# Patient Record
Sex: FEMALE | Race: WHITE | NOT HISPANIC OR LATINO | Employment: OTHER | URBAN - METROPOLITAN AREA
[De-identification: names, ages, dates, MRNs, and addresses within clinical notes are randomized per-mention and may not be internally consistent; named-entity substitution may affect disease eponyms.]

---

## 2017-01-06 ENCOUNTER — OFFICE VISIT (OUTPATIENT)
Dept: PSYCHOLOGY | Facility: CLINIC | Age: 47
End: 2017-01-06
Payer: COMMERCIAL

## 2017-01-06 DIAGNOSIS — F41.1 GENERALIZED ANXIETY DISORDER: ICD-10-CM

## 2017-01-06 DIAGNOSIS — F33.0 MAJOR DEPRESSIVE DISORDER, RECURRENT EPISODE, MILD (H): ICD-10-CM

## 2017-01-06 DIAGNOSIS — F43.10 PTSD (POST-TRAUMATIC STRESS DISORDER): Primary | ICD-10-CM

## 2017-01-06 PROCEDURE — 90791 PSYCH DIAGNOSTIC EVALUATION: CPT | Performed by: MARRIAGE & FAMILY THERAPIST

## 2017-01-06 ASSESSMENT — ANXIETY QUESTIONNAIRES
GAD7 TOTAL SCORE: 15
1. FEELING NERVOUS, ANXIOUS, OR ON EDGE: SEVERAL DAYS
IF YOU CHECKED OFF ANY PROBLEMS ON THIS QUESTIONNAIRE, HOW DIFFICULT HAVE THESE PROBLEMS MADE IT FOR YOU TO DO YOUR WORK, TAKE CARE OF THINGS AT HOME, OR GET ALONG WITH OTHER PEOPLE: EXTREMELY DIFFICULT
6. BECOMING EASILY ANNOYED OR IRRITABLE: SEVERAL DAYS
5. BEING SO RESTLESS THAT IT IS HARD TO SIT STILL: SEVERAL DAYS
2. NOT BEING ABLE TO STOP OR CONTROL WORRYING: NEARLY EVERY DAY
3. WORRYING TOO MUCH ABOUT DIFFERENT THINGS: NEARLY EVERY DAY
7. FEELING AFRAID AS IF SOMETHING AWFUL MIGHT HAPPEN: NEARLY EVERY DAY

## 2017-01-06 ASSESSMENT — PATIENT HEALTH QUESTIONNAIRE - PHQ9: 5. POOR APPETITE OR OVEREATING: NEARLY EVERY DAY
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
## 2017-01-07 ASSESSMENT — PATIENT HEALTH QUESTIONNAIRE - PHQ9: SUM OF ALL RESPONSES TO PHQ QUESTIONS 1-9: 16

## 2017-01-07 ASSESSMENT — ANXIETY QUESTIONNAIRES: GAD7 TOTAL SCORE: 15

## 2017-01-09 DIAGNOSIS — J45.20 INTERMITTENT ASTHMA, UNCOMPLICATED: Primary | ICD-10-CM

## 2017-01-10 DIAGNOSIS — F41.1 GENERALIZED ANXIETY DISORDER: Primary | ICD-10-CM

## 2017-01-11 RX ORDER — ALPRAZOLAM 0.5 MG
TABLET ORAL
Qty: 90 TABLET | Refills: 0 | Status: SHIPPED | OUTPATIENT
Start: 2017-01-11 | End: 2017-04-11

## 2017-01-11 RX ORDER — ALBUTEROL SULFATE 90 UG/1
AEROSOL, METERED RESPIRATORY (INHALATION)
Qty: 18 G | Refills: 1 | Status: SHIPPED | OUTPATIENT
Start: 2017-01-11 | End: 2017-03-06

## 2017-01-11 NOTE — TELEPHONE ENCOUNTER
Routing refill request to provider for review/approval because:  Patient needs to be seen because:  Asthma Action plan is due. RN unable to approve.     Dejuan Sung, RN, BSN

## 2017-01-11 NOTE — TELEPHONE ENCOUNTER
ventolin       Last Written Prescription Date: 10/26/16  Last Fill Quantity: 18, # refills: 2    Last Office Visit with [facility] or [facility] prescribing provider:  11/29/16   Future Office Visit:    Next 5 appointments (look out 90 days)     Jan 12, 2017  2:00 PM   Return Visit with Ezra Varela [address] [address]   [phone]            Jan 19, 2017  1:00 PM   Return Visit with Ezra Varela [facility] ([address] [address]   [phone]            Jan 26, 2017  2:00 PM   Return Visit with Ezra Varela [address] [address]   [phone]                   Date of Last Asthma Action Plan Letter:   Asthma Action Plan Q1 Year    Topic Date Due     Asthma Action Plan - yearly  11/23/2016      Asthma Control Test:   ACT Total Scores 11/29/2016   ACT TOTAL SCORE -   ASTHMA ER VISITS -   ASTHMA HOSPITALIZATIONS -   ACT TOTAL SCORE (Goal Greater than or Equal to 20) 20   In the past 12 months, how many times did you visit the emergency room for your asthma without being admitted to the hospital? 0   In the past 12 months, how many times were you hospitalized overnight because of your asthma? 0       Date of Last Spirometry Test:   No results found for this or any previous visit.

## 2017-01-11 NOTE — TELEPHONE ENCOUNTER
Xanax       Last Written Prescription Date: 10/11/2016  Last Fill Quantity: 90,  # refills: 0   Last Office Visit with [facility] or [facility] prescribing provider: 11/29/2016                                         Next 5 appointments (look out 90 days)     Jan 12, 2017  2:00 PM   Return Visit with Ezra Varela [address] [address] [phone]            Jan 19, 2017  1:00 PM   Return Visit with ROBERTA Lara   [facility] ([address] [address] [phone]            Jan 26, 2017  2:00 PM   Return Visit with Ezra Varela [facility] ([address] [address] [phone]

## 2017-01-15 ENCOUNTER — HOSPITAL ENCOUNTER (EMERGENCY)
Facility: CLINIC | Age: 47
Discharge: HOME | End: 2017-01-15
Attending: EMERGENCY MEDICINE | Admitting: EMERGENCY MEDICINE
Payer: COMMERCIAL

## 2017-01-15 VITALS
DIASTOLIC BLOOD PRESSURE: 78 MMHG | RESPIRATION RATE: 14 BRPM | BODY MASS INDEX: 21.53 KG/M2 | HEART RATE: 66 BPM | WEIGHT: 134 LBS | TEMPERATURE: 97.6 F | HEIGHT: 66 IN | SYSTOLIC BLOOD PRESSURE: 109 MMHG | OXYGEN SATURATION: 100 %

## 2017-01-15 DIAGNOSIS — J02.0 ACUTE STREPTOCOCCAL PHARYNGITIS: ICD-10-CM

## 2017-01-15 LAB
FLUAV+FLUBV AG SPEC QL: NEGATIVE
FLUAV+FLUBV AG SPEC QL: NORMAL
SPECIMEN SOURCE: NORMAL

## 2017-01-15 PROCEDURE — 99284 EMERGENCY DEPT VISIT MOD MDM: CPT | Performed by: EMERGENCY MEDICINE

## 2017-01-15 PROCEDURE — 87804 INFLUENZA ASSAY W/OPTIC: CPT | Performed by: EMERGENCY MEDICINE

## 2017-01-15 PROCEDURE — 99283 EMERGENCY DEPT VISIT LOW MDM: CPT

## 2017-01-15 RX ORDER — CEFUROXIME AXETIL 500 MG/1
500 TABLET ORAL 2 TIMES DAILY
Qty: 20 TABLET | Refills: 0 | Status: SHIPPED | OUTPATIENT
Start: 2017-01-15 | End: 2017-02-03

## 2017-01-15 ASSESSMENT — ENCOUNTER SYMPTOMS
HEADACHES: 1
FEVER: 1
NAUSEA: 1
CHEST TIGHTNESS: 1
COUGH: 1
DIAPHORESIS: 1
VOMITING: 0
DIARRHEA: 0
WEAKNESS: 1

## 2017-01-15 NOTE — ED PROVIDER NOTES
"  History     Chief Complaint   Patient presents with     Generalized Weakness     The history is provided by the patient.     Sherie Otero is a 46 year old female with a history of Allergic Rhinitis and Asthma, who presents to the ED complaining of generalized weakness. She reports that her symptoms began 5 days ago with weakness, an occipital headache, subjective fever, diaphoresis, a mild cough, mild nausea, and one episode of chest tightness. She \"feels miserable\". Patient states that her daughter has been ill for about 5 days, and her son has the stomach flu. She denies any emesis, diarrhea, or other associated symptoms. Patient did not get an Influenza Vaccination this year. Patient does use Tobacco, smoking about 1.5 PPD.     Patient Active Problem List   Diagnosis     CARDIOVASCULAR SCREENING; LDL GOAL LESS THAN 160     Chronic fatigue syndrome     Fibromyalgia     Generalized anxiety disorder     Intermittent asthma     Tobacco abuse     Migraine headache     SHANTANU (obstructive sleep apnea)     Disturbance in sleep behavior     Cervicalgia     Stenosis, cervical spine     Spondylitis, cervical (H)     NSAID induced gastritis     Major depressive disorder, recurrent episode, mild (H)     Other chronic pain     Intermittent asthma, uncomplicated     Rheumatoid arthritis involving multiple sites with positive rheumatoid factor (H)     Elevated white blood cell count     Panic attacks     Grief reaction     Osteoarthritis of cervical spine, unspecified spinal osteoarthritis complication status     Degenerative joint disease of cervical spine     Past Medical History   Diagnosis Date     Tension headache      Other specified gastritis without mention of hemorrhage      Irritable bowel syndrome      Generalized anxiety disorder      Other malaise and fatigue      fibromyalgia     Lump or mass in breast 1996     Left breast lump 1996-- aspiration benign.     Pain in joint, lower leg      patello-femoral syndrome "     Intrinsic asthma, unspecified      Allergic rhinitis due to other allergen      Rheumatoid arthritis(714.0)      Hearing loss      Stenosis, cervical spine      C5-C7 (MRI)     Spondylitis, cervical (H)      C5-C7 (MRI)     Spindle cell carcinoma (H) 2013     Imo Update utility     Degenerative joint disease of cervical spine 2016     multi level worst at C5-6       Past Surgical History   Procedure Laterality Date     C appendectomy       Ruptured at age 7 years     C  delivery only       , Low Cervical     Dilation and curettage, hysteroscopy, ablate endometrium novasure, combined  2011     Procedure:COMBINED DILATION AND CURETTAGE, HYSTEROSCOPY, ABLATE ENDOMETRIUM NOVASURE; hysteroscopy, dilation and curettage, novasure; Surgeon:JEREMY ANNA; Location:[facility] OR     Hc hysteros w permanent fallopain implant       Essure done in office Davian amaya guidewire       Excise lesion trunk  2013     Procedure: EXCISE LESION TRUNK;  interlaminar epidural Steroid Injection Cervical -thoracic Levels (C7-T1)    Re-Excision of chest wall mass;  Surgeon: Jeremy Bolaños MD;  Location: [facility] OR     Inject facet joint  2014     Procedure: INJECT FACET JOINT;  diagnostic medial branch facet nerve block cervical levels 5-6, 6-7;  Surgeon: Josué Munson MD;  Location: [facility]     Inject block medial branch cervical/thoracic/lumbar  2014     Procedure: INJECT BLOCK MEDIAL BRANCH CERVICAL / THORACIC / LUMBAR;  Surgeon: Josué Munson MD;  Location: [facility]       Family History   Problem Relation Age of Onset     Arthritis Mother      Rheumatoid     Respiratory Mother      COPD     Hypertension Sister      1/2 sister     Neurologic Disorder Sister      1/2 sisiter  Very mild form of CP     HEART DISEASE Maternal Aunt      Bypass at age 50's     Cardiovascular Maternal Grandmother        Social History   Substance Use Topics     Smoking status: Current Every Day  Smoker -- 0.50 packs/day for 29 years     Types: Cigarettes     Smokeless tobacco: Never Used      Comment: 9/19/11 - 1pk/day     Alcohol Use: No        Immunization History   Administered Date(s) Administered     TDAP (BOOSTRIX AGES 10-64) 03/19/2014          Allergies   Allergen Reactions     Codeine Nausea and Vomiting     Droperidol      Uncontrollable shaking       Penicillins        Current Outpatient Prescriptions   Medication Sig Dispense Refill     cefUROXime (CEFTIN) 500 MG tablet Take 1 tablet (500 mg) by mouth 2 times daily 20 tablet 0     VENTOLIN  (90 BASE) MCG/ACT Inhaler INHALE 2 PUFFS INTO THE LUNGS EVERY 6 HOURS 18 g 1     ALPRAZolam (XANAX) 0.5 MG tablet TAKE ONE TABLET BY MOUTH THREE TIMES DAILY 90 tablet 0     clonazePAM (KLONOPIN) 0.5 MG tablet TAKE 1/2 TO 1 TABLET TWO TIMES DAILY AS NEEDED FOR ANXIETY 60 tablet 0     HYDROcodone-acetaminophen (NORCO) 7.5-325 MG per tablet TAKE 2 TABLETS BY MOUTH EVERY 6 HOURS AS NEEDED FOR PAIN -MAX 8 TABS/ tablet 0     cephALEXin (KEFLEX) 500 MG capsule TAKE 1 CAPSULE (500 MG) BY MOUTH 3 TIMES DAILY FOR 14 DAYS 42 capsule 0     busPIRone (BUSPAR) 10 MG tablet Take 5 mg (1/2 tab) in the morning and 10 mg at night 135 tablet 1     DULoxetine (CYMBALTA) 60 MG capsule TAKE ONE CAPSULE BY MOUTH EVERY EVENING - KADY 90 capsule 3     verapamil (CALAN) 40 MG tablet TAKE ONE TABLET BY MOUTH TWICE DAILY 60 tablet 8     omeprazole (PRILOSEC) 20 MG capsule TAKE 1 CAPSULE (20 MG) BY MOUTH DAILY 30 capsule 8     ranitidine (ZANTAC) 300 MG tablet TAKE 1 TABLET (300 MG) BY MOUTH AT BEDTIME 30 tablet 8     Prenatal Vit-Fe Fumarate-FA (PNV PRENATAL PLUS MULTIVITAMIN) 27-1 MG TABS TAKE ONE TABLET BY MOUTH DAILY 30 tablet 11     cetirizine (ZYRTEC) 10 MG tablet TAKE  ONE TABLET BY MOUTH EVERY DAY 30 tablet 10     fluticasone (FLONASE) 50 MCG/ACT nasal spray Spray 1-2 sprays into both nostrils daily 16 g 1       I have reviewed the Medications, Allergies, Past  "Medical and Surgical History, and Social History in the Epic system.    Review of Systems   Constitutional: Positive for fever and diaphoresis.   Respiratory: Positive for cough and chest tightness.    Gastrointestinal: Positive for nausea. Negative for vomiting and diarrhea.   Neurological: Positive for weakness and headaches (occipital).   All other systems reviewed and are negative.      Physical Exam   BP: 109/78 mmHg  Pulse: 78  Temp: 97.6  F (36.4  C)  Resp: 16  Height: 167.6 cm (5' 6\")  Weight: 60.782 kg (134 lb)  SpO2: 100 %    Physical Exam   Constitutional: She is oriented to person, place, and time. No distress.   HENT:   Nose: Mucosal edema and rhinorrhea present.   Mouth/Throat: Oropharynx is clear and moist and mucous membranes are normal.   Neck: Normal range of motion. Neck supple.   Cardiovascular: Normal rate, regular rhythm, normal heart sounds and intact distal pulses.    No murmur heard.  Pulmonary/Chest: Effort normal. She has no decreased breath sounds. She has wheezes (Scant expiratory wheezes bilaterally, at base). She has no rhonchi. She has no rales.   Abdominal: Soft. Bowel sounds are normal. There is no tenderness. There is no rebound.   Lymphadenopathy:     She has no cervical adenopathy.   Neurological: She is alert and oriented to person, place, and time.   Skin: Skin is warm. No rash noted. She is not diaphoretic.   Psychiatric: She has a normal mood and affect. Her behavior is normal.   Nursing note and vitals reviewed.      ED Course   Procedures                 Results for orders placed or performed during the hospital encounter of 01/15/17 (from the past 24 hour(s))   Influenza A/B antigen   Result Value Ref Range    Influenza A/B Agn Specimen Nasopharyngeal     Influenza A Negative NEG    Influenza B  NEG     Negative   Test results must be correlated with clinical data. If necessary, results   should be confirmed by a molecular assay or viral culture.         Medications - No " data to display    Assessments & Plan (with Medical Decision Making)  Sherie is a 46 year old female who presents to the ED complaining of generalized weakness, chest tightness, cough, nausea, diaphoresis, a subjective fever, and occipital headache for 5 days. Patient is afebrile with SpO2 of 100% and normal vitals upon presentation to the ED. She exhibits mucosal edema, rhinorrhea, and bilateral wheezes at the base. Influenza Swab, which resulted negative.  In addition, her daughter is positive for strep and had sustained timeline and symptoms that the patient has reported Sherie on Ceftin 500 mg twice daily for 7 days for presumed strep pharyngitis.  The patient's headache is most likely due to decreased by mouth intake and therefore I encouraged her to push fluids, take ibuprofen for the pain, and rest.  I reviewed with the patient indications return to the ED for reevaluation.  All questions with a sure answered and she was suitable for discharge.       I have reviewed the nursing notes.    I have reviewed the findings, diagnosis, plan and need for follow up with the patient.    New Prescriptions    CEFUROXIME (CEFTIN) 500 MG TABLET    Take 1 tablet (500 mg) by mouth 2 times daily       Final diagnoses:   Acute streptococcal pharyngitis     This document serves as a record of services personally performed by Obinna Mims MD. It was created on their behalf by Yamel Ji, a trained medical scribe. The creation of this record is based on the provider's personal observations and the statements of the patient. This document has been checked and approved by the attending provider.    Note: Chart documentation done in part with Dragon Voice Recognition software. Although reviewed after completion, some word and grammatical errors may remain.    1/15/2017   [facility] EMERGENCY DEPARTMENT      Obinna Mims MD  01/15/17 1649

## 2017-01-15 NOTE — ED AVS SNAPSHOT
[facility] Emergency Department    [address] [place] [address]    Phone:  [phone]    Fax:  [phone]                                       Sherie Otero   MRN: [id number]    Department:  [facility] Emergency Department   Date of Visit:  1/15/2017           After Visit Summary Signature Page     I have received my discharge instructions, and my questions have been answered. I have discussed any challenges I see with this plan with the nurse or doctor.    ..........................................................................................................................................  Patient/Patient Representative Signature      ..........................................................................................................................................  Patient Representative Print Name and Relationship to Patient    ..................................................               ................................................  Date                                            Time    ..........................................................................................................................................  Reviewed by Signature/Title    ...................................................              ..............................................  Date                                                            Time

## 2017-01-15 NOTE — ED AVS SNAPSHOT
[facility] Emergency Department    [address]     [place] [address]    Phone:  [phone]    Fax:  [phone]                                       Sherie Otero   MRN: [id number]    Department:  [facility] Emergency Department   Date of Visit:  1/15/2017           Patient Information     Date Of Birth          1970        Your diagnoses for this visit were:     Acute streptococcal pharyngitis        You were seen by Obinna Mims MD.      Follow-up Information     Follow up with Selina Reveles PA-C.    Specialty:  Family Practice    Why:  As needed    Contact information:    [facility]  [address]   [place] [address] [phone]          Discharge Instructions         Pharyngitis: Strep (Presumed)    You have pharyngitis (sore throat). The cause is thought to be the streptococcus, or strep, bacterium. Strep throat infection can cause throat pain that is worse when swallowing, aching all over, headache, and fever. The infection may be spread by coughing, kissing, or touching others after touching your mouth or nose. Antibiotic medications are given to treat the infection.  Home care    Rest at home. Drink plenty of fluids to avoid dehydration.    No work or school for the first 2 days of taking the antibiotics. After this time, you will not be contagious. You can then return to work or school if you are feeling better.     The antibiotic medication must be taken for the full 10 days, even if you feel better. This is very important to ensure the infection is treated. It is also important to prevent drug-resistant organisms from developing. If you were given an antibiotic shot, no more antibiotics are needed.    You may use acetaminophen or ibuprofen to control pain or fever, unless another medicine was prescribed for this. If you have chronic liver or kidney disease or ever had a stomach ulcer or GI bleeding, talk with your doctor before  using these medicines.    Throat lozenges or a throat-numbing sprays can help reduce throat pain. Gargling with warm salt water can also help. Dissolve 1/2 teaspoon of salt in 1 8 ounce glass of warm water.     Avoid salty or spicy foods, which can irritate the throat.  Follow-up care  Follow up with your healthcare provider or our staff if you are not improving over the next week.  When to seek medical advice  Call your healthcare provider right away if any of these occur:    Fever as directed by your doctor.     New or worsening ear pain, sinus pain, or headache    Painful lumps in the back of neck    Stiff neck    Lymph nodes are getting larger    Inability to swallow liquids, excessive drooling, or inability to open mouth wide due to throat pain    Signs of dehydration (very dark urine or no urine, sunken eyes, dizziness)    Trouble breathing or noisy breathing    Muffled voice    New rash    4582-5052 [employer]. [address] [place] [address]. All rights reserved. This information is not intended as a substitute for professional medical care. Always follow your healthcare professional's instructions.          Future Appointments        Provider Department Dept Phone Center    1/19/2017 1:00 PM ROBERTA Lennon [facility] [phone] [facility]    1/19/2017 2:10 PM Brady Lorenzo PA-C [facility] [phone] [facility]    1/26/2017 2:00 PM ROBERTA Lennon [facility] [phone] [facility]      24 Hour Appointment Hotline       To make an appointment at any [facility], call [phone] ([phone]). If you don't have a family doctor or clinic, we will help you find one. [facility] are conveniently located to serve the needs of you and your family.             Review of your medicines      START taking        Dose / Directions Last dose taken    cefUROXime 500 MG tablet   Commonly known  as:  CEFTIN   Dose:  500 mg   Quantity:  20 tablet        Take 1 tablet (500 mg) by mouth 2 times daily   Refills:  0          Our records show that you are taking the medicines listed below. If these are incorrect, please call your family doctor or clinic.        Dose / Directions Last dose taken    ALPRAZolam 0.5 MG tablet   Commonly known as:  XANAX   Quantity:  90 tablet        TAKE ONE TABLET BY MOUTH THREE TIMES DAILY   Refills:  0        busPIRone 10 MG tablet   Commonly known as:  BUSPAR   Quantity:  135 tablet        Take 5 mg (1/2 tab) in the morning and 10 mg at night   Refills:  1        cephALEXin 500 MG capsule   Commonly known as:  KEFLEX   Quantity:  42 capsule        TAKE 1 CAPSULE (500 MG) BY MOUTH 3 TIMES DAILY FOR 14 DAYS   Refills:  0        cetirizine 10 MG tablet   Commonly known as:  zyrTEC   Quantity:  30 tablet        TAKE  ONE TABLET BY MOUTH EVERY DAY   Refills:  10        clonazePAM 0.5 MG tablet   Commonly known as:  klonoPIN   Quantity:  60 tablet        TAKE 1/2 TO 1 TABLET TWO TIMES DAILY AS NEEDED FOR ANXIETY   Refills:  0        DULoxetine 60 MG EC capsule   Commonly known as:  CYMBALTA   Quantity:  90 capsule        TAKE ONE CAPSULE BY MOUTH EVERY EVENING - KADY   Refills:  3        fluticasone 50 MCG/ACT spray   Commonly known as:  FLONASE   Dose:  1-2 spray   Quantity:  16 g        Spray 1-2 sprays into both nostrils daily   Refills:  1        HYDROcodone-acetaminophen 7.5-325 MG per tablet   Commonly known as:  NORCO   Quantity:  240 tablet        TAKE 2 TABLETS BY MOUTH EVERY 6 HOURS AS NEEDED FOR PAIN -MAX 8 TABS/DAY   Refills:  0        omeprazole 20 MG CR capsule   Commonly known as:  priLOSEC   Quantity:  30 capsule        TAKE 1 CAPSULE (20 MG) BY MOUTH DAILY   Refills:  8        PNV PRENATAL PLUS MULTIVITAMIN 27-1 MG Tabs   Quantity:  30 tablet        TAKE ONE TABLET BY MOUTH DAILY   Refills:  11        ranitidine 300 MG tablet   Commonly known as:  ZANTAC   Quantity:  30  "tablet        TAKE 1 TABLET (300 MG) BY MOUTH AT BEDTIME   Refills:  8        VENTOLIN  (90 BASE) MCG/ACT Inhaler   Quantity:  18 g   Generic drug:  albuterol        INHALE 2 PUFFS INTO THE LUNGS EVERY 6 HOURS   Refills:  1        verapamil 40 MG tablet   Commonly known as:  CALAN   Quantity:  60 tablet        TAKE ONE TABLET BY MOUTH TWICE DAILY   Refills:  8                Prescriptions were sent or printed at these locations (1 Prescription)                   [facility]   [address] [address]    Telephone:  [phone]   Fax:  [phone]   Hours:                  These medications are not ready yet because we are checking if your insurance will help you pay for them. Call your pharmacy to confirm that your medication is ready for pickup. It may take up to 24 hours for them to receive the prescription. If the prescription is not ready within 3 business days, please contact your clinic or your provider (1 of 1)         cefUROXime (CEFTIN) 500 MG tablet                Procedures and tests performed during your visit     Influenza A/B antigen      Orders Needing Specimen Collection     None      Pending Results     No orders found from 1/14/2017 to 1/16/2017.            Pending Culture Results     No orders found from 1/14/2017 to 1/16/2017.            Thank you for choosing [facility]       Thank you for choosing [facility] for your care. Our goal is always to provide you with excellent care. Hearing back from our patients is one way we can continue to improve our services. Please take a few minutes to complete the written survey that you may receive in the mail after you visit with us. Thank you!        [facility] Information     [employer] lets you send messages to your doctor, view your test results, renew your prescriptions, schedule appointments and more. To sign up, go to [email or web address] . Click on \"Log in\" on the left side of the screen, which will " "take you to the Welcome page. Then click on \"Sign up Now\" on the right side of the page.     You will be asked to enter the access code listed below, as well as some personal information. Please follow the directions to create your username and password.     Your access code is: [email or web address]  Expires: 2017  3:00 PM     Your access code will  in 90 days. If you need help or a new code, please call your [facility] clinic or [phone].        Care EveryWhere ID     This is your Care EveryWhere ID. This could be used by other organizations to access your [facility] medical records  [id number]        After Visit Summary       This is your record. Keep this with you and show to your community pharmacist(s) and doctor(s) at your next visit.                  "

## 2017-01-16 DIAGNOSIS — M47.812 OSTEOARTHRITIS OF CERVICAL SPINE, UNSPECIFIED SPINAL OSTEOARTHRITIS COMPLICATION STATUS: Primary | ICD-10-CM

## 2017-01-16 NOTE — DISCHARGE INSTRUCTIONS
Pharyngitis: Strep (Presumed)    You have pharyngitis (sore throat). The cause is thought to be the streptococcus, or strep, bacterium. Strep throat infection can cause throat pain that is worse when swallowing, aching all over, headache, and fever. The infection may be spread by coughing, kissing, or touching others after touching your mouth or nose. Antibiotic medications are given to treat the infection.  Home care    Rest at home. Drink plenty of fluids to avoid dehydration.    No work or school for the first 2 days of taking the antibiotics. After this time, you will not be contagious. You can then return to work or school if you are feeling better.     The antibiotic medication must be taken for the full 10 days, even if you feel better. This is very important to ensure the infection is treated. It is also important to prevent drug-resistant organisms from developing. If you were given an antibiotic shot, no more antibiotics are needed.    You may use acetaminophen or ibuprofen to control pain or fever, unless another medicine was prescribed for this. If you have chronic liver or kidney disease or ever had a stomach ulcer or GI bleeding, talk with your doctor before using these medicines.    Throat lozenges or a throat-numbing sprays can help reduce throat pain. Gargling with warm salt water can also help. Dissolve 1/2 teaspoon of salt in 1 8 ounce glass of warm water.     Avoid salty or spicy foods, which can irritate the throat.  Follow-up care  Follow up with your healthcare provider or our staff if you are not improving over the next week.  When to seek medical advice  Call your healthcare provider right away if any of these occur:    Fever as directed by your doctor.     New or worsening ear pain, sinus pain, or headache    Painful lumps in the back of neck    Stiff neck    Lymph nodes are getting larger    Inability to swallow liquids, excessive drooling, or inability to open mouth wide due to throat  pain    Signs of dehydration (very dark urine or no urine, sunken eyes, dizziness)    Trouble breathing or noisy breathing    Muffled voice    New rash    [id number] [employer]. [address] [place] [address], [place] [address]. All rights reserved. This information is not intended as a substitute for professional medical care. Always follow your healthcare professional's instructions.

## 2017-01-16 NOTE — TELEPHONE ENCOUNTER
[place]      Last Written Prescription Date: 12/9/16  Last Fill Quantity: 240,  # refills: 0   Last Office Visit with [facility] or [facility] prescribing provider: 11/29/16                                         Next 5 appointments (look out 90 days)     Jan 19, 2017  1:00 PM   Return Visit with ROBERTA Lara   [facility] ([address] [address] [phone]            Jan 26, 2017  2:00 PM   Return Visit with ROBERTA Lara   [facility] ([facility])    [address] [place] [id number] [phone]                  Paula Og CMA ([facility])

## 2017-01-17 RX ORDER — HYDROCODONE BITARTRATE AND ACETAMINOPHEN 7.5; 325 MG/1; MG/1
TABLET ORAL
Qty: 240 TABLET | Refills: 0 | Status: SHIPPED | OUTPATIENT
Start: 2017-01-17 | End: 2017-02-21

## 2017-01-18 ENCOUNTER — TELEPHONE (OUTPATIENT)
Dept: FAMILY MEDICINE | Facility: CLINIC | Age: 47
End: 2017-01-18

## 2017-01-18 NOTE — TELEPHONE ENCOUNTER
Patient notified that per Selina Reveles PA-C a z-soco does not treat strep. Patient offered an appt tomorrow with Selina Reveles PA-C. Patient declined and asked to be kept on a cancellation list because she can not make that appt time. Patient had no further questions at this time.  Paula Og CMA ([facility])

## 2017-01-18 NOTE — TELEPHONE ENCOUNTER
Vilma is calling back wondering if she could be worked in at 945 tomorrow as discussed previously? Please call vilma back

## 2017-01-18 NOTE — TELEPHONE ENCOUNTER
Yes this is fine. Appt scheduled. Please inform patient.  Paula Og CMA ([facility])

## 2017-01-18 NOTE — TELEPHONE ENCOUNTER
Reason for Call:  Medication or medication refill:    Do you use a [facility]?  Name of the pharmacy and phone number for the current request:  [facility] - [phone]    Name of the medication requested: patient was in the ED Sunday and they said that she had strep and was given cefUROXime (CEFTIN) 500 MG tablet. Patient doesn't feel like this is helping as fast as a z pack would. Requesting Selina to send a z pack to her pharmacy     Other request:     Can we leave a detailed message on this number? YES    Phone number patient can be reached at: Home number on file [phone] (home)    Best Time: any    Call taken on 1/18/2017 at 11:09 AM by Lizett Ryder

## 2017-01-19 ENCOUNTER — HOSPITAL ENCOUNTER (OUTPATIENT)
Dept: CT IMAGING | Facility: CLINIC | Age: 47
End: 2017-01-19
Attending: PHYSICIAN ASSISTANT
Payer: COMMERCIAL

## 2017-01-19 ENCOUNTER — HOSPITAL ENCOUNTER (OUTPATIENT)
Dept: GENERAL RADIOLOGY | Facility: CLINIC | Age: 47
Discharge: HOME | End: 2017-01-19
Attending: PHYSICIAN ASSISTANT | Admitting: PHYSICIAN ASSISTANT
Payer: COMMERCIAL

## 2017-01-19 ENCOUNTER — OFFICE VISIT (OUTPATIENT)
Dept: FAMILY MEDICINE | Facility: CLINIC | Age: 47
End: 2017-01-19
Payer: COMMERCIAL

## 2017-01-19 VITALS
WEIGHT: 131 LBS | BODY MASS INDEX: 21.15 KG/M2 | RESPIRATION RATE: 16 BRPM | TEMPERATURE: 98.2 F | DIASTOLIC BLOOD PRESSURE: 70 MMHG | SYSTOLIC BLOOD PRESSURE: 110 MMHG | HEART RATE: 76 BPM

## 2017-01-19 DIAGNOSIS — R07.9 RIGHT-SIDED CHEST PAIN: ICD-10-CM

## 2017-01-19 DIAGNOSIS — R59.0 HILAR LYMPHADENOPATHY: ICD-10-CM

## 2017-01-19 DIAGNOSIS — R79.89 ELEVATED D-DIMER: ICD-10-CM

## 2017-01-19 DIAGNOSIS — R93.89 ABNORMAL CT OF THE CHEST: ICD-10-CM

## 2017-01-19 DIAGNOSIS — R50.9 FEVER, UNSPECIFIED: ICD-10-CM

## 2017-01-19 DIAGNOSIS — R50.9 FEVER, UNSPECIFIED: Primary | ICD-10-CM

## 2017-01-19 DIAGNOSIS — R07.0 THROAT PAIN: ICD-10-CM

## 2017-01-19 DIAGNOSIS — R91.8 PULMONARY NODULES: ICD-10-CM

## 2017-01-19 LAB
BASOPHILS # BLD AUTO: 0.1 10E9/L (ref 0–0.2)
BASOPHILS NFR BLD AUTO: 0.6 %
CRP SERPL-MCNC: 39.5 MG/L (ref 0–8)
D DIMER PPP FEU-MCNC: 1.7 UG/ML FEU (ref 0–0.5)
DIFFERENTIAL METHOD BLD: NORMAL
EOSINOPHIL # BLD AUTO: 0.1 10E9/L (ref 0–0.7)
EOSINOPHIL NFR BLD AUTO: 1.3 %
ERYTHROCYTE [DISTWIDTH] IN BLOOD BY AUTOMATED COUNT: 12.4 % (ref 10–15)
HCT VFR BLD AUTO: 42.6 % (ref 35–47)
HETEROPH AB SER QL: NEGATIVE
HGB BLD-MCNC: 14.4 G/DL (ref 11.7–15.7)
IMM GRANULOCYTES # BLD: 0 10E9/L (ref 0–0.4)
IMM GRANULOCYTES NFR BLD: 0.2 %
LYMPHOCYTES # BLD AUTO: 3.3 10E9/L (ref 0.8–5.3)
LYMPHOCYTES NFR BLD AUTO: 31.9 %
MCH RBC QN AUTO: 32.8 PG (ref 26.5–33)
MCHC RBC AUTO-ENTMCNC: 33.8 G/DL (ref 31.5–36.5)
MCV RBC AUTO: 97 FL (ref 78–100)
MONOCYTES # BLD AUTO: 1.2 10E9/L (ref 0–1.3)
MONOCYTES NFR BLD AUTO: 11.7 %
NEUTROPHILS # BLD AUTO: 5.6 10E9/L (ref 1.6–8.3)
NEUTROPHILS NFR BLD AUTO: 54.3 %
PLATELET # BLD AUTO: 242 10E9/L (ref 150–450)
PLATELET # BLD EST: NORMAL 10*3/UL
RBC # BLD AUTO: 4.39 10E12/L (ref 3.8–5.2)
RBC MORPH BLD: NORMAL
WBC # BLD AUTO: 10.3 10E9/L (ref 4–11)

## 2017-01-19 PROCEDURE — 36415 COLL VENOUS BLD VENIPUNCTURE: CPT | Performed by: PHYSICIAN ASSISTANT

## 2017-01-19 PROCEDURE — 71020 XR CHEST 2 VW: CPT | Mod: TC

## 2017-01-19 PROCEDURE — 86308 HETEROPHILE ANTIBODY SCREEN: CPT | Performed by: PHYSICIAN ASSISTANT

## 2017-01-19 PROCEDURE — 25500064 ZZH RX 255 OP 636: Performed by: PHYSICIAN ASSISTANT

## 2017-01-19 PROCEDURE — 71260 CT THORAX DX C+: CPT

## 2017-01-19 PROCEDURE — 86140 C-REACTIVE PROTEIN: CPT | Performed by: PHYSICIAN ASSISTANT

## 2017-01-19 PROCEDURE — 99215 OFFICE O/P EST HI 40 MIN: CPT | Performed by: PHYSICIAN ASSISTANT

## 2017-01-19 PROCEDURE — 25000125 ZZHC RX 250: Performed by: PHYSICIAN ASSISTANT

## 2017-01-19 PROCEDURE — 85025 COMPLETE CBC W/AUTO DIFF WBC: CPT | Performed by: PHYSICIAN ASSISTANT

## 2017-01-19 PROCEDURE — 85379 FIBRIN DEGRADATION QUANT: CPT | Performed by: PHYSICIAN ASSISTANT

## 2017-01-19 RX ORDER — IOPAMIDOL 755 MG/ML
100 INJECTION, SOLUTION INTRAVASCULAR ONCE
Status: COMPLETED | OUTPATIENT
Start: 2017-01-19 | End: 2017-01-19

## 2017-01-19 RX ADMIN — IOPAMIDOL 65 ML: 755 INJECTION, SOLUTION INTRAVENOUS at 12:36

## 2017-01-19 RX ADMIN — SODIUM CHLORIDE 61 ML: 9 INJECTION, SOLUTION INTRAVENOUS at 12:29

## 2017-01-19 ASSESSMENT — PAIN SCALES - GENERAL: PAINLEVEL: NO PAIN (0)

## 2017-01-19 NOTE — PROGRESS NOTES
SUBJECTIVE:                                                    Sherie Otero is a 46 year old female with long history of multiple medical problems including documented rheumatoid arthritis kind, fibromyalgia, chronic fatigue, longtime smoker who presents to clinic today for the following health issues:      ED/UC Followup:    Facility:  [facility]  Date of visit: 1/15/17  Reason for visit: Acute Streptococcal Pharyngitis - presumed - no test. Daughter with + strep. Flu swab negative.  Current Status: Patient was treated with Ceftin and has had no improvement - continues to have a waxing and waning of symptoms. Biggest concerns today are off-and-on hot and cold flashes and she has had intermittent particularly with lying down right upper chest discomfort that radiates to her back. She has smoked for the majority of her adult life.      ED COURSE:  Assessments & Plan (with Medical Decision Making)  Sherie is a 46 year old female who presents to the ED complaining of generalized weakness, chest tightness, cough, nausea, diaphoresis, a subjective fever, and occipital headache for 5 days. Patient is afebrile with SpO2 of 100% and normal vitals upon presentation to the ED. She exhibits mucosal edema, rhinorrhea, and bilateral wheezes at the base. Influenza Swab, which resulted negative.  In addition, her daughter is positive for strep and had sustained timeline and symptoms that the patient has reported Sherie on Ceftin 500 mg twice daily for 7 days for presumed strep pharyngitis.  The patient's headache is most likely due to decreased by mouth intake and therefore I encouraged her to push fluids, take ibuprofen for the pain, and rest.     Continues with hot/cold, myalgias at night, pain R upper anterior chest at night time.      Problem list and histories reviewed & adjusted, as indicated.  Additional history: as documented    Problem list, Medication list, Allergies, and Medical/Social/Surgical histories reviewed in [facility]  and updated as appropriate.    ROS:  Constitutional, HEENT, cardiovascular, pulmonary, GI, , musculoskeletal, neuro, skin, endocrine and psych systems are negative, except as otherwise noted. States today other than the upper respiratory and lower respiratory concerns, she is stable with all of her other chronic medical conditions.    OBJECTIVE:                                                    /70 mmHg  Pulse 76  Temp(Src) 98.2  F (36.8  C) (Tympanic)  Resp 16  Wt 131 lb (59.421 kg)  Body mass index is 21.15 kg/(m^2).   GENERAL: alert, no distress and appears older than stated age  EYES: Eyes grossly normal to inspection, PERRL and conjunctivae and sclerae normal  HENT: ear canals and TM's normal, nose and mouth without ulcers or lesions  NECK: no adenopathy, no asymmetry, masses, or scars and thyroid normal to palpation  RESP: lungs clear to auscultation - no rales, rhonchi or wheezes  CV: regular rate and rhythm, normal S1 S2, no S3 or S4, no murmur, click or rub, no peripheral edema and peripheral pulses strong  LUNGS: Lungs sound clear however she is guarded with taking deep breaths as she has pain in her right upper lung with deep breaths. No shortness of breath or dyspnea.  ABDOMEN: soft, nontender, no hepatosplenomegaly, no masses and bowel sounds normal  MS: no gross musculoskeletal defects noted, no edema  SKIN: no suspicious lesions or rashes  PSYCH: affect flat and this is typical of her normal demeanor.    Diagnostic Test Results:  Results for orders placed or performed in visit on 01/19/17 (from the past 24 hour(s))   D dimer, quantitative   Result Value Ref Range    D Dimer 1.7 (H) 0.0 - 0.50 ug/ml FEU   CBC with platelets and differential   Result Value Ref Range    WBC 10.3 4.0 - 11.0 10e9/L    RBC Count 4.39 3.8 - 5.2 10e12/L    Hemoglobin 14.4 11.7 - 15.7 g/dL    Hematocrit 42.6 35.0 - 47.0 %    MCV 97 78 - 100 fl    MCH 32.8 26.5 - 33.0 pg    MCHC 33.8 31.5 - 36.5 g/dL    RDW 12.4  10.0 - 15.0 %    Platelet Count 242 150 - 450 10e9/L    Diff Method Automated Method     % Neutrophils 54.3 %    % Lymphocytes 31.9 %    % Monocytes 11.7 %    % Eosinophils 1.3 %    % Basophils 0.6 %    % Immature Granulocytes 0.2 %    Absolute Neutrophil 5.6 1.6 - 8.3 10e9/L    Absolute Lymphocytes 3.3 0.8 - 5.3 10e9/L    Absolute Monocytes 1.2 0.0 - 1.3 10e9/L    Absolute Eosinophils 0.1 0.0 - 0.7 10e9/L    Absolute Basophils 0.1 0.0 - 0.2 10e9/L    Abs Immature Granulocytes 0.0 0 - 0.4 10e9/L    RBC Morphology Normal     Platelet Estimate Normal    CRP, inflammation   Result Value Ref Range    CRP Inflammation 39.5 (H) 0.0 - 8.0 mg/L   Mononucleosis screen   Result Value Ref Range    Mononucleosis Screen Negative NEG     CXR:  IMPRESSION:  1. Right lateral basilar atelectasis, infiltrate, and/or pleural  fluid.  2. Prominent right hilum. Adenopathy is difficult to entirely exclude.  Further evaluation with CT is recommended.    CHEST CT (INITIALLY ORDERED DUE TO ELEVATED D DIMER IN PRESENCE OF R CHEST PAIN  IMPRESSION:  1. Multiple bilateral pulmonary nodules measuring up to 1.2 cm in  maximum dimension, mediastinal and bilateral hilar lymphadenopathy,  and right pleural fluid collection could be secondary to rheumatoid  arthritis with rheumatoid nodules and mediastinal lymphadenopathy.  Constellation of findings could represent rheumatoid arthritis with  rheumatoid nodules, right pleural fluid, and mediastinal  lymphadenopathy, although lymphadenopathy is considered somewhat  atypical with rheumatoid arthritis. Differential diagnosis also  includes metastases. I recommend CT/PET for further evaluation.  Alternatively, close interval follow-up CT chest in 3 months, to  ensure stability or resolution, can be performed.     ASSESSMENT:                                                       Fever, unspecified  Throat pain  Right-sided chest pain  Elevated d-dimer  Abnormal CT of the chest  Pulmonary nodules  Hilar  lymphadenopathy      PLAN:                                                        ICD-10-CM    1. Fever, unspecified R50.9 CBC with platelets and differential     CRP, inflammation     Mononucleosis screen     XR Chest 2 Views   2. Throat pain R07.0 CBC with platelets and differential     CRP, inflammation     Mononucleosis screen   3. Right-sided chest pain R07.9 D dimer, quantitative     CBC with platelets and differential     CRP, inflammation     XR Chest 2 Views     CANCELED: CT Chest w Contrast   4. Elevated d-dimer R79.1 CANCELED: CT Chest w Contrast   5. Abnormal CT of the chest R93.8 Creatinine     CT Abdomen Pelvis w Contrast     PULMONARY MEDICINE REFERRAL     PET Oncology (Eyes to Thighs)   6. Pulmonary nodules R91.8 CT Abdomen Pelvis w Contrast     PULMONARY MEDICINE REFERRAL     PET Oncology (Eyes to Thighs)   7. Hilar lymphadenopathy R59.0 PULMONARY MEDICINE REFERRAL     PET Oncology (Eyes to Thighs)           I will have her finish the Ceftin. CT scan confirms that there is not an infiltrate as the chest x-ray question. I reviewed her lab studies as well as CT with her today-there are larger concerns that she may have a primary tumor that may be creating the hilar lymphadenopathy noted. She also has multiple pulmonary nodules all of about the same size and less than 3 cm. Radiologist called me with the findings of the CT and originally felt that a CT of the abdomen/pelvis would be the next step to look for a primary tumor. He then called me back after doing some research and stated that a CT PET scan would be a better option-went through the paperwork to get this ordered-sounds like she is going to have to get an approval from her insurance company. We'll also get a creatinine prior to any more studies. Patient is understandably nervous and worried and is very tearful after this news. Her mother recently  of lung cancer. She has been a longtime smoker and now has even more concerns. Pulmonology  consult was arranged as well it sounds like we can get her in early February. She would like to stay local if possible given constraints with transportation.    If she has any new symptoms that are concerning should contact me, otherwise I will contact her with results as soon as I see them. In the meantime continue with supportive measures surrounding what appears to be a viral respiratory infection.    Selina Reveles PA-C  [facility]    GREATER THAN 50% OF TIME SPENT IN COUNSELING & CARE COORDINATION; review of labs, CXR, CT, establishing plan - TOTAL FACE TO FACE TIME  45 MINUTES.    Orders Placed This Encounter     XR Chest 2 Views     CT Abdomen Pelvis w Contrast     PET Oncology (Eyes to Thighs)     D dimer, quantitative     CBC with platelets and differential     CRP, inflammation     Mononucleosis screen     Creatinine     PULMONARY MEDICINE REFERRAL     Multiple Vitamins-Minerals (MULTIVITAL PO)       Chart documentation done in part with Dragon Voice recognition Software. Although reviewed after completion, some word and grammatical error may remain.  AVS given to patient upon discharge today.  Electronically signed by Selina Reveles PA-C  January 19, 2017  4:30 PM

## 2017-01-19 NOTE — NURSING NOTE
"Chief Complaint   Patient presents with     ER F/U     Strep       Initial /70 mmHg  Pulse 76  Temp(Src) 98.2  F (36.8  C) (Tympanic)  Resp 16  Wt 131 lb (59.421 kg) Estimated body mass index is 21.15 kg/(m^2) as calculated from the following:    Height as of 1/15/17: 5' 6\" (1.676 m).    Weight as of this encounter: 131 lb (59.421 kg).  BP completed using cuff size: regular  Paula Og CMA (AAMA)   "

## 2017-01-19 NOTE — MR AVS SNAPSHOT
After Visit Summary   1/19/2017    Sherie Otero    MRN: [id number]           Patient Information     Date Of Birth          1970        Visit Information        Provider Department      1/19/2017 9:45 AM Selina Reveles PA-C [facility]        Today's Diagnoses     Fever, unspecified    -  1     Throat pain         Right-sided chest pain         Elevated d-dimer         Abnormal CT of the chest         Pulmonary nodules         Hilar lymphadenopathy            Follow-ups after your visit        Additional Services     PULMONARY MEDICINE REFERRAL       Your provider [place] you [place] [facility] [facility] [phone]   [email or web address]    Please be aware that coverage of these services is subject to the terms and limitations of your health insurance plan.  Call member services at your health plan with any benefit or coverage questions.      Please bring the following with you to your appointment:    (1) Any X-Rays, CTs or MRIs which have been performed.  Contact the facility where they were done to arrange for  prior to your scheduled appointment.    (2) List of current medications   (3) This referral request   (4) Any documents/labs given to you for this referral                  Your next 10 appointments already scheduled     Jan 26, 2017  2:00 PM   Return Visit with ROBERTA Lara   [facility]    [address] [place] [id number] [phone]            Jan 27, 2017  2:00 PM   PE NPET ONCOLOGY (EYES TO THIGHS) with PHPET1   [facility] PET CT Scan ([facility])    [address] [place] [id number] [phone]           Tell your doctor:   If there is any chance you may be pregnant or if you are breastfeeding.   If you have problems lying in small spaces (claustrophobia). If you do, your doctor may give  you medicine to help you relax. If you have diabetes:   Have your exam early in the morning. Your blood glucose will go up as the day goes by.   Your glucose level must be 180 or less at the start of the exam. Please take any medicines you need to ensure this blood glucose level. 24 hours before your scan: Don t do any heavy exercise. (No jogging, aerobics or other workouts.) Exercise will make your pictures less accurate. 6 hours before your scan:   Stop all food and liquids (except water).   Do not chew gum or suck on mints.   If you need to take medicine with food, you may take it with a few crackers.  Please call your Imaging Department at your exam site with any questions.            Feb 03, 2017 10:00 AM   New Visit with Abdiel Man MD   [facility] ([address] [id number] [phone]            Feb 03, 2017 11:20 AM   New Visit with Brady Lorenzo PA-C   [facility] ([address] [id number] [phone]              Who to contact     If you have questions or need follow up information about today's clinic visit or your schedule please contact [facility] directly at [phone].  Normal or non-critical lab and imaging results will be communicated to you by Inmoohart, letter or phone within 4 business days after the clinic has received the results. If you do not hear from us within 7 days, please contact the clinic through MyChart or phone. If you have a critical or abnormal lab result, we will notify you by phone as soon as possible.  Submit refill requests through XtremeData or call your pharmacy and they will forward the refill request to us. Please allow 3 business days for your refill to be completed.          Additional Information About Your Visit        XtremeData Information     XtremeData lets you send messages to your doctor, view your test  "results, renew your prescriptions, schedule appointments and more. To sign up, go to [email or web address] . Click on \"Log in\" on the left side of the screen, which will take you to the Welcome page. Then click on \"Sign up Now\" on the right side of the page.     You will be asked to enter the access code listed below, as well as some personal information. Please follow the directions to create your username and password.     Your access code is: [email or web address]  Expires: 2017  3:00 PM     Your access code will  in 90 days. If you need help or a new code, please call your [place] clinic or [phone].        Care EveryWhere ID     This is your Care EveryWhere ID. This could be used by other organizations to access your [place] medical records  [id number]        Your Vitals Were     Pulse Temperature Respirations             76 98.2  F (36.8  C) (Tympanic) 16          Blood Pressure from Last 3 Encounters:   17 110/70   01/15/17 109/78   16 110/60    Weight from Last 3 Encounters:   17 131 lb (59.421 kg)   01/15/17 134 lb (60.782 kg)   16 134 lb (60.782 kg)              We Performed the Following     CBC with platelets and differential     CRP, inflammation     D dimer, quantitative     Mononucleosis screen     PULMONARY MEDICINE REFERRAL        Primary Care Provider Office Phone # Fax #    Selina Reveles PA-C [phone]       [address] [place] [address]        Thank you!     Thank you for choosing [facility]  for your care. Our goal is always to provide you with excellent care. Hearing back from our patients is one way we can continue to improve our services. Please take a few minutes to complete the written survey that you may receive in the mail after your visit with us. Thank you!             Your Updated Medication List - Protect others around you: Learn how to safely use, store and throw away your " medicines at www.disposemymeds.org.          This list is accurate as of: 1/19/17 11:59 PM.  Always use your most recent med list.                   Brand Name Dispense Instructions for use    ALPRAZolam 0.5 MG tablet    XANAX    90 tablet    TAKE ONE TABLET BY MOUTH THREE TIMES DAILY       busPIRone 10 MG tablet    BUSPAR    135 tablet    Take 5 mg (1/2 tab) in the morning and 10 mg at night       cefUROXime 500 MG tablet    CEFTIN    20 tablet    Take 1 tablet (500 mg) by mouth 2 times daily       cetirizine 10 MG tablet    zyrTEC    30 tablet    TAKE  ONE TABLET BY MOUTH EVERY DAY       clonazePAM 0.5 MG tablet    klonoPIN    60 tablet    TAKE 1/2 TO 1 TABLET TWO TIMES DAILY AS NEEDED FOR ANXIETY       DULoxetine 60 MG EC capsule    CYMBALTA    90 capsule    TAKE ONE CAPSULE BY MOUTH EVERY EVENING - KADY       fluticasone 50 MCG/ACT spray    FLONASE    16 g    Spray 1-2 sprays into both nostrils daily       HYDROcodone-acetaminophen 7.5-325 MG per tablet    NORCO    240 tablet    TAKE 2 TABLETS BY MOUTH EVERY 6 HOURS AS NEEDED FOR PAIN--MAX OF 8 TABLETS PER DAY       MULTIVITAL PO          VENTOLIN  (90 BASE) MCG/ACT Inhaler   Generic drug:  albuterol     18 g    INHALE 2 PUFFS INTO THE LUNGS EVERY 6 HOURS       verapamil 40 MG tablet    CALAN    60 tablet    TAKE ONE TABLET BY MOUTH TWICE DAILY

## 2017-01-20 ENCOUNTER — TELEPHONE (OUTPATIENT)
Dept: FAMILY MEDICINE | Facility: CLINIC | Age: 47
End: 2017-01-20

## 2017-01-20 ASSESSMENT — PATIENT HEALTH QUESTIONNAIRE - PHQ9: SUM OF ALL RESPONSES TO PHQ QUESTIONS 1-9: 14

## 2017-01-20 NOTE — TELEPHONE ENCOUNTER
Sherie is calling stating the insurance is still waiting from  about a prior authorization they need in order to pay for the norco. And in order for pt to take up to 8 a day. That's what [facility] stated to pt. If you can't do anything, is it to  prescription and take 6 a day? Pt stated she is out of them right now. Please call and advise.

## 2017-01-23 NOTE — TELEPHONE ENCOUNTER
She has been on 8/day longterm along with many other modalities for pain.  If they won't approve based on that, we will need to change script to 6/day & have her see a pain clinic.  Electronically signed by Selina Reveles PA-C  1/23/2017

## 2017-01-23 NOTE — TELEPHONE ENCOUNTER
I can do the PA Selina but can you tell the reason why she should be on 8 a day.  That is what they will be asking me.

## 2017-01-24 ENCOUNTER — TELEPHONE (OUTPATIENT)
Dept: FAMILY MEDICINE | Facility: CLINIC | Age: 47
End: 2017-01-24

## 2017-01-24 NOTE — TELEPHONE ENCOUNTER
Since Dr. Perez is the one that suggested the PET scan I gave Krista his number to see what he has to say.

## 2017-01-24 NOTE — TELEPHONE ENCOUNTER
Reason for Call: Request for an order or referral:    Order or referral being requested: PET SCAN    Date needed: as soon as possible    Has the patient been seen by the PCP for this problem? YES    Additional comments: Krista calling has questions on the PET SCAN/neck CT part/please call      Phone number Patient can be reached at:  Other phone number:  [phone]    Best Time:  any    Can we leave a detailed message on this number?  Not Applicable    Call taken on 1/24/2017 at 9:42 AM by Evangelina Higginbotham

## 2017-01-25 DIAGNOSIS — F41.1 GENERALIZED ANXIETY DISORDER: Primary | ICD-10-CM

## 2017-01-25 RX ORDER — CLONAZEPAM 0.5 MG/1
TABLET ORAL
Qty: 60 TABLET | Refills: 0 | Status: SHIPPED | OUTPATIENT
Start: 2017-01-25 | End: 2017-01-26

## 2017-01-25 NOTE — TELEPHONE ENCOUNTER
Clonazepam       Last Written Prescription Date:  12/28/2016  Last Fill Quantity: 60,   # refills: 0  Last Office Visit with [facility] prescribing provider: 1/19/2017  Future Office visit:    Next 5 appointments (look out 90 days)     Jan 26, 2017  2:00 PM   Return Visit with ROBERTA Lara   [facility] ([facility])    [address] [place] [id number] [phone]                   Routing refill request to provider for review/approval because:  Drug not on the [facility] refill protocol or controlled substance

## 2017-01-26 RX ORDER — CLONAZEPAM 0.5 MG/1
TABLET ORAL
Qty: 60 TABLET | Refills: 0 | Status: SHIPPED | OUTPATIENT
Start: 2017-01-26 | End: 2017-02-28

## 2017-01-26 NOTE — TELEPHONE ENCOUNTER
[facility] pharmacy needs directions for Clonazepam refill. Please call. [phone]

## 2017-01-27 ENCOUNTER — HOSPITAL ENCOUNTER (OUTPATIENT)
Dept: PET IMAGING | Facility: CLINIC | Age: 47
Discharge: HOME | End: 2017-01-27
Attending: PHYSICIAN ASSISTANT | Admitting: PHYSICIAN ASSISTANT
Payer: COMMERCIAL

## 2017-01-27 DIAGNOSIS — R59.0 HILAR LYMPHADENOPATHY: ICD-10-CM

## 2017-01-27 DIAGNOSIS — R93.89 ABNORMAL CT OF THE CHEST: ICD-10-CM

## 2017-01-27 DIAGNOSIS — R91.8 PULMONARY NODULES: ICD-10-CM

## 2017-01-27 PROCEDURE — 25500064 ZZH RX 255 OP 636: Performed by: PHYSICIAN ASSISTANT

## 2017-01-27 PROCEDURE — 34300033 ZZH RX 343: Performed by: PHYSICIAN ASSISTANT

## 2017-01-27 PROCEDURE — 78815 PET IMAGE W/CT SKULL-THIGH: CPT | Mod: PI

## 2017-01-27 PROCEDURE — A9552 F18 FDG: HCPCS | Performed by: PHYSICIAN ASSISTANT

## 2017-01-27 RX ORDER — IOPAMIDOL 755 MG/ML
20-135 INJECTION, SOLUTION INTRAVASCULAR ONCE
Status: COMPLETED | OUTPATIENT
Start: 2017-01-27 | End: 2017-01-27

## 2017-01-27 RX ADMIN — IOPAMIDOL 100 ML: 755 INJECTION, SOLUTION INTRAVENOUS at 14:53

## 2017-01-27 RX ADMIN — FLUDEOXYGLUCOSE F-18 13.33 MCI.: 500 INJECTION, SOLUTION INTRAVENOUS at 14:54

## 2017-01-30 ENCOUNTER — TELEPHONE (OUTPATIENT)
Dept: FAMILY MEDICINE | Facility: CLINIC | Age: 47
End: 2017-01-30

## 2017-01-30 NOTE — TELEPHONE ENCOUNTER
Called with results of PET scan - important to keep appt on 2/3 with Dr. Sanon.    Electronically signed by Selina Reveles PA-C  1/30/2017

## 2017-01-30 NOTE — TELEPHONE ENCOUNTER
Pt called checking on the status of the message below.     Thank you,   Yasmin Hardin   for [facility]

## 2017-01-30 NOTE — TELEPHONE ENCOUNTER
Reason for Call:  Request for results:    Name of test or procedure: pet/ct scan    Date of test of procedure: 01/27/17    Location of the test or procedure: [facility] to leave the result message on voice mail or with a family member? YES    Phone number Patient can be reached at:  Cell number on file:    Telephone Information:   Mobile [phone]       Additional comments: Sherie would like the results of her scan    Call taken on 1/30/2017 at 11:32 AM by Marisa Delong

## 2017-02-03 ENCOUNTER — OFFICE VISIT (OUTPATIENT)
Dept: NEUROSURGERY | Facility: CLINIC | Age: 47
End: 2017-02-03
Payer: COMMERCIAL

## 2017-02-03 ENCOUNTER — OFFICE VISIT (OUTPATIENT)
Dept: PULMONOLOGY | Facility: CLINIC | Age: 47
End: 2017-02-03
Payer: COMMERCIAL

## 2017-02-03 VITALS
WEIGHT: 128 LBS | OXYGEN SATURATION: 97 % | DIASTOLIC BLOOD PRESSURE: 78 MMHG | TEMPERATURE: 96.8 F | HEIGHT: 66 IN | RESPIRATION RATE: 20 BRPM | HEART RATE: 107 BPM | SYSTOLIC BLOOD PRESSURE: 120 MMHG | BODY MASS INDEX: 20.57 KG/M2

## 2017-02-03 VITALS — WEIGHT: 128 LBS | BODY MASS INDEX: 20.57 KG/M2 | HEIGHT: 66 IN | TEMPERATURE: 96.8 F

## 2017-02-03 DIAGNOSIS — R93.89 ABNORMAL CT OF THE CHEST: Primary | ICD-10-CM

## 2017-02-03 DIAGNOSIS — M48.02 SPINAL STENOSIS IN CERVICAL REGION: Primary | ICD-10-CM

## 2017-02-03 LAB
CRP SERPL-MCNC: 12.2 MG/L (ref 0–8)
FEF 25/75: NORMAL
FEV-1: NORMAL
FEV1/FVC: NORMAL
FVC: NORMAL

## 2017-02-03 PROCEDURE — 86635 COCCIDIOIDES ANTIBODY: CPT | Mod: 90 | Performed by: INTERNAL MEDICINE

## 2017-02-03 PROCEDURE — 99204 OFFICE O/P NEW MOD 45 MIN: CPT | Performed by: PHYSICIAN ASSISTANT

## 2017-02-03 PROCEDURE — 86255 FLUORESCENT ANTIBODY SCREEN: CPT | Mod: 90 | Performed by: INTERNAL MEDICINE

## 2017-02-03 PROCEDURE — 86606 ASPERGILLUS ANTIBODY: CPT | Mod: 90 | Performed by: INTERNAL MEDICINE

## 2017-02-03 PROCEDURE — 99244 OFF/OP CNSLTJ NEW/EST MOD 40: CPT | Performed by: INTERNAL MEDICINE

## 2017-02-03 PROCEDURE — 86698 HISTOPLASMA ANTIBODY: CPT | Mod: 90 | Performed by: INTERNAL MEDICINE

## 2017-02-03 PROCEDURE — 36415 COLL VENOUS BLD VENIPUNCTURE: CPT | Performed by: INTERNAL MEDICINE

## 2017-02-03 PROCEDURE — 86612 BLASTOMYCES ANTIBODY: CPT | Mod: 90 | Performed by: INTERNAL MEDICINE

## 2017-02-03 PROCEDURE — 86140 C-REACTIVE PROTEIN: CPT | Performed by: INTERNAL MEDICINE

## 2017-02-03 PROCEDURE — 99000 SPECIMEN HANDLING OFFICE-LAB: CPT | Performed by: INTERNAL MEDICINE

## 2017-02-03 PROCEDURE — 94010 BREATHING CAPACITY TEST: CPT | Performed by: INTERNAL MEDICINE

## 2017-02-03 ASSESSMENT — PAIN SCALES - GENERAL: PAINLEVEL: EXTREME PAIN (8)

## 2017-02-03 NOTE — PROGRESS NOTES
PULMONARY CONSULTATION      PHYSICIAN REQUESTING CONSULTATION:  Selina Reveles P.A.-C.      REASON FOR CONSULTATION:  Abnormal CT scan.      HISTORY OF PRESENT ILLNESS:  Ms. Sherie Otero is a 46-year-old female who has fibromyalgia syndrome and carries a diagnosis of rheumatoid arthritis, but is not on any rheumatoid medication; who is in her usual state of fair health (she is on SSI disability for fibromyalgia). In 01/10 she had acute onset of body aches, chilly feeling, no trey rigors was not able to measure her temperature, headaches, sweats was lying in bed most of the time but had no respiratory symptoms, no sore throat.  She was seen in the emergency room along with her daughter who did have a sore throat and was found to be strep pharyngitis positive, so they treated both her and the daughter empirically with Ceftin.  The patient did not improve.  Several days later she had chest pain when lying down, which restricted deep breathing and she was seen in the office on 01/19 and sent for a spiral CT scan which did not show a pulmonary embolism, but showed multiple pulmonary nodules and lymphadenopathy.  No additional antibiotics or other therapies were given.  Because of the abnormal CT scan she underwent a PET CT scan 8 days after that which I will review below.      In the interim, the patient feels she is finally improving, she made dinner for the first time in 3 weeks.  The chills and diffuse myalgias have resolved; she has much better energy.  Again, no sore throat, cough, hemoptysis, joint swelling, rash, nausea, vomiting or weight loss has occurred.  She ascribes her fibromyalgia, intermittent pain, sometimes in her ankles, her feet, wrists or elbows that moved from 1 place to another.  Usually these resolve without specific treatment about once or twice per year she takes a short course of prednisone, but has never been on any long-term medications besides Cymbalta and BuSpar and occasional Xanax.   She was prescribed a Ventolin inhaler many years ago but almost never uses it.  She herself has not felt any lymphadenopathy.  Her 18-year-old daughter had brought home a potbelly pig from a farm several months ago, it stays in the house most of the time, does not appear to be ill.  But because of the concern of infectious disease the pig was removed from the house earlier this week.  The patient herself has had no recent travel.  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  No exposure.  No toxic inhalants.  She does have summer and fall allergies causing cough but this is not severe.  Occasional sinus congestion.  She has also had increasing neck pain and back pain and had recent MRIs of the spine and will be seeing the spine surgeon later today.      She smoked 1 pack per day since age 14.  Does not think she has a smoker's cough does not believe cigarettes are making her ill anyway, but has been trying to cut down with this recent illness.  Her  did die recently.  Her mother also had lung cancer but is doing okay after radiation therapy and some kind of lung resection.      LABORATORY DATA AND DIAGNOSTIC STUDIES:  The patient's CRP is mildly elevated.  CBC is normal.  D-dimer was 1.7.  CT scan images were reviewed with the patient; the initial chest x-ray on 01/19 shows mild streaky opacity in the right lower lobe, possible right hilar lymphadenopathy.  This was confirmed with a CT scan showing lymphadenopathy also subcarinal paratracheal there are 4-5 and 8 mm to 10 mm opacities not completely tissue density scattered throughout the right lung, a smaller one in the left lung.  The airways were open.  There is no emphysema or fibrosis.  Followup PET scan on the 27th showed the nodules about the same; possibly one is slightly cavitating and the lymphadenopathy persists and almost all the lymph nodes, even the nonenlarged ones are hypermetabolic, in fact more  hypermetabolic than the pulmonary nodules.  There is no abnormality outside the chest.      ASSESSMENT AND PLAN:   A 46-year-old smoker with acute illness which she is resolving with multiple abnormalities on the CT scan and PET scan.  I did tell the patient this presentation is not typical of lung cancer.     1.  There are multiple pulmonary nodules, all about the same size which would be unusual for lung cancer.   2.  The lymph nodes are more hypermetabolic than the parenchymal nodules.   3.  Most of the nodules are on the right side, but there is bilateral hypermetabolism in both areas of the mediastinum.  This is unusual for lung cancer, it would also be unusual for metastatic cancer as most patients already have a previous diagnosis and, in fact, a total body PET scan would normally  a primary elsewhere in the body such as colon.  The patient had a recent mammogram which was negative and again her symptoms have resolved.        This brings up the possibility that this was an infectious etiology, possible fungal; less likely is a noninfectious inflammatory state.  I do not think her rheumatoid arthritis has been active enough to cause this kind of acute illness; sarcoidosis seems unlikely; organizing pneumonia seems unlikely.  A internet search did not reveal that potbelly pigs transmit illnesses to humans easily. Because of the uncertainty, I offered 2 options to the patient.  One is bronchoscopy at the [facility] with endobronchial ultrasound to biopsy the hilar lymph nodes, or because malignancy is low on the list and she otherwise feels well to repeat the CT scan in 2 months and then proceed to invasive procedures if there is no resolution or worsening of the abnormalities.  She has chosen the latter.        I will obtain blood tests for fungal antibodies, ANCA, repeat the CRP and patient is to contact me earlier if she has any alarming symptoms or palpable lymphadenopathy or recurrence of  her prior syndrome.         AYAN HODGES MD, MPH             D: 2017 11:15   T: 2017 13:02   MT: GERMAINE#[id number]      Name:     ZAKIYA RUSH   MRN:      [id number]        Account:      [id number]   :      1970           Visit Date:   2017      Document: [id number]       cc: Selina Reveles PA-C

## 2017-02-03 NOTE — PROGRESS NOTES
"Sherie Otero is a 46 year old female who presents for:  Chief Complaint   Patient presents with     Neurologic Problem     Neck pain, changes from 2012 cervical MRI to 2016        Initial Vitals:  Temp(Src) 96.8  F (36  C) (Skin)  Ht 1.676 m (5' 6\")  Wt 58.06 kg (128 lb)  BMI 20.67 kg/m2 Estimated body mass index is 20.67 kg/(m^2) as calculated from the following:    Height as of this encounter: 1.676 m (5' 6\").    Weight as of this encounter: 58.06 kg (128 lb).. Body surface area is 1.64 meters squared. BP completed using cuff size: NA (Not Taken)  Data Unavailable    Do you feel safe in your environment?  Yes  Do you need any refills today? No    Nursing Comments:       5 min nursing intake time  Nicholas Delaney    Discharge plan:    nursing discharge time   signature    "

## 2017-02-03 NOTE — PROGRESS NOTES
Past Medical History   Diagnosis Date     Tension headache      Other specified gastritis without mention of hemorrhage      Irritable bowel syndrome      Generalized anxiety disorder      Other malaise and fatigue      fibromyalgia     Lump or mass in breast 1996     Left breast lump 1996-- aspiration benign.     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 MUST LAST 30 DAYS. 60 tablet 0     Multiple Vitamins-Minerals (MULTIVITAL PO)        HYDROcodone-acetaminophen (NORCO) 7.5-325 MG per tablet TAKE 2 TABLETS BY MOUTH EVERY 6 HOURS AS NEEDED FOR PAIN--MAX OF 8 TABLETS PER  tablet 0     VENTOLIN  (90 BASE) MCG/ACT Inhaler INHALE 2 PUFFS INTO THE LUNGS EVERY 6 HOURS 18 g 1     ALPRAZolam (XANAX) 0.5 MG tablet TAKE ONE TABLET BY MOUTH THREE TIMES DAILY 90 tablet 0     busPIRone (BUSPAR) 10 MG tablet Take 5 mg (1/2 tab) in the morning and 10 mg at night 135 tablet 1     DULoxetine (CYMBALTA) 60 MG capsule TAKE ONE CAPSULE BY MOUTH EVERY EVENING - KADY 90 capsule 3     verapamil (CALAN) 40 MG tablet TAKE ONE TABLET BY MOUTH TWICE DAILY 60 tablet 8     fluticasone (FLONASE) 50 MCG/ACT nasal spray Spray 1-2 sprays into both nostrils daily 16 g 1     cetirizine (ZYRTEC) 10 MG tablet TAKE  ONE TABLET BY MOUTH EVERY DAY 30 tablet 10     Family History   Problem Relation Age of Onset     Arthritis Mother      Rheumatoid     Respiratory Mother      COPD      "Hypertension Sister      1/2 sister     Neurologic Disorder Sister      1/2 sisiter  Very mild form of CP     HEART DISEASE Maternal Aunt      Bypass at age 50's     Cardiovascular Maternal Grandmother      Social History     Social History     Marital Status:      Spouse Name: N/A     Number of Children: N/A     Years of Education: N/A     Occupational History           bookkeeping     Social History Main Topics     Smoking status: Current Every Day Smoker -- 0.50 packs/day for 29 years     Types: Cigarettes     Smokeless tobacco: Never Used      Comment: 9/19/11 - 1pk/day     Alcohol Use: No     Drug Use: No     Sexual Activity:     Partners: Male     Birth Control/ Protection: Surgical      Comment: Essure     Other Topics Concern     Caffeine Concern No     up to 6pm at night up to a pot of coffee a day      Sleep Concern Yes     Exercise No     Social History Narrative     Constitutional: improving, fatigue,and  Chills w/o weight loss  Eyes: NEGATIVE for vision changes or irritation and redness.  ENT/Mouth: NEGATIVE for hoarseness and sore throat  CV: NEGATIVE for chest pain, palpitations or chest pressure, lower extremity edema and syncope or near-syncope  Respiratory:  NEGATIVE for significant cough or SOB, hemoptysis, excessive sleepiness  GI: NEGATIVE for nausea, abdominal pain, heartburn, or change in bowel habits  Musculoskeletal: migratory arthralgias w/o joint swelling  Integumentary/Skin: NEGATIVE for rash  Neurological:  NEGATIVE for numbness or tingling and focal weakness  Hemotologic/Lymphatic: NEGATIVE for bleeding disorder and swollen nodes  Allergic/Immunologic: No rhinitis or hives  RESPIRATORY EXAM:  /78 mmHg  Pulse 107  Temp(Src) 96.8  F (36  C) (Temporal)  Resp 20  Ht 5' 6\" (1.676 m)  Wt 128 lb (58.06 kg)  BMI 20.67 kg/m2  SpO2 97%  Patient is well-nourished and well-appearing   Moves easily to exam table without dyspnea, voice quality normal  Nares have no obstruction or " d/c and normal mucosa.  No geoffrey-pharyngeal lesions.    No CEDRICK  No neck masses or thyromegaly or jugular venous distention   Symmetrical chest, normal configuration, without accessory muscle use or tenderness on palpation. Clear breath sounds. No stridor.   Normal S1 and S2 heart sounds without murmur rub or gallop. No limb edema.  No abdominal distension  No neck or supraclavicular adenopathy.   No muscle atrophy. 5/5 lower limb strength bilaterally.  No tremor.  No digit clubbing.  No skin rash.   Affect is normal; cognition is normal.     Remainder of visit dictated. Transcription immediately below.  Abdiel Man MD, MPH  Associate Professor of Medicine

## 2017-02-03 NOTE — NURSING NOTE
"Chief Complaint   Patient presents with     Consult       Initial /78 mmHg  Pulse 107  Temp(Src) 96.8  F (36  C) (Temporal)  Resp 20  Ht 1.676 m (5' 6\")  Wt 58.06 kg (128 lb)  BMI 20.67 kg/m2  SpO2 97% Estimated body mass index is 20.67 kg/(m^2) as calculated from the following:    Height as of this encounter: 1.676 m (5' 6\").    Weight as of this encounter: 58.06 kg (128 lb).  BP completed using cuff size: PK Zavala  "

## 2017-02-03 NOTE — MR AVS SNAPSHOT
"              After Visit Summary   2/3/2017    Sherie Otero    MRN: [id number]           Patient Information     Date Of Birth          1970        Visit Information        Provider Department      2/3/2017 11:20 AM Brady Lorenzo PA-C [facility]         Follow-ups after your visit        Your next 10 appointments already scheduled     Apr 14, 2017  1:30 PM   Return Visit with Abdiel Man MD   [facility] ([facility])    [address] [place] [id number]   [phone]              Future tests that were ordered for you today     Open Future Orders        Priority Expected Expires Ordered    CT Chest w Contrast Routine 4/3/2017 2/3/2018 2/3/2017            Who to contact     If you have questions or need follow up information about today's clinic visit or your schedule please contact [facility] directly at [phone].  Normal or non-critical lab and imaging results will be communicated to you by MyChart, letter or phone within 4 business days after the clinic has received the results. If you do not hear from us within 7 days, please contact the clinic through MyChart or phone. If you have a critical or abnormal lab result, we will notify you by phone as soon as possible.  Submit refill requests through [employer] or call your pharmacy and they will forward the refill request to us. Please allow 3 business days for your refill to be completed.          Additional Information About Your Visit        [employer] Information     [employer] lets you send messages to your doctor, view your test results, renew your prescriptions, schedule appointments and more. To sign up, go to [email or web address] . Click on \"Log in\" on the left side of the screen, which will take you to the Welcome page. Then click on \"Sign up Now\" on the right side of the page.     You will be asked to enter the access code listed below, as well as some personal " "information. Please follow the directions to create your username and password.     Your access code is: [email or web address]  Expires: 2017  3:00 PM     Your access code will  in 90 days. If you need help or a new code, please call your [facility] clinic or [phone].        Care EveryWhere ID     This is your Care EveryWhere ID. This could be used by other organizations to access your [facility] medical records  [id number]        Your Vitals Were     Temperature Height BMI (Body Mass Index)             96.8  F (36  C) (Skin) 1.676 m (5' 6\") 20.67 kg/m2          Blood Pressure from Last 3 Encounters:   17 120/78   17 110/70   01/15/17 109/78    Weight from Last 3 Encounters:   17 58.06 kg (128 lb)   17 58.06 kg (128 lb)   17 59.421 kg (131 lb)              Today, you had the following     No orders found for display       Primary Care Provider Office Phone # Fax #    Selina Reveles PA-C [phone]       [facility] [address] [place] [address]        Thank you!     Thank you for choosing [facility]  for your care. Our goal is always to provide you with excellent care. Hearing back from our patients is one way we can continue to improve our services. Please take a few minutes to complete the written survey that you may receive in the mail after your visit with us. Thank you!             Your Updated Medication List - Protect others around you: Learn how to safely use, store and throw away your medicines at www.disposemymeds.org.          This list is accurate as of: 2/3/17 11:59 AM.  Always use your most recent med list.                   Brand Name Dispense Instructions for use    ALPRAZolam 0.5 MG tablet    XANAX    90 tablet    TAKE ONE TABLET BY MOUTH THREE TIMES DAILY       busPIRone 10 MG tablet    BUSPAR    135 tablet    Take 5 mg (1/2 tab) in the morning and 10 mg at night       cetirizine 10 MG tablet    zyrTEC    30 " tablet    TAKE  ONE TABLET BY MOUTH EVERY DAY       clonazePAM 0.5 MG tablet    klonoPIN    60 tablet    TAKE 1/2 TO 1 TABLET TWO TIMES DAILY AS NEEDED FOR ANXIETY. MUST LAST 30 DAYS.       DULoxetine 60 MG EC capsule    CYMBALTA    90 capsule    TAKE ONE CAPSULE BY MOUTH EVERY EVENING - KADY       fluticasone 50 MCG/ACT spray    FLONASE    16 g    Spray 1-2 sprays into both nostrils daily       HYDROcodone-acetaminophen 7.5-325 MG per tablet    NORCO    240 tablet    TAKE 2 TABLETS BY MOUTH EVERY 6 HOURS AS NEEDED FOR PAIN--MAX OF 8 TABLETS PER DAY       MULTIVITAL PO          VENTOLIN  (90 BASE) MCG/ACT Inhaler   Generic drug:  albuterol     18 g    INHALE 2 PUFFS INTO THE LUNGS EVERY 6 HOURS       verapamil 40 MG tablet    CALAN    60 tablet    TAKE ONE TABLET BY MOUTH TWICE DAILY

## 2017-02-03 NOTE — PROGRESS NOTES
Dr. Chemo Evangelista  [facility]  Neurosurgery Clinic Visit      CC: Neck pain    Primary care Provider: Selina Reveles      Reason For Visit:   I was asked to consult on the patient for neck pain.      HPI: Sherie Otero is a 46 year old female who presents for evaluation of her chief complaint of neck pain. She has had symptoms of mild, intermittent neck pain over several years. She also notes an occasional numbness in her hands and arms, which seems to happen during the night when she sleeps awkwardly. She is not having any concerns with fine motor control in her upper extremities, but does note that she occasionally has some balance and coordination issues. She denies any bowel or bladder changes, or any focal weaknesses in her extremity. She has not received any recent treatment.    Past Medical History   Diagnosis Date     Tension headache      Other specified gastritis without mention of hemorrhage      Irritable bowel syndrome      Generalized anxiety disorder      Other malaise and fatigue      fibromyalgia     Lump or mass in breast      Left breast lump -- aspiration benign.     Pain in joint, lower leg      patello-femoral syndrome     Intrinsic asthma, unspecified      Allergic rhinitis due to other allergen      Rheumatoid arthritis(714.0)      Hearing loss      Stenosis, cervical spine      C5-C7 (MRI)     Spondylitis, cervical (H)      C5-C7 (MRI)     Spindle cell carcinoma (H) 2013     Imo Update utility     Degenerative joint disease of cervical spine 2016     multi level worst at C5-6       Past Medical History reviewed with patient during visit.    Past Surgical History   Procedure Laterality Date     C appendectomy       Ruptured at age 7 years     C  delivery only       , Low Cervical     Dilation and curettage, hysteroscopy, ablate endometrium novasure, combined  2011     Procedure:COMBINED DILATION AND CURETTAGE, HYSTEROSCOPY, ABLATE  ENDOMETRIUM NOVASURE; hysteroscopy, dilation and curettage, novasure; Surgeon:JEREMY ANNA; Location:[facility] OR     Hc hysteros w permanent fallopain implant  2012     Essure done in office Davian amaya guidewire       Excise lesion trunk  9/12/2013     Procedure: EXCISE LESION TRUNK;  interlaminar epidural Steroid Injection Cervical -thoracic Levels (C7-T1)    Re-Excision of chest wall mass;  Surgeon: Jeremy Bolaños MD;  Location: [facility] OR     Inject facet joint  2/13/2014     Procedure: INJECT FACET JOINT;  diagnostic medial branch facet nerve block cervical levels 5-6, 6-7;  Surgeon: Josué Munson MD;  Location: [facility] OR     Inject block medial branch cervical/thoracic/lumbar  6/12/2014     Procedure: INJECT BLOCK MEDIAL BRANCH CERVICAL / THORACIC / LUMBAR;  Surgeon: Josué Munson MD;  Location: [facility] OR     Past Surgical History reviewed with patient during visit.    Current Outpatient Prescriptions   Medication     clonazePAM (KLONOPIN) 0.5 MG tablet     Multiple Vitamins-Minerals (MULTIVITAL PO)     HYDROcodone-acetaminophen (NORCO) 7.5-325 MG per tablet     VENTOLIN  (90 BASE) MCG/ACT Inhaler     ALPRAZolam (XANAX) 0.5 MG tablet     busPIRone (BUSPAR) 10 MG tablet     DULoxetine (CYMBALTA) 60 MG capsule     fluticasone (FLONASE) 50 MCG/ACT nasal spray     verapamil (CALAN) 40 MG tablet     cetirizine (ZYRTEC) 10 MG tablet     No current facility-administered medications for this visit.       Allergies   Allergen Reactions     Codeine Nausea and Vomiting     Droperidol      Uncontrollable shaking       Penicillins        Social History     Social History     Marital Status:      Spouse Name: N/A     Number of Children: N/A     Years of Education: N/A     Occupational History           bookkeeping     Social History Main Topics     Smoking status: Current Every Day Smoker -- 0.50 packs/day for 29 years     Types: Cigarettes     Smokeless tobacco: Never Used      Comment: 9/19/11 -  "1pk/day     Alcohol Use: No     Drug Use: No     Sexual Activity:     Partners: Male     Birth Control/ Protection: Surgical      Comment: Essure     Other Topics Concern     Caffeine Concern No     up to 6pm at night up to a pot of coffee a day      Sleep Concern Yes     Exercise No     Social History Narrative       Family History   Problem Relation Age of Onset     Arthritis Mother      Rheumatoid     Respiratory Mother      COPD     Hypertension Sister      1/2 sister     Neurologic Disorder Sister      1/2 sisiter  Very mild form of CP     HEART DISEASE Maternal Aunt      Bypass at age 50's     Cardiovascular Maternal Grandmother           ROS: 10 point ROS neg other than the symptoms noted above in the HPI.    Vital Signs: Temp(Src) 96.8  F (36  C) (Skin)  Ht 1.676 m (5' 6\")  Wt 58.06 kg (128 lb)  BMI 20.67 kg/m2    Examination:  Constitutional:  Alert, well nourished, NAD.  HEENT: Normocephalic, atraumatic.   Pulmonary:  Without shortness of breath, normal effort.   Lymph: no lymphadenopathy to low back or LE.   Integumentary: Skin is free of rashes or lesions.   Cardiovascular:  No pitting edema of BLE.      Neurological:  Awake  Alert  Oriented x 3  Speech clear  Cranial nerves II - XII grossly intact  PERRL  EOMI  Face symmetric  Tongue midline  Motor exam   Shoulder Abduction:  Right:  5/5   Left:  5/5  Biceps:                      Right:  5/5   Left:  5/5  Triceps:                     Right:  5/5   Left:  5/5  Wrist Extensors:       Right:  5/5   Left:  5/5  Wrist Flexors:           Right:  5/5   Left:  5/5  Intrinsics:                   Right:  5/5   Left:  5/5  Hip Flexor:                Right: 5/5  Left:  5/5  Hip Adductor:             Right:  5/5  Left:  5/5  Hip Abductor:             Right:  5/5  Left:  5/5  Gastroc Soleus:        Right:  5/5  Left:  5/5  Tib/Ant:                      Right:  5/5  Left:  5/5  EHL:                          Right:  5/5  Left:  5/5       Sensation normal to " bilateral upper and lower extremities.    Reflexes are 2+ in the patellar and Achilles. There is no clonus. Downgoing Babinski.    Reflexes are 2+ in the brachial radialis and triceps. Negative Kourtney sign bilaterally.    Finger to Nose smooth  Pronator drift negative  Musculoskeletal:  Gait: Able to stand from a seated position. Normal non-antalgic, non-myelopathic gait.  Able to heel/toe walk without loss of balance  Cervical examination reveals good range of motion.  No tenderness to palpation of the cervical spine or paraspinous muscles bilaterally.    Lumbar examination reveals no tenderness of the spine or paraspinous muscles.  Hip height is symmetrical. Negative SI joint, sciatic notch or greater trochanteric tenderness to palpation bilaterally.  Straight leg raise is negative bilaterally.      Imaging:   MRI of the cervical spine was reviewed in the office today. It shows degenerative disc disease throughout the cervical spine, with loss of lordosis. There is moderate disc bulging at C4 5 and C5 6, producing moderate to severe central stenosis. There is mild cord deformity. There are no cord signal abnormalities.    Assessment/Plan:     Cervicalgia  Central cervical stenosis at C4 5 and C5 6    Sherie Otero is a 46 year old female. I did have a discussion with the patient regarding her cervical spine. She understands the findings on her MRI. At this point, I do not think she is symptomatic from this, but does get some occasional numbness and tingling in her hands, which seems to be worse at night. She does not have any neurological deficits on examination. I did discuss with her symptoms that could indicate this stenosis in her cervical spine is worsening. She will schedule a follow-up appointment with Dr. Evangelista to discuss her options. She voiced agreement and understanding.           Brady Lorenzo PA-C  [facility]  [address]  [address]    Tel [phone]  Pager [phone]

## 2017-02-05 LAB — ANCA IGG TITR SER IF: NORMAL {TITER}

## 2017-02-06 LAB
ASPERGILLUS AB TITR SER CF: NORMAL {TITER}
B DERMAT AB TITR SER CF: NORMAL {TITER}
COCCIDIOIDES AB TITR SER CF: NORMAL {TITER}
H CAPSUL MYC AB TITR SER CF: NORMAL {TITER}
H CAPSUL YST AB TITR SER CF: NORMAL {TITER}

## 2017-02-21 ENCOUNTER — TELEPHONE (OUTPATIENT)
Dept: PULMONOLOGY | Facility: CLINIC | Age: 47
End: 2017-02-21

## 2017-02-21 DIAGNOSIS — J31.0 CHRONIC RHINITIS: ICD-10-CM

## 2017-02-21 DIAGNOSIS — M47.812 OSTEOARTHRITIS OF CERVICAL SPINE, UNSPECIFIED SPINAL OSTEOARTHRITIS COMPLICATION STATUS: ICD-10-CM

## 2017-02-21 RX ORDER — HYDROCODONE BITARTRATE AND ACETAMINOPHEN 7.5; 325 MG/1; MG/1
TABLET ORAL
Qty: 240 TABLET | Refills: 0 | Status: SHIPPED | OUTPATIENT
Start: 2017-02-21 | End: 2017-03-22

## 2017-02-21 NOTE — TELEPHONE ENCOUNTER
Reason for Call:  Other call back    Detailed comments: Dr. Man patient - would like lab resutls from 02/03/2017    Phone Number Patient can be reached at: Cell number on file:    Telephone Information:   Mobile [phone]       Best Time: as soon as possible     Can we leave a detailed message on this number? YES    Call taken on 2/21/2017 at 3:43 PM by Barbara Anguiano

## 2017-02-21 NOTE — TELEPHONE ENCOUNTER
Claraco       Last Written Prescription Date: 1/17/2017  Last Fill Quantity: 240,  # refills: 0   Last Office Visit with [facility] or [facility] prescribing provider: 1/19/2017                                         Next 5 appointments (look out 90 days)     Apr 14, 2017  1:30 PM CDT   Return Visit with bAdiel Man MD   [facility]    [address] [place] [id number] [phone]

## 2017-02-21 NOTE — TELEPHONE ENCOUNTER
Script faxed to [facility] [phone] and put in black box up front.  Addie García MA

## 2017-02-22 RX ORDER — CETIRIZINE HYDROCHLORIDE 10 MG/1
TABLET ORAL
Qty: 30 TABLET | Refills: 9 | Status: SHIPPED | OUTPATIENT
Start: 2017-02-22 | End: 2018-03-14

## 2017-02-22 NOTE — TELEPHONE ENCOUNTER
Provider is not in until 3/3/2017. Email was sent to him today asking to check his in-basket and inform patient of lab results.  Marjan Larios, CMA

## 2017-02-22 NOTE — TELEPHONE ENCOUNTER
cetirizine (ZYRTEC) 10 MG tablet      Last Written Prescription Date: 10/7/15  Last Fill Quantity: 30,  # refills: 10   Last Office Visit with [facility], [facility] or [facility] prescribing provider: 1/19/17                                         Next 5 appointments (look out 90 days)     Apr 14, 2017  1:30 PM CDT   Return Visit with Abdiel Man MD   [facility] ([facility])    [address] [place] [id number] [phone]

## 2017-02-23 NOTE — TELEPHONE ENCOUNTER
Patient called about negative fungal and ANCA tests. Patient continues to fell well.  Will get repeat CT in April.

## 2017-02-28 DIAGNOSIS — F41.1 GENERALIZED ANXIETY DISORDER: ICD-10-CM

## 2017-03-01 RX ORDER — CLONAZEPAM 0.5 MG/1
TABLET ORAL
Qty: 60 TABLET | Refills: 0 | Status: SHIPPED | OUTPATIENT
Start: 2017-03-01 | End: 2017-03-29

## 2017-03-01 NOTE — TELEPHONE ENCOUNTER
Script faxed to [facility] [phone] pharmacy.  Addie García MA

## 2017-03-01 NOTE — TELEPHONE ENCOUNTER
Clonazepam       Last Written Prescription Date: 1/26/2017  Last Fill Quantity: 60,  # refills: 0   Last Office Visit with [facility] or [facility] prescribing provider: 1/19/2017                                         Next 5 appointments (look out 90 days)     Apr 14, 2017  1:30 PM CDT   Return Visit with Abdiel Man MD   [facility]    [address] [place] [id number] [phone]

## 2017-03-10 DIAGNOSIS — J31.0 CHRONIC RHINITIS: ICD-10-CM

## 2017-03-11 NOTE — TELEPHONE ENCOUNTER
flonase      Last Written Prescription Date: 2/7/17  Last Fill Quantity: 16g,  # refills: 0   Last Office Visit with [facility], [facility] or [facility] prescribing provider: 1/19/17                                         Next 5 appointments (look out 90 days)     Apr 14, 2017  1:30 PM CDT   Return Visit with Abdiel Man MD   [facility] ([facility])    [address] [place] [address] [phone]

## 2017-03-13 RX ORDER — FLUTICASONE PROPIONATE 50 MCG
SPRAY, SUSPENSION (ML) NASAL
Qty: 16 G | Refills: 6 | Status: SHIPPED | OUTPATIENT
Start: 2017-03-13 | End: 2018-01-08

## 2017-03-16 DIAGNOSIS — G43.819 OTHER MIGRAINE WITHOUT STATUS MIGRAINOSUS, INTRACTABLE: ICD-10-CM

## 2017-03-16 NOTE — TELEPHONE ENCOUNTER
Verapamil      Last Written Prescription Date: 5/4/16  Last Fill Quantity: 60, # refills: 8  Last Office Visit with [facility], [facility] or [facility] prescribing provider: 1/19/17  Next 5 appointments (look out 90 days)     Apr 14, 2017  1:30 PM CDT   Return Visit with Abdiel Man MD   [facility])    [address] [place] [phone] [phone]                   Potassium   Date Value Ref Range Status   01/12/2016 4.0 3.4 - 5.3 mmol/L Final     Creatinine   Date Value Ref Range Status   01/12/2016 0.78 0.52 - 1.04 mg/dL Final     BP Readings from Last 3 Encounters:   02/03/17 120/78   01/19/17 110/70   01/15/17 109/78

## 2017-03-20 NOTE — TELEPHONE ENCOUNTER
Routing refill request to provider for review/approval because:  Drug not on the [facility] refill protocol for this diagnosis  Addie Kuhn, JULIO      Future Office Visit:    Next 5 appointments (look out 90 days)     Apr 14, 2017  1:30 PM CDT   Return Visit with Abdiel Man MD   [facility] ([facility])    [address] [place] [id number]   [phone]                  BP Readings from Last 3 Encounters:   02/03/17 120/78   01/19/17 110/70   01/15/17 109/78     Lab Results   Component Value Date    ALT 24 02/05/2016     Lab Results   Component Value Date    CHOL 204 03/06/2009     Lab Results   Component Value Date    HDL 91 03/06/2009     Lab Results   Component Value Date    LDL 63 03/06/2009     Lab Results   Component Value Date    TRIG 247 03/06/2009     No results found for: CHOLJOSIANEO

## 2017-03-21 PROBLEM — G43.819 OTHER MIGRAINE WITHOUT STATUS MIGRAINOSUS, INTRACTABLE: Status: ACTIVE | Noted: 2017-03-21

## 2017-03-21 RX ORDER — VERAPAMIL HYDROCHLORIDE 40 MG/1
TABLET ORAL
Qty: 60 TABLET | Refills: 0 | Status: SHIPPED | OUTPATIENT
Start: 2017-03-21 | End: 2017-04-15

## 2017-03-22 DIAGNOSIS — M47.812 OSTEOARTHRITIS OF CERVICAL SPINE, UNSPECIFIED SPINAL OSTEOARTHRITIS COMPLICATION STATUS: ICD-10-CM

## 2017-03-22 RX ORDER — HYDROCODONE BITARTRATE AND ACETAMINOPHEN 7.5; 325 MG/1; MG/1
TABLET ORAL
Qty: 240 TABLET | Refills: 0 | Status: SHIPPED | OUTPATIENT
Start: 2017-03-22 | End: 2017-04-20

## 2017-03-22 NOTE — TELEPHONE ENCOUNTER
Script faxed to [facility] [phone] and put in black box up front.  Addie García MA

## 2017-03-22 NOTE — TELEPHONE ENCOUNTER
Claraco       Last Written Prescription Date: 2/21/2017  Last Fill Quantity: 240,  # refills: 0   Last Office Visit with [facility] or [facility] prescribing provider: 1/19/2017                                         Next 5 appointments (look out 90 days)     Apr 14, 2017  1:30 PM CDT   Return Visit with Abdiel Man MD   [facility] ([facility])    [address] [place] [phone] [phone]

## 2017-03-29 DIAGNOSIS — F41.1 GENERALIZED ANXIETY DISORDER: ICD-10-CM

## 2017-03-29 NOTE — TELEPHONE ENCOUNTER
clonazePAM (KLONOPIN) 0.5 MG tablet      Last Written Prescription Date:  3/1/17  Last Fill Quantity: 60,   # refills: 0  Last Office Visit with [facility] or [facility] provider: 1/19/17  Future Office visit:    Next 5 appointments (look out 90 days)     Apr 14, 2017  1:30 PM CDT   Return Visit with Abdiel Man MD   [facility] ([facility])    [address] [place] [id number] [phone]                   Routing refill request to provider for review/approval because:  Drug not on the [facility] or [facility] refill protocol or controlled substance

## 2017-03-30 RX ORDER — CLONAZEPAM 0.5 MG/1
TABLET ORAL
Qty: 60 TABLET | Refills: 0 | Status: SHIPPED | OUTPATIENT
Start: 2017-03-30 | End: 2017-04-28

## 2017-04-03 ENCOUNTER — HOSPITAL ENCOUNTER (OUTPATIENT)
Dept: CT IMAGING | Facility: CLINIC | Age: 47
Discharge: HOME | End: 2017-04-03
Attending: INTERNAL MEDICINE | Admitting: INTERNAL MEDICINE
Payer: COMMERCIAL

## 2017-04-03 DIAGNOSIS — R93.89 ABNORMAL CT OF THE CHEST: ICD-10-CM

## 2017-04-03 PROCEDURE — 71260 CT THORAX DX C+: CPT

## 2017-04-03 PROCEDURE — 25500064 ZZH RX 255 OP 636: Performed by: RADIOLOGY

## 2017-04-03 PROCEDURE — 25000125 ZZHC RX 250: Performed by: RADIOLOGY

## 2017-04-03 RX ORDER — IOPAMIDOL 755 MG/ML
100 INJECTION, SOLUTION INTRAVASCULAR ONCE
Status: COMPLETED | OUTPATIENT
Start: 2017-04-03 | End: 2017-04-03

## 2017-04-03 RX ADMIN — SODIUM CHLORIDE 75 ML: 9 INJECTION, SOLUTION INTRAVENOUS at 12:12

## 2017-04-03 RX ADMIN — IOPAMIDOL 75 ML: 755 INJECTION, SOLUTION INTRAVENOUS at 12:12

## 2017-04-05 DIAGNOSIS — R91.8 PULMONARY NODULES: Primary | ICD-10-CM

## 2017-04-05 NOTE — PROGRESS NOTES
Called patient but no answer but left message that CT nodules and CEDRICK were much improved suggesting that the abnormalities were infectious, less likely inflammatory and that no further f/u is needed as long as she feels well.

## 2017-04-11 DIAGNOSIS — F41.1 GENERALIZED ANXIETY DISORDER: ICD-10-CM

## 2017-04-11 NOTE — TELEPHONE ENCOUNTER
ALPRAZolam       Last Written Prescription Date:  1/11/17  Last Fill Quantity: 90,   # refills: 0  Last Office Visit with [facility], [facility] or [facility] prescribing provider: 1/19/17  Future Office visit:    Next 5 appointments (look out 90 days)     Apr 14, 2017  1:30 PM CDT   Return Visit with Abdiel Man MD   [facility]    [address] [place] [phone] [phone]                   Routing refill request to provider for review/approval because:  Drug not on the [facility], [facility] or [facility] refill protocol or controlled substance  Ammy Friedman MA 4/11/2017

## 2017-04-13 ENCOUNTER — TELEPHONE (OUTPATIENT)
Dept: PULMONOLOGY | Facility: CLINIC | Age: 47
End: 2017-04-13

## 2017-04-13 RX ORDER — ALPRAZOLAM 0.5 MG
TABLET ORAL
Qty: 90 TABLET | Refills: 0 | Status: SHIPPED | OUTPATIENT
Start: 2017-04-13 | End: 2017-07-07

## 2017-04-13 NOTE — TELEPHONE ENCOUNTER
Script faxed to [facility] [phone] pharmacy.  Addie García MA

## 2017-04-13 NOTE — TELEPHONE ENCOUNTER
Reason for Call:  Other appointment    Detailed comments: When confirming Sherie's appointment for Friday, she asked if the appointment was necessary, stating Dr Man called and gave her results and states all is well.  She is planning to keep the appointment unless she hears from us otherwise.    Phone Number Patient can be reached at: Home number on file [phone] (home)    Best Time: any    Can we leave a detailed message on this number? YES    Call taken on 4/13/2017 at 9:54 AM by Dennise Smith

## 2017-04-15 DIAGNOSIS — G43.819 OTHER MIGRAINE WITHOUT STATUS MIGRAINOSUS, INTRACTABLE: ICD-10-CM

## 2017-04-17 RX ORDER — VERAPAMIL HYDROCHLORIDE 40 MG/1
40 TABLET ORAL 2 TIMES DAILY
Qty: 60 TABLET | Refills: 0 | Status: SHIPPED | OUTPATIENT
Start: 2017-04-17 | End: 2017-05-16

## 2017-04-17 NOTE — TELEPHONE ENCOUNTER
ALT:  ALT 24  0 - 50 U/L Final 02/05/2016  4:20 PM [facility]     Routing refill request to provider for review/approval because:  Labs not current:  ALT  T'd up #30    Will forward to schedulers to schedule patient for physical and labs.  Addie Kuhn RN

## 2017-04-17 NOTE — TELEPHONE ENCOUNTER
Verapamil       Last Written Prescription Date: 3/21/2017  Last Fill Quantity: 60, # refills: 0  Last Office Visit with [facility] or [facility] prescribing provider: 1/19/2017       Potassium   Date Value Ref Range Status   01/12/2016 4.0 3.4 - 5.3 mmol/L Final     Creatinine   Date Value Ref Range Status   01/12/2016 0.78 0.52 - 1.04 mg/dL Final     BP Readings from Last 3 Encounters:   02/03/17 120/78   01/19/17 110/70   01/15/17 109/78

## 2017-04-20 DIAGNOSIS — M47.812 OSTEOARTHRITIS OF CERVICAL SPINE, UNSPECIFIED SPINAL OSTEOARTHRITIS COMPLICATION STATUS: ICD-10-CM

## 2017-04-20 RX ORDER — HYDROCODONE BITARTRATE AND ACETAMINOPHEN 7.5; 325 MG/1; MG/1
TABLET ORAL
Qty: 240 TABLET | Refills: 0 | Status: SHIPPED | OUTPATIENT
Start: 2017-04-20 | End: 2017-05-16

## 2017-04-20 NOTE — TELEPHONE ENCOUNTER
Claraco       Last Written Prescription Date: 3/22/2017  Last Fill Quantity: 240,  # refills: 0   Last Office Visit with [facility], [facility] or  [facility] prescribing provider: 1/19/2017                                         Next 5 appointments (look out 90 days)     May 16, 2017 10:00 AM CDT   PHYSICAL with Selina Reveles PA-C   [facility]    [address] [place] [phone]

## 2017-04-28 DIAGNOSIS — F41.1 GENERALIZED ANXIETY DISORDER: ICD-10-CM

## 2017-04-28 NOTE — TELEPHONE ENCOUNTER
clonazePAM (KLONOPIN) 0.5 MG tablet      Last Written Prescription Date:  3/30/17  Last Fill Quantity: 60,   # refills: 0  Last Office Visit with [facility] or [facility] prescribing provider: 1/19/17  Future Office visit:    Next 5 appointments (look out 90 days)     May 16, 2017 10:00 AM CDT   PHYSICAL with Selina Reveles PA-C   [facility]    [address] [place] [phone]                   Routing refill request to provider for review/approval because:  Drug not on the [facility] or [facility] refill protocol or controlled substance

## 2017-05-01 RX ORDER — CLONAZEPAM 0.5 MG/1
TABLET ORAL
Qty: 60 TABLET | Refills: 0 | Status: SHIPPED | OUTPATIENT
Start: 2017-05-01 | End: 2017-05-31

## 2017-05-03 DIAGNOSIS — J45.20 INTERMITTENT ASTHMA, UNCOMPLICATED: ICD-10-CM

## 2017-05-03 NOTE — TELEPHONE ENCOUNTER
VENTOLIN  (90 BASE) MCG/ACT     Last Written Prescription Date: 03/07/2017  Last Fill Quantity: 18g, # refills: 1    Last Office Visit with [facility], [facility] or [facility] prescribing provider:  01/19/2017   Future Office Visit:    Next 5 appointments (look out 90 days)     May 16, 2017 10:00 AM CDT   PHYSICAL with Selina Reveles PA-C   [facility]    [address] [place] [phone]                   Date of Last Asthma Action Plan Letter:   Asthma Action Plan Q1 Year    Topic Date Due     Asthma Action Plan - yearly  11/23/2016      Asthma Control Test:   ACT Total Scores 11/29/2016   ACT TOTAL SCORE -   ASTHMA ER VISITS -   ASTHMA HOSPITALIZATIONS -   ACT TOTAL SCORE (Goal Greater than or Equal to 20) 20   In the past 12 months, how many times did you visit the emergency room for your asthma without being admitted to the hospital? 0   In the past 12 months, how many times were you hospitalized overnight because of your asthma? 0       Date of Last Spirometry Test:   No results found for this or any previous visit.

## 2017-05-05 RX ORDER — ALBUTEROL SULFATE 90 UG/1
2 AEROSOL, METERED RESPIRATORY (INHALATION) EVERY 6 HOURS PRN
Qty: 18 G | Refills: 0 | Status: SHIPPED | OUTPATIENT
Start: 2017-05-05 | End: 2017-06-05

## 2017-05-16 ENCOUNTER — OFFICE VISIT (OUTPATIENT)
Dept: FAMILY MEDICINE | Facility: CLINIC | Age: 47
End: 2017-05-16
Payer: COMMERCIAL

## 2017-05-16 VITALS
HEIGHT: 66 IN | WEIGHT: 132 LBS | HEART RATE: 88 BPM | BODY MASS INDEX: 21.21 KG/M2 | SYSTOLIC BLOOD PRESSURE: 120 MMHG | RESPIRATION RATE: 16 BRPM | TEMPERATURE: 98.4 F | DIASTOLIC BLOOD PRESSURE: 70 MMHG

## 2017-05-16 DIAGNOSIS — L70.0 ACNE VULGARIS: ICD-10-CM

## 2017-05-16 DIAGNOSIS — M79.7 FIBROMYALGIA: ICD-10-CM

## 2017-05-16 DIAGNOSIS — G43.819 OTHER MIGRAINE WITHOUT STATUS MIGRAINOSUS, INTRACTABLE: ICD-10-CM

## 2017-05-16 DIAGNOSIS — G47.9 SLEEP DISTURBANCE: Primary | ICD-10-CM

## 2017-05-16 DIAGNOSIS — F33.0 MAJOR DEPRESSIVE DISORDER, RECURRENT EPISODE, MILD (H): ICD-10-CM

## 2017-05-16 DIAGNOSIS — M47.812 OSTEOARTHRITIS OF CERVICAL SPINE, UNSPECIFIED SPINAL OSTEOARTHRITIS COMPLICATION STATUS: ICD-10-CM

## 2017-05-16 PROCEDURE — 99214 OFFICE O/P EST MOD 30 MIN: CPT | Performed by: PHYSICIAN ASSISTANT

## 2017-05-16 RX ORDER — VERAPAMIL HYDROCHLORIDE 40 MG/1
40 TABLET ORAL 2 TIMES DAILY
Qty: 180 TABLET | Refills: 3 | Status: SHIPPED | OUTPATIENT
Start: 2017-05-16 | End: 2018-01-30

## 2017-05-16 RX ORDER — DULOXETIN HYDROCHLORIDE 60 MG/1
CAPSULE, DELAYED RELEASE ORAL
Qty: 90 CAPSULE | Refills: 3 | Status: SHIPPED | OUTPATIENT
Start: 2017-05-16 | End: 2018-01-30

## 2017-05-16 RX ORDER — HYDROCODONE BITARTRATE AND ACETAMINOPHEN 7.5; 325 MG/1; MG/1
TABLET ORAL
Qty: 240 TABLET | Refills: 0 | Status: SHIPPED | OUTPATIENT
Start: 2017-05-20 | End: 2017-06-19

## 2017-05-16 RX ORDER — CLINDAMYCIN PHOSPHATE 11.9 MG/ML
SOLUTION TOPICAL 2 TIMES DAILY
Qty: 60 ML | Refills: 11 | Status: SHIPPED | OUTPATIENT
Start: 2017-05-16 | End: 2018-01-30

## 2017-05-16 RX ORDER — HYDROXYZINE HYDROCHLORIDE 25 MG/1
50-100 TABLET, FILM COATED ORAL
Qty: 90 TABLET | Refills: 1 | Status: SHIPPED | OUTPATIENT
Start: 2017-05-16 | End: 2017-07-15

## 2017-05-16 ASSESSMENT — PAIN SCALES - GENERAL: PAINLEVEL: NO PAIN (0)

## 2017-05-16 NOTE — PROGRESS NOTES
SUBJECTIVE:                                                    Sherie Otero is a 46 year old female who presents to clinic today for the following health issues:      Depression Followup  Had started seeing Obed Nichole in January - was ill and didn't make it back in. Would like to restart. Had been sick for a month.   Cymbalta, Buspar and prn Clonazepam/Xanax used. Has horrible sleep patterns - is chronically fatigued. Has had a sleep study which was negative. Has been on REstoril without good relief. Has diagnosis of fibromyalgia - Cymbalta used for this as well.    Status since last visit: Worsened - lost her good friend - sudden death     See PHQ-9 for current symptoms.  Other associated symptoms: None    Complicating factors:   Significant life event:  No   Current substance abuse:  None  Anxiety or Panic symptoms:  No    PHQ-9  English PHQ-9   Any Language          Asthma Follow-Up    Was ACT completed today?    Yes    ACT Total Scores 5/16/2017   ACT TOTAL SCORE -   ASTHMA ER VISITS -   ASTHMA HOSPITALIZATIONS -   ACT TOTAL SCORE (Goal Greater than or Equal to 20) 17   In the past 12 months, how many times did you visit the emergency room for your asthma without being admitted to the hospital? 0   In the past 12 months, how many times were you hospitalized overnight because of your asthma? 0       Recent asthma triggers that patient is dealing with: None        Amount of exercise or physical activity: None    Problems taking medications regularly: No    Medication side effects: none    Diet: regular (no restrictions)      Migraine Follow-Up  Is on Verapamil twice daily for Migraine prevention    Headaches symptoms:  Stable     Frequency: rare     Duration of headaches: 1-2 hrs    Able to do normal daily activities/work with migraines: Yes    Rescue/Relief medication:none                  Preventative medication: verapamil    Neurologic complications: No new stroke-like symptoms, loss of vision or speech,  numbness or weakness    In the past 4 weeks, how often have you gone to Urgent Care or the emergency room because of your headaches?  0     CYSTIC LESIONS FACE  REQUESTING treatment for chronic cystic acne lesions face. Has been on Minocin consistently in the past. Has been told she should see derm. Wondering about a topical agent.     RENEWAL NARCOTIC AGREEMENT  IS ON Vicodin max of 8/day for ongoing chronic neck pain with know osteoarthritis.     Problem list and histories reviewed & adjusted, as indicated.  Additional history: as documented    Past Medical History:   Diagnosis Date     Allergic rhinitis due to other allergen      Degenerative joint disease of cervical spine 2016    multi level worst at C5-6     Generalized anxiety disorder      Hearing loss      Intrinsic asthma, unspecified      Irritable bowel syndrome      Lump or mass in breast     Left breast lump -- aspiration benign.     Other malaise and fatigue     fibromyalgia     Other specified gastritis without mention of hemorrhage      Pain in joint, lower leg     patello-femoral syndrome     Rheumatoid arthritis(714.0)      Spindle cell carcinoma (H) 2013    Imo Update utility     Spondylitis, cervical (H)     C5-C7 (MRI)     Stenosis, cervical spine     C5-C7 (MRI)     Tension headache      Past Surgical History:   Procedure Laterality Date     C APPENDECTOMY      Ruptured at age 7 years     C  DELIVERY ONLY      , Low Cervical     DILATION AND CURETTAGE, HYSTEROSCOPY, ABLATE ENDOMETRIUM NOVASURE, COMBINED  2011    Procedure:COMBINED DILATION AND CURETTAGE, HYSTEROSCOPY, ABLATE ENDOMETRIUM NOVASURE; hysteroscopy, dilation and curettage, novasure; Surgeon:JEREMY ANNA; Location:[facility] OR     EXCISE LESION TRUNK  2013    Procedure: EXCISE LESION TRUNK;  interlaminar epidural Steroid Injection Cervical -thoracic Levels (C7-T1)    Re-Excision of chest wall mass;  Surgeon: Jeremy Bolaños MD;   Location: [facility] OR     HC HYSTEROS W PERMANENT FALLOPAIN IMPLANT  2012    Essure done in office [facility]     INJECT BLOCK MEDIAL BRANCH CERVICAL/THORACIC/LUMBAR  6/12/2014    Procedure: INJECT BLOCK MEDIAL BRANCH CERVICAL / THORACIC / LUMBAR;  Surgeon: Josué Munson MD;  Location: [facility] OR     INJECT FACET JOINT  2/13/2014    Procedure: INJECT FACET JOINT;  diagnostic medial branch facet nerve block cervical levels 5-6, 6-7;  Surgeon: Josué Munson MD;  Location:  OR     [facility]       Social History   Substance Use Topics     Smoking status: Current Every Day Smoker     Packs/day: 0.50     Years: 29.00     Types: Cigarettes     Smokeless tobacco: Never Used      Comment: 9/19/11 - 1pk/day     Alcohol use No     Family History   Problem Relation Age of Onset     Arthritis Mother      Rheumatoid     Respiratory Mother      COPD     Hypertension Sister      1/2 sister     Neurologic Disorder Sister      1/2 sisiter  Very mild form of CP     HEART DISEASE Maternal Aunt      Bypass at age 50's     Cardiovascular Maternal Grandmother         Allergies   Allergen Reactions     Codeine Nausea and Vomiting     Droperidol      Uncontrollable shaking       Penicillins      Current Outpatient Prescriptions   Medication Sig Dispense Refill     hydrOXYzine (ATARAX) 25 MG tablet Take 2-4 tablets ( mg) by mouth nightly as needed for anxiety (sleep) May use 25mg (1 tablet) to start with intially. 90 tablet 1     clindamycin (CLEOCIN T) 1 % solution Apply topically 2 times daily 60 mL 11     [START ON 5/20/2017] HYDROcodone-acetaminophen (NORCO) 7.5-325 MG per tablet TAKE 2 TABLETS BY MOUTH EVERY 6 HOURS AS NEEDED FOR PAIN--MAX OF 8 TABLETS PER  tablet 0     verapamil (CALAN) 40 MG tablet Take 1 tablet (40 mg) by mouth 2 times daily 180 tablet 3     DULoxetine (CYMBALTA) 60 MG EC capsule TAKE ONE CAPSULE BY MOUTH EVERY EVENING - KADY 90 capsule 3     busPIRone (BUSPAR) 10 MG tablet TAKE 5 MG (1/2 TAB) IN THE  "MORNING AND 10 MG AT NIGHT 135 tablet 1     clonazePAM (KLONOPIN) 0.5 MG tablet TAKE ONE-HALF TO ONE TABLET BY MOUTH TWICE MOON AS NEEDED FOR ANXIETY-MUST LAST 30 DAYS 60 tablet 0     fluticasone (FLONASE) 50 MCG/ACT spray SPRAY 1-2 SPRAYS INTO BOTH NOSTRILS DAILY 16 g 6     cetirizine (ZYRTEC) 10 MG tablet TAKE  ONE TABLET BY MOUTH EVERY DAY 30 tablet 9     Multiple Vitamins-Minerals (MULTIVITAL PO)        albuterol (VENTOLIN HFA) 108 (90 BASE) MCG/ACT Inhaler Inhale 2 puffs into the lungs every 6 hours as needed for shortness of breath / dyspnea or wheezing Appointment needed for additional refills. 18 g 0     ALPRAZolam (XANAX) 0.5 MG tablet TAKE ONE TABLET BY MOUTH THREE TIMES DAILY 90 tablet 0           Reviewed and updated as needed this visit by clinical staff  Tobacco  Meds       Reviewed and updated as needed this visit by Provider         ROS:  Constitutional, HEENT, cardiovascular, pulmonary, GI, , musculoskeletal, neuro, skin, endocrine and psych systems are negative, except as otherwise noted.    OBJECTIVE:                                                    /70  Pulse 88  Temp 98.4  F (36.9  C) (Tympanic)  Resp 16  Ht 5' 6\" (1.676 m)  Wt 132 lb (59.9 kg)  BMI 21.31 kg/m2  Body mass index is 21.31 kg/(m^2).   GENERAL: alert, no distress, fatigued and appears older than stated age  No further PE done today.    Diagnostic Test Results:  none      ASSESSMENT:                                                       Sleep disturbance  Acne vulgaris  Osteoarthritis of cervical spine, unspecified spinal osteoarthritis complication status  Other migraine without status migrainosus, intractable  Fibromyalgia  Major depressive disorder, recurrent episode, mild (H)      PLAN:                                                        ICD-10-CM    1. Sleep disturbance G47.9 hydrOXYzine (ATARAX) 25 MG tablet   2. Acne vulgaris L70.0 clindamycin (CLEOCIN T) 1 % solution   3. Osteoarthritis of cervical spine, " "unspecified spinal osteoarthritis complication status M47.812 HYDROcodone-acetaminophen (NORCO) 7.5-325 MG per tablet   4. Other migraine without status migrainosus, intractable G43.819 verapamil (CALAN) 40 MG tablet   5. Fibromyalgia M79.7 DULoxetine (CYMBALTA) 60 MG EC capsule   6. Major depressive disorder, recurrent episode, mild (H) F33.0 DULoxetine (CYMBALTA) 60 MG EC capsule           Continue current medications. Trial of Atarax for sleep - reviewed side effects with her. Urged to get back in with Obed Varela for counseling. Narcotic agreement reviewed and she signed this again. Given poor sleep and daytime somnolence and had a full workup and has seen sleep clinic for study, may want to meet with a sleep specialist. Likely her ongoing narcotic use is not helping her chronic fatigue and with underlying fibromyalgia this is common.   It was stressed that her role in treating fibromyalgia is important.   It was explained that there are certain factors that may be associated with a better prognosis: an increased sense of control over pain, a belief that one is not disabled, a belief that pain is not a sign of damage, exercising more, and pacing activities.  \"Catastrophizing\" about the pain of fibromyalgia is associated with increased awareness and worsening of the pain.      Pharmaceutical treatment of fibromyalgia works best when combined with nonpharmacologic measures.  Tricyclic medications, such as amitriptyline, and several selective serotonin and norepinephrine reuptake inhibitors, such as duloxetine, are effective at treating both fibromyalgia and depression.  Cyclobenzaprine, another tricyclic medication, may be effective for fibromyalgia but not depression.  Anticonvulsants, such as gabapentin and pregabalin, may be beneficial.  In general, these medications may be started at low doses and built up slowly.     For sleep problems, some medications that treat pain also improve sleep such as " cyclobenzaprine, amitriptyline, gabapentin, or pregabalin.  Medications such as zolpidem or benzodiazepines are not recommended for patients with fibromyalgia.      NSAIDs, glucocorticoids, and narcotics are not used to treatment fibromyalgia.      Trigger point or tender point injections may help some patients with fibromyalgia but quality trials for this therapy are lacking.      The patient was counseled regarding the importance of exercise, and caution that a temporary increase in myalgias my occur upon initiating an exercise program.  Low impact aerobic activities, such as fast walking, biking, swimming, and water aerobics, are very important to include in the treatment plan.  Other activities that are not primarily directed at developing aerobic fitness include mind-body interventions such as Steven Chi and yoga.        Selina Reveles PA-C  [facility]    GREATER THAN 50% OF TIME SPENT IN COUNSELING & CARE COORDINATION - TOTAL FACE TO FACE TIME  35 MINUTES.    Orders Placed This Encounter     hydrOXYzine (ATARAX) 25 MG tablet     clindamycin (CLEOCIN T) 1 % solution     HYDROcodone-acetaminophen (NORCO) 7.5-325 MG per tablet     verapamil (CALAN) 40 MG tablet     DULoxetine (CYMBALTA) 60 MG EC capsule       Chart documentation done in part with Dragon Voice recognition Software. Although reviewed after completion, some word and grammatical error may remain.  AVS given to patient upon discharge today.  Electronically signed by Selina Reveles PA-C  May 19, 2017  8:31 PM

## 2017-05-16 NOTE — NURSING NOTE
"Chief Complaint   Patient presents with     Asthma     med check     Depression       Initial /70  Pulse 88  Temp 98.4  F (36.9  C) (Tympanic)  Resp 16  Ht 5' 6\" (1.676 m)  Wt 132 lb (59.9 kg)  BMI 21.31 kg/m2 Estimated body mass index is 21.31 kg/(m^2) as calculated from the following:    Height as of this encounter: 5' 6\" (1.676 m).    Weight as of this encounter: 132 lb (59.9 kg).  Medication Reconciliation: complete   Paula Og CMA (AAMA)   "

## 2017-05-16 NOTE — MR AVS SNAPSHOT
After Visit Summary   5/16/2017    Sherie Otero    MRN: [id number]           Patient Information     Date Of Birth          1970        Visit Information        Provider Department      5/16/2017 10:00 AM Selina Reveles PA-C [facility]        Today's Diagnoses     Sleep disturbance    -  1    Acne vulgaris        Osteoarthritis of cervical spine, unspecified spinal osteoarthritis complication status        Other migraine without status migrainosus, intractable        Fibromyalgia        Major depressive disorder, recurrent episode, mild (H)          Care Instructions      Preventive Health Recommendations  Female Ages 40 to 49    Yearly exam:     See your health care provider every year in order to  1. Review health changes.   2. Discuss preventive care.    3. Review your medicines if your doctor prescribed any.      Get a Pap test every three years (unless you have an abnormal result and your provider advises testing more often).      If you get Pap tests with HPV test, you only need to test every 5 years, unless you have an abnormal result. You do not need a Pap test if your uterus was removed (hysterectomy) and you have not had cancer.      You should be tested each year for STDs (sexually transmitted diseases), if you're at risk.       Ask your doctor if you should have a mammogram.      Have a colonoscopy (test for colon cancer) if someone in your family has had colon cancer or polyps before age 50.       Have a cholesterol test every 5 years.       Have a diabetes test (fasting glucose) after age 45. If you are at risk for diabetes, you should have this test every 3 years.    Shots: Get a flu shot each year. Get a tetanus shot every 10 years.     Nutrition:     Eat at least 5 servings of fruits and vegetables each day.    Eat whole-grain bread, whole-wheat pasta and brown rice instead of white grains and rice.    Talk to your provider about Calcium and Vitamin D.  "    Lifestyle    Exercise at least 150 minutes a week (an average of 30 minutes a day, 5 days a week). This will help you control your weight and prevent disease.    Limit alcohol to one drink per day.    No smoking.     Wear sunscreen to prevent skin cancer.    See your dentist every six months for an exam and cleaning.        Follow-ups after your visit        Who to contact     If you have questions or need follow up information about today's clinic visit or your schedule please contact [facility] directly at [phone].  Normal or non-critical lab and imaging results will be communicated to you by XTRMhart, letter or phone within 4 business days after the clinic has received the results. If you do not hear from us within 7 days, please contact the clinic through StartX or phone. If you have a critical or abnormal lab result, we will notify you by phone as soon as possible.  Submit refill requests through StartX or call your pharmacy and they will forward the refill request to us. Please allow 3 business days for your refill to be completed.          Additional Information About Your Visit        XTRMharMarketbright Information     StartX gives you secure access to your electronic health record. If you see a primary care provider, you can also send messages to your care team and make appointments. If you have questions, please call your primary care clinic.  If you do not have a primary care provider, please call [phone] and they will assist you.        Care EveryWhere ID     This is your Care EveryWhere ID. This could be used by other organizations to access your [place] medical records  [id number]        Your Vitals Were     Pulse Temperature Respirations Height BMI (Body Mass Index)       88 98.4  F (36.9  C) (Tympanic) 16 5' 6\" (1.676 m) 21.31 kg/m2        Blood Pressure from Last 3 Encounters:   05/16/17 120/70   02/03/17 120/78   01/19/17 110/70    Weight from Last 3 Encounters:   05/16/17 " 132 lb (59.9 kg)   02/03/17 128 lb (58.1 kg)   02/03/17 128 lb (58.1 kg)              Today, you had the following     No orders found for display         Today's Medication Changes          These changes are accurate as of: 5/16/17 11:59 PM.  If you have any questions, ask your nurse or doctor.               Start taking these medicines.        Dose/Directions    clindamycin 1 % solution   Commonly known as:  CLEOCIN T   Used for:  Acne vulgaris   Started by:  Selina Reveles PA-C        Apply topically 2 times daily   Quantity:  60 mL   Refills:  11       hydrOXYzine 25 MG tablet   Commonly known as:  ATARAX   Used for:  Sleep disturbance   Started by:  Selina Reveles PA-C        Dose:   mg   Take 2-4 tablets ( mg) by mouth nightly as needed for anxiety (sleep) May use 25mg (1 tablet) to start with intially.   Quantity:  90 tablet   Refills:  1         These medicines have changed or have updated prescriptions.        Dose/Directions    verapamil 40 MG tablet   Commonly known as:  CALAN   This may have changed:  additional instructions   Used for:  Other migraine without status migrainosus, intractable   Changed by:  Selina Reveles PA-C        Dose:  40 mg   Take 1 tablet (40 mg) by mouth 2 times daily   Quantity:  180 tablet   Refills:  3            Where to get your medicines      These medications were sent to [id number] - [place], [place]  [address]   [address] [place] [address]     Phone:  [phone]     clindamycin 1 % solution    DULoxetine 60 MG EC capsule    hydrOXYzine 25 MG tablet    verapamil 40 MG tablet         Some of these will need a paper prescription and others can be bought over the counter.  Ask your nurse if you have questions.     Bring a paper prescription for each of these medications     HYDROcodone-acetaminophen 7.5-325 MG per tablet                Primary Care Provider Office Phone # Fax #    Selina Reveles PA-C [phone]  [phone]       [address] [place] [address]        Thank you!     Thank you for choosing [facility]  for your care. Our goal is always to provide you with excellent care. Hearing back from our patients is one way we can continue to improve our services. Please take a few minutes to complete the written survey that you may receive in the mail after your visit with us. Thank you!             Your Updated Medication List - Protect others around you: Learn how to safely use, store and throw away your medicines at www.disposemymeds.org.          This list is accurate as of: 5/16/17 11:59 PM.  Always use your most recent med list.                   Brand Name Dispense Instructions for use    albuterol 108 (90 BASE) MCG/ACT Inhaler    VENTOLIN HFA    18 g    Inhale 2 puffs into the lungs every 6 hours as needed for shortness of breath / dyspnea or wheezing Appointment needed for additional refills.       ALPRAZolam 0.5 MG tablet    XANAX    90 tablet    TAKE ONE TABLET BY MOUTH THREE TIMES DAILY       busPIRone 10 MG tablet    BUSPAR    135 tablet    TAKE 5 MG (1/2 TAB) IN THE MORNING AND 10 MG AT NIGHT       cetirizine 10 MG tablet    zyrTEC    30 tablet    TAKE  ONE TABLET BY MOUTH EVERY DAY       clindamycin 1 % solution    CLEOCIN T    60 mL    Apply topically 2 times daily       clonazePAM 0.5 MG tablet    klonoPIN    60 tablet    TAKE ONE-HALF TO ONE TABLET BY MOUTH TWICE MOON AS NEEDED FOR ANXIETY-MUST LAST 30 DAYS       DULoxetine 60 MG EC capsule    CYMBALTA    90 capsule    TAKE ONE CAPSULE BY MOUTH EVERY EVENING - KADY       fluticasone 50 MCG/ACT spray    FLONASE    16 g    SPRAY 1-2 SPRAYS INTO BOTH NOSTRILS DAILY       HYDROcodone-acetaminophen 7.5-325 MG per tablet   Start taking on:  5/20/2017    NORCO    240 tablet    TAKE 2 TABLETS BY MOUTH EVERY 6 HOURS AS NEEDED FOR PAIN--MAX OF 8 TABLETS PER DAY       hydrOXYzine 25 MG tablet    ATARAX    90 tablet     Take 2-4 tablets ( mg) by mouth nightly as needed for anxiety (sleep) May use 25mg (1 tablet) to start with intially.       MULTIVITAL PO          verapamil 40 MG tablet    CALAN    180 tablet    Take 1 tablet (40 mg) by mouth 2 times daily

## 2017-05-16 NOTE — TELEPHONE ENCOUNTER
Disp Refills Start End KADY      verapamil (CALAN) 40 MG tablet 180 tablet 3 5/16/2017  No     Sig: Take 1 tablet (40 mg) by mouth 2 times daily     Patient just had filled.  Gutierrez Felton MA

## 2017-05-17 ASSESSMENT — ASTHMA QUESTIONNAIRES: ACT_TOTALSCORE: 17

## 2017-05-17 ASSESSMENT — PATIENT HEALTH QUESTIONNAIRE - PHQ9: SUM OF ALL RESPONSES TO PHQ QUESTIONS 1-9: 18

## 2017-05-18 RX ORDER — VERAPAMIL HYDROCHLORIDE 40 MG/1
TABLET ORAL
Qty: 60 TABLET | Refills: 0 | OUTPATIENT
Start: 2017-05-18

## 2017-05-18 NOTE — TELEPHONE ENCOUNTER
Verapamil  Filled for 90 days w/3 refills on 5/16/17, too soon.  Refill refused.    Bolivar Laurent RN, BSN

## 2017-05-31 DIAGNOSIS — F41.1 GENERALIZED ANXIETY DISORDER: ICD-10-CM

## 2017-05-31 RX ORDER — CLONAZEPAM 0.5 MG/1
TABLET ORAL
Qty: 60 TABLET | Refills: 0 | Status: SHIPPED | OUTPATIENT
Start: 2017-05-31 | End: 2017-06-27

## 2017-05-31 NOTE — TELEPHONE ENCOUNTER
clonazePAM (KLONOPIN) 0.5 MG tablet      Last Written Prescription Date:  5/1/17  Last Fill Quantity: 60,   # refills: 0  Last Office Visit with [facility], [facility] or [facility] prescribing provider: 5/16/17  Future Office visit:       Routing refill request to provider for review/approval because:  Drug not on the [facility], [facility] or [facility] refill protocol or controlled substance

## 2017-06-05 DIAGNOSIS — J45.20 INTERMITTENT ASTHMA, UNCOMPLICATED: ICD-10-CM

## 2017-06-05 NOTE — TELEPHONE ENCOUNTER
Ventolin        Last Written Prescription Date: 5/5/2017  Last Fill Quantity: 18g, # refills: 0    Last Office Visit with [facility], [facility] or [facility] prescribing provider:  5/16/2017   Future Office Visit:       Date of Last Asthma Action Plan Letter:   Asthma Action Plan Q1 Year    Topic Date Due     Asthma Action Plan - yearly  11/23/2016      Asthma Control Test:   ACT Total Scores 5/16/2017   ACT TOTAL SCORE -   ASTHMA ER VISITS -   ASTHMA HOSPITALIZATIONS -   ACT TOTAL SCORE (Goal Greater than or Equal to 20) 17   In the past 12 months, how many times did you visit the emergency room for your asthma without being admitted to the hospital? 0   In the past 12 months, how many times were you hospitalized overnight because of your asthma? 0       Date of Last Spirometry Test:   No results found for this or any previous visit.

## 2017-06-08 RX ORDER — ALBUTEROL SULFATE 90 UG/1
AEROSOL, METERED RESPIRATORY (INHALATION)
Qty: 18 G | Refills: 3 | Status: SHIPPED | OUTPATIENT
Start: 2017-06-08 | End: 2018-03-14

## 2017-06-08 NOTE — TELEPHONE ENCOUNTER
Routing refill request to provider for review/approval because:  Labs out of range:  ACT      Addie Kuhn RN

## 2017-06-19 ENCOUNTER — TELEPHONE (OUTPATIENT)
Dept: FAMILY MEDICINE | Facility: CLINIC | Age: 47
End: 2017-06-19

## 2017-06-19 ENCOUNTER — MYC REFILL (OUTPATIENT)
Dept: FAMILY MEDICINE | Facility: CLINIC | Age: 47
End: 2017-06-19

## 2017-06-19 DIAGNOSIS — M47.812 OSTEOARTHRITIS OF CERVICAL SPINE, UNSPECIFIED SPINAL OSTEOARTHRITIS COMPLICATION STATUS: ICD-10-CM

## 2017-06-19 RX ORDER — HYDROCODONE BITARTRATE AND ACETAMINOPHEN 7.5; 325 MG/1; MG/1
TABLET ORAL
Qty: 240 TABLET | Refills: 0 | Status: SHIPPED | OUTPATIENT
Start: 2017-06-19 | End: 2017-07-16

## 2017-06-19 NOTE — TELEPHONE ENCOUNTER
Message from Dragon Portshart:  Original authorizing provider: AGUSTÍN Owens would like a refill of the following medications:  HYDROcodone-acetaminophen (NORCO) 7.5-325 MG per tablet [Selina Reveles PA-C]    Preferred pharmacy: [id number] [address]     Comment:  I called [facility] Friday for them to fax you a refill request, Rosalva said today that you did not receive it.        Last Written Prescription Date: 05/20/17  Last Fill Quantity: 240,  # refills: 0   Last Office Visit with [facility], [facility] or [facility] prescribing provider: 05/16/17

## 2017-06-19 NOTE — TELEPHONE ENCOUNTER
Patient called today.    Patient is requesting medication hydrocodone.    Patient is out of this medication.    Please contact patient.    Thank you.    Central Scheduling  Andreea ZHANG

## 2017-06-27 DIAGNOSIS — F41.1 GENERALIZED ANXIETY DISORDER: ICD-10-CM

## 2017-06-27 RX ORDER — CLONAZEPAM 0.5 MG/1
TABLET ORAL
Qty: 60 TABLET | Refills: 0 | Status: SHIPPED | OUTPATIENT
Start: 2017-06-27 | End: 2017-07-26

## 2017-06-27 NOTE — TELEPHONE ENCOUNTER
Klonopin      Last Written Prescription Date: 5/31/2017  Last Fill Quantity: 60,  # refills: 0   Last Office Visit with [facility], [facility] or [facility] prescribing provider: 5/16/2017

## 2017-07-07 ENCOUNTER — MYC REFILL (OUTPATIENT)
Dept: FAMILY MEDICINE | Facility: CLINIC | Age: 47
End: 2017-07-07

## 2017-07-07 DIAGNOSIS — F41.1 GENERALIZED ANXIETY DISORDER: ICD-10-CM

## 2017-07-07 NOTE — TELEPHONE ENCOUNTER
Message from Bright Patternhart:  Original authorizing provider: AGUSTÍN Owens would like a refill of the following medications:  ALPRAZolam (XANAX) 0.5 MG tablet [Selina Reveles PA-C]    Preferred pharmacy: [id number] [address]     Comment:

## 2017-07-07 NOTE — TELEPHONE ENCOUNTER
ALPRAZOLAM      Last Written Prescription Date:  4/13/17  Last Fill Quantity: 90,   # refills: 0  Last Office Visit with [facility] prescribing provider: 5/16/17  Future Office visit:       Routing refill request to provider for review/approval because:  Drug not on the [facility] refill protocol or controlled substance

## 2017-07-10 RX ORDER — ALPRAZOLAM 0.5 MG
0.5 TABLET ORAL 3 TIMES DAILY
Qty: 90 TABLET | Refills: 0 | Status: SHIPPED | OUTPATIENT
Start: 2017-07-10 | End: 2017-10-05

## 2017-07-15 DIAGNOSIS — G47.9 SLEEP DISTURBANCE: ICD-10-CM

## 2017-07-16 ENCOUNTER — MYC REFILL (OUTPATIENT)
Dept: FAMILY MEDICINE | Facility: CLINIC | Age: 47
End: 2017-07-16

## 2017-07-16 DIAGNOSIS — M47.812 OSTEOARTHRITIS OF CERVICAL SPINE, UNSPECIFIED SPINAL OSTEOARTHRITIS COMPLICATION STATUS: ICD-10-CM

## 2017-07-17 RX ORDER — HYDROCODONE BITARTRATE AND ACETAMINOPHEN 7.5; 325 MG/1; MG/1
TABLET ORAL
Qty: 240 TABLET | Refills: 0 | Status: SHIPPED | OUTPATIENT
Start: 2017-07-17 | End: 2017-08-14

## 2017-07-17 NOTE — TELEPHONE ENCOUNTER
Atarax       Last Written Prescription Date: 5/16/2017  Last Fill Quantity: 90,  # refills: 1   Last Office Visit with [facility], [facility] or [facility] prescribing provider: 5/16/2017

## 2017-07-17 NOTE — TELEPHONE ENCOUNTER
Message from [employer]:  Original authorizing provider: AGUSTÍN Owens would like a refill of the following medications:  HYDROcodone-acetaminophen (NORCO) 7.5-325 MG per tablet [Selina Reveles PA-C]    Preferred pharmacy: [facility] #[id number] [address]     Comment:        Last Written Prescription Date: 06/19/17  Last Fill Quantity: 240,  # refills: 0   Last Office Visit with [facility], [facility] or [facility] prescribing provider: 05/16/17

## 2017-07-18 RX ORDER — HYDROXYZINE HYDROCHLORIDE 25 MG/1
TABLET, FILM COATED ORAL
Qty: 90 TABLET | Refills: 0 | Status: SHIPPED | OUTPATIENT
Start: 2017-07-18 | End: 2017-08-15

## 2017-07-18 NOTE — TELEPHONE ENCOUNTER
Atarax  Routing refill request to provider for review/approval because:  Drug not on the [facility] refill protocol for associated diagnosis    Bolivar Laurent RN, BSN

## 2017-07-25 ENCOUNTER — MYC REFILL (OUTPATIENT)
Dept: FAMILY MEDICINE | Facility: CLINIC | Age: 47
End: 2017-07-25

## 2017-07-25 DIAGNOSIS — F41.1 GENERALIZED ANXIETY DISORDER: ICD-10-CM

## 2017-07-25 NOTE — TELEPHONE ENCOUNTER
Message from MyChart:  Original authorizing provider: MD Sherie Ortega would like a refill of the following medications:  clonazePAM (KLONOPIN) 0.5 MG tablet [Mauro Paredes MD]    Preferred pharmacy: [facility] #[id number] [address]     Comment:        Last Written Prescription Date: 06/27/17  Last Fill Quantity: 60,  # refills: 0   Last Office Visit with G, P or [facility] prescribing provider: 05/16/17

## 2017-07-26 RX ORDER — CLONAZEPAM 0.5 MG/1
TABLET ORAL
Qty: 60 TABLET | Refills: 0 | Status: CANCELLED | OUTPATIENT
Start: 2017-07-26

## 2017-07-26 RX ORDER — CLONAZEPAM 0.5 MG/1
TABLET ORAL
Qty: 60 TABLET | Refills: 0 | Status: SHIPPED | OUTPATIENT
Start: 2017-07-26 | End: 2017-08-23

## 2017-07-26 NOTE — TELEPHONE ENCOUNTER
Rx faxed to [facility] and placed in team C rx faxed bin. Informed patient via Keduot.     Hodan Huynh CMA  7/26/2017

## 2017-08-14 ENCOUNTER — MYC REFILL (OUTPATIENT)
Dept: FAMILY MEDICINE | Facility: CLINIC | Age: 47
End: 2017-08-14

## 2017-08-14 DIAGNOSIS — M47.812 OSTEOARTHRITIS OF CERVICAL SPINE, UNSPECIFIED SPINAL OSTEOARTHRITIS COMPLICATION STATUS: ICD-10-CM

## 2017-08-14 RX ORDER — HYDROCODONE BITARTRATE AND ACETAMINOPHEN 7.5; 325 MG/1; MG/1
TABLET ORAL
Qty: 240 TABLET | Refills: 0 | Status: SHIPPED | OUTPATIENT
Start: 2017-08-14 | End: 2017-09-07

## 2017-08-14 NOTE — TELEPHONE ENCOUNTER
Message from [employer]:  Original authorizing provider: AGUSTÍN Owens would like a refill of the following medications:  HYDROcodone-acetaminophen (NORCO) 7.5-325 MG per tablet [Selina Reveles PA-C]    Preferred pharmacy: [facility] #[id number] [address]     Comment:        Last Written Prescription Date: 07/17/17  Last Fill Quantity: 240,  # refills: 0   Last Office Visit with [facility], [facility] or [place] prescribing provider: 05/16/17

## 2017-08-15 DIAGNOSIS — G47.9 SLEEP DISTURBANCE: ICD-10-CM

## 2017-08-15 NOTE — TELEPHONE ENCOUNTER
hydrOXYzine (ATARAX) 25 MG tablet      Last Written Prescription Date: 7/18/17  Last Fill Quantity: 90,  # refills: 0   Last Office Visit with [facility], [facility] or [facility] prescribing provider: 5/16/17

## 2017-08-16 NOTE — TELEPHONE ENCOUNTER
Routing refill request to provider for review/approval because:  Drug not on the [facility] refill protocol for this diagnosis.  Addie Kuhn RN

## 2017-08-17 RX ORDER — HYDROXYZINE HYDROCHLORIDE 25 MG/1
TABLET, FILM COATED ORAL
Qty: 90 TABLET | Refills: 0 | Status: SHIPPED | OUTPATIENT
Start: 2017-08-17 | End: 2018-01-12

## 2017-08-23 ENCOUNTER — MYC REFILL (OUTPATIENT)
Dept: FAMILY MEDICINE | Facility: CLINIC | Age: 47
End: 2017-08-23

## 2017-08-23 ENCOUNTER — TELEPHONE (OUTPATIENT)
Dept: FAMILY MEDICINE | Facility: CLINIC | Age: 47
End: 2017-08-23

## 2017-08-23 DIAGNOSIS — F41.1 GENERALIZED ANXIETY DISORDER: ICD-10-CM

## 2017-08-23 RX ORDER — CLONAZEPAM 0.5 MG/1
TABLET ORAL
Qty: 60 TABLET | Refills: 0 | Status: SHIPPED | OUTPATIENT
Start: 2017-08-23 | End: 2017-09-19

## 2017-08-23 NOTE — TELEPHONE ENCOUNTER
Reason for Call:  Same Day Appointment, Requested Provider:  Selina Reveles PA-C    PCP: Selina Reveles    Reason for visit: back pain     Duration of symptoms: since 8/12    Have you been treated for this in the past? No    Additional comments: Patient called and states that she think she may have pulled a muscle in her back and she said it is not getting better. She said it happened 8/12 and that she has tried doing stretches, taking ibuprofen and rotating the ibuprofen with her pain medicine and it is not working. She said she had even tried putting a pain patch on it and that it didn't help at all. She said it doesn't hurt to touch her back but it hurts when she moves, walks, or stands. She is wondering if Selina Reveles would be able to work her into her schedule sooner than her next available. Please advise.     Can we leave a detailed message on this number? YES    Phone number patient can be reached at: Home number on file [phone] (home)    Best Time: any     Call taken on 8/23/2017 at 3:28 PM by Nicholas Curran

## 2017-08-23 NOTE — TELEPHONE ENCOUNTER
Called pt and informed her that we first chance to see her would be next Wed at either 9 or 3 otherwise she will have to see someone else. She will try and wait for her appt next week otherwise she will call back and try and see someone emelyn.  Addie García MA

## 2017-08-23 NOTE — TELEPHONE ENCOUNTER
Message from [employer]:  Original authorizing provider: AGUSTÍN Owens would like a refill of the following medications:  clonazePAM (KLONOPIN) 0.5 MG tablet [Selina Reveles PA-C]    Preferred pharmacy: [facility] #[id number] [address]     Comment:        Last Written Prescription Date: 07/26/17  Last Fill Quantity: 60,  # refills: 0   Last Office Visit with G, P or [facility] prescribing provider: 05/16/17

## 2017-08-30 ENCOUNTER — OFFICE VISIT (OUTPATIENT)
Dept: FAMILY MEDICINE | Facility: CLINIC | Age: 47
End: 2017-08-30
Payer: MEDICAID

## 2017-08-30 VITALS
HEART RATE: 100 BPM | SYSTOLIC BLOOD PRESSURE: 116 MMHG | WEIGHT: 131 LBS | OXYGEN SATURATION: 98 % | BODY MASS INDEX: 21.14 KG/M2 | TEMPERATURE: 97.8 F | DIASTOLIC BLOOD PRESSURE: 72 MMHG | RESPIRATION RATE: 18 BRPM

## 2017-08-30 DIAGNOSIS — F33.0 MAJOR DEPRESSIVE DISORDER, RECURRENT EPISODE, MILD (H): ICD-10-CM

## 2017-08-30 DIAGNOSIS — M05.79 RHEUMATOID ARTHRITIS INVOLVING MULTIPLE SITES WITH POSITIVE RHEUMATOID FACTOR (H): ICD-10-CM

## 2017-08-30 DIAGNOSIS — M62.830 BACK MUSCLE SPASM: ICD-10-CM

## 2017-08-30 DIAGNOSIS — J45.20 INTERMITTENT ASTHMA, UNCOMPLICATED: ICD-10-CM

## 2017-08-30 DIAGNOSIS — S39.012A LOW BACK STRAIN, INITIAL ENCOUNTER: Primary | ICD-10-CM

## 2017-08-30 PROCEDURE — 99214 OFFICE O/P EST MOD 30 MIN: CPT | Performed by: PHYSICIAN ASSISTANT

## 2017-08-30 RX ORDER — TIZANIDINE 2 MG/1
2 TABLET ORAL 3 TIMES DAILY PRN
Qty: 60 TABLET | Refills: 0 | Status: SHIPPED | OUTPATIENT
Start: 2017-08-30 | End: 2017-09-18 | Stop reason: ALTCHOICE

## 2017-08-30 RX ORDER — PREDNISONE 20 MG/1
TABLET ORAL
Qty: 15 TABLET | Refills: 0 | Status: SHIPPED | OUTPATIENT
Start: 2017-08-30 | End: 2018-01-30

## 2017-08-30 ASSESSMENT — PAIN SCALES - GENERAL: PAINLEVEL: MODERATE PAIN (5)

## 2017-08-30 NOTE — MR AVS SNAPSHOT
After Visit Summary   8/30/2017    Sherie Otero    MRN: [id number]           Patient Information     Date Of Birth          1970        Visit Information        Provider Department      8/30/2017 3:00 PM Selina Reveles PA-C [facility]        Today's Diagnoses     Low back strain, initial encounter    -  1    Intermittent asthma, uncomplicated        Major depressive disorder, recurrent episode, mild (H)        Back muscle spasm        Rheumatoid arthritis involving multiple sites with positive rheumatoid factor (H)           Follow-ups after your visit        Who to contact     If you have questions or need follow up information about today's clinic visit or your schedule please contact [facility] directly at [phone].  Normal or non-critical lab and imaging results will be communicated to you by Vibrynthart, letter or phone within 4 business days after the clinic has received the results. If you do not hear from us within 7 days, please contact the clinic through Vibrynthart or phone. If you have a critical or abnormal lab result, we will notify you by phone as soon as possible.  Submit refill requests through [employer]Keeper or call your pharmacy and they will forward the refill request to us. Please allow 3 business days for your refill to be completed.          Additional Information About Your Visit        MyChart Information     AdKeeper gives you secure access to your electronic health record. If you see a primary care provider, you can also send messages to your care team and make appointments. If you have questions, please call your primary care clinic.  If you do not have a primary care provider, please call [phone] and they will assist you.        Care EveryWhere ID     This is your Care EveryWhere ID. This could be used by other organizations to access your [facility] medical records  [id number]        Your Vitals Were     Pulse Temperature Respirations  Pulse Oximetry BMI (Body Mass Index)       100 97.8  F (36.6  C) (Tympanic) 18 98% 21.14 kg/m2        Blood Pressure from Last 3 Encounters:   08/30/17 116/72   05/16/17 120/70   02/03/17 120/78    Weight from Last 3 Encounters:   08/30/17 131 lb (59.4 kg)   05/16/17 132 lb (59.9 kg)   02/03/17 128 lb (58.1 kg)              We Performed the Following     Asthma Action Plan (AAP)     DEPRESSION ACTION PLAN (DAP)          Today's Medication Changes          These changes are accurate as of: 8/30/17 11:59 PM.  If you have any questions, ask your nurse or doctor.               Start taking these medicines.        Dose/Directions    predniSONE 20 MG tablet   Commonly known as:  DELTASONE   Used for:  Low back strain, initial encounter   Started by:  Selina Reveles PA-C        Take 40mg daily x 5 days then 20 mg daily x 5 days.   Quantity:  15 tablet   Refills:  0       tiZANidine 2 MG tablet   Commonly known as:  ZANAFLEX   Used for:  Back muscle spasm   Started by:  Selina Reveles PA-C        Dose:  2 mg   Take 1 tablet (2 mg) by mouth 3 times daily as needed for muscle spasms   Quantity:  60 tablet   Refills:  0            Where to get your medicines      These medications were sent to [facility] [address]   [address] [place] [address] [facility] [address]     Phone:  [phone]     predniSONE 20 MG tablet    tiZANidine 2 MG tablet                Primary Care Provider Office Phone # Fax #    Selina Reveles PA-C [phone]       [address] [place] [address]   [facility] [place] [address]        Equal Access to Services     GERMAIEN [facility] SystemJOAQUIN : Fabi Amaya, wanohelia luhosea, qaybta kaalneema edwards. So [facility] [phone].    ATENCIÓN: Si habla español, tiene a lange disposición servicios gratuitos de asistencia lingüística. iMla al [phone].    We comply with applicable federal civil rights laws and [place] laws. We do not  discriminate on the basis of race, color, national origin, age, disability sex, sexual orientation or gender identity.            Thank you!     Thank you for choosing [facility]  for your care. Our goal is always to provide you with excellent care. Hearing back from our patients is one way we can continue to improve our services. Please take a few minutes to complete the written survey that you may receive in the mail after your visit with us. Thank you!             Your Updated Medication List - Protect others around you: Learn how to safely use, store and throw away your medicines at www.disposemymeds.org.          This list is accurate as of: 8/30/17 11:59 PM.  Always use your most recent med list.                   Brand Name Dispense Instructions for use Diagnosis    ALPRAZolam 0.5 MG tablet    XANAX    90 tablet    Take 1 tablet (0.5 mg) by mouth 3 times daily    Generalized anxiety disorder       busPIRone 10 MG tablet    BUSPAR    135 tablet    TAKE 5 MG (1/2 TAB) IN THE MORNING AND 10 MG AT NIGHT    Generalized anxiety disorder       cetirizine 10 MG tablet    zyrTEC    30 tablet    TAKE  ONE TABLET BY MOUTH EVERY DAY    Chronic rhinitis       clindamycin 1 % solution    CLEOCIN T    60 mL    Apply topically 2 times daily    Acne vulgaris       clonazePAM 0.5 MG tablet    klonoPIN    60 tablet    TAKE ONE-HALF TO ONE TABLET BY MOUTH TWICE A DAY AS NEEDED FOR ANXIETY - MUST LAST 30 DAYS    Generalized anxiety disorder       DULoxetine 60 MG EC capsule    CYMBALTA    90 capsule    TAKE ONE CAPSULE BY MOUTH EVERY EVENING - KADY    Fibromyalgia, Major depressive disorder, recurrent episode, mild (H)       fluticasone 50 MCG/ACT spray    FLONASE    16 g    SPRAY 1-2 SPRAYS INTO BOTH NOSTRILS DAILY    Chronic rhinitis       hydrOXYzine 25 MG tablet    ATARAX    90 tablet    TAKE 2-4 TABLETS ( MG) BY MOUTH NIGHTLY AS NEEDED FOR ANXIETY (SLEEP) MAY USE 25MG(1 TABLET) TO START WITH    Sleep  disturbance       MULTIVITAL PO           predniSONE 20 MG tablet    DELTASONE    15 tablet    Take 40mg daily x 5 days then 20 mg daily x 5 days.    Low back strain, initial encounter       tiZANidine 2 MG tablet    ZANAFLEX    60 tablet    Take 1 tablet (2 mg) by mouth 3 times daily as needed for muscle spasms    Back muscle spasm       VENTOLIN  (90 BASE) MCG/ACT Inhaler   Generic drug:  albuterol     18 g    INHALE 2 PUFFS INTO THE LUNGS EVERY 6 HOURS AS NEEDED FOR SHORTNESS OF BREATH    Intermittent asthma, uncomplicated       verapamil 40 MG tablet    CALAN    180 tablet    Take 1 tablet (40 mg) by mouth 2 times daily    Other migraine without status migrainosus, intractable

## 2017-08-30 NOTE — NURSING NOTE
"Chief Complaint   Patient presents with     Back Pain       Initial /72  Pulse 100  Temp 97.8  F (36.6  C) (Tympanic)  Resp 18  Wt 131 lb (59.4 kg)  SpO2 98%  BMI 21.14 kg/m2 Estimated body mass index is 21.14 kg/(m^2) as calculated from the following:    Height as of 5/16/17: 5' 6\" (1.676 m).    Weight as of this encounter: 131 lb (59.4 kg).  BP completed using cuff size: krunal García MA      "

## 2017-08-30 NOTE — PROGRESS NOTES
"  SUBJECTIVE:   Sherie Otero is a 47 year old female with underlying RA & fibromyalgia who presents to clinic today for the following health issues:      Back Pain - was vacuuming and suddenly felt severe non radiating low back pain.  Was a \"shooting\" pain when it started & now states that in the time it took her to get an office appt it has improved by about 50%.  Has been using her current prescription of Vicodin - she has a narcotic agreement - uses this for her RA/fibromyalgia dx.        Duration: 8/12        Specific cause: turning/bending    Description:   Location of pain: low back bilateral  Character of pain: sharp, stabbing and intermittent  Pain radiation:none  New numbness or weakness in legs, not attributed to pain:  no     Intensity: Currently 5/10    History:   Pain interferes with job: Not applicable  History of back problems: no prior back problems  Any previous MRI or X-rays: None  Sees a specialist for back pain:  No  Therapies tried without relief: opioids    Alleviating factors:   Improved by: none      Precipitating factors:  Worsened by: Lifting and Bending    Functional and Psychosocial Screen (Omar STarT Back):      Not performed today          Accompanying Signs & Symptoms:  Risk of Fracture:  None  Risk of Cauda Equina:  None  Risk of Infection:  None  Risk of Cancer:  None  Risk of Ankylosing Spondylitis:  Onset at age <35, male, AND morning back stiffness. no                       Problem list and histories reviewed & adjusted, as indicated.  Additional history: as documented    Patient Active Problem List   Diagnosis     CARDIOVASCULAR SCREENING; LDL GOAL LESS THAN 160     Chronic fatigue syndrome     Fibromyalgia     Generalized anxiety disorder     Intermittent asthma     Tobacco abuse     SHANTANU (obstructive sleep apnea)     Disturbance in sleep behavior     Cervicalgia     Stenosis, cervical spine     Spondylitis, cervical (H)     NSAID induced gastritis     Major depressive " disorder, recurrent episode, mild (H)     Other chronic pain     Intermittent asthma, uncomplicated     Rheumatoid arthritis involving multiple sites with positive rheumatoid factor (H)     Elevated white blood cell count     Panic attacks     Grief reaction     Osteoarthritis of cervical spine, unspecified spinal osteoarthritis complication status     Degenerative joint disease of cervical spine     Pulmonary nodules     Abnormal CT of the chest     Hilar lymphadenopathy     Other migraine without status migrainosus, intractable     Past Surgical History:   Procedure Laterality Date     C APPENDECTOMY      Ruptured at age 7 years     C  DELIVERY ONLY      , Low Cervical     DILATION AND CURETTAGE, HYSTEROSCOPY, ABLATE ENDOMETRIUM NOVASURE, COMBINED  2011    Procedure:COMBINED DILATION AND CURETTAGE, HYSTEROSCOPY, ABLATE ENDOMETRIUM NOVASURE; hysteroscopy, dilation and curettage, novasure; Surgeon:JEREMY ANNA; Location:[facility] OR     EXCISE LESION TRUNK  2013    Procedure: EXCISE LESION TRUNK;  interlaminar epidural Steroid Injection Cervical -thoracic Levels (C7-T1)    Re-Excision of chest wall mass;  Surgeon: Jeremy Bolaños MD;  Location: [facility] OR     HC HYSTEROS W PERMANENT FALLOPAIN IMPLANT      Essure done in office [facility]     INJECT BLOCK MEDIAL BRANCH CERVICAL/THORACIC/LUMBAR  2014    Procedure: INJECT BLOCK MEDIAL BRANCH CERVICAL / THORACIC / LUMBAR;  Surgeon: Josué Munson MD;  Location: [facility] OR     INJECT FACET JOINT  2014    Procedure: INJECT FACET JOINT;  diagnostic medial branch facet nerve block cervical levels 5-6, 6-7;  Surgeon: Josué Munson MD;  Location:  OR     [facility]         Social History   Substance Use Topics     Smoking status: Current Every Day Smoker     Packs/day: 0.50     Years: 29.00     Types: Cigarettes     Smokeless tobacco: Never Used      Comment: 11 - 1pk/day     Alcohol use No     Family History   Problem  Relation Age of Onset     Arthritis Mother      Rheumatoid     Respiratory Mother      COPD     Hypertension Sister      1/2 sister     Neurologic Disorder Sister      1/2 sisiter  Very mild form of CP     Cardiovascular Maternal Grandmother      HEART DISEASE Maternal Aunt      Bypass at age 50's             Reviewed and updated as needed this visit by clinical staffAlltriston  Meds       Reviewed and updated as needed this visit by Provider         ROS:  Constitutional, HEENT, cardiovascular, pulmonary, gi and gu systems are negative, except as otherwise noted.      OBJECTIVE:   /72  Pulse 100  Temp 97.8  F (36.6  C) (Tympanic)  Resp 18  Wt 131 lb (59.4 kg)  SpO2 98%  BMI 21.14 kg/m2  Body mass index is 21.14 kg/(m^2).   GENERAL: alert, no distress and appears older than stated age  Comprehensive back pain exam:  Tenderness of lumbar musculature, Pain limits the following motions: twisting motion, Lower extremity strength functional and equal on both sides, Lower extremity reflexes within normal limits bilaterally, Lower extremity sensation normal and equal on both sides and Straight leg raise negative bilaterally    Diagnostic Test Results:  none     ASSESSMENT:      Intermittent asthma, uncomplicated  Major depressive disorder, recurrent episode, mild (H)  Low back strain, initial encounter  Back muscle spasm  Rheumatoid arthritis involving multiple sites with positive rheumatoid factor (H)      PLAN:       ICD-10-CM    1. Low back strain, initial encounter S39.012A predniSONE (DELTASONE) 20 MG tablet   2. Intermittent asthma, uncomplicated J45.20 Asthma Action Plan (AAP)   3. Major depressive disorder, recurrent episode, mild (H) F33.0 DEPRESSION ACTION PLAN (DAP)   4. Back muscle spasm M62.830 tiZANidine (ZANAFLEX) 2 MG tablet   5. Rheumatoid arthritis involving multiple sites with positive rheumatoid factor (H) M05.79          MEDICATIONS:        - Start taking Medrol Dose pack and Tizanadine.        - Continue other medications without change - urged to decrease use of Vicodin. She will likely need new script early as she was using more of this for the pain.  FUTURE APPOINTMENTS:       - Follow-up visit as needed for back - would consider PT referral if persistent issue with her back.     Selina Reveles PA-C  [facility]    GREATER THAN 50% OF TIME SPENT IN COUNSELING & CARE COORDINATION - TOTAL FACE TO FACE TIME  30 MINUTES.    Orders Placed This Encounter     Asthma Action Plan (AAP)     DEPRESSION ACTION PLAN (DAP)     predniSONE (DELTASONE) 20 MG tablet     tiZANidine (ZANAFLEX) 2 MG tablet       AVS given to patient upon discharge today.  Electronically signed by Selina Reveles PA-C  September 7, 2017  2:47 PM

## 2017-08-31 ASSESSMENT — ASTHMA QUESTIONNAIRES: ACT_TOTALSCORE: 22

## 2017-09-07 ENCOUNTER — MYC REFILL (OUTPATIENT)
Dept: FAMILY MEDICINE | Facility: CLINIC | Age: 47
End: 2017-09-07

## 2017-09-07 DIAGNOSIS — M47.812 OSTEOARTHRITIS OF CERVICAL SPINE, UNSPECIFIED SPINAL OSTEOARTHRITIS COMPLICATION STATUS: ICD-10-CM

## 2017-09-07 RX ORDER — HYDROCODONE BITARTRATE AND ACETAMINOPHEN 7.5; 325 MG/1; MG/1
TABLET ORAL
Qty: 240 TABLET | Refills: 0 | Status: SHIPPED | OUTPATIENT
Start: 2017-09-07 | End: 2017-10-05

## 2017-09-07 NOTE — TELEPHONE ENCOUNTER
Message from [employer]:  Original authorizing provider: AGUSTÍN Owens would like a refill of the following medications:  HYDROcodone-acetaminophen (NORCO) 7.5-325 MG per tablet [Selina Reveles PA-C]    Preferred pharmacy: [facility] #[id number] [address]     Comment:  Hi Dr Reveles you wanted me to mention when I submitted an early refill request for my pain meds that I had taken extra meds when I hurt my low back on Aug12th thru the 30th when I could get in to see you.

## 2017-09-18 ENCOUNTER — MYC MEDICAL ADVICE (OUTPATIENT)
Dept: FAMILY MEDICINE | Facility: CLINIC | Age: 47
End: 2017-09-18

## 2017-09-18 DIAGNOSIS — M62.830 BACK MUSCLE SPASM: Primary | ICD-10-CM

## 2017-09-18 RX ORDER — METAXALONE 400 MG/1
1-2 TABLET ORAL AT BEDTIME
Qty: 120 TABLET | Refills: 0 | Status: SHIPPED | OUTPATIENT
Start: 2017-09-18 | End: 2017-11-28

## 2017-09-19 ENCOUNTER — MYC REFILL (OUTPATIENT)
Dept: FAMILY MEDICINE | Facility: CLINIC | Age: 47
End: 2017-09-19

## 2017-09-19 DIAGNOSIS — F41.1 GENERALIZED ANXIETY DISORDER: ICD-10-CM

## 2017-09-19 RX ORDER — CLONAZEPAM 0.5 MG/1
TABLET ORAL
Qty: 60 TABLET | Refills: 0 | Status: SHIPPED | OUTPATIENT
Start: 2017-09-19 | End: 2017-10-16

## 2017-09-19 NOTE — TELEPHONE ENCOUNTER
Message from [employer]:  Original authorizing provider: AGUSTÍN Owens would like a refill of the following medications:  clonazePAM (KLONOPIN) 0.5 MG tablet [Selina Reveles PA-C]    Preferred pharmacy: [facility] #[id number] [address]     Comment:        Last Written Prescription Date: 08/23/17  Last Fill Quantity: 60,  # refills: 0   Last Office Visit with G, P or [facility] prescribing provider: 08/30/17

## 2017-09-20 ENCOUNTER — MYC REFILL (OUTPATIENT)
Dept: FAMILY MEDICINE | Facility: CLINIC | Age: 47
End: 2017-09-20

## 2017-09-20 DIAGNOSIS — M79.7 FIBROMYALGIA: ICD-10-CM

## 2017-09-20 DIAGNOSIS — F33.0 MAJOR DEPRESSIVE DISORDER, RECURRENT EPISODE, MILD (H): ICD-10-CM

## 2017-09-20 RX ORDER — DULOXETIN HYDROCHLORIDE 60 MG/1
CAPSULE, DELAYED RELEASE ORAL
Qty: 90 CAPSULE | Refills: 3 | Status: CANCELLED | OUTPATIENT
Start: 2017-09-20

## 2017-09-21 NOTE — TELEPHONE ENCOUNTER
We have not received any fax from [facility] about this needed a PA.  I am showing that we previously have done PA's for this in Oct.    Dennise COLON

## 2017-09-21 NOTE — TELEPHONE ENCOUNTER
Message from [employer]:  Original authorizing provider: AGUSTÍN Owens would like a refill of the following medications:  DULoxetine (CYMBALTA) 60 MG EC capsule [Selina Reveles PA-C]    Preferred pharmacy: [facility] #[id number] [address]     Comment:  Hi  my insurance is denying my brand name Cymbalta, Shop said they faxed you the form 3 times. I only have 2 left and not sure what to do.      DULoxetine (CYMBALTA) 60 MG EC capsule  Last Written Prescription Date: 05/16/2017  Last Fill Quantity: 90, # refills: 3  Last Office Visit with G, P or [facility] prescribing provider: 08/30/2017        BP Readings from Last 3 Encounters:   08/30/17 116/72   05/16/17 120/70   02/03/17 120/78     Pulse: (for Fetzima)  Creatinine   Date Value Ref Range Status   01/12/2016 0.78 0.52 - 1.04 mg/dL Final   ]    Last PHQ-9 score on record=   PHQ-9 SCORE 5/16/2017   Total Score -   Total Score 18

## 2017-10-05 ENCOUNTER — MYC REFILL (OUTPATIENT)
Dept: FAMILY MEDICINE | Facility: CLINIC | Age: 47
End: 2017-10-05

## 2017-10-05 DIAGNOSIS — M47.812 OSTEOARTHRITIS OF CERVICAL SPINE, UNSPECIFIED SPINAL OSTEOARTHRITIS COMPLICATION STATUS: ICD-10-CM

## 2017-10-05 DIAGNOSIS — F41.1 GENERALIZED ANXIETY DISORDER: ICD-10-CM

## 2017-10-05 RX ORDER — HYDROCODONE BITARTRATE AND ACETAMINOPHEN 7.5; 325 MG/1; MG/1
TABLET ORAL
Qty: 240 TABLET | Refills: 0 | Status: CANCELLED | OUTPATIENT
Start: 2017-10-05

## 2017-10-05 NOTE — TELEPHONE ENCOUNTER
Message from MyChart:  Original authorizing provider: AGUSTÍN Owens would like a refill of the following medications:  ALPRAZolam (XANAX) 0.5 MG tablet [Selina Reveles PA-C]  HYDROcodone-acetaminophen (NORCO) 7.5-325 MG per tablet [[place], APRN CNP]    Preferred pharmacy: [facility] #[id number] [address]     Comment:

## 2017-10-05 NOTE — TELEPHONE ENCOUNTER
Message from [facility] SystemsThe [facility] Connecticutt:  Original authorizing provider: CYN Carney CNP would like a refill of the following medications:  HYDROcodone-acetaminophen (NORCO) 7.5-325 MG per tablet [CYN Carney CNP]    Preferred pharmacy: [facility] #[id number] [address]     Comment:      Medication renewals requested in this message routed to other providers:  ALPRAZolam (XANAX) 0.5 MG tablet [ALAINA OwensC]

## 2017-10-09 ENCOUNTER — MYC MEDICAL ADVICE (OUTPATIENT)
Dept: FAMILY MEDICINE | Facility: CLINIC | Age: 47
End: 2017-10-09

## 2017-10-10 RX ORDER — HYDROCODONE BITARTRATE AND ACETAMINOPHEN 7.5; 325 MG/1; MG/1
TABLET ORAL
Qty: 240 TABLET | Refills: 0 | Status: SHIPPED | OUTPATIENT
Start: 2017-10-10 | End: 2017-11-06

## 2017-10-10 RX ORDER — ALPRAZOLAM 0.5 MG
0.5 TABLET ORAL 3 TIMES DAILY
Qty: 90 TABLET | Refills: 0 | Status: SHIPPED | OUTPATIENT
Start: 2017-10-10 | End: 2017-11-13

## 2017-10-16 ENCOUNTER — MYC REFILL (OUTPATIENT)
Dept: FAMILY MEDICINE | Facility: CLINIC | Age: 47
End: 2017-10-16

## 2017-10-16 DIAGNOSIS — F41.1 GENERALIZED ANXIETY DISORDER: ICD-10-CM

## 2017-10-16 NOTE — TELEPHONE ENCOUNTER
Message from [employer]hart:  Original authorizing provider: AGUSTÍN Owens would like a refill of the following medications:  clonazePAM (KLONOPIN) 0.5 MG tablet [Selina Reveles PA-C]    Preferred pharmacy: [facility] #[id number] [address]     Comment:

## 2017-10-17 RX ORDER — CLONAZEPAM 0.5 MG/1
TABLET ORAL
Qty: 60 TABLET | Refills: 0 | Status: SHIPPED | OUTPATIENT
Start: 2017-10-17 | End: 2017-11-13

## 2017-10-18 ENCOUNTER — MYC REFILL (OUTPATIENT)
Dept: FAMILY MEDICINE | Facility: CLINIC | Age: 47
End: 2017-10-18

## 2017-10-18 DIAGNOSIS — F41.1 GENERALIZED ANXIETY DISORDER: ICD-10-CM

## 2017-10-19 NOTE — TELEPHONE ENCOUNTER
Message from [employer]:  Original authorizing provider: AGUSTÍN Owens would like a refill of the following medications:  busPIRone (BUSPAR) 10 MG tablet [Selina Reveles PA-C]    Preferred pharmacy: [facility] #[id number] [address]     Comment:

## 2017-10-24 RX ORDER — BUSPIRONE HYDROCHLORIDE 10 MG/1
TABLET ORAL
Qty: 45 TABLET | Refills: 0 | Status: SHIPPED | OUTPATIENT
Start: 2017-10-24 | End: 2017-12-26

## 2017-10-24 NOTE — TELEPHONE ENCOUNTER
Buspar  Routing refill request to provider for review/approval because:  Last PHQ-9 done 5/16/17, was 18    Bolivar Laurent RN, BSN

## 2017-11-06 ENCOUNTER — MYC REFILL (OUTPATIENT)
Dept: FAMILY MEDICINE | Facility: CLINIC | Age: 47
End: 2017-11-06

## 2017-11-06 DIAGNOSIS — M47.812 OSTEOARTHRITIS OF CERVICAL SPINE, UNSPECIFIED SPINAL OSTEOARTHRITIS COMPLICATION STATUS: ICD-10-CM

## 2017-11-06 RX ORDER — HYDROCODONE BITARTRATE AND ACETAMINOPHEN 7.5; 325 MG/1; MG/1
TABLET ORAL
Qty: 240 TABLET | Refills: 0 | Status: SHIPPED | OUTPATIENT
Start: 2017-11-06 | End: 2017-12-04

## 2017-11-06 NOTE — TELEPHONE ENCOUNTER
Message from [employer]:  Original authorizing provider: AGUSTÍN Owens would like a refill of the following medications:  HYDROcodone-acetaminophen (NORCO) 7.5-325 MG per tablet [Selina Reveles PA-C]    Preferred pharmacy: [facility]    Comment:

## 2017-11-13 ENCOUNTER — MYC REFILL (OUTPATIENT)
Dept: FAMILY MEDICINE | Facility: CLINIC | Age: 47
End: 2017-11-13

## 2017-11-13 DIAGNOSIS — F41.1 GENERALIZED ANXIETY DISORDER: ICD-10-CM

## 2017-11-13 RX ORDER — CLONAZEPAM 0.5 MG/1
TABLET ORAL
Qty: 60 TABLET | Refills: 0 | Status: SHIPPED | OUTPATIENT
Start: 2017-11-13 | End: 2017-12-11

## 2017-11-13 RX ORDER — ALPRAZOLAM 0.5 MG
0.5 TABLET ORAL 3 TIMES DAILY
Qty: 90 TABLET | Refills: 0 | Status: SHIPPED | OUTPATIENT
Start: 2017-11-13 | End: 2017-11-14

## 2017-11-13 NOTE — TELEPHONE ENCOUNTER
Message from [employer]art:  Original authorizing provider: AGUSTÍN Owens would like a refill of the following medications:  ALPRAZolam (XANAX) 0.5 MG tablet [Selina Reveles PA-C]  clonazePAM (KLONOPIN) 0.5 MG tablet [Selina Reveles PA-C]    Preferred pharmacy: [facility]    Comment:

## 2017-11-13 NOTE — TELEPHONE ENCOUNTER
Xanax      Last Written Prescription Date:  10/10/2017  Last Fill Quantity: 90,   # refills: 0  Future Office visit:       Routing refill request to provider for review/approval because:  Drug not on the [facility], [facility] or [facility] refill protocol or controlled substance      Klonopin      Last Written Prescription Date:  10/17/2017  Last Fill Quantity: 60,   # refills: 0  Future Office visit:       Routing refill request to provider for review/approval because:  Drug not on the [facility], [facility] or [facility] refill protocol or controlled substance

## 2017-11-14 ENCOUNTER — TELEPHONE (OUTPATIENT)
Dept: FAMILY MEDICINE | Facility: CLINIC | Age: 47
End: 2017-11-14

## 2017-11-14 NOTE — TELEPHONE ENCOUNTER
Please call patient - for safety reasons will only fill Clonazepam monthly - pharmacy called with concerns of two benzodiazapines being prescribed.  If persistent anxiety issues may need to revisit baseline meds.    Electronically signed by Selina Reveles PA-C  11/14/2017

## 2017-11-28 DIAGNOSIS — M62.830 BACK MUSCLE SPASM: ICD-10-CM

## 2017-11-28 RX ORDER — METAXALONE 400 MG/1
TABLET ORAL
Qty: 60 TABLET | Refills: 0 | Status: SHIPPED | OUTPATIENT
Start: 2017-11-28 | End: 2017-12-26

## 2017-11-28 NOTE — TELEPHONE ENCOUNTER
Metaxalone 400 MG TABS   Last Written Prescription Date:  9-18-17  Last Fill Quantity: 120,   # refills: 0  Last Office Visit: 8-30-17  Future Office visit:       Routing refill request to provider for review/approval because:  Drug not on the [facility], [facility] or [facility] refill protocol or controlled substance

## 2017-12-04 ENCOUNTER — MYC REFILL (OUTPATIENT)
Dept: FAMILY MEDICINE | Facility: CLINIC | Age: 47
End: 2017-12-04

## 2017-12-04 DIAGNOSIS — M47.812 OSTEOARTHRITIS OF CERVICAL SPINE, UNSPECIFIED SPINAL OSTEOARTHRITIS COMPLICATION STATUS: ICD-10-CM

## 2017-12-04 RX ORDER — HYDROCODONE BITARTRATE AND ACETAMINOPHEN 7.5; 325 MG/1; MG/1
TABLET ORAL
Qty: 240 TABLET | Refills: 0 | Status: SHIPPED | OUTPATIENT
Start: 2017-12-04 | End: 2018-01-01

## 2017-12-04 NOTE — TELEPHONE ENCOUNTER
Script faxed to [facility] [phone] and put in black box up front.  Addie García MA

## 2017-12-04 NOTE — TELEPHONE ENCOUNTER
DANACO      Last Written Prescription Date:  11/6/17  Last Fill Quantity: 240,   # refills: 0  Last Office Visit: 8/30/17  Future Office visit:       Routing refill request to provider for review/approval because:  Drug not on the [facility], [facility] or [facility] refill protocol or controlled substance

## 2017-12-04 NOTE — TELEPHONE ENCOUNTER
Message from [employer]:  Original authorizing provider: AGUSTÍN Owens would like a refill of the following medications:  HYDROcodone-acetaminophen (NORCO) 7.5-325 MG per tablet [Selina Reveles PA-C]    Preferred pharmacy: [facility] #[id number] [address]     Comment:

## 2017-12-06 ENCOUNTER — TELEPHONE (OUTPATIENT)
Dept: FAMILY MEDICINE | Facility: CLINIC | Age: 47
End: 2017-12-06

## 2017-12-06 NOTE — TELEPHONE ENCOUNTER
Patient is due for a PHQ-9.  Index start date:10/16/2017  Index end date:12/16/2017    Please call patient. Pt is active on [employer]. I have sent PHQ-9 via [employer] and will postpone encounter. Talisha Becerra CMA ([facility])

## 2017-12-11 ENCOUNTER — MYC REFILL (OUTPATIENT)
Dept: FAMILY MEDICINE | Facility: CLINIC | Age: 47
End: 2017-12-11

## 2017-12-11 DIAGNOSIS — F41.1 GENERALIZED ANXIETY DISORDER: ICD-10-CM

## 2017-12-11 NOTE — TELEPHONE ENCOUNTER
Pt returned PHQ-9. Talisha Becerra CMA ([facility])  PHQ-9 SCORE 12/11/2017   Total Score -   Total Score MyChart 4 (Minimal depression)   Total Score 4

## 2017-12-11 NOTE — TELEPHONE ENCOUNTER
I have attempted to call the pt to update a PHQ-9. I left message for pt to call back. I will call back another time. Talisha Becerra CMA ([facility])

## 2017-12-11 NOTE — TELEPHONE ENCOUNTER
CLONAZEPAM      Last Written Prescription Date:  11/13/17  Last Fill Quantity: 60,   # refills: 0  Last Office Visit: 8/30/17  Future Office visit:       Routing refill request to provider for review/approval because:  Drug not on the [facility], P or [facility] refill protocol or controlled substance

## 2017-12-11 NOTE — TELEPHONE ENCOUNTER
Message from [employer]:  Original authorizing provider: AGUSTÍN Owens would like a refill of the following medications:  clonazePAM (KLONOPIN) 0.5 MG tablet [Selina Reveles PA-C]    Preferred pharmacy: [facility] #[id number] [address]     Comment:

## 2017-12-13 RX ORDER — CLONAZEPAM 0.5 MG/1
TABLET ORAL
Qty: 60 TABLET | Refills: 0 | Status: SHIPPED | OUTPATIENT
Start: 2017-12-13 | End: 2018-01-12

## 2017-12-26 DIAGNOSIS — M62.830 BACK MUSCLE SPASM: ICD-10-CM

## 2017-12-26 DIAGNOSIS — F41.1 GENERALIZED ANXIETY DISORDER: ICD-10-CM

## 2017-12-27 RX ORDER — METAXALONE 400 MG/1
TABLET ORAL
Qty: 60 TABLET | Refills: 1 | Status: SHIPPED | OUTPATIENT
Start: 2017-12-27 | End: 2018-01-30 | Stop reason: ALTCHOICE

## 2017-12-27 RX ORDER — BUSPIRONE HYDROCHLORIDE 10 MG/1
TABLET ORAL
Qty: 45 TABLET | Refills: 1 | Status: SHIPPED | OUTPATIENT
Start: 2017-12-27 | End: 2018-01-30

## 2017-12-27 NOTE — TELEPHONE ENCOUNTER
Requested Prescriptions   Pending Prescriptions Disp Refills     busPIRone (BUSPAR) 10 MG tablet [Pharmacy Med Name: BUSPIRONE 10 MG     TAB IZA] 45 tablet 0     Sig: TAKE 5 MG (1/2 TAB) IN THE MORNING AND 10 MG AT NIGHT    Atypical Antidepressants Protocol Passed    12/26/2017  5:42 PM       Passed - Patient has PHQ-9 score less than 5 in past 6 months.    The PHQ-9 criteria is meant to fail. It requires a PHQ-9 score review         Passed - Recent or future visit with authorizing provider's specialty    Patient had office visit in the last year or has a visit in the next 30 days with authorizing provider.  See chart review.              Passed - Patient is age 18 or older       Passed - No active pregnancy on record       Passed - No positive pregnancy test in past 12 mos       Passed - Recent (6 mo) or future visit with authorizing provider's specialty    Patient had office visit in the last 6 months or has a visit in the next 30 days with authorizing provider.  See chart review.          Metaxalone      Last Written Prescription Date:  11/28/2017  Last Fill Quantity: 60,   # refills: 0  Last Office Visit: 8/30/2017  Future Office visit:    Next 5 appointments (look out 90 days)     Jan 09, 2018  2:30 PM CST   [facility]    [address] [place] [id number] [phone]                   Routing refill request to provider for review/approval because:  Drug not on the [facility], [facility] or  Health refill protocol or controlled substance

## 2018-01-01 ENCOUNTER — MYC REFILL (OUTPATIENT)
Dept: FAMILY MEDICINE | Facility: CLINIC | Age: 48
End: 2018-01-01

## 2018-01-01 DIAGNOSIS — M47.812 OSTEOARTHRITIS OF CERVICAL SPINE, UNSPECIFIED SPINAL OSTEOARTHRITIS COMPLICATION STATUS: ICD-10-CM

## 2018-01-02 RX ORDER — HYDROCODONE BITARTRATE AND ACETAMINOPHEN 7.5; 325 MG/1; MG/1
TABLET ORAL
Qty: 240 TABLET | Refills: 0 | Status: SHIPPED | OUTPATIENT
Start: 2018-01-02 | End: 2018-01-30

## 2018-01-02 NOTE — TELEPHONE ENCOUNTER
Script faxed to [facility] [phone] and put in black box up front.  Addie García MA

## 2018-01-02 NOTE — TELEPHONE ENCOUNTER
DANACO      Last Written Prescription Date:  12/4/17  Last Fill Quantity: 240,   # refills: 0  Last Office Visit: 8/30/17  Future Office visit:    Next 5 appointments (look out 90 days)     Jan 30, 2018  1:00 PM CST   MyChart Long with Selina Reveles PA-C   [facility] ([facility])    [address] [place] [id number]   [phone]                   Routing refill request to provider for review/approval because:  Drug not on the [facility], [facility] or  Health refill protocol or controlled substance

## 2018-01-02 NOTE — TELEPHONE ENCOUNTER
Message from [employer]:  Original authorizing provider: AGUSTÍN Owens would like a refill of the following medications:  HYDROcodone-acetaminophen (NORCO) 7.5-325 MG per tablet [Selina Reveles PA-C]    Preferred pharmacy: [facility] #[id number] [address]     Comment:

## 2018-01-08 ENCOUNTER — MYC REFILL (OUTPATIENT)
Dept: FAMILY MEDICINE | Facility: CLINIC | Age: 48
End: 2018-01-08

## 2018-01-08 DIAGNOSIS — J31.0 CHRONIC RHINITIS: ICD-10-CM

## 2018-01-09 RX ORDER — FLUTICASONE PROPIONATE 50 MCG
SPRAY, SUSPENSION (ML) NASAL
Qty: 16 G | Refills: 2 | Status: SHIPPED | OUTPATIENT
Start: 2018-01-09 | End: 2018-03-14

## 2018-01-09 NOTE — TELEPHONE ENCOUNTER
"Last Written Prescription Date:  3/13/2017  Last Fill Quantity: 16 g,  # refills: 6   Last Office Visit with [facility], P or [facility] prescribing provider:  8/30/2017   Future Office Visit:    Next 5 appointments (look out 90 days)     Jan 30, 2018  1:00 PM CST   MyChart Long with Selina Reveles PA-C   [facility] ([facility])    [address] [place] [id number] [phone]                 Requested Prescriptions   Pending Prescriptions Disp Refills     fluticasone (FLONASE) 50 MCG/ACT spray 16 g 6     Sig: SPRAY 1-2 SPRAYS INTO BOTH NOSTRILS DAILY    Inhaled Steroids Protocol Passed    1/9/2018  8:40 AM       Passed - Patient is age 12 or older       Passed - Asthma control test 20 or greater in last 6 months    Please review ACT score.          Passed - Recent (6 mo) or future visit with authorizing provider's specialty    Patient had office visit in the last 6 months or has a visit in the next 30 days with authorizing provider.  See \"Patient Info\" tab in inbasket, or \"Choose Columns\" in Meds & Orders section of the refill encounter.             "

## 2018-01-09 NOTE — TELEPHONE ENCOUNTER
Message from [employer]:  Original authorizing provider: AGUSTÍN Owens would like a refill of the following medications:  fluticasone (FLONASE) 50 MCG/ACT spray [Selina Reveles PA-C]    Preferred pharmacy: [facility] #[id number] [address]     Comment:

## 2018-01-09 NOTE — TELEPHONE ENCOUNTER
FLONASE Prescription approved per [facility] Refill Protocol......................JULIO Heller

## 2018-01-11 ENCOUNTER — TELEPHONE (OUTPATIENT)
Dept: FAMILY MEDICINE | Facility: CLINIC | Age: 48
End: 2018-01-11

## 2018-01-11 NOTE — TELEPHONE ENCOUNTER
Prior Authorization Retail Medication Request  Medication/Dose: Cymbalta 60mg  Diagnosis and ICD code: Major depressive disorder (F33.0), Fibromyalgia (M79.7)  New/Renewal/Insurance Change PA: Renewal  Previously Tried and Failed Therapies:     Insurance ID (if provided): [id number]  Insurance Phone (if provided): [phone]    Any additional info from fax request: KADY    If you received a fax notification from an outside Pharmacy:  Pharmacy Name:[facility]  Pharmacy #:[phone]  Pharmacy Fax:[phone]

## 2018-01-12 ENCOUNTER — MYC REFILL (OUTPATIENT)
Dept: FAMILY MEDICINE | Facility: CLINIC | Age: 48
End: 2018-01-12

## 2018-01-12 DIAGNOSIS — F41.1 GENERALIZED ANXIETY DISORDER: ICD-10-CM

## 2018-01-12 DIAGNOSIS — G47.9 SLEEP DISTURBANCE: ICD-10-CM

## 2018-01-12 NOTE — TELEPHONE ENCOUNTER
CLONAZEPAM      Last Written Prescription Date:  12/17  Last Fill Quantity: 60,   # refills: 0  Last Office Visit: 8/30/17  Future Office visit:    Next 5 appointments (look out 90 days)     Jan 30, 2018  1:00 PM CST   MyChart Long with Selina Reveles PA-C   [facility]    [address] [place] [id number]   [phone]                   Routing refill request to provider for review/approval because:  Drug not on the [facility], [facility] or [facility] refill protocol or controlled substance    HYDROXYZINE  Last Written Prescription Date:  8/17/17  Last Fill Quantity: 90,  # refills: 0   Last Office Visit with [facility], [facility] or [facility] prescribing provider:  8/30/17   Future Office Visit:    Next 5 appointments (look out 90 days)     Jan 30, 2018  1:00 PM CST   MyChart Long with eSlina Reveles PA-C   [facility]    [address] [place] [id number] [phone]

## 2018-01-12 NOTE — TELEPHONE ENCOUNTER
Message from MyChart:  Original authorizing provider: AGUSTÍN Owens would like a refill of the following medications:  hydrOXYzine (ATARAX) 25 MG tablet [Selina Reveles PA-C]  clonazePAM (KLONOPIN) 0.5 MG tablet [Selina Reveles PA-C]    Preferred pharmacy: [facility] #[id number] [address]     Comment:

## 2018-01-15 RX ORDER — CLONAZEPAM 0.5 MG/1
TABLET ORAL
Qty: 60 TABLET | Refills: 0 | Status: SHIPPED | OUTPATIENT
Start: 2018-01-15 | End: 2018-01-30

## 2018-01-15 RX ORDER — HYDROXYZINE HYDROCHLORIDE 25 MG/1
TABLET, FILM COATED ORAL
Qty: 90 TABLET | Refills: 0 | Status: SHIPPED | OUTPATIENT
Start: 2018-01-15 | End: 2018-03-14

## 2018-01-15 NOTE — TELEPHONE ENCOUNTER
Script faxed to [facility] [phone] pharmacy.  Addie García MA

## 2018-01-16 NOTE — TELEPHONE ENCOUNTER
Central Prior Authorization Team   Phone: [phone]    PA Initiation    Medication: Cymbalta 60mg  Insurance Company: [employer] - Phone [phone] Fax [phone]  Pharmacy Filling the Rx: [facility] #[id number] [address]   Filling Pharmacy Phone: [phone]  Filling Pharmacy Fax: [phone]  Start Date: 1/16/2018

## 2018-01-30 ENCOUNTER — OFFICE VISIT (OUTPATIENT)
Dept: FAMILY MEDICINE | Facility: CLINIC | Age: 48
End: 2018-01-30
Payer: COMMERCIAL

## 2018-01-30 VITALS
TEMPERATURE: 98.3 F | RESPIRATION RATE: 18 BRPM | DIASTOLIC BLOOD PRESSURE: 70 MMHG | BODY MASS INDEX: 20.25 KG/M2 | HEIGHT: 66 IN | SYSTOLIC BLOOD PRESSURE: 118 MMHG | OXYGEN SATURATION: 97 % | WEIGHT: 126 LBS | HEART RATE: 100 BPM

## 2018-01-30 DIAGNOSIS — F41.1 GENERALIZED ANXIETY DISORDER: ICD-10-CM

## 2018-01-30 DIAGNOSIS — G43.819 OTHER MIGRAINE WITHOUT STATUS MIGRAINOSUS, INTRACTABLE: ICD-10-CM

## 2018-01-30 DIAGNOSIS — M79.7 FIBROMYALGIA: ICD-10-CM

## 2018-01-30 DIAGNOSIS — F33.0 MAJOR DEPRESSIVE DISORDER, RECURRENT EPISODE, MILD (H): ICD-10-CM

## 2018-01-30 DIAGNOSIS — M47.812 OSTEOARTHRITIS OF CERVICAL SPINE, UNSPECIFIED SPINAL OSTEOARTHRITIS COMPLICATION STATUS: ICD-10-CM

## 2018-01-30 PROCEDURE — 99215 OFFICE O/P EST HI 40 MIN: CPT | Performed by: PHYSICIAN ASSISTANT

## 2018-01-30 RX ORDER — BUSPIRONE HYDROCHLORIDE 10 MG/1
TABLET ORAL
Qty: 135 TABLET | Refills: 3 | Status: SHIPPED | OUTPATIENT
Start: 2018-01-30 | End: 2018-03-14

## 2018-01-30 RX ORDER — ALPRAZOLAM 0.5 MG
TABLET ORAL
Qty: 30 TABLET | Refills: 0 | Status: SHIPPED | OUTPATIENT
Start: 2018-01-30 | End: 2018-02-26

## 2018-01-30 RX ORDER — VERAPAMIL HYDROCHLORIDE 40 MG/1
40 TABLET ORAL 2 TIMES DAILY
Qty: 180 TABLET | Refills: 3 | Status: SHIPPED | OUTPATIENT
Start: 2018-01-30 | End: 2018-03-14

## 2018-01-30 RX ORDER — PREGABALIN 50 MG/1
50 CAPSULE ORAL 2 TIMES DAILY
Qty: 60 CAPSULE | Refills: 1 | Status: SHIPPED | OUTPATIENT
Start: 2018-01-30 | End: 2018-02-20

## 2018-01-30 RX ORDER — HYDROCODONE BITARTRATE AND ACETAMINOPHEN 7.5; 325 MG/1; MG/1
TABLET ORAL
Qty: 240 TABLET | Refills: 0 | Status: SHIPPED | OUTPATIENT
Start: 2018-01-30 | End: 2018-02-26

## 2018-01-30 RX ORDER — CYCLOBENZAPRINE HCL 5 MG
5 TABLET ORAL 3 TIMES DAILY PRN
Qty: 90 TABLET | Refills: 1 | Status: SHIPPED | OUTPATIENT
Start: 2018-01-30 | End: 2018-03-15

## 2018-01-30 RX ORDER — DULOXETIN HYDROCHLORIDE 60 MG/1
CAPSULE, DELAYED RELEASE ORAL
Qty: 90 CAPSULE | Refills: 3 | Status: SHIPPED | OUTPATIENT
Start: 2018-01-30 | End: 2018-06-12

## 2018-01-30 RX ORDER — CLONAZEPAM 0.5 MG/1
TABLET ORAL
Qty: 60 TABLET | Refills: 0 | Status: SHIPPED | OUTPATIENT
Start: 2018-02-12 | End: 2018-03-12

## 2018-01-30 ASSESSMENT — ANXIETY QUESTIONNAIRES
1. FEELING NERVOUS, ANXIOUS, OR ON EDGE: NEARLY EVERY DAY
3. WORRYING TOO MUCH ABOUT DIFFERENT THINGS: MORE THAN HALF THE DAYS
5. BEING SO RESTLESS THAT IT IS HARD TO SIT STILL: SEVERAL DAYS
7. FEELING AFRAID AS IF SOMETHING AWFUL MIGHT HAPPEN: MORE THAN HALF THE DAYS
GAD7 TOTAL SCORE: 17
2. NOT BEING ABLE TO STOP OR CONTROL WORRYING: NEARLY EVERY DAY
IF YOU CHECKED OFF ANY PROBLEMS ON THIS QUESTIONNAIRE, HOW DIFFICULT HAVE THESE PROBLEMS MADE IT FOR YOU TO DO YOUR WORK, TAKE CARE OF THINGS AT HOME, OR GET ALONG WITH OTHER PEOPLE: VERY DIFFICULT
6. BECOMING EASILY ANNOYED OR IRRITABLE: NEARLY EVERY DAY

## 2018-01-30 ASSESSMENT — PAIN SCALES - GENERAL: PAINLEVEL: NO PAIN (0)

## 2018-01-30 ASSESSMENT — PATIENT HEALTH QUESTIONNAIRE - PHQ9: 5. POOR APPETITE OR OVEREATING: NEARLY EVERY DAY

## 2018-01-30 NOTE — NURSING NOTE
"Chief Complaint   Patient presents with     Recheck Medication       Initial /70  Pulse 100  Temp 98.3  F (36.8  C) (Tympanic)  Resp 18  Ht 5' 5.5\" (1.664 m)  Wt 126 lb (57.2 kg)  SpO2 97%  BMI 20.65 kg/m2 Estimated body mass index is 20.65 kg/(m^2) as calculated from the following:    Height as of this encounter: 5' 5.5\" (1.664 m).    Weight as of this encounter: 126 lb (57.2 kg).  BP completed using cuff size: krunal García MA      "

## 2018-01-30 NOTE — MR AVS SNAPSHOT
After Visit Summary   1/30/2018    Sherie Otero    MRN: [id number]           Patient Information     Date Of Birth          1970        Visit Information        Provider Department      1/30/2018 1:00 PM Selina Reveles PA-C [facility]        Today's Diagnoses     Generalized anxiety disorder        Other migraine without status migrainosus, intractable        Fibromyalgia        Major depressive disorder, recurrent episode, mild (H)        Osteoarthritis of cervical spine, unspecified spinal osteoarthritis complication status           Follow-ups after your visit        Who to contact     If you have questions or need follow up information about today's clinic visit or your schedule please contact [facility] directly at [phone].  Normal or non-critical lab and imaging results will be communicated to you by MyChart, letter or phone within 4 business days after the clinic has received the results. If you do not hear from us within 7 days, please contact the clinic through [employer] or phone. If you have a critical or abnormal lab result, we will notify you by phone as soon as possible.  Submit refill requests through [employer] or call your pharmacy and they will forward the refill request to us. Please allow 3 business days for your refill to be completed.          Additional Information About Your Visit        MyChart Information     [employer] gives you secure access to your electronic health record. If you see a primary care provider, you can also send messages to your care team and make appointments. If you have questions, please call your primary care clinic.  If you do not have a primary care provider, please call [phone] and they will assist you.        Care EveryWhere ID     This is your Care EveryWhere ID. This could be used by other organizations to access your [place] medical records  [id number]        Your Vitals Were     Pulse Temperature  "Respirations Height Pulse Oximetry BMI (Body Mass Index)    100 98.3  F (36.8  C) (Tympanic) 18 5' 5.5\" (1.664 m) 97% 20.65 kg/m2       Blood Pressure from Last 3 Encounters:   01/30/18 118/70   08/30/17 116/72   05/16/17 120/70    Weight from Last 3 Encounters:   01/30/18 126 lb (57.2 kg)   08/30/17 131 lb (59.4 kg)   05/16/17 132 lb (59.9 kg)              Today, you had the following     No orders found for display         Today's Medication Changes          These changes are accurate as of 1/30/18 11:59 PM.  If you have any questions, ask your nurse or doctor.               Start taking these medicines.        Dose/Directions    ALPRAZolam 0.5 MG tablet   Commonly known as:  XANAX   Used for:  Generalized anxiety disorder   Started by:  Selina Reveles PA-C        One tablet mid day.   Quantity:  30 tablet   Refills:  0       cyclobenzaprine 5 MG tablet   Commonly known as:  FLEXERIL   Used for:  Fibromyalgia   Started by:  Selina Reveles PA-C        Dose:  5 mg   Take 1 tablet (5 mg) by mouth 3 times daily as needed for muscle spasms   Quantity:  90 tablet   Refills:  1       pregabalin 50 MG capsule   Commonly known as:  LYRICA   Used for:  Fibromyalgia   Started by:  Selina Reveles PA-C        Dose:  50 mg   Take 1 capsule (50 mg) by mouth 2 times daily   Quantity:  60 capsule   Refills:  1         These medicines have changed or have updated prescriptions.        Dose/Directions    busPIRone 10 MG tablet   Commonly known as:  BUSPAR   This may have changed:  See the new instructions.   Used for:  Generalized anxiety disorder   Changed by:  Selina Reveles PA-C        TAKE 5 MG (1/2 TAB) IN THE MORNING AND 10 MG AT NIGHT   Quantity:  135 tablet   Refills:  3         Stop taking these medicines if you haven't already. Please contact your care team if you have questions.     Metaxalone 400 MG Tabs   Stopped by:  Selina Reveles PA-C                Where to get your medicines      These " medications were sent to [facility] #[id number] - [place], [place] - [address]  [address] [facility] , [facility] [address]     Phone:  [phone]     busPIRone 10 MG tablet    cyclobenzaprine 5 MG tablet    DULoxetine 60 MG EC capsule    verapamil 40 MG tablet         Some of these will need a paper prescription and others can be bought over the counter.  Ask your nurse if you have questions.     Bring a paper prescription for each of these medications     ALPRAZolam 0.5 MG tablet    clonazePAM 0.5 MG tablet    HYDROcodone-acetaminophen 7.5-325 MG per tablet    pregabalin 50 MG capsule                Primary Care Provider Office Phone # Fax #    Selina Reveles PA-C [phone] [address]   [place] [address]        Equal Access to Services     ARUN WESTBROOK : Hadii aad ku hadasho Soomaali, waaxda luqadaha, qaybta kaalmada adeegyada, neema dan . So [facility] [phone].    ATENCIÓN: Si habla español, tiene a lange disposición servicios gratuitos de asistencia lingüística. [facility] [phone].    We comply with applicable federal civil rights laws and [place] laws. We do not discriminate on the basis of race, color, national origin, age, disability, sex, sexual orientation, or gender identity.            Thank you!     Thank you for choosing [facility]  for your care. Our goal is always to provide you with excellent care. Hearing back from our patients is one way we can continue to improve our services. Please take a few minutes to complete the written survey that you may receive in the mail after your visit with us. Thank you!             Your Updated Medication List - Protect others around you: Learn how to safely use, store and throw away your medicines at www.disposemymeds.org.          This list is accurate as of 1/30/18 11:59 PM.  Always use your most recent med list.                   Brand Name Dispense Instructions for use Diagnosis     ALPRAZolam 0.5 MG tablet    XANAX    30 tablet    One tablet mid day.    Generalized anxiety disorder       busPIRone 10 MG tablet    BUSPAR    135 tablet    TAKE 5 MG (1/2 TAB) IN THE MORNING AND 10 MG AT NIGHT    Generalized anxiety disorder       cetirizine 10 MG tablet    zyrTEC    30 tablet    TAKE  ONE TABLET BY MOUTH EVERY DAY    Chronic rhinitis       clonazePAM 0.5 MG tablet   Start taking on:  2/12/2018    klonoPIN    60 tablet    TAKE ONE-HALF TO ONE TABLET BY MOUTH TWICE A DAY AS NEEDED FOR ANXIETY - MUST LAST 30 DAYS    Generalized anxiety disorder       cyclobenzaprine 5 MG tablet    FLEXERIL    90 tablet    Take 1 tablet (5 mg) by mouth 3 times daily as needed for muscle spasms    Fibromyalgia       DULoxetine 60 MG EC capsule    CYMBALTA    90 capsule    TAKE ONE CAPSULE BY MOUTH EVERY EVENING - KADY    Fibromyalgia, Major depressive disorder, recurrent episode, mild (H)       fluticasone 50 MCG/ACT spray    FLONASE    16 g    SPRAY 1-2 SPRAYS INTO BOTH NOSTRILS DAILY    Chronic rhinitis       HYDROcodone-acetaminophen 7.5-325 MG per tablet    NORCO    240 tablet    TAKE 2 TABLETS BY MOUTH EVERY 6 HOURS AS NEEDED FOR PAIN--MAX OF 8 TABLETS PER DAY    Osteoarthritis of cervical spine, unspecified spinal osteoarthritis complication status       hydrOXYzine 25 MG tablet    ATARAX    90 tablet    TAKE 2-4 TABLETS ( MG) BY MOUTH NIGHTLY AS NEEDED FOR ANXIETY (SLEEP) MAY USE 25MG(1 TABLET) TO START WITH    Sleep disturbance       MULTIVITAL PO           pregabalin 50 MG capsule    LYRICA    60 capsule    Take 1 capsule (50 mg) by mouth 2 times daily    Fibromyalgia       VENTOLIN  (90 BASE) MCG/ACT Inhaler   Generic drug:  albuterol     18 g    INHALE 2 PUFFS INTO THE LUNGS EVERY 6 HOURS AS NEEDED FOR SHORTNESS OF BREATH    Intermittent asthma, uncomplicated       verapamil 40 MG tablet    CALAN    180 tablet    Take 1 tablet (40 mg) by mouth 2 times daily    Other migraine without status  migrainosus, intractable

## 2018-01-30 NOTE — LETTER
[facility]    01/30/18    Patient: Sherie Otero  YOB: 1970  Medical Record Number: [id number]                                                                  Controlled Substance Agreement  I understand that my care provider has prescribed controlled substances (narcotics, tranquilizers, and/or stimulants) to help manage my condition(s).  I am taking this medicine to help me function or work.  I know that this is strong medicine.  It could have serious side effects and even cause a dependency on the drug.  If I stop these medicines suddenly, I could have severe withdrawal symptoms.    The risks, benefits, and side effects of these medication(s) were explained to me.  I agree that:  1. I will take part in other treatments as advised by my provider.  This may be psychiatry or counseling, physical therapy, behavioral therapy, group treatment, or a referral to a pain clinic.  I will reduce or stop my medicine when my provider tells me to do so.   2. I will take my medicines as prescribed.  I will not change the dose or schedule unless my provider tells me to.  There will be no refills if I  run out early.   I may be contacted at any time without warning and asked to complete a drug test or pill count.   3. I will keep all my appointments at the clinic.  If I miss appointments or fail to follow instructions, my provider may stop my medicine.  4. I will not ask other providers to prescribe controlled substances. And I will not accept controlled substances from other people. If I need another prescribed controlled substance for a new reason, I will notify my provider within one business day.  5. If I enroll in the [place] Medical Marijuana program, I will tell my provider.  I will also sign an agreement to share my medical records with my provider.  6. I will use one pharmacy to fill all of my controlled substance prescriptions.  If my prescription is mailed to my  pharmacy, it may take 5 to 7 days for my medicine to be ready.  7. I understand that my provider, clinic care team, and pharmacy can track controlled substance prescriptions from other providers through a central database (prescription monitoring program).  8. I will bring in my list of medications (or my medicine bottles) each time I come to the clinic.  REV- 04/2016                                                                                                                                            Page 1 of 2      [facility]    01/30/18    Patient: Sherie Otero  YOB: 1970  Medical Record Number: [id number]    9. Refills of controlled substances will be made only during office hours.  It is up to me to make sure that I do not run out of my medicines on weekends or holidays.    10. I am responsible for my prescriptions.  If the medicine is lost or stolen, it will not be replaced.   I also agree not to share these medicines with anyone.  11. I agree to not use ANY illegal or recreational drugs.  This includes marijuana, cocaine, bath salts or other drugs.  I agree not to use alcohol unless my provider says I may.  I agree to give urine samples whenever asked.  If I fail to give a urine sample, the provider may stop my medicine.     12. I will tell my nurse or provider right away if I become pregnant or have a new medical problem treated outside of [facility].  13. I understand that this medicine can affect my thinking and judgment.  It may be unsafe for me to drive, use machinery and do dangerous tasks.  I will not do any of these things until I know how the medicine affects me.  If my dose changes, I will wait to see how it affects me.  I will contact my provider if I have concerns about medicine side effects.  I understand that if I do not follow any of the conditions above, my prescriptions or treatment may be stopped.    I agree that my provider, clinic  care team, and pharmacy may work with any city, state or federal law enforcement agency that investigates the misuse, sale, or other diversion of my controlled medicine. I will allow my provider to discuss my care with or share a copy of this agreement with any other treating provider, pharmacy or emergency room where I receive care.  I agree to give up (waive) any right of privacy or confidentiality with respect to these authorizations.   I have read this agreement and have asked questions about anything I did not understand.   ___________________________________    ___________________________  Patient Signature                                                           Date and Time  ___________________________________     ____________________________  Witness                                                                            Date and Time  ___________________________________  Selina Reveles PA-C  REV-  04/2016                                                                                                                                                                 Page 2 of 2  Opioid Pain Medicines (also known as Narcotics)  What You Need to Know      What are opioids?   Opioids are pain medicines that must be prescribed by a doctor. Examples are:     morphine (MS Contin, Catrachita)    oxycodone (Oxycontin)    oxycodone and acetaminophen (Percocet)    hydrocodone and acetaminophen (Vicodin, Norco)     fentanyl patch (Duragesic)     hydromorphone (Dilaudid)     methadone     What do opioids do well?   Opioids are best for short-term pain after a surgery or injury. They also work well for cancer pain. Unlike other pain medicines, they do not cause liver or kidney failure or ulcers. They may help some people with long-lasting (chronic) pain.     What do opioids NOT do well?   Opioids never get rid of pain entirely, and they do not work well for most patients with chronic pain. Opioids do not reduce  swelling, one of the causes of pain. They also don t work well for nerve pain.     Side effects  Talk to your doctor before you start or decide to keep taking one of these medicines. Side effects include:    Lowers your breathing rate enough that it could cause death    Death due to taking more than the prescribed dose    Serious lifelong opioid use      Dependence is not the same as addiction. Addiction is when people keep using a substance that harms their body, their mind or their relations with others. If you have a history of drug or alcohol abuse, taking opioids can cause a relapse.  Over time, opioids don t work as well. Most people will need higher and higher doses. The higher the dose, the more serious the side effects. We don t know the long-term effects of opioids.   People who have used opioids for a long time have a lower quality of life, worse depression, higher levels of pain and more visits to doctors.  Overdose from prescription drugs is the second leading cause of death in the [place] The risk of overdose rises when opioids are taken with other drugs such as:    Medicines used for anxiety and panic attacks (such as lorazepam, alprazolam, clonazepam    Other sedatives    Alcohol    Illegal drugs such as heroin  Never share your opioids with others. Be sure to store opioids in a secure place, locked if possible.Young children can easily swallow them and overdose.     Are there other ways to manage pain?  Ways to help reduce pain:    Exercise every day.    Treat health problems that may be causing pain.    Treat mental health problems like depression and anxiety.     Worse depression symptoms; Less pleasure in things you usually enjoy    Feeling tired or sluggish    Slower thoughts or cloudy thinking    Being more sensitive to pain over time; Pain is harder to control.    Trouble sleeping or restless sleep    Changes in hormone levels (for example, less testosterone).     Changes in sex drive or ability  to have sex    Long lasting nausea and constipation    Trouble breathing while asleep; This is worse with lung problems like COPD or sleep apnea.    Unsafe driving    Getting sick more often    Itching    Feeling dizzy    Dry mouth    Sweating    Trouble emptying the bladder (peeing). This is worse if you have an enlarged prostate or get urinary tract infections (UTIs).    What else should I know about opioids?  When someone takes opioids for too long or too often, they become dependent. This means that if you stop or reduce the medicine too quickly, you will have withdrawal symptoms.          Practice good sleep habits.  Try to go to bed and get up at the same time every day.    Stop smoking.  Tobacco use can make pain worse.    Do things that you enjoy.    Find a way to work through pain without drugs.  Try deep breathing, meditation, visual imagery and aromatherapy.    Ask your doctor to help you create a plan to manage your pain.

## 2018-01-30 NOTE — PROGRESS NOTES
SUBJECTIVE:   Sherie Otero is a very complicated 47 year old female who presents to clinic today for the following health issues:      Depression and Anxiety Follow-Up  States that she has had a lot of stress recently.  Also continues to struggle with chronic fatigue.  Previous patient of Dr. Stapleton's, came to me she has maintained fairly well through the years that I have not seen her on current meds.  On numerous medications.  I have suggested she think about seen ENT for further evaluation of sleep due to her chronic fatigue.  She has been dragging her feet on doing that.  Does have a diagnosis of fibromyalgia and is in chronic pain.  Has a narcotic agreement with me.  States her anxiety has significantly worsened since I requested that she stop using Xanax as needed through the day as a new pharmacy she changed to was not happy with filling both clonazepam twice a day and as needed low-dose Xanax.  She also uses BuSpar 5 mg morning and 10 mg evening for anxiety.  Has tried to go up and dose on that medication and it seems to cause her a lot more sedation.  She is requesting to go back on the alprazolam.  She was using 0.5 mg mid day and not using more than that once a day.  Unfortunately to wait a prescription previously had been written may have been a bit confusing and looked like she was using it 3 times a day.  The one prescription was lasting her 90 days which would make sense with how she was taking.  She also uses verapamil for migraine prevention twice daily-has been on this long-term.  Seems to work well for her.Using hydrocodone-acetaminophen for pain with a max of 8 tablets per day.  Has been on this long-term.  Duloxetine 60 mg daily for depression and for pain which seems to help.  She is requesting a muscle relaxer again as at nighttime particularly her trapezius regions really bother her.     Status since last visit: Worsened     Other associated symptoms:None    Complicating factors:      Significant life event: No     Current substance abuse: None    PHQ-9 1/19/2017 5/16/2017 12/11/2017   Total Score 14 18 4   Q9: Suicide Ideation Not at all Not at all Not at all     CELIA-7 SCORE 1/6/2017   Total Score 15       PHQ-9  English  PHQ-9   Any Language  CELIA-7  Suicide Assessment Five-step Evaluation and Treatment (SAFE-T)  Migraine Follow-Up    Headaches symptoms:  Stable     Frequency: 2 times week      Duration of headaches: 6-8 hours     Able to do normal daily activities/work with migraines: No -     Rescue/Relief medication:              Effectiveness: moderate relief    Preventative medication: Verapamil    Neurologic complications: No new stroke-like symptoms, loss of vision or speech, numbness or weakness    In the past 4 weeks, how often have you gone to Urgent Care or the emergency room because of your headaches?  0      Amount of exercise or physical activity: None    Problems taking medications regularly: No    Medication side effects: none    Diet: regular (no restrictions)            Problem list and histories reviewed & adjusted, as indicated.  Additional history: as documented    Patient Active Problem List   Diagnosis     CARDIOVASCULAR SCREENING; LDL GOAL LESS THAN 160     Chronic fatigue syndrome     Fibromyalgia     Generalized anxiety disorder     Intermittent asthma     Tobacco abuse     SHANTANU (obstructive sleep apnea)     Disturbance in sleep behavior     Cervicalgia     Stenosis, cervical spine     Spondylitis, cervical (H)     NSAID induced gastritis     Major depressive disorder, recurrent episode, mild (H)     Other chronic pain     Intermittent asthma, uncomplicated     Rheumatoid arthritis involving multiple sites with positive rheumatoid factor (H)     Elevated white blood cell count     Panic attacks     Grief reaction     Osteoarthritis of cervical spine, unspecified spinal osteoarthritis complication status     Degenerative joint disease of cervical spine     Pulmonary  nodules     Abnormal CT of the chest     Hilar lymphadenopathy     Other migraine without status migrainosus, intractable     Past Surgical History:   Procedure Laterality Date     C APPENDECTOMY      Ruptured at age 7 years     C  DELIVERY ONLY      , Low Cervical     DILATION AND CURETTAGE, HYSTEROSCOPY, ABLATE ENDOMETRIUM NOVASURE, COMBINED  2011    Procedure:COMBINED DILATION AND CURETTAGE, HYSTEROSCOPY, ABLATE ENDOMETRIUM NOVASURE; hysteroscopy, dilation and curettage, novasure; Surgeon:JEREMY ANNA; Location:[facility] OR     EXCISE LESION TRUNK  2013    Procedure: EXCISE LESION TRUNK;  interlaminar epidural Steroid Injection Cervical -thoracic Levels (C7-T1)    Re-Excision of chest wall mass;  Surgeon: Jeremy Bolaños MD;  Location: [facility] OR     HC HYSTEROS W PERMANENT FALLOPAIN IMPLANT      Essure done in office Marcelo     INJECT BLOCK MEDIAL BRANCH CERVICAL/THORACIC/LUMBAR  2014    Procedure: INJECT BLOCK MEDIAL BRANCH CERVICAL / THORACIC / LUMBAR;  Surgeon: Josué Munson MD;  Location: [facility] OR     INJECT FACET JOINT  2014    Procedure: INJECT FACET JOINT;  diagnostic medial branch facet nerve block cervical levels 5-6, 6-7;  Surgeon: Josué Munson MD;  Location: [facility] OR     WISDOM [facility]         Social History   Substance Use Topics     Smoking status: Current Every Day Smoker     Packs/day: 0.50     Years: 29.00     Types: Cigarettes     Smokeless tobacco: Never Used      Comment: 11 - 1pk/day     Alcohol use No     Family History   Problem Relation Age of Onset     Arthritis Mother      Rheumatoid     Respiratory Mother      COPD     Hypertension Sister      1/2 sister     Neurologic Disorder Sister      1/2 sisiter  Very mild form of CP     Cardiovascular Maternal Grandmother      HEART DISEASE Maternal Aunt      Bypass at age 50's           Reviewed and updated as needed this visit by clinical staff       Reviewed and updated as needed this  "visit by Provider         ROS:  Constitutional, HEENT, cardiovascular, pulmonary, GI, , musculoskeletal, neuro, skin, endocrine and psych systems are negative, except as otherwise noted.    OBJECTIVE:     /70  Pulse 100  Temp 98.3  F (36.8  C) (Tympanic)  Resp 18  Ht 5' 5.5\" (1.664 m)  Wt 126 lb (57.2 kg)  SpO2 97%  BMI 20.65 kg/m2  Body mass index is 20.65 kg/(m^2).   GENERAL: alert, no distress, fatigued and appears older than stated age  NECK: no adenopathy, no asymmetry, masses, or scars and thyroid normal to palpation  RESP: lungs clear to auscultation - no rales, rhonchi or wheezes  CV: regular rate and rhythm, normal S1 S2, no S3 or S4, no murmur, click or rub, no peripheral edema and peripheral pulses strong  ABDOMEN: soft, nontender, no hepatosplenomegaly, no masses and bowel sounds normal  SKIN: no suspicious lesions or rashes    Diagnostic Test Results:  none     ASSESSMENT:      Generalized anxiety disorder  Other migraine without status migrainosus, intractable  Fibromyalgia  Major depressive disorder, recurrent episode, mild (H)  Osteoarthritis of cervical spine, unspecified spinal osteoarthritis complication status      PLAN:       ICD-10-CM    1. Generalized anxiety disorder F41.1 busPIRone (BUSPAR) 10 MG tablet     ALPRAZolam (XANAX) 0.5 MG tablet     clonazePAM (KLONOPIN) 0.5 MG tablet   2. Other migraine without status migrainosus, intractable G43.819 verapamil (CALAN) 40 MG tablet   3. Fibromyalgia M79.7 DULoxetine (CYMBALTA) 60 MG EC capsule     cyclobenzaprine (FLEXERIL) 5 MG tablet     pregabalin (LYRICA) 50 MG capsule   4. Major depressive disorder, recurrent episode, mild (H) F33.0 DULoxetine (CYMBALTA) 60 MG EC capsule   5. Osteoarthritis of cervical spine, unspecified spinal osteoarthritis complication status M47.812 HYDROcodone-acetaminophen (NORCO) 7.5-325 MG per tablet       Tobacco Cessation:   reports that she has been smoking Cigarettes.  She has a 14.50 pack-year " smoking history. She has never used smokeless tobacco.  Tobacco Cessation Action Plan: Information offered: Patient not interested at this time      MEDICATIONS:        - Trial of Lyrica 50 mg twice daily-will obviously be able to titrate this up hopefully in the next 3-4 weeks if she is doing well with this.  Hoping this will help with some of her fibromyalgia.  Pain.  Also added Flexeril to her medication list for spasm at nighttime.    I am willing to refill Xanax if she is willing to use this only once a day midday.  She will trial doing clonazepam half a tablet morning and evening and use the alprazolam midday.  Prescription is written for #30 to make it clear to the pharmacy that she is only using this once midday.  See orders.  I reviewed with her that I am very nervous about her using multiple medications within the same family-she is aware of safety and how they should be used together.       - Continue other medications without change  FUTURE APPOINTMENTS:       - Follow-up visit in 3 months and office.  Otherwise can contact me via E visit or my chart to let me know how the Lyrica is working and if she needs a dose upward.    Selina Reveles PA-C  [facility]    GREATER THAN 50% OF TIME SPENT IN COUNSELING & CARE COORDINATION - TOTAL FACE TO FACE TIME  45 MINUTES.    Orders Placed This Encounter     busPIRone (BUSPAR) 10 MG tablet     verapamil (CALAN) 40 MG tablet     DULoxetine (CYMBALTA) 60 MG EC capsule     ALPRAZolam (XANAX) 0.5 MG tablet     cyclobenzaprine (FLEXERIL) 5 MG tablet     pregabalin (LYRICA) 50 MG capsule     HYDROcodone-acetaminophen (NORCO) 7.5-325 MG per tablet     clonazePAM (KLONOPIN) 0.5 MG tablet       AVS given to patient upon discharge today.  Electronically signed by Selina Reveles PA-C  February 3, 2018  4:28 PM

## 2018-01-31 ASSESSMENT — PATIENT HEALTH QUESTIONNAIRE - PHQ9: SUM OF ALL RESPONSES TO PHQ QUESTIONS 1-9: 11

## 2018-01-31 ASSESSMENT — ANXIETY QUESTIONNAIRES: GAD7 TOTAL SCORE: 17

## 2018-02-20 ENCOUNTER — MYC MEDICAL ADVICE (OUTPATIENT)
Dept: FAMILY MEDICINE | Facility: CLINIC | Age: 48
End: 2018-02-20

## 2018-02-20 DIAGNOSIS — M79.7 FIBROMYALGIA: ICD-10-CM

## 2018-02-20 RX ORDER — PREGABALIN 50 MG/1
50 CAPSULE ORAL 2 TIMES DAILY
Qty: 60 CAPSULE | Refills: 1 | Status: SHIPPED | OUTPATIENT
Start: 2018-02-20 | End: 2018-04-18

## 2018-02-20 NOTE — TELEPHONE ENCOUNTER
See note below, new prescription for Lyrica placed to approve. See information in regards to flexeril filled. Yazmin Mcdaniels LPN      LYRICA      Last Written Prescription Date:  1/30/18, did not pick original up prescription  Last Fill Quantity: 60,   # refills: 1  Last Office Visit: 1/30/18  Future Office visit:       Routing refill request to provider for review/approval because:  Drug not on the [facility] or [facility] refill protocol or controlled substance

## 2018-02-20 NOTE — TELEPHONE ENCOUNTER
Savella and Cymbalta are both in the same family of medication.  He would have to take you off of the Cymbalta in order to start that medication, and I would want you to check with your insurance company to see if it is even covered as there is a good likelihood that it is not at this time.  Lyrica works very differently for the pain associated with fibromyalgia.  It works well with Cymbalta which you have been stable on.  If you are wanting to change potentially to the Savella, I would need you or your pharmacy to check with your insurance to see if it is even possible with coverage.    Electronically signed by Selina Reveles PA-C  2/20/2018

## 2018-02-23 ENCOUNTER — TELEPHONE (OUTPATIENT)
Dept: FAMILY MEDICINE | Facility: CLINIC | Age: 48
End: 2018-02-23

## 2018-02-23 NOTE — TELEPHONE ENCOUNTER
Prior Authorization Retail Medication Request  Medication/Dose: Lyrica 50 mg capsule  Diagnosis and ICD code: Fibromyalgia (M79.7)  New/Renewal/Insurance Change PA: New  Previously Tried and Failed Therapies:     Insurance ID (if provided): [id number]  Insurance Phone (if provided): [phone]    Any additional info from fax request: [facility] meds  SHAIKH: Shanna LAFLEUR DOB 1970    If you received a fax notification from an outside Pharmacy:  Pharmacy Name:[facility], [place]  Pharmacy #:[phone]  Pharmacy Fax:[phone]

## 2018-02-26 ENCOUNTER — MYC MEDICAL ADVICE (OUTPATIENT)
Dept: FAMILY MEDICINE | Facility: CLINIC | Age: 48
End: 2018-02-26

## 2018-02-26 ENCOUNTER — MYC REFILL (OUTPATIENT)
Dept: FAMILY MEDICINE | Facility: CLINIC | Age: 48
End: 2018-02-26

## 2018-02-26 DIAGNOSIS — M47.812 OSTEOARTHRITIS OF CERVICAL SPINE, UNSPECIFIED SPINAL OSTEOARTHRITIS COMPLICATION STATUS: ICD-10-CM

## 2018-02-26 DIAGNOSIS — F41.1 GENERALIZED ANXIETY DISORDER: ICD-10-CM

## 2018-02-26 NOTE — TELEPHONE ENCOUNTER
ALPRAZOLAM      Last Written Prescription Date:  1/30/18  Last Fill Quantity: 30,   # refills: 0  Last Office Visit: 1/30/18  Future Office visit:       Routing refill request to provider for review/approval because:  Drug not on the [facility], [facility] or [facility] refill protocol or controlled substance      NORCO      Last Written Prescription Date:  1/30/18  Last Fill Quantity: 240,   # refills: 0  Last Office Visit: 1/30/18  Future Office visit:       Routing refill request to provider for review/approval because:  Drug not on the [facility], [facility] or [facility] refill protocol or controlled substance

## 2018-02-27 NOTE — TELEPHONE ENCOUNTER
Fax received of approval of medication, [facility] pharmacy notified. Yazmin Mcdaniels LPN

## 2018-02-28 RX ORDER — ALPRAZOLAM 0.5 MG
TABLET ORAL
Qty: 30 TABLET | Refills: 0 | Status: SHIPPED | OUTPATIENT
Start: 2018-02-28 | End: 2018-03-26

## 2018-02-28 RX ORDER — HYDROCODONE BITARTRATE AND ACETAMINOPHEN 7.5; 325 MG/1; MG/1
TABLET ORAL
Qty: 240 TABLET | Refills: 0 | Status: SHIPPED | OUTPATIENT
Start: 2018-02-28 | End: 2018-03-26

## 2018-03-12 ENCOUNTER — MYC REFILL (OUTPATIENT)
Dept: FAMILY MEDICINE | Facility: CLINIC | Age: 48
End: 2018-03-12

## 2018-03-12 DIAGNOSIS — F41.1 GENERALIZED ANXIETY DISORDER: ICD-10-CM

## 2018-03-12 RX ORDER — CLONAZEPAM 0.5 MG/1
TABLET ORAL
Qty: 60 TABLET | Refills: 0 | Status: SHIPPED | OUTPATIENT
Start: 2018-03-12 | End: 2018-04-09

## 2018-03-12 NOTE — TELEPHONE ENCOUNTER
Message from [employer]:  Original authorizing provider: AGUSTÍN Owens would like a refill of the following medications:  clonazePAM (KLONOPIN) 0.5 MG tablet [Selina Reveles PA-C]    Preferred pharmacy: [facility]    Comment:

## 2018-03-12 NOTE — TELEPHONE ENCOUNTER
KLONOPIN      Last Written Prescription Date:  2/12/18  Last Fill Quantity: 60,   # refills: 0  Last Office Visit: 1/30/18  Future Office visit:       Routing refill request to provider for review/approval because:  Drug not on the [facility], [facility] or [facility] refill protocol or controlled substance

## 2018-03-14 ENCOUNTER — MYC MEDICAL ADVICE (OUTPATIENT)
Dept: FAMILY MEDICINE | Facility: CLINIC | Age: 48
End: 2018-03-14

## 2018-03-15 ENCOUNTER — MYC REFILL (OUTPATIENT)
Dept: FAMILY MEDICINE | Facility: CLINIC | Age: 48
End: 2018-03-15

## 2018-03-15 DIAGNOSIS — M79.7 FIBROMYALGIA: ICD-10-CM

## 2018-03-15 RX ORDER — CYCLOBENZAPRINE HCL 5 MG
5 TABLET ORAL 3 TIMES DAILY PRN
Qty: 90 TABLET | Refills: 1 | Status: SHIPPED | OUTPATIENT
Start: 2018-03-15 | End: 2018-05-11

## 2018-03-15 NOTE — TELEPHONE ENCOUNTER
CYCLOBENZAPRINE      Last Written Prescription Date:  1/30/18  Last Fill Quantity: 90,   # refills: 1  Last Office Visit: 1/30/18  Future Office visit:       Routing refill request to provider for review/approval because:  Drug not on the [facility], [facility] or [facility] refill protocol or controlled substance

## 2018-03-15 NOTE — TELEPHONE ENCOUNTER
Message from [employer]:  Original authorizing provider: AGUSTÍN Owens would like a refill of the following medications:  cyclobenzaprine (FLEXERIL) 5 MG tablet [Selina Reveles PA-C]    Preferred pharmacy: [facility]    Comment:

## 2018-03-26 ENCOUNTER — MYC REFILL (OUTPATIENT)
Dept: FAMILY MEDICINE | Facility: CLINIC | Age: 48
End: 2018-03-26

## 2018-03-26 DIAGNOSIS — M47.812 OSTEOARTHRITIS OF CERVICAL SPINE, UNSPECIFIED SPINAL OSTEOARTHRITIS COMPLICATION STATUS: ICD-10-CM

## 2018-03-26 DIAGNOSIS — F41.1 GENERALIZED ANXIETY DISORDER: ICD-10-CM

## 2018-03-26 RX ORDER — HYDROCODONE BITARTRATE AND ACETAMINOPHEN 7.5; 325 MG/1; MG/1
TABLET ORAL
Qty: 240 TABLET | Refills: 0 | Status: SHIPPED | OUTPATIENT
Start: 2018-03-26 | End: 2018-04-24

## 2018-03-26 RX ORDER — ALPRAZOLAM 0.5 MG
TABLET ORAL
Qty: 30 TABLET | Refills: 0 | Status: SHIPPED | OUTPATIENT
Start: 2018-03-26 | End: 2018-04-24

## 2018-03-26 NOTE — TELEPHONE ENCOUNTER
NORCO      Last Written Prescription Date:  2/28/18  Last Fill Quantity: 240,   # refills: 0  Last Office Visit: 1/30/18  Future Office visit:    Next 5 appointments (look out 90 days)     Apr 02, 2018 10:00 AM CDT   Office Visit with Selina Reveles PA-C   [facility] ([address] [id number]   [phone]                   Routing refill request to provider for review/approval because:  Drug not on the [facility], [facility] or [facility] refill protocol or controlled substance      ALPRAZOLAM      Last Written Prescription Date:  2/28/18  Last Fill Quantity: 30,   # refills: 0  Last Office Visit: 1/30/18  Future Office visit:    Next 5 appointments (look out 90 days)     Apr 02, 2018 10:00 AM CDT   Office Visit with Selina Reveles PA-C   [facility]    [address] [place] [id number]   [phone]                   Routing refill request to provider for review/approval because:  Drug not on the [facility], [facility] or [facility] refill protocol or controlled substance

## 2018-03-26 NOTE — TELEPHONE ENCOUNTER
Message from [employer]:  Original authorizing provider: AGUSTÍN Owens would like a refill of the following medications:  ALPRAZolam (XANAX) 0.5 MG tablet [Selina Reveles PA-C]  HYDROcodone-acetaminophen (NORCO) 7.5-325 MG per tablet [Selina Reveles PA-C]    Preferred pharmacy: [address]     Comment:

## 2018-04-09 ENCOUNTER — MYC REFILL (OUTPATIENT)
Dept: FAMILY MEDICINE | Facility: CLINIC | Age: 48
End: 2018-04-09

## 2018-04-09 DIAGNOSIS — F41.1 GENERALIZED ANXIETY DISORDER: ICD-10-CM

## 2018-04-09 NOTE — TELEPHONE ENCOUNTER
Message from [employer]:  Original authorizing provider: AGUSTÍN Owens would like a refill of the following medications:  clonazePAM (KLONOPIN) 0.5 MG tablet [Selina Reveles PA-C]    Preferred pharmacy: [address]     Comment:

## 2018-04-09 NOTE — TELEPHONE ENCOUNTER
CLONAZEPAM      Last Written Prescription Date:  3/12/18  Last Fill Quantity: 60,   # refills: 0  Last Office Visit: 1/30/18  Future Office visit:    Next 5 appointments (look out 90 days)     Apr 18, 2018  1:15 PM CDT   Office Visit with Selina Reveles PA-C   [facility] ([facility])    [address] [place] [id number]   [phone]                   Routing refill request to provider for review/approval because:  Drug not on the [facility], [facility] or  Health refill protocol or controlled substance

## 2018-04-10 RX ORDER — CLONAZEPAM 0.5 MG/1
TABLET ORAL
Qty: 60 TABLET | Refills: 0 | Status: SHIPPED | OUTPATIENT
Start: 2018-04-10 | End: 2018-05-11

## 2018-04-13 DIAGNOSIS — G47.9 SLEEP DISTURBANCE: ICD-10-CM

## 2018-04-13 RX ORDER — HYDROXYZINE HYDROCHLORIDE 25 MG/1
TABLET, FILM COATED ORAL
Qty: 90 TABLET | Refills: 0 | Status: SHIPPED | OUTPATIENT
Start: 2018-04-13 | End: 2018-05-21

## 2018-04-13 NOTE — TELEPHONE ENCOUNTER
"Requested Prescriptions   Pending Prescriptions Disp Refills     hydrOXYzine (ATARAX) 25 MG tablet [Pharmacy Med Name: hydrOXYzine HCL 25MG TAB] 90 tablet 0     Sig: TAKE 2-4 TABLETS BY MOUTH NIGHTLY AS NEEDED FOR ANXIETY(SLEEP) -MAY TAKE 1 TABLET(25MG) TO START WITH    Antihistamines Protocol Passed    4/13/2018 11:46 AM       Passed - Recent (12 mo) or future (30 days) visit within the authorizing provider's specialty    Patient had office visit in the last 12 months or has a visit in the next 30 days with authorizing provider or within the authorizing provider's specialty.  See \"Patient Info\" tab in inbasket, or \"Choose Columns\" in Meds & Orders section of the refill encounter.           Passed - Patient is age 3 or older    Apply age and/or weight-based dosing for peds patients age 3 and older.    Forward request to provider for patients under the age of 3.            Last Written Prescription Date:  3/14/18  Last Fill Quantity: 90,  # refills: 0   Last Office Visit with [facility], [facility] or [facility] prescribing provider:  1/30/18   Future Office Visit:    Next 5 appointments (look out 90 days)     Apr 18, 2018  1:15 PM CDT   [facility]    [address] [place] [id number] [phone]                   "

## 2018-04-18 ENCOUNTER — OFFICE VISIT (OUTPATIENT)
Dept: FAMILY MEDICINE | Facility: CLINIC | Age: 48
End: 2018-04-18
Payer: COMMERCIAL

## 2018-04-18 VITALS
SYSTOLIC BLOOD PRESSURE: 110 MMHG | WEIGHT: 131 LBS | HEART RATE: 82 BPM | RESPIRATION RATE: 18 BRPM | DIASTOLIC BLOOD PRESSURE: 72 MMHG | OXYGEN SATURATION: 97 % | BODY MASS INDEX: 21.47 KG/M2 | TEMPERATURE: 98.2 F

## 2018-04-18 DIAGNOSIS — M79.7 FIBROMYALGIA: ICD-10-CM

## 2018-04-18 DIAGNOSIS — M05.79 RHEUMATOID ARTHRITIS INVOLVING MULTIPLE SITES WITH POSITIVE RHEUMATOID FACTOR (H): Primary | ICD-10-CM

## 2018-04-18 DIAGNOSIS — J31.0 CHRONIC RHINITIS, UNSPECIFIED TYPE: ICD-10-CM

## 2018-04-18 PROCEDURE — 99215 OFFICE O/P EST HI 40 MIN: CPT | Performed by: PHYSICIAN ASSISTANT

## 2018-04-18 RX ORDER — FLUTICASONE PROPIONATE 50 MCG
SPRAY, SUSPENSION (ML) NASAL
Qty: 16 G | Refills: 11 | Status: SHIPPED | OUTPATIENT
Start: 2018-04-18 | End: 2018-08-02

## 2018-04-18 ASSESSMENT — PAIN SCALES - GENERAL: PAINLEVEL: NO PAIN (0)

## 2018-04-18 NOTE — NURSING NOTE
"Chief Complaint   Patient presents with     Recheck Medication       Initial /72  Pulse 82  Temp 98.2  F (36.8  C) (Temporal)  Resp 18  Wt 131 lb (59.4 kg)  SpO2 97%  BMI 21.47 kg/m2 Estimated body mass index is 21.47 kg/(m^2) as calculated from the following:    Height as of 1/30/18: 5' 5.5\" (1.664 m).    Weight as of this encounter: 131 lb (59.4 kg).  BP completed using cuff size: krunal García MA      "

## 2018-04-18 NOTE — PROGRESS NOTES
SUBJECTIVE:   Sherie Otero is an extremely complicated 47 year old female who presents to clinic today for the following health issues:      Recheck Meds for fibromyalgia and ongoing chronic pain.  She was a longtime patient of Dr. Gregory Stapleton and when her clinic closed here in [place], started seeing me in 2013.  Unfortunately many of her records did not transfer quickly and did not get placed into her chart accurately when I started seeing her.  In 2016, the patient continued about joint pain and I told her we needed to do a rheumatological workup.  That is when she told me that she had already had a positive rheumatoid factor any diagnosis.  Had been seeing Dr. Penaloza in rheumatology years prior to that diagnosis.      In reviewing epic thoroughly, I am seen 3 visits in 2004 and 1 visit in 2008 with that physician.  In the 2008 visit it was noted that she was diagnosed with fibromyalgia in 2004.  She had related a history of fatigue associated with nonrestorative sleep, diffuse arthralgias and myalgias and several areas of trigger point tenderness without evidence of synovitis on exam.  She also had history of IBS, chronic headaches, and occasional nocturnal lower leg cramping discomfort.  Rheumatoid factor in 2004 returned at 640 with normal being less than 40.  She was started on Plaquenil 200 mg, 2 tablets daily in an attempt to determine whether any of her musculoskeletal symptoms may be inflammatory in etiology.  Joint pain continued and waxed and waned despite the use of naproxen and Vicodin.  2008 diagnosis was that of fatigue associated with fibromyalgia-at the time was told she had a weakly positive rheumatoid factor and did not of synovitis on physical examination.  Because of abdominal discomfort and weight loss, the Plaquenil was discontinued after about 4 weeks of therapy.  She was started on sulfasalazine and began with a low dose and a taper up, but at the 2008 visit, the patient did not  recollect ever starting the sulfasalazine therapy. In 2008,  She was told by the rheumatologist to increase her Cymbalta to 120 mg daily.  Neurontin or Lyrica should be considered next options if the Cymbalta increase does not help her-she was told she did not need to return to rheumatology clinic.        I last saw this patient January 2018.  At that visit, she was having persistent issues with fibromyalgia pain and excessive fatigue.  Opted to have her start Lyrica on a low-dose basis with hopes that she could titrate up on this.  Also added Flexeril for muscle spasm at nighttime.  Also reviewed benzodiazepine use carefully as her prescriptions were not real clear and how she was using this.  She is taking clonazepam twice daily and a low dose once daily alprazolam midday.    Taking me via my chart in March stating she had to wean herself off of the Lyrica as it was causing increased pain.  Muscle relaxers also did not help her.  Used a prescription of oral prednisone to help her get through that weekend.    Current medications include Cymbalta 60 mg daily, hydroxyzine mainly for nighttime, hydrocodone-acetaminophen using large doses 7.5-325 8 tablets per day, as needed Flexeril.  Also on several different medications for anxiety.      She needs refills of Flonase today for chronic rhinorrhea.        Problem list and histories reviewed & adjusted, as indicated.  Additional history: as documented    Patient Active Problem List   Diagnosis     CARDIOVASCULAR SCREENING; LDL GOAL LESS THAN 160     Chronic fatigue syndrome     Fibromyalgia     Generalized anxiety disorder     Intermittent asthma     Tobacco abuse     SHANTANU (obstructive sleep apnea)     Disturbance in sleep behavior     Cervicalgia     Stenosis, cervical spine     Spondylitis, cervical (H)     NSAID induced gastritis     Major depressive disorder, recurrent episode, mild (H)     Other chronic pain     Intermittent asthma, uncomplicated     Rheumatoid  arthritis involving multiple sites with positive rheumatoid factor (H)     Elevated white blood cell count     Panic attacks     Grief reaction     Osteoarthritis of cervical spine, unspecified spinal osteoarthritis complication status     Degenerative joint disease of cervical spine     Pulmonary nodules     Abnormal CT of the chest     Hilar lymphadenopathy     Other migraine without status migrainosus, intractable     Past Surgical History:   Procedure Laterality Date     C APPENDECTOMY      Ruptured at age 7 years     C  DELIVERY ONLY      , Low Cervical     DILATION AND CURETTAGE, HYSTEROSCOPY, ABLATE ENDOMETRIUM NOVASURE, COMBINED  2011    Procedure:COMBINED DILATION AND CURETTAGE, HYSTEROSCOPY, ABLATE ENDOMETRIUM NOVASURE; hysteroscopy, dilation and curettage, novasure; Surgeon:JEREMY ANNA; Location:[facility] OR     EXCISE LESION TRUNK  2013    Procedure: EXCISE LESION TRUNK;  interlaminar epidural Steroid Injection Cervical -thoracic Levels (C7-T1)    Re-Excision of chest wall mass;  Surgeon: Jeremy Bolaños MD;  Location: [facility] OR     HC HYSTEROS W PERMANENT FALLOPAIN IMPLANT      Essure done in office [place]     INJECT BLOCK MEDIAL BRANCH CERVICAL/THORACIC/LUMBAR  2014    Procedure: INJECT BLOCK MEDIAL BRANCH CERVICAL / THORACIC / LUMBAR;  Surgeon: Josué Munson MD;  Location: [facility] OR     INJECT FACET JOINT  2014    Procedure: INJECT FACET JOINT;  diagnostic medial branch facet nerve block cervical levels 5-6, 6-7;  Surgeon: Josué Munson MD;  Location:  OR     [facility]         Social History   Substance Use Topics     Smoking status: Current Every Day Smoker     Packs/day: 0.50     Years: 29.00     Types: Cigarettes     Smokeless tobacco: Never Used      Comment: 11 - 1pk/day     Alcohol use No     Family History   Problem Relation Age of Onset     Arthritis Mother      Rheumatoid     Respiratory Mother      COPD     Hypertension  Sister      1/2 sister     Neurologic Disorder Sister      1/2 sisiter  Very mild form of CP     Cardiovascular Maternal Grandmother      HEART DISEASE Maternal Aunt      Bypass at age 50's           Reviewed and updated as needed this visit by clinical staff       Reviewed and updated as needed this visit by Provider         ROS:  Constitutional, HEENT, cardiovascular, pulmonary, GI, , musculoskeletal, neuro, skin, endocrine and psych systems are negative, except as otherwise noted.    OBJECTIVE:     /72  Pulse 82  Temp 98.2  F (36.8  C) (Temporal)  Resp 18  Wt 131 lb (59.4 kg)  SpO2 97%  BMI 21.47 kg/m2  Body mass index is 21.47 kg/(m^2).   GENERAL: alert, no distress and appears older than stated age  Full exam was not done today although it is noted that she does not have any obvious signs of synovitis.    Diagnostic Test Results:  none     ASSESSMENT:      Chronic rhinitis, unspecified type  Rheumatoid arthritis involving multiple sites with positive rheumatoid factor (H)  Fibromyalgia      PLAN:       ICD-10-CM    1. Rheumatoid arthritis involving multiple sites with positive rheumatoid factor (H) M05.79 RHEUMATOLOGY REFERRAL   2. Chronic rhinitis, unspecified type J31.0 fluticasone (FLONASE) 50 MCG/ACT spray   3. Fibromyalgia M79.7 RHEUMATOLOGY REFERRAL           MEDICATIONS:  Continue current medications without change  Filled Flonase today, at this point will not change any of her medications given specialty consultation suggestion today.  CONSULTATION/REFERRAL to rheumatology-appointment set up with Dr. Ash Donald in rheumatology.    I reviewed with her my concerns regarding polypharmacy.  She is on a numerous amount of medications at high doses many of which are controlled substances.  We have attempted other medications that have been suggested by rheumatology in 2008 and these have not agreed with her and she has had side effects namely gabapentin and Lyrica most recently.  She requests  "something more for pain today, but I am reluctant and refused to do anything more at this time.  I suggested to her that she may need to consider a pain clinic to get adequate care.    She is excited to consider rheumatology consult, I discussed with her that more formalized testing may need to be done, but feel it is in her best interest to get another opinion on this.    Selina Reveles PA-C  [facility]    GREATER THAN 50% OF TIME SPENT IN COUNSELING & CARE COORDINATION; namely review of past medical history including rheumatology notes and review of previous treatment plan and current plan \" TOTAL FACE TO FACE TIME  45 MINUTES.    Orders Placed This Encounter     RHEUMATOLOGY REFERRAL     fluticasone (FLONASE) 50 MCG/ACT spray       AVS given to patient upon discharge today.  Electronically signed by Selina Reveles PA-C  April 18, 2018  5:16 PM      "

## 2018-04-19 ASSESSMENT — ASTHMA QUESTIONNAIRES: ACT_TOTALSCORE: 23

## 2018-04-24 ENCOUNTER — MYC REFILL (OUTPATIENT)
Dept: FAMILY MEDICINE | Facility: CLINIC | Age: 48
End: 2018-04-24

## 2018-04-24 DIAGNOSIS — F41.1 GENERALIZED ANXIETY DISORDER: ICD-10-CM

## 2018-04-24 DIAGNOSIS — M47.812 OSTEOARTHRITIS OF CERVICAL SPINE, UNSPECIFIED SPINAL OSTEOARTHRITIS COMPLICATION STATUS: ICD-10-CM

## 2018-04-24 RX ORDER — HYDROCODONE BITARTRATE AND ACETAMINOPHEN 7.5; 325 MG/1; MG/1
TABLET ORAL
Qty: 240 TABLET | Refills: 0 | Status: SHIPPED | OUTPATIENT
Start: 2018-04-24 | End: 2018-05-21

## 2018-04-24 RX ORDER — ALPRAZOLAM 0.5 MG
TABLET ORAL
Qty: 30 TABLET | Refills: 0 | Status: SHIPPED | OUTPATIENT
Start: 2018-04-24 | End: 2018-05-21

## 2018-04-24 NOTE — TELEPHONE ENCOUNTER
NORCO      Last Written Prescription Date:  3/26/18  Last Fill Quantity: 240,   # refills: 0  Last Office Visit: 4/18/18  Future Office visit:       Routing refill request to provider for review/approval because:  Drug not on the [facility], [facility] or [facility] refill protocol or controlled substance    ALPRAZOLAM      Last Written Prescription Date:  3/26/18  Last Fill Quantity: 30,   # refills: 0  Last Office Visit: 4/18/18  Future Office visit:       Routing refill request to provider for review/approval because:  Drug not on the [facility], [facility] or [facility] refill protocol or controlled substance

## 2018-04-24 NOTE — TELEPHONE ENCOUNTER
Message from [employer]:  Original authorizing provider: AGUSTÍN Owens would like a refill of the following medications:  ALPRAZolam (XANAX) 0.5 MG tablet [Selina Reveles PA-C]  HYDROcodone-acetaminophen (NORCO) 7.5-325 MG per tablet [Selina Reveles PA-C]    Preferred pharmacy: [address]     Comment:

## 2018-05-11 ENCOUNTER — MYC REFILL (OUTPATIENT)
Dept: FAMILY MEDICINE | Facility: CLINIC | Age: 48
End: 2018-05-11

## 2018-05-11 DIAGNOSIS — F41.1 GENERALIZED ANXIETY DISORDER: ICD-10-CM

## 2018-05-11 DIAGNOSIS — M79.7 FIBROMYALGIA: ICD-10-CM

## 2018-05-11 DIAGNOSIS — J31.0 CHRONIC RHINITIS, UNSPECIFIED TYPE: ICD-10-CM

## 2018-05-11 NOTE — TELEPHONE ENCOUNTER
Message from MyChart:  Original authorizing provider: AGUSTÍN Owens would like a refill of the following medications:  cetirizine (ZYRTEC) 10 MG tablet [Selina Reveles PA-C]  cyclobenzaprine (FLEXERIL) 5 MG tablet [Selina Reveles PA-C]  clonazePAM (KLONOPIN) 0.5 MG tablet [Selina Reveles PA-C]    Preferred pharmacy: [address]     Comment:

## 2018-05-11 NOTE — TELEPHONE ENCOUNTER
"Clonazepam 0.5 MG       Last Written Prescription Date:  4/10/18  Last Fill Quantity: 60,   # refills: 0  Last Office Visit: 4/18/18  Future Office visit:       Routing refill request to provider for review/approval because:  Drug not on the [facility], [facility] or [facility] refill protocol or controlled substance  Cyclobenzaprine 5 MG       Last Written Prescription Date:  3/15/18  Last Fill Quantity: 90,   # refills: 1  Last Office Visit: 4/18/18  Future Office visit:       Routing refill request to provider for review/approval because:  Drug not on the [facility] or [facility] refill protocol or controlled substance  Requested Prescriptions   Pending Prescriptions Disp Refills     cetirizine (ZYRTEC) 10 MG tablet 30 tablet 0     Sig: TAKE  ONE TABLET BY MOUTH EVERY DAY.  Appointment needed for additional refills.    Antihistamines Protocol Passed    5/11/2018  2:30 PM       Passed - Patient is 3-64 years of age    Apply weight-based dosing for peds patients age 3 - 12 years of age.    Forward request to provider for patients under the age of 3 or over the age of 64.         Passed - Recent (12 mo) or future (30 days) visit within the authorizing provider's specialty    Patient had office visit in the last 12 months or has a visit in the next 30 days with authorizing provider or within the authorizing provider's specialty.  See \"Patient Info\" tab in inbasket, or \"Choose Columns\" in Meds & Orders section of the refill encounter.            cyclobenzaprine (FLEXERIL) 5 MG tablet 90 tablet 1     Sig: Take 1 tablet (5 mg) by mouth 3 times daily as needed for muscle spasms    There is no refill protocol information for this order        clonazePAM (KLONOPIN) 0.5 MG tablet 60 tablet 0     Sig: TAKE ONE-HALF TO ONE TABLET BY MOUTH TWICE A DAY AS NEEDED FOR ANXIETY - MUST LAST 30 DAYS    There is no refill protocol information for this order          Last Written Prescription Date:  3/14/18  Last Fill Quantity: 30,  # refills: 0   Last " Office Visit with [facility], [facility] or [facility] prescribing provider:  4/18/18   Future Office Visit:

## 2018-05-14 PROBLEM — J31.0 CHRONIC RHINITIS, UNSPECIFIED TYPE: Status: ACTIVE | Noted: 2018-05-14

## 2018-05-14 RX ORDER — CETIRIZINE HYDROCHLORIDE 10 MG/1
TABLET ORAL
Qty: 90 TABLET | Refills: 1 | Status: SHIPPED | OUTPATIENT
Start: 2018-05-14 | End: 2018-08-02

## 2018-05-14 RX ORDER — CYCLOBENZAPRINE HCL 5 MG
5 TABLET ORAL 3 TIMES DAILY PRN
Qty: 90 TABLET | Refills: 1 | Status: SHIPPED | OUTPATIENT
Start: 2018-05-14 | End: 2018-07-17

## 2018-05-14 RX ORDER — CLONAZEPAM 0.5 MG/1
TABLET ORAL
Qty: 60 TABLET | Refills: 0 | Status: SHIPPED | OUTPATIENT
Start: 2018-05-14 | End: 2018-06-12

## 2018-05-21 ENCOUNTER — MYC REFILL (OUTPATIENT)
Dept: FAMILY MEDICINE | Facility: CLINIC | Age: 48
End: 2018-05-21

## 2018-05-21 ENCOUNTER — TELEPHONE (OUTPATIENT)
Dept: FAMILY MEDICINE | Facility: CLINIC | Age: 48
End: 2018-05-21

## 2018-05-21 DIAGNOSIS — F41.1 GENERALIZED ANXIETY DISORDER: ICD-10-CM

## 2018-05-21 DIAGNOSIS — M47.812 OSTEOARTHRITIS OF CERVICAL SPINE, UNSPECIFIED SPINAL OSTEOARTHRITIS COMPLICATION STATUS: ICD-10-CM

## 2018-05-21 DIAGNOSIS — G47.9 SLEEP DISTURBANCE: ICD-10-CM

## 2018-05-21 RX ORDER — ALPRAZOLAM 0.5 MG
TABLET ORAL
Qty: 30 TABLET | Refills: 0 | Status: SHIPPED | OUTPATIENT
Start: 2018-05-21 | End: 2018-06-19

## 2018-05-21 RX ORDER — HYDROXYZINE HYDROCHLORIDE 25 MG/1
TABLET, FILM COATED ORAL
Qty: 90 TABLET | Refills: 0 | Status: SHIPPED | OUTPATIENT
Start: 2018-05-21 | End: 2018-07-01

## 2018-05-21 RX ORDER — HYDROCODONE BITARTRATE AND ACETAMINOPHEN 7.5; 325 MG/1; MG/1
TABLET ORAL
Qty: 240 TABLET | Refills: 0 | Status: SHIPPED | OUTPATIENT
Start: 2018-05-21 | End: 2018-06-19

## 2018-05-21 NOTE — TELEPHONE ENCOUNTER
----- Message from Sherie Otero sent at 5/21/2018 11:41 AM CDT -----  Regarding: Appointment Request ()  Contact: [phone]  Appointment Request From: Sherie Otero    With Provider: Selina Reveles PA-C [-Primary Care Physician-]    Preferred Date Range: From 5/21/2018 To 6/30/2018    Preferred Times: Any    Reason: To address the following health maintenance concerns.  Pap Q3 Yr    Comments:  Hi  is it possible to have my yearly exam done before November ? I've been having pain in my lower abdomen, we've discussed this in the past.

## 2018-05-21 NOTE — TELEPHONE ENCOUNTER
Please approve in the absence of patients provider. Yazmin Mcdaniels LPN    ALPRAZOLAM      Last Written Prescription Date:  4/24/18  Last Fill Quantity: 30,   # refills: 0  Last Office Visit: 4/18/18  Future Office visit:    Next 5 appointments (look out 90 days)     May 25, 2018 11:30 AM CDT   Screening Mammogram with PHMA1   [facility] ([facility])    [address] [place] [phone]   [phone]                   Routing refill request to provider for review/approval because:  Drug not on the [facility], [facility] or [facility] refill protocol or controlled substance   NORCO      Last Written Prescription Date:  4/24/18  Last Fill Quantity: 240,   # refills: 0  Last Office Visit: 4/18/18  Future Office visit:    Next 5 appointments (look out 90 days)     May 25, 2018 11:30 AM CDT   Screening Mammogram with PHMA1   [facility] ([facility])    [address] [place] [address]   [phone]                   Routing refill request to provider for review/approval because:  Drug not on the [facility], [facility] or [facility] refill protocol or controlled substance    ATARAX      Last Written Prescription Date:  4/13/18  Last Fill Quantity: 90,   # refills: 0  Last Office Visit: 4/18/18  Future Office visit:    Next 5 appointments (look out 90 days)     May 25, 2018 11:30 AM CDT   Screening Mammogram with PHMA1   [facility] ([facility])    [address] [place] [phone]   [phone]                   Routing refill request to provider for review/approval because:  Drug not on the [facility], [facility] or [facility] refill protocol or controlled substance

## 2018-05-21 NOTE — TELEPHONE ENCOUNTER
Message from [employer]:  Original authorizing provider: AGUSTÍN Owens would like a refill of the following medications:  hydrOXYzine (ATARAX) 25 MG tablet [Selina Reveles PA-C]  ALPRAZolam (XANAX) 0.5 MG tablet [Selina Reveles PA-C]  HYDROcodone-acetaminophen (NORCO) 7.5-325 MG per tablet [Selina Reveles PA-C]    Preferred pharmacy: [address]     Comment:

## 2018-05-21 NOTE — TELEPHONE ENCOUNTER
Please triage abdominal pain to see if symptoms worsening and needs to be seen sooner rather than later. Otherwise pt can schedule physical for first available with Selina, but addressing abdominal pain and physical would need to be separate appointments per Addie BALLARD MA. Amber Jeffries, CMA

## 2018-05-21 NOTE — TELEPHONE ENCOUNTER
Patient states this is the same pain she has been having the past 8 years. She states she is fine to wait to see PCP at her next available opening in June.  She states she either had an US ordered for this in the past, or she completed one, she can't remember.  She is informed there was one ordered in 1/2017, but not completed.  She is instructed she will need to meet with PCP first to discuss if she still wants her to complete this or has a different plan in mind.    Patient understands and agrees to this plan.  Closing this encounter.  Addie Kuhn, SAMAARN, RN

## 2018-05-25 ENCOUNTER — HOSPITAL ENCOUNTER (OUTPATIENT)
Dept: MAMMOGRAPHY | Facility: CLINIC | Age: 48
Discharge: HOME | End: 2018-05-25
Attending: PHYSICIAN ASSISTANT | Admitting: PHYSICIAN ASSISTANT
Payer: COMMERCIAL

## 2018-05-25 DIAGNOSIS — Z12.31 VISIT FOR SCREENING MAMMOGRAM: ICD-10-CM

## 2018-05-25 PROCEDURE — 77067 SCR MAMMO BI INCL CAD: CPT

## 2018-06-12 ENCOUNTER — MYC REFILL (OUTPATIENT)
Dept: FAMILY MEDICINE | Facility: CLINIC | Age: 48
End: 2018-06-12

## 2018-06-12 DIAGNOSIS — M79.7 FIBROMYALGIA: ICD-10-CM

## 2018-06-12 DIAGNOSIS — F33.0 MAJOR DEPRESSIVE DISORDER, RECURRENT EPISODE, MILD (H): ICD-10-CM

## 2018-06-12 DIAGNOSIS — F41.1 GENERALIZED ANXIETY DISORDER: ICD-10-CM

## 2018-06-12 RX ORDER — DULOXETIN HYDROCHLORIDE 60 MG/1
CAPSULE, DELAYED RELEASE ORAL
Qty: 90 CAPSULE | Refills: 3 | Status: CANCELLED | OUTPATIENT
Start: 2018-06-12

## 2018-06-13 RX ORDER — DULOXETINE HCL 60 MG
CAPSULE,DELAYED RELEASE (ENTERIC COATED) ORAL
Qty: 30 CAPSULE | Refills: 0 | Status: SHIPPED | OUTPATIENT
Start: 2018-06-13 | End: 2018-07-16

## 2018-06-13 RX ORDER — CLONAZEPAM 0.5 MG/1
TABLET ORAL
Qty: 60 TABLET | Refills: 0 | Status: SHIPPED | OUTPATIENT
Start: 2018-06-13 | End: 2018-07-16

## 2018-06-13 NOTE — TELEPHONE ENCOUNTER
CLONAZEPAM      Last Written Prescription Date:  5/14/18  Last Fill Quantity: 60,   # refills: 0  Last Office Visit: 4/18/18  Future Office visit:    Next 5 appointments (look out 90 days)     Jun 20, 2018 12:45 PM CDT   Office Visit with Selina Reveles PA-C   [facility] ([address] [address]   [phone]            Jun 20, 2018  1:15 PM CDT   PHYSICAL with Selina Reveles PA-C   [facility] ([facility])    [address] [place] [address]   [phone]                   Routing refill request to provider for review/approval because:  Drug not on the [facility], [facility] or [facility] refill protocol or controlled substance

## 2018-06-13 NOTE — TELEPHONE ENCOUNTER
"Requested Prescriptions   Pending Prescriptions Disp Refills     CYMBALTA 60 MG EC capsule [Pharmacy Med Name: CYMBALTA 60MG CPEP] 30 capsule 4     Sig: TAKE ONE CAPSULE BY MOUTH EVERY EVENING    Serotonin-Norepinephrine Reuptake Inhibitors  Failed    6/12/2018 10:47 PM       Failed - PHQ-9 score of less than 5 in past 6 months    Please review last PHQ-9 score.          Passed - Blood pressure under 140/90 in past 12 months    BP Readings from Last 3 Encounters:   04/18/18 110/72   01/30/18 118/70   08/30/17 116/72                Passed - Patient is age 18 or older       Passed - No active pregnancy on record       Passed - No positive pregnancy test in past 12 months       Passed - Recent (6 mo) or future (30 days) visit within the authorizing provider's specialty    Patient had office visit in the last 6 months or has a visit in the next 30 days with authorizing provider or within the authorizing provider's specialty.  See \"Patient Info\" tab in inbasket, or \"Choose Columns\" in Meds & Orders section of the refill encounter.            Last Written Prescription Date:  1/30/18  Last Fill Quantity: 90,  # refills: 3   Last office visit: 4/18/2018 with prescribing provider:     Future Office Visit:   Next 5 appointments (look out 90 days)     Jun 20, 2018 12:45 PM CDT   Office Visit with Selina Reveles PA-C   [facility] ([address] [phone]            Jun 20, 2018  1:15 PM CDT   PHYSICAL with Selina Reveles PA-C   [facility] ([address] [phone] [phone]                   "

## 2018-06-13 NOTE — TELEPHONE ENCOUNTER
Message from [employer]:  Original authorizing provider: AGUSTÍN Owens would like a refill of the following medications:  DULoxetine (CYMBALTA) 60 MG EC capsule [Selina Reveles PA-C]  clonazePAM (KLONOPIN) 0.5 MG tablet [Selina Reveles PA-C]    Preferred pharmacy: [address]     Comment:

## 2018-06-19 ENCOUNTER — MYC REFILL (OUTPATIENT)
Dept: FAMILY MEDICINE | Facility: CLINIC | Age: 48
End: 2018-06-19

## 2018-06-19 DIAGNOSIS — F41.1 GENERALIZED ANXIETY DISORDER: ICD-10-CM

## 2018-06-19 DIAGNOSIS — J45.20 INTERMITTENT ASTHMA, UNCOMPLICATED: ICD-10-CM

## 2018-06-19 DIAGNOSIS — M47.812 OSTEOARTHRITIS OF CERVICAL SPINE, UNSPECIFIED SPINAL OSTEOARTHRITIS COMPLICATION STATUS: ICD-10-CM

## 2018-06-19 RX ORDER — HYDROCODONE BITARTRATE AND ACETAMINOPHEN 7.5; 325 MG/1; MG/1
TABLET ORAL
Qty: 240 TABLET | Refills: 0 | Status: SHIPPED | OUTPATIENT
Start: 2018-06-19 | End: 2018-07-19

## 2018-06-19 RX ORDER — ALPRAZOLAM 0.5 MG
TABLET ORAL
Qty: 30 TABLET | Refills: 0 | Status: SHIPPED | OUTPATIENT
Start: 2018-06-19 | End: 2018-07-17

## 2018-06-19 NOTE — TELEPHONE ENCOUNTER
Script brought to [facility] pharmacy.  Addie García MA

## 2018-06-19 NOTE — TELEPHONE ENCOUNTER
Message from [employer] [facility]:  Original authorizing provider: MD Sherie Ortega would like a refill of the following medications:  ALPRAZolam (XANAX) 0.5 MG tablet [Mauro Paredes MD]  HYDROcodone-acetaminophen (NORCO) 7.5-325 MG per tablet [Mauro Paredes MD]    Preferred pharmacy: [address]     Comment:      Medication renewals requested in this message routed to other providers:  albuterol (VENTOLIN HFA) 108 (90 BASE) MCG/ACT Inhaler [Selina Reveles PA-C]

## 2018-06-19 NOTE — TELEPHONE ENCOUNTER
"Requested Prescriptions   Pending Prescriptions Disp Refills     albuterol (VENTOLIN HFA) 108 (90 Base) MCG/ACT Inhaler 18 g 3     Sig: Inhale 2 puffs into the lungs every 6 hours as needed    Asthma Maintenance Inhalers - Anticholinergics Passed    6/19/2018  3:07 PM       Passed - Patient is age 12 years or older       Passed - Asthma control assessment score within normal limits in last 6 months    Please review ACT score.          Passed - Recent (6 mo) or future (30 days) visit within the authorizing provider's specialty    Patient had office visit in the last 6 months or has a visit in the next 30 days with authorizing provider or within the authorizing provider's specialty.  See \"Patient Info\" tab in inbasket, or \"Choose Columns\" in Meds & Orders section of the refill encounter.              Last Written Prescription Date:  3/14/18  Last Fill Quantity: 18 g,  # refills: 0   Last Office Visit with [facility], [facility] or [facility] prescribing provider:  4/18/18   Future Office Visit:    Next 5 appointments (look out 90 days)     Jul 25, 2018 11:15 AM CDT   PHYSICAL with Selina Reevles [facility]-C   [facility]    [address] [facility] [address] [phone]                 "

## 2018-06-19 NOTE — TELEPHONE ENCOUNTER
NORCO      Last Written Prescription Date:  5/21/18  Last Fill Quantity: 240,   # refills: 0  Last Office Visit: 4/18/18  Future Office visit:    Next 5 appointments (look out 90 days)     Jul 25, 2018 11:15 AM CDT   PHYSICAL with Selina Reveles PA-C   [address] [address]   [phone]                   Routing refill request to provider for review/approval because:  Drug not on the [facility], [facility] or [facility] refill protocol or controlled substance    ALPRAZOLAM      Last Written Prescription Date:  5/21/18  Last Fill Quantity: 30,   # refills: 0  Last Office Visit: 4/18/18  Future Office visit:    Next 5 appointments (look out 90 days)     Jul 25, 2018 11:15 AM CDT   PHYSICAL with Selina Reveles PA-C   [facility] ([facility]    [address] [place] [address]   [phone]                   Routing refill request to provider for review/approval because:  Drug not on the [facility], [facility] or [facility] refill protocol or controlled substance

## 2018-06-19 NOTE — TELEPHONE ENCOUNTER
Please make sure for upcoming physical, this patient is given a full hour.  If we need to adjust her appointment, please call her to do so.  I would like to spend additional time discussing pain management and medications with her and it will take longer than the typical additional 15 minutes I usually get.

## 2018-06-19 NOTE — TELEPHONE ENCOUNTER
[facility]  Original authorizing provider: AGUSTÍN Owens would like a refill of the following medications:  albuterol (VENTOLIN HFA) 108 (90 BASE) MCG/ACT Inhaler [Selina Reveles PA-C]    Preferred pharmacy: [address]     Comment:      Medication renewals requested in this message routed to other providers:  ALPRAZolam (XANAX) 0.5 MG tablet [Mauro Paredes MD]  HYDROcodone-acetaminophen (NORCO) 7.5-325 MG per tablet [Mauro Paredes MD]

## 2018-06-21 RX ORDER — ALBUTEROL SULFATE 90 UG/1
2 AEROSOL, METERED RESPIRATORY (INHALATION) EVERY 6 HOURS PRN
Qty: 18 G | Refills: 4 | Status: SHIPPED | OUTPATIENT
Start: 2018-06-21 | End: 2019-05-16

## 2018-06-21 NOTE — TELEPHONE ENCOUNTER
ACT Total Scores 5/16/2017 8/30/2017 4/18/2018   ACT TOTAL SCORE - - -   ASTHMA ER VISITS - - -   ASTHMA HOSPITALIZATIONS - - -   ACT TOTAL SCORE (Goal Greater than or Equal to 20) 17 22 23   In the past 12 months, how many times did you visit the emergency room for your asthma without being admitted to the hospital? 0 0 0   In the past 12 months, how many times were you hospitalized overnight because of your asthma? 0 0 0     VENTOLIN Prescription approved per [facility] Refill Protocol...........................JULIO Heller

## 2018-06-22 ENCOUNTER — TELEPHONE (OUTPATIENT)
Dept: FAMILY MEDICINE | Facility: CLINIC | Age: 48
End: 2018-06-22

## 2018-06-22 NOTE — TELEPHONE ENCOUNTER
"Patient is due for a PHQ-9.  Index start date:5/30/2018  Index end date:9/30/2018    Please call patient. Pt has appt scheduled for 8/01/2018. I have put \"Give PHQ-9\" in the appt note and will postpone encounter. I have also sent a [employer] message. Talisha Becerra CMA ([facility])      "

## 2018-07-01 DIAGNOSIS — G47.9 SLEEP DISTURBANCE: ICD-10-CM

## 2018-07-02 NOTE — TELEPHONE ENCOUNTER
"Requested Prescriptions   Pending Prescriptions Disp Refills     hydrOXYzine (ATARAX) 25 MG tablet [Pharmacy Med Name: hydrOXYzine HCL 25MG TAB] 90 tablet 0     Sig: TAKE 2-4 TABLETS BY MOUTH NIGHTLY AS NEEDED FOR ANXIETY  (SLEEP) MAY TAKE ONE TABLET TO START WITH    Antihistamines Protocol Passed    7/1/2018 11:54 AM       Passed - Recent (12 mo) or future (30 days) visit within the authorizing provider's specialty    Patient had office visit in the last 12 months or has a visit in the next 30 days with authorizing provider or within the authorizing provider's specialty.  See \"Patient Info\" tab in inbasket, or \"Choose Columns\" in Meds & Orders section of the refill encounter.           Passed - Patient is age 3 or older    Apply age and/or weight-based dosing for peds patients age 3 and older.    Forward request to provider for patients under the age of 3.          Last Written Prescription Date:  5/21/18  Last Fill Quantity: 90,  # refills: 0   Last office visit: 4/18/2018 with prescribing provider:     Future Office Visit:   Next 5 appointments (look out 90 days)     Aug 01, 2018  1:30 PM CDT   PHYSICAL with Selina Reveles PA-C   [facility] ([facility])    [address] [facility] [address] [phone]                   "

## 2018-07-03 RX ORDER — HYDROXYZINE HYDROCHLORIDE 25 MG/1
TABLET, FILM COATED ORAL
Qty: 90 TABLET | Refills: 0 | Status: SHIPPED | OUTPATIENT
Start: 2018-07-03 | End: 2018-09-26

## 2018-07-12 DIAGNOSIS — F41.1 GENERALIZED ANXIETY DISORDER: ICD-10-CM

## 2018-07-12 RX ORDER — CLONAZEPAM 0.5 MG/1
TABLET ORAL
Qty: 60 TABLET | Refills: 0 | OUTPATIENT
Start: 2018-07-12

## 2018-07-12 NOTE — TELEPHONE ENCOUNTER
Requested Prescriptions   Pending Prescriptions Disp Refills     clonazePAM (KLONOPIN) 0.5 MG tablet 60 tablet 0     Sig: TAKE ONE-HALF TO ONE TABLET BY MOUTH TWICE A DAY AS NEEDED FOR ANXIETY - MUST LAST 30 DAYS    There is no refill protocol information for this order              Last Written Prescription Date:  6/13/18  Last Fill Quantity: 60,   # refills: 0  Last Office Visit: 4/18/18  Future Office visit:    Next 5 appointments (look out 90 days)     Aug 01, 2018  1:30 PM CDT   PHYSICAL with Selina Reveles PA-C   [facility] ([facility]    [address] [place] [id number] [phone]                   Routing refill request to provider for review/approval because:  Drug not on the [facility], [facility] or [facility] refill protocol or controlled substance

## 2018-07-16 ENCOUNTER — MYC REFILL (OUTPATIENT)
Dept: FAMILY MEDICINE | Facility: CLINIC | Age: 48
End: 2018-07-16

## 2018-07-16 DIAGNOSIS — F41.1 GENERALIZED ANXIETY DISORDER: ICD-10-CM

## 2018-07-16 DIAGNOSIS — F33.0 MAJOR DEPRESSIVE DISORDER, RECURRENT EPISODE, MILD (H): ICD-10-CM

## 2018-07-16 DIAGNOSIS — M79.7 FIBROMYALGIA: ICD-10-CM

## 2018-07-16 NOTE — TELEPHONE ENCOUNTER
CLONAZEPAM      Last Written Prescription Date:  6/13/18  Last Fill Quantity: 60,   # refills: 0  Last Office Visit: 4/18/18  Future Office visit:    Next 5 appointments (look out 90 days)     Aug 01, 2018  1:30 PM CDT   PHYSICAL with Selina Reveles PA-C   [facility]    [address] [place] [address]   [phone]                   Routing refill request to provider for review/approval because:  Drug not on the [facility], [facility] or  Health refill protocol or controlled substance

## 2018-07-16 NOTE — TELEPHONE ENCOUNTER
Message from [employer]:  Original authorizing provider: AGUSTÍN Owens would like a refill of the following medications:  clonazePAM (KLONOPIN) 0.5 MG tablet [Selina Reveles PA-C]  CYMBALTA 60 MG EC capsule [Selina Reveles PA-C]    [facility] [place] [address] [facility]     Comment:

## 2018-07-17 DIAGNOSIS — M79.7 FIBROMYALGIA: ICD-10-CM

## 2018-07-17 DIAGNOSIS — F41.1 GENERALIZED ANXIETY DISORDER: ICD-10-CM

## 2018-07-17 RX ORDER — CYCLOBENZAPRINE HCL 5 MG
TABLET ORAL
Qty: 90 TABLET | Refills: 1 | Status: SHIPPED | OUTPATIENT
Start: 2018-07-17 | End: 2018-09-10

## 2018-07-17 NOTE — TELEPHONE ENCOUNTER
Cyclobenzaprine 5 MG       Last Written Prescription Date:  5/14/18  Last Fill Quantity: 90,   # refills: 1  Last Office Visit: 4/18/18  Future Office visit:    Next 5 appointments (look out 90 days)     Aug 01, 2018  1:30 PM CDT   PHYSICAL with Selina Reveles PA-C   [facility] ([facility])    [address] [place] [address]   [phone]                   Routing refill request to provider for review/approval because:  Drug not on the [facility], [facility] or [facility] refill protocol or controlled substance

## 2018-07-17 NOTE — TELEPHONE ENCOUNTER
"Requested Prescriptions   Pending Prescriptions Disp Refills     CYMBALTA 60 MG EC capsule [Pharmacy Med Name: CYMBALTA 60MG CPEP] 30 capsule 0     Sig: TAKE ONE CAPSULE BY MOUTH EVERY EVENING  (WILL GIVE ADDITIONAL REFILLS AFTER UPCOMING APPOINTMENT )    Serotonin-Norepinephrine Reuptake Inhibitors  Failed    7/16/2018 11:50 AM       Failed - PHQ-9 score of less than 5 in past 6 months    Please review last PHQ-9 score.          Passed - Blood pressure under 140/90 in past 12 months    BP Readings from Last 3 Encounters:   04/18/18 110/72   01/30/18 118/70   08/30/17 116/72                Passed - Patient is age 18 or older       Passed - No active pregnancy on record       Passed - No positive pregnancy test in past 12 months       Passed - Recent (6 mo) or future (30 days) visit within the authorizing provider's specialty    Patient had office visit in the last 6 months or has a visit in the next 30 days with authorizing provider or within the authorizing provider's specialty.  See \"Patient Info\" tab in inbasket, or \"Choose Columns\" in Meds & Orders section of the refill encounter.              Last Written Prescription Date:  6-13-18  Last Fill Quantity: 30,  # refills: 0   Last Office Visit with [facility] prescribing provider:  1/30/18   Future Office Visit:    Next 5 appointments (look out 90 days)     Aug 01, 2018  1:30 PM CDT   PHYSICAL with Selina Reveles PA-C   [facility]    [address] [place] [address] [phone]                   "

## 2018-07-18 RX ORDER — ALPRAZOLAM 0.5 MG
TABLET ORAL
Qty: 30 TABLET | Refills: 0 | Status: SHIPPED | OUTPATIENT
Start: 2018-07-18 | End: 2018-08-13

## 2018-07-18 NOTE — TELEPHONE ENCOUNTER
Xanax      Last Written Prescription Date:  6/19/2018  Last Fill Quantity: 30,   # refills: 0  Last Office Visit: 4/18/2018  Future Office visit:    Next 5 appointments (look out 90 days)     Aug 01, 2018  1:30 PM CDT   PHYSICAL with Selina Reveles PA-C   [facility]    [address] [place] [address]   [phone]                   Routing refill request to provider for review/approval because:  Drug not on the [facility], [facility] or  Health refill protocol or controlled substance

## 2018-07-18 NOTE — TELEPHONE ENCOUNTER
PHQ-9 SCORE 5/16/2017 12/11/2017 1/30/2018   Total Score - - -   Total Score MyChart - 4 (Minimal depression) -   Total Score 18 4 11     Routing refill request to provider for review/approval because:  Labs out of range:  PHQ-9  T'd up 1 month for provider review.  Addie Kuhn, SAMARAN, RN

## 2018-07-19 ENCOUNTER — MYC MEDICAL ADVICE (OUTPATIENT)
Dept: FAMILY MEDICINE | Facility: CLINIC | Age: 48
End: 2018-07-19

## 2018-07-19 DIAGNOSIS — M47.812 OSTEOARTHRITIS OF CERVICAL SPINE, UNSPECIFIED SPINAL OSTEOARTHRITIS COMPLICATION STATUS: ICD-10-CM

## 2018-07-19 RX ORDER — CLONAZEPAM 0.5 MG/1
TABLET ORAL
Qty: 60 TABLET | Refills: 0 | Status: SHIPPED | OUTPATIENT
Start: 2018-07-19 | End: 2018-08-13

## 2018-07-19 RX ORDER — DULOXETINE HCL 60 MG
60 CAPSULE,DELAYED RELEASE (ENTERIC COATED) ORAL DAILY
Qty: 30 CAPSULE | Refills: 0 | Status: SHIPPED | OUTPATIENT
Start: 2018-07-19 | End: 2018-07-31

## 2018-07-19 RX ORDER — DULOXETINE HCL 60 MG
CAPSULE,DELAYED RELEASE (ENTERIC COATED) ORAL
Qty: 30 CAPSULE | Refills: 0 | Status: SHIPPED | OUTPATIENT
Start: 2018-07-19 | End: 2018-08-02

## 2018-07-19 NOTE — TELEPHONE ENCOUNTER
Pt returned PHQ-9. She has a follow-up appt scheduled for 8/01/2018.     PHQ-9 SCORE 7/19/2018   Total Score -   Total Score MyChart 12 (Moderate depression)   Total Score 12     Talisha Becerra CMA ([facility])

## 2018-07-20 RX ORDER — HYDROCODONE BITARTRATE AND ACETAMINOPHEN 7.5; 325 MG/1; MG/1
TABLET ORAL
Qty: 240 TABLET | Refills: 0 | Status: SHIPPED | OUTPATIENT
Start: 2018-07-20 | End: 2018-08-16

## 2018-07-20 NOTE — TELEPHONE ENCOUNTER
Norco 7.5-325 MG       Last Written Prescription Date:  6-19-18  Last Fill Quantity: 240,   # refills: 0  Last Office Visit: 4/18/18  Future Office visit:    Next 5 appointments (look out 90 days)     Aug 01, 2018  1:30 PM CDT   PHYSICAL with Selina Reveles PA-C   [facility] ([facility])    [address] [place] [id number] [phone]                   Routing refill request to provider for review/approval because:  Drug not on the [facility], [facility] or [facility] refill protocol or controlled substance

## 2018-07-20 NOTE — TELEPHONE ENCOUNTER
Pt is mycharting about request that was sent yesterday. Can this be filled in providers absence as pt is out of medication. Copy and pasted mychart msg. Amber Jeffries, [facility]           Francisco Marks I sent a refill request for my pain medication and other medications, everything but my pain medication has been filled. I filled them last on 06-20-18 I'm asking because its Friday and [place] and don't want to run out. I have a flare starting from the weather.

## 2018-07-31 ENCOUNTER — RADIANT APPOINTMENT (OUTPATIENT)
Dept: GENERAL RADIOLOGY | Facility: CLINIC | Age: 48
End: 2018-07-31
Attending: INTERNAL MEDICINE
Payer: COMMERCIAL

## 2018-07-31 ENCOUNTER — OFFICE VISIT (OUTPATIENT)
Dept: RHEUMATOLOGY | Facility: CLINIC | Age: 48
End: 2018-07-31
Payer: COMMERCIAL

## 2018-07-31 VITALS
SYSTOLIC BLOOD PRESSURE: 111 MMHG | HEIGHT: 66 IN | WEIGHT: 127.8 LBS | OXYGEN SATURATION: 96 % | BODY MASS INDEX: 20.54 KG/M2 | TEMPERATURE: 98.1 F | RESPIRATION RATE: 16 BRPM | DIASTOLIC BLOOD PRESSURE: 52 MMHG | HEART RATE: 108 BPM

## 2018-07-31 DIAGNOSIS — Z86.19 FREQUENT INFECTIONS: ICD-10-CM

## 2018-07-31 DIAGNOSIS — Z72.0 TOBACCO USE: ICD-10-CM

## 2018-07-31 DIAGNOSIS — M43.6 NECK STIFFNESS: ICD-10-CM

## 2018-07-31 DIAGNOSIS — M05.79 RHEUMATOID ARTHRITIS INVOLVING MULTIPLE SITES WITH POSITIVE RHEUMATOID FACTOR (H): ICD-10-CM

## 2018-07-31 DIAGNOSIS — M05.79 RHEUMATOID ARTHRITIS INVOLVING MULTIPLE SITES WITH POSITIVE RHEUMATOID FACTOR (H): Primary | ICD-10-CM

## 2018-07-31 DIAGNOSIS — G89.4 CHRONIC PAIN SYNDROME: ICD-10-CM

## 2018-07-31 LAB
ALBUMIN SERPL-MCNC: 3.7 G/DL (ref 3.4–5)
ALP SERPL-CCNC: 122 U/L (ref 40–150)
ALT SERPL W P-5'-P-CCNC: 18 U/L (ref 0–50)
AST SERPL W P-5'-P-CCNC: 15 U/L (ref 0–45)
BASOPHILS # BLD AUTO: 0 10E9/L (ref 0–0.2)
BASOPHILS NFR BLD AUTO: 0.1 %
BILIRUB DIRECT SERPL-MCNC: <0.1 MG/DL (ref 0–0.2)
BILIRUB SERPL-MCNC: 0.2 MG/DL (ref 0.2–1.3)
CALCIUM SERPL-MCNC: 9.1 MG/DL (ref 8.5–10.1)
CREAT SERPL-MCNC: 0.9 MG/DL (ref 0.52–1.04)
CRP SERPL-MCNC: 5.7 MG/L (ref 0–8)
DIFFERENTIAL METHOD BLD: ABNORMAL
EOSINOPHIL # BLD AUTO: 0 10E9/L (ref 0–0.7)
EOSINOPHIL NFR BLD AUTO: 0.1 %
ERYTHROCYTE [DISTWIDTH] IN BLOOD BY AUTOMATED COUNT: 13.1 % (ref 10–15)
ERYTHROCYTE [SEDIMENTATION RATE] IN BLOOD BY WESTERGREN METHOD: 14 MM/H (ref 0–20)
GFR SERPL CREATININE-BSD FRML MDRD: 66 ML/MIN/1.7M2
HCT VFR BLD AUTO: 40.4 % (ref 35–47)
HGB BLD-MCNC: 13.4 G/DL (ref 11.7–15.7)
LYMPHOCYTES # BLD AUTO: 1 10E9/L (ref 0.8–5.3)
LYMPHOCYTES NFR BLD AUTO: 6.8 %
MCH RBC QN AUTO: 33 PG (ref 26.5–33)
MCHC RBC AUTO-ENTMCNC: 33.2 G/DL (ref 31.5–36.5)
MCV RBC AUTO: 100 FL (ref 78–100)
MONOCYTES # BLD AUTO: 0.2 10E9/L (ref 0–1.3)
MONOCYTES NFR BLD AUTO: 1.1 %
NEUTROPHILS # BLD AUTO: 13.6 10E9/L (ref 1.6–8.3)
NEUTROPHILS NFR BLD AUTO: 91.9 %
PLATELET # BLD AUTO: 223 10E9/L (ref 150–450)
PROT SERPL-MCNC: 7.5 G/DL (ref 6.8–8.8)
RBC # BLD AUTO: 4.06 10E12/L (ref 3.8–5.2)
WBC # BLD AUTO: 14.8 10E9/L (ref 4–11)

## 2018-07-31 PROCEDURE — 87340 HEPATITIS B SURFACE AG IA: CPT | Performed by: INTERNAL MEDICINE

## 2018-07-31 PROCEDURE — 85652 RBC SED RATE AUTOMATED: CPT | Performed by: INTERNAL MEDICINE

## 2018-07-31 PROCEDURE — 82784 ASSAY IGA/IGD/IGG/IGM EACH: CPT | Performed by: INTERNAL MEDICINE

## 2018-07-31 PROCEDURE — 86200 CCP ANTIBODY: CPT | Performed by: INTERNAL MEDICINE

## 2018-07-31 PROCEDURE — 85025 COMPLETE CBC W/AUTO DIFF WBC: CPT | Performed by: INTERNAL MEDICINE

## 2018-07-31 PROCEDURE — 73130 X-RAY EXAM OF HAND: CPT | Mod: 59

## 2018-07-31 PROCEDURE — 86140 C-REACTIVE PROTEIN: CPT | Performed by: INTERNAL MEDICINE

## 2018-07-31 PROCEDURE — 82310 ASSAY OF CALCIUM: CPT | Performed by: INTERNAL MEDICINE

## 2018-07-31 PROCEDURE — 36415 COLL VENOUS BLD VENIPUNCTURE: CPT | Performed by: INTERNAL MEDICINE

## 2018-07-31 PROCEDURE — 80076 HEPATIC FUNCTION PANEL: CPT | Performed by: INTERNAL MEDICINE

## 2018-07-31 PROCEDURE — 86431 RHEUMATOID FACTOR QUANT: CPT | Performed by: INTERNAL MEDICINE

## 2018-07-31 PROCEDURE — 99215 OFFICE O/P EST HI 40 MIN: CPT | Performed by: INTERNAL MEDICINE

## 2018-07-31 PROCEDURE — 82565 ASSAY OF CREATININE: CPT | Performed by: INTERNAL MEDICINE

## 2018-07-31 PROCEDURE — 86704 HEP B CORE ANTIBODY TOTAL: CPT | Performed by: INTERNAL MEDICINE

## 2018-07-31 PROCEDURE — 82306 VITAMIN D 25 HYDROXY: CPT | Performed by: INTERNAL MEDICINE

## 2018-07-31 PROCEDURE — 73630 X-RAY EXAM OF FOOT: CPT | Mod: 59

## 2018-07-31 PROCEDURE — 72050 X-RAY EXAM NECK SPINE 4/5VWS: CPT | Mod: FY

## 2018-07-31 PROCEDURE — 87389 HIV-1 AG W/HIV-1&-2 AB AG IA: CPT | Performed by: INTERNAL MEDICINE

## 2018-07-31 RX ORDER — PREDNISONE 10 MG/1
TABLET ORAL
Qty: 40 TABLET | Refills: 1 | Status: SHIPPED | OUTPATIENT
Start: 2018-07-31 | End: 2018-12-11

## 2018-07-31 RX ORDER — FOLIC ACID 1 MG/1
1 TABLET ORAL DAILY
Qty: 100 TABLET | Refills: 3 | Status: SHIPPED | OUTPATIENT
Start: 2018-07-31 | End: 2019-08-13

## 2018-07-31 RX ORDER — METHOTREXATE 2.5 MG/1
TABLET ORAL
Qty: 24 TABLET | Refills: 3 | Status: SHIPPED | OUTPATIENT
Start: 2018-07-31 | End: 2018-11-16

## 2018-07-31 NOTE — PATIENT INSTRUCTIONS
Rheumatology    Dr. Ash Donald         [place] [facility]   (Monday)  [address]  [place] [address]       [facility]   (Tuesday)  [address]  [place] [address]    [facility]   (Wed., Thurs., and Friday)  [address] [place] [address]    Phone number: [phone]  Thank you for choosing [facility].  Tameka Castro CMA

## 2018-07-31 NOTE — MR AVS SNAPSHOT
After Visit Summary   7/31/2018    Sherie Otero    MRN: [id number]           Patient Information     Date Of Birth          1970        Visit Information        Provider Department      7/31/2018 2:40 PM Ash Donald MD [facility]        Today's Diagnoses     Rheumatoid arthritis involving multiple sites with positive rheumatoid factor (H)    -  1    Neck stiffness        Frequent infections          Care Instructions    Rheumatology    Dr. Ash Donald         [place] [facility]   (Monday)  [address]  [place] [address]       [facility]   (Tuesday)  [address]  [place] [address]    [facility]   (Wed., Thurs., and Friday)  [address] [facility] [place] [address]    Phone number: [phone]  Thank you for choosing [facility].  Tameka Castro CMA            Follow-ups after your visit        Your next 10 appointments already scheduled     Aug 01, 2018  1:30 PM CDT   PHYSICAL with Selina Reveles PA-C   [address] [id number] [phone]            Aug 30, 2018  1:00 PM CDT   LAB with [facility] [address] [phone] [phone]           Please do not eat 10-12 hours before your appointment if you are coming in fasting for labs on lipids, cholesterol, or glucose (sugar). This does not apply to pregnant women. Water, hot tea and black coffee (with nothing added) are okay. Do not drink other fluids, diet soda or chew gum.            Sep 27, 2018  1:00 PM CDT   LAB with [facility] LAB PMC   [facility] ([facility])    [address] [place] [id number] [phone]           Please do not eat 10-12 hours before your appointment if you are coming in fasting for labs on lipids, cholesterol, or glucose (sugar). This does not apply to  pregnant women. Water, hot tea and black coffee (with nothing added) are okay. Do not drink other fluids, diet soda or chew gum.            Oct 30, 2018  1:00 PM CDT   LAB with NL LAB PMC   [facility] ([facility])    [address] [place] [address]   [phone]           Please do not eat 10-12 hours before your appointment if you are coming in fasting for labs on lipids, cholesterol, or glucose (sugar). This does not apply to pregnant women. Water, hot tea and black coffee (with nothing added) are okay. Do not drink other fluids, diet soda or chew gum.            Dec 11, 2018  2:40 PM CST   Return Visit with Ash Donald MD   [facility]    [address] [place] [phone]   [phone]              Future tests that were ordered for you today     Open Standing Orders        Priority Remaining Interval Expires Ordered    CBC with platelets differential Routine 2/2 Every 4 Weeks 1/27/2019 7/31/2018    Creatinine Routine 2/2 Every 4 Weeks 1/27/2019 7/31/2018    Hepatic panel Routine 2/2 Every 4 Weeks 1/27/2019 7/31/2018          Open Future Orders        Priority Expected Expires Ordered    Hepatic panel Routine 10/25/2018 11/28/2018 7/31/2018    CRP inflammation Routine 10/25/2018 11/28/2018 7/31/2018    Erythrocyte sedimentation rate auto Routine 10/25/2018 11/28/2018 7/31/2018    Creatinine Routine 10/25/2018 11/28/2018 7/31/2018    CBC with platelets differential Routine 10/25/2018 11/28/2018 7/31/2018    XR Hand Bilateral G/E 3 Views Routine 7/31/2018 7/31/2019 7/31/2018    XR Foot Bilateral G/E 3 Views Routine 7/31/2018 7/31/2019 7/31/2018    XR Cervical Spine G/E 4 Views Routine 7/31/2018 8/1/2019 7/31/2018            Who to contact     If you have questions or need follow up information about today's clinic visit or your schedule please contact [facility] directly at  "[phone].  Normal or non-critical lab and imaging results will be communicated to you by [employer], letter or phone within 4 business days after the clinic has received the results. If you do not hear from us within 7 days, please contact the clinic through [employer] or phone. If you have a critical or abnormal lab result, we will notify you by phone as soon as possible.  Submit refill requests through [employer] or call your pharmacy and they will forward the refill request to us. Please allow 3 business days for your refill to be completed.          Additional Information About Your Visit        [employer] Information     [employer] gives you secure access to your electronic health record. If you see a primary care provider, you can also send messages to your care team and make appointments. If you have questions, please call your primary care clinic.  If you do not have a primary care provider, please call [phone] and they will assist you.        Care EveryWhere ID     This is your Care EveryWhere ID. This could be used by other organizations to access your [facility] medical records  [id number]        Your Vitals Were     Pulse Temperature Respirations Height Pulse Oximetry BMI (Body Mass Index)    108 98.1  F (36.7  C) (Oral) 16 1.664 m (5' 5.5\") 96% 20.94 kg/m2       Blood Pressure from Last 3 Encounters:   07/31/18 111/52   04/18/18 110/72   01/30/18 118/70    Weight from Last 3 Encounters:   07/31/18 58 kg (127 lb 12.8 oz)   04/18/18 59.4 kg (131 lb)   01/30/18 57.2 kg (126 lb)              We Performed the Following     Calcium     CBC with platelets differential     Creatinine     CRP inflammation     Cyclic Citrullinated Peptide Antibody IgG     Erythrocyte sedimentation rate auto     Hepatic panel     Hepatitis B core antibody     Hepatitis B surface antigen     HIV Antigen Antibody Combo     Immunoglobulins A G and M     Rheumatoid factor     Vitamin D Deficiency          Today's Medication Changes        "   These changes are accurate as of 7/31/18  3:33 PM.  If you have any questions, ask your nurse or doctor.               Start taking these medicines.        Dose/Directions    folic acid 1 MG tablet   Commonly known as:  FOLVITE   Used for:  Rheumatoid arthritis involving multiple sites with positive rheumatoid factor (H)   Started by:  Ash Donald MD        Dose:  1 mg   Take 1 tablet (1 mg) by mouth daily   Quantity:  100 tablet   Refills:  3       methotrexate sodium 2.5 MG Tabs   Used for:  Rheumatoid arthritis involving multiple sites with positive rheumatoid factor (H)   Started by:  Ash Donald MD        10mg (4 tabs) once weekly x1 week, then increase by 2.5mg (1 tab) each week until taking the goal weekly dose of 15mg (6 tabs) once weekly   Quantity:  24 tablet   Refills:  3       predniSONE 10 MG tablet   Commonly known as:  DELTASONE   Used for:  Rheumatoid arthritis involving multiple sites with positive rheumatoid factor (H)   Started by:  Ash Donald MD        PRN arthritis flare: prednisone 10-20mg daily x5days, then stop.   Quantity:  40 tablet   Refills:  1            Where to get your medicines      These medications were sent to [facility] [place] - [address] [place] [address] [facility] [address]     Phone:  [phone]     folic acid 1 MG tablet    methotrexate sodium 2.5 MG Tabs    predniSONE 10 MG tablet                Primary Care Provider Office Phone # Fax #    Selina Reveles PA-C [phone]       [address]   [facility] [address]        Equal Access to Services     [facility] AH: Hadii glendy ku hadasho Soomaali, waaxda luqadaha, qaybta kaalmada adeegyada, neema harvey. So [facility] [phone].    ATENCIÓN: Si habla español, tiene a lange disposición servicios gratuitos de asistencia lingüística. Llame al [phone].    We comply with applicable federal civil rights laws and [place] laws. We do not  discriminate on the basis of race, color, national origin, age, disability, sex, sexual orientation, or gender identity.            Thank you!     Thank you for choosing [facility]  for your care. Our goal is always to provide you with excellent care. Hearing back from our patients is one way we can continue to improve our services. Please take a few minutes to complete the written survey that you may receive in the mail after your visit with us. Thank you!             Your Updated Medication List - Protect others around you: Learn how to safely use, store and throw away your medicines at www.disposemymeds.org.          This list is accurate as of 7/31/18  3:33 PM.  Always use your most recent med list.                   Brand Name Dispense Instructions for use Diagnosis    albuterol 108 (90 Base) MCG/ACT Inhaler    VENTOLIN HFA    18 g    Inhale 2 puffs into the lungs every 6 hours as needed    Intermittent asthma, uncomplicated       ALPRAZolam 0.5 MG tablet    XANAX    30 tablet    One tablet mid day.    Generalized anxiety disorder       busPIRone 10 MG tablet    BUSPAR    135 tablet    TAKE 5 MG (1/2 TAB) IN THE MORNING AND 10 MG AT NIGHT    Generalized anxiety disorder       cetirizine 10 MG tablet    zyrTEC    90 tablet    TAKE  ONE TABLET BY MOUTH EVERY DAY.  Appointment needed for additional refills.    Chronic rhinitis, unspecified type       clonazePAM 0.5 MG tablet    klonoPIN    60 tablet    TAKE ONE-HALF TO ONE TABLET BY MOUTH TWICE A DAY AS NEEDED FOR ANXIETY - MUST LAST 30 DAYS    Generalized anxiety disorder       cyclobenzaprine 5 MG tablet    FLEXERIL    90 tablet    TAKE ONE TABLET BY MOUTH THREE TIMES A DAY AS NEEDED FOR MUSCLE SPASMS    Fibromyalgia       CYMBALTA 60 MG EC capsule   Generic drug:  DULoxetine     30 capsule    TAKE ONE CAPSULE BY MOUTH EVERY EVENING  (WILL GIVE ADDITIONAL REFILLS AFTER UPCOMING APPOINTMENT )    Fibromyalgia, Major depressive disorder,  recurrent episode, mild (H)       fluticasone 50 MCG/ACT spray    FLONASE    16 g    SPRAY 2 SPRAYS INTO BOTH NOSTRILS DAILY.    Chronic rhinitis, unspecified type       folic acid 1 MG tablet    FOLVITE    100 tablet    Take 1 tablet (1 mg) by mouth daily    Rheumatoid arthritis involving multiple sites with positive rheumatoid factor (H)       HYDROcodone-acetaminophen 7.5-325 MG per tablet    NORCO    240 tablet    TAKE 2 TABLETS BY MOUTH EVERY 6 HOURS AS NEEDED FOR PAIN--MAX OF 8 TABLETS PER DAY    Osteoarthritis of cervical spine, unspecified spinal osteoarthritis complication status       hydrOXYzine 25 MG tablet    ATARAX    90 tablet    TAKE 2-4 TABLETS BY MOUTH NIGHTLY AS NEEDED FOR ANXIETY  (SLEEP) MAY TAKE ONE TABLET TO START WITH    Sleep disturbance       methotrexate sodium 2.5 MG Tabs     24 tablet    10mg (4 tabs) once weekly x1 week, then increase by 2.5mg (1 tab) each week until taking the goal weekly dose of 15mg (6 tabs) once weekly    Rheumatoid arthritis involving multiple sites with positive rheumatoid factor (H)       MULTIVITAL PO           predniSONE 10 MG tablet    DELTASONE    40 tablet    PRN arthritis flare: prednisone 10-20mg daily x5days, then stop.    Rheumatoid arthritis involving multiple sites with positive rheumatoid factor (H)       verapamil 40 MG tablet    CALAN    180 tablet    Take 1 tablet (40 mg) by mouth 2 times daily    Other migraine without status migrainosus, intractable

## 2018-07-31 NOTE — PROGRESS NOTES
Rheumatology Clinic Visit      Sherie Otero MRN# [id number]   YOB: 1970 Age: 47 year old      Date of visit: 7/31/18   Referring provider: Selina Reveles  PCP: Selina Reveles    Chief Complaint   Patient presents with:  Consult      Assessment and Plan     1.  Seropositive rheumatoid arthritis (RF 21, CCP 32): Previously followed by Jeff Penaloza and Mem[facility].  Previously on HCQ (GI upset) and possible SSZ (patient does not recall using SSZ).  Inflammatory arthritis symptoms started in 1999 with sudden onset polyarticular pain/stiffness/swelling that resolved with prednisone.  Since then she has had polyarticular pain involving her MCPs, wrists, knees, and feet; and morning stiffness for at least a couple hours but worse with arthritis flares; each arthritis flare consists of worsening peripheral joint pain and stiffness and presence of neck stiffness; each time this resolves with prednisone 10-20mg daily that she typically uses for 5 days or less; flares have occurred 4 times in the past 3 months.  No clear synovitis on exam.  Lack of synovitis may be due to prednisone use, but previous rheumatology evaluations did not see synovitis either. Chest imaging in the past suggests inflammatory nodules but malignancy cannot be completely excluded and the patient verbalized understanding of this.  Therefore, in summary she has a history and labs consistent with rheumatoid arthritis but lacks physical exam findings today while on prednisone 10mg daily.  We reviewed this in detail and the rationale for treating as rheumatoid arthritis.  Will start methotrexate as noted below. Okay to continue prednisone as she has been doing for over 10 years. Note that MTX is being used in PO form despite patient's reported easy GI upset because she also says that her son is going to be going to rehab soon for IV drug use and does not want to have needles in the house if it can be avoided, but if there is a need to use MTX  SQ then she says she can keep the needles in her safe.   - Start Methotrexate 10mg once weekly for 1 week, then increase by 2.5mg each week until taking 15mg once weekly then continue 15mg once weekly thereafter.  - Start folic acid 1mg daily  - PRN RA flare: prednisone 10-20mg daily x5days, then stop  - X-rays today: bilateral hands/feet  - Labs today: CBC, Creatinine, Hepatic panel, ESR, CRP, hepatitis B, HIV, RF, CCP  - Labs monthly ×2 months: CBC, creatinine, hepatic panel  - Labs in 3 months: CBC, Creatinine, Hepatic Panel, ESR, CRP    # Methotrexate Risks and Benefits: The risks and benefits of methotrexate were discussed in detail and the patient verbalized understanding.  The risks discussed include, but are not limited to, the risk for hypersensitivity, anaphylaxis, anaphylactoid reactions, infections, bone marrow suppression, renal toxicity, hepatotoxicity, pulmonary toxicity, malignancy, impaired fertility, GI upset, alopecia, and oral and nasal sores.  Folic acid supplementation is recommended during methotrexate therapy to help prevent some of the side effects. Pregnancy prevention and planning was discussed; it is recommended that women of childbearing potential use reliable contraception during therapy.  The risks of taking both methotrexate and alcohol were reviewed; complete alcohol avoidance was discussed.  Routine laboratory monitoring is required during methotrexate therapy. Taking MTX once weekly, all within a 24 hour period was stressed and the patient verbalized this instruction back to me.  I encouraged reviewing the package insert and asking any questions about the medication.    # Prednisone Risks and Benefits: The risks and benefits of prednisone were discussed in detail and the patient verbalized understanding.  The risks discussed include, but are not limited to, weight gain, fluid retention, impaired wound healing, hyperglycemia, adrenal suppression, GI upset, peptic ulcer,  hepatotoxicity, aseptic necrosis of the femoral and humeral heads, osteoporosis, myopathy, tendon rupture (particularly Achilles tendon), ocular changes including an increased intraocular pressure.  I encouraged reviewing the package insert and asking any questions about the medication.      # Leflunomide (Arava) Risks and Benefits: The risks and benefits of leflunomide were discussed in detail and the patient verbalized understanding.  The risks discussed include, but are not limited to, the risk for hypersensitivity, anaphylaxis, anaphylactoid reactions, hepatotoxicity, infections, interstitial lung disease, alopecia, rash, nausea, and diarrhea.  The impact on malignancies is not fully defined.   Alcohol should be avoided while taking leflunomide.  Pregnancy prevention and planning was discussed; it is recommended that women of childbearing potential use reliable contraception before, during, and for a period of 2 years after treatment with leflunomide; if pregnancy is planned within 2 years of discontinuing medication then women should undergo drug elimination procedures (i.e. cholestyramine). Routine laboratory monitoring is required during leflunomide therapy. I encouraged reviewing the package insert and asking any questions about the medication.      2. Neck stiffness: present during arthritis flares. No radiation of the pain.  Evaluate for instability.  - X-ray: C-spine, including flexion and extension views    3. Frequent infections: Reported per patient, but generally only occurs in the winter and she refuses vaccinations because she says she knows of someone who had an adverse reaction to the flu shot.  She would benefit from vaccination and we discussed this today. Given the association between rheumatologic diseases and immunodeficiency will also check immunoglobulins today.  - Lab: quantitative immunoglobulins (A, G, M)    4. Cigarette Smoking: Encouraged complete cessation and discussed the health  benefits.      5. Chronic Pain Syndrome / Fibromyalgia: Agree that she likely has fibromyalgia and she says that she can tell the difference between fibromyalgia pain and arthritis pain.  Question how much of her pain is related to arthritis.  Fibromyalgia management per PCP.      Ms. Otero verbalized agreement with and understanding of the rational for the diagnosis and treatment plan.  All questions were answered to best of my ability and the patient's satisfaction. Ms. Otero was advised to contact the clinic with any questions that may arise after the clinic visit.      More than 50% of the face-to-face time was spent counseling the patient.  Total face-to-face time was at least 40 minutes.  This was her first clinic visit with me.     Thank you for involving me in the care of the patient    Return to clinic: 3 - 4 months      HPI   Sherie Otero is a 47 year old female with a past medical history significant for migraines, chronic rhinitis, anxiety, depression, asthma, NSAID induced gastritis, SHANTANU, fibromyalgia, and rheumatoid arthritis who is seen in consultation at the request of Selina Reveles for evaluation of rheumatoid arthritis and fibromyalgia..    4/18/2018 clinic note by Selina Reveles documents that the patient has been seen by rheumatology in the past.  Diagnosed with fibromyalgia in 2004.  Fatigue associated with nonrestorative sleep, diffuse arthralgias and myalgias, and several areas of trigger point tenderness without evidence of synovitis on exam.  Irritable bowel syndrome.  Chronic headaches.  Rheumatoid factor 2004 was 640 with normal being less than 40.  She was started on Plaquenil 200 mg twice daily.  Plaquenil stopped because of abdominal discomfort and weight loss after 4 weeks of therapy.  She was then started on sulfasalazine but unclear if she used it.  In 2018 Cymbalta was increased.    2/1/2016 rheumatology clinic note by Dr. Mason Valentine documents that the patient is  "coming and discuss possible diagnosis of rheumatoid arthritis in the setting of known chronic pain and fibromyalgia.  Previously seen by Dr. Penaloza in 2004 and was diagnosed with probable fibromyalgia.Tremaine have been then seen in the emergency department where apparently she could not move and had multiple swollen joints and was placed on prednisone that led to improvement.  Plaquenil started that caused nausea and GI symptoms so was discontinued.  Sulfasalazine was then used because the same.  Elevated rheumatoid factor.  No physical exam findings to suggest rheumatoid arthritis.  Treated for years of chronic pain with courses of prednisone that lead to rather immediate improvement.  Diagnosed with fibromyalgia.  \"Certainly I do not see any signs of progressive deforming arthritis.\"    4/30/2017 chest CT with contrast showed \"multiple bilateral pulmonary nodules.  All are slightly decreased in size when compared to 1/19/2017 suggesting an inflammatory etiology.\"    1/27/2017 PET CT showed multiple hypermetabolic pulmonary nodules.  Neoplasm could not be excluded.  \"Hypermetabolic lung nodules and lymphadenopathy could also possibly be seen in the setting of rheumatic disease.\"    Today, Ms. Otero reports that she has steroid responsive joint pain.  Sudden onset joint pain and swelling that started 19 years ago, and at that time it ultimately resolved after a prednisone taper.  Typically has flares with weather changes and sometimes if \"I overdo myself\"; in the last 3 months she has had 4 flares. Flares started about 18 years ago.  Feet and hands affected.  Knees also affected.  She points to her MCPs and wrists as joints being affected.  Neck stiffness with flares. Morning stiffness every days; lasts all day when having flares; couple hours when not having an arthritis flare.  Feels there is a difference between fibromyalgia and arthritis.  At this time, when she has more pain she uses narcotics and that if " the pain does not get better she uses prednisone, and reportedly prednisone always works. Prednisone used: 10-20mg daily x5days. Currently on prednisone 10mg daily.     Denies fevers, chills, nausea, vomiting, constipation, diarrhea. No abdominal pain. No chest pain/pressure, palpitations, or shortness of breath. No LE swelling. No neck pain. No oral or nasal sores.  No rash. No sicca symptoms. No photosensitivity or photophobia. No eye pain or redness. No history of inflammatory eye disease.  No history of DVT or pulmonary embolism; 1 first trimester miscarriage.  No history of serositis.  No history of Raynaud's Phenomenon.  No seizure history. No known renal disorder.      Frequent infections in the winter only. Doesn't get vaccinations because she knows a person who had a reaction from the flu shot.     Mother: RA    Tobacco: 1 ppd   EtOH: none  Drugs: none  Occupation: cleans houses    Ms. Otero's cousin was present with her today.     ROS   GEN: No fevers, chills, night sweats, or weight change  SKIN: No itching, rashes, sores  HEENT: No epistaxis. No oral or nasal ulcers.  CV: No chest pain, pressure, palpitations, or dyspnea on exertion.  PULM: No SOB, wheeze, cough.  GI: No nausea, vomiting, constipation, diarrhea. No blood in stool. No abdominal pain.  : No blood in urine.  MSK: See HPI.  NEURO: No numbness or tingling  ENDO: No heat/cold intolerance.  EXT: No LE swelling  PSYCH: Negative    Active Problem List     Patient Active Problem List   Diagnosis     CARDIOVASCULAR SCREENING; LDL GOAL LESS THAN 160     Chronic fatigue syndrome     Fibromyalgia     Generalized anxiety disorder     Intermittent asthma     Tobacco abuse     SHANTANU (obstructive sleep apnea)     Disturbance in sleep behavior     Cervicalgia     Stenosis, cervical spine     Spondylitis, cervical (H)     NSAID induced gastritis     Major depressive disorder, recurrent episode, mild (H)     Other chronic pain     Intermittent asthma,  uncomplicated     Rheumatoid arthritis involving multiple sites with positive rheumatoid factor (H)     Elevated white blood cell count     Panic attacks     Grief reaction     Osteoarthritis of cervical spine, unspecified spinal osteoarthritis complication status     Degenerative joint disease of cervical spine     Pulmonary nodules     Abnormal CT of the chest     Hilar lymphadenopathy     Other migraine without status migrainosus, intractable     Chronic rhinitis, unspecified type     Past Medical History     Past Medical History:   Diagnosis Date     Allergic rhinitis due to other allergen      Degenerative joint disease of cervical spine 2016    multi level worst at C5-6     Generalized anxiety disorder      Hearing loss      Intrinsic asthma, unspecified      Irritable bowel syndrome      Lump or mass in breast     Left breast lump -- aspiration benign.     Other malaise and fatigue     fibromyalgia     Other specified gastritis without mention of hemorrhage      Pain in joint, lower leg     patello-femoral syndrome     Rheumatoid arthritis(714.0)      Spindle cell carcinoma (H) 2013    Im[employer] Update utility     Spondylitis, cervical (H)     C5-C7 (MRI)     Stenosis, cervical spine     C5-C7 (MRI)     Tension headache      Past Surgical History     Past Surgical History:   Procedure Laterality Date     C APPENDECTOMY      Ruptured at age 7 years     C  DELIVERY ONLY      , Low Cervical     DILATION AND CURETTAGE, HYSTEROSCOPY, ABLATE ENDOMETRIUM NOVASURE, COMBINED  2011    Procedure:COMBINED DILATION AND CURETTAGE, HYSTEROSCOPY, ABLATE ENDOMETRIUM NOVASURE; hysteroscopy, dilation and curettage, novasure; Surgeon:JEREMY ANNA; Location:[facility] OR     EXCISE LESION TRUNK  2013    Procedure: EXCISE LESION TRUNK;  interlaminar epidural Steroid Injection Cervical -thoracic Levels (C7-T1)    Re-Excision of chest wall mass;  Surgeon: Jeremy Bolaños MD;  Location:   OR     HC HYSTEROS W PERMANENT FALLOPAIN IMPLANT  2012    Essure done in office [facility]     INJECT BLOCK MEDIAL BRANCH CERVICAL/THORACIC/LUMBAR  6/12/2014    Procedure: INJECT BLOCK MEDIAL BRANCH CERVICAL / THORACIC / LUMBAR;  Surgeon: Josué Munson MD;  Location: [facility] OR     INJECT FACET JOINT  2/13/2014    Procedure: INJECT FACET JOINT;  diagnostic medial branch facet nerve block cervical levels 5-6, 6-7;  Surgeon: Josué Munson MD;  Location: [facility] OR     WISDOM ST GUIDEWIRE       Allergy     Allergies   Allergen Reactions     Codeine Nausea and Vomiting     Droperidol      Uncontrollable shaking       Penicillins      Current Medication List     Current Outpatient Prescriptions   Medication Sig     albuterol (VENTOLIN HFA) 108 (90 Base) MCG/ACT Inhaler Inhale 2 puffs into the lungs every 6 hours as needed     ALPRAZolam (XANAX) 0.5 MG tablet One tablet mid day.     busPIRone (BUSPAR) 10 MG tablet TAKE 5 MG (1/2 TAB) IN THE MORNING AND 10 MG AT NIGHT     cetirizine (ZYRTEC) 10 MG tablet TAKE  ONE TABLET BY MOUTH EVERY DAY.  Appointment needed for additional refills.     clonazePAM (KLONOPIN) 0.5 MG tablet TAKE ONE-HALF TO ONE TABLET BY MOUTH TWICE A DAY AS NEEDED FOR ANXIETY - MUST LAST 30 DAYS     cyclobenzaprine (FLEXERIL) 5 MG tablet TAKE ONE TABLET BY MOUTH THREE TIMES A DAY AS NEEDED FOR MUSCLE SPASMS     CYMBALTA 60 MG EC capsule TAKE ONE CAPSULE BY MOUTH EVERY EVENING  (WILL GIVE ADDITIONAL REFILLS AFTER UPCOMING APPOINTMENT )     CYMBALTA 60 MG EC capsule Take 1 capsule (60 mg) by mouth daily     fluticasone (FLONASE) 50 MCG/ACT spray SPRAY 2 SPRAYS INTO BOTH NOSTRILS DAILY.     HYDROcodone-acetaminophen (NORCO) 7.5-325 MG per tablet TAKE 2 TABLETS BY MOUTH EVERY 6 HOURS AS NEEDED FOR PAIN--MAX OF 8 TABLETS PER DAY     hydrOXYzine (ATARAX) 25 MG tablet TAKE 2-4 TABLETS BY MOUTH NIGHTLY AS NEEDED FOR ANXIETY  (SLEEP) MAY TAKE ONE TABLET TO START WITH     Multiple Vitamins-Minerals (MULTIVITAL PO)       "verapamil (CALAN) 40 MG tablet Take 1 tablet (40 mg) by mouth 2 times daily     No current facility-administered medications for this visit.          Social History   See HPI    Family History     Family History   Problem Relation Age of Onset     Arthritis Mother      Rheumatoid     Respiratory Mother      COPD     Hypertension Sister      1/2 sister     Neurologic Disorder Sister      1/2 sisiter  Very mild form of CP     Cardiovascular Maternal Grandmother      HEART DISEASE Maternal Aunt      Bypass at age 50's     Mother: Rheumatoid arthritis  Cousin: Rheumatoid arthritis    Physical Exam     Temp Readings from Last 3 Encounters:   04/18/18 98.2  F (36.8  C) (Temporal)   01/30/18 98.3  F (36.8  C) (Tympanic)   08/30/17 97.8  F (36.6  C) (Tympanic)     BP Readings from Last 5 Encounters:   04/18/18 110/72   01/30/18 118/70   08/30/17 116/72   05/16/17 120/70   02/03/17 120/78     Pulse Readings from Last 1 Encounters:   04/18/18 82     Resp Readings from Last 1 Encounters:   04/18/18 18     Estimated body mass index is 21.47 kg/(m^2) as calculated from the following:    Height as of 1/30/18: 1.664 m (5' 5.5\").    Weight as of 4/18/18: 59.4 kg (131 lb).    GEN: NAD.  Smells of cigarette smoke  HEENT: MMM. No oral lesions. Anicteric, noninjected sclera  CV: S1, S2. RRR. No m/r/g.  PULM: CTA bilaterally. No w/c.  ABD: +BS.   MSK: Mild tenderness palpation bilateral second and third MCPs that were without synovial swelling.  PIPs without swelling or tenderness to palpation.  DIPs without swelling or tenderness to palpation.  Wrists mildly tender to palpation but without swelling.  Elbows and shoulders without swelling or tenderness palpation; normal range of motion.  Hips nontender to direct palpation.  Knees, ankles, and MTPs without swelling or tenderness palpation.  Neck with normal range of motion.       NEURO: UE and LE strengths 5/5 and equal bilaterally.   SKIN: No rash  EXT: No LE edema  PSYCH: Alert. " Appropriate.    Labs / Imaging (select studies)     RF/CCP  Recent Labs   Lab Test  02/05/16   1620   CCPIGG  32*   RHF  21*     SUPA  Recent Labs   Lab Test  02/05/16   1620   PAM  <1.0  Interpretation:  Negative       ANCA  Recent Labs   Lab Test  02/03/17   1052   ANCA  <1:20  Reference range: <1:20  (Note)  The ANCA IFA is <1:20; therefore, no further testing will  be performed.  INTERPRETIVE INFORMATION: Anti-Neutrophil Cyto Ab, IgG  Neutrophil Cytoplasmic Antibodies (C-ANCA = granular  cytoplasmic staining, P-ANCA = perinuclear staining) are  found in the serum of over 90 percent of patients with  certain necrotizing systemic vasculitides, and usually in  less than 5 percent of patients with collagen vascular  disease or arthritis.  Performed by [facility],  [address] [place] [address] [id number] [phone]  [email or web address] Vinay Armas MD, Lab. Director       CBC  Recent Labs   Lab Test  01/19/17   1038  02/05/16 1620  01/12/16   1221   WBC  10.3  10.7  15.5*  15.4*   RBC  4.39  4.09  4.28  4.25   HGB  14.4  13.9  14.4  14.3   HCT  42.6  41.0  42.5  42.1   MCV  97  100  99  99   RDW  12.4  12.6  12.5  12.6   PLT  242  207  214  213   MCH  32.8  34.0*  33.6*  33.6*   MCHC  33.8  33.9  33.9  34.0   NEUTROPHIL  54.3  49.7  77.7   LYMPH  31.9  42.2  16.7   MONOCYTE  11.7  6.8  4.7   EOSINOPHIL  1.3  0.8  0.5   BASOPHIL  0.6  0.3  0.1   ANEU  5.6  5.3  12.1*   ALYM  3.3  4.5  2.6   MAURIZIO  1.2  0.7  0.7   AEOS  0.1  0.1  0.1   ABAS  0.1  0.0  0.0     CMP  Recent Labs   Lab Test  02/05/16   1620  01/12/16   1221  09/14/15   1530 09/25/12 07/30/12   NA   --   142  140   --    --    POTASSIUM   --   4.0  3.9  3.9  4.1   CHLORIDE   --   106  107   --    --    CO2   --   33*  28   --    --    ANIONGAP   --   3  5   --    --    GLC   --   83  95  82  88   BUN   --   12  7   --    --    CR   --   0.78  0.76  0.92  0.92   GFRESTIMATED   --   80  82  76  76   GFRESTBLACK   --   >90   GFR Calc     >90   GFR Calc    88  88   ANDRESSA   --   8.5  8.4*   --    --    BILITOTAL   --   0.3  0.2   --    --    ALBUMIN   --   3.9  3.3*   --    --    PROTTOTAL   --   7.4  6.7*   --    --    ALKPHOS   --   129  124   --    --    AST   --   14  14  20  15   ALT  24  24  20  19  14     Calcium/VitaminD  Recent Labs   Lab Test  01/12/16   1221  09/14/15   1530   ANDRESSA  8.5  8.4*   VITDT  34   --      ESR/CRP  Recent Labs   Lab Test  02/03/17   1052  01/19/17   1038  02/05/16   1620  01/12/16   1221   SED   --    --    --   7   CRP  12.2*  39.5*  <2.9  <2.9     TSH/T4  Recent Labs   Lab Test  01/12/16   1221 03/14/13 07/30/12   TSH  0.61  1.09  1.16     Hepatitis C  Recent Labs   Lab Test  02/05/16   1620   HCVAB  Nonreactive   Assay performance characteristics have not been established for newborns,   infants, and children       Lyme ab screening  Recent Labs   Lab Test  11/29/16   1515   LYMEGM  0.12     Immunization History     Immunization History   Administered Date(s) Administered     TDAP Vaccine (Boostrix) 03/19/2014          Chart documentation done in part with Dragon Voice recognition Software. Although reviewed after completion, some word and grammatical error may remain.    Ash Donald MD

## 2018-08-01 ENCOUNTER — OFFICE VISIT (OUTPATIENT)
Dept: FAMILY MEDICINE | Facility: CLINIC | Age: 48
End: 2018-08-01
Payer: COMMERCIAL

## 2018-08-01 VITALS
WEIGHT: 129 LBS | RESPIRATION RATE: 18 BRPM | HEART RATE: 80 BPM | DIASTOLIC BLOOD PRESSURE: 72 MMHG | BODY MASS INDEX: 21.49 KG/M2 | SYSTOLIC BLOOD PRESSURE: 120 MMHG | TEMPERATURE: 97.5 F | HEIGHT: 65 IN | OXYGEN SATURATION: 97 %

## 2018-08-01 DIAGNOSIS — F41.1 GENERALIZED ANXIETY DISORDER: ICD-10-CM

## 2018-08-01 DIAGNOSIS — R10.2 PELVIC PAIN IN FEMALE: ICD-10-CM

## 2018-08-01 DIAGNOSIS — M79.7 FIBROMYALGIA: ICD-10-CM

## 2018-08-01 DIAGNOSIS — G43.819 OTHER MIGRAINE WITHOUT STATUS MIGRAINOSUS, INTRACTABLE: ICD-10-CM

## 2018-08-01 DIAGNOSIS — J31.0 CHRONIC RHINITIS, UNSPECIFIED TYPE: ICD-10-CM

## 2018-08-01 DIAGNOSIS — F33.0 MAJOR DEPRESSIVE DISORDER, RECURRENT EPISODE, MILD (H): ICD-10-CM

## 2018-08-01 DIAGNOSIS — Z72.0 TOBACCO ABUSE: ICD-10-CM

## 2018-08-01 DIAGNOSIS — Z00.01 ENCOUNTER FOR ROUTINE ADULT MEDICAL EXAM WITH ABNORMAL FINDINGS: Primary | ICD-10-CM

## 2018-08-01 LAB
ALBUMIN UR-MCNC: NEGATIVE MG/DL
APPEARANCE UR: ABNORMAL
BILIRUB UR QL STRIP: NEGATIVE
CCP AB SER IA-ACNC: 29 U/ML
COLOR UR AUTO: YELLOW
DEPRECATED CALCIDIOL+CALCIFEROL SERPL-MC: 39 UG/L (ref 20–75)
GLUCOSE UR STRIP-MCNC: NEGATIVE MG/DL
HBV CORE AB SERPL QL IA: NONREACTIVE
HBV SURFACE AG SERPL QL IA: NONREACTIVE
HGB UR QL STRIP: NEGATIVE
HIV 1+2 AB+HIV1 P24 AG SERPL QL IA: NONREACTIVE
IGA SERPL-MCNC: 251 MG/DL (ref 70–380)
IGG SERPL-MCNC: 1240 MG/DL (ref 695–1620)
IGM SERPL-MCNC: 147 MG/DL (ref 60–265)
KETONES UR STRIP-MCNC: 5 MG/DL
LEUKOCYTE ESTERASE UR QL STRIP: NEGATIVE
MUCOUS THREADS #/AREA URNS LPF: PRESENT /LPF
NITRATE UR QL: NEGATIVE
PH UR STRIP: 5 PH (ref 5–7)
RBC #/AREA URNS AUTO: 3 /HPF (ref 0–2)
RHEUMATOID FACT SER NEPH-ACNC: 21 IU/ML (ref 0–20)
SOURCE: ABNORMAL
SP GR UR STRIP: 1.03 (ref 1–1.03)
SPECIMEN SOURCE: NORMAL
SQUAMOUS #/AREA URNS AUTO: <1 /HPF (ref 0–1)
UROBILINOGEN UR STRIP-MCNC: 2 MG/DL (ref 0–2)
WBC #/AREA URNS AUTO: 1 /HPF (ref 0–5)
WET PREP SPEC: NORMAL

## 2018-08-01 PROCEDURE — 87624 HPV HI-RISK TYP POOLED RSLT: CPT | Performed by: PHYSICIAN ASSISTANT

## 2018-08-01 PROCEDURE — 99214 OFFICE O/P EST MOD 30 MIN: CPT | Mod: 25 | Performed by: PHYSICIAN ASSISTANT

## 2018-08-01 PROCEDURE — 87491 CHLMYD TRACH DNA AMP PROBE: CPT | Performed by: PHYSICIAN ASSISTANT

## 2018-08-01 PROCEDURE — 87591 N.GONORRHOEAE DNA AMP PROB: CPT | Performed by: PHYSICIAN ASSISTANT

## 2018-08-01 PROCEDURE — 87210 SMEAR WET MOUNT SALINE/INK: CPT | Performed by: PHYSICIAN ASSISTANT

## 2018-08-01 PROCEDURE — G0145 SCR C/V CYTO,THINLAYER,RESCR: HCPCS | Performed by: PHYSICIAN ASSISTANT

## 2018-08-01 PROCEDURE — 81001 URINALYSIS AUTO W/SCOPE: CPT | Performed by: PHYSICIAN ASSISTANT

## 2018-08-01 PROCEDURE — 99396 PREV VISIT EST AGE 40-64: CPT | Performed by: PHYSICIAN ASSISTANT

## 2018-08-01 ASSESSMENT — PAIN SCALES - GENERAL: PAINLEVEL: NO PAIN (0)

## 2018-08-01 NOTE — MR AVS SNAPSHOT
After Visit Summary   8/1/2018    Sherie Otero    MRN: [id number]           Patient Information     Date Of Birth          1970        Visit Information        Provider Department      8/1/2018 1:30 PM Selina Reveles PA-C [facility]        Today's Diagnoses     Encounter for routine adult medical exam with abnormal findings    -  1    Pelvic pain in female        Tobacco abuse        Other migraine without status migrainosus, intractable        Chronic rhinitis, unspecified type        Fibromyalgia        Major depressive disorder, recurrent episode, mild (H)        Generalized anxiety disorder          Care Instructions      Preventive Health Recommendations  Female Ages 40 to 49    Yearly exam:     See your health care provider every year in order to  1. Review health changes.   2. Discuss preventive care.    3. Review your medicines if your doctor prescribed any.      Get a Pap test every three years (unless you have an abnormal result and your provider advises testing more often).      If you get Pap tests with HPV test, you only need to test every 5 years, unless you have an abnormal result. You do not need a Pap test if your uterus was removed (hysterectomy) and you have not had cancer.      You should be tested each year for STDs (sexually transmitted diseases), if you're at risk.     Ask your doctor if you should have a mammogram.      Have a colonoscopy (test for colon cancer) if someone in your family has had colon cancer or polyps before age 50.       Have a cholesterol test every 5 years.       Have a diabetes test (fasting glucose) after age 45. If you are at risk for diabetes, you should have this test every 3 years.    Shots: Get a flu shot each year. Get a tetanus shot every 10 years.     Nutrition:     Eat at least 5 servings of fruits and vegetables each day.    Eat whole-grain bread, whole-wheat pasta and brown rice instead of white grains and  rice.    Get adequate Calcium and Vitamin D.      Lifestyle    Exercise at least 150 minutes a week (an average of 30 minutes a day, 5 days a week). This will help you control your weight and prevent disease.    Limit alcohol to one drink per day.    No smoking.     Wear sunscreen to prevent skin cancer.    See your dentist every six months for an exam and cleaning.          Follow-ups after your visit        Additional Services     MED THERAPY MANAGE REFERRAL       Your provider has referred you to: **[facility] Scheduling (numerous locations) [phone]   [email or web address]  [facility]: [facility] [phone]   [email or web address]    Reason for Referral: Tobacco Cessation    The [facility] department will contact you to schedule an appointment.  You may also schedule the appointment by calling [phone].  For [facility] patients, please call [phone] to confirm/schedule your appointment on the next business day.    This service is designed to help you get the most from your medications.  A specially trained Pharmacist will work closely with you and your providers to solve any questions, concerns, issues or problems related to your medications.    Please bring all of your prescription and non-prescription medications (such as vitamins, over-the-counter medications, and herbals) or a detailed medication list to your appointment.    If you have a glucose meter or other home monitoring information, please also bring this to your appointment (i.e. blood glucose log, blood pressure log, pain log, etc.).                  Your next 10 appointments already scheduled     Aug 30, 2018  1:00 PM CDT   LAB with NL LAB [facility]   [facility] ([facility])    [address] [address] [phone]            Please do not eat 10-12 hours before your appointment if you are coming in fasting for labs on lipids, cholesterol, or glucose (sugar). This does not apply to pregnant women. Water, hot tea and black coffee (with nothing added) are okay. Do not drink other fluids, diet soda or chew gum.            Sep 27, 2018  1:00 PM CDT   LAB with NL LAB [facility] ([address] [address]   [phone]           Please do not eat 10-12 hours before your appointment if you are coming in fasting for labs on lipids, cholesterol, or glucose (sugar). This does not apply to pregnant women. Water, hot tea and black coffee (with nothing added) are okay. Do not drink other fluids, diet soda or chew gum.            Oct 30, 2018  1:00 PM CDT   LAB with NL LAB PMC   [facility] ([address] [address]   [phone]           Please do not eat 10-12 hours before your appointment if you are coming in fasting for labs on lipids, cholesterol, or glucose (sugar). This does not apply to pregnant women. Water, hot tea and black coffee (with nothing added) are okay. Do not drink other fluids, diet soda or chew gum.            Dec 11, 2018  2:40 PM CST   Return Visit with Ash Donald MD   [facility] ([facility]    [address] [place] [address]   [phone]              Future tests that were ordered for you today     Open Future Orders        Priority Expected Expires Ordered    CRP inflammation Routine  9/2/2018 8/2/2018    CBC with platelets and differential Routine  9/2/2018 8/2/2018    ESR: Erythrocyte sedimentation rate Routine  9/2/2018 8/2/2018    US Pelvic Complete w Transvaginal Routine  8/1/2019 8/1/2018    Follicle stimulating hormone Routine  10/1/2018 8/1/2018    Lipid Profile (Chol, Trig, HDL, LDL calc) Routine   "10/1/2018 8/1/2018            Who to contact     If you have questions or need follow up information about today's clinic visit or your schedule please contact [facility] directly at [phone].  Normal or non-critical lab and imaging results will be communicated to you by MyChart, letter or phone within 4 business days after the clinic has received the results. If you do not hear from us within 7 days, please contact the clinic through [employer] or phone. If you have a critical or abnormal lab result, we will notify you by phone as soon as possible.  Submit refill requests through [employer] or call your pharmacy and they will forward the refill request to us. Please allow 3 business days for your refill to be completed.          Additional Information About Your Visit        [employer] Information     [employer] gives you secure access to your electronic health record. If you see a primary care provider, you can also send messages to your care team and make appointments. If you have questions, please call your primary care clinic.  If you do not have a primary care provider, please call [phone] and they will assist you.        Care EveryWhere ID     This is your Care EveryWhere ID. This could be used by other organizations to access your [place] medical records  [id number]        Your Vitals Were     Pulse Temperature Respirations Height Pulse Oximetry BMI (Body Mass Index)    80 97.5  F (36.4  C) (Temporal) 18 5' 5.3\" (1.659 m) 97% 21.27 kg/m2       Blood Pressure from Last 3 Encounters:   08/01/18 120/72   07/31/18 111/52   04/18/18 110/72    Weight from Last 3 Encounters:   08/01/18 129 lb (58.5 kg)   07/31/18 127 lb 12.8 oz (58 kg)   04/18/18 131 lb (59.4 kg)              We Performed the Following     Chlamydia trachomatis PCR     HPV High Risk Types DNA Cervical     MED THERAPY MANAGE REFERRAL     Neisseria gonorrhoeae PCR     Pap imaged thin layer screen with HPV - recommended age 30 - 65 " years (select HPV order below)     UA with Microscopic reflex to Culture (Pamela Still; [facility])     Wet prep          Today's Medication Changes          These changes are accurate as of 8/1/18 11:59 PM.  If you have any questions, ask your nurse or doctor.               These medicines have changed or have updated prescriptions.        Dose/Directions    cetirizine 10 MG tablet   Commonly known as:  zyrTEC   This may have changed:  additional instructions   Used for:  Chronic rhinitis, unspecified type   Changed by:  Selina Reveles PA-C        TAKE  ONE TABLET BY MOUTH EVERY DAY.   Quantity:  90 tablet   Refills:  3       CYMBALTA 60 MG EC capsule   This may have changed:  See the new instructions.   Used for:  Fibromyalgia, Major depressive disorder, recurrent episode, mild (H)   Generic drug:  DULoxetine   Changed by:  Selina Reveles PA-C        TAKE ONE CAPSULE BY MOUTH EVERY EVENING - KADY   Quantity:  90 capsule   Refills:  1            Where to get your medicines      These medications were sent to [facility] [address]   [address] [facility] [address]     Phone:  [phone]     busPIRone 10 MG tablet    cetirizine 10 MG tablet    CYMBALTA 60 MG EC capsule    fluticasone 50 MCG/ACT spray    verapamil 40 MG tablet                Primary Care Provider Office Phone # Fax #    Selina Reveels PA-C [phone]       [address] [facility]   [facility] [address]        Equal Access to Services     ARUN WESTBROOK AH: Hadii glendy garcia hadasho Sosanjanaali, waaxda luqadaha, qaybta kaalmada adeegyada, neema bradshaw hayalma dan . So [facility] [phone].    ATENCIÓN: Si habla español, tiene a lange disposición servicios gratuitos de asistencia lingüística. Mila al [phone].    We comply with applicable federal civil rights laws and [place] laws. We do not discriminate on the basis of race, color, national origin, age, disability, sex, sexual  orientation, or gender identity.            Thank you!     Thank you for choosing [facility]  for your care. Our goal is always to provide you with excellent care. Hearing back from our patients is one way we can continue to improve our services. Please take a few minutes to complete the written survey that you may receive in the mail after your visit with us. Thank you!             Your Updated Medication List - Protect others around you: Learn how to safely use, store and throw away your medicines at www.disposemymeds.org.          This list is accurate as of 8/1/18 11:59 PM.  Always use your most recent med list.                   Brand Name Dispense Instructions for use Diagnosis    albuterol 108 (90 Base) MCG/ACT Inhaler    VENTOLIN HFA    18 g    Inhale 2 puffs into the lungs every 6 hours as needed    Intermittent asthma, uncomplicated       ALPRAZolam 0.5 MG tablet    XANAX    30 tablet    One tablet mid day.    Generalized anxiety disorder       busPIRone 10 MG tablet    BUSPAR    135 tablet    TAKE 5 MG (1/2 TAB) IN THE MORNING AND 10 MG AT NIGHT    Generalized anxiety disorder       cetirizine 10 MG tablet    zyrTEC    90 tablet    TAKE  ONE TABLET BY MOUTH EVERY DAY.    Chronic rhinitis, unspecified type       clonazePAM 0.5 MG tablet    klonoPIN    60 tablet    TAKE ONE-HALF TO ONE TABLET BY MOUTH TWICE A DAY AS NEEDED FOR ANXIETY - MUST LAST 30 DAYS    Generalized anxiety disorder       cyclobenzaprine 5 MG tablet    FLEXERIL    90 tablet    TAKE ONE TABLET BY MOUTH THREE TIMES A DAY AS NEEDED FOR MUSCLE SPASMS    Fibromyalgia       CYMBALTA 60 MG EC capsule   Generic drug:  DULoxetine     90 capsule    TAKE ONE CAPSULE BY MOUTH EVERY EVENING - KADY    Fibromyalgia, Major depressive disorder, recurrent episode, mild (H)       fluticasone 50 MCG/ACT spray    FLONASE    16 g    SPRAY 2 SPRAYS INTO BOTH NOSTRILS DAILY.    Chronic rhinitis, unspecified type       folic acid 1 MG tablet     FOLVITE    100 tablet    Take 1 tablet (1 mg) by mouth daily    Rheumatoid arthritis involving multiple sites with positive rheumatoid factor (H)       HYDROcodone-acetaminophen 7.5-325 MG per tablet    NORCO    240 tablet    TAKE 2 TABLETS BY MOUTH EVERY 6 HOURS AS NEEDED FOR PAIN--MAX OF 8 TABLETS PER DAY    Osteoarthritis of cervical spine, unspecified spinal osteoarthritis complication status       hydrOXYzine 25 MG tablet    ATARAX    90 tablet    TAKE 2-4 TABLETS BY MOUTH NIGHTLY AS NEEDED FOR ANXIETY  (SLEEP) MAY TAKE ONE TABLET TO START WITH    Sleep disturbance       methotrexate sodium 2.5 MG Tabs     24 tablet    10mg (4 tabs) once weekly x1 week, then increase by 2.5mg (1 tab) each week until taking the goal weekly dose of 15mg (6 tabs) once weekly    Rheumatoid arthritis involving multiple sites with positive rheumatoid factor (H)       MULTIVITAL PO           predniSONE 10 MG tablet    DELTASONE    40 tablet    PRN arthritis flare: prednisone 10-20mg daily x5days, then stop.    Rheumatoid arthritis involving multiple sites with positive rheumatoid factor (H)       verapamil 40 MG tablet    CALAN    180 tablet    Take 1 tablet (40 mg) by mouth 2 times daily    Other migraine without status migrainosus, intractable

## 2018-08-01 NOTE — PROGRESS NOTES
SUBJECTIVE:   CC: Sherie Otero is an 47 year old woman who presents for preventive health visit.  She comes in today with ongoing concerns related to bilateral lower pelvic discomfort.  She is nervous about this as she has history of yeast her placement in 2010.  Feels the pain started potentially around that time.  She is also requesting STD screening as she has a new sexual partner.  She has no vaginal symptoms that are concerning.  No longer has menstrual cycles.  Last pelvic ultrasound was done in 2011-that was reviewed in detail.    She does have a narcotic agreement with me which was last signed 1/30/2018.     Today she states she would like to consider stopping smoking.  Is wondering what options are available.    She had an establish care visit with rheumatologist Dr. Ash Donald yesterday for rheumatoid arthritis.  He will be starting new medication management including methotrexate.  He will be monitoring labs very carefully.  Follow-up with him will be in December.    Physical   Annual:     Getting at least 3 servings of Calcium per day:  NO    Bi-annual eye exam:  Yes    Dental care twice a year:  NO    Sleep apnea or symptoms of sleep apnea:  Daytime drowsiness    Diet:  Regular (no restrictions)    Taking medications regularly:  Yes    Medication side effects:  Lightheadedness    Additional concerns today:  YES        Depression and Anxiety Follow-Up  Currently using Cymbalta 60 mg daily which is also being used to manage fibromyalgia pain.  She is also on BuSpar 5 mg in the morning and 10 mg at night for anxiety.  Uses 1 0.5 mg alprazolam mid day for anxiety/panic.  Been on much of this regimen since seen Dr. Stapleton years ago.    Status since last visit: Improved     Other associated symptoms:None    Complicating factors:     Significant life event: Yes-       Current substance abuse: None    PHQ-9 12/11/2017 1/30/2018 7/19/2018   Total Score 4 11 12   Q9: Suicide Ideation Not at all Not at all  Not at all     CELIA-7 SCORE 1/6/2017 1/30/2018 7/19/2018   Total Score - - 16 (severe anxiety)   Total Score 15 17 16       PHQ-9  English  PHQ-9   Any Language  CELIA-7  Suicide Assessment Five-step Evaluation and Treatment (SAFE-T)    Today's PHQ-2 Score:   PHQ-2 ( 1999 [employer]) 8/1/2018   Q1: Little interest or pleasure in doing things 0   Q2: Feeling down, depressed or hopeless 0   PHQ-2 Score 0   Q1: Little interest or pleasure in doing things Not at all   Q2: Feeling down, depressed or hopeless Not at all   PHQ-2 Score 0       Abuse: Current or Past(Physical, Sexual or Emotional)- No  Do you feel safe in your environment - Yes    Social History   Substance Use Topics     Smoking status: Current Every Day Smoker     Packs/day: 0.50     Years: 29.00     Types: Cigarettes     Smokeless tobacco: Never Used      Comment: 9/19/11 - 1pk/day     Alcohol use No     Alcohol Use 8/1/2018   If you drink alcohol do you typically have greater than 3 drinks per day OR greater than 7 drinks per week? No   No flowsheet data found.    Reviewed orders with patient.  Reviewed health maintenance and updated orders accordingly - Yes  Patient Active Problem List   Diagnosis     CARDIOVASCULAR SCREENING; LDL GOAL LESS THAN 160     Chronic fatigue syndrome     Fibromyalgia     Generalized anxiety disorder     Intermittent asthma     Tobacco abuse     SHANTANU (obstructive sleep apnea)     Disturbance in sleep behavior     Cervicalgia     Stenosis, cervical spine     Spondylitis, cervical (H)     NSAID induced gastritis     Major depressive disorder, recurrent episode, mild (H)     Other chronic pain     Intermittent asthma, uncomplicated     Rheumatoid arthritis involving multiple sites with positive rheumatoid factor (H)     Elevated white blood cell count     Panic attacks     Grief reaction     Osteoarthritis of cervical spine, unspecified spinal osteoarthritis complication status     Degenerative joint disease of cervical spine      Pulmonary nodules     Abnormal CT of the chest     Hilar lymphadenopathy     Other migraine without status migrainosus, intractable     Chronic rhinitis, unspecified type     Pelvic pain in female     Past Surgical History:   Procedure Laterality Date     C APPENDECTOMY      Ruptured at age 7 years     C  DELIVERY ONLY      , Low Cervical     DILATION AND CURETTAGE, HYSTEROSCOPY, ABLATE ENDOMETRIUM NOVASURE, COMBINED  2011    Procedure:COMBINED DILATION AND CURETTAGE, HYSTEROSCOPY, ABLATE ENDOMETRIUM NOVASURE; hysteroscopy, dilation and curettage, novasure; Surgeon:JEREMY ANNA; Location:[facility]     EXCISE LESION TRUNK  2013    Procedure: EXCISE LESION TRUNK;  interlaminar epidural Steroid Injection Cervical -thoracic Levels (C7-T1)    Re-Excision of chest wall mass;  Surgeon: Jeremy Bolaños MD;  Location: [facility] OR     HC HYSTEROS W PERMANENT FALLOPAIN IMPLANT      Essure done in office Marcel[facility]     INJECT BLOCK MEDIAL BRANCH CERVICAL/THORACIC/LUMBAR  2014    Procedure: INJECT BLOCK MEDIAL BRANCH CERVICAL / THORACIC / LUMBAR;  Surgeon: Josué Munson MD;  Location: [facility]     INJECT FACET JOINT  2014    Procedure: INJECT FACET JOINT;  diagnostic medial branch facet nerve block cervical levels 5-6, 6-7;  Surgeon: Josué Munson MD;  Location: [facility] OR     WISDOM [facility]         Social History   Substance Use Topics     Smoking status: Current Every Day Smoker     Packs/day: 0.50     Years: 29.00     Types: Cigarettes     Smokeless tobacco: Never Used      Comment: 11 - 1pk/day     Alcohol use No     Family History   Problem Relation Age of Onset     Arthritis Mother      Rheumatoid     Respiratory Mother      COPD     Hypertension Sister      1/2 sister     Neurologic Disorder Sister      1/2 sisiter  Very mild form of CP     Cardiovascular Maternal Grandmother      HEART DISEASE Maternal Aunt      Bypass at age 50's           Patient under age 50, mutual  "decision reflected in health maintenance.      Pertinent mammograms are reviewed under the imaging tab.  History of abnormal Pap smear: NO - age 30-65 PAP every 5 years with negative HPV co-testing recommended  PAP / HPV 11/23/2015 8/23/2011   PAP NIL NIL     Reviewed and updated as needed this visit by clinical staff  Allergies  Meds         Reviewed and updated as needed this visit by Provider            Review of Systems  CONSTITUTIONAL: NEGATIVE for fever, chills, change in weight  CONSTITUTIONAL:POSITIVE  for chronic fatigue  INTEGUMENTARY/SKIN: NEGATIVE for worrisome rashes, moles or lesions  EYES: NEGATIVE for vision changes or irritation  ENT: NEGATIVE for ear, mouth and throat problems  RESP: NEGATIVE for significant cough or SOB  BREAST: NEGATIVE for masses, tenderness or discharge  CV: NEGATIVE for chest pain, palpitations or peripheral edema  GI: NEGATIVE for nausea, abdominal pain, heartburn, or change in bowel habits  : NEGATIVE for unusual urinary or vaginal symptoms. No vaginal bleeding.  MUSCULOSKELETAL:POSITIVE  for chronic fibromyalgia pain; chronic joint pain related to rheumatoid arthritis  NEURO: NEGATIVE for weakness, dizziness or paresthesias  ENDOCRINE: NEGATIVE for temperature intolerance, skin/hair changes  HEME/ALLERGY/IMMUNE: NEGATIVE for bleeding problems  PSYCHIATRIC: NEGATIVE for changes in mood or affect      OBJECTIVE:   /72  Pulse 80  Temp 97.5  F (36.4  C) (Temporal)  Resp 18  Ht 5' 5.3\" (1.659 m)  Wt 129 lb (58.5 kg)  SpO2 97%  BMI 21.27 kg/m2  Physical Exam  GENERAL APPEARANCE: alert, no distress, fatigued and appears older than stated age  EYES: Eyes grossly normal to inspection, PERRL and conjunctivae and sclerae normal  HENT: ear canals and TM's normal, nose and mouth without ulcers or lesions, oropharynx clear and oral mucous membranes moist  NECK: no adenopathy, no asymmetry, masses, or scars and thyroid normal to palpation  RESP: lungs clear to " auscultation - no rales, rhonchi or wheezes  BREAST: normal without masses, tenderness or nipple discharge and no palpable axillary masses or adenopathy  CV: regular rate and rhythm, normal S1 S2, no S3 or S4, no murmur, click or rub, no peripheral edema and peripheral pulses strong  ABDOMEN: soft, nontender, no hepatosplenomegaly, no masses and bowel sounds normal   (female): normal female external genitalia, normal urethral meatus, vaginal mucosal atrophy noted, normal cervix, adnexae, and uterus without masses or abnormal discharge  MS: no musculoskeletal defects are noted and gait is age appropriate without ataxia  SKIN: no suspicious lesions or rashes  NEURO: Normal strength and tone, sensory exam grossly normal, mentation intact and speech normal  PSYCH: mentation appears normal and affect normal/bright    Diagnostic Test Results:  STD screening will be done today per her request.  UA will be done.  Tomorrow she will come in for fasting labs including lipid panel, FSH, CRP, ESR, CBC differential.    Pelvic ultrasound ordered    ASSESSMENT/PLAN:       ICD-10-CM    1. Encounter for routine adult medical exam with abnormal findings Z00.01 Wet prep     Neisseria gonorrhoeae PCR     Chlamydia trachomatis PCR     Pap imaged thin layer screen with HPV - recommended age 30 - 65 years (select HPV order below)     HPV High Risk Types DNA Cervical     Follicle stimulating hormone     Lipid Profile (Chol, Trig, HDL, LDL calc)   2. Pelvic pain in female R10.2 UA with Microscopic reflex to Culture (Pamela Still; [facility])     Follicle stimulating hormone     US Pelvic Complete w Transvaginal     CRP inflammation     CBC with platelets and differential     ESR: Erythrocyte sedimentation rate   3. Tobacco abuse Z72.0 MED THERAPY MANAGE REFERRAL   4. Other migraine without status migrainosus, intractable G43.819 verapamil (CALAN) 40 MG tablet   5. Chronic rhinitis, unspecified type J31.0 fluticasone (FLONASE) 50  "MCG/ACT spray     cetirizine (ZYRTEC) 10 MG tablet   6. Fibromyalgia M79.7 CYMBALTA 60 MG EC capsule   7. Major depressive disorder, recurrent episode, mild (H) F33.0 CYMBALTA 60 MG EC capsule   8. Generalized anxiety disorder F41.1 busPIRone (BUSPAR) 10 MG tablet       COUNSELING:  Reviewed preventive health counseling, as reflected in patient instructions       Regular exercise       Healthy diet/nutrition       Vision screening       Immunizations    Patient will consider Shingrix vaccine when she turns 48 end of August- took sheet home to review and check insurance reimbursement. Can return for float visit to initiate Shingrix series.                 Osteoporosis Prevention/Bone Health    BP Readings from Last 1 Encounters:   07/31/18 111/52     Estimated body mass index is 20.94 kg/(m^2) as calculated from the following:    Height as of 7/31/18: 5' 5.5\" (1.664 m).    Weight as of 7/31/18: 127 lb 12.8 oz (58 kg).           reports that she has been smoking Cigarettes.  She has a 14.50 pack-year smoking history. She has never used smokeless tobacco.  Tobacco Cessation Action Plan: Medication Therapy Management  (Referral to MTM) this was coordinated today.    She has had ongoing bilateral lower pelvic pain since 2010 when E sure was placed.  She has not had a pelvic ultrasound since 2011.  Will repeat pelvic ultrasound.  Today her pelvic exam is really unremarkable.  Pap smear was done as was STD screening per her request.  We will also be getting some serum lab studies.  If all returns normal or negative, I did discuss with Dr. De Santiago next steps.  Could consider MRI of the pelvis, or just get a gynecological consult to discuss pain and options given Essure placement.    Counseling Resources:  ATP IV Guidelines  Pooled Cohorts Equation Calculator  Breast Cancer Risk Calculator  FRAX Risk Assessment  ICSI Preventive Guidelines  Dietary Guidelines for Americans, 2010  USDA's MyPlate  ASA Prophylaxis  Lung CA " Screening    Selina Reveles PA-C  [facility]    Orders Placed This Encounter     US Pelvic Complete w Transvaginal     UA with Microscopic reflex to Culture (aPmela Still; [facility])     Pap imaged thin layer screen with HPV - recommended age 30 - 65 years (select HPV order below)     HPV High Risk Types DNA Cervical     Follicle stimulating hormone     Lipid Profile (Chol, Trig, HDL, LDL calc)     MED THERAPY MANAGE REFERRAL       AVS given to patient upon discharge today.  Electronically signed by Selina Reveles PA-C  August 2, 2018  10:30 AM

## 2018-08-01 NOTE — NURSING NOTE
"Chief Complaint   Patient presents with     Physical     Abdominal Pain       Initial /72  Pulse 80  Temp 97.5  F (36.4  C) (Temporal)  Resp 18  Ht 5' 5.3\" (1.659 m)  Wt 129 lb (58.5 kg)  SpO2 97%  BMI 21.27 kg/m2 Estimated body mass index is 21.27 kg/(m^2) as calculated from the following:    Height as of this encounter: 5' 5.3\" (1.659 m).    Weight as of this encounter: 129 lb (58.5 kg).  BP completed using cuff size: krunal García MA      "

## 2018-08-02 LAB
C TRACH DNA SPEC QL NAA+PROBE: NEGATIVE
N GONORRHOEA DNA SPEC QL NAA+PROBE: NEGATIVE
SPECIMEN SOURCE: NORMAL
SPECIMEN SOURCE: NORMAL

## 2018-08-02 RX ORDER — BUSPIRONE HYDROCHLORIDE 10 MG/1
TABLET ORAL
Qty: 135 TABLET | Refills: 3 | Status: SHIPPED | OUTPATIENT
Start: 2018-08-02 | End: 2018-12-13

## 2018-08-02 RX ORDER — CETIRIZINE HYDROCHLORIDE 10 MG/1
TABLET ORAL
Qty: 90 TABLET | Refills: 3 | Status: SHIPPED | OUTPATIENT
Start: 2018-08-02 | End: 2018-12-13

## 2018-08-02 RX ORDER — DULOXETINE HCL 60 MG
CAPSULE,DELAYED RELEASE (ENTERIC COATED) ORAL
Qty: 90 CAPSULE | Refills: 1 | Status: SHIPPED | OUTPATIENT
Start: 2018-08-02 | End: 2018-11-02

## 2018-08-02 RX ORDER — VERAPAMIL HYDROCHLORIDE 40 MG/1
40 TABLET ORAL 2 TIMES DAILY
Qty: 180 TABLET | Refills: 3 | Status: SHIPPED | OUTPATIENT
Start: 2018-08-02 | End: 2019-07-25

## 2018-08-02 RX ORDER — FLUTICASONE PROPIONATE 50 MCG
SPRAY, SUSPENSION (ML) NASAL
Qty: 16 G | Refills: 11 | Status: SHIPPED | OUTPATIENT
Start: 2018-08-02 | End: 2019-07-12

## 2018-08-02 ASSESSMENT — PATIENT HEALTH QUESTIONNAIRE - PHQ9: SUM OF ALL RESPONSES TO PHQ QUESTIONS 1-9: 9

## 2018-08-03 LAB
COPATH REPORT: NORMAL
PAP: NORMAL

## 2018-08-07 LAB
FINAL DIAGNOSIS: NORMAL
HPV HR 12 DNA CVX QL NAA+PROBE: NEGATIVE
HPV16 DNA SPEC QL NAA+PROBE: NEGATIVE
HPV18 DNA SPEC QL NAA+PROBE: NEGATIVE
SPECIMEN DESCRIPTION: NORMAL
SPECIMEN SOURCE CVX/VAG CYTO: NORMAL

## 2018-08-08 ENCOUNTER — TELEPHONE (OUTPATIENT)
Dept: RHEUMATOLOGY | Facility: CLINIC | Age: 48
End: 2018-08-08

## 2018-08-08 NOTE — TELEPHONE ENCOUNTER
Reason for Call:  Other     Detailed comments: Started methotraxate started on 8/6 should I move out labs visits. Please call back and advise.    Phone Number Patient can be reached at: Cell number on file:    Telephone Information:   Mobile [phone]       Best Time: any    Can we leave a detailed message on this number? YES    Call taken on 8/8/2018 at 2:55 PM by Batool Gutierrez

## 2018-08-13 DIAGNOSIS — F41.1 GENERALIZED ANXIETY DISORDER: ICD-10-CM

## 2018-08-13 RX ORDER — ALPRAZOLAM 0.5 MG
TABLET ORAL
Qty: 30 TABLET | Refills: 0 | Status: SHIPPED | OUTPATIENT
Start: 2018-08-16 | End: 2018-08-13

## 2018-08-13 RX ORDER — ALPRAZOLAM 0.5 MG
TABLET ORAL
Qty: 30 TABLET | Refills: 0 | Status: SHIPPED | OUTPATIENT
Start: 2018-09-13 | End: 2018-08-13

## 2018-08-13 RX ORDER — CLONAZEPAM 0.5 MG/1
TABLET ORAL
Qty: 60 TABLET | Refills: 0 | Status: SHIPPED | OUTPATIENT
Start: 2018-08-16 | End: 2018-08-13

## 2018-08-13 RX ORDER — ALPRAZOLAM 0.5 MG
TABLET ORAL
Qty: 30 TABLET | Refills: 0 | Status: SHIPPED | OUTPATIENT
Start: 2018-10-11 | End: 2018-09-14

## 2018-08-13 RX ORDER — CLONAZEPAM 0.5 MG/1
TABLET ORAL
Qty: 60 TABLET | Refills: 0 | Status: SHIPPED | OUTPATIENT
Start: 2018-09-13 | End: 2018-08-13

## 2018-08-13 RX ORDER — CLONAZEPAM 0.5 MG/1
TABLET ORAL
Qty: 60 TABLET | Refills: 0 | Status: SHIPPED | OUTPATIENT
Start: 2018-10-11 | End: 2018-09-14

## 2018-08-13 NOTE — TELEPHONE ENCOUNTER
Xanax       Last Written Prescription Date:  7/18/2018  Last Fill Quantity: 30,   # refills: 0  Last Office Visit: 8/1/2018  Future Office visit:       Routing refill request to provider for review/approval because:  Drug not on the [facility], P or [facility] refill protocol or controlled substance

## 2018-08-13 NOTE — TELEPHONE ENCOUNTER
Clonazepam       Last Written Prescription Date:  7/19/2018  Last Fill Quantity: 60,   # refills: 0  Last Office Visit: 8/1/2018  Future Office visit:       Routing refill request to provider for review/approval because:  Drug not on the [facility], [facility] or [facility] refill protocol or controlled substance

## 2018-08-14 ENCOUNTER — HOSPITAL ENCOUNTER (OUTPATIENT)
Dept: ULTRASOUND IMAGING | Facility: CLINIC | Age: 48
Discharge: HOME | End: 2018-08-14
Attending: PHYSICIAN ASSISTANT | Admitting: PHYSICIAN ASSISTANT
Payer: COMMERCIAL

## 2018-08-14 DIAGNOSIS — R10.2 PELVIC PAIN IN FEMALE: ICD-10-CM

## 2018-08-14 PROCEDURE — 76830 TRANSVAGINAL US NON-OB: CPT

## 2018-08-14 NOTE — PROGRESS NOTES
"Innobits message sent:  \"Ms. Otero,    Hand and foot x-rays did not show erosive changes.  Cervical spine x-ray showed degenerative changes at multiple levels.    Labs showed positive antibodies that are consistent with rheumatoid arthritis.    These findings do not change the treatment plan discussed during your clinic visit.    Sincerely,  Ash Donald MD  8/14/2018 5:28 PM\""

## 2018-08-16 ENCOUNTER — MYC REFILL (OUTPATIENT)
Dept: FAMILY MEDICINE | Facility: CLINIC | Age: 48
End: 2018-08-16

## 2018-08-16 ENCOUNTER — MYC MEDICAL ADVICE (OUTPATIENT)
Dept: FAMILY MEDICINE | Facility: CLINIC | Age: 48
End: 2018-08-16

## 2018-08-16 DIAGNOSIS — R10.2 PELVIC PAIN IN FEMALE: Primary | ICD-10-CM

## 2018-08-16 DIAGNOSIS — M47.812 OSTEOARTHRITIS OF CERVICAL SPINE, UNSPECIFIED SPINAL OSTEOARTHRITIS COMPLICATION STATUS: ICD-10-CM

## 2018-08-16 RX ORDER — HYDROCODONE BITARTRATE AND ACETAMINOPHEN 7.5; 325 MG/1; MG/1
TABLET ORAL
Qty: 240 TABLET | Refills: 0 | Status: SHIPPED | OUTPATIENT
Start: 2018-08-16 | End: 2018-09-14

## 2018-08-16 NOTE — TELEPHONE ENCOUNTER
Norco       Last Written Prescription Date:  7/20/2018  Last Fill Quantity: 240,   # refills: 0  Last Office Visit: 8/1/2018  Future Office visit:       Routing refill request to provider for review/approval because:  Drug not on the [facility], [facility] or [facility] refill protocol or controlled substance

## 2018-08-16 NOTE — TELEPHONE ENCOUNTER
Message from MyChart:  Original authorizing provider: MD Sherie Ortega would like a refill of the following medications:  HYDROcodone-acetaminophen (NORCO) 7.5-325 MG per tablet [Mauro Paredes MD]    Preferred pharmacy: [address]     Comment:  Hi Selina I also need to refill 2 prescriptions that don't show on my list. Alprazolam and Clonazepam.

## 2018-08-21 NOTE — TELEPHONE ENCOUNTER
Spoke with patient and changed her lab appointments so they will be starting 4 weeks after she started her methotrexate. (started on 8/6/18)  Patient is also wondering if methotrexate can cause her cough to get worse, she has had a cough but lately it is getting worse but not sure if it is due to the medication or not. She stated she does have seasonal allergies and maybe it is due to that. Informed patient I will send a message to Dr. Donald to see what he thinks and we would call her back.  Tameka Castro [facility]  8/21/2018 10:58 AM

## 2018-08-22 NOTE — TELEPHONE ENCOUNTER
Called, spoke with the patient, informed her of Dr. Donald's advice below. She stated that she has a prescription for Zyrtec and will try taking that. Patient expressed understanding to me, and will call back if cough does not return to a baseline after trying the antihistamine for a few days. Closing encounter.    Malia Pereira CMA on 8/22/2018 at 10:51 AM

## 2018-08-22 NOTE — TELEPHONE ENCOUNTER
Rheumatology team: (1) thank you for addressing the lab question.  (2) regarding the cough, please advise Ms. Otero to use an antihistamine such as Claritin or Zyrtec; and if the cough does not improve then will need to further evaluate because methotrexate can be associated with a cough.  She needs to notify us if the cough does not return to a baseline after the allergy treatment with antihistamines is used for a few days.      Ash Donald MD  8/22/2018 5:50 AM

## 2018-09-04 DIAGNOSIS — M05.79 RHEUMATOID ARTHRITIS INVOLVING MULTIPLE SITES WITH POSITIVE RHEUMATOID FACTOR (H): ICD-10-CM

## 2018-09-04 LAB
ALBUMIN SERPL-MCNC: 3.4 G/DL (ref 3.4–5)
ALP SERPL-CCNC: 117 U/L (ref 40–150)
ALT SERPL W P-5'-P-CCNC: 15 U/L (ref 0–50)
AST SERPL W P-5'-P-CCNC: 14 U/L (ref 0–45)
BASOPHILS # BLD AUTO: 0.1 10E9/L (ref 0–0.2)
BASOPHILS NFR BLD AUTO: 0.3 %
BILIRUB DIRECT SERPL-MCNC: <0.1 MG/DL (ref 0–0.2)
BILIRUB SERPL-MCNC: 0.2 MG/DL (ref 0.2–1.3)
CREAT SERPL-MCNC: 0.94 MG/DL (ref 0.52–1.04)
DIFFERENTIAL METHOD BLD: ABNORMAL
EOSINOPHIL NFR BLD AUTO: 0.4 %
ERYTHROCYTE [DISTWIDTH] IN BLOOD BY AUTOMATED COUNT: 13.3 % (ref 10–15)
GFR SERPL CREATININE-BSD FRML MDRD: 63 ML/MIN/1.7M2
HCT VFR BLD AUTO: 40.4 % (ref 35–47)
HGB BLD-MCNC: 13.3 G/DL (ref 11.7–15.7)
IMM GRANULOCYTES # BLD: 0.1 10E9/L (ref 0–0.4)
IMM GRANULOCYTES NFR BLD: 0.7 %
LYMPHOCYTES # BLD AUTO: 1.5 10E9/L (ref 0.8–5.3)
LYMPHOCYTES NFR BLD AUTO: 6.9 %
MCH RBC QN AUTO: 33.1 PG (ref 26.5–33)
MCHC RBC AUTO-ENTMCNC: 32.9 G/DL (ref 31.5–36.5)
MCV RBC AUTO: 101 FL (ref 78–100)
MONOCYTES # BLD AUTO: 0.7 10E9/L (ref 0–1.3)
MONOCYTES NFR BLD AUTO: 3.4 %
NEUTROPHILS # BLD AUTO: 19 10E9/L (ref 1.6–8.3)
NEUTROPHILS NFR BLD AUTO: 88.3 %
NRBC # BLD AUTO: 0 10*3/UL
NRBC BLD AUTO-RTO: 0 /100
PLATELET # BLD AUTO: 268 10E9/L (ref 150–450)
PROT SERPL-MCNC: 6.9 G/DL (ref 6.8–8.8)
RBC # BLD AUTO: 4.02 10E12/L (ref 3.8–5.2)
WBC # BLD AUTO: 21.5 10E9/L (ref 4–11)

## 2018-09-04 PROCEDURE — 80076 HEPATIC FUNCTION PANEL: CPT | Performed by: INTERNAL MEDICINE

## 2018-09-04 PROCEDURE — 82565 ASSAY OF CREATININE: CPT | Performed by: INTERNAL MEDICINE

## 2018-09-04 PROCEDURE — 85025 COMPLETE CBC W/AUTO DIFF WBC: CPT | Performed by: INTERNAL MEDICINE

## 2018-09-04 PROCEDURE — 36415 COLL VENOUS BLD VENIPUNCTURE: CPT | Performed by: INTERNAL MEDICINE

## 2018-09-10 DIAGNOSIS — M79.7 FIBROMYALGIA: ICD-10-CM

## 2018-09-10 RX ORDER — CYCLOBENZAPRINE HCL 5 MG
TABLET ORAL
Qty: 90 TABLET | Refills: 3 | Status: SHIPPED | OUTPATIENT
Start: 2018-09-10 | End: 2018-11-02 | Stop reason: DRUGHIGH

## 2018-09-10 NOTE — TELEPHONE ENCOUNTER
Flexeril       Last Written Prescription Date:  7/17/2018  Last Fill Quantity: 90,   # refills: 1  Last Office Visit: 8/1/2018  Future Office visit:       Routing refill request to provider for review/approval because:  Drug not on the [facility], [facility] or [facility] refill protocol or controlled substance

## 2018-09-14 DIAGNOSIS — F41.1 GENERALIZED ANXIETY DISORDER: ICD-10-CM

## 2018-09-14 DIAGNOSIS — M47.812 OSTEOARTHRITIS OF CERVICAL SPINE, UNSPECIFIED SPINAL OSTEOARTHRITIS COMPLICATION STATUS: ICD-10-CM

## 2018-09-14 NOTE — TELEPHONE ENCOUNTER
Requested Prescriptions   Pending Prescriptions Disp Refills     ALPRAZolam (XANAX) 0.5 MG tablet 30 tablet 0     Sig: One tablet mid day. MUST LAST 30 DAYS.    There is no refill protocol information for this order   Last Written Prescription Date:  10/11/2018  Last Fill Quantity: 30,  # refills: 0   Last office visit: 8/1/2018 with prescribing provider:  08/01/2018-Lunning   Future Office Visit:   Next 5 appointments (look out 90 days)     Dec 11, 2018  2:40 PM CST   [facility]    [address] [place] [address]   [phone]                      clonazePAM (KLONOPIN) 0.5 MG tablet 60 tablet 0     Sig: TAKE ONE-HALF TO ONE TABLET BY MOUTH TWICE A DAY AS NEEDED FOR ANXIETY - MUST LAST 30 DAYS    There is no refill protocol information for this order   Last Written Prescription Date:  10/11/2018  Last Fill Quantity: 60,  # refills: 0   Last office visit: 8/1/2018 with prescribing provider:  08/01/2018-Lunning   Future Office Visit:   Next 5 appointments (look out 90 days)     Dec 11, 2018  2:40 PM CST   Return Visit with Ash Donald MD   [facility] ([address] [address]   [phone]                      HYDROcodone-acetaminophen (NORCO) 7.5-325 MG per tablet 240 tablet 0     Sig: TAKE 2 TABLETS BY MOUTH EVERY 6 HOURS AS NEEDED FOR PAIN--MAX OF 8 TABLETS PER DAY. NARCOTIC AGREEMENT DONE 1/30/2018    There is no refill protocol information for this order      Last Written Prescription Date:  08/16/2018  Last Fill Quantity: 240,  # refills: 0   Last office visit: 8/1/2018 with prescribing provider:  08/01/2018-Lunning   Future Office Visit:   Next 5 appointments (look out 90 days)     Dec 11, 2018  2:40 PM CST   [facility]     [address] [facility] [address]   [phone]

## 2018-09-17 RX ORDER — CLONAZEPAM 0.5 MG/1
TABLET ORAL
Qty: 60 TABLET | Refills: 0 | Status: SHIPPED | OUTPATIENT
Start: 2018-10-11 | End: 2018-11-02

## 2018-09-17 RX ORDER — ALPRAZOLAM 0.5 MG
TABLET ORAL
Qty: 30 TABLET | Refills: 0 | Status: SHIPPED | OUTPATIENT
Start: 2018-10-11 | End: 2018-11-02

## 2018-09-17 RX ORDER — HYDROCODONE BITARTRATE AND ACETAMINOPHEN 7.5; 325 MG/1; MG/1
TABLET ORAL
Qty: 240 TABLET | Refills: 0 | Status: SHIPPED | OUTPATIENT
Start: 2018-09-17 | End: 2018-10-14

## 2018-09-17 NOTE — PROGRESS NOTES
"MyChart message sent:  \"Ms. Otero,    Your labs did not show evidence for methotrexate toxicity.  However, the white blood cell count is elevated and this could represent infection or other cause of inflammation.  Prednisone will also increase the white blood cell count but not typically this high.  If you are experiencing fevers, chills, or other symptoms of infection then please discuss with your primary care provider. Otherwise this lab will be repeated with your next toxicity monitoring labs.    Sincerely,  Ash Donald MD  9/16/2018 10:19 PM\""

## 2018-09-18 DIAGNOSIS — F41.1 GENERALIZED ANXIETY DISORDER: ICD-10-CM

## 2018-09-18 RX ORDER — CLONAZEPAM 0.5 MG/1
TABLET ORAL
Qty: 60 TABLET | Refills: 0 | Status: CANCELLED | OUTPATIENT
Start: 2018-10-11

## 2018-09-18 RX ORDER — ALPRAZOLAM 0.5 MG
TABLET ORAL
Qty: 30 TABLET | Refills: 0 | Status: CANCELLED | OUTPATIENT
Start: 2018-10-11

## 2018-09-18 NOTE — TELEPHONE ENCOUNTER
Alprazolam  0.5 MG      Last Written Prescription Date:  10/11/18  Last Fill Quantity: 30,   # refills: 0  Last Office Visit: 8/1/18  Future Office visit:    Next 5 appointments (look out 90 days)     Dec 11, 2018  2:40 PM CST   [facility] Visit with Ash Donald MD   [facility]    [address] [facility] [address]   [phone]                   Routing refill request to provider for review/approval because:  Drug not on the [facility], [facility] or [facility] refill protocol or controlled substance  Clonazepam 0.5 MG       Last Written Prescription Date:  10/11/18  Last Fill Quantity: 60,   # refills: 0  Last Office Visit: 8/1/18  Future Office visit:    Next 5 appointments (look out 90 days)     Dec 11, 2018  2:40 PM CST   Return Visit with Ash Donald MD   [facility]    [address] [facility] [address]   [phone]                   Routing refill request to provider for review/approval because:  Drug not on the [facility], [facility] or [facility] refill protocol or controlled substance  Patient had prescriptions profiled at pharmacy, please disregard encounter.

## 2018-09-20 ENCOUNTER — TELEPHONE (OUTPATIENT)
Dept: FAMILY MEDICINE | Facility: CLINIC | Age: 48
End: 2018-09-20

## 2018-09-20 ENCOUNTER — HOSPITAL ENCOUNTER (OUTPATIENT)
Dept: GENERAL RADIOLOGY | Facility: CLINIC | Age: 48
Discharge: HOME | End: 2018-09-20
Attending: PHYSICIAN ASSISTANT | Admitting: PHYSICIAN ASSISTANT
Payer: MEDICARE

## 2018-09-20 ENCOUNTER — TELEPHONE (OUTPATIENT)
Dept: PSYCHOLOGY | Facility: CLINIC | Age: 48
End: 2018-09-20

## 2018-09-20 ENCOUNTER — OFFICE VISIT (OUTPATIENT)
Dept: FAMILY MEDICINE | Facility: CLINIC | Age: 48
End: 2018-09-20
Payer: MEDICARE

## 2018-09-20 VITALS
OXYGEN SATURATION: 98 % | BODY MASS INDEX: 21.6 KG/M2 | WEIGHT: 131 LBS | RESPIRATION RATE: 18 BRPM | DIASTOLIC BLOOD PRESSURE: 70 MMHG | TEMPERATURE: 96.8 F | SYSTOLIC BLOOD PRESSURE: 116 MMHG | HEART RATE: 90 BPM

## 2018-09-20 DIAGNOSIS — R05.9 COUGH: Primary | ICD-10-CM

## 2018-09-20 DIAGNOSIS — J20.9 ACUTE BRONCHITIS WITH SYMPTOMS > 10 DAYS: ICD-10-CM

## 2018-09-20 DIAGNOSIS — R05.9 COUGH: ICD-10-CM

## 2018-09-20 DIAGNOSIS — J01.90 ACUTE SINUSITIS WITH SYMPTOMS > 10 DAYS: ICD-10-CM

## 2018-09-20 PROCEDURE — 71046 X-RAY EXAM CHEST 2 VIEWS: CPT | Mod: TC

## 2018-09-20 PROCEDURE — 99214 OFFICE O/P EST MOD 30 MIN: CPT | Performed by: PHYSICIAN ASSISTANT

## 2018-09-20 RX ORDER — BENZONATATE 200 MG/1
200 CAPSULE ORAL 3 TIMES DAILY PRN
Qty: 21 CAPSULE | Refills: 0 | Status: SHIPPED | OUTPATIENT
Start: 2018-09-20 | End: 2018-10-01

## 2018-09-20 RX ORDER — GUAIFENESIN 600 MG/1
1200 TABLET, EXTENDED RELEASE ORAL 2 TIMES DAILY
Qty: 28 TABLET | Refills: 0 | Status: SHIPPED | OUTPATIENT
Start: 2018-09-20 | End: 2018-12-13

## 2018-09-20 RX ORDER — DOXYCYCLINE 100 MG/1
100 CAPSULE ORAL 2 TIMES DAILY
Qty: 20 CAPSULE | Refills: 0 | Status: SHIPPED | OUTPATIENT
Start: 2018-09-20 | End: 2018-10-01

## 2018-09-20 ASSESSMENT — PAIN SCALES - GENERAL: PAINLEVEL: NO PAIN (0)

## 2018-09-20 NOTE — TELEPHONE ENCOUNTER
Reason for Call:  Same Day Appointment, Requested Provider:  Selina Reveles PA-C    PCP: Selina Reveles    Reason for visit: possible bronchitis, chills,  Feels like she is getting worse.     Duration of symptoms: x 7 days    Have you been treated for this in the past? No    Additional comments: asking if Selina Snowdenricardo can work her in today.  Please advise.     Can we leave a detailed message on this number? YES    Phone number patient can be reached at: Cell number on file:    Telephone Information:   Mobile [phone]       Best Time: as soon as possible    Call taken on 9/20/2018 at 9:08 AM by Carol Anand

## 2018-09-20 NOTE — MR AVS SNAPSHOT
After Visit Summary   9/20/2018    Sherie Otero    MRN: [id number]           Patient Information     Date Of Birth          1970        Visit Information        Provider Department      9/20/2018 2:00 PM Selina Reveles PA-C [facility]        Today's Diagnoses     Cough    -  1    Acute bronchitis with symptoms > 10 days        Acute sinusitis with symptoms > 10 days           Follow-ups after your visit        Your next 10 appointments already scheduled     Oct 02, 2018  1:00 PM CDT   LAB with NL LAB [facility] ([address] [address]   [phone]           Please do not eat 10-12 hours before your appointment if you are coming in fasting for labs on lipids, cholesterol, or glucose (sugar). This does not apply to pregnant women. Water, hot tea and black coffee (with nothing added) are okay. Do not drink other fluids, diet soda or chew gum.            Oct 30, 2018  1:00 PM CDT   LAB with NL LAB [facility] ([address] [address]   [phone]           Please do not eat 10-12 hours before your appointment if you are coming in fasting for labs on lipids, cholesterol, or glucose (sugar). This does not apply to pregnant women. Water, hot tea and black coffee (with nothing added) are okay. Do not drink other fluids, diet soda or chew gum.            Dec 11, 2018  2:40 PM CST   Return Visit with Ash Donald MD   [facility] ([facility]    [address] [place] [address] [phone]              Who to contact     If you have questions or need follow up information about today's clinic visit or your schedule please contact [facility] directly at [phone].  Normal or non-critical lab and imaging results will be communicated to you by Candie  letter or phone within 4 business days after the clinic has received the results. If you do not hear from us within 7 days, please contact the clinic through [employer] or phone. If you have a critical or abnormal lab result, we will notify you by phone as soon as possible.  Submit refill requests through [employer] or call your pharmacy and they will forward the refill request to us. Please allow 3 business days for your refill to be completed.          Additional Information About Your Visit        [employer] Information     [employer] gives you secure access to your electronic health record. If you see a primary care provider, you can also send messages to your care team and make appointments. If you have questions, please call your primary care clinic.  If you do not have a primary care provider, please call [phone] and they will assist you.        Care EveryWhere ID     This is your Care EveryWhere ID. This could be used by other organizations to access your [facility] medical records  [id number]        Your Vitals Were     Pulse Temperature Respirations Pulse Oximetry BMI (Body Mass Index)       90 96.8  F (36  C) (Temporal) 18 98% 21.6 kg/m2        Blood Pressure from Last 3 Encounters:   09/20/18 116/70   08/01/18 120/72   07/31/18 111/52    Weight from Last 3 Encounters:   09/20/18 131 lb (59.4 kg)   08/01/18 129 lb (58.5 kg)   07/31/18 127 lb 12.8 oz (58 kg)                 Today's Medication Changes          These changes are accurate as of 9/20/18 11:59 PM.  If you have any questions, ask your nurse or doctor.               Start taking these medicines.        Dose/Directions    benzonatate 200 MG capsule   Commonly known as:  TESSALON   Used for:  Acute bronchitis with symptoms > 10 days   Started by:  Selina Reveles PA-C        Dose:  200 mg   Take 1 capsule (200 mg) by mouth 3 times daily as needed for cough   Quantity:  21 capsule   Refills:  0       doxycycline 100 MG capsule   Commonly known as:   VIBRAMYCIN   Used for:  Cough, Acute bronchitis with symptoms > 10 days, Acute sinusitis with symptoms > 10 days   Started by:  Selina Reveles PA-C        Dose:  100 mg   Take 1 capsule (100 mg) by mouth 2 times daily   Quantity:  20 capsule   Refills:  0       guaiFENesin 600 MG 12 hr tablet   Commonly known as:  MUCINEX   Used for:  Cough, Acute bronchitis with symptoms > 10 days, Acute sinusitis with symptoms > 10 days   Started by:  Selina Reveles PA-C        Dose:  1200 mg   Take 2 tablets (1,200 mg) by mouth 2 times daily   Quantity:  28 tablet   Refills:  0            Where to get your medicines      These medications were sent to [facility], [place] - [address] [facility] [address]     Phone:  [phone]     benzonatate 200 MG capsule    doxycycline 100 MG capsule    guaiFENesin 600 MG 12 hr tablet                Primary Care Provider Office Phone # Fax #    Selina Reveles PA-C [phone]       [address] [facility] [address]   [facility] [address]        Equal Access to Services     [facility]: Hadii glendy ku hadasho Soomaali, waaxda luqadaha, qaybta kaalmada adeegyaace, waxay leeann dan . [facility] [phone].    ATENCIÓN: Si habla español, tiene a lange disposición servicios gratuitos de asistencia lingüística. Llame al [phone].    We comply with applicable federal civil rights laws and [place] laws. We do not discriminate on the basis of race, color, national origin, age, disability, sex, sexual orientation, or gender identity.            Thank you!     Thank you for choosing [facility]  for your care. Our goal is always to provide you with excellent care. Hearing back from our patients is one way we can continue to improve our services. Please take a few minutes to complete the written survey that you may receive in the mail after your visit with us. Thank you!             Your Updated Medication  List - Protect others around you: Learn how to safely use, store and throw away your medicines at www.disposemymeds.org.          This list is accurate as of 9/20/18 11:59 PM.  Always use your most recent med list.                   Brand Name Dispense Instructions for use Diagnosis    albuterol 108 (90 Base) MCG/ACT inhaler    VENTOLIN HFA    18 g    Inhale 2 puffs into the lungs every 6 hours as needed    Intermittent asthma, uncomplicated       ALPRAZolam 0.5 MG tablet   Start taking on:  10/11/2018    XANAX    30 tablet    One tablet mid day. MUST LAST 30 DAYS.    Generalized anxiety disorder       benzonatate 200 MG capsule    TESSALON    21 capsule    Take 1 capsule (200 mg) by mouth 3 times daily as needed for cough    Acute bronchitis with symptoms > 10 days       busPIRone 10 MG tablet    BUSPAR    135 tablet    TAKE 5 MG (1/2 TAB) IN THE MORNING AND 10 MG AT NIGHT    Generalized anxiety disorder       cetirizine 10 MG tablet    zyrTEC    90 tablet    TAKE  ONE TABLET BY MOUTH EVERY DAY.    Chronic rhinitis, unspecified type       clonazePAM 0.5 MG tablet   Start taking on:  10/11/2018    klonoPIN    60 tablet    TAKE ONE-HALF TO ONE TABLET BY MOUTH TWICE A DAY AS NEEDED FOR ANXIETY - MUST LAST 30 DAYS    Generalized anxiety disorder       cyclobenzaprine 5 MG tablet    FLEXERIL    90 tablet    TAKE ONE TABLET BY MOUTH THREE TIMES A DAY AS NEEDED FOR MUSCLE SPASMS    Fibromyalgia       CYMBALTA 60 MG EC capsule   Generic drug:  DULoxetine     90 capsule    TAKE ONE CAPSULE BY MOUTH EVERY EVENING - KADY    Fibromyalgia, Major depressive disorder, recurrent episode, mild (H)       doxycycline 100 MG capsule    VIBRAMYCIN    20 capsule    Take 1 capsule (100 mg) by mouth 2 times daily    Cough, Acute bronchitis with symptoms > 10 days, Acute sinusitis with symptoms > 10 days       fluticasone 50 MCG/ACT spray    FLONASE    16 g    SPRAY 2 SPRAYS INTO BOTH NOSTRILS DAILY.    Chronic rhinitis, unspecified type        folic acid 1 MG tablet    FOLVITE    100 tablet    Take 1 tablet (1 mg) by mouth daily    Rheumatoid arthritis involving multiple sites with positive rheumatoid factor (H)       guaiFENesin 600 MG 12 hr tablet    MUCINEX    28 tablet    Take 2 tablets (1,200 mg) by mouth 2 times daily    Cough, Acute bronchitis with symptoms > 10 days, Acute sinusitis with symptoms > 10 days       HYDROcodone-acetaminophen 7.5-325 MG per tablet    NORCO    240 tablet    TAKE 2 TABLETS BY MOUTH EVERY 6 HOURS AS NEEDED FOR PAIN--MAX OF 8 TABLETS PER DAY. NARCOTIC AGREEMENT DONE 1/30/2018    Osteoarthritis of cervical spine, unspecified spinal osteoarthritis complication status       hydrOXYzine 25 MG tablet    ATARAX    90 tablet    TAKE 2-4 TABLETS BY MOUTH NIGHTLY AS NEEDED FOR ANXIETY  (SLEEP) MAY TAKE ONE TABLET TO START WITH    Sleep disturbance       methotrexate sodium 2.5 MG Tabs     24 tablet    10mg (4 tabs) once weekly x1 week, then increase by 2.5mg (1 tab) each week until taking the goal weekly dose of 15mg (6 tabs) once weekly    Rheumatoid arthritis involving multiple sites with positive rheumatoid factor (H)       MULTIVITAL PO           predniSONE 10 MG tablet    DELTASONE    40 tablet    PRN arthritis flare: prednisone 10-20mg daily x5days, then stop.    Rheumatoid arthritis involving multiple sites with positive rheumatoid factor (H)       verapamil 40 MG tablet    CALAN    180 tablet    Take 1 tablet (40 mg) by mouth 2 times daily    Other migraine without status migrainosus, intractable

## 2018-09-20 NOTE — TELEPHONE ENCOUNTER
Prior Authorization Approval    Authorization Effective Date: 9/20/2018  Authorization Expiration Date: 12/31/2018  Medication: Methotrexate needs PA  Approved Dose/Quantity:    Reference #:     Insurance Company: [employer] [id number] Fax [phone]  Expected CoPay:       CoPay Card Available:      Foundation Assistance Needed:    Which Pharmacy is filling the prescription (Not needed for infusion/clinic administered): [facility] [address]   Pharmacy Notified: Yes  Patient Notified: Yes

## 2018-09-20 NOTE — NURSING NOTE
"Chief Complaint   Patient presents with     URI       Initial /70  Pulse 90  Temp 96.8  F (36  C) (Temporal)  Resp 18  Wt 131 lb (59.4 kg)  SpO2 98%  BMI 21.6 kg/m2 Estimated body mass index is 21.6 kg/(m^2) as calculated from the following:    Height as of 8/1/18: 5' 5.3\" (1.659 m).    Weight as of this encounter: 131 lb (59.4 kg).  BP completed using cuff size: krunal García MA      "

## 2018-09-20 NOTE — TELEPHONE ENCOUNTER
Methotrexate needs PA, insurance states to call [phone] to determine if methotrexate should be billed to Part D or B.     Prior Authorization Retail Medication Request    Medication/Dose: Methotrexate needs PA  ICD code (if different than what is on RX):    Previously Tried and Failed:    Rationale:      Insurance Name:  [employer] Part D  Insurance ID:  [id number]      Thanks  Deanna Fierro [facility]   [phone]

## 2018-09-20 NOTE — PROGRESS NOTES
SUBJECTIVE:   Sherie Otero is a 48 year old female who has smoked for the majority of her adult life who presents to clinic today for the following health issues:      Acute Illness   Acute illness concerns: cough   Onset: 9/10    Fever: no    Chills/Sweats: YES    Headache (location?): YES    Sinus Pressure:yes    Conjunctivitis:  no    Ear Pain: YES: both    Rhinorrhea: YES    Congestion: YES    Sore Throat: no     Cough: YES productive - thick mucous    Wheeze: no    Decreased Appetite: YES    Nausea: yes    Vomiting: no    Diarrhea:  no    Dysuria/Freq.: no    Fatigue/Achiness: YES    Sick/Strep Exposure: no     Therapies Tried and outcome: Prednisone per Dr. Donald to use with RA flares, but has had costochondral pain in the right anterior chest since she saw him.  States it has helped with wheezing that she was having, but respiratory symptoms continue.    CT scan lungs from 1/2017  IMPRESSION:  1. Multiple bilateral pulmonary nodules measuring up to 1.2 cm in  maximum dimension, mediastinal and bilateral hilar lymphadenopathy,  and right pleural fluid collection could be secondary to rheumatoid  arthritis with rheumatoid nodules and mediastinal lymphadenopathy.  Constellation of findings could represent rheumatoid arthritis with  rheumatoid nodules, right pleural fluid, and mediastinal  lymphadenopathy, although lymphadenopathy is considered somewhat  atypical with rheumatoid arthritis. Differential diagnosis also  includes metastases. I recommend CT/PET for further evaluation.  Alternatively, close interval follow-up CT chest in 3 months, to  ensure stability or resolution, can be performed.    PET SCAN  IMPRESSION: There are multiple hypermetabolic pulmonary nodules  bilaterally, with hypermetabolic lymphadenopathy noted in the  mediastinum and bilateral lung germain. Findings remain indeterminate,  and neoplasm cannot be excluded, although the constellation of  findings raises the possibility of an  atypical infection, such as  fungal. Please clinically correlate. Hypermetabolic lung nodules and  lymphadenopathy could also possibly be seen in the setting of  rheumatic disease.     CT scan repeat 2017  IMPRESSION:  1. Multiple bilateral pulmonary nodules. All are slightly decreased in  size when compared to 2017 suggesting an inflammatory etiology.  2. Mediastinal and bilateral hilar adenopathy. This is nonspecific but  stable or slightly decreased in size compared to 2017.         Problem list and histories reviewed & adjusted, as indicated.  Additional history: as documented    Patient Active Problem List   Diagnosis     CARDIOVASCULAR SCREENING; LDL GOAL LESS THAN 160     Chronic fatigue syndrome     Fibromyalgia     Generalized anxiety disorder     Tobacco abuse     SHANTANU (obstructive sleep apnea)     Disturbance in sleep behavior     Cervicalgia     Stenosis, cervical spine     Spondylitis, cervical (H)     NSAID induced gastritis     Major depressive disorder, recurrent episode, mild (H)     Other chronic pain     Intermittent asthma, uncomplicated     Rheumatoid arthritis involving multiple sites with positive rheumatoid factor (H)     Elevated white blood cell count     Panic attacks     Osteoarthritis of cervical spine, unspecified spinal osteoarthritis complication status     Degenerative joint disease of cervical spine     Pulmonary nodules     Abnormal CT of the chest     Hilar lymphadenopathy     Other migraine without status migrainosus, intractable     Chronic rhinitis, unspecified type     Pelvic pain in female     Cervical cancer screening     Past Surgical History:   Procedure Laterality Date     C APPENDECTOMY      Ruptured at age 7 years     C  DELIVERY ONLY      , Low Cervical     DILATION AND CURETTAGE, HYSTEROSCOPY, ABLATE ENDOMETRIUM NOVASURE, COMBINED  2011    Procedure:COMBINED DILATION AND CURETTAGE, HYSTEROSCOPY, ABLATE ENDOMETRIUM NOVASURE;  hysteroscopy, dilation and curettage, novasure; Surgeon:JEREMY ANNA; Location:[facility] OR     EXCISE LESION TRUNK  9/12/2013    Procedure: EXCISE LESION TRUNK;  interlaminar epidural Steroid Injection Cervical -thoracic Levels (C7-T1)    Re-Excision of chest wall mass;  Surgeon: Jeremy Bolaños MD;  Location: [facility] OR     HC HYSTEROS W PERMANENT FALLOPAIN IMPLANT  2012    Essure done in office [facility]     INJECT BLOCK MEDIAL BRANCH CERVICAL/THORACIC/LUMBAR  6/12/2014    Procedure: INJECT BLOCK MEDIAL BRANCH CERVICAL / THORACIC / LUMBAR;  Surgeon: Josué Munson MD;  Location: [facility] OR     INJECT FACET JOINT  2/13/2014    Procedure: INJECT FACET JOINT;  diagnostic medial branch facet nerve block cervical levels 5-6, 6-7;  Surgeon: Josué Munson MD;  Location: [facility] OR     WISDOM [facility]         Social History   Substance Use Topics     Smoking status: Current Every Day Smoker     Packs/day: 0.50     Years: 29.00     Types: Cigarettes     Smokeless tobacco: Never Used      Comment: 9/19/11 - 1pk/day     Alcohol use No     Family History   Problem Relation Age of Onset     Arthritis Mother      Rheumatoid     Respiratory Mother      COPD     Hypertension Sister      1/2 sister     Neurologic Disorder Sister      1/2 sisiter  Very mild form of CP     Cardiovascular Maternal Grandmother      HEART DISEASE Maternal Aunt      Bypass at age 50's           Reviewed and updated as needed this visit by clinical staff       Reviewed and updated as needed this visit by Provider         ROS:  Constitutional, HEENT, cardiovascular, pulmonary, GI, , musculoskeletal, neuro, skin, endocrine and psych systems are negative, except as otherwise noted.    OBJECTIVE:     /70  Pulse 90  Temp 96.8  F (36  C) (Temporal)  Resp 18  Wt 131 lb (59.4 kg)  SpO2 98%  BMI 21.6 kg/m2  Body mass index is 21.6 kg/(m^2).   GENERAL: alert, no distress and appears older than stated age  HENT: normal cephalic/atraumatic, both ears:  "clear effusion, nasal mucosa edematous , rhinorrhea clear, oropharynx clear, oral mucous membranes moist and sinuses: maxillary tenderness on both sides  NECK: no adenopathy, no asymmetry, masses, or scars and thyroid normal to palpation  RESP: Scattered rhonchi anterior aspects and upper aspects posterior as well.  No rales are noted.  No respiratory distress.  Bronchitic sounding cough throughout the appointment.  CV: regular rate and rhythm, normal S1 S2, no S3 or S4, no murmur, click or rub, no peripheral edema and peripheral pulses strong  ABDOMEN: soft, nontender, no hepatosplenomegaly, no masses and bowel sounds normal  MS: no gross musculoskeletal defects noted, no edema  SKIN: no suspicious lesions or rashes    Diagnostic Test Results:  CXR - IMPRESSION: Interval clearing of previously seen right base density.  Current exam shows some minimal prominent bronchovascular markings but  no discrete infiltrates or effusions. Heart size and pulmonary  vasculature are normal    ASSESSMENT:      Cough  Acute bronchitis with symptoms > 10 days  Acute sinusitis with symptoms > 10 days      PLAN:       ICD-10-CM    1. Cough R05 XR Chest 2 Views     doxycycline (VIBRAMYCIN) 100 MG capsule     guaiFENesin (MUCINEX) 600 MG 12 hr tablet   2. Acute bronchitis with symptoms > 10 days J20.9 doxycycline (VIBRAMYCIN) 100 MG capsule     benzonatate (TESSALON) 200 MG capsule     guaiFENesin (MUCINEX) 600 MG 12 hr tablet   3. Acute sinusitis with symptoms > 10 days J01.90 doxycycline (VIBRAMYCIN) 100 MG capsule     guaiFENesin (MUCINEX) 600 MG 12 hr tablet           MEDICATIONS:        - Start taking Doxycycline per orders.  She also requested \"liquid gold\" cough medication which apparently used to be prescribed by another provider.  Sounds like it may contain a narcotic.  She was told that I do not typically prescribe that type of medication and many of those formulations are obsolete.  I was willing to give her Tessalon Perles " for cough at this time especially given the long list of medication she currently takes.  She is also using Zyrtec daily which she will continue.  Also uses Flonase on a daily basis which is encouraged.  She does have an albuterol inhaler which she will use for wheezing.  Dr. Donald is apparently watching her prednisone use, encouraged not to use this for bronchial symptoms but should be using it strictly for RA flares.       - Continue other medications without change  FUTURE APPOINTMENTS:       - Follow-up visit in 2 weeks - sooner if worsening.    Selian Reveles PA-C  [facility]      Orders Placed This Encounter     XR Chest 2 Views     doxycycline (VIBRAMYCIN) 100 MG capsule     benzonatate (TESSALON) 200 MG capsule     guaiFENesin (MUCINEX) 600 MG 12 hr tablet       AVS given to patient upon discharge today.  Electronically signed by Selina Reveles PA-C  September 24, 2018  12:13 PM

## 2018-09-20 NOTE — TELEPHONE ENCOUNTER
Central Prior Authorization Team   Phone: [phone]    PA Initiation    Medication: Methotrexate needs PA  Insurance Company: [employer] - Phone [phone] Fax [phone]  Pharmacy Filling the Rx: [address]   Filling Pharmacy Phone: [phone]  Filling Pharmacy Fax:    Start Date: 9/20/2018

## 2018-09-26 ENCOUNTER — TELEPHONE (OUTPATIENT)
Dept: FAMILY MEDICINE | Facility: CLINIC | Age: 48
End: 2018-09-26

## 2018-09-26 DIAGNOSIS — G47.9 SLEEP DISTURBANCE: ICD-10-CM

## 2018-09-26 NOTE — TELEPHONE ENCOUNTER
"Requested Prescriptions   Pending Prescriptions Disp Refills     hydrOXYzine (ATARAX) 25 MG tablet  Last Written Prescription Date:  7/3/18  Last Fill Quantity: 90,  # refills: 0   Last office visit: 9/20/2018 with prescribing provider:  9/20/18   Future Office Visit:   Next 5 appointments (look out 90 days)     Dec 11, 2018  2:40 PM CST   [facility] Visit with Ash Donald MD   [facility]    [address] [place] [address]   [phone]                  90 tablet 0    Antihistamines Protocol Passed    9/26/2018  2:52 PM       Passed - Recent (12 mo) or future (30 days) visit within the authorizing provider's specialty    Patient had office visit in the last 12 months or has a visit in the next 30 days with authorizing provider or within the authorizing provider's specialty.  See \"Patient Info\" tab in inbasket, or \"Choose Columns\" in Meds & Orders section of the refill encounter.           Passed - Patient is age 3 or older    Apply age and/or weight-based dosing for peds patients age 3 and older.    Forward request to provider for patients under the age of 3.            "

## 2018-09-26 NOTE — TELEPHONE ENCOUNTER
Per Selina Reveles we are unable to see her ,please help her make an appt with someone.  Addie García MA

## 2018-09-26 NOTE — TELEPHONE ENCOUNTER
Reason for Call:  Same Day Appointment, Requested Provider:  Selina Reveles PA-C    PCP: Selina Reveles    Reason for visit: rib pain from a fall on 9/22    Duration of symptoms:      Have you been treated for this in the past? No    Additional comments: Was recently seen for a URI and now has rib pain from a fall. She feels she is not improving and now having difficulty coughing because of the pain. She is wondering if you could work her in 9/27 or 9/28.     Can we leave a detailed message on this number? YES    Phone number patient can be reached at: Home number on file [phone] (home)    Best Time:      Call taken on 9/26/2018 at 10:31 AM by Marjan Castro

## 2018-09-26 NOTE — TELEPHONE ENCOUNTER
Called Sherie and message was relayed.  Sherie denies making an appt with another provider at this time but will call back if she feels she needs to.

## 2018-09-28 RX ORDER — HYDROXYZINE HYDROCHLORIDE 25 MG/1
TABLET, FILM COATED ORAL
Qty: 90 TABLET | Refills: 2 | Status: SHIPPED | OUTPATIENT
Start: 2018-09-28 | End: 2018-11-02

## 2018-10-01 ENCOUNTER — OFFICE VISIT (OUTPATIENT)
Dept: FAMILY MEDICINE | Facility: OTHER | Age: 48
End: 2018-10-01
Payer: MEDICARE

## 2018-10-01 ENCOUNTER — RADIANT APPOINTMENT (OUTPATIENT)
Dept: GENERAL RADIOLOGY | Facility: OTHER | Age: 48
End: 2018-10-01
Attending: NURSE PRACTITIONER
Payer: MEDICARE

## 2018-10-01 VITALS
HEART RATE: 95 BPM | SYSTOLIC BLOOD PRESSURE: 126 MMHG | DIASTOLIC BLOOD PRESSURE: 60 MMHG | OXYGEN SATURATION: 98 % | TEMPERATURE: 98.2 F | BODY MASS INDEX: 21.76 KG/M2 | WEIGHT: 132 LBS | RESPIRATION RATE: 16 BRPM

## 2018-10-01 DIAGNOSIS — J45.20 MILD INTERMITTENT ASTHMA WITHOUT COMPLICATION: ICD-10-CM

## 2018-10-01 DIAGNOSIS — R07.81 RIB PAIN ON RIGHT SIDE: ICD-10-CM
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
## 2018-10-01 DIAGNOSIS — J06.9 UPPER RESPIRATORY TRACT INFECTION, UNSPECIFIED TYPE: ICD-10-CM

## 2018-10-01 PROCEDURE — 71046 X-RAY EXAM CHEST 2 VIEWS: CPT | Mod: FY

## 2018-10-01 PROCEDURE — 99214 OFFICE O/P EST MOD 30 MIN: CPT | Performed by: NURSE PRACTITIONER

## 2018-10-01 RX ORDER — CODEINE PHOSPHATE AND GUAIFENESIN 10; 100 MG/5ML; MG/5ML
1 SOLUTION ORAL EVERY 6 HOURS PRN
Qty: 120 ML | Refills: 0 | Status: SHIPPED | OUTPATIENT
Start: 2018-10-01 | End: 2018-12-13

## 2018-10-01 ASSESSMENT — ANXIETY QUESTIONNAIRES
2. NOT BEING ABLE TO STOP OR CONTROL WORRYING: MORE THAN HALF THE DAYS
1. FEELING NERVOUS, ANXIOUS, OR ON EDGE: NEARLY EVERY DAY
3. WORRYING TOO MUCH ABOUT DIFFERENT THINGS: MORE THAN HALF THE DAYS
5. BEING SO RESTLESS THAT IT IS HARD TO SIT STILL: MORE THAN HALF THE DAYS
7. FEELING AFRAID AS IF SOMETHING AWFUL MIGHT HAPPEN: NOT AT ALL
GAD7 TOTAL SCORE: 12
6. BECOMING EASILY ANNOYED OR IRRITABLE: NOT AT ALL
IF YOU CHECKED OFF ANY PROBLEMS ON THIS QUESTIONNAIRE, HOW DIFFICULT HAVE THESE PROBLEMS MADE IT FOR YOU TO DO YOUR WORK, TAKE CARE OF THINGS AT HOME, OR GET ALONG WITH OTHER PEOPLE: VERY DIFFICULT

## 2018-10-01 ASSESSMENT — PATIENT HEALTH QUESTIONNAIRE - PHQ9: 5. POOR APPETITE OR OVEREATING: NEARLY EVERY DAY

## 2018-10-01 ASSESSMENT — PAIN SCALES - GENERAL: PAINLEVEL: EXTREME PAIN (8)

## 2018-10-01 NOTE — LETTER
My Depression Action Plan  Name: Sherie Otero   Date of Birth 1970  Date: 10/1/2018    My doctor: Selina Reveles   My clinic: [address] [address] [phone]          GREEN    ZONE   Good Control    What it looks like:     Things are going generally well. You have normal up s and down s. You may even feel depressed from time to time, but bad moods usually last less than a day.   What you need to do:  1. Continue to care for yourself (see self care plan)  2. Check your depression survival kit and update it as needed  3. Follow your physician s recommendations including any medication.  4. Do not stop taking medication unless you consult with your physician first.           YELLOW         ZONE Getting Worse    What it looks like:     Depression is starting to interfere with your life.     It may be hard to get out of bed; you may be starting to isolate yourself from others.    Symptoms of depression are starting to last most all day and this has happened for several days.     You may have suicidal thoughts but they are not constant.   What you need to do:     1. Call your care team, your response to treatment will improve if you keep your care team informed of your progress. Yellow periods are signs an adjustment may need to be made.     2. Continue your self-care, even if you have to fake it!    3. Talk to someone in your support network    4. Open up your depression survival kit           RED    ZONE Medical Alert - Get Help    What it looks like:     Depression is seriously interfering with your life.     You may experience these or other symptoms: You can t get out of bed most days, can t work or engage in other necessary activities, you have trouble taking care of basic hygiene, or basic responsibilities, thoughts of suicide or death that will not go away, self-injurious behavior.     What you need to do:  1. Call your care team and  request a same-day appointment. If they are not available (weekends or after hours) call your local crisis line, emergency room or 911.            Depression Self Care Plan / Survival Kit    Self-Care for Depression  Here s the deal. Your body and mind are really not as separate as most people think.  What you do and think affects how you feel and how you feel influences what you do and think. This means if you do things that people who feel good do, it will help you feel better.  Sometimes this is all it takes.  There is also a place for medication and therapy depending on how severe your depression is, so be sure to consult with your medical provider and/ or Behavioral Health Consultant if your symptoms are worsening or not improving.     In order to better manage my stress, I will:    Exercise  Get some form of exercise, every day. This will help reduce pain and release endorphins, the  feel good  chemicals in your brain. This is almost as good as taking antidepressants!  This is not the same as joining a gym and then never going! (they count on that by the way ) It can be as simple as just going for a walk or doing some gardening, anything that will get you moving.      Hygiene   Maintain good hygiene (Get out of bed in the morning, Make your bed, Brush your teeth, Take a shower, and Get dressed like you were going to work, even if you are unemployed).  If your clothes don't fit try to get ones that do.    Diet  I will strive to eat foods that are good for me, drink plenty of water, and avoid excessive sugar, caffeine, alcohol, and other mood-altering substances.  Some foods that are helpful in depression are: complex carbohydrates, B vitamins, flaxseed, fish or fish oil, fresh fruits and vegetables.    Psychotherapy  I agree to participate in Individual Therapy (if recommended).    Medication  If prescribed medications, I agree to take them.  Missing doses can result in serious side effects.  I understand that  drinking alcohol, or other illicit drug use, may cause potential side effects.  I will not stop my medication abruptly without first discussing it with my provider.    Staying Connected With Others  I will stay in touch with my friends, family members, and my primary care provider/team.    Use your imagination  Be creative.  We all have a creative side; it doesn t matter if it s oil painting, sand castles, or mud pies! This will also kick up the endorphins.    Witness Beauty  (AKA stop and smell the roses) Take a look outside, even in mid-winter. Notice colors, textures. Watch the squirrels and birds.     Service to others  Be of service to others.  There is always someone else in need.  By helping others we can  get out of ourselves  and remember the really important things.  This also provides opportunities for practicing all the other parts of the program.    Humor  Laugh and be silly!  Adjust your TV habits for less news and crime-drama and more comedy.    Control your stress  Try breathing deep, massage therapy, biofeedback, and meditation. Find time to relax each day.     My support system    Clinic Contact:  Phone number:    Contact 1:  Phone number:    Contact 2:  Phone number:    Baptism/:  Phone number:    Therapist:  Phone number:    Local crisis center:    Phone number:    Other community support:  Phone number:

## 2018-10-01 NOTE — PROGRESS NOTES
SUBJECTIVE:   Sherie Otero is a 48 year old female who presents to clinic today for the following health issues:      HPI  RESPIRATORY SYMPTOMS      Duration: a week ago    Description  nasal congestion, rhinorrhea, facial pain/pressure, wheezing and headache    Severity: moderate    Accompanying signs and symptoms: a week ago Saturday she feel on the same side she was having the pain before and now pain is back after the fall on the right side, cough is productive and is white with some yellow, sinus pressure, chills/sweats, ears are full and popping     History (predisposing factors):  tobacco abuse    Precipitating or alleviating factors: sleeping propped up helps and hot liquids help    Therapies tried and outcome:  Above and zyrtec, albuterol inhaler, tessalon caps, doxycycline, Flonase,     Problem list and histories reviewed & adjusted, as indicated.    Took all ten days.  Then a week ago fell and cough worsened.  Pain on right side.  Had costocondritis.  Yellow drainage coughing up.  Got runny nose last night.  Chills/ sweats.  No fever today.      When she fell a wire wrapped around her foot.      Her primary provider is leaving clinic.  She is looking for a new provider.      Intermittent asthma.  Uses flonase, zyrtec.   And albuterol when has cough.      Additional history: as documented        Patient Active Problem List   Diagnosis     CARDIOVASCULAR SCREENING; LDL GOAL LESS THAN 160     Chronic fatigue syndrome     Fibromyalgia     Generalized anxiety disorder     Tobacco abuse     SHANTANU (obstructive sleep apnea)     Disturbance in sleep behavior     Cervicalgia     Stenosis, cervical spine     Spondylitis, cervical (H)     NSAID induced gastritis     Major depressive disorder, recurrent episode, mild (H)     Other chronic pain     Intermittent asthma, uncomplicated     Rheumatoid arthritis involving multiple sites with positive rheumatoid factor (H)     Elevated white blood cell count     Panic  attacks     Osteoarthritis of cervical spine, unspecified spinal osteoarthritis complication status     Degenerative joint disease of cervical spine     Pulmonary nodules     Abnormal CT of the chest     Hilar lymphadenopathy     Other migraine without status migrainosus, intractable     Chronic rhinitis, unspecified type     Pelvic pain in female     Cervical cancer screening     Past Surgical History:   Procedure Laterality Date     C APPENDECTOMY      Ruptured at age 7 years     C  DELIVERY ONLY      , Low Cervical     DILATION AND CURETTAGE, HYSTEROSCOPY, ABLATE ENDOMETRIUM NOVASURE, COMBINED  2011    Procedure:COMBINED DILATION AND CURETTAGE, HYSTEROSCOPY, ABLATE ENDOMETRIUM NOVASURE; hysteroscopy, dilation and curettage, novasure; Surgeon:JEREMY ANNA; Location:[facility] OR     EXCISE LESION TRUNK  2013    Procedure: EXCISE LESION TRUNK;  interlaminar epidural Steroid Injection Cervical -thoracic Levels (C7-T1)    Re-Excision of chest wall mass;  Surgeon: Jeremy Bolaños MD;  Location: [facility] OR     HC HYSTEROS W PERMANENT FALLOPAIN IMPLANT      Essure done in office [facility]     INJECT BLOCK MEDIAL BRANCH CERVICAL/THORACIC/LUMBAR  2014    Procedure: INJECT BLOCK MEDIAL BRANCH CERVICAL / THORACIC / LUMBAR;  Surgeon: Josué Munson MD;  Location: [facility] OR     INJECT FACET JOINT  2014    Procedure: INJECT FACET JOINT;  diagnostic medial branch facet nerve block cervical levels 5-6, 6-7;  Surgeon: Josué Munson MD;  Location: [facility] OR     [facility]         Social History   Substance Use Topics     Smoking status: Current Every Day Smoker     Packs/day: 0.50     Years: 29.00     Types: Cigarettes     Smokeless tobacco: Never Used      Comment: 11 - 1pk/day     Alcohol use No     Family History   Problem Relation Age of Onset     Arthritis Mother      Rheumatoid     Respiratory Mother      COPD     Hypertension Sister      1/2 sister     Neurologic Disorder  Sister      1/2 sisiter  Very mild form of CP     Cardiovascular Maternal Grandmother      HEART DISEASE Maternal Aunt      Bypass at age 50's         Current Outpatient Prescriptions   Medication Sig Dispense Refill     albuterol (VENTOLIN HFA) 108 (90 Base) MCG/ACT Inhaler Inhale 2 puffs into the lungs every 6 hours as needed 18 g 4     [START ON 10/11/2018] ALPRAZolam (XANAX) 0.5 MG tablet One tablet mid day. MUST LAST 30 DAYS. 30 tablet 0     busPIRone (BUSPAR) 10 MG tablet TAKE 5 MG (1/2 TAB) IN THE MORNING AND 10 MG AT NIGHT 135 tablet 3     cetirizine (ZYRTEC) 10 MG tablet TAKE  ONE TABLET BY MOUTH EVERY DAY. 90 tablet 3     [START ON 10/11/2018] clonazePAM (KLONOPIN) 0.5 MG tablet TAKE ONE-HALF TO ONE TABLET BY MOUTH TWICE A DAY AS NEEDED FOR ANXIETY - MUST LAST 30 DAYS 60 tablet 0     cyclobenzaprine (FLEXERIL) 5 MG tablet TAKE ONE TABLET BY MOUTH THREE TIMES A DAY AS NEEDED FOR MUSCLE SPASMS 90 tablet 3     CYMBALTA 60 MG EC capsule TAKE ONE CAPSULE BY MOUTH EVERY EVENING - KADY 90 capsule 1     fluticasone (FLONASE) 50 MCG/ACT spray SPRAY 2 SPRAYS INTO BOTH NOSTRILS DAILY. 16 g 11     folic acid (FOLVITE) 1 MG tablet Take 1 tablet (1 mg) by mouth daily 100 tablet 3     guaiFENesin-codeine (ROBITUSSIN AC) 100-10 MG/5ML SOLN solution Take 5 mLs by mouth every 6 hours as needed for congestion 120 mL 0     HYDROcodone-acetaminophen (NORCO) 7.5-325 MG per tablet TAKE 2 TABLETS BY MOUTH EVERY 6 HOURS AS NEEDED FOR PAIN--MAX OF 8 TABLETS PER DAY. NARCOTIC AGREEMENT DONE 1/30/2018 240 tablet 0     hydrOXYzine (ATARAX) 25 MG tablet TAKE 2-4 TABLETS BY MOUTH NIGHTLY AS NEEDED FOR ANXIETY  (SLEEP) MAY TAKE ONE TABLET TO START WITH 90 tablet 2     methotrexate sodium 2.5 MG TABS 10mg (4 tabs) once weekly x1 week, then increase by 2.5mg (1 tab) each week until taking the goal weekly dose of 15mg (6 tabs) once weekly 24 tablet 3     Multiple Vitamins-Minerals (MULTIVITAL PO)        predniSONE (DELTASONE) 10 MG tablet  PRN arthritis flare: prednisone 10-20mg daily x5days, then stop. 40 tablet 1     verapamil (CALAN) 40 MG tablet Take 1 tablet (40 mg) by mouth 2 times daily 180 tablet 3     guaiFENesin (MUCINEX) 600 MG 12 hr tablet Take 2 tablets (1,200 mg) by mouth 2 times daily (Patient not taking: Reported on 10/1/2018) 28 tablet 0     Allergies   Allergen Reactions     Codeine Nausea and Vomiting     Droperidol      Uncontrollable shaking       Penicillins        ROS:  Constitutional, HEENT, cardiovascular, pulmonary, gi and gu systems are negative, except as otherwise noted.    OBJECTIVE:     /60  Pulse 95  Temp 98.2  F (36.8  C) (Temporal)  Resp 16  Wt 132 lb (59.9 kg)  SpO2 98%  BMI 21.76 kg/m2  Body mass index is 21.76 kg/(m^2).  GENERAL: healthy, alert and no distress  NECK: no adenopathy, no asymmetry, masses, or scars and thyroid normal to palpation  RESP: lungs clear to auscultation - no rales, rhonchi or wheezes  CV: regular rate and rhythm, normal S1 S2, no S3 or S4, no murmur, click or rub, no peripheral edema and peripheral pulses strong  ABDOMEN: soft, nontender, no hepatosplenomegaly, no masses and bowel sounds normal  MS: no gross musculoskeletal defects noted, no edema  Ribs- under right breast- tender to palpation- no step offs or abnormalities noted.     Diagnostic Test Results:  No results found for this or any previous visit (from the past 24 hour(s)).    ASSESSMENT/PLAN:           (W19.XXXA) Fall, initial encounter  (primary encounter diagnosis)  Comment: xray negative to my read.  Supportive cares discussed.  Plan: XR Chest 2 Views,          (R07.81) Rib pain on right side  Comment: as above  Plan: XR Chest 2 Views    (J06.9) Upper respiratory tract infection, unspecified type  Comment: suspect viral at this point.  CXR negative.  Afebrile.    Plan: finish prednisone, supportive cares. Codeine cough syrup to use very sparingly- she states she can tolerate the cough syrup with codeine.  She is  on a pain agreement with her PCP so will route to that provider. She will not take cough syrup at same time as hydrocodone, xanax or clonazepam.      (J45.20) Mild intermittent asthma without complication  Comment: ACT and AAP updated today    Did review patient's medications- discussed that if I did take over her care I would want her to make changes to her current medications as I would not prescribe the xanax, clonazepam, hydroxyzine, hydrocodone, flexeril at the same time.      Michelle Mayorga, STEFANI  [facility]

## 2018-10-01 NOTE — LETTER
My Asthma Action Plan  Name: Sherie Otero   YOB: 1970  Date: 10/1/2018   My doctor: Michelle Mayorga NP   My clinic: [facility]        My Control Medicine: flonasemaryayrtec  My Rescue Medicine: Albuterol (Proair/Ventolin/Proventil) inhaler 2 puffs every four hours as needed   My Asthma Severity: intermittent  Avoid your asthma triggers: smoke and upper respiratory infections  None            GREEN ZONE   Good Control    I feel good    No cough or wheeze    Can work, sleep and play without asthma symptoms       Take your asthma control medicine every day.     1. If exercise triggers your asthma, take your rescue medication    15 minutes before exercise or sports, and    During exercise if you have asthma symptoms  2. Spacer to use with inhaler: If you have a spacer, make sure to use it with your inhaler             YELLOW ZONE Getting Worse  I have ANY of these:    I do not feel good    Cough or wheeze    Chest feels tight    Wake up at night   1. Keep taking your Green Zone medications  2. Start taking your rescue medicine:    every 20 minutes for up to 1 hour. Then every 4 hours for 24-48 hours.  3. If you stay in the Yellow Zone for more than 12-24 hours, contact your doctor.  4. If you do not return to the Green Zone in 12-24 hours or you get worse, start taking your oral steroid medicine if prescribed by your provider.           RED ZONE Medical Alert - Get Help  I have ANY of these:    I feel awful    Medicine is not helping    Breathing getting harder    Trouble walking or talking    Nose opens wide to breathe       1. Take your rescue medicine NOW  2. If your provider has prescribed an oral steroid medicine, start taking it NOW  3. Call your doctor NOW  4. If you are still in the Red Zone after 20 minutes and you have not reached your doctor:    Take your rescue medicine again and    Call 911 or go to the emergency room right away    See your regular doctor within 2 weeks  of an Emergency Room or Urgent Care visit for follow-up treatment.          Annual Reminders:  Meet with Asthma Educator,  Flu Shot in the Fall, consider Pneumonia Vaccination for patients with asthma (aged 19 and older).    Pharmacy:    [facility] [address]                       Asthma Triggers  How To Control Things That Make Your Asthma Worse    Triggers are things that make your asthma worse.  Look at the list below to help you find your triggers and what you can do about them.  You can help prevent asthma flare-ups by staying away from your triggers.      Trigger                                                          What you can do   Cigarette Smoke  Tobacco smoke can make asthma worse. Do not allow smoking in your home, car or around you.  Be sure no one smokes at a child s day care or school.  If you smoke, ask your health care provider for ways to help you quit.  Ask family members to quit too.  Ask your health care provider for a referral to Quit Plan to help you quit smoking, or call 8-688-674-PLAN.     Colds, Flu, Bronchitis  These are common triggers of asthma. Wash your hands often.  Don t touch your eyes, nose or mouth.  Get a flu shot every year.     Dust Mites  These are tiny bugs that live in cloth or carpet. They are too small to see. Wash sheets and blankets in hot water every week.   Encase pillows and mattress in dust mite proof covers.  Avoid having carpet if you can. If you have carpet, vacuum weekly.   Use a dust mask and HEPA vacuum.   Pollen and Outdoor Mold  Some people are allergic to trees, grass, or weed pollen, or molds. Try to keep your windows closed.  Limit time out doors when pollen count is high.   Ask you health care provider about taking medicine during allergy season.     Animal Dander  Some people are allergic to skin flakes, urine or saliva from pets with fur or feathers. Keep pets with fur or  feathers out of your home.    If you can t keep the pet outdoors, then keep the pet out of your bedroom.  Keep the bedroom door closed.  Keep pets off cloth furniture and away from stuffed toys.     Mice, Rats, and Cockroaches  Some people are allergic to the waste from these pests.   Cover food and garbage.  Clean up spills and food crumbs.  Store grease in the refrigerator.   Keep food out of the bedroom.   Indoor Mold  This can be a trigger if your home has high moisture. Fix leaking faucets, pipes, or other sources of water.   Clean moldy surfaces.  Dehumidify basement if it is damp and smelly.   Smoke, Strong Odors, and Sprays  These can reduce air quality. Stay away from strong odors and sprays, such as perfume, powder, hair spray, paints, smoke incense, paint, cleaning products, candles and new carpet.   Exercise or Sports  Some people with asthma have this trigger. Be active!  Ask your doctor about taking medicine before sports or exercise to prevent symptoms.    Warm up for 5-10 minutes before and after sports or exercise.     Other Triggers of Asthma  Cold air:  Cover your nose and mouth with a scarf.  Sometimes laughing or crying can be a trigger.  Some medicines and food can trigger asthma.

## 2018-10-01 NOTE — MR AVS SNAPSHOT
After Visit Summary   10/1/2018    Sherie Otero    MRN: [id number]           Patient Information     Date Of Birth          1970        Visit Information        Provider Department      10/1/2018 12:10 PM Michelle Mayorga NP [facility]        Today's Diagnoses     Fall, initial encounter    -  1    Rib pain on right side        Upper respiratory tract infection, unspecified type        Mild intermittent asthma without complication          Care Instructions    Can use the codeine cough syrup sparingly- do not take at same time as hydrocodone.  Drink plenty of fluids.    For symptoms can try the following:   For body aches, headache, pain:   Tylenol (acetaminophen) up to 1000mg 3x/day.   Ibuprofen up to 800mg 3x/day     For sore throat  1. Throat lozenges  2. Cold beverages, ice pops, OR warm liquids (teas, etc)  3. Warm salt water gargles   4. Chloroseptic spray  5. Tylenol -or- ibuprofen for pain    For cough:  1. Sleep upright  2. Humidifier- warm showers  3. Honey  4.Vicks Vapor Rub  5. Mucinex tablets (guaifenesin)  6. Robitussin (guaifenesin) or Delsym (dextromethorphan) cough syrup as directed   5. Can use albuterol every 4-6 hours for cough, shortness of breath    For nasal congestion:  Can try neti pot or simply saline  Nasal spray containing oxymetazoline (Afrin) - 1 sprays each nostril twice a day for 3 days only (prolonged use can worsen congestion symptoms once discontinued)      Should be re-evaluated for new fevers, shortness of breath or difficulty breathing, new or worsening symptoms.            Follow-ups after your visit        Follow-up notes from your care team     Return in about 4 weeks (around 10/29/2018), or if symptoms worsen or fail to improve.      Your next 10 appointments already scheduled     Oct 02, 2018  1:00 PM CDT   LAB with NL LAB PMC   [facility] ([facility])    [address] [place] [id number]    [phone]           Please do not eat 10-12 hours before your appointment if you are coming in fasting for labs on lipids, cholesterol, or glucose (sugar). This does not apply to pregnant women. Water, hot tea and black coffee (with nothing added) are okay. Do not drink other fluids, diet soda or chew gum.            Oct 30, 2018  1:00 PM CDT   LAB with NL LAB PMC   [facility] ([facility])    [address] [place] [address]   [phone]           Please do not eat 10-12 hours before your appointment if you are coming in fasting for labs on lipids, cholesterol, or glucose (sugar). This does not apply to pregnant women. Water, hot tea and black coffee (with nothing added) are okay. Do not drink other fluids, diet soda or chew gum.            Dec 11, 2018  2:40 PM CST   Return Visit with Ash Donald MD   [facility] ([facility]    [address] [place] [id number] [phone]              Who to contact     If you have questions or need follow up information about today's clinic visit or your schedule please contact [facility] directly at [phone].  Normal or non-critical lab and imaging results will be communicated to you by [employer], letter or phone within 4 business days after the clinic has received the results. If you do not hear from us within 7 days, please contact the clinic through [employer] or phone. If you have a critical or abnormal lab result, we will notify you by phone as soon as possible.  Submit refill requests through [employer] or call your pharmacy and they will forward the refill request to us. Please allow 3 business days for your refill to be completed.          Additional Information About Your Visit        [employer] Information     [employer] gives you secure access to your electronic health record. If you see a primary care provider, you can also send messages to your  care team and make appointments. If you have questions, please call your primary care clinic.  If you do not have a primary care provider, please call [phone] and they will assist you.        Care EveryWhere ID     This is your Care EveryWhere ID. This could be used by other organizations to access your [place] medical records  [id number]        Your Vitals Were     Pulse Temperature Respirations Pulse Oximetry BMI (Body Mass Index)       95 98.2  F (36.8  C) (Temporal) 16 98% 21.76 kg/m2        Blood Pressure from Last 3 Encounters:   10/01/18 126/60   09/20/18 116/70   08/01/18 120/72    Weight from Last 3 Encounters:   10/01/18 132 lb (59.9 kg)   09/20/18 131 lb (59.4 kg)   08/01/18 129 lb (58.5 kg)                 Today's Medication Changes          These changes are accurate as of 10/1/18  1:15 PM.  If you have any questions, ask your nurse or doctor.               Start taking these medicines.        Dose/Directions    guaiFENesin-codeine 100-10 MG/5ML Soln solution   Commonly known as:  ROBITUSSIN AC   Used for:  Rib pain on right side, Fall, initial encounter   Started by:  Michelle Mayorga, STEFANI        Dose:  1 tsp.   Take 5 mLs by mouth every 6 hours as needed for congestion   Quantity:  120 mL   Refills:  0            Where to get your medicines      Some of these will need a paper prescription and others can be bought over the counter.  Ask your nurse if you have questions.     Bring a paper prescription for each of these medications     guaiFENesin-codeine 100-10 MG/5ML Soln solution                Primary Care Provider Office Phone # Fax #    Selina Reveles PA-C [phone]       [address] [place] [address]        Equal Access to Services     [facility] [place] : Fabi Amaya, abelino chappell, berhane onofre, neema harvey. So [facility] [phone].    ATENCIÓN: Si habla español, tiene a lange disposición servicios gratuitos  de asistencia lingüística. Mila al [phone].    We comply with applicable federal civil rights laws and [place] laws. We do not discriminate on the basis of race, color, national origin, age, disability, sex, sexual orientation, or gender identity.            Thank you!     Thank you for choosing [facility]  for your care. Our goal is always to provide you with excellent care. Hearing back from our patients is one way we can continue to improve our services. Please take a few minutes to complete the written survey that you may receive in the mail after your visit with us. Thank you!             Your Updated Medication List - Protect others around you: Learn how to safely use, store and throw away your medicines at www.disposemymeds.org.          This list is accurate as of 10/1/18  1:15 PM.  Always use your most recent med list.                   Brand Name Dispense Instructions for use Diagnosis    albuterol 108 (90 Base) MCG/ACT inhaler    VENTOLIN HFA    18 g    Inhale 2 puffs into the lungs every 6 hours as needed    Intermittent asthma, uncomplicated       ALPRAZolam 0.5 MG tablet   Start taking on:  10/11/2018    XANAX    30 tablet    One tablet mid day. MUST LAST 30 DAYS.    Generalized anxiety disorder       busPIRone 10 MG tablet    BUSPAR    135 tablet    TAKE 5 MG (1/2 TAB) IN THE MORNING AND 10 MG AT NIGHT    Generalized anxiety disorder       cetirizine 10 MG tablet    zyrTEC    90 tablet    TAKE  ONE TABLET BY MOUTH EVERY DAY.    Chronic rhinitis, unspecified type       clonazePAM 0.5 MG tablet   Start taking on:  10/11/2018    klonoPIN    60 tablet    TAKE ONE-HALF TO ONE TABLET BY MOUTH TWICE A DAY AS NEEDED FOR ANXIETY - MUST LAST 30 DAYS    Generalized anxiety disorder       cyclobenzaprine 5 MG tablet    FLEXERIL    90 tablet    TAKE ONE TABLET BY MOUTH THREE TIMES A DAY AS NEEDED FOR MUSCLE SPASMS    Fibromyalgia       CYMBALTA 60 MG EC capsule   Generic drug:  DULoxetine      90 capsule    TAKE ONE CAPSULE BY MOUTH EVERY EVENING - KADY    Fibromyalgia, Major depressive disorder, recurrent episode, mild (H)       fluticasone 50 MCG/ACT spray    FLONASE    16 g    SPRAY 2 SPRAYS INTO BOTH NOSTRILS DAILY.    Chronic rhinitis, unspecified type       folic acid 1 MG tablet    FOLVITE    100 tablet    Take 1 tablet (1 mg) by mouth daily    Rheumatoid arthritis involving multiple sites with positive rheumatoid factor (H)       guaiFENesin 600 MG 12 hr tablet    MUCINEX    28 tablet    Take 2 tablets (1,200 mg) by mouth 2 times daily    Cough, Acute bronchitis with symptoms > 10 days, Acute sinusitis with symptoms > 10 days       guaiFENesin-codeine 100-10 MG/5ML Soln solution    ROBITUSSIN AC    120 mL    Take 5 mLs by mouth every 6 hours as needed for congestion    Rib pain on right side, Fall, initial encounter       HYDROcodone-acetaminophen 7.5-325 MG per tablet    NORCO    240 tablet    TAKE 2 TABLETS BY MOUTH EVERY 6 HOURS AS NEEDED FOR PAIN--MAX OF 8 TABLETS PER DAY. NARCOTIC AGREEMENT DONE 1/30/2018    Osteoarthritis of cervical spine, unspecified spinal osteoarthritis complication status       hydrOXYzine 25 MG tablet    ATARAX    90 tablet    TAKE 2-4 TABLETS BY MOUTH NIGHTLY AS NEEDED FOR ANXIETY  (SLEEP) MAY TAKE ONE TABLET TO START WITH    Sleep disturbance       methotrexate sodium 2.5 MG Tabs     24 tablet    10mg (4 tabs) once weekly x1 week, then increase by 2.5mg (1 tab) each week until taking the goal weekly dose of 15mg (6 tabs) once weekly    Rheumatoid arthritis involving multiple sites with positive rheumatoid factor (H)       MULTIVITAL PO           predniSONE 10 MG tablet    DELTASONE    40 tablet    PRN arthritis flare: prednisone 10-20mg daily x5days, then stop.    Rheumatoid arthritis involving multiple sites with positive rheumatoid factor (H)       verapamil 40 MG tablet    CALAN    180 tablet    Take 1 tablet (40 mg) by mouth 2 times daily    Other migraine  without status migrainosus, intractable

## 2018-10-02 ENCOUNTER — MYC MEDICAL ADVICE (OUTPATIENT)
Dept: FAMILY MEDICINE | Facility: CLINIC | Age: 48
End: 2018-10-02

## 2018-10-02 ASSESSMENT — ANXIETY QUESTIONNAIRES: GAD7 TOTAL SCORE: 12

## 2018-10-02 ASSESSMENT — PATIENT HEALTH QUESTIONNAIRE - PHQ9: SUM OF ALL RESPONSES TO PHQ QUESTIONS 1-9: 10

## 2018-10-02 ASSESSMENT — ASTHMA QUESTIONNAIRES: ACT_TOTALSCORE: 20

## 2018-10-03 DIAGNOSIS — M05.79 RHEUMATOID ARTHRITIS INVOLVING MULTIPLE SITES WITH POSITIVE RHEUMATOID FACTOR (H): ICD-10-CM

## 2018-10-03 LAB
ALBUMIN SERPL-MCNC: 3.1 G/DL (ref 3.4–5)
ALP SERPL-CCNC: 120 U/L (ref 40–150)
ALT SERPL W P-5'-P-CCNC: 20 U/L (ref 0–50)
AST SERPL W P-5'-P-CCNC: 11 U/L (ref 0–45)
BASOPHILS # BLD AUTO: 0 10E9/L (ref 0–0.2)
BASOPHILS NFR BLD AUTO: 0.1 %
BILIRUB DIRECT SERPL-MCNC: <0.1 MG/DL (ref 0–0.2)
BILIRUB SERPL-MCNC: 0.2 MG/DL (ref 0.2–1.3)
CREAT SERPL-MCNC: 0.72 MG/DL (ref 0.52–1.04)
DIFFERENTIAL METHOD BLD: ABNORMAL
EOSINOPHIL NFR BLD AUTO: 0 %
ERYTHROCYTE [DISTWIDTH] IN BLOOD BY AUTOMATED COUNT: 14.5 % (ref 10–15)
GFR SERPL CREATININE-BSD FRML MDRD: 86 ML/MIN/1.7M2
HCT VFR BLD AUTO: 40.5 % (ref 35–47)
HGB BLD-MCNC: 13 G/DL (ref 11.7–15.7)
IMM GRANULOCYTES # BLD: 0.2 10E9/L (ref 0–0.4)
IMM GRANULOCYTES NFR BLD: 1.2 %
LYMPHOCYTES # BLD AUTO: 0.8 10E9/L (ref 0.8–5.3)
LYMPHOCYTES NFR BLD AUTO: 5.7 %
MCH RBC QN AUTO: 33.1 PG (ref 26.5–33)
MCHC RBC AUTO-ENTMCNC: 32.1 G/DL (ref 31.5–36.5)
MCV RBC AUTO: 103 FL (ref 78–100)
MONOCYTES # BLD AUTO: 0.3 10E9/L (ref 0–1.3)
MONOCYTES NFR BLD AUTO: 2.1 %
NEUTROPHILS # BLD AUTO: 12.2 10E9/L (ref 1.6–8.3)
NEUTROPHILS NFR BLD AUTO: 90.9 %
NRBC # BLD AUTO: 0 10*3/UL
NRBC BLD AUTO-RTO: 0 /100
PLATELET # BLD AUTO: 368 10E9/L (ref 150–450)
PROT SERPL-MCNC: 7 G/DL (ref 6.8–8.8)
RBC # BLD AUTO: 3.93 10E12/L (ref 3.8–5.2)
WBC # BLD AUTO: 13.4 10E9/L (ref 4–11)

## 2018-10-03 PROCEDURE — 85025 COMPLETE CBC W/AUTO DIFF WBC: CPT | Performed by: INTERNAL MEDICINE

## 2018-10-03 PROCEDURE — 82565 ASSAY OF CREATININE: CPT | Performed by: INTERNAL MEDICINE

## 2018-10-03 PROCEDURE — 80076 HEPATIC FUNCTION PANEL: CPT | Performed by: INTERNAL MEDICINE

## 2018-10-03 PROCEDURE — 36415 COLL VENOUS BLD VENIPUNCTURE: CPT | Performed by: INTERNAL MEDICINE

## 2018-10-11 NOTE — PROGRESS NOTES
"[employer] message sent:  \"Ms. Otero,    Your labs did not show evidence for medication toxicity.  The white blood cell count remains elevated, but improved since the last labs.    Sincerely,  Ash Donald MD  10/11/2018 11:27 AM\""

## 2018-10-14 ENCOUNTER — MYC REFILL (OUTPATIENT)
Dept: FAMILY MEDICINE | Facility: CLINIC | Age: 48
End: 2018-10-14

## 2018-10-14 DIAGNOSIS — M47.812 OSTEOARTHRITIS OF CERVICAL SPINE, UNSPECIFIED SPINAL OSTEOARTHRITIS COMPLICATION STATUS: ICD-10-CM

## 2018-10-14 DIAGNOSIS — F41.1 GENERALIZED ANXIETY DISORDER: ICD-10-CM

## 2018-10-14 RX ORDER — ALPRAZOLAM 0.5 MG
TABLET ORAL
Qty: 30 TABLET | Refills: 0 | Status: CANCELLED | OUTPATIENT
Start: 2018-10-14

## 2018-10-14 RX ORDER — CLONAZEPAM 0.5 MG/1
TABLET ORAL
Qty: 60 TABLET | Refills: 0 | Status: CANCELLED | OUTPATIENT
Start: 2018-10-14

## 2018-10-15 RX ORDER — HYDROCODONE BITARTRATE AND ACETAMINOPHEN 7.5; 325 MG/1; MG/1
TABLET ORAL
Qty: 240 TABLET | Refills: 0 | Status: SHIPPED | OUTPATIENT
Start: 2018-10-15 | End: 2018-11-02

## 2018-10-15 NOTE — TELEPHONE ENCOUNTER
DANACO      Last Written Prescription Date:  9/17/18  Last Fill Quantity: 240,   # refills: 0  Last Office Visit: 9/20/18  Future Office visit:    Next 5 appointments (look out 90 days)     Nov 02, 2018 10:45 AM CDT   [facility] with Mauro Paredes MD   [facility]    [address] [address]   [phone]            Dec 11, 2018  2:40 PM CST   Return Visit with Ash Donald MD   [facility] ([facility])    [address] [place] [id number]   [phone]                   Routing refill request to provider for review/approval because:  Drug not on the [facility], [facility] or  Health refill protocol or controlled substance

## 2018-10-30 DIAGNOSIS — M05.79 RHEUMATOID ARTHRITIS INVOLVING MULTIPLE SITES WITH POSITIVE RHEUMATOID FACTOR (H): ICD-10-CM

## 2018-10-30 LAB
ALBUMIN SERPL-MCNC: 3.4 G/DL (ref 3.4–5)
ALP SERPL-CCNC: 131 U/L (ref 40–150)
ALT SERPL W P-5'-P-CCNC: 23 U/L (ref 0–50)
AST SERPL W P-5'-P-CCNC: 14 U/L (ref 0–45)
BASOPHILS # BLD AUTO: 0.1 10E9/L (ref 0–0.2)
BASOPHILS NFR BLD AUTO: 0.4 %
BILIRUB DIRECT SERPL-MCNC: <0.1 MG/DL (ref 0–0.2)
BILIRUB SERPL-MCNC: 0.2 MG/DL (ref 0.2–1.3)
CREAT SERPL-MCNC: 0.92 MG/DL (ref 0.52–1.04)
CRP SERPL-MCNC: 16.9 MG/L (ref 0–8)
DIFFERENTIAL METHOD BLD: ABNORMAL
EOSINOPHIL NFR BLD AUTO: 0.6 %
ERYTHROCYTE [DISTWIDTH] IN BLOOD BY AUTOMATED COUNT: 15.4 % (ref 10–15)
ERYTHROCYTE [SEDIMENTATION RATE] IN BLOOD BY WESTERGREN METHOD: 23 MM/H (ref 0–30)
GFR SERPL CREATININE-BSD FRML MDRD: 65 ML/MIN/1.7M2
HCT VFR BLD AUTO: 41.1 % (ref 35–47)
HGB BLD-MCNC: 13.4 G/DL (ref 11.7–15.7)
IMM GRANULOCYTES # BLD: 0.4 10E9/L (ref 0–0.4)
IMM GRANULOCYTES NFR BLD: 2.3 %
LYMPHOCYTES # BLD AUTO: 4 10E9/L (ref 0.8–5.3)
LYMPHOCYTES NFR BLD AUTO: 23.9 %
MCH RBC QN AUTO: 34.3 PG (ref 26.5–33)
MCHC RBC AUTO-ENTMCNC: 32.6 G/DL (ref 31.5–36.5)
MCV RBC AUTO: 105 FL (ref 78–100)
MONOCYTES # BLD AUTO: 1 10E9/L (ref 0–1.3)
MONOCYTES NFR BLD AUTO: 5.9 %
NEUTROPHILS # BLD AUTO: 11.1 10E9/L (ref 1.6–8.3)
NEUTROPHILS NFR BLD AUTO: 66.9 %
NRBC # BLD AUTO: 0 10*3/UL
NRBC BLD AUTO-RTO: 0 /100
PLATELET # BLD AUTO: 363 10E9/L (ref 150–450)
PROT SERPL-MCNC: 7.7 G/DL (ref 6.8–8.8)
RBC # BLD AUTO: 3.91 10E12/L (ref 3.8–5.2)
WBC # BLD AUTO: 16.6 10E9/L (ref 4–11)

## 2018-10-30 PROCEDURE — 85025 COMPLETE CBC W/AUTO DIFF WBC: CPT | Performed by: INTERNAL MEDICINE

## 2018-10-30 PROCEDURE — 82565 ASSAY OF CREATININE: CPT | Performed by: INTERNAL MEDICINE

## 2018-10-30 PROCEDURE — 85652 RBC SED RATE AUTOMATED: CPT | Performed by: INTERNAL MEDICINE

## 2018-10-30 PROCEDURE — 36415 COLL VENOUS BLD VENIPUNCTURE: CPT | Performed by: INTERNAL MEDICINE

## 2018-10-30 PROCEDURE — 80076 HEPATIC FUNCTION PANEL: CPT | Performed by: INTERNAL MEDICINE

## 2018-10-30 PROCEDURE — 86140 C-REACTIVE PROTEIN: CPT | Performed by: INTERNAL MEDICINE

## 2018-11-02 ENCOUNTER — OFFICE VISIT (OUTPATIENT)
Dept: FAMILY MEDICINE | Facility: CLINIC | Age: 48
End: 2018-11-02
Payer: MEDICARE

## 2018-11-02 VITALS
HEART RATE: 116 BPM | TEMPERATURE: 97.1 F | OXYGEN SATURATION: 96 % | DIASTOLIC BLOOD PRESSURE: 68 MMHG | RESPIRATION RATE: 16 BRPM | SYSTOLIC BLOOD PRESSURE: 118 MMHG | BODY MASS INDEX: 22.42 KG/M2 | WEIGHT: 136 LBS

## 2018-11-02 DIAGNOSIS — F11.90 CHRONIC, CONTINUOUS USE OF OPIOIDS: ICD-10-CM

## 2018-11-02 DIAGNOSIS — F33.0 MAJOR DEPRESSIVE DISORDER, RECURRENT EPISODE, MILD (H): ICD-10-CM

## 2018-11-02 DIAGNOSIS — Z79.899 CHRONICALLY ON BENZODIAZEPINE THERAPY: ICD-10-CM

## 2018-11-02 DIAGNOSIS — M79.7 FIBROMYALGIA: Primary | ICD-10-CM

## 2018-11-02 DIAGNOSIS — M47.812 OSTEOARTHRITIS OF CERVICAL SPINE, UNSPECIFIED SPINAL OSTEOARTHRITIS COMPLICATION STATUS: ICD-10-CM

## 2018-11-02 DIAGNOSIS — F41.1 GENERALIZED ANXIETY DISORDER: ICD-10-CM

## 2018-11-02 PROCEDURE — 99415 PROLNG CLIN STAFF SVC 1ST HR: CPT | Performed by: FAMILY MEDICINE

## 2018-11-02 PROCEDURE — 99215 OFFICE O/P EST HI 40 MIN: CPT | Performed by: FAMILY MEDICINE

## 2018-11-02 RX ORDER — ALPRAZOLAM 0.5 MG
TABLET ORAL
Qty: 30 TABLET | Refills: 0 | Status: SHIPPED | OUTPATIENT
Start: 2018-11-02 | End: 2018-12-13

## 2018-11-02 RX ORDER — CLONAZEPAM 0.5 MG/1
TABLET ORAL
Qty: 60 TABLET | Refills: 0 | Status: SHIPPED | OUTPATIENT
Start: 2018-11-02 | End: 2018-12-13

## 2018-11-02 RX ORDER — HYDROCODONE BITARTRATE AND ACETAMINOPHEN 7.5; 325 MG/1; MG/1
TABLET ORAL
Qty: 210 TABLET | Refills: 0 | Status: SHIPPED | OUTPATIENT
Start: 2018-11-14 | End: 2018-12-13

## 2018-11-02 RX ORDER — CYCLOBENZAPRINE HCL 10 MG
TABLET ORAL
Qty: 90 TABLET | Refills: 1 | Status: SHIPPED | OUTPATIENT
Start: 2018-11-02 | End: 2018-12-13

## 2018-11-02 RX ORDER — DULOXETINE HCL 60 MG
CAPSULE,DELAYED RELEASE (ENTERIC COATED) ORAL
Qty: 90 CAPSULE | Refills: 1 | Status: SHIPPED | OUTPATIENT
Start: 2018-11-02 | End: 2019-01-11

## 2018-11-02 ASSESSMENT — PAIN SCALES - GENERAL: PAINLEVEL: EXTREME PAIN (8)

## 2018-11-02 NOTE — Clinical Note
This is strictly for your learning on how I organized and laid out my office visit notes for this complicated establish care visit.  Thank goodness for voice recognition software even if it is sometimes haphazard.  Mauro

## 2018-11-02 NOTE — NURSING NOTE
Order for Psychiatry placed for [facility], they will contact patient with the appointment. Yazmin Mcdaniels LPN

## 2018-11-02 NOTE — MR AVS SNAPSHOT
After Visit Summary   11/2/2018    Sherie Otero    MRN: [id number]           Patient Information     Date Of Birth          1970        Visit Information        Provider Department      11/2/2018 10:45 AM Mauro Paredes MD [facility]        Today's Diagnoses     Fibromyalgia    -  1    Major depressive disorder, recurrent episode, mild (H)        Osteoarthritis of cervical spine, unspecified spinal osteoarthritis complication status        Chronic, continuous use of opioids        Chronically on benzodiazepine therapy        Generalized anxiety disorder           Follow-ups after your visit        Additional Services     MENTAL HEALTH REFERRAL  - Adult; Psychiatry and Medication Management; Psychiatry; [facility]: Psychiatry Clinic [phone]; We will contact you to schedule the appointment or please call with any questions       All scheduling is subject to the client's specific insurance plan & benefits, provider/location availability, and provider clinical specialities.  Please arrive 15 minutes early for your first appointment and bring your completed paperwork.    Please be aware that coverage of these services is subject to the terms and limitations of your health insurance plan.  Call member services at your health plan with any benefit or coverage questions.                            Follow-up notes from your care team     Return in about 4 weeks (around 11/30/2018) for medication recheck.      Your next 10 appointments already scheduled     Nov 30, 2018  1:30 PM CST   Office Visit with Mauro Paredes MD   [facility] ([facility])    [address] [place] [address] [phone]           Bring a current list of meds and any records pertaining to this visit. For Physicals, please bring immunization records and any forms needing to be filled out. Please arrive 10 minutes early to complete paperwork.            Dec  11, 2018  2:40 PM CST   Return Visit with Ash Donald MD   [facility] ([facility]    [address] [address] [phone]              Who to contact     If you have questions or need follow up information about today's clinic visit or your schedule please contact [facility] directly at [phone].  Normal or non-critical lab and imaging results will be communicated to you by "[employer]hart, letter or phone within 4 business days after the clinic has received the results. If you do not hear from us within 7 days, please contact the clinic through [employer] or phone. If you have a critical or abnormal lab result, we will notify you by phone as soon as possible.  Submit refill requests through [employer] or call your pharmacy and they will forward the refill request to us. Please allow 3 business days for your refill to be completed.          Additional Information About Your Visit        "[employer] Information     [employer] gives you secure access to your electronic health record. If you see a primary care provider, you can also send messages to your care team and make appointments. If you have questions, please call your primary care clinic.  If you do not have a primary care provider, please call [phone] and they will assist you.        Care EveryWhere ID     This is your Care EveryWhere ID. This could be used by other organizations to access your [facility] medical records  [id number]        Your Vitals Were     Pulse Temperature Respirations Pulse Oximetry BMI (Body Mass Index)       116 97.1  F (36.2  C) (Temporal) 16 96% 22.42 kg/m2        Blood Pressure from Last 3 Encounters:   11/02/18 118/68   10/01/18 126/60   09/20/18 116/70    Weight from Last 3 Encounters:   11/02/18 136 lb (61.7 kg)   10/01/18 132 lb (59.9 kg)   09/20/18 131 lb (59.4 kg)              We Performed the Following     MENTAL HEALTH REFERRAL  - Adult;  Psychiatry and Medication Management; Psychiatry; [facility]: Psychiatry Clinic [phone]; We will contact you to schedule the appointment or please call with any questions          Today's Medication Changes          These changes are accurate as of 11/2/18 11:59 PM.  If you have any questions, ask your nurse or doctor.               Start taking these medicines.        Dose/Directions    naloxone nasal spray   Commonly known as:  NARCAN   Used for:  Chronic, continuous use of opioids, Chronically on benzodiazepine therapy   Started by:  Mauro Paredes MD        Dose:  4 mg   Spray 1 spray (4 mg) into one nostril alternating nostrils as needed for opioid reversal every 2-3 minutes until assistance arrives   Quantity:  0.2 mL   Refills:  0         These medicines have changed or have updated prescriptions.        Dose/Directions    cyclobenzaprine 10 MG tablet   Commonly known as:  FLEXERIL   This may have changed:    - medication strength  - See the new instructions.   Used for:  Fibromyalgia   Changed by:  Mauro Paredes MD        Take 2 tablets in the evening and 1 in the morning   Quantity:  90 tablet   Refills:  1       HYDROcodone-acetaminophen 7.5-325 MG per tablet   Commonly known as:  NORCO   This may have changed:    - additional instructions  - These instructions start on 11/14/2018. If you are unsure what to do until then, ask your doctor or other care provider.   Used for:  Osteoarthritis of cervical spine, unspecified spinal osteoarthritis complication status   Changed by:  Mauro Paredes MD        Start taking on:  11/14/2018   TAKE 2 TABLETS BY MOUTH EVERY 6 HOURS AS NEEDED FOR PAIN--MAX OF 7 TABLETS PER DAY. NARCOTIC AGREEMENT DONE 1/30/2018   Quantity:  210 tablet   Refills:  0         Stop taking these medicines if you haven't already. Please contact your care team if you have questions.     hydrOXYzine 25 MG tablet   Commonly known as:  ATARAX   Stopped by:  Mauro Paredes  MD Daniel                Where to get your medicines      These medications were sent to [facility] [place], [place] - [address]  [address], [facility] [address]     Phone:  [phone]     cyclobenzaprine 10 MG tablet    CYMBALTA 60 MG EC capsule    naloxone nasal spray         Some of these will need a paper prescription and others can be bought over the counter.  Ask your nurse if you have questions.     Bring a paper prescription for each of these medications     ALPRAZolam 0.5 MG tablet    clonazePAM 0.5 MG tablet    HYDROcodone-acetaminophen 7.5-325 MG per tablet               Information about OPIOIDS     PRESCRIPTION OPIOIDS: WHAT YOU NEED TO KNOW   We gave you an opioid (narcotic) pain medicine. It is important to manage your pain, but opioids are not always the best choice. You should first try all the other options your care team gave you. Take this medicine for as short a time (and as few doses) as possible.    Some activities can increase your pain, such as bandage changes or therapy sessions. It may help to take your pain medicine 30 to 60 minutes before these activities. Reduce your stress by getting enough sleep, working on hobbies you enjoy and practicing relaxation or meditation. Talk to your care team about ways to manage your pain beyond prescription opioids.    These medicines have risks:    DO NOT drive when on new or higher doses of pain medicine. These medicines can affect your alertness and reaction times, and you could be arrested for driving under the influence (DUI). If you need to use opioids long-term, talk to your care team about driving.    DO NOT operate heavy machinery    DO NOT do any other dangerous activities while taking these medicines.    DO NOT drink any alcohol while taking these medicines.     If the opioid prescribed includes acetaminophen, DO NOT take with any other medicines that contain acetaminophen. Read all labels carefully. Look for the  word  acetaminophen  or  Tylenol.  Ask your pharmacist if you have questions or are unsure.    You can get addicted to pain medicines, especially if you have a history of addiction (chemical, alcohol or substance dependence). Talk to your care team about ways to reduce this risk.    All opioids tend to cause constipation. Drink plenty of water and eat foods that have a lot of fiber, such as fruits, vegetables, prune juice, apple juice and high-fiber cereal. Take a laxative (Miralax, milk of magnesia, Colace, Senna) if you don t move your bowels at least every other day. Other side effects include upset stomach, sleepiness, dizziness, throwing up, tolerance (needing more of the medicine to have the same effect), physical dependence and slowed breathing.    Store your pills in a secure place, locked if possible. We will not replace any lost or stolen medicine. If you don t finish your medicine, please throw away (dispose) as directed by your pharmacist. The [place] [employer]ution [employer] has more information about safe disposal: https://[email or web address].[email or web address].[place].us/living-green/managing-unwanted-medications         Primary Care Provider Office Phone # Fax #    Mauro Paredes [phone] [address] [place] [address]        Equal Access to Services     ARUN WESTBROOK : Hadii glendy ku hadasho Soomaali, waaxda luqadaha, qaybta kaalmada adeegyada, neema harvey. So [facility] [phone].    ATENCIÓN: Si habla español, tiene a lange disposición servicios gratuitos de asistencia lingüística. Llame al [phone].    We comply with applicable federal civil rights laws and [place] laws. We do not discriminate on the basis of race, color, national origin, age, disability, sex, sexual orientation, or gender identity.            Thank you!     Thank you for choosing [facility]  for your care. Our goal is always to provide you with excellent care. Hearing back  from our patients is one way we can continue to improve our services. Please take a few minutes to complete the written survey that you may receive in the mail after your visit with us. Thank you!             Your Updated Medication List - Protect others around you: Learn how to safely use, store and throw away your medicines at www.disposemymeds.org.          This list is accurate as of 11/2/18 11:59 PM.  Always use your most recent med list.                   Brand Name Dispense Instructions for use Diagnosis    albuterol 108 (90 Base) MCG/ACT inhaler    VENTOLIN HFA    18 g    Inhale 2 puffs into the lungs every 6 hours as needed    Intermittent asthma, uncomplicated       ALPRAZolam 0.5 MG tablet    XANAX    30 tablet    One tablet mid day. MUST LAST 30 DAYS.    Generalized anxiety disorder       busPIRone 10 MG tablet    BUSPAR    135 tablet    TAKE 5 MG (1/2 TAB) IN THE MORNING AND 10 MG AT NIGHT    Generalized anxiety disorder       cetirizine 10 MG tablet    zyrTEC    90 tablet    TAKE  ONE TABLET BY MOUTH EVERY DAY.    Chronic rhinitis, unspecified type       clonazePAM 0.5 MG tablet    klonoPIN    60 tablet    TAKE ONE-HALF TO ONE TABLET BY MOUTH TWICE A DAY AS NEEDED FOR ANXIETY - MUST LAST 30 DAYS    Generalized anxiety disorder       cyclobenzaprine 10 MG tablet    FLEXERIL    90 tablet    Take 2 tablets in the evening and 1 in the morning    Fibromyalgia       CYMBALTA 60 MG EC capsule   Generic drug:  DULoxetine     90 capsule    TAKE ONE CAPSULE BY MOUTH EVERY EVENING - KADY    Fibromyalgia, Major depressive disorder, recurrent episode, mild (H)       fluticasone 50 MCG/ACT spray    FLONASE    16 g    SPRAY 2 SPRAYS INTO BOTH NOSTRILS DAILY.    Chronic rhinitis, unspecified type       folic acid 1 MG tablet    FOLVITE    100 tablet    Take 1 tablet (1 mg) by mouth daily    Rheumatoid arthritis involving multiple sites with positive rheumatoid factor (H)       guaiFENesin 600 MG 12 hr tablet    MUCINEX     28 tablet    Take 2 tablets (1,200 mg) by mouth 2 times daily    Cough, Acute bronchitis with symptoms > 10 days, Acute sinusitis with symptoms > 10 days       guaiFENesin-codeine 100-10 MG/5ML Soln solution    ROBITUSSIN AC    120 mL    Take 5 mLs by mouth every 6 hours as needed for congestion    Rib pain on right side, Fall, initial encounter       HYDROcodone-acetaminophen 7.5-325 MG per tablet   Start taking on:  11/14/2018    NORCO    210 tablet    TAKE 2 TABLETS BY MOUTH EVERY 6 HOURS AS NEEDED FOR PAIN--MAX OF 7 TABLETS PER DAY. NARCOTIC AGREEMENT DONE 1/30/2018    Osteoarthritis of cervical spine, unspecified spinal osteoarthritis complication status       methotrexate sodium 2.5 MG Tabs     24 tablet    10mg (4 tabs) once weekly x1 week, then increase by 2.5mg (1 tab) each week until taking the goal weekly dose of 15mg (6 tabs) once weekly    Rheumatoid arthritis involving multiple sites with positive rheumatoid factor (H)       MULTIVITAL PO           naloxone nasal spray    NARCAN    0.2 mL    Spray 1 spray (4 mg) into one nostril alternating nostrils as needed for opioid reversal every 2-3 minutes until assistance arrives    Chronic, continuous use of opioids, Chronically on benzodiazepine therapy       predniSONE 10 MG tablet    DELTASONE    40 tablet    PRN arthritis flare: prednisone 10-20mg daily x5days, then stop.    Rheumatoid arthritis involving multiple sites with positive rheumatoid factor (H)       verapamil 40 MG tablet    CALAN    180 tablet    Take 1 tablet (40 mg) by mouth 2 times daily    Other migraine without status migrainosus, intractable

## 2018-11-02 NOTE — PROGRESS NOTES
SUBJECTIVE:   Sherie Otero is a 48 year old female who presents to clinic today for the following health issues:      Establish care. Go over medications.      Problem list and histories reviewed & adjusted, as indicated.  Additional history: as documented      Reviewed and updated as needed this visit by clinical staff  Tobacco  Allergies  Meds  Problems  Med Hx  Surg Hx  Fam Hx  Soc Hx        Reviewed and updated as needed this visit by Provider  Allergies  Meds  Problems         Patient here today for establishing primary care since her previous provider KEVIN Arzola, has left our practice.    Patient has a complicated medical history that was reviewed in great detail today.  The bulk of our visit was spent discussing her chronic pain management.  Patient is currently on 8 tablets daily of 7.5 mg Norco.  She takes 2 tablets every 6 hours.    We reviewed her pain issues.  She has a diagnosis of fibromyalgia with chronic fatigue and rheumatoid arthritis.  She is currently seeing Dr. Ash Donald, rheumatology.  He has started her on methotrexate and patient does have as needed prednisone on file for when she has arthritis flares.  She recently just started the methotrexate and has been working on dose adjusting as well as consideration for other medications to help with her rheumatoid arthritis pain.    Patient notes that the main reason for the Norco noted above, is for her fibromyalgia pain.  She has been on this for quite some time and it was a previous doctor before Selina BROWN that had started the patient on this medication.  She has not ever been to a pain clinic before.  In addition, she is on Cymbalta.  She is on Flexeril 5 mg 3 times daily and does not note any past or current side effects with this medication.  Patient has tried Lyrica in the past and apparently did have some side effects with this.  She has not been on gabapentin as her previous provider told her that it  would not be a good idea since the Lyrica was not effective.    Review of her chart shows that she also has spinal stenosis in her cervical spine.  We did briefly discuss this as it pertains to the patient's chronic pain issues.  However, the patient did not note that this was a large posterior to her overall pain management for which her medications are prescribed.  She says this is something that comes and goes and overall does not give her much difficulty.  She was seen by our neurosurgery team here at [facility] approximately 21 months ago and was recommended to see Dr. Evangelista if she was having worsening symptoms and felt that she wanted to discuss surgical options.  There is no record that the patient has been seen by him since that appointment.    Patient also has severe anxiety with depression for which she is on clonazepam, Xanax, BuSpar, and Cymbalta.  She has issues with sleeping because of anxiety and is on hydroxyzine.  Patient has never seen a psychiatrist before.  For a long time, patient was on multiple doses of Xanax, but 3 years ago, she was started on clonazepam twice daily with only the Xanax to be used in the middle of the day to help with a slight increase in her anxiety symptoms.    Patient also informs us today that she recently started on Medicare and was informed that she will not be allowed more than 6 tablets/day of her Norco without prior authorization.  She was also informed that getting a 90-day supply of her medications would be more cost effective as the price per prescription would be the same as if she was getting monthly prescriptions.  She is interested in getting a 90-day supply of her routine medications today.    ROS:  10 point ROS of systems including Constitutional, Eyes, HENT, Respiratory, Cardiovascular, Gastroenterology, Genitourinary, Integumentary, Muscularskeletal, Psychiatric were all negative except for pertinent positives noted in my HPI.     OBJECTIVE:   /68 (Cuff  Size: Adult Regular)  Pulse 116  Temp 97.1  F (36.2  C) (Temporal)  Resp 16  Wt 136 lb (61.7 kg)  SpO2 96%  BMI 22.42 kg/m2  Body mass index is 22.42 kg/(m^2).  Physical Exam   Constitutional: She appears well-developed and well-nourished.   Cardiovascular: Normal rate, regular rhythm, S1 normal, S2 normal and normal heart sounds.    No murmur heard.  Pulmonary/Chest: Effort normal and breath sounds normal. No respiratory distress. She has no wheezes. She has no rhonchi. She has no rales.   Neurological: She is alert.   Psychiatric: She has a normal mood and affect. Her behavior is normal. Judgment and thought content normal.       ASSESSMENT/PLAN:       ICD-10-CM    1. Fibromyalgia M79.7 cyclobenzaprine (FLEXERIL) 10 MG tablet     CYMBALTA 60 MG EC capsule   2. Major depressive disorder, recurrent episode, mild (H) F33.0 CYMBALTA 60 MG EC capsule     MENTAL HEALTH REFERRAL  - Adult; Psychiatry and Medication Management; Psychiatry; [facility]: Psychiatry Clinic [phone]; We will contact you to schedule the appointment or please call with any questions   3. Osteoarthritis of cervical spine, unspecified spinal osteoarthritis complication status M47.812 HYDROcodone-acetaminophen (NORCO) 7.5-325 MG per tablet   4. Chronic, continuous use of opioids F11.90 naloxone (NARCAN) nasal spray     MENTAL HEALTH REFERRAL  - Adult; Psychiatry and Medication Management; Psychiatry; [facility]: Psychiatry Clinic [phone]; We will contact you to schedule the appointment or please call with any questions   5. Chronically on benzodiazepine therapy Z79.899 naloxone (NARCAN) nasal spray     MENTAL HEALTH REFERRAL  - Adult; Psychiatry and Medication Management; Psychiatry; [facility]: Psychiatry Clinic [phone]; We will contact you to schedule the appointment or please call with any questions   6. Generalized anxiety disorder F41.1 clonazePAM (KLONOPIN) 0.5 MG tablet     ALPRAZolam (XANAX) 0.5 MG tablet     MENTAL HEALTH REFERRAL  -  Adult; Psychiatry and Medication Management; Psychiatry; [facility]: Psychiatry Clinic [phone]; We will contact you to schedule the appointment or please call with any questions     PLAN:  We had a very long and trey discussion about her current pain management as well as her psychiatric medications.  I informed her that I feel that we could do better than what she is currently doing for her medication regimen with regards to the above-noted conditions at her being treated.    1.  With regards to her pain management, fibromyalgia is now noted to be better managed when patients are not on narcotic-based medications.  I informed the patient that given that she has not tried other well study options for fibromyalgia pain management, we should investigate doing his first and also start working on titrating her off of her Norco.  Given that she has not had any past issues with Flexeril, I recommended that we increase her to 20 mg at bedtime with 10 mg in the morning for better management of her fibromyalgia.  We will keep the Cymbalta dose at 60 mg daily.  I informed her that at some point we may want to try her on gabapentin, but I did not want to add this medication in at this time for fear of making too many medication changes at one time and not knowing what is effective and not effective.    We are also going to start decreasing her Norco daily amount and we are going to do this slowly.  For today, we are going to decrease her from 8 tablets daily to 7 tablets daily.  We discussed which dose timing would make the most sense to go down to 1 tablet and since we are increasing her nighttime Flexeril dose, we agreed that perhaps the nighttime would be the best time to drop her Norco to 1 tablet.  I also informed the patient that at any time that she does not feel comfortable with how her pain management process has been going or if she would like to seek a second opinion, I have no issue placing a referral for our  [facility] pain management program with Dr. Harmon and Dr. Claudia Rodriguez.    2.  With the Flexeril dose increased, we are going to have her discontinue hydroxyzine for nighttime use.    3.  I discussed with the patient that her psychiatric medications need to be reconsidered as using benzodiazepines long-term for anxiety and depression management is not a best practice especially in the primary care setting.  She needs a psychiatrist to evaluate her anxiety and depression to see if she is going to be remaining on benzodiazepines.  There is a good chance that a better medication regimen will be offered to her.  I told her I especially do not agree with long-acting and short acting benzodiazepines used together as evidence has shown that this is not a best practice.    4.  We discussed that combining benzodiazepines with opioid medications does result in a higher risk of accidental overdose and I recommended that the patient get a prescription for Narcan to carry with her in case of emergency.  Patient was quite taken back by this recommendation as she has been on these medications for quite some time and nobody has ever told this to her before.  I informed her that this is a relatively new recommendation and is highly recommended.  I gave her the option as to what kind of Narcan she would be most comfortable having on hand, and she opted for nasal spray.    5.  In the meantime, we reviewed any medications that will be due for renewal in the next month, and I refilled them as noted above.    Follow up with Provider - Return in about 4 weeks (around 11/30/2018) for medication recheck.      I spent > 60 minutes of face to face time with the patient, >50% of which was spent counseling and coordination of care regarding discussion of chronic pain management, long-term opioid use, long-term benzodiazepine use, and anxiety management.     Mauro Paredes MD   [facility]

## 2018-11-03 PROBLEM — F11.90 CHRONIC, CONTINUOUS USE OF OPIOIDS: Status: ACTIVE | Noted: 2018-11-03

## 2018-11-07 ENCOUNTER — TELEPHONE (OUTPATIENT)
Dept: FAMILY MEDICINE | Facility: CLINIC | Age: 48
End: 2018-11-07

## 2018-11-07 NOTE — TELEPHONE ENCOUNTER
Central Prior Authorization Team   Phone: [phone]      PA Initiation    Medication: cymbalta 60mg  Insurance Company: [employer] - Phone [phone] Fax [phone]  Pharmacy Filling the Rx: [address]   Filling Pharmacy Phone: [phone]  Filling Pharmacy Fax:    Start Date: 11/7/2018

## 2018-11-07 NOTE — TELEPHONE ENCOUNTER
Prior Authorization Approval    Authorization Effective Date: 12/30/2017  Authorization Expiration Date: 12/31/2018  Medication: cymbalta 60mg  Approved Dose/Quantity:    Reference #:     Insurance Company: [employer] [facility] [place] [phone] Fax [phone]  Expected CoPay:       CoPay Card Available:      Foundation Assistance Needed:    Which Pharmacy is filling the prescription (Not needed for infusion/clinic administered): [facility] [address]   Pharmacy Notified: Yes  Patient Notified: Yes

## 2018-11-07 NOTE — TELEPHONE ENCOUNTER
Prior Authorization Retail Medication Request    Medication/Dose: cymbalta 60mg  ICD code (if different than what is on RX):     Previously Tried and Failed:     Rationale:       Insurance Name:  [employer] part d  Insurance ID:  [id number]      Pharmacy Information (if different than what is on RX)  Name:     Phone:

## 2018-11-16 DIAGNOSIS — M05.79 RHEUMATOID ARTHRITIS INVOLVING MULTIPLE SITES WITH POSITIVE RHEUMATOID FACTOR (H): ICD-10-CM

## 2018-11-16 DIAGNOSIS — F41.1 GENERALIZED ANXIETY DISORDER: ICD-10-CM

## 2018-11-16 RX ORDER — CLONAZEPAM 0.5 MG/1
TABLET ORAL
Qty: 60 TABLET | Refills: 0 | OUTPATIENT
Start: 2018-11-16

## 2018-11-16 RX ORDER — ALPRAZOLAM 0.5 MG
TABLET ORAL
Qty: 30 TABLET | Refills: 0 | OUTPATIENT
Start: 2018-11-16

## 2018-11-16 NOTE — TELEPHONE ENCOUNTER
Alprazolam      Last Written Prescription Date:  11/02/2018  Last Fill Quantity: 30,   # refills: 0  Last Office Visit: 11/02/2018  Future Office visit:    Next 5 appointments (look out 90 days)     Nov 30, 2018  1:30 PM CST   Office Visit with Mauro Paredes MD   [address] [address]   [phone]            Dec 11, 2018  2:40 PM CST   Return Visit with Ash Donald MD   [facility]    [address] [facility] [address]   [phone]                   Routing refill request to provider for review/approval because:  Drug not on the [facility], [facility] or [facility] refill protocol or controlled substance    Clonazepam      Last Written Prescription Date:  11/02/2018  Last Fill Quantity: 60,   # refills: 0  Last Office Visit: 11/02/2018  Future Office visit:    Next 5 appointments (look out 90 days)     Nov 30, 2018  1:30 PM CST   Office Visit with Mauro Paredes MD   [facility]    [address] [place] [address]   [phone]            Dec 11, 2018  2:40 PM CST   Return Visit with Ash Donald MD   [facility] ([facility])    [address] [place] [address]   [phone]                   Routing refill request to provider for review/approval because:  Drug not on the [facility], [facility] or [facility] refill protocol or controlled substance

## 2018-11-16 NOTE — TELEPHONE ENCOUNTER
Requested Prescriptions   Pending Prescriptions Disp Refills     methotrexate sodium 2.5 MG TABS 24 tablet 3     Sig: 10mg (4 tabs) once weekly x1 week, then increase by 2.5mg (1 tab) each week until taking the goal weekly dose of 15mg (6 tabs) once weekly    There is no refill protocol information for this order        Last Written Prescription Date:  7/31/2018  Last Fill Quantity: 24,  # refills: 3   Last office visit: 7/31/2018 with prescribing provider:  Reggie   Future Office Visit:   Next 5 appointments (look out 90 days)     Nov 30, 2018  1:30 PM CST   [facility] Visit with [facility]uro [facility]es MD   [facility]    [address] [place] [id number] [phone]            Dec 11, 2018  2:40 PM CST   [facility] Visit with Ash Donald MD   [facility]    [address] [facility] [id number] [phone]

## 2018-11-17 NOTE — TELEPHONE ENCOUNTER
Patient has appointment with me before these medications will run out.  Refills were denied.    Mauro Paredes MD

## 2018-11-19 NOTE — TELEPHONE ENCOUNTER
Routing refill request to provider for review/approval because:  Drug not on the [facility] refill protocol       Henrietta Morrow RN - BC

## 2018-11-20 RX ORDER — METHOTREXATE 2.5 MG/1
15 TABLET ORAL
Qty: 24 TABLET | Refills: 1 | Status: SHIPPED | OUTPATIENT
Start: 2018-11-20 | End: 2018-12-11

## 2018-11-20 NOTE — TELEPHONE ENCOUNTER
Rheumatology team: Please call to notify Ms. Otero that methotrexate has been refilled.  Ash Donald MD  11/20/2018 8:25 AM

## 2018-12-11 ENCOUNTER — OFFICE VISIT (OUTPATIENT)
Dept: RHEUMATOLOGY | Facility: CLINIC | Age: 48
End: 2018-12-11
Payer: MEDICARE

## 2018-12-11 VITALS
BODY MASS INDEX: 22.95 KG/M2 | HEART RATE: 109 BPM | TEMPERATURE: 98 F | SYSTOLIC BLOOD PRESSURE: 122 MMHG | DIASTOLIC BLOOD PRESSURE: 76 MMHG | OXYGEN SATURATION: 98 % | WEIGHT: 139.2 LBS

## 2018-12-11 DIAGNOSIS — M05.79 RHEUMATOID ARTHRITIS INVOLVING MULTIPLE SITES WITH POSITIVE RHEUMATOID FACTOR (H): Primary | ICD-10-CM

## 2018-12-11 DIAGNOSIS — Z79.899 HIGH RISK MEDICATIONS (NOT ANTICOAGULANTS) LONG-TERM USE: ICD-10-CM

## 2018-12-11 DIAGNOSIS — Z23 NEED FOR PROPHYLACTIC VACCINATION AND INOCULATION AGAINST INFLUENZA: ICD-10-CM

## 2018-12-11 PROCEDURE — G0009 ADMIN PNEUMOCOCCAL VACCINE: HCPCS | Performed by: INTERNAL MEDICINE

## 2018-12-11 PROCEDURE — 99213 OFFICE O/P EST LOW 20 MIN: CPT | Mod: 25 | Performed by: INTERNAL MEDICINE

## 2018-12-11 PROCEDURE — 90682 RIV4 VACC RECOMBINANT DNA IM: CPT | Performed by: INTERNAL MEDICINE

## 2018-12-11 PROCEDURE — 90670 PCV13 VACCINE IM: CPT | Performed by: INTERNAL MEDICINE

## 2018-12-11 PROCEDURE — G0008 ADMIN INFLUENZA VIRUS VAC: HCPCS | Performed by: INTERNAL MEDICINE

## 2018-12-11 RX ORDER — PREDNISONE 5 MG/1
5 TABLET ORAL DAILY PRN
Qty: 30 TABLET | Refills: 0 | Status: SHIPPED | OUTPATIENT
Start: 2018-12-11 | End: 2019-01-23

## 2018-12-11 RX ORDER — METHOTREXATE 2.5 MG/1
20 TABLET ORAL
Qty: 32 TABLET | Refills: 3 | Status: SHIPPED | OUTPATIENT
Start: 2018-12-11 | End: 2019-04-16

## 2018-12-11 ASSESSMENT — PAIN SCALES - GENERAL: PAINLEVEL: EXTREME PAIN (8)

## 2018-12-11 NOTE — PROGRESS NOTES

## 2018-12-11 NOTE — NURSING NOTE
"Chief Complaint   Patient presents with     RECHECK     RA-pain and spine, bone spur on right hand ring finger.        Initial /76   Pulse 109   Temp 98  F (36.7  C) (Oral)   Wt 63.1 kg (139 lb 3.2 oz)   SpO2 98%   BMI 22.95 kg/m   Estimated body mass index is 22.95 kg/m  as calculated from the following:    Height as of 8/1/18: 1.659 m (5' 5.3\").    Weight as of this encounter: 63.1 kg (139 lb 3.2 oz).  BP completed using cuff size: regular         RAPID3 (0-30) Cumulative Score  20.3          RAPID3 Weighted Score (divide #4 by 3 and that is the weighted score)  6.8         "

## 2018-12-11 NOTE — PROGRESS NOTES
Rheumatology Clinic Visit      Sherie Otero MRN# [id number]   YOB: 1970 Age: 48 year old      Date of visit: 12/11/18   PCP: Dr. Mauro Paredes    Chief Complaint   Patient presents with:  RECHECK: RA-pain and spine, bone spur on right hand ring finger.       Assessment and Plan     1.  Seropositive rheumatoid arthritis (RF 21, CCP 32): Previously followed by Jeff Penaloza and Meme.  Previously on HCQ (GI upset) and possible SSZ (patient does not recall using SSZ).  Inflammatory arthritis symptoms started in 1999 with sudden onset polyarticular pain/stiffness/swelling that resolved with prednisone.  Since then she has had polyarticular pain involving her MCPs, wrists, knees, and feet; and morning stiffness for at least a couple hours but worse with arthritis flares; each arthritis flare consists of worsening peripheral joint pain and stiffness and presence of neck stiffness; each time this resolves with prednisone 10-20mg daily that she typically uses for 5 days or less; flares have occurred 4 times in the past 3 months.  No clear synovitis on exam.  Lack of synovitis may be due to prednisone use, but previous rheumatology evaluations did not see synovitis either. Chest imaging in the past suggests inflammatory nodules but malignancy cannot be completely excluded and the patient verbalized understanding of this.  Therefore, in summary she has a history and labs consistent with rheumatoid arthritis but lacks physical exam findings when I first evaluated her on 7/31/2018 while on prednisone 10mg daily.  MTX 15mg once weekly started in July.  Currently off of prednisone but occasionally having more aches and pains in her hands and feet when the weather changes.  We discussed the effect of barometric pressure on her joint pains.  Given the frequency of her fluctuating symptoms will increase methotrexate today.  - Increase methotrexate from 15 mg once weekly to 20 mg once weekly   - Continue folic  acid 1mg daily  - Prednisone 5mg daily PRN RA symptoms; advised against using for back pain  - Labs monthly g1ikdopy: CBC, Cr, Hepatic Panel  - Labs in 3 months: CBC, Creatinine, Hepatic Panel, ESR, CRP    2.  Nodule on the right fourth PIP: Bony hypertrophy from osteoarthritis versus rheumatoid nodule.  Monitor for now.    3. Back pain: advised that she continue to follow with her PCP and consider spine surgeon earle. She reports hx of spinal stenosis.     4. Cigarette Smoking: Encouraged complete cessation and discussed the health benefits.      5. Chronic Pain Syndrome / Fibromyalgia: management per PCP    6.  Vaccinations: Vaccinations reviewed with Ms. Otero.  Risks and benefits of vaccinations were discussed.   - Influenza: will receive today  - [id number]: will receive today  - [id number]: to receive at least 8 weeks after odutygy28 is administered  - Shingrix: advised that she receive; plan to separate vaccines to improve response (so first does in a month at the earliest)    Ms. Otero verbalized agreement with and understanding of the rational for the diagnosis and treatment plan.  All questions were answered to best of my ability and the patient's satisfaction. Ms. Otero was advised to contact the clinic with any questions that may arise after the clinic visit.      Thank you for involving me in the care of the patient    Return to clinic: 3 - 4 months      HPI   Sherie Otero is a 48 year old female with a past medical history significant for migraines, chronic rhinitis, anxiety, depression, asthma, NSAID induced gastritis, SHANTANU, fibromyalgia, and rheumatoid arthritis who is seen for follow-up of rheumatoid arthritis.    Today, she reports that she is doing fairly well.  Nodule on the right fourth PIP that is not painful.  Joints are doing well.  She is off of prednisone.  Occasionally she has worsening joint pains with barometric pressure changes so she takes prednisone 10-20 mg once.  Still with  back pain.  Back pain response to steroids.  She says that she has a known history of spinal stenosis.  She is following with her PCP regarding her back pain.  She says that her pain medications are being reduced for fibromyalgia.    Denies fevers, chills, nausea, vomiting, constipation, diarrhea. No abdominal pain. No chest pain/pressure, palpitations, or shortness of breath. No LE swelling.  No oral or nasal sores.  No rash. No sicca symptoms.      Mother: RA    Tobacco: 1 ppd   EtOH: none  Drugs: none  Occupation: cleans houses    Ms. Otero's cousin was present with her today.     ROS   GEN: No fevers, chills, night sweats, or weight change  SKIN: No itching, rashes, sores  HEENT: No epistaxis. No oral or nasal ulcers.  CV: No chest pain, pressure, palpitations, or dyspnea on exertion.  PULM: No SOB, wheeze, cough.  GI: No nausea, vomiting, constipation, diarrhea. No blood in stool. No abdominal pain.  : No blood in urine.  MSK: See HPI.  NEURO: No numbness or tingling  ENDO: No heat/cold intolerance.  EXT: No LE swelling  PSYCH: Negative    Active Problem List     Patient Active Problem List   Diagnosis     CARDIOVASCULAR SCREENING; LDL GOAL LESS THAN 160     Chronic fatigue syndrome     Fibromyalgia     Generalized anxiety disorder     Tobacco abuse     SHANTANU (obstructive sleep apnea)     Disturbance in sleep behavior     Cervicalgia     Stenosis, cervical spine     Spondylitis, cervical     NSAID induced gastritis     Major depressive disorder, recurrent episode, mild (H)     Other chronic pain     Intermittent asthma, uncomplicated     Rheumatoid arthritis involving multiple sites with positive rheumatoid factor (H)     Elevated white blood cell count     Panic attacks     Osteoarthritis of cervical spine, unspecified spinal osteoarthritis complication status     Degenerative joint disease of cervical spine     Pulmonary nodules     Abnormal CT of the chest     Hilar lymphadenopathy     Other migraine  without status migrainosus, intractable     Chronic rhinitis, unspecified type     Pelvic pain in female     Cervical cancer screening     Chronic, continuous use of opioids     Chronically on benzodiazepine therapy     Past Medical History     Past Medical History:   Diagnosis Date     Allergic rhinitis due to other allergen      Degenerative joint disease of cervical spine 2016    multi level worst at C5-6     Generalized anxiety disorder      Hearing loss      Intrinsic asthma, unspecified      Irritable bowel syndrome      Lump or mass in breast     Left breast lump -- aspiration benign.     Other malaise and fatigue     fibromyalgia     Other specified gastritis without mention of hemorrhage      Pain in joint, lower leg     patello-femoral syndrome     Rheumatoid arthritis(714.0)      Spindle cell carcinoma (H) 2013    Im[employer] Update utility     Spondylitis, cervical     C5-C7 (MRI)     Stenosis, cervical spine     C5-C7 (MRI)     Tension headache      Past Surgical History     Past Surgical History:   Procedure Laterality Date     C APPENDECTOMY      Ruptured at age 7 years     C  DELIVERY ONLY      , Low Cervical     DILATION AND CURETTAGE, HYSTEROSCOPY, ABLATE ENDOMETRIUM NOVASURE, COMBINED  2011    Procedure:COMBINED DILATION AND CURETTAGE, HYSTEROSCOPY, ABLATE ENDOMETRIUM NOVASURE; hysteroscopy, dilation and curettage, novasure; Surgeon:JEREMY ANNA; Location: OR     EXCISE LESION TRUNK  2013    Procedure: EXCISE LESION TRUNK;  interlaminar epidural Steroid Injection Cervical -thoracic Levels (C7-T1)    Re-Excision of chest wall mass;  Surgeon: Jeremy Bolaños MD;  Location:  OR      HYSTEROS W PERMANENT FALLOPAIN IMPLANT      Essure done in office Marcelo     INJECT BLOCK MEDIAL BRANCH CERVICAL/THORACIC/LUMBAR  2014    Procedure: INJECT BLOCK MEDIAL BRANCH CERVICAL / THORACIC / LUMBAR;  Surgeon: Josué Munson MD;  Location:  OR      INJECT FACET JOINT  2/13/2014    Procedure: INJECT FACET JOINT;  diagnostic medial branch facet nerve block cervical levels 5-6, 6-7;  Surgeon: Josué Munson MD;  Location: [facility]       Allergy     Allergies   Allergen Reactions     Codeine Nausea and Vomiting     Droperidol      Uncontrollable shaking       Penicillins      Current Medication List     Current Outpatient Medications   Medication Sig     albuterol (VENTOLIN HFA) 108 (90 Base) MCG/ACT Inhaler Inhale 2 puffs into the lungs every 6 hours as needed     ALPRAZolam (XANAX) 0.5 MG tablet One tablet mid day. MUST LAST 30 DAYS.     busPIRone (BUSPAR) 10 MG tablet TAKE 5 MG (1/2 TAB) IN THE MORNING AND 10 MG AT NIGHT     cetirizine (ZYRTEC) 10 MG tablet TAKE  ONE TABLET BY MOUTH EVERY DAY.     clonazePAM (KLONOPIN) 0.5 MG tablet TAKE ONE-HALF TO ONE TABLET BY MOUTH TWICE A DAY AS NEEDED FOR ANXIETY - MUST LAST 30 DAYS     cyclobenzaprine (FLEXERIL) 10 MG tablet Take 2 tablets in the evening and 1 in the morning     CYMBALTA 60 MG EC capsule TAKE ONE CAPSULE BY MOUTH EVERY EVENING - KADY     fluticasone (FLONASE) 50 MCG/ACT spray SPRAY 2 SPRAYS INTO BOTH NOSTRILS DAILY.     folic acid (FOLVITE) 1 MG tablet Take 1 tablet (1 mg) by mouth daily     HYDROcodone-acetaminophen (NORCO) 7.5-325 MG per tablet TAKE 2 TABLETS BY MOUTH EVERY 6 HOURS AS NEEDED FOR PAIN--MAX OF 7 TABLETS PER DAY. NARCOTIC AGREEMENT DONE 1/30/2018     methotrexate sodium 2.5 MG TABS Take 6 tablets (15 mg) by mouth every 7 days     naloxone (NARCAN) nasal spray Spray 1 spray (4 mg) into one nostril alternating nostrils as needed for opioid reversal every 2-3 minutes until assistance arrives     predniSONE (DELTASONE) 10 MG tablet PRN arthritis flare: prednisone 10-20mg daily x5days, then stop.     verapamil (CALAN) 40 MG tablet Take 1 tablet (40 mg) by mouth 2 times daily     guaiFENesin (MUCINEX) 600 MG 12 hr tablet Take 2 tablets (1,200 mg) by mouth 2 times daily  "(Patient not taking: Reported on 10/1/2018)     guaiFENesin-codeine (ROBITUSSIN AC) 100-10 MG/5ML SOLN solution Take 5 mLs by mouth every 6 hours as needed for congestion (Patient not taking: Reported on 11/2/2018)     Multiple Vitamins-Minerals (MULTIVITAL PO)      No current facility-administered medications for this visit.          Social History   See HPI    Family History     Family History   Problem Relation Age of Onset     Arthritis Mother         Rheumatoid     Respiratory Mother         COPD     Hypertension Sister         1/2 sister     Neurologic Disorder Sister         1/2 sisiter  Very mild form of CP     Cardiovascular Maternal Grandmother      Heart Disease Maternal Aunt         Bypass at age 50's     Mother: Rheumatoid arthritis  Cousin: Rheumatoid arthritis    Physical Exam     Temp Readings from Last 3 Encounters:   12/11/18 98  F (36.7  C) (Oral)   11/02/18 97.1  F (36.2  C) (Temporal)   10/01/18 98.2  F (36.8  C) (Temporal)     BP Readings from Last 5 Encounters:   12/11/18 122/76   11/02/18 118/68   10/01/18 126/60   09/20/18 116/70   08/01/18 120/72     Pulse Readings from Last 1 Encounters:   12/11/18 109     Resp Readings from Last 1 Encounters:   11/02/18 16     Estimated body mass index is 22.95 kg/m  as calculated from the following:    Height as of 8/1/18: 1.659 m (5' 5.3\").    Weight as of this encounter: 63.1 kg (139 lb 3.2 oz).    GEN: NAD.  Smells of cigarette smoke  HEENT: MMM. No oral lesions. Anicteric, noninjected sclera  CV: S1, S2. RRR. No m/r/g.  PULM: CTA bilaterally. No w/c.  ABD: +BS.   MSK: Subtle synovial swelling and tenderness palpation of the bilateral second MCPs.  Other MCPs and PIPs without swelling or tenderness to palpation.  Firm mass over the dorsal aspect of the right fourth PIP that was nontender to palpation, without increased warmth, and without overlying erythema.  Wrists, elbows, shoulders, knees, ankles, and MTPs without swelling or tenderness to " palpation.        NEURO: UE and LE strengths 5/5 and equal bilaterally.   SKIN: No rash  EXT: No LE edema  PSYCH: Alert. Appropriate.    Labs / Imaging (select studies)   RF/CCP  Recent Labs   Lab Test 07/31/18  1545 02/05/16  1620   CCPIGG 29* 32*   RHF 21* 21*     CBC  Recent Labs   Lab Test 10/30/18  1320 10/03/18  1447 09/04/18  1307 07/31/18  1545 01/19/17  1038 02/05/16  1620   WBC 16.6* 13.4* 21.5* 14.8* 10.3 10.7   RBC 3.91 3.93 4.02 4.06 4.39 4.09   HGB 13.4 13.0 13.3 13.4 14.4 13.9   HCT 41.1 40.5 40.4 40.4 42.6 41.0   * 103* 101* 100 97 100   RDW 15.4* 14.5 13.3 13.1 12.4 12.6    368 268 223 242 207   MCH 34.3* 33.1* 33.1* 33.0 32.8 34.0*   MCHC 32.6 32.1 32.9 33.2 33.8 33.9   NEUTROPHIL 66.9 90.9 88.3 91.9 54.3 49.7   LYMPH 23.9 5.7 6.9 6.8 31.9 42.2   MONOCYTE 5.9 2.1 3.4 1.1 11.7 6.8   EOSINOPHIL 0.6 0.0 0.4 0.1 1.3 0.8   BASOPHIL 0.4 0.1 0.3 0.1 0.6 0.3   ANEU 11.1* 12.2* 19.0* 13.6* 5.6 5.3   ALYM 4.0 0.8 1.5 1.0 3.3 4.5   MAURIZIO 1.0 0.3 0.7 0.2 1.2 0.7   AEOS  --   --   --  0.0 0.1 0.1   ABAS 0.1 0.0 0.1 0.0 0.1 0.0     CMP  Recent Labs   Lab Test 10/30/18  1320 10/03/18  1447 09/04/18  1307 07/31/18  1545  01/12/16  1221 09/14/15  1530  09/25/12   NA  --   --   --   --   --  142 140  --   --    POTASSIUM  --   --   --   --   --  4.0 3.9  --  3.9   CHLORIDE  --   --   --   --   --  106 107  --   --    CO2  --   --   --   --   --  33* 28  --   --    ANIONGAP  --   --   --   --   --  3 5  --   --    GLC  --   --   --   --   --  83 95  --  82   BUN  --   --   --   --   --  12 7  --   --    CR 0.92 0.72 0.94 0.90  --  0.78 0.76  --  0.92   GFRESTIMATED 65 86 63 66  --  80 82  --  76   GFRESTBLACK 78 >90 76 80  --  >90   GFR Calc   >90   GFR Calc    --  88   ANDRESSA  --   --   --  9.1  --  8.5 8.4*  --   --    BILITOTAL 0.2 0.2 0.2 0.2  --  0.3 0.2   < >  --    ALBUMIN 3.4 3.1* 3.4 3.7  --  3.9 3.3*   < >  --    PROTTOTAL 7.7 7.0 6.9 7.5  --  7.4 6.7*   < >  --     ALKPHOS 131 120 117 122  --  129 124   < >  --    AST 14 11 14 15  --  14 14  --  20   ALT 23 20 15 18   < > 24 20  --  19    < > = values in this interval not displayed.     Calcium/VitaminD  Recent Labs   Lab Test 07/31/18  1545 01/12/16  1221 09/14/15  1530   ANDRESSA 9.1 8.5 8.4*   VITDT 39 34  --      ESR/CRP  Recent Labs   Lab Test 10/30/18  1320 07/31/18  1545 02/03/17  1052  01/12/16  1221   SED 23 14  --   --  7   CRP 16.9* 5.7 12.2*   < > <2.9    < > = values in this interval not displayed.     Hepatitis B  Recent Labs   Lab Test 07/31/18  1545   HBCAB Nonreactive   HEPBANG Nonreactive     Hepatitis C  Recent Labs   Lab Test 02/05/16  1620   HCVAB Nonreactive   Assay performance characteristics have not been established for newborns,   infants, and children       Lyme ab screening  Recent Labs   Lab Test 11/29/16  1515   LYMEGM 0.12     HIV Screening  Recent Labs   Lab Test 07/31/18  1545   HIAGAB Nonreactive       Immunization History     Immunization History   Administered Date(s) Administered     Flu, Unspecified 09/15/2009     TDAP Vaccine (Boostrix) 03/19/2014     Td (Adult), Adsorbed 04/10/2006          Chart documentation done in part with Dragon Voice recognition Software. Although reviewed after completion, some word and grammatical error may remain.    Ash Donald MD

## 2018-12-13 ENCOUNTER — OFFICE VISIT (OUTPATIENT)
Dept: FAMILY MEDICINE | Facility: CLINIC | Age: 48
End: 2018-12-13
Payer: MEDICARE

## 2018-12-13 VITALS
TEMPERATURE: 98 F | OXYGEN SATURATION: 97 % | RESPIRATION RATE: 16 BRPM | DIASTOLIC BLOOD PRESSURE: 66 MMHG | SYSTOLIC BLOOD PRESSURE: 116 MMHG | BODY MASS INDEX: 23.25 KG/M2 | HEART RATE: 124 BPM | WEIGHT: 141 LBS

## 2018-12-13 DIAGNOSIS — F41.1 GENERALIZED ANXIETY DISORDER: Primary | ICD-10-CM

## 2018-12-13 DIAGNOSIS — M79.7 FIBROMYALGIA: ICD-10-CM

## 2018-12-13 DIAGNOSIS — J31.0 CHRONIC RHINITIS: ICD-10-CM

## 2018-12-13 DIAGNOSIS — Z12.11 SPECIAL SCREENING FOR MALIGNANT NEOPLASMS, COLON: ICD-10-CM

## 2018-12-13 DIAGNOSIS — M47.812 OSTEOARTHRITIS OF CERVICAL SPINE, UNSPECIFIED SPINAL OSTEOARTHRITIS COMPLICATION STATUS: ICD-10-CM

## 2018-12-13 PROCEDURE — 99214 OFFICE O/P EST MOD 30 MIN: CPT | Performed by: FAMILY MEDICINE

## 2018-12-13 RX ORDER — BUSPIRONE HYDROCHLORIDE 10 MG/1
TABLET ORAL
Qty: 135 TABLET | Refills: 3 | Status: SHIPPED | OUTPATIENT
Start: 2018-12-13 | End: 2019-03-01

## 2018-12-13 RX ORDER — CETIRIZINE HYDROCHLORIDE 10 MG/1
TABLET ORAL
Qty: 90 TABLET | Refills: 3 | Status: SHIPPED | OUTPATIENT
Start: 2018-12-13 | End: 2020-01-27

## 2018-12-13 RX ORDER — HYDROCODONE BITARTRATE AND ACETAMINOPHEN 7.5; 325 MG/1; MG/1
TABLET ORAL
Qty: 180 TABLET | Refills: 0 | Status: SHIPPED | OUTPATIENT
Start: 2018-12-13 | End: 2019-01-11

## 2018-12-13 RX ORDER — CLONAZEPAM 0.5 MG/1
TABLET ORAL
Qty: 60 TABLET | Refills: 0 | Status: SHIPPED | OUTPATIENT
Start: 2018-12-13 | End: 2019-01-11

## 2018-12-13 RX ORDER — ALPRAZOLAM 0.5 MG
TABLET ORAL
Qty: 30 TABLET | Refills: 0 | Status: SHIPPED | OUTPATIENT
Start: 2018-12-13 | End: 2019-01-11

## 2018-12-13 RX ORDER — CYCLOBENZAPRINE HCL 10 MG
TABLET ORAL
Qty: 90 TABLET | Refills: 1 | Status: SHIPPED | OUTPATIENT
Start: 2018-12-13 | End: 2019-03-01

## 2018-12-13 NOTE — PROGRESS NOTES
SUBJECTIVE:   Sherie Otero is a 48 year old female who presents to clinic today for the following health issues:      Depression and Anxiety Follow-Up    Status since last visit: Worsened     Other associated symptoms:None    Complicating factors:     Significant life event: No     Current substance abuse: None    PHQ 7/19/2018 8/1/2018 10/1/2018   PHQ-9 Total Score 12 9 10   Q9: Suicide Ideation Not at all Not at all Not at all     CELIA-7 SCORE 1/30/2018 7/19/2018 10/1/2018   Total Score - 16 (severe anxiety) -   Total Score 17 16 12     PHQ-9  English  PHQ-9   Any Language  CELIA-7  Suicide Assessment Five-step Evaluation and Treatment (SAFE-T)  Chronic Pain Follow-Up       Type / Location of Pain: wrists, elbow. Radiates to different locations  Analgesia/pain control:       Recent changes:  same      Overall control: Comfortably manageable  Activity level/function:      Daily activities:  Unable to perform most daily activities - chores, hobbies, social activities, driving and Able to do light housework, cooking    Work:  Unable to work  Adverse effects:  No  Adherance    Taking medication as directed?  Yes    Participating in other treatments: yes  Risk Factors:    Sleep:  Poor    Mood/anxiety:  worsened    Recent family or social stressors:  none noted    Other aggravating factors: weather change, overdoing movements  PHQ-9 SCORE 7/19/2018 8/1/2018 10/1/2018   PHQ-9 Total Score - - -   PHQ-9 Total Score MyChart 12 (Moderate depression) - -   PHQ-9 Total Score 12 9 10     CELIA-7 SCORE 1/30/2018 7/19/2018 10/1/2018   Total Score - 16 (severe anxiety) -   Total Score 17 16 12     Encounter-Level CSA - 01/30/2018:    Controlled Substance Agreement - Scan on 2/4/2018  9:27 AM: CONTROLLED SUBSTANCE AGREEMENT (below)       Encounter-Level CSA - 09/22/2015:    Controlled Substance Agreement - Scan on 9/23/2015 10:20 AM: CONTROLLED SUBSTANCE AGREEMENT (below)       Encounter-Level CSA - 08/06/2014:    Controlled  Substance Agreement - Scan on 9/10/2014 10:20 AM: Controlled Medication Agreement-08/06/14 (below)       Patient-Level CSA:    There are no patient-level csa.         Amount of exercise or physical activity: daily stretching    Problems taking medications regularly: No    Medication side effects: fatigue, wt gain    Diet: regular (no restrictions)        Problem list and histories reviewed & adjusted, as indicated.  Additional history: as documented    Reviewed and updated as needed this visit by clinical staff  Tobacco  Allergies  Meds  Problems  Med Hx  Surg Hx  Fam Hx  Soc Hx        Reviewed and updated as needed this visit by Provider  Tobacco  Allergies  Meds  Problems  Med Hx  Surg Hx  Fam Hx         Patient here today for follow-up on her fibromyalgia, chronic pain and anxiety.  We reviewed our plan from last office visit.  We decreased her hydrocodone to 7 tabs of 7.5 mg tablets/day at her last visit.  This is overall going fine for her.  She has been taking flexeril 20 mg at bedtime as recommended.  Has not been taking the AM dose yet.  Still trying to make sure side effects will be okay.  She is not that drowsy with current medication regimen.      For anxiety, patient is taking clonazepam 0.5 mg twice daily and Xanax 0.5 mg in afternoon.  Takes Buspar as well.  Also on Cymbalta for this and fibromyalgia.  Has not gotten appointment for psychiatrist yet.  Is still working on this.    Patient is on cetirizine for allergies and needs prescription for this today.  This is working well for her allergy treatment.    ROS:  10 point ROS of systems including Constitutional, Eyes, HENT, Respiratory, Cardiovascular, Gastroenterology, Genitourinary, Integumentary, Muscularskeletal, Psychiatric were all negative except for pertinent positives noted in my HPI.     OBJECTIVE:   /66   Pulse 124   Temp 98  F (36.7  C) (Temporal)   Resp 16   Wt 64 kg (141 lb)   SpO2 97%   BMI 23.25 kg/m    Body  mass index is 23.25 kg/m .  Physical Exam   Constitutional: She appears well-developed and well-nourished.   Cardiovascular: Normal rate, regular rhythm, S1 normal, S2 normal and normal heart sounds.   No murmur heard.  Pulmonary/Chest: Effort normal and breath sounds normal. No respiratory distress. She has no wheezes. She has no rhonchi. She has no rales.   Neurological: She is alert.   Psychiatric: Her speech is normal and behavior is normal. Judgment and thought content normal. Her mood appears anxious. Cognition and memory are normal. She exhibits a depressed mood (flat affect).       ASSESSMENT/PLAN:       ICD-10-CM    1. Generalized anxiety disorder F41.1 busPIRone (BUSPAR) 10 MG tablet     clonazePAM (KLONOPIN) 0.5 MG tablet     ALPRAZolam (XANAX) 0.5 MG tablet   2. Osteoarthritis of cervical spine, unspecified spinal osteoarthritis complication status M47.812 HYDROcodone-acetaminophen (NORCO) 7.5-325 MG per tablet   3. Fibromyalgia M79.7 cyclobenzaprine (FLEXERIL) 10 MG tablet   4. Chronic rhinitis J31.0 cetirizine (ZYRTEC) 10 MG tablet   5. Special screening for malignant neoplasms, colon Z12.11 Fecal colorectal cancer screen (FIT)     PLAN:  1.  We discussed medication regimen today.  I recommended that she work on taking AM dose of flexeril.  Could eventually consider gabapentin as she has not officially tried this medication yet (did have side effects from Lyrica noted at last office visit).  I recommended that we decrease her daily hydrocodone dose to 2 tabs q8 hours now for total of 6 pills/day.  We discussed the need to continue to ween her off opioids due to her fibromyalgia and that these type of medications tend to make this condition worse in the long term.    2.  I encouraged patient keep with her the naloxone prescription given to her at last visit on her as she is on both benzodiazepines and opioid medications.    3.  She will continue to wait on getting in with psychiatry.  In the meantime, I  will continue to refill her anxiety medications at current doses.      Follow-up in 1 month for recheck on above issues.    Mauro Paredes MD   [facility] [place]

## 2019-01-11 ENCOUNTER — OFFICE VISIT (OUTPATIENT)
Dept: FAMILY MEDICINE | Facility: CLINIC | Age: 49
End: 2019-01-11
Payer: MEDICARE

## 2019-01-11 VITALS
RESPIRATION RATE: 16 BRPM | SYSTOLIC BLOOD PRESSURE: 100 MMHG | DIASTOLIC BLOOD PRESSURE: 70 MMHG | WEIGHT: 139 LBS | TEMPERATURE: 98.2 F | OXYGEN SATURATION: 100 % | HEIGHT: 65 IN | BODY MASS INDEX: 23.16 KG/M2 | HEART RATE: 78 BPM

## 2019-01-11 DIAGNOSIS — M05.79 RHEUMATOID ARTHRITIS INVOLVING MULTIPLE SITES WITH POSITIVE RHEUMATOID FACTOR (H): ICD-10-CM

## 2019-01-11 DIAGNOSIS — F41.1 GENERALIZED ANXIETY DISORDER: ICD-10-CM

## 2019-01-11 DIAGNOSIS — F33.0 MAJOR DEPRESSIVE DISORDER, RECURRENT EPISODE, MILD (H): ICD-10-CM

## 2019-01-11 DIAGNOSIS — M47.812 OSTEOARTHRITIS OF CERVICAL SPINE, UNSPECIFIED SPINAL OSTEOARTHRITIS COMPLICATION STATUS: ICD-10-CM

## 2019-01-11 DIAGNOSIS — M79.7 FIBROMYALGIA: ICD-10-CM

## 2019-01-11 LAB
ALBUMIN SERPL-MCNC: 3.7 G/DL (ref 3.4–5)
ALP SERPL-CCNC: 127 U/L (ref 40–150)
ALT SERPL W P-5'-P-CCNC: 30 U/L (ref 0–50)
AST SERPL W P-5'-P-CCNC: 24 U/L (ref 0–45)
BASOPHILS # BLD AUTO: 0.1 10E9/L (ref 0–0.2)
BASOPHILS NFR BLD AUTO: 0.4 %
BILIRUB DIRECT SERPL-MCNC: <0.1 MG/DL (ref 0–0.2)
BILIRUB SERPL-MCNC: 0.2 MG/DL (ref 0.2–1.3)
CREAT SERPL-MCNC: 0.97 MG/DL (ref 0.52–1.04)
DIFFERENTIAL METHOD BLD: ABNORMAL
EOSINOPHIL NFR BLD AUTO: 0.9 %
ERYTHROCYTE [DISTWIDTH] IN BLOOD BY AUTOMATED COUNT: 13.8 % (ref 10–15)
GFR SERPL CREATININE-BSD FRML MDRD: 68 ML/MIN/{1.73_M2}
HCT VFR BLD AUTO: 44.4 % (ref 35–47)
HGB BLD-MCNC: 14.5 G/DL (ref 11.7–15.7)
IMM GRANULOCYTES # BLD: 0.1 10E9/L (ref 0–0.4)
IMM GRANULOCYTES NFR BLD: 0.4 %
LYMPHOCYTES # BLD AUTO: 3.6 10E9/L (ref 0.8–5.3)
LYMPHOCYTES NFR BLD AUTO: 29.4 %
MCH RBC QN AUTO: 34.4 PG (ref 26.5–33)
MCHC RBC AUTO-ENTMCNC: 32.7 G/DL (ref 31.5–36.5)
MCV RBC AUTO: 105 FL (ref 78–100)
MONOCYTES # BLD AUTO: 0.5 10E9/L (ref 0–1.3)
MONOCYTES NFR BLD AUTO: 3.9 %
NEUTROPHILS # BLD AUTO: 7.9 10E9/L (ref 1.6–8.3)
NEUTROPHILS NFR BLD AUTO: 65 %
NRBC # BLD AUTO: 0 10*3/UL
NRBC BLD AUTO-RTO: 0 /100
PLATELET # BLD AUTO: 313 10E9/L (ref 150–450)
PROT SERPL-MCNC: 7.5 G/DL (ref 6.8–8.8)
RBC # BLD AUTO: 4.22 10E12/L (ref 3.8–5.2)
WBC # BLD AUTO: 12.1 10E9/L (ref 4–11)

## 2019-01-11 PROCEDURE — 36415 COLL VENOUS BLD VENIPUNCTURE: CPT | Performed by: INTERNAL MEDICINE

## 2019-01-11 PROCEDURE — 80076 HEPATIC FUNCTION PANEL: CPT | Performed by: INTERNAL MEDICINE

## 2019-01-11 PROCEDURE — 82565 ASSAY OF CREATININE: CPT | Performed by: INTERNAL MEDICINE

## 2019-01-11 PROCEDURE — 85025 COMPLETE CBC W/AUTO DIFF WBC: CPT | Performed by: INTERNAL MEDICINE

## 2019-01-11 PROCEDURE — 99214 OFFICE O/P EST MOD 30 MIN: CPT | Performed by: FAMILY MEDICINE

## 2019-01-11 RX ORDER — TOPIRAMATE 25 MG/1
TABLET, FILM COATED ORAL
Qty: 70 TABLET | Refills: 1 | Status: SHIPPED | OUTPATIENT
Start: 2019-01-11 | End: 2019-03-29

## 2019-01-11 RX ORDER — DULOXETINE HCL 60 MG
CAPSULE,DELAYED RELEASE (ENTERIC COATED) ORAL
Qty: 90 CAPSULE | Refills: 1 | Status: SHIPPED | OUTPATIENT
Start: 2019-01-11 | End: 2019-03-01

## 2019-01-11 RX ORDER — HYDROCODONE BITARTRATE AND ACETAMINOPHEN 7.5; 325 MG/1; MG/1
TABLET ORAL
Qty: 180 TABLET | Refills: 0 | Status: SHIPPED | OUTPATIENT
Start: 2019-01-11 | End: 2019-02-04

## 2019-01-11 RX ORDER — ALPRAZOLAM 0.5 MG
TABLET ORAL
Qty: 30 TABLET | Refills: 0 | Status: SHIPPED | OUTPATIENT
Start: 2019-01-11 | End: 2019-02-19

## 2019-01-11 RX ORDER — CLONAZEPAM 0.5 MG/1
TABLET ORAL
Qty: 60 TABLET | Refills: 0 | Status: SHIPPED | OUTPATIENT
Start: 2019-01-11 | End: 2019-02-19

## 2019-01-11 ASSESSMENT — ANXIETY QUESTIONNAIRES
7. FEELING AFRAID AS IF SOMETHING AWFUL MIGHT HAPPEN: NOT AT ALL
IF YOU CHECKED OFF ANY PROBLEMS ON THIS QUESTIONNAIRE, HOW DIFFICULT HAVE THESE PROBLEMS MADE IT FOR YOU TO DO YOUR WORK, TAKE CARE OF THINGS AT HOME, OR GET ALONG WITH OTHER PEOPLE: NOT DIFFICULT AT ALL
GAD7 TOTAL SCORE: 0
3. WORRYING TOO MUCH ABOUT DIFFERENT THINGS: NOT AT ALL
1. FEELING NERVOUS, ANXIOUS, OR ON EDGE: NOT AT ALL
5. BEING SO RESTLESS THAT IT IS HARD TO SIT STILL: NOT AT ALL
2. NOT BEING ABLE TO STOP OR CONTROL WORRYING: NOT AT ALL
6. BECOMING EASILY ANNOYED OR IRRITABLE: NOT AT ALL

## 2019-01-11 ASSESSMENT — PAIN SCALES - GENERAL: PAINLEVEL: EXTREME PAIN (8)

## 2019-01-11 ASSESSMENT — PATIENT HEALTH QUESTIONNAIRE - PHQ9
SUM OF ALL RESPONSES TO PHQ QUESTIONS 1-9: 8
5. POOR APPETITE OR OVEREATING: NOT AT ALL

## 2019-01-11 ASSESSMENT — MIFFLIN-ST. JEOR: SCORE: 1251.38

## 2019-01-11 NOTE — LETTER
[facility]  01/11/19    Patient: Sherie Otero  YOB: 1970  Medical Record Number: [id number]                                                                  Opioid / Opioid Plus Controlled Substance Agreement    I understand that my care provider has prescribed an opioid (narcotic) controlled substance to help manage my condition(s). I am taking this medicine to help me function or work. I know this is strong medicine, and that it can cause serious side effects. Opioid medicine can be sedating, addicting and may cause a dependency on the drug. They can affect my ability to drive or think, and cause depression. They need to be taken exactly as prescribed. Combining opioids with certain medicines or chemicals (such as cocaine, sedatives and tranquilizers, sleeping pills, meth) can be dangerous or even fatal. Also, if I stop opioids suddenly, I may have severe withdrawal symptoms. Last, I understand that opioids do not work for all types of pain nor for all patients. If not helpful, I may be asked to stop them.    I am also being prescribed a benzodiazepine (tranquilizer) controlled substance.   I understand this type of medication is sedating, and can increase the risk of death when taken together with opioids.  I have talked to my care team about the option of having a prescription for Narcan to use to reverse the opioid medicine, in case I get too sleepy. I will be very careful to take my medicines only as directed.    The risks, benefits, and side effects of these medicine(s) were explained to me. I agree that:    1. I will take part in other treatments as advised by my care team. This may be psychiatry or counseling, physical therapy, behavioral therapy, group treatment or a referral to a pain clinic. I will reduce or stop my medicine when my care team tells me to do so.  2. I will take my medicines as prescribed. I will not change the dose or schedule unless my  care team tells me to. There will be no refills if I  run out early.   I may be contactedwithout warning and asked to complete a urine drug test or pill count at any time.   3. I will keep all my appointments, and understand this is part of the monitoring of opioids. My care team may require an office visit for EVERY opioid/controlled substance refill. If I miss appointments or don t follow instructions, my care team may stop my medicine.  4. I will not ask other providers to prescribe controlled substances, and I will not accept controlled substances from other people. If I need another prescribed controlled substance for a new reason, I will tell my care team within 1 business day.  5. I will use one pharmacy to fill all of my controlled substance prescriptions, and it is up to me to make sure that I do not run out of my medicines on weekends or holidays. If my care team is willing to refill my opioid prescription without a visit, I must request refills only during office hours, refills may take up to 3 days to process, and it may take up to 5 to 7 days for my medicine to be mailed and ready at my pharmacy. Prescriptions will not be mailed anywhere except my pharmacy.        [id number]  Rev 12/18         Registration to scan to EHR                             Page 1 of 2               Controlled Substance Agreement Opioid        [facility]  01/11/19  Patient: Sherie Otero  YOB: 1970  Medical Record Number: [id number]                                                                  6. I am responsible for my prescriptions. If the medicine/prescription is lost or stolen, it will not be replaced. I also agree not to share controlled substance medicines with anyone.  7. I agree to not use ANY illegal or recreational drugs. This includes marijuana, cocaine, bath salts or other drugs. I agree not to use alcohol unless my care team says I may.          I agree to give urine  samples whenever asked. If I don t give a urine sample, the care team may stop my medicine.    8. If I enroll in the [place] Medical Marijuana program, I will tell my care team. I will also sign an agreement to share my medical records with my care team.   9. I will bring in my list of medicines (or my medicine bottles) each time I come to the clinic.   10. I will tell my care team right away if I become pregnant or have a new medical problem treated outside of my regular clinic.  11. I understand that this medicine can affect my thinking and judgment. It may be unsafe for me to drive, use machinery and do dangerous tasks. I will not do any of these things until I know how the medicine affects me. If my dose changes, I will wait to see how it affects me. I will contact my care team if I have concerns about medicine side effects.    I understand that if I do not follow any of the conditions above, my prescriptions or treatment may be stopped.      I agree that my provider, clinic care team, and pharmacy may work with any city, state or federal law enforcement agency that investigates the misuse, sale, or other diversion of my controlled medicine. I will allow my provider to discuss my care with or share a copy of this agreement with any other treating provider, pharmacy or emergency room where I receive care. I agree to give up (waive) any right of privacy or confidentiality with respect to these consents.     I have read this agreement and have asked questions about anything I did not understand.      ________________________________________________________________________  Patient signature - Date/Time -  Sherie Otero                                      ________________________________________________________________________  Witness signature                                                            ________________________________________________________________________  Provider signature - Mauro Sanchez  MD Reggie      [id number]  Rev 12/18         Registration to scan to EHR                         Page 2 of 2                   Controlled Substance Agreement Opioid           Page 1 of 2  Opioid Pain Medicines (also known as Narcotics)  What You Need to Know    What are opioids?   Opioids are pain medicines that must be prescribed by a doctor.  They are also known as narcotics.    Examples are:     morphine (MS Contin, Catrachita)    oxycodone (Oxycontin)    oxycodone and acetaminophen (Percocet)    hydrocodone and acetaminophen (Vicodin, Norco)     fentanyl patch (Duragesic)     hydromorphone (Dilaudid)     methadone     What do opioids do well?   Opioids are best for short-term pain after a surgery or injury. They also work well for cancer pain. Unlike other pain medicines, they do not cause liver or kidney failure or ulcers. They may help some people with long-lasting (chronic) pain.     What do opioids NOT do well?   Opioids never get rid of pain entirely, and they do not work well for most patients with chronic pain. Opioids do not reduce swelling, one of the causes of pain. They also don t work well for nerve pain.                           For informational purposes only.  Not to replace the advice of your care provider.  Copyright [facility]. All right reserved. [place] [id number]-[id number] 02/18.      Page 2 of 2    Risks and side effects   Talk to your doctor before you start or decide to keep taking one of these medicines. Side effects include:    Lowering your breathing rate enough to cause death    Overdose, including death, especially if taking higher than prescribed doses    Long-term opioid use    Worse depression symptoms; less pleasure in things you usually enjoy    Feeling tired or sluggish    Slower thoughts or cloudy thinking    Being more sensitive to pain over time; pain is harder to control    Trouble sleeping or restless sleep    Changes in hormone levels (for example, less  testosterone)    Changes in sex drive or ability to have sex    Constipation    Unsafe driving    Itching and sweating    Feeling dizzy    Nausea, vomiting and dry mouth    What else should I know about opioids?  When someone takes opioids for too long or too often, they become dependent. This means that if you stop or reduce the medicine too quickly, you will have withdrawal symptoms.    Dependence is not the same as addiction. Addiction is when people keep using a substance that harms their body, their mind or their relations with others. If you have a history of drug or alcohol abuse, taking opioids can cause a relapse.    Over time, opioids don t work as well. Most people will need higher and higher doses. The higher the dose, the more serious the side effects. We don t know the long-term effects of opioids.      Prescribed opioids aren't the best way to manage chronic pain    Other ways to manage pain include:      Ibuprofen or acetaminophen.  You should always try this first.      Treat health problems that may be causing pain.      acupuncture or massage, deep breathing, meditation, visual imagery, aromatherapy.      Use heat or ice at the pain site      Physical therapy and exercise      Stop smoking      See a counselor or therapist                                                  People who have used opioids for a long time may have a lower quality of life, worse depression, higher levels of pain and more visits to doctors.    Never share your opioids with others. Be sure to store opioids in a secure place, locked if possible.Young children can easily swallow them and overdose.     You can overdose on opioids.  Signs of overdose include decrease or loss of consciousness, slowed breathing, trouble waking and blue lips.  If someone is worried about overdose, they should call 911.    If you are at risk for overdose, you may get naloxone (Narcan, a medicine that reverses the effects of opioids.  If you  overdose, a friend or family member can give you Narcan while waiting for the ambulance.  They need to know the signs of overdose and how to give Narcan.    While you're taking opioids:    Don't use alcohol or street drugs. Taking them together can cause death.    Don't take any of these medicines unless your doctor says its okay.  Taking these with opioids can cause death.    Benzodiazepines (such as lorazepam         or diazepam)    Muscle relaxers (such as cyclobenzaprine)    sleeping pills    other opioids    Safe disposal of opioids  Find your area drug take-back program, your pharmacy mail-back program, buy a special disposal bag (such as Deterra) from your pharmacy or flush them down the toilet.  Use the guidelines at:  www.fda.gov/drugs/resourcesforyou

## 2019-01-11 NOTE — PROGRESS NOTES
SUBJECTIVE:   Sherie Otero is a 48 year old female who presents to clinic today for the following health issues:    Medication recheck.   Anxiety Follow-Up    Status since last visit: Worsened due to pain.     Other associated symptoms:None    Complicating factors:   Significant life event: No   Current substance abuse: None  Depression symptoms: Yes-  Stay in due to pain.   CELIA-7 SCORE 7/19/2018 10/1/2018 1/11/2019   Total Score 16 (severe anxiety) - -   Total Score 16 12 0       CELIA-7    Amount of exercise or physical activity: None    Problems taking medications regularly: No    Medication side effects: none    Diet: regular (no restrictions)            Problem list and histories reviewed & adjusted, as indicated.  Additional history: as documented        Reviewed and updated as needed this visit by clinical staff  Tobacco  Allergies  Meds  Problems  Med Hx  Surg Hx  Fam Hx       Reviewed and updated as needed this visit by Provider  Tobacco  Allergies  Meds  Problems  Med Hx  Surg Hx  Fam Hx         Patient here today for recheck on her chronic pain management with respect to her fibromyalgia, osteoarthritis, and rheumatoid arthritis.  Patient states that since we decreased her daily hydrocodone allotment at her last office visit 1 month ago, her pain seems to have gotten worse.  She reports that she had to separate episodes of rheumatoid arthritis flareups in which she required additional prednisone.  She also notes that her fibromyalgia flared up as well.  She states as a result, she increased her usage of hydrocodone beyond what her daily limits were.  She notes that she has 10 pills of hydrocodone left and about 6 days until she can get this refilled.    Patient is now taking cyclobenzaprine 20 mg in the evening and 10 mg in the morning.  She informs me today that she remembers that she had been on gabapentin in the past and had severe nausea and vomiting with this medication.  This is why  "her last primary care provider tried Lyrica instead.  Patient had side effects of this medication as well.    Patient thinks that she may have tried Topamax but she cannot recall where that was and for what reason.  It is not listed in her medication list here, but patient thinks that perhaps she tried it for her migraine management with her neurologist (who is outside of [place]).    Patient has a psychiatry evaluation set up in 3 months.  Her anxiety symptoms are stable though not fully optimized.  She continues to take clonazepam 0.5 mg twice daily and up to once daily as needed 0.5 alprazolam along with BuSpar and Cymbalta    ROS:  10 point ROS of systems including Constitutional, Eyes, HENT, Respiratory, Cardiovascular, Gastroenterology, Genitourinary, Integumentary, Muscularskeletal, Psychiatric were all negative except for pertinent positives noted in my HPI.     OBJECTIVE:   /70   Pulse 78   Temp 98.2  F (36.8  C) (Temporal)   Resp 16   Ht 1.651 m (5' 5\")   Wt 63 kg (139 lb)   SpO2 100%   Breastfeeding? No   BMI 23.13 kg/m    Body mass index is 23.13 kg/m .  Physical Exam   Constitutional: She appears well-developed and well-nourished.   Cardiovascular: Normal rate, regular rhythm, S1 normal, S2 normal and normal heart sounds.   No murmur heard.  Pulmonary/Chest: Effort normal and breath sounds normal. No respiratory distress. She has no wheezes. She has no rhonchi. She has no rales.   Neurological: She is alert.   Psychiatric: Her speech is normal and behavior is normal. Judgment and thought content normal. Her mood appears anxious. Cognition and memory are normal. She exhibits a depressed mood (flat affect).       ASSESSMENT/PLAN:       ICD-10-CM    1. Fibromyalgia M79.7 topiramate (TOPAMAX) 25 MG tablet     CYMBALTA 60 MG capsule     PAIN MANAGEMENT REFERRAL   2. Osteoarthritis of cervical spine, unspecified spinal osteoarthritis complication status M47.812 HYDROcodone-acetaminophen (NORCO) " 7.5-325 MG per tablet     PAIN MANAGEMENT REFERRAL   3. Generalized anxiety disorder F41.1 ALPRAZolam (XANAX) 0.5 MG tablet     clonazePAM (KLONOPIN) 0.5 MG tablet   4. Major depressive disorder, recurrent episode, mild (H) F33.0 CYMBALTA 60 MG capsule     PLAN:  1.  I reviewed her pain management regimen with her today.  We discussed trialing Topamax for her pain management as she does not recall specifically why she stopped taking it for her migraines.  Could be that it just did not work for her migraine management as she does not recall specific side effects associated with it.  I informed her that this medication is used commonly with chronic pain management and would be a good option for her at this point.  I also recommended that she see our pain management clinic for further evaluation and discussion on other options for long-term pain management.  We discussed that the hydrocodone is not a recommended medication for fibromyalgia and he can actually make fibromyalgia symptoms worse.  I informed her that when she has fibromyalgia pain exacerbations that hydrocodone may feel better in the short-term, but may lead to long-term issues with regards to difficulty with improved pain control.  I informed her today that we will keep her daily hydrocodone dose the same for right now as we await her evaluation with the pain management clinic.  2.  Her psychiatric medications were reviewed and refilled and remain at the same doses as before.  I encouraged her to continue to work on getting in sooner with her psychiatrist if possible and plan to keep her appointment in 3 months.  3.  Patient asked what she should do with regards to the fact that she is going to run out short of her hydrocodone this month, and I informed her that unfortunately there is not anything that I can do for her as she made the choice to increase her hydrocodone frequency beyond what was recommended to her last month and she is going to have to  live with those consequences.  I recommended that she consider spacing out what she has left to make it last until her next refill is due to be filled.    Follow up with Provider - Return in about 4 weeks (around 2/8/2019) for medication recheck.      Mauro Paredes MD   [facility]

## 2019-01-12 ASSESSMENT — ANXIETY QUESTIONNAIRES: GAD7 TOTAL SCORE: 0

## 2019-01-14 ENCOUNTER — OFFICE VISIT (OUTPATIENT)
Dept: PODIATRY | Facility: CLINIC | Age: 49
End: 2019-01-14
Payer: MEDICARE

## 2019-01-14 VITALS
HEIGHT: 65 IN | DIASTOLIC BLOOD PRESSURE: 76 MMHG | BODY MASS INDEX: 23.57 KG/M2 | SYSTOLIC BLOOD PRESSURE: 128 MMHG | TEMPERATURE: 98 F | WEIGHT: 141.5 LBS | HEART RATE: 124 BPM

## 2019-01-14 DIAGNOSIS — M54.2 NECK PAIN: ICD-10-CM

## 2019-01-14 DIAGNOSIS — M79.7 FIBROMYALGIA: ICD-10-CM

## 2019-01-14 DIAGNOSIS — M54.50 CHRONIC BILATERAL LOW BACK PAIN WITHOUT SCIATICA: Primary | ICD-10-CM

## 2019-01-14 DIAGNOSIS — G89.29 CHRONIC BILATERAL LOW BACK PAIN WITHOUT SCIATICA: Primary | ICD-10-CM

## 2019-01-14 DIAGNOSIS — G89.4 CHRONIC PAIN SYNDROME: ICD-10-CM

## 2019-01-14 PROCEDURE — 99205 OFFICE O/P NEW HI 60 MIN: CPT | Performed by: PHYSICIAN ASSISTANT

## 2019-01-14 ASSESSMENT — PAIN SCALES - GENERAL: PAINLEVEL: EXTREME PAIN (9)

## 2019-01-14 ASSESSMENT — MIFFLIN-ST. JEOR: SCORE: 1262.72

## 2019-01-14 NOTE — PROGRESS NOTES
"/76   Pulse 124   Temp 98  F (36.7  C) (Temporal)   Ht 1.651 m (5' 5\")   Wt 64.2 kg (141 lb 8 oz)   BMI 23.55 kg/m    Body mass index is 23.55 kg/m .  5' 5\"  141 lbs 8 oz        Rachael Madrigal MA on 1/14/2019 at 12:59 PM    "

## 2019-01-14 NOTE — PROGRESS NOTES
[facility] Consultation      This patient is being seen in consultation at the request of her provider Dr. Mauro Paredes.    Primary Care Provider is Mauro Paredes.    The current opiate pain medications are being prescribed by: Dr. Mauro Paredes    Please see the [facility] health questionnaire which the patient completed and reviewed with me in detail    CHIEF COMPLAINT:  Fibromyalgia, neck pain    HISTORY OF PRESENT ILLNESS:  Sherie Otero is a 48 year old female with history of widespread pain s/t fibromyalgia, neck pain and low back pain.     Today spine hurts with weather changes. Multiple joints hurt. Fibromyalgia pain (widespread pain) as well. Pain goes with the weather. Pain started in . Diagnosed with fibromyalgia at that time. In year , had a significant joint pain flare. Diagnosed with rheumatoid arthritis around . Gets a burning and tightness in the afternoon.     Pain radiates mostly with weather. Fibromyalgia pain is always. Years ago went to PT and got a foam roller and if lay on that, it helps stretch the muscles. Helps with Headaches as well. Uses a TENS unit as well.     Feels that pain is worse with recent medication changes (decrease in hydrocodone use).     Pain Information:   Onset/Progression:  Pain started .   Pain quality: Aching, Burning and Shooting    Pain timing: Constant     Pain ratin/10 today   Aggravating factors include: increased activity, changes in weather         Past Pain Treatments:    Pain Clinic:   No    PT: Yes, years ago. Has not done pool therapy.    Psychologist: No   Relaxation techniques/biofeedback: No   Chiropractor: No, was told not to because of neck.    Acupuncture: Yes for headaches.    Pharmacotherapy:     Opioids: Yes      Non-opioids:  Yes    TENs Unit:Yes   Injections: cervical medial branch blocks 2014   Self-care:   Yes, ice/heat, hot baths, theracare   Surgeries  related to pain: No    Current Pain Relevant Medications:    Flexeril-1 in AM and 2 at HS  Cymbalta-60mg at night  Hydrocodone-currently taking 2 tablets three times a day   Topamax-haven't started yet  Ibuprofen-will take 800mg up to 3 times day, doesn't take daily      Previous Pain Relevant Medications: (H--helped; HI--Helped initially; [facility]--Somewhat helpful; NH--No help; W--worse; SE--side effects; ?--Unsure if helpful)   NOTE: This medication information taken from patient's intake form, not medical records.    Opiates: Hydrocodone H, Oxycodone SE   NSAIDS: Ibuprofent H    Muscle Relaxants: Tizanidine NH, Flexeril H   Anti-migraine mediations: Verapamil H   Anti-depressants: Cymbalta H   Sleep aids: none   Anxiolytics: Xanax H, Clonazepam H, Valium H   Neuropathics: Gabapentin SE dizziness    Topicals: Lidocaine patches SW   Other medications not covered above: Savella H at first, then seemed to stop working, Lyrica SE shortness of breath, felt 'weird' on them, throat felt weird. Prednisone H for arthritis flare          THE 4 As OF OPIOID MAINTENANCE ANALGESIA    Analgesia: Is pain relief clinically significant? YES   Activity: Is patient functional and able to perform Activities of Daily Living? YES   Adverse effects: Is patient free from adverse side effects from opiates? YES   Adherence to Rx protocol: Is patient adhering to Controlled Substance Agreement and taking medications ONLY as ordered? No, patient has taken more than prescribed    Is Narcan prescribed for opiate use >50 MME daily? YES    Total Daily MME: 30    PAST MEDICAL HISTORY:   Past Medical History:   Diagnosis Date     Allergic rhinitis due to other allergen      Degenerative joint disease of cervical spine 2016    multi level worst at C5-6     Generalized anxiety disorder      Hearing loss      Intrinsic asthma, unspecified      Irritable bowel syndrome      Lump or mass in breast 1996    Left breast lump 1996-- aspiration benign.      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 Feels like sometimes that people do not understand his pain.   Occupation: No working   Current stressors: yes, money issues.   Safety concerns: none    FAMILY MEDICAL HISTORY:  Chronic pain: Yes, mom with RA,   Family history of headaches: none    HEALTH AND LIFESTYLE PRACTICES:  Have you ever had any problems with alcohol or drug use? none  Have you ever felt you should cut down your use of alcohol/drugs? no  Have people every annoyed you by criticizing your alcohol/drug use? no  Have you ever felt bad or guilty about your alcohol/drug use? no  Have you ever had a drink or taken a drug first thing in the morning for an eye-opener/hangover? no    SLEEP:  Do you snore heavily? no  Do you wake up feeling rested? no  How many hours of sleep do you average per day? 6  What keep you from sleep? Unable to get comfortable, pain, moving from bed to couch  Have you been diagnosed with sleep apnea? no  Do you wear a CPAP? no      ALLERGIES:  Allergies   Allergen Reactions     Codeine Nausea and Vomiting     Droperidol      Uncontrollable shaking       Penicillins        MEDICATIONS:  Current Outpatient Medications   Medication Sig Dispense Refill     albuterol (VENTOLIN HFA) 108 (90 Base) MCG/ACT Inhaler Inhale 2 puffs into the lungs every 6 hours as needed 18 g 4     ALPRAZolam (XANAX) 0.5 MG tablet One tablet mid day. MUST LAST 30 DAYS. 30 tablet 0     busPIRone (BUSPAR) 10 MG tablet TAKE 5 MG (1/2 TAB) IN THE MORNING AND 10 MG AT  NIGHT 135 tablet 3     cetirizine (ZYRTEC) 10 MG tablet TAKE  ONE TABLET BY MOUTH EVERY DAY. 90 tablet 3     clonazePAM (KLONOPIN) 0.5 MG tablet TAKE ONE-HALF TO ONE TABLET BY MOUTH TWICE A DAY AS NEEDED FOR ANXIETY - MUST LAST 30 DAYS 60 tablet 0     cyclobenzaprine (FLEXERIL) 10 MG tablet Take 2 tablets in the evening and 1 in the morning 90 tablet 1     CYMBALTA 60 MG capsule TAKE ONE CAPSULE BY MOUTH EVERY EVENING - KADY 90 capsule 1     fluticasone (FLONASE) 50 MCG/ACT spray SPRAY 2 SPRAYS INTO BOTH NOSTRILS DAILY. 16 g 11     folic acid (FOLVITE) 1 MG tablet Take 1 tablet (1 mg) by mouth daily 100 tablet 3     HYDROcodone-acetaminophen (NORCO) 7.5-325 MG per tablet TAKE 2 TABLETS BY MOUTH EVERY 6 HOURS AS NEEDED FOR PAIN--MAX OF 6 TABLETS PER DAY. NARCOTIC AGREEMENT DONE 1/30/2018 180 tablet 0     methotrexate sodium 2.5 MG TABS Take 8 tablets (20 mg) by mouth every 7 days 32 tablet 3     Multiple Vitamins-Minerals (MULTIVITAL PO)        naloxone (NARCAN) nasal spray Spray 1 spray (4 mg) into one nostril alternating nostrils as needed for opioid reversal every 2-3 minutes until assistance arrives 0.2 mL 0     predniSONE (DELTASONE) 5 MG tablet Take 5 mg by mouth daily as needed for peripheral joint pain from rheumatoid arthritis; use sparingly. (Patient not taking: Reported on 1/11/2019.) 30 tablet 0     topiramate (TOPAMAX) 25 MG tablet 1 tab daily x1week, then 1 tab twice daily x1 week, then 2 tabs AM, 1 tab PM x1 week then 2 tabs twice daily 70 tablet 1     verapamil (CALAN) 40 MG tablet Take 1 tablet (40 mg) by mouth 2 times daily 180 tablet 3         REVIEW OF SYSTEMS:   Constitutional:  Fatigue, Malaise and Weight Gain  Eyes/Head: Dizziness, Headache and Vision Changes  Ears/Nose/Throat: Hearing Loss and Sinus infection  Allergy/Immune: Negative  Skin:Negative  Hematologic/Lymphatic/Immunologic:Easy bruising  Respiratory: Cough  Cardiovascular: Negative  Gastrointestinal: Constipation, Diarrhea, Nausea  "and Vomiting  Endocrine: Steroid use-last use was last week  Musculoskeletal: Arthritis, Back pain, Joint pain, Neck pain and Stiffness  Urinary:  Negative   Any bowel or bladder incontinence: Denies   Neurologic: Numbness/Tingling  Psychiatric: Anxiety, Depression, Mood swings and Stress      Any illicit drug use: no  EtOH use: <1/year  Caffeine use: 1/2 pot of coffee, maybe a little more  Nicotine use: 1/2-1ppd  Any use of prescriptions other than how they were prescribed:no    PHYSICAL EXAM    /76   Pulse 124   Temp 98  F (36.7  C) (Temporal)   Ht 1.651 m (5' 5\")   Wt 64.2 kg (141 lb 8 oz)   BMI 23.55 kg/m        Appearance:     A&O. Patient is appropriate.   Patient is in NAD.   Patient is well groomed and appears stated age.     HEENT:   Normocephalic, atraumatic, sclera, conjunctiva and pharynx normal. Pupils are equal, round and reactive to light. Hearing is adequate for exam. Uvula rises with phonation.   Neck: Supple. No deformities or adenopathy  Cardiovascular:  Heart has a regular rate and rhythm. Audible S1 and S2. No murmurs auscultated. No edema on bilateral lower extremities.   Respiratory: Lungs are clear to auscultation bilaterally. No wheezes or crackles.  Abdominal/Pelvic:   Soft, non-tender with good bowel sounds, no palpable organomegaly.  Skin:  No rashes, erythema, breakdowns, lesions to exposed skin.   Hematologic:  No bruises, petechiae or ecchymosis to exposed areas.  Psychiatric:  mentation appears normal., affect and mood normal  Musculoskeletal:  Posture upright, shoulders and pelvis are leveled.   Deltoid: R: 5/5 L: 5/5  Biceps: R: 5/5 L: 5/5  Triceps: R: 5/5 L: 5/5  Intrinsic hand: R: 5/5   L: 5/5  Hip flexion: R: 5/5 L: 5/5  Knee ext: R: 5/5 L: 5/5  Knee flex: R: 5/5 L: 5/5  Dorsiflexion: R: 5/5 L: 5/5  Plantarflexion: R: 5/5 L: 5/5    Cervical spine:   Flex:  25 degrees, painful    Ext: 25 degrees, painful    Rotation to right: 60 degrees, pain free   Rotation to left: 60 " degrees, pain free   Tenderness in the cervical spine at midline. Yes   Tenderness in the cervical paraspinal muscles. Yes  Thoracic spine:    Tenderness in the thoracic spine at midline. No   Tenderness in the thoracic paraspinal muscles. No  Lumbar/Sacral spine:   Flexion:  70 degrees, painful    Ext: 25 degrees, painful    Rotation/ext to right: painful    Rotation/ext to left: painful    Tenderness in the lumbar spine at midline. Yes   Tenderness in the lumbar paraspinal muscles.Yes   Straight leg exam:    Right: negative     Left:  negative   Chris/Tunde's test:      Right: positive    Left:  positive   Tenderness over SI joint:      Right: negative     Left:  negative   Tenderness over piriformis:     Right: negative     Left:  negative   Tenderness over Trochanteric Bursa:     Right: negative     Left: negative     Fibromyalgia Assessment:     Patient does meet the criteria for a diagnosis of Fibromyalgia.     Gait pattern:  Able to walk on the heels and toes. Patient has a normal gait.     Neurological:   Deep Tendon Reflex exam:   Biceps:     R:  2/4   L: 2/4   Brachioradialis   R:  2/4   L: 2/4:   Patella:  R:  2/4   L: 2/4   Achilles:  R:  2/4   L: 2/4    Sensory exam:   Light touch: normal bilateral upper and lower extremities    Vibration: normal in bilateral upper extremities and bilateral lower extremities   Sharp: normal in bilateral upper extremities and bilateral lower extremities   No allodynia, dysesthesia, or hyperalgesia.      Previous Diagnostic Tests:   Imaging Studies:   Xray Cervical Spine 7/31/2018  IMPRESSION: Moderate degenerative disc disease at C4-C5 and moderate  to advanced degenerative disc disease at C5-C6. Mild retrolisthesis of  C5 on C6. Moderate degenerative disc disease at C6-C7. Mild  anterolisthesis of C7 on T1. No significant change in flexion or  extension. There are likely facet degenerative changes in the mid  cervical spine as well.    MRI Cervical Spine  12/29/2016  IMPRESSION: Lower cervical degenerative disc disease most advanced at  C5-C6 where there is moderate to severe central canal stenosis and  mild cord deformity. This stenosis has slightly increased since the  prior exam in 2012.    MRI Thoracic Spine 12/29/2016  IMPRESSION: Minimal upper leftward thoracic curvature. No evidence for  any focal disc herniations or stenosis.    [place] Board of Pharmacy Data Base Reviewed:    YES; No concern for abuse or misuse of controlled medications based on this report.     ASSESSMENT:   Sherie Otero is a 48 year old female who presents today with the complaints of:    1.  Fibromyalgia  2.  Neck pain  3.  Low back pain  4.  Chronic pain syndrome    PLAN:    Diagnosis reviewed, treatment option addressed, and risk/benefits discussed.  Self-care instructions given.  I am recommending a multidisciplinary treatment plan to help this patient better manage her pain.       1.  Physical Therapy:  Patient is going to see if her insurance will cover pool therapy. I think she would benefit from this  2.  Clinical Health Psychologist to address issues of relaxation, behavorial change, coping style, and other factors important to improvement: This will be very important going forward. She is going to look into it and let me know.   3.  Diagnostic Studies:  MRI lumbar spine. No imaging of low back and is having a lot of pain.   4.  Medication Management:    1. Start Topamax as directed by Dr. Paredes. I think this will help both the fibromyalgia pain as well as the chronic musculoskeletal pain.    2. Continue hydrocodone. Would recommend continuing to taper this medication. I do think she has a dependence/tolerance with the hydrocodone with how long she has been on it.  Discussed with the patient today that studies have shown that opioids are not effective and in fact may detrimental in the treatment of fibromyalgia.   5.  Urine toxicology screen today    -I will plan to take  over Hydrocodone depending on UDS results.   6.  Potential procedures: ERNESTO and LESI  7. Other treatments: Consider Butrans or Naltrexone. A small study showed a benefit of Naltrexone for fibromyalgia. Patient would need to be off of the hydrocodone before starting Naltrexone. Butrans is another option. She was provided with educational handouts today regarding both of these medications.   8. Recommendations to PCP: see above    Follow up: 2 Weeks     TIME SPENT:   A total of 70  minutes was spent on the patient today, greater than 50% of that time was spent on face to face counseling and care coordination regarding diagnoses and treatment options as mentioned above.    I would like to thank Dr. Mauro Paredes for allowing me to participate in the management of this patient.     Celia Bettencourt PA-C  [facility]

## 2019-01-14 NOTE — LETTER
2019         RE: Sherie Otero  [address] [address]        Dear Colleague,    Thank you for referring your patient, Sherie Otero, to the [facility]. Please see a copy of my visit note below.        Again,[facility] Consultation      This patient is being seen in consultation at the request of her provider Dr. Mauro Paredes.    Primary Care Provider is Mauro Paredes.    The current opiate pain medications are being prescribed by: Dr. Mauro Paredes    Please see the [facility] health questionnaire which the patient completed and reviewed with me in detail    CHIEF COMPLAINT:  Fibromyalgia, neck pain    HISTORY OF PRESENT ILLNESS:  Sherie Otero is a 48 year old female with history of widespread pain s/t fibromyalgia, neck pain and low back pain.     Today spine hurts with weather changes. Multiple joints hurt. Fibromyalgia pain (widespread pain) as well. Pain goes with the weather. Pain started in . Diagnosed with fibromyalgia at that time. In year , had a significant joint pain flare. Diagnosed with rheumatoid arthritis around . Gets a burning and tightness in the afternoon.     Pain radiates mostly with weather. Fibromyalgia pain is always. Years ago went to PT and got a foam roller and if lay on that, it helps stretch the muscles. Helps with Headaches as well. Uses a TENS unit as well.     Feels that pain is worse with recent medication changes (decrease in hydrocodone use).     Pain Information:   Onset/Progression:  Pain started .   Pain quality: Aching, Burning and Shooting    Pain timing: Constant     Pain ratin/10 today   Aggravating factors include: increased activity, changes in weather         Past Pain Treatments:    Pain Clinic:   No    PT: Yes, years ago. Has not done pool therapy.    Psychologist: No   Relaxation techniques/biofeedback: No   Chiropractor: No, was  told not to because of neck.    Acupuncture: Yes for headaches.    Pharmacotherapy:     Opioids: Yes      Non-opioids:  Yes    TENs Unit:Yes   Injections: cervical medial branch blocks 6/12/2014   Self-care:   Yes, ice/heat, hot baths, theracare   Surgeries related to pain: No    Current Pain Relevant Medications:    Flexeril-1 in AM and 2 at HS  Cymbalta-60mg at night  Hydrocodone-currently taking 2 tablets three times a day   Topamax-haven't started yet  Ibuprofen-will take 800mg up to 3 times day, doesn't take daily      Previous Pain Relevant Medications: (H--helped; HI--Helped initially; SWH--Somewhat helpful; NH--No help; W--worse; SE--side effects; ?--Unsure if helpful)   NOTE: This medication information taken from patient's intake form, not medical records.    Opiates: Hydrocodone H, Oxycodone SE   NSAIDS: Ibuprofent H    Muscle Relaxants: Tizanidine NH, Flexeril H   Anti-migraine mediations: Verapamil H   Anti-depressants: Cymbalta H   Sleep aids: none   Anxiolytics: Xanax H, Clonazepam H, Valium H   Neuropathics: Gabapentin SE dizziness    Topicals: Lidocaine patches S[facility]   Other medications not covered above: Savella H at first, then seemed to stop working, Lyrica SE shortness of breath, felt 'weird' on them, throat felt weird. Prednisone H for arthritis flare          THE 4 As OF OPIOID MAINTENANCE ANALGESIA    Analgesia: Is pain relief clinically significant? YES   Activity: Is patient functional and able to perform Activities of Daily Living? YES   Adverse effects: Is patient free from adverse side effects from opiates? YES   Adherence to Rx protocol: Is patient adhering to Controlled Substance Agreement and taking medications ONLY as ordered? No, patient has taken more than prescribed    Is Narcan prescribed for opiate use >50 MME daily? YES    Total Daily MME: 30    PAST MEDICAL HISTORY:   Past Medical History:   Diagnosis Date     Allergic rhinitis due to other allergen      Degenerative joint  disease of cervical spine 2016    multi level worst at C5-6     Generalized anxiety disorder      Hearing loss      Intrinsic asthma, unspecified      Irritable bowel syndrome      Lump or mass in breast 1996    Left breast lump 1996-- aspiration benign.     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 Feels like sometimes that people do not understand his pain.   Occupation: No working   Current stressors: yes, money issues.   Safety concerns: none    FAMILY MEDICAL HISTORY:  Chronic pain: Yes, mom with RA,   Family history of headaches: none    HEALTH AND LIFESTYLE PRACTICES:  Have you ever had any problems with alcohol or drug use? none  Have you ever felt you should cut down your use of alcohol/drugs? no  Have people every annoyed you by criticizing your alcohol/drug use? no  Have you ever felt bad or guilty about your alcohol/drug use? no  Have you ever had a drink or taken a drug first thing in the morning for an eye-opener/hangover? no    SLEEP:  Do you snore heavily? no  Do you wake up feeling rested? no  How many hours of sleep do you average per day? 6  What keep you from sleep? Unable to get comfortable, pain, moving from bed to couch  Have you been diagnosed with sleep apnea? no  Do you wear a CPAP? no      ALLERGIES:  Allergies   Allergen Reactions     Codeine Nausea and Vomiting     Droperidol      Uncontrollable shaking       Penicillins        MEDICATIONS:  Current Outpatient Medications   Medication Sig Dispense Refill     albuterol (VENTOLIN HFA) 108  (90 Base) MCG/ACT Inhaler Inhale 2 puffs into the lungs every 6 hours as needed 18 g 4     ALPRAZolam (XANAX) 0.5 MG tablet One tablet mid day. MUST LAST 30 DAYS. 30 tablet 0     busPIRone (BUSPAR) 10 MG tablet TAKE 5 MG (1/2 TAB) IN THE MORNING AND 10 MG AT NIGHT 135 tablet 3     cetirizine (ZYRTEC) 10 MG tablet TAKE  ONE TABLET BY MOUTH EVERY DAY. 90 tablet 3     clonazePAM (KLONOPIN) 0.5 MG tablet TAKE ONE-HALF TO ONE TABLET BY MOUTH TWICE A DAY AS NEEDED FOR ANXIETY - MUST LAST 30 DAYS 60 tablet 0     cyclobenzaprine (FLEXERIL) 10 MG tablet Take 2 tablets in the evening and 1 in the morning 90 tablet 1     CYMBALTA 60 MG capsule TAKE ONE CAPSULE BY MOUTH EVERY EVENING - KADY 90 capsule 1     fluticasone (FLONASE) 50 MCG/ACT spray SPRAY 2 SPRAYS INTO BOTH NOSTRILS DAILY. 16 g 11     folic acid (FOLVITE) 1 MG tablet Take 1 tablet (1 mg) by mouth daily 100 tablet 3     HYDROcodone-acetaminophen (NORCO) 7.5-325 MG per tablet TAKE 2 TABLETS BY MOUTH EVERY 6 HOURS AS NEEDED FOR PAIN--MAX OF 6 TABLETS PER DAY. NARCOTIC AGREEMENT DONE 1/30/2018 180 tablet 0     methotrexate sodium 2.5 MG TABS Take 8 tablets (20 mg) by mouth every 7 days 32 tablet 3     Multiple Vitamins-Minerals (MULTIVITAL PO)        naloxone (NARCAN) nasal spray Spray 1 spray (4 mg) into one nostril alternating nostrils as needed for opioid reversal every 2-3 minutes until assistance arrives 0.2 mL 0     predniSONE (DELTASONE) 5 MG tablet Take 5 mg by mouth daily as needed for peripheral joint pain from rheumatoid arthritis; use sparingly. (Patient not taking: Reported on 1/11/2019.) 30 tablet 0     topiramate (TOPAMAX) 25 MG tablet 1 tab daily x1week, then 1 tab twice daily x1 week, then 2 tabs AM, 1 tab PM x1 week then 2 tabs twice daily 70 tablet 1     verapamil (CALAN) 40 MG tablet Take 1 tablet (40 mg) by mouth 2 times daily 180 tablet 3         REVIEW OF SYSTEMS:   Constitutional:  Fatigue, Malaise and Weight Gain  Eyes/Head: Dizziness, Headache  "and Vision Changes  Ears/Nose/Throat: Hearing Loss and Sinus infection  Allergy/Immune: Negative  Skin:Negative  Hematologic/Lymphatic/Immunologic:Easy bruising  Respiratory: Cough  Cardiovascular: Negative  Gastrointestinal: Constipation, Diarrhea, Nausea and Vomiting  Endocrine: Steroid use-last use was last week  Musculoskeletal: Arthritis, Back pain, Joint pain, Neck pain and Stiffness  Urinary:  Negative   Any bowel or bladder incontinence: Denies   Neurologic: Numbness/Tingling  Psychiatric: Anxiety, Depression, Mood swings and Stress      Any illicit drug use: no  EtOH use: <1/year  Caffeine use: 1/2 pot of coffee, maybe a little more  Nicotine use: 1/2-1ppd  Any use of prescriptions other than how they were prescribed:no    PHYSICAL EXAM    /76   Pulse 124   Temp 98  F (36.7  C) (Temporal)   Ht 1.651 m (5' 5\")   Wt 64.2 kg (141 lb 8 oz)   BMI 23.55 kg/m         Appearance:     A&O. Patient is appropriate.   Patient is in NAD.   Patient is well groomed and appears stated age.     HEENT:   Normocephalic, atraumatic, sclera, conjunctiva and pharynx normal. Pupils are equal, round and reactive to light. Hearing is adequate for exam. Uvula rises with phonation.   Neck: Supple. No deformities or adenopathy  Cardiovascular:  Heart has a regular rate and rhythm. Audible S1 and S2. No murmurs auscultated. No edema on bilateral lower extremities.   Respiratory: Lungs are clear to auscultation bilaterally. No wheezes or crackles.  Abdominal/Pelvic:   Soft, non-tender with good bowel sounds, no palpable organomegaly.  Skin:  No rashes, erythema, breakdowns, lesions to exposed skin.   Hematologic:  No bruises, petechiae or ecchymosis to exposed areas.  Psychiatric:  mentation appears normal., affect and mood normal  Musculoskeletal:  Posture upright, shoulders and pelvis are leveled.   Deltoid: R: 5/5 L: 5/5  Biceps: R: 5/5 L: 5/5  Triceps: R: 5/5 L: 5/5  Intrinsic hand: R: 5/5   L: 5/5  Hip flexion: R: 5/5 L: " 5/5  Knee ext: R: 5/5 L: 5/5  Knee flex: R: 5/5 L: 5/5  Dorsiflexion: R: 5/5 L: 5/5  Plantarflexion: R: 5/5 L: 5/5    Cervical spine:   Flex:  25 degrees, painful    Ext: 25 degrees, painful    Rotation to right: 60 degrees, pain free   Rotation to left: 60 degrees, pain free   Tenderness in the cervical spine at midline. Yes   Tenderness in the cervical paraspinal muscles. Yes  Thoracic spine:    Tenderness in the thoracic spine at midline. No   Tenderness in the thoracic paraspinal muscles. No  Lumbar/Sacral spine:   Flexion:  70 degrees, painful    Ext: 25 degrees, painful    Rotation/ext to right: painful    Rotation/ext to left: painful    Tenderness in the lumbar spine at midline. Yes   Tenderness in the lumbar paraspinal muscles.Yes   Straight leg exam:    Right: negative     Left:  negative   Chris/Tunde's test:      Right: positive    Left:  positive   Tenderness over SI joint:      Right: negative     Left:  negative   Tenderness over piriformis:     Right: negative     Left:  negative   Tenderness over Trochanteric Bursa:     Right: negative     Left: negative     Fibromyalgia Assessment:     Patient does meet the criteria for a diagnosis of Fibromyalgia.     Gait pattern:  Able to walk on the heels and toes. Patient has a normal gait.     Neurological:   Deep Tendon Reflex exam:   Biceps:     R:  2/4   L: 2/4   Brachioradialis   R:  2/4   L: 2/4:   Patella:  R:  2/4   L: 2/4   Achilles:  R:  2/4   L: 2/4    Sensory exam:   Light touch: normal bilateral upper and lower extremities    Vibration: normal in bilateral upper extremities and bilateral lower extremities   Sharp: normal in bilateral upper extremities and bilateral lower extremities   No allodynia, dysesthesia, or hyperalgesia.      Previous Diagnostic Tests:   Imaging Studies:   Xray Cervical Spine 7/31/2018  IMPRESSION: Moderate degenerative disc disease at C4-C5 and moderate  to advanced degenerative disc disease at C5-C6. Mild retrolisthesis  of  C5 on C6. Moderate degenerative disc disease at C6-C7. Mild  anterolisthesis of C7 on T1. No significant change in flexion or  extension. There are likely facet degenerative changes in the mid  cervical spine as well.    MRI Cervical Spine 12/29/2016  IMPRESSION: Lower cervical degenerative disc disease most advanced at  C5-C6 where there is moderate to severe central canal stenosis and  mild cord deformity. This stenosis has slightly increased since the  prior exam in 2012.    MRI Thoracic Spine 12/29/2016  IMPRESSION: Minimal upper leftward thoracic curvature. No evidence for  any focal disc herniations or stenosis.    [place] Board of Pharmacy Data Base Reviewed:    YES; No concern for abuse or misuse of controlled medications based on this report.     ASSESSMENT:   Sherie Otero is a 48 year old female who presents today with the complaints of:    1.  Fibromyalgia  2.  Neck pain  3.  Low back pain  4.  Chronic pain syndrome    PLAN:    Diagnosis reviewed, treatment option addressed, and risk/benefits discussed.  Self-care instructions given.  I am recommending a multidisciplinary treatment plan to help this patient better manage her pain.       1.  Physical Therapy:  Patient is going to see if her insurance will cover pool therapy. I think she would benefit from this  2.  Clinical Health Psychologist to address issues of relaxation, behavorial change, coping style, and other factors important to improvement: This will be very important going forward. She is going to look into it and let me know.   3.  Diagnostic Studies:  MRI lumbar spine. No imaging of low back and is having a lot of pain.   4.  Medication Management:    1. Start Topamax as directed by Dr. Paredes. I think this will help both the fibromyalgia pain as well as the chronic musculoskeletal pain.    2. Continue hydrocodone. Would recommend continuing to taper this medication. I do think she has a dependence/tolerance with the hydrocodone  "with how long she has been on it.  Discussed with the patient today that studies have shown that opioids are not effective and in fact may detrimental in the treatment of fibromyalgia.   5.  Urine toxicology screen today    -I will plan to take over Hydrocodone depending on UDS results.   6.  Potential procedures: ERNESTO and LESI  7. Other treatments: Consider Butrans or Naltrexone. A small study showed a benefit of Naltrexone for fibromyalgia. Patient would need to be off of the hydrocodone before starting Naltrexone. Butrans is another option. She was provided with educational handouts today regarding both of these medications.   8. Recommendations to PCP: see above    Follow up: 2 Weeks     TIME SPENT:   A total of 70  minutes was spent on the patient today, greater than 50% of that time was spent on face to face counseling and care coordination regarding diagnoses and treatment options as mentioned above.    I would like to thank Dr. Mauro Paredes for allowing me to participate in the management of this patient.     Celia Bettencourt PA-C  [facility]        /76   Pulse 124   Temp 98  F (36.7  C) (Temporal)   Ht 1.651 m (5' 5\")   Wt 64.2 kg (141 lb 8 oz)   BMI 23.55 kg/m     Body mass index is 23.55 kg/m .  5' 5\"  141 lbs 8 oz        Rachael Madrigal MA on 1/14/2019 at 12:59 PM   thank you for allowing me to participate in the care of your patient.        Sincerely,        Celia Bettencourt PA-C    "

## 2019-01-14 NOTE — PATIENT INSTRUCTIONS
After Visit Instructions:     Thank you for coming to [facility] for your care. It is my goal to partner with you to help you reach your optimal state of health.     I am recommending multidisciplinary care at this time.  The focus of care will be to continue gradual rehabilitation and pain management with medication adjustments as needed.    Continue daily self-care, identifying contributing factors, and monitoring variations in pain level. Continue to integrate self-care into your life.      1. Schedule pain psychology assessment/visit: Would recommend you see a pain psychologist. I would refer you to [facility] in [place].   2. Schedule physical therapy assessment/visit: Check with your insurance regarding pool therapy and if they cover it, let me know and I'll place a referral.   3. Schedule follow-up with Celia Bettencourt PA-C in 2 weeks  4. Imaging: MRI of lumbar spine (low back). We will call you to schedule this.   5. Labs: urine drug screen today  6. Procedures recommended: Consider cervical epidural steroid injection. Consider a lumbar epidural steroid injection (we would want the MRI first).    7. Medication recommendations:   1. Start Topamax per Dr. Paredes recommendations  2. Would encourage you to slowly taper off of the hydrocodone.   3. For your osteoarthritis try over the counter Aspercreme with 4% lidocaine (follow package instructions)      Celia Bettencourt PA-C  [facility]  [facility]    Contact information: [facility]  Clinic Number:  [phone]   Call this number with any questions about your care and for scheduling assistance. Calls are returned Monday through Friday between 8 AM and 4:30 PM. We usually get back to you within 2 business days depending on the issue/request.           Medication Refills Policy:    For non-narcotic medications, please your pharmacy directly to request a refill and the  pharmacy will call the [facility] Center for authorization. Please allow 3-4 days for these refills.    For narcotic refills, call the nurse triage line and leave a message requesting your refill along with the name of the pharmacy that you use. Narcotic prescriptions will be mailed to your pharmacy or you may pick them up at the clinic.  Please call 7-10 days before your refill is due  The above policy allows adequate time so that you do not run out of medication.    No Show - Late Cancellation - Late Arrival Policy  We believe regular attendance is key to your success in our program.    Any time you are unable to keep your appointment we ask that you call us at least 24 hours in advance to let us know. This will allow us to offer the appointment time to another patient. The following is our policy for missed appointments. This also applies to appointments cancelled with less than 24 hours notice.    After missing 3 appointments without calling first, we will cancel all of your future appointments at [facility].    At that point, you will not be able to resume services unless approved by your care team  We understand that unforseen circumstances arise, however, out of respect for all concerned and to provide this appointment to another patient, this policy will be enforced.    Please note that most follow up appointments are 30 minutes long. If you arrive late, your provider may not be able to see you for the entire 30 minutes. Please also note that if you arrive more than 15 minutes for any appointment, it may be rescheduled.

## 2019-01-17 LAB — PAIN DRUG SCR UR W RPTD MEDS: NORMAL

## 2019-01-23 DIAGNOSIS — M05.79 RHEUMATOID ARTHRITIS INVOLVING MULTIPLE SITES WITH POSITIVE RHEUMATOID FACTOR (H): ICD-10-CM

## 2019-01-23 RX ORDER — PREDNISONE 5 MG/1
5 TABLET ORAL DAILY PRN
Qty: 30 TABLET | Refills: 1 | Status: SHIPPED | OUTPATIENT
Start: 2019-01-23 | End: 2019-12-30

## 2019-01-23 NOTE — TELEPHONE ENCOUNTER
Rheumatology team: Please call to notify Ms. Otero that prednisone has been refilled.  Ash Donald MD  1/23/2019 4:37 PM

## 2019-01-23 NOTE — TELEPHONE ENCOUNTER
Pending Prescriptions:                       Disp   Refills    predniSONE (DELTASONE) 5 MG tablet        30 tab*0            Sig: Take 5 mg by mouth daily as needed for peripheral           joint pain from rheumatoid arthritis; use           sparingly.    Routing refill request to provider for review/approval because:  Drug not on the [facility] refill protocol     Kori Lennon RN

## 2019-01-24 NOTE — TELEPHONE ENCOUNTER
Attempted to contact Pt, l/m letting Ms. Otero know that Prednisone has been refilled.  Having Pt return call to clinic @ [phone] should she have any questions or concerns.    Gracie Hernandez CMA  1/24/2019  8:49 AM

## 2019-02-01 NOTE — PROGRESS NOTES
[facility]    CHIEF COMPLAINT: Fibromyalgia, neck pain    INTERVAL HISTORY:  Last seen on 01/14/2019.        Recommendations/plan at the last visit included:  1.  Physical Therapy:  Patient is going to see if her insurance will cover pool therapy. I think she would benefit from this  2.  Clinical Health Psychologist to address issues of relaxation, behavorial change, coping style, and other factors important to improvement: This will be very important going forward. She is going to look into it and let me know.   3.  Diagnostic Studies:  MRI lumbar spine. No imaging of low back and is having a lot of pain.   4.  Medication Management:               1. Start Topamax as directed by Dr. Paredes. I think this will help both the fibromyalgia pain as well as the chronic musculoskeletal pain.               2. Continue hydrocodone. Would recommend continuing to taper this medication. I do think she has a dependence/tolerance with the hydrocodone with how long she has been on it.  Discussed with the patient today that studies have shown that opioids are not effective and in fact may detrimental in the treatment of fibromyalgia.   5.  Urine toxicology screen today               -I will plan to take over Hydrocodone depending on UDS results.   6.  Potential procedures: ERNESTO and LESI  7. Other treatments: Consider Butrans or Naltrexone. A small study showed a benefit of Naltrexone for fibromyalgia. Patient would need to be off of the hydrocodone before starting Naltrexone. Butrans is another option. She was provided with educational handouts today regarding both of these medications.   8. Recommendations to PCP: see above     Follow up: 2 Weeks     Since her last visit, Sherie Otero reports:  - Increased pain s/t an illness for 2 weeks and a fall in her kitchen on 1/27/19. She fell on her knees.   - The topiramate makes her tired during the day.     She has a [facility] coordinator that will be calling her  tomorrow to review her options regarding pool therapy and psychology. She has been in more pain, mostly secondary to being sick. She states that she had a stomach bug as well as a cough. She states that she is also in more pain from the weather.     Pain Information:   Pain quality: Aching, Exhausting, Miserable and Throbbing    Pain rating: intensity ranges from 7/10 to 10/10, and averages 7/10 on a 0-10 scale.      UDS from 1/19/2019 reviewed: results appropriate except for absent Cymbalta    CURRENT RELEVANT PAIN MEDICATIONS:    Flexeril-1 in AM and 2 at HS  Cymbalta-60mg at night  Hydrocodone-currently taking 2 tablets three times a day   Topamax-1 tab in AM, 2 tabs at HS  Ibuprofen-will take 800mg up to 3 times day, doesn't take daily    Patient is using the medication as prescribed:  YES  Is your medication helpful? YES   Medication side effects? no side effect    Previous Medications: (H--helped; HI--Helped initially; SWH-- somewhat helpful, NH--No help; W--worse; SE--side effects)   Opiates: Hydrocodone H, Oxycodone SE  NSAIDS: Ibuprofent H  Muscle Relaxants: Tizanidine NH, Flexeril H  Anti-migraine mediations: Verapamil H  Anti-depressants: Cymbalta H  Sleep aids: none  Anxiolytics: Xanax H, Clonazepam H, Valium H  Neuropathics: Gabapentin SE dizziness          Topicals: Lidocaine patches SW  Other medications not covered above: Savella H at first, then seemed to stop working, Lyrica SE shortness of breath, felt 'weird' on them, throat felt weird. Prednisone H for arthritis flare    Past Pain Treatments:  Pain Clinic:   No   PT: Yes, years ago. Has not done pool therapy.   Psychologist: No  Relaxation techniques/biofeedback: No  Chiropractor: No, was told not to because of neck.   Acupuncture: Yes for headaches.   Pharmacotherapy:           Opioids: Yes            Non-opioids:    Yes   TENs Unit:Yes  Injections: cervical medial branch blocks 6/12/2014  Self-care:   Yes, ice/heat, hot baths,  [facility]  Surgeries related to pain: No    [place] Board of Pharmacy Data Base Reviewed:    YES; As expected, no concern for misuse/abuse of controlled medications based on this report.      THE 4 As OF OPIOID MAINTENANCE ANALGESIA    Analgesia: Is pain relief clinically significant? YES   Activity: Is patient functional and able to perform Activities of Daily Living? YES   Adverse effects: Is patient free from adverse side effects from opiates? YES   Adherence to Rx protocol: Is patient adhering to Controlled Substance Agreement and taking medications ONLY as ordered? YES       Is Narcan prescribed for opiate use >50 MME daily? N/A      Total Daily MME: 30    Medications:  Current Outpatient Medications   Medication Sig Dispense Refill     albuterol (VENTOLIN HFA) 108 (90 Base) MCG/ACT Inhaler Inhale 2 puffs into the lungs every 6 hours as needed 18 g 4     ALPRAZolam (XANAX) 0.5 MG tablet One tablet mid day. MUST LAST 30 DAYS. 30 tablet 0     busPIRone (BUSPAR) 10 MG tablet TAKE 5 MG (1/2 TAB) IN THE MORNING AND 10 MG AT NIGHT 135 tablet 3     cetirizine (ZYRTEC) 10 MG tablet TAKE  ONE TABLET BY MOUTH EVERY DAY. 90 tablet 3     clonazePAM (KLONOPIN) 0.5 MG tablet TAKE ONE-HALF TO ONE TABLET BY MOUTH TWICE A DAY AS NEEDED FOR ANXIETY - MUST LAST 30 DAYS 60 tablet 0     cyclobenzaprine (FLEXERIL) 10 MG tablet Take 2 tablets in the evening and 1 in the morning 90 tablet 1     CYMBALTA 60 MG capsule TAKE ONE CAPSULE BY MOUTH EVERY EVENING - KADY 90 capsule 1     fluticasone (FLONASE) 50 MCG/ACT spray SPRAY 2 SPRAYS INTO BOTH NOSTRILS DAILY. 16 g 11     folic acid (FOLVITE) 1 MG tablet Take 1 tablet (1 mg) by mouth daily 100 tablet 3     HYDROcodone-acetaminophen (NORCO) 7.5-325 MG per tablet TAKE 2 TABLETS BY MOUTH EVERY 6 HOURS AS NEEDED FOR PAIN--MAX OF 6 TABLETS PER DAY. NARCOTIC AGREEMENT DONE 1/30/2018 180 tablet 0     methotrexate sodium 2.5 MG TABS Take 8 tablets (20 mg) by mouth every 7 days 32 tablet 3      "Multiple Vitamins-Minerals (MULTIVITAL PO)        naloxone (NARCAN) nasal spray Spray 1 spray (4 mg) into one nostril alternating nostrils as needed for opioid reversal every 2-3 minutes until assistance arrives (Patient not taking: Reported on 1/14/2019) 0.2 mL 0     predniSONE (DELTASONE) 5 MG tablet Take 5 mg by mouth daily as needed for peripheral joint pain from rheumatoid arthritis; use sparingly. 30 tablet 1     topiramate (TOPAMAX) 25 MG tablet 1 tab daily x1week, then 1 tab twice daily x1 week, then 2 tabs AM, 1 tab PM x1 week then 2 tabs twice daily (Patient not taking: Reported on 1/14/2019) 70 tablet 1     verapamil (CALAN) 40 MG tablet Take 1 tablet (40 mg) by mouth 2 times daily 180 tablet 3       Review of Systems: A 10-point review of systems was negative, with the exception of chronic pain issues, fever/chills, fatigue, weight loss from when sick, headache, dizziness, sinus infection, earache, cough, steroid use, and stress.      Social History: Reviewed; unchanged from previous consultation.      Family history: Reviewed; unchanged from previous consultation.     PHYSICAL EXAM:     /76   Temp 98.2  F (36.8  C) (Temporal)   Ht 1.651 m (5' 5\")   Wt 63.6 kg (140 lb 3.2 oz)   BMI 23.33 kg/m        Constitutional: healthy, alert and no distress  HEENT: Head atraumatic, normocephalic. Eyes without conjunctival injection or jaundice. Neck supple. No obvious neck masses.  Psychiatric/mental status: Alert, without lethargy or stupor. Appropriate affect. Mood normal.   Neurologic exam: Gait is normal         DIAGNOSTIC TESTS:  Imaging Studies:   No new imaging to review    Assessment:  Sherie Otreo is a 48 year old female who presents today for follow up regarding her:    1.  Fibromyalgia  2.  Neck pain  3.  Low back pain  4.  Chronic pain syndrome    Plan:    Diagnosis reviewed, treatment option addressed, and risk/benifits discussed.  Self-care instructions given.  I am recommending a " multidisciplinary treatment plan to help this patient better manage pain.      1. Physical Therapy:  YES, she is going to review with her insurance  2. Clinical Health Psychologist:  YES, she is going to review with her insurance    3. Diagnostic Studies:  Reschedule lumbar MRI  4. Medication Management:     1. Continue Cymbalta at 60mg at bedtime    2. Continue Flexeril   3. Continue Hydrocodone at max of 6 tabs/day. We will slowly start a taper at the next visit   4. Try taking topiramate a couple hours after waking up. Continue take 2 tabs at bedime  5. Further procedures recommended: We can review this again after getting MRI  6. Opioid contract today.       Follow up with this provider:  2-4 weeks     Total time spent face to face was 25 minutes and more than 50% of face to face time was spent in counseling and/or coordination of care regarding the diagnosis and recommendations above.      Celia Bettencourt PA-C   [facility]

## 2019-02-04 ENCOUNTER — OFFICE VISIT (OUTPATIENT)
Dept: PODIATRY | Facility: CLINIC | Age: 49
End: 2019-02-04
Payer: MEDICARE

## 2019-02-04 VITALS
BODY MASS INDEX: 23.36 KG/M2 | TEMPERATURE: 98.2 F | DIASTOLIC BLOOD PRESSURE: 76 MMHG | WEIGHT: 140.2 LBS | HEIGHT: 65 IN | SYSTOLIC BLOOD PRESSURE: 120 MMHG

## 2019-02-04 DIAGNOSIS — G89.4 CHRONIC PAIN SYNDROME: ICD-10-CM

## 2019-02-04 DIAGNOSIS — G89.29 CHRONIC BILATERAL LOW BACK PAIN WITHOUT SCIATICA: ICD-10-CM

## 2019-02-04 DIAGNOSIS — M54.50 CHRONIC BILATERAL LOW BACK PAIN WITHOUT SCIATICA: ICD-10-CM

## 2019-02-04 DIAGNOSIS — M54.2 NECK PAIN: ICD-10-CM

## 2019-02-04 DIAGNOSIS — M79.7 FIBROMYALGIA: Primary | ICD-10-CM

## 2019-02-04 PROCEDURE — 99214 OFFICE O/P EST MOD 30 MIN: CPT | Performed by: PHYSICIAN ASSISTANT

## 2019-02-04 RX ORDER — HYDROCODONE BITARTRATE AND ACETAMINOPHEN 7.5; 325 MG/1; MG/1
TABLET ORAL
Qty: 180 TABLET | Refills: 0 | Status: SHIPPED | OUTPATIENT
Start: 2019-02-04 | End: 2019-03-06

## 2019-02-04 ASSESSMENT — PAIN SCALES - GENERAL: PAINLEVEL: EXTREME PAIN (8)

## 2019-02-04 ASSESSMENT — MIFFLIN-ST. JEOR: SCORE: 1256.82

## 2019-02-04 NOTE — PATIENT INSTRUCTIONS
After Visit Instructions:     Thank you for coming to [facility] for your care. It is my goal to partner with you to help you reach your optimal state of health.     I am recommending multidisciplinary care at this time.  The focus of care will be to continue gradual rehabilitation and pain management with medication adjustments as needed.    Continue daily self-care, identifying contributing factors, and monitoring variations in pain level. Continue to integrate self-care into your life.      1. Schedule pain psychology assessment/visit: talk with Sahil  2. Schedule physical therapy assessment/visit: talk with Sahil  3. Schedule follow-up with Celia Bettencourt PA-C in 2-4 weeks  4. Procedures recommended: consider neck or low back epidural  5. Reschedule lumbar MRI  6. Medication recommendations: You signed a controlled substance agreement with me today.   1. Continue the hydrocodone 6 tabs/day  2. Continue Cymbalta at current dosing  3. Topiramate 1 tab in the morning (try waiting 1-2 hours after waking up). 2 tabs at bedtime. If going well, increase to 2 tabs in AM and 2 tabs at bedtime.   4. Continue Flexeril      Celia Bettencourt PA-C  [facility]  [facility]    Contact information: [facility]  Clinic Number:  [phone]   Call this number with any questions about your care and for scheduling assistance. Calls are returned Monday through Friday between 8 AM and 4:30 PM. We usually get back to you within 2 business days depending on the issue/request.           Medication Refills Policy:    For non-narcotic medications, please your pharmacy directly to request a refill and the pharmacy will call the [facility] for authorization. Please allow 3-4 days for these refills.    For narcotic refills, call the nurse triage line and leave a message requesting your refill along with the name of the pharmacy that you use. Narcotic  prescriptions will be mailed to your pharmacy or you may pick them up at the clinic.  Please call 7-10 days before your refill is due  The above policy allows adequate time so that you do not run out of medication.    No Show - Late Cancellation - Late Arrival Policy  We believe regular attendance is key to your success in our program.    Any time you are unable to keep your appointment we ask that you call us at least 24 hours in advance to let us know. This will allow us to offer the appointment time to another patient. The following is our policy for missed appointments. This also applies to appointments cancelled with less than 24 hours notice.    After missing 3 appointments without calling first, we will cancel all of your future appointments at [facility].    At that point, you will not be able to resume services unless approved by your care team  We understand that unforseen circumstances arise, however, out of respect for all concerned and to provide this appointment to another patient, this policy will be enforced.    Please note that most follow up appointments are 30 minutes long. If you arrive late, your provider may not be able to see you for the entire 30 minutes. Please also note that if you arrive more than 15 minutes for any appointment, it may be rescheduled.

## 2019-02-04 NOTE — LETTER
2/4/2019         RE: Sehrie Otero  [address] [id number]        Dear Colleague,    Thank you for referring your patient, Sherie Otero, to the [facility]. Please see a copy of my visit note below.    [facility]    CHIEF COMPLAINT: Fibromyalgia, neck pain    INTERVAL HISTORY:  Last seen on 01/14/2019.        Recommendations/plan at the last visit included:  1.  Physical Therapy:  Patient is going to see if her insurance will cover pool therapy. I think she would benefit from this  2.  Clinical Health Psychologist to address issues of relaxation, behavorial change, coping style, and other factors important to improvement: This will be very important going forward. She is going to look into it and let me know.   3.  Diagnostic Studies:  MRI lumbar spine. No imaging of low back and is having a lot of pain.   4.  Medication Management:               1. Start Topamax as directed by Dr. Paredes. I think this will help both the fibromyalgia pain as well as the chronic musculoskeletal pain.               2. Continue hydrocodone. Would recommend continuing to taper this medication. I do think she has a dependence/tolerance with the hydrocodone with how long she has been on it.  Discussed with the patient today that studies have shown that opioids are not effective and in fact may detrimental in the treatment of fibromyalgia.   5.  Urine toxicology screen today               -I will plan to take over Hydrocodone depending on UDS results.   6.  Potential procedures: ERNESTO and LESI  7. Other treatments: Consider Butrans or Naltrexone. A small study showed a benefit of Naltrexone for fibromyalgia. Patient would need to be off of the hydrocodone before starting Naltrexone. Butrans is another option. She was provided with educational handouts today regarding both of these medications.   8. Recommendations to PCP: see above     Follow up: 2 Weeks     Since her  last visit, Sheriecha Otero reports:  - Increased pain s/t an illness for 2 weeks and a fall in her kitchen on 1/27/19. She fell on her knees.   - The topiramate makes her tired during the day.     She has a [facility] coordinator that will be calling her tomorrow to review her options regarding pool therapy and psychology. She has been in more pain, mostly secondary to being sick. She states that she had a stomach bug as well as a cough. She states that she is also in more pain from the weather.     Pain Information:   Pain quality: Aching, Exhausting, Miserable and Throbbing    Pain rating: intensity ranges from 7/10 to 10/10, and averages 7/10 on a 0-10 scale.      UDS from 1/19/2019 reviewed: results appropriate except for absent Cymbalta    CURRENT RELEVANT PAIN MEDICATIONS:    Flexeril-1 in AM and 2 at HS  Cymbalta-60mg at night  Hydrocodone-currently taking 2 tablets three times a day   Topamax-1 tab in AM, 2 tabs at HS  Ibuprofen-will take 800mg up to 3 times day, doesn't take daily    Patient is using the medication as prescribed:  YES  Is your medication helpful? YES   Medication side effects? no side effect    Previous Medications: (H--helped; HI--Helped initially; SWH-- somewhat helpful, NH--No help; W--worse; SE--side effects)   Opiates: Hydrocodone H, Oxycodone SE  NSAIDS: Ibuprofent H  Muscle Relaxants: Tizanidine NH, Flexeril H  Anti-migraine mediations: Verapamil H  Anti-depressants: Cymbalta H  Sleep aids: none  Anxiolytics: Xanax H, Clonazepam H, Valium H  Neuropathics: Gabapentin SE dizziness          Topicals: Lidocaine patches SW  Other medications not covered above: Savella H at first, then seemed to stop working, Lyrica SE shortness of breath, felt 'weird' on them, throat felt weird. Prednisone H for arthritis flare    Past Pain Treatments:  Pain Clinic:   No   PT: Yes, years ago. Has not done pool therapy.   Psychologist: No  Relaxation techniques/biofeedback: No  Chiropractor: No, was told not  to because of neck.   Acupuncture: Yes for headaches.   Pharmacotherapy:           Opioids: Yes            Non-opioids:    Yes   TENs Unit:Yes  Injections: cervical medial branch blocks 6/12/2014  Self-care:   Yes, ice/heat, hot baths, [facility]  Surgeries related to pain: No    [place] Board of Pharmacy Data Base Reviewed:    YES; As expected, no concern for misuse/abuse of controlled medications based on this report.      THE 4 As OF OPIOID MAINTENANCE ANALGESIA    Analgesia: Is pain relief clinically significant? YES   Activity: Is patient functional and able to perform Activities of Daily Living? YES   Adverse effects: Is patient free from adverse side effects from opiates? YES   Adherence to Rx protocol: Is patient adhering to Controlled Substance Agreement and taking medications ONLY as ordered? YES       Is Narcan prescribed for opiate use >50 MME daily? N/A      Total Daily MME: 30    Medications:  Current Outpatient Medications   Medication Sig Dispense Refill     albuterol (VENTOLIN HFA) 108 (90 Base) MCG/ACT Inhaler Inhale 2 puffs into the lungs every 6 hours as needed 18 g 4     ALPRAZolam (XANAX) 0.5 MG tablet One tablet mid day. MUST LAST 30 DAYS. 30 tablet 0     busPIRone (BUSPAR) 10 MG tablet TAKE 5 MG (1/2 TAB) IN THE MORNING AND 10 MG AT NIGHT 135 tablet 3     cetirizine (ZYRTEC) 10 MG tablet TAKE  ONE TABLET BY MOUTH EVERY DAY. 90 tablet 3     clonazePAM (KLONOPIN) 0.5 MG tablet TAKE ONE-HALF TO ONE TABLET BY MOUTH TWICE A DAY AS NEEDED FOR ANXIETY - MUST LAST 30 DAYS 60 tablet 0     cyclobenzaprine (FLEXERIL) 10 MG tablet Take 2 tablets in the evening and 1 in the morning 90 tablet 1     CYMBALTA 60 MG capsule TAKE ONE CAPSULE BY MOUTH EVERY EVENING - KADY 90 capsule 1     fluticasone (FLONASE) 50 MCG/ACT spray SPRAY 2 SPRAYS INTO BOTH NOSTRILS DAILY. 16 g 11     folic acid (FOLVITE) 1 MG tablet Take 1 tablet (1 mg) by mouth daily 100 tablet 3     HYDROcodone-acetaminophen (NORCO) 7.5-325 MG per  "tablet TAKE 2 TABLETS BY MOUTH EVERY 6 HOURS AS NEEDED FOR PAIN--MAX OF 6 TABLETS PER DAY. NARCOTIC AGREEMENT DONE 1/30/2018 180 tablet 0     methotrexate sodium 2.5 MG TABS Take 8 tablets (20 mg) by mouth every 7 days 32 tablet 3     Multiple Vitamins-Minerals (MULTIVITAL PO)        naloxone (NARCAN) nasal spray Spray 1 spray (4 mg) into one nostril alternating nostrils as needed for opioid reversal every 2-3 minutes until assistance arrives (Patient not taking: Reported on 1/14/2019) 0.2 mL 0     predniSONE (DELTASONE) 5 MG tablet Take 5 mg by mouth daily as needed for peripheral joint pain from rheumatoid arthritis; use sparingly. 30 tablet 1     topiramate (TOPAMAX) 25 MG tablet 1 tab daily x1week, then 1 tab twice daily x1 week, then 2 tabs AM, 1 tab PM x1 week then 2 tabs twice daily (Patient not taking: Reported on 1/14/2019) 70 tablet 1     verapamil (CALAN) 40 MG tablet Take 1 tablet (40 mg) by mouth 2 times daily 180 tablet 3       Review of Systems: A 10-point review of systems was negative, with the exception of chronic pain issues, fever/chills, fatigue, weight loss from when sick, headache, dizziness, sinus infection, earache, cough, steroid use, and stress.      Social History: Reviewed; unchanged from previous consultation.      Family history: Reviewed; unchanged from previous consultation.     PHYSICAL EXAM:     /76   Temp 98.2  F (36.8  C) (Temporal)   Ht 1.651 m (5' 5\")   Wt 63.6 kg (140 lb 3.2 oz)   BMI 23.33 kg/m         Constitutional: healthy, alert and no distress  HEENT: Head atraumatic, normocephalic. Eyes without conjunctival injection or jaundice. Neck supple. No obvious neck masses.  Psychiatric/mental status: Alert, without lethargy or stupor. Appropriate affect. Mood normal.   Neurologic exam: Gait is normal         DIAGNOSTIC TESTS:  Imaging Studies:   No new imaging to review    Assessment:  Sherie Otero is a 48 year old female who presents today for follow up regarding " "her:    1.  Fibromyalgia  2.  Neck pain  3.  Low back pain  4.  Chronic pain syndrome    Plan:    Diagnosis reviewed, treatment option addressed, and risk/benifits discussed.  Self-care instructions given.  I am recommending a multidisciplinary treatment plan to help this patient better manage pain.      1. Physical Therapy:  YES, she is going to review with her insurance  2. Clinical Health Psychologist:  YES, she is going to review with her insurance    3. Diagnostic Studies:  Reschedule lumbar MRI  4. Medication Management:     1. Continue Cymbalta at 60mg at bedtime    2. Continue Flexeril   3. Continue Hydrocodone at max of 6 tabs/day. We will slowly start a taper at the next visit   4. Try taking topiramate a couple hours after waking up. Continue take 2 tabs at bedime  5. Further procedures recommended: We can review this again after getting MRI  6. Opioid contract today.       Follow up with this provider:  2-4 weeks     Total time spent face to face was 25 minutes and more than 50% of face to face time was spent in counseling and/or coordination of care regarding the diagnosis and recommendations above.      Celia Bettencorut PA-C   [facility]          /76   Temp 98.2  F (36.8  C) (Temporal)   Ht 1.651 m (5' 5\")   Wt 63.6 kg (140 lb 3.2 oz)   BMI 23.33 kg/m     Body mass index is 23.33 kg/m .  5' 5\"  140 lbs 3.2 oz        Rachael Madrigal MA on 2/4/2019 at 1:40 PM      Again, thank you for allowing me to participate in the care of your patient.        Sincerely,        Celia Bettencourt PA-C    "

## 2019-02-04 NOTE — PROGRESS NOTES
"/76   Temp 98.2  F (36.8  C) (Temporal)   Ht 1.651 m (5' 5\")   Wt 63.6 kg (140 lb 3.2 oz)   BMI 23.33 kg/m    Body mass index is 23.33 kg/m .  5' 5\"  140 lbs 3.2 oz        Rachael Madrigal MA on 2/4/2019 at 1:40 PM    "

## 2019-02-04 NOTE — LETTER
[facility]  02/04/19    Patient: Sherie tOero  YOB: 1970  Medical Record Number: [id number]                                                                  Opioid / Opioid Plus Controlled Substance Agreement    I understand that my care provider has prescribed an opioid (narcotic) controlled substance to help manage my condition(s). I am taking this medicine to help me function or work. I know this is strong medicine, and that it can cause serious side effects. Opioid medicine can be sedating, addicting and may cause a dependency on the drug. They can affect my ability to drive or think, and cause depression. They need to be taken exactly as prescribed. Combining opioids with certain medicines or chemicals (such as cocaine, sedatives and tranquilizers, sleeping pills, meth) can be dangerous or even fatal. Also, if I stop opioids suddenly, I may have severe withdrawal symptoms. Last, I understand that opioids do not work for all types of pain nor for all patients. If not helpful, I may be asked to stop them.    I am also being prescribed a benzodiazepine (tranquilizer) controlled substance.   I understand this type of medication is sedating, and can increase the risk of death when taken together with opioids.  I have talked to my care team about the option of having a prescription for Narcan to use to reverse the opioid medicine, in case I get too sleepy. I will be very careful to take my medicines only as directed.    The risks, benefits, and side effects of these medicine(s) were explained to me. I agree that:    1. I will take part in other treatments as advised by my care team. This may be psychiatry or counseling, physical therapy, behavioral therapy, group treatment or a referral to a pain clinic. I will reduce or stop my medicine when my care team tells me to do so.  2. I will take my medicines as prescribed. I will not change the dose or schedule unless my  care team tells me to. There will be no refills if I  run out early.   I may be contactedwithout warning and asked to complete a urine drug test or pill count at any time.   3. I will keep all my appointments, and understand this is part of the monitoring of opioids. My care team may require an office visit for EVERY opioid/controlled substance refill. If I miss appointments or don t follow instructions, my care team may stop my medicine.  4. I will not ask other providers to prescribe controlled substances, and I will not accept controlled substances from other people. If I need another prescribed controlled substance for a new reason, I will tell my care team within 1 business day.  5. I will use one pharmacy to fill all of my controlled substance prescriptions, and it is up to me to make sure that I do not run out of my medicines on weekends or holidays. If my care team is willing to refill my opioid prescription without a visit, I must request refills only during office hours, refills may take up to 3 days to process, and it may take up to 5 to 7 days for my medicine to be mailed and ready at my pharmacy. Prescriptions will not be mailed anywhere except my pharmacy.        [id number]  Rev 12/18         Registration to scan to EHR                             Page 1 of 2               Controlled Substance Agreement Opioid        [facility]  02/04/19  Patient: Sherie Otero  YOB: 1970  Medical Record Number: [id number]                                                                  6. I am responsible for my prescriptions. If the medicine/prescription is lost or stolen, it will not be replaced. I also agree not to share controlled substance medicines with anyone.  7. I agree to not use ANY illegal or recreational drugs. This includes marijuana, cocaine, bath salts or other drugs. I agree not to use alcohol unless my care team says I may.          I agree to give urine  samples whenever asked. If I don t give a urine sample, the care team may stop my medicine.    8. If I enroll in the [place] Medical Marijuana program, I will tell my care team. I will also sign an agreement to share my medical records with my care team.   9. I will bring in my list of medicines (or my medicine bottles) each time I come to the clinic.   10. I will tell my care team right away if I become pregnant or have a new medical problem treated outside of my regular clinic.  11. I understand that this medicine can affect my thinking and judgment. It may be unsafe for me to drive, use machinery and do dangerous tasks. I will not do any of these things until I know how the medicine affects me. If my dose changes, I will wait to see how it affects me. I will contact my care team if I have concerns about medicine side effects.    I understand that if I do not follow any of the conditions above, my prescriptions or treatment may be stopped.      I agree that my provider, clinic care team, and pharmacy may work with any city, state or federal law enforcement agency that investigates the misuse, sale, or other diversion of my controlled medicine. I will allow my provider to discuss my care with or share a copy of this agreement with any other treating provider, pharmacy or emergency room where I receive care. I agree to give up (waive) any right of privacy or confidentiality with respect to these consents.     I have read this agreement and have asked questions about anything I did not understand.      ________________________________________________________________________  Patient signature - Date/Time -  Sherie Otero                                      ________________________________________________________________________  Witness signature                                                            ________________________________________________________________________  Provider signature Martha MAY  AGUSTÍN Bettencourt      [id number]  Rev 12/18         Registration to scan to EHR                         Page 2 of 2                   Controlled Substance Agreement Opioid           Page 1 of 2  Opioid Pain Medicines (also known as Narcotics)  What You Need to Know    What are opioids?   Opioids are pain medicines that must be prescribed by a doctor.  They are also known as narcotics.    Examples are:     morphine (MS Contin, Catrachita)    oxycodone (Oxycontin)    oxycodone and acetaminophen (Percocet)    hydrocodone and acetaminophen (Vicodin, Norco)     fentanyl patch (Duragesic)     hydromorphone (Dilaudid)     methadone     What do opioids do well?   Opioids are best for short-term pain after a surgery or injury. They also work well for cancer pain. Unlike other pain medicines, they do not cause liver or kidney failure or ulcers. They may help some people with long-lasting (chronic) pain.     What do opioids NOT do well?   Opioids never get rid of pain entirely, and they do not work well for most patients with chronic pain. Opioids do not reduce swelling, one of the causes of pain. They also don t work well for nerve pain.                           For informational purposes only.  Not to replace the advice of your care provider.  Copyright [facility]. All right reserved. Siine [id number] 02/18.      Page 2 of 2    Risks and side effects   Talk to your doctor before you start or decide to keep taking one of these medicines. Side effects include:    Lowering your breathing rate enough to cause death    Overdose, including death, especially if taking higher than prescribed doses    Long-term opioid use    Worse depression symptoms; less pleasure in things you usually enjoy    Feeling tired or sluggish    Slower thoughts or cloudy thinking    Being more sensitive to pain over time; pain is harder to control    Trouble sleeping or restless sleep    Changes in hormone levels (for example, less  testosterone)    Changes in sex drive or ability to have sex    Constipation    Unsafe driving    Itching and sweating    Feeling dizzy    Nausea, vomiting and dry mouth    What else should I know about opioids?  When someone takes opioids for too long or too often, they become dependent. This means that if you stop or reduce the medicine too quickly, you will have withdrawal symptoms.    Dependence is not the same as addiction. Addiction is when people keep using a substance that harms their body, their mind or their relations with others. If you have a history of drug or alcohol abuse, taking opioids can cause a relapse.    Over time, opioids don t work as well. Most people will need higher and higher doses. The higher the dose, the more serious the side effects. We don t know the long-term effects of opioids.      Prescribed opioids aren't the best way to manage chronic pain    Other ways to manage pain include:      Ibuprofen or acetaminophen.  You should always try this first.      Treat health problems that may be causing pain.      acupuncture or massage, deep breathing, meditation, visual imagery, aromatherapy.      Use heat or ice at the pain site      Physical therapy and exercise      Stop smoking      See a counselor or therapist                                                  People who have used opioids for a long time may have a lower quality of life, worse depression, higher levels of pain and more visits to doctors.    Never share your opioids with others. Be sure to store opioids in a secure place, locked if possible.Young children can easily swallow them and overdose.     You can overdose on opioids.  Signs of overdose include decrease or loss of consciousness, slowed breathing, trouble waking and blue lips.  If someone is worried about overdose, they should call 911.    If you are at risk for overdose, you may get naloxone (Narcan, a medicine that reverses the effects of opioids.  If you  overdose, a friend or family member can give you Narcan while waiting for the ambulance.  They need to know the signs of overdose and how to give Narcan.    While you're taking opioids:    Don't use alcohol or street drugs. Taking them together can cause death.    Don't take any of these medicines unless your doctor says its okay.  Taking these with opioids can cause death.    Benzodiazepines (such as lorazepam         or diazepam)    Muscle relaxers (such as cyclobenzaprine)    sleeping pills    other opioids    Safe disposal of opioids  Find your area drug take-back program, your pharmacy mail-back program, buy a special disposal bag (such as Deterra) from your pharmacy or flush them down the toilet.  Use the guidelines at:  www.fda.gov/drugs/resourcesforyou

## 2019-02-08 ENCOUNTER — MYC MEDICAL ADVICE (OUTPATIENT)
Dept: FAMILY MEDICINE | Facility: CLINIC | Age: 49
End: 2019-02-08

## 2019-02-14 ENCOUNTER — TELEPHONE (OUTPATIENT)
Dept: FAMILY MEDICINE | Facility: CLINIC | Age: 49
End: 2019-02-14

## 2019-02-14 NOTE — TELEPHONE ENCOUNTER
Prior Authorization Retail Medication Request    Medication/Dose: cymbalta 60mg (name brand)  ICD code (if different than what is on RX):     Previously Tried and Failed:     Rationale:       Insurance Name:  christopher  Insurance ID:  [id number]      Pharmacy Information (if different than what is on RX)  Name:     Phone:

## 2019-02-18 DIAGNOSIS — F41.1 GENERALIZED ANXIETY DISORDER: ICD-10-CM

## 2019-02-19 RX ORDER — CLONAZEPAM 0.5 MG/1
TABLET ORAL
Qty: 60 TABLET | Refills: 0 | Status: SHIPPED | OUTPATIENT
Start: 2019-02-19 | End: 2019-03-01

## 2019-02-19 RX ORDER — ALPRAZOLAM 0.5 MG
TABLET ORAL
Qty: 30 TABLET | Refills: 0 | Status: SHIPPED | OUTPATIENT
Start: 2019-02-19 | End: 2019-03-01

## 2019-02-19 NOTE — TELEPHONE ENCOUNTER
, relayed message above.   appt scheduled     Marisa Matthew ~ Patient Representative  [address] [address]  [email or web address]  [email or web address]  Office:  [phone]  Fax:  [phone]

## 2019-02-19 NOTE — TELEPHONE ENCOUNTER
Requested Prescriptions   Pending Prescriptions Disp Refills     ALPRAZolam (XANAX) 0.5 MG tablet 30 tablet 0     Sig: One tablet mid day. MUST LAST 30 DAYS.    There is no refill protocol information for this order     Last Written Prescription Date:  1/11/19  Last Fill Quantity: 30,  # refills: 0   Last office visit: 1/11/2019 with prescribing provider:     Future Office Visit:   Next 5 appointments (look out 90 days)    Apr 11, 2019  2:40 PM CDT  Return Visit with Ash Donald MD  [facility] ([address] [address]  [phone]                clonazePAM (KLONOPIN) 0.5 MG tablet 60 tablet 0     Sig: TAKE ONE-HALF TO ONE TABLET BY MOUTH TWICE A DAY AS NEEDED FOR ANXIETY - MUST LAST 30 DAYS    There is no refill protocol information for this order        Last Written Prescription Date:  1/11/19  Last Fill Quantity: 60,  # refills: 0   Last office visit: 1/11/2019 with prescribing provider:     Future Office Visit:   Next 5 appointments (look out 90 days)    Apr 11, 2019  2:40 PM CDT  Return Visit with Ash Donald MD  [facility] ([facility] [address] [place] [id number]  [phone]         Routing refill request to provider for review/approval because:  Drug not on the G refill protocol   T'd up 1 month for provider review.    Will forward to schedulers to schedule patient for OV - canceled appointment on 2/11/19, which she was informed to keep.  Addie Kuhn RN

## 2019-02-20 NOTE — TELEPHONE ENCOUNTER
Central Prior Authorization Team   Phone: [phone]      PA Initiation    Medication: cymbalta-Initiated  Insurance Company: [employer] - Phone [phone] Fax [phone]  Pharmacy Filling the Rx: [place], [place] - [address] [place] [address]   Filling Pharmacy Phone: [phone]  Filling Pharmacy Fax:    Start Date: 2/20/2019

## 2019-02-20 NOTE — TELEPHONE ENCOUNTER
Prior Authorization Approval    Authorization Effective Date: 12/30/2018  Authorization Expiration Date: 12/31/2019  Medication: cymbalta-APPROVED  Approved Dose/Quantity:   Reference #:     Insurance Company: [employer] - Phone [phone] Fax [phone]  Expected CoPay:       CoPay Card Available:      Foundation Assistance Needed:    Which Pharmacy is filling the prescription (Not needed for infusion/clinic administered): [facility] [address]   Pharmacy Notified: Yes  Patient Notified: No    Pharmacy will notify patient when medication is ready.

## 2019-02-27 DIAGNOSIS — M79.7 FIBROMYALGIA: ICD-10-CM

## 2019-02-27 RX ORDER — CYCLOBENZAPRINE HCL 10 MG
TABLET ORAL
Qty: 90 TABLET | Refills: 1 | Status: CANCELLED | OUTPATIENT
Start: 2019-02-27

## 2019-03-01 ENCOUNTER — OFFICE VISIT (OUTPATIENT)
Dept: FAMILY MEDICINE | Facility: CLINIC | Age: 49
End: 2019-03-01
Payer: MEDICARE

## 2019-03-01 VITALS
SYSTOLIC BLOOD PRESSURE: 114 MMHG | DIASTOLIC BLOOD PRESSURE: 62 MMHG | HEART RATE: 114 BPM | RESPIRATION RATE: 16 BRPM | OXYGEN SATURATION: 97 % | TEMPERATURE: 98 F | WEIGHT: 138.6 LBS | BODY MASS INDEX: 23.06 KG/M2

## 2019-03-01 DIAGNOSIS — M79.7 FIBROMYALGIA: ICD-10-CM

## 2019-03-01 DIAGNOSIS — N94.10 DYSPAREUNIA IN FEMALE: ICD-10-CM

## 2019-03-01 DIAGNOSIS — R10.2 PELVIC PAIN IN FEMALE: ICD-10-CM

## 2019-03-01 DIAGNOSIS — Z98.51 S/P TUBAL LIGATION: ICD-10-CM

## 2019-03-01 DIAGNOSIS — F41.1 GENERALIZED ANXIETY DISORDER: Primary | ICD-10-CM

## 2019-03-01 DIAGNOSIS — Z13.220 LIPID SCREENING: ICD-10-CM

## 2019-03-01 DIAGNOSIS — F33.0 MAJOR DEPRESSIVE DISORDER, RECURRENT EPISODE, MILD (H): ICD-10-CM

## 2019-03-01 DIAGNOSIS — M05.79 RHEUMATOID ARTHRITIS INVOLVING MULTIPLE SITES WITH POSITIVE RHEUMATOID FACTOR (H): ICD-10-CM

## 2019-03-01 LAB
ALBUMIN SERPL-MCNC: 3.6 G/DL (ref 3.4–5)
ALP SERPL-CCNC: 160 U/L (ref 40–150)
ALT SERPL W P-5'-P-CCNC: 21 U/L (ref 0–50)
AST SERPL W P-5'-P-CCNC: 15 U/L (ref 0–45)
BASOPHILS # BLD AUTO: 0.1 10E9/L (ref 0–0.2)
BASOPHILS NFR BLD AUTO: 0.4 %
BILIRUB DIRECT SERPL-MCNC: <0.1 MG/DL (ref 0–0.2)
BILIRUB SERPL-MCNC: 0.2 MG/DL (ref 0.2–1.3)
CREAT SERPL-MCNC: 0.97 MG/DL (ref 0.52–1.04)
CRP SERPL-MCNC: 11.9 MG/L (ref 0–8)
DIFFERENTIAL METHOD BLD: ABNORMAL
EOSINOPHIL NFR BLD AUTO: 0.6 %
ERYTHROCYTE [DISTWIDTH] IN BLOOD BY AUTOMATED COUNT: 13.4 % (ref 10–15)
ERYTHROCYTE [SEDIMENTATION RATE] IN BLOOD BY WESTERGREN METHOD: 10 MM/H (ref 0–30)
GFR SERPL CREATININE-BSD FRML MDRD: 68 ML/MIN/{1.73_M2}
HCT VFR BLD AUTO: 41 % (ref 35–47)
HGB BLD-MCNC: 13.8 G/DL (ref 11.7–15.7)
IMM GRANULOCYTES # BLD: 0.1 10E9/L (ref 0–0.4)
IMM GRANULOCYTES NFR BLD: 0.5 %
LYMPHOCYTES # BLD AUTO: 3 10E9/L (ref 0.8–5.3)
LYMPHOCYTES NFR BLD AUTO: 21.8 %
MCH RBC QN AUTO: 34.7 PG (ref 26.5–33)
MCHC RBC AUTO-ENTMCNC: 33.7 G/DL (ref 31.5–36.5)
MCV RBC AUTO: 103 FL (ref 78–100)
MONOCYTES # BLD AUTO: 0.6 10E9/L (ref 0–1.3)
MONOCYTES NFR BLD AUTO: 4.5 %
NEUTROPHILS # BLD AUTO: 9.9 10E9/L (ref 1.6–8.3)
NEUTROPHILS NFR BLD AUTO: 72.2 %
NRBC # BLD AUTO: 0 10*3/UL
NRBC BLD AUTO-RTO: 0 /100
PLATELET # BLD AUTO: 217 10E9/L (ref 150–450)
PROT SERPL-MCNC: 7.3 G/DL (ref 6.8–8.8)
RBC # BLD AUTO: 3.98 10E12/L (ref 3.8–5.2)
WBC # BLD AUTO: 13.6 10E9/L (ref 4–11)

## 2019-03-01 PROCEDURE — 99214 OFFICE O/P EST MOD 30 MIN: CPT | Performed by: FAMILY MEDICINE

## 2019-03-01 PROCEDURE — 85025 COMPLETE CBC W/AUTO DIFF WBC: CPT | Performed by: INTERNAL MEDICINE

## 2019-03-01 PROCEDURE — 80076 HEPATIC FUNCTION PANEL: CPT | Performed by: INTERNAL MEDICINE

## 2019-03-01 PROCEDURE — 36415 COLL VENOUS BLD VENIPUNCTURE: CPT | Performed by: INTERNAL MEDICINE

## 2019-03-01 PROCEDURE — 86140 C-REACTIVE PROTEIN: CPT | Performed by: INTERNAL MEDICINE

## 2019-03-01 PROCEDURE — 85652 RBC SED RATE AUTOMATED: CPT | Performed by: INTERNAL MEDICINE

## 2019-03-01 PROCEDURE — 82565 ASSAY OF CREATININE: CPT | Performed by: INTERNAL MEDICINE

## 2019-03-01 RX ORDER — ALPRAZOLAM 0.5 MG
TABLET ORAL
Qty: 30 TABLET | Refills: 0 | Status: SHIPPED | OUTPATIENT
Start: 2019-03-21 | End: 2019-04-18

## 2019-03-01 RX ORDER — CYCLOBENZAPRINE HCL 10 MG
TABLET ORAL
Qty: 90 TABLET | Refills: 1 | Status: SHIPPED | OUTPATIENT
Start: 2019-03-01 | End: 2019-04-18

## 2019-03-01 RX ORDER — CLONAZEPAM 0.5 MG/1
TABLET ORAL
Qty: 60 TABLET | Refills: 0 | Status: SHIPPED | OUTPATIENT
Start: 2019-03-21 | End: 2019-04-18

## 2019-03-01 RX ORDER — DULOXETINE HCL 60 MG
CAPSULE,DELAYED RELEASE (ENTERIC COATED) ORAL
Qty: 90 CAPSULE | Refills: 1 | Status: SHIPPED | OUTPATIENT
Start: 2019-03-01 | End: 2019-09-27

## 2019-03-01 RX ORDER — BUSPIRONE HYDROCHLORIDE 10 MG/1
TABLET ORAL
Qty: 135 TABLET | Refills: 3 | Status: SHIPPED | OUTPATIENT
Start: 2019-03-01 | End: 2019-06-24

## 2019-03-01 ASSESSMENT — ANXIETY QUESTIONNAIRES
GAD7 TOTAL SCORE: 9
2. NOT BEING ABLE TO STOP OR CONTROL WORRYING: MORE THAN HALF THE DAYS
7. FEELING AFRAID AS IF SOMETHING AWFUL MIGHT HAPPEN: NOT AT ALL
6. BECOMING EASILY ANNOYED OR IRRITABLE: NOT AT ALL
IF YOU CHECKED OFF ANY PROBLEMS ON THIS QUESTIONNAIRE, HOW DIFFICULT HAVE THESE PROBLEMS MADE IT FOR YOU TO DO YOUR WORK, TAKE CARE OF THINGS AT HOME, OR GET ALONG WITH OTHER PEOPLE: NOT DIFFICULT AT ALL
3. WORRYING TOO MUCH ABOUT DIFFERENT THINGS: MORE THAN HALF THE DAYS
1. FEELING NERVOUS, ANXIOUS, OR ON EDGE: NEARLY EVERY DAY
5. BEING SO RESTLESS THAT IT IS HARD TO SIT STILL: NOT AT ALL

## 2019-03-01 ASSESSMENT — PATIENT HEALTH QUESTIONNAIRE - PHQ9
SUM OF ALL RESPONSES TO PHQ QUESTIONS 1-9: 11
5. POOR APPETITE OR OVEREATING: MORE THAN HALF THE DAYS

## 2019-03-01 NOTE — TELEPHONE ENCOUNTER
Requested Prescriptions   Pending Prescriptions Disp Refills     cyclobenzaprine (FLEXERIL) 10 MG tablet [Pharmacy Med Name: CYCLOBENZAPRINE HCL 10MG TABS] 90 tablet 1     Sig: TAKE TWO TABLETS BY MOUTH EVERY EVENING AND TAKE ONE TABLET BY MOUTH EVERY MORNING    There is no refill protocol information for this order        Controlled Substance Refill Request for Flexeril    Last refill: 12/13/18 - #90 with 1 refill to be taken TID    Last clinic visit: 1/11/19     Patient has appointment today.  Will wait to see if this is filled.  Addie Kuhn, SAMARAN, RN

## 2019-03-01 NOTE — NURSING NOTE
OB/GYN referral, appointment and F/U appointment with Dr Paredes made.  Appointment dates and times given to Sherie and also PEYTON.

## 2019-03-01 NOTE — PROGRESS NOTES
SUBJECTIVE:   Sherie Otero is a 48 year old female who presents to clinic today for the following health issues:      Anxiety Follow-Up    Status since last visit: No change    Other associated symptoms:None    Complicating factors:   Significant life event: Yes-  Loss of dog, pain med changes   Current substance abuse: None  Depression symptoms: Yes-    CELIA-7 SCORE 10/1/2018 1/11/2019 3/1/2019   Total Score - - -   Total Score 12 0 9       CELIA-7    Amount of exercise or physical activity: just enrolled in Gym membership    Problems taking medications regularly: No    Medication side effects: fatigue-from tompriamate    Diet: regular (no restrictions)          Problem list and histories reviewed & adjusted, as indicated.  Additional history: as documented      Reviewed and updated as needed this visit by clinical staff  Tobacco  Allergies  Meds  Problems  Med Hx  Surg Hx  Fam Hx  Soc Hx        Reviewed and updated as needed this visit by Provider  Tobacco  Allergies  Meds  Problems  Med Hx  Surg Hx  Fam Hx         Patient here today for follow-up on her depression and anxiety.  She is currently on Cymbalta 60 mg daily along with BuSpar.  She also takes clonazepam twice daily and Xanax in the middle of the day between the 2 clonazepam doses.  She states that she has a psychiatry consultation in the middle of April, though she does not know exactly what day it is.  Patient recently had her clonazepam and Xanax refilled as she had to reschedule her previous appointment for follow-up with me and was going to run out of medication prior to being able to see me today.    Patient is currently seeing the pain clinic for her fibromyalgia and chronic neck pain.  She also continues to see rheumatology for her rheumatoid arthritis.    Patient also is concerned because she has the Essure device in her fallopian tubes used for tubal ligation.  She notes there was a product recall and the device was discontinued  and she has had dyspareunia since the placement of this device.  She is wondering what she should do about this.    Review of Systems  10 point ROS of systems including Constitutional, Eyes, HENT, Respiratory, Cardiovascular, Gastroenterology, Genitourinary, Integumentary, Muscularskeletal, Psychiatric were all negative except for pertinent positives noted in my HPI.     OBJECTIVE:   /62   Pulse 114   Temp 98  F (36.7  C) (Temporal)   Resp 16   Wt 62.9 kg (138 lb 9.6 oz)   SpO2 97%   BMI 23.06 kg/m    Body mass index is 23.06 kg/m .  Physical Exam   Constitutional: She appears well-developed and well-nourished. No distress.   Cardiovascular: Normal rate, regular rhythm and normal heart sounds. Exam reveals no friction rub.   No murmur heard.  Pulmonary/Chest: Effort normal and breath sounds normal. No stridor. She has no decreased breath sounds. She has no wheezes. She has no rhonchi. She has no rales.   Neurological: She is alert.   Psychiatric: She has a normal mood and affect. Her behavior is normal. Judgment and thought content normal.        ASSESSMENT/PLAN:       ICD-10-CM    1. Generalized anxiety disorder F41.1 ALPRAZolam (XANAX) 0.5 MG tablet     clonazePAM (KLONOPIN) 0.5 MG tablet     busPIRone (BUSPAR) 10 MG tablet   2. Dyspareunia in female N94.10 OB/GYN REFERRAL   3. S/P essure Z98.51 OB/GYN REFERRAL   4. Fibromyalgia M79.7 cyclobenzaprine (FLEXERIL) 10 MG tablet     CYMBALTA 60 MG capsule   5. Major depressive disorder, recurrent episode, mild (H) F33.0 CYMBALTA 60 MG capsule   6. Lipid screening Z13.220 Lipid panel reflex to direct LDL Fasting   7. Pelvic pain in female R10.2 OB/GYN REFERRAL     PLAN:  1.  Patient's Cymbalta, BuSpar, and Flexeril oral refilled with adequate refills to cover her through her upcoming psychiatric evaluation.  I did give her another refill of Xanax and clonazepam that she can refill after her current prescription runs out.  We figured out when the next  prescription will  and I got her scheduled for an appointment prior to this date.  Patient not sure if this will be immediately before or after her psychiatric evaluation, but the follow-up appointment that I scheduled for her with me will least sure that the patient's benzodiazepine prescriptions can be refilled if she has not been seen by psychiatry at that point.  She is seen by psychiatry prior to that appointment, then we can discuss with their long-term care management options are for her.  2.  Referral to OB/GYN was made regarding what to do about her dyspareunia and Essure devices that are placed in her fallopian tubes.  3.  She will continue to follow with pain management and rheumatology.    Follow up with Provider - Return in about 7 weeks (around 2019).      Mauro Paredes MD   [facility]

## 2019-03-02 ASSESSMENT — ANXIETY QUESTIONNAIRES: GAD7 TOTAL SCORE: 9

## 2019-03-02 ASSESSMENT — ASTHMA QUESTIONNAIRES: ACT_TOTALSCORE: 23

## 2019-03-05 NOTE — PROGRESS NOTES
[facility]    CHIEF COMPLAINT: Pain  -Fibromyalgia  -Neck pain  -Low back pain    INTERVAL HISTORY:  Last seen on 02/04/2019.        Recommendations/plan at the last visit included:  1. Physical Therapy:  YES, she is going to review with her insurance  2. Clinical Health Psychologist:  YES, she is going to review with her insurance    3. Diagnostic Studies:  Reschedule lumbar MRI  4. Medication Management:     1. Continue Cymbalta at 60mg at bedtime    2. Continue Flexeril   3. Continue Hydrocodone at max of 6 tabs/day. We will slowly start a taper at the next visit   4. Try taking topiramate a couple hours after waking up. Continue take 2 tabs at bedime  5. Further procedures recommended: We can review this again after getting MRI  6. Opioid contract today.       Follow up with this provider:  2-4 weeks     Since her last visit, Sherie Otero reports:  -Got a stomach bug. Is still having diarrhea from this. This illness caused her to have some bone achyness.     Her joints and some fibro spots are bothering her due to the incoming snow. She started keeping track of when she is taking her medications. She is still getting tired from the Topamax. One night she was so tired that she did not take her nighttime medications.     She qualifies for [facility] [employer]. She is going to start this today.They have a pool and some classes that she is going to start. She would also like help on quitting smoking.      Pain Information:   Pain quality: Aching, burning, stabbing, miserable, tiring, exhausting, and unbearable   Pain rating: intensity ranges from 6/10 to 10/10, and averages 8/10 on a 0-10 scale.   Pain today 8/10      UDS 1/19/2019   CSA 2/04/2019    CURRENT RELEVANT PAIN MEDICATIONS:    Flexeril-1 in AM and 2 at HS  Cymbalta-60mg at night  Hydrocodone 7.5mg -currently taking 2 tablets three times a day   Topamax- 1/2-1 tab in AM, 2 tabs at HS  Ibuprofen-will take 800mg up to 3 times day, doesn't  take daily    Patient is using the medication as prescribed:  YES  Is your medication helpful? YES   Medication side effects? no side effect    Previous Medications: (H--helped; HI--Helped initially; SWH-- somewhat helpful, NH--No help; W--worse; SE--side effects)   Opiates: Hydrocodone H, Oxycodone SE  NSAIDS: Ibuprofent H  Muscle Relaxants: Tizanidine NH, Flexeril H  Anti-migraine mediations: Verapamil H  Anti-depressants: Cymbalta H  Sleep aids: none  Anxiolytics: Xanax H, Clonazepam H, Valium H  Neuropathics: Gabapentin SE dizziness          Topicals: Lidocaine patches SW  Other medications not covered above: Savella H at first, then seemed to stop working, Lyrica SE shortness of breath, felt 'weird' on them, throat felt weird. Prednisone H for arthritis flare    Past Pain Treatments:  Pain Clinic:   No   PT: Yes, years ago. Has not done pool therapy.   Psychologist: No  Relaxation techniques/biofeedback: No  Chiropractor: No, was told not to because of neck.   Acupuncture: Yes for headaches.   Pharmacotherapy:           Opioids: Yes            Non-opioids:    Yes   TENs Unit:Yes  Injections: cervical medial branch blocks 6/12/2014  Self-care:   Yes, ice/heat, hot baths, theracare  Surgeries related to pain: No    [place] Board of Pharmacy Data Base Reviewed:    YES; As expected, no concern for misuse/abuse of controlled medications based on this report.      THE 4 As OF OPIOID MAINTENANCE ANALGESIA    Analgesia: Is pain relief clinically significant? YES   Activity: Is patient functional and able to perform Activities of Daily Living? YES   Adverse effects: Is patient free from adverse side effects from opiates? YES   Adherence to Rx protocol: Is patient adhering to Controlled Substance Agreement and taking medications ONLY as ordered? YES       Is Narcan prescribed for opiate use >50 MME daily? N/A      Total Daily MME: 45    Medications:  Current Outpatient Medications   Medication Sig Dispense Refill      albuterol (VENTOLIN HFA) 108 (90 Base) MCG/ACT Inhaler Inhale 2 puffs into the lungs every 6 hours as needed 18 g 4     [START ON 3/21/2019] ALPRAZolam (XANAX) 0.5 MG tablet One tablet mid day. MUST LAST 30 DAYS. 30 tablet 0     busPIRone (BUSPAR) 10 MG tablet TAKE 5 MG (1/2 TAB) IN THE MORNING AND 10 MG AT NIGHT 135 tablet 3     cetirizine (ZYRTEC) 10 MG tablet TAKE  ONE TABLET BY MOUTH EVERY DAY. 90 tablet 3     [START ON 3/21/2019] clonazePAM (KLONOPIN) 0.5 MG tablet TAKE ONE-HALF TO ONE TABLET BY MOUTH TWICE A DAY AS NEEDED FOR ANXIETY - MUST LAST 30 DAYS. 60 tablet 0     cyclobenzaprine (FLEXERIL) 10 MG tablet Take 2 tablets in the evening and 1 in the morning 90 tablet 1     CYMBALTA 60 MG capsule TAKE ONE CAPSULE BY MOUTH EVERY EVENING - KADY 90 capsule 1     fluticasone (FLONASE) 50 MCG/ACT spray SPRAY 2 SPRAYS INTO BOTH NOSTRILS DAILY. 16 g 11     folic acid (FOLVITE) 1 MG tablet Take 1 tablet (1 mg) by mouth daily 100 tablet 3     HYDROcodone-acetaminophen (NORCO) 7.5-325 MG per tablet TAKE 2 TABLETS BY MOUTH EVERY 6 HOURS AS NEEDED FOR PAIN--MAX OF 6 TABLETS PER DAY. 180 tablet 0     methotrexate sodium 2.5 MG TABS Take 8 tablets (20 mg) by mouth every 7 days 32 tablet 3     Multiple Vitamins-Minerals (MULTIVITAL PO)        naloxone (NARCAN) nasal spray Spray 1 spray (4 mg) into one nostril alternating nostrils as needed for opioid reversal every 2-3 minutes until assistance arrives (Patient not taking: Reported on 3/1/2019) 0.2 mL 0     predniSONE (DELTASONE) 5 MG tablet Take 5 mg by mouth daily as needed for peripheral joint pain from rheumatoid arthritis; use sparingly. 30 tablet 1     topiramate (TOPAMAX) 25 MG tablet 1 tab daily x1week, then 1 tab twice daily x1 week, then 2 tabs AM, 1 tab PM x1 week then 2 tabs twice daily 70 tablet 1     verapamil (CALAN) 40 MG tablet Take 1 tablet (40 mg) by mouth 2 times daily 180 tablet 3       Review of Systems: A 10-point review of systems was negative, with  "the exception of chronic pain issues, fever/chills, fatigue, weight loss from when sick, headache, dizziness, sinus infection, earache, cough, allergies, abdominal pain, nausea, vomiting, diarrhea, depression, and stress.      Social History: Reviewed; unchanged from previous consultation.      Family history: Reviewed; unchanged from previous consultation.     PHYSICAL EXAM:     /68   Temp 98.5  F (36.9  C) (Temporal)   Ht 1.651 m (5' 5\")   Wt 61.9 kg (136 lb 8 oz)   BMI 22.71 kg/m        Constitutional: healthy, alert and no distress  HEENT: Head atraumatic, normocephalic. Eyes without conjunctival injection or jaundice. Neck supple. No obvious neck masses.  Psychiatric/mental status: Alert, without lethargy or stupor. Appropriate affect. Mood normal.   Neurologic exam: Gait is normal         DIAGNOSTIC TESTS:  Imaging Studies:   No new imaging to review    Assessment:  Sherie Otero is a 48 year old female who presents today for follow up regarding her:    1.  Low back pain  2.  Neck pain  3.  Chronic pain syndrome  4.  Fibromyalgia    Things are overall stable.  Her pain has increased with a recent illness.  She is still having diarrhea and will follow up with Dr. Paredes on this if it persists.  She can also tell that snow is coming given the joint pain that she is having.  She would like some resources on quitting smoking as well.  She is feeling very motivated to become more active and have more energy.    Plan:    Diagnosis reviewed, treatment option addressed, and risk/benifits discussed.  Self-care instructions given.  I am recommending a multidisciplinary treatment plan to help this patient better manage pain.      1. Physical Therapy: We will hold off on formal physical therapy at this time however would recommend that she start the silver sneakers as is her plan.  We discussed that when she gets into the pool she can even just walk around the pool.  2. Clinical Health Psychologist:  YES, " she is going to review with her insurance    3. Diagnostic Studies: Scheduled for March 13, 2019  4. Medication Management:     1. Continue Cymbalta at 60mg at bedtime    2. Continue Flexeril   3. Continue Hydrocodone at max of 6 tabs/day.  We will decrease the number of tablets that she takes from 180/month to 175/month.  We did discuss the different ways that we can taper the medication.  We will go slow with this.    4. Continue topiramate  1/2-1 in AM. Continue take 2 tabs at bedtime. Increase as tolerated.  5. Further procedures recommended: We can review this again after getting MRI        Follow up with this provider: 4-5 weeks     Total time spent face to face was 20 minutes and more than 50% of face to face time was spent in counseling and/or coordination of care regarding the diagnosis and recommendations above.      Celia Bettencourt PA-C   [facility]

## 2019-03-06 ENCOUNTER — OFFICE VISIT (OUTPATIENT)
Dept: PODIATRY | Facility: CLINIC | Age: 49
End: 2019-03-06
Payer: MEDICARE

## 2019-03-06 VITALS
TEMPERATURE: 98.5 F | SYSTOLIC BLOOD PRESSURE: 108 MMHG | BODY MASS INDEX: 22.74 KG/M2 | WEIGHT: 136.5 LBS | DIASTOLIC BLOOD PRESSURE: 68 MMHG | HEIGHT: 65 IN

## 2019-03-06 DIAGNOSIS — M79.7 FIBROMYALGIA: ICD-10-CM

## 2019-03-06 DIAGNOSIS — G89.29 CHRONIC BILATERAL LOW BACK PAIN WITHOUT SCIATICA: Primary | ICD-10-CM

## 2019-03-06 DIAGNOSIS — M54.50 CHRONIC BILATERAL LOW BACK PAIN WITHOUT SCIATICA: Primary | ICD-10-CM

## 2019-03-06 DIAGNOSIS — M54.2 CERVICALGIA: ICD-10-CM

## 2019-03-06 DIAGNOSIS — G89.4 CHRONIC PAIN SYNDROME: ICD-10-CM

## 2019-03-06 PROCEDURE — 99213 OFFICE O/P EST LOW 20 MIN: CPT | Performed by: PHYSICIAN ASSISTANT

## 2019-03-06 RX ORDER — HYDROCODONE BITARTRATE AND ACETAMINOPHEN 7.5; 325 MG/1; MG/1
TABLET ORAL
Qty: 175 TABLET | Refills: 0 | Status: SHIPPED | OUTPATIENT
Start: 2019-03-06 | End: 2019-04-10

## 2019-03-06 ASSESSMENT — MIFFLIN-ST. JEOR: SCORE: 1240.04

## 2019-03-06 ASSESSMENT — PAIN SCALES - GENERAL: PAINLEVEL: EXTREME PAIN (8)

## 2019-03-06 NOTE — PATIENT INSTRUCTIONS
After Visit Instructions:     Thank you for coming to [facility] for your care. It is my goal to partner with you to help you reach your optimal state of health.     I am recommending multidisciplinary care at this time.  The focus of care will be to continue gradual rehabilitation and pain management with medication adjustments as needed.    Continue daily self-care, identifying contributing factors, and monitoring variations in pain level. Continue to integrate self-care into your life.      1. Schedule pain psychology assessment/visit: check with your insurance  2. Schedule physical therapy assessment/visit: start your workout program  3. Schedule follow-up with Celia Bettencourt PA-C in 4-5 weeks. You will need to make this appointment.   4. Should you become interested in quitting smoking, for free help visit [email or web address] or call [phone] ([phone]).  5. Medication recommendations:   1. Continue Cymbalta at current dosing  2. Continue Topamax as you have been. Okay to stop the morning dose if you find you cannot tolerate it.   3. Hydrocodone-decreased to 175 tabs for 30 days. This is 5 tabs less/month. Keep track of when you're needing this medication.       Celia Bettencourt PA-C  [facility]  [facility] [place]    Contact information: [facility]  Clinic Number:  [phone]   Call this number with any questions about your care and for scheduling assistance. Calls are returned Monday through Friday between 8 AM and 4:30 PM. We usually get back to you within 2 business days depending on the issue/request.           Medication Refills Policy:    For non-narcotic medications, please your pharmacy directly to request a refill and the pharmacy will call the [facility] for authorization. Please allow 3-4 days for these refills.    For narcotic refills, call the nurse triage line and leave a message requesting your refill along with  the name of the pharmacy that you use. Narcotic prescriptions will be mailed to your pharmacy or you may pick them up at the clinic.  Please call 7-10 days before your refill is due  The above policy allows adequate time so that you do not run out of medication.    No Show - Late Cancellation - Late Arrival Policy  We believe regular attendance is key to your success in our program.    Any time you are unable to keep your appointment we ask that you call us at least 24 hours in advance to let us know. This will allow us to offer the appointment time to another patient. The following is our policy for missed appointments. This also applies to appointments cancelled with less than 24 hours notice.    After missing 3 appointments without calling first, we will cancel all of your future appointments at [facility].    At that point, you will not be able to resume services unless approved by your care team  We understand that unforseen circumstances arise, however, out of respect for all concerned and to provide this appointment to another patient, this policy will be enforced.    Please note that most follow up appointments are 30 minutes long. If you arrive late, your provider may not be able to see you for the entire 30 minutes. Please also note that if you arrive more than 15 minutes for any appointment, it may be rescheduled.

## 2019-03-06 NOTE — PROGRESS NOTES
"/68   Temp 98.5  F (36.9  C) (Temporal)   Ht 1.651 m (5' 5\")   Wt 61.9 kg (136 lb 8 oz)   BMI 22.71 kg/m    Body mass index is 22.71 kg/m .  5' 5\"  136 lbs 8 oz        Rachael Madrigal MA on 3/6/2019 at 1:15 PM    "

## 2019-03-06 NOTE — LETTER
3/6/2019         RE: Sherie Otero  [address] [address]        Dear Colleague,    Thank you for referring your patient, Sherie Otero, to the [facility]. Please see a copy of my visit note below.    [facility]    CHIEF COMPLAINT: Pain  -Fibromyalgia  -Neck pain  -Low back pain    INTERVAL HISTORY:  Last seen on 02/04/2019.        Recommendations/plan at the last visit included:  1. Physical Therapy:  YES, she is going to review with her insurance  2. Clinical Health Psychologist:  YES, she is going to review with her insurance    3. Diagnostic Studies:  Reschedule lumbar MRI  4. Medication Management:     1. Continue Cymbalta at 60mg at bedtime    2. Continue Flexeril   3. Continue Hydrocodone at max of 6 tabs/day. We will slowly start a taper at the next visit   4. Try taking topiramate a couple hours after waking up. Continue take 2 tabs at bedime  5. Further procedures recommended: We can review this again after getting MRI  6. Opioid contract today.       Follow up with this provider:  2-4 weeks     Since her last visit, Sherie Otero reports:  -Got a stomach bug. Is still having diarrhea from this. This illness caused her to have some bone achyness.     Her joints and some fibro spots are bothering her due to the incoming snow. She started keeping track of when she is taking her medications. She is still getting tired from the Topamax. One night she was so tired that she did not take her nighttime medications.     She qualifies for silver sneakers. She is going to start this today.They have a pool and some classes that she is going to start. She would also like help on quitting smoking.      Pain Information:   Pain quality: Aching, burning, stabbing, miserable, tiring, exhausting, and unbearable   Pain rating: intensity ranges from 6/10 to 10/10, and averages 8/10 on a 0-10 scale.   Pain today 8/10      UDS 1/19/2019   CSA  2/04/2019    CURRENT RELEVANT PAIN MEDICATIONS:    Flexeril-1 in AM and 2 at HS  Cymbalta-60mg at night  Hydrocodone 7.5mg -currently taking 2 tablets three times a day   Topamax- 1/2-1 tab in AM, 2 tabs at HS  Ibuprofen-will take 800mg up to 3 times day, doesn't take daily    Patient is using the medication as prescribed:  YES  Is your medication helpful? YES   Medication side effects? no side effect    Previous Medications: (H--helped; HI--Helped initially; SWH-- somewhat helpful, NH--No help; W--worse; SE--side effects)   Opiates: Hydrocodone H, Oxycodone SE  NSAIDS: Ibuprofent H  Muscle Relaxants: Tizanidine NH, Flexeril H  Anti-migraine mediations: Verapamil H  Anti-depressants: Cymbalta H  Sleep aids: none  Anxiolytics: Xanax H, Clonazepam H, Valium H  Neuropathics: Gabapentin SE dizziness          Topicals: Lidocaine patches [facility]  Other medications not covered above: Savella H at first, then seemed to stop working, Lyrica SE shortness of breath, felt 'weird' on them, throat felt weird. Prednisone H for arthritis flare    Past Pain Treatments:  Pain Clinic:   No   PT: Yes, years ago. Has not done pool therapy.   Psychologist: No  Relaxation techniques/biofeedback: No  Chiropractor: No, was told not to because of neck.   Acupuncture: Yes for headaches.   Pharmacotherapy:           Opioids: Yes            Non-opioids:    Yes   TENs Unit:Yes  Injections: cervical medial branch blocks 6/12/2014  Self-care:   Yes, ice/heat, hot baths, theracare  Surgeries related to pain: No    [place] Board of Pharmacy Data Base Reviewed:    YES; As expected, no concern for misuse/abuse of controlled medications based on this report.      THE 4 As OF OPIOID MAINTENANCE ANALGESIA    Analgesia: Is pain relief clinically significant? YES   Activity: Is patient functional and able to perform Activities of Daily Living? YES   Adverse effects: Is patient free from adverse side effects from opiates? YES   Adherence to Rx protocol: Is  patient adhering to Controlled Substance Agreement and taking medications ONLY as ordered? YES       Is Narcan prescribed for opiate use >50 MME daily? N/A      Total Daily MME: 45    Medications:  Current Outpatient Medications   Medication Sig Dispense Refill     albuterol (VENTOLIN HFA) 108 (90 Base) MCG/ACT Inhaler Inhale 2 puffs into the lungs every 6 hours as needed 18 g 4     [START ON 3/21/2019] ALPRAZolam (XANAX) 0.5 MG tablet One tablet mid day. MUST LAST 30 DAYS. 30 tablet 0     busPIRone (BUSPAR) 10 MG tablet TAKE 5 MG (1/2 TAB) IN THE MORNING AND 10 MG AT NIGHT 135 tablet 3     cetirizine (ZYRTEC) 10 MG tablet TAKE  ONE TABLET BY MOUTH EVERY DAY. 90 tablet 3     [START ON 3/21/2019] clonazePAM (KLONOPIN) 0.5 MG tablet TAKE ONE-HALF TO ONE TABLET BY MOUTH TWICE A DAY AS NEEDED FOR ANXIETY - MUST LAST 30 DAYS. 60 tablet 0     cyclobenzaprine (FLEXERIL) 10 MG tablet Take 2 tablets in the evening and 1 in the morning 90 tablet 1     CYMBALTA 60 MG capsule TAKE ONE CAPSULE BY MOUTH EVERY EVENING - KADY 90 capsule 1     fluticasone (FLONASE) 50 MCG/ACT spray SPRAY 2 SPRAYS INTO BOTH NOSTRILS DAILY. 16 g 11     folic acid (FOLVITE) 1 MG tablet Take 1 tablet (1 mg) by mouth daily 100 tablet 3     HYDROcodone-acetaminophen (NORCO) 7.5-325 MG per tablet TAKE 2 TABLETS BY MOUTH EVERY 6 HOURS AS NEEDED FOR PAIN--MAX OF 6 TABLETS PER DAY. 180 tablet 0     methotrexate sodium 2.5 MG TABS Take 8 tablets (20 mg) by mouth every 7 days 32 tablet 3     Multiple Vitamins-Minerals (MULTIVITAL PO)        naloxone (NARCAN) nasal spray Spray 1 spray (4 mg) into one nostril alternating nostrils as needed for opioid reversal every 2-3 minutes until assistance arrives (Patient not taking: Reported on 3/1/2019) 0.2 mL 0     predniSONE (DELTASONE) 5 MG tablet Take 5 mg by mouth daily as needed for peripheral joint pain from rheumatoid arthritis; use sparingly. 30 tablet 1     topiramate (TOPAMAX) 25 MG tablet 1 tab daily x1week,  "then 1 tab twice daily x1 week, then 2 tabs AM, 1 tab PM x1 week then 2 tabs twice daily 70 tablet 1     verapamil (CALAN) 40 MG tablet Take 1 tablet (40 mg) by mouth 2 times daily 180 tablet 3       Review of Systems: A 10-point review of systems was negative, with the exception of chronic pain issues, fever/chills, fatigue, weight loss from when sick, headache, dizziness, sinus infection, earache, cough, allergies, abdominal pain, nausea, vomiting, diarrhea, depression, and stress.      Social History: Reviewed; unchanged from previous consultation.      Family history: Reviewed; unchanged from previous consultation.     PHYSICAL EXAM:     /68   Temp 98.5  F (36.9  C) (Temporal)   Ht 1.651 m (5' 5\")   Wt 61.9 kg (136 lb 8 oz)   BMI 22.71 kg/m         Constitutional: healthy, alert and no distress  HEENT: Head atraumatic, normocephalic. Eyes without conjunctival injection or jaundice. Neck supple. No obvious neck masses.  Psychiatric/mental status: Alert, without lethargy or stupor. Appropriate affect. Mood normal.   Neurologic exam: Gait is normal         DIAGNOSTIC TESTS:  Imaging Studies:   No new imaging to review    Assessment:  Sherie Otero is a 48 year old female who presents today for follow up regarding her:    1.  Low back pain  2.  Neck pain  3.  Chronic pain syndrome  4.  Fibromyalgia    Things are overall stable.  Her pain has increased with a recent illness.  She is still having diarrhea and will follow up with Dr. Paredes on this if it persists.  She can also tell that snow is coming given the joint pain that she is having.  She would like some resources on quitting smoking as well.  She is feeling very motivated to become more active and have more energy.    Plan:    Diagnosis reviewed, treatment option addressed, and risk/benifits discussed.  Self-care instructions given.  I am recommending a multidisciplinary treatment plan to help this patient better manage pain.      1. Physical " "Therapy: We will hold off on formal physical therapy at this time however would recommend that she start the silver sneakers as is her plan.  We discussed that when she gets into the pool she can even just walk around the pool.  2. Clinical Health Psychologist:  YES, she is going to review with her insurance    3. Diagnostic Studies: Scheduled for March 13, 2019  4. Medication Management:     1. Continue Cymbalta at 60mg at bedtime    2. Continue Flexeril   3. Continue Hydrocodone at max of 6 tabs/day.  We will decrease the number of tablets that she takes from 180/month to 175/month.  We did discuss the different ways that we can taper the medication.  We will go slow with this.    4. Continue topiramate  1/2-1 in AM. Continue take 2 tabs at bedtime. Increase as tolerated.  5. Further procedures recommended: We can review this again after getting MRI        Follow up with this provider: 4-5 weeks     Total time spent face to face was 20 minutes and more than 50% of face to face time was spent in counseling and/or coordination of care regarding the diagnosis and recommendations above.      Celia Bettencourt PA-C   [facility]        /68   Temp 98.5  F (36.9  C) (Temporal)   Ht 1.651 m (5' 5\")   Wt 61.9 kg (136 lb 8 oz)   BMI 22.71 kg/m     Body mass index is 22.71 kg/m .  5' 5\"  136 lbs 8 oz        Rachael Madrigal MA on 3/6/2019 at 1:15 PM      Again, thank you for allowing me to participate in the care of your patient.        Sincerely,        Celia Bettencourt PA-C    "

## 2019-03-25 DIAGNOSIS — Z13.220 LIPID SCREENING: ICD-10-CM

## 2019-03-25 DIAGNOSIS — M05.79 RHEUMATOID ARTHRITIS INVOLVING MULTIPLE SITES WITH POSITIVE RHEUMATOID FACTOR (H): ICD-10-CM

## 2019-03-25 LAB
ALBUMIN SERPL-MCNC: 3.5 G/DL (ref 3.4–5)
ALP SERPL-CCNC: 157 U/L (ref 40–150)
ALT SERPL W P-5'-P-CCNC: 24 U/L (ref 0–50)
AST SERPL W P-5'-P-CCNC: 19 U/L (ref 0–45)
BASOPHILS # BLD AUTO: 0 10E9/L (ref 0–0.2)
BASOPHILS NFR BLD AUTO: 0.4 %
BILIRUB DIRECT SERPL-MCNC: <0.1 MG/DL (ref 0–0.2)
BILIRUB SERPL-MCNC: 0.3 MG/DL (ref 0.2–1.3)
CREAT SERPL-MCNC: 0.84 MG/DL (ref 0.52–1.04)
DIFFERENTIAL METHOD BLD: ABNORMAL
EOSINOPHIL NFR BLD AUTO: 0.4 %
ERYTHROCYTE [DISTWIDTH] IN BLOOD BY AUTOMATED COUNT: 13.8 % (ref 10–15)
GFR SERPL CREATININE-BSD FRML MDRD: 80 ML/MIN/{1.73_M2}
HCT VFR BLD AUTO: 41.5 % (ref 35–47)
HGB BLD-MCNC: 13.8 G/DL (ref 11.7–15.7)
IMM GRANULOCYTES # BLD: 0 10E9/L (ref 0–0.4)
IMM GRANULOCYTES NFR BLD: 0.2 %
LYMPHOCYTES # BLD AUTO: 2.4 10E9/L (ref 0.8–5.3)
LYMPHOCYTES NFR BLD AUTO: 28.6 %
MCH RBC QN AUTO: 34.7 PG (ref 26.5–33)
MCHC RBC AUTO-ENTMCNC: 33.3 G/DL (ref 31.5–36.5)
MCV RBC AUTO: 104 FL (ref 78–100)
MONOCYTES # BLD AUTO: 0.4 10E9/L (ref 0–1.3)
MONOCYTES NFR BLD AUTO: 4.9 %
NEUTROPHILS # BLD AUTO: 5.5 10E9/L (ref 1.6–8.3)
NEUTROPHILS NFR BLD AUTO: 65.5 %
NRBC # BLD AUTO: 0 10*3/UL
NRBC BLD AUTO-RTO: 0 /100
PLATELET # BLD AUTO: 234 10E9/L (ref 150–450)
PROT SERPL-MCNC: 6.9 G/DL (ref 6.8–8.8)
RBC # BLD AUTO: 3.98 10E12/L (ref 3.8–5.2)
WBC # BLD AUTO: 8.4 10E9/L (ref 4–11)

## 2019-03-25 PROCEDURE — 85025 COMPLETE CBC W/AUTO DIFF WBC: CPT | Performed by: INTERNAL MEDICINE

## 2019-03-25 PROCEDURE — 80076 HEPATIC FUNCTION PANEL: CPT | Performed by: INTERNAL MEDICINE

## 2019-03-25 PROCEDURE — 36415 COLL VENOUS BLD VENIPUNCTURE: CPT | Performed by: INTERNAL MEDICINE

## 2019-03-25 PROCEDURE — 82565 ASSAY OF CREATININE: CPT | Performed by: INTERNAL MEDICINE

## 2019-03-26 ENCOUNTER — OFFICE VISIT (OUTPATIENT)
Dept: OBGYN | Facility: CLINIC | Age: 49
End: 2019-03-26
Payer: MEDICARE

## 2019-03-26 VITALS
SYSTOLIC BLOOD PRESSURE: 122 MMHG | WEIGHT: 138.19 LBS | HEART RATE: 88 BPM | BODY MASS INDEX: 23 KG/M2 | DIASTOLIC BLOOD PRESSURE: 78 MMHG

## 2019-03-26 DIAGNOSIS — Z72.0 TOBACCO ABUSE: ICD-10-CM

## 2019-03-26 DIAGNOSIS — R10.2 CHRONIC PELVIC PAIN IN FEMALE: Primary | ICD-10-CM

## 2019-03-26 DIAGNOSIS — E28.319 ASYMPTOMATIC PREMATURE MENOPAUSE: ICD-10-CM

## 2019-03-26 DIAGNOSIS — G89.29 CHRONIC PELVIC PAIN IN FEMALE: Primary | ICD-10-CM

## 2019-03-26 PROCEDURE — 99203 OFFICE O/P NEW LOW 30 MIN: CPT | Performed by: OBSTETRICS & GYNECOLOGY

## 2019-03-26 NOTE — PROGRESS NOTES
Consult Note    Consult requested by Mauro Paredes     CC:    Chief Complaint   Patient presents with     Pelvic Pain     painful intercourse      HPI:  Sherie Otero is a 48 year old  perimenopausal woman with history of essure sterilization and endometrial ablation who presents for evaluation of chronic deep pelvic pain as well as dyspareunia, both present since essure placement in appx . The pain is deep, midline, and somewhat movement and position dependent. She has had several sexual partners since the procedure and the dyspareunia is not partner dependent. She is amenorrheic since her ablation, and has no other vaginal complaints, also no urinary nor bowel concerns.   Her medical hx of complicated by tobacco abuse since age 14, chronic pain with long term opioid and benzo use, and RA. Surgical hx notable for a , appendectomy, and the ablation.     PMH:   Past Medical History:   Diagnosis Date     Allergic rhinitis due to other allergen      Degenerative joint disease of cervical spine 2016    multi level worst at C5-6     Generalized anxiety disorder      Hearing loss      Intrinsic asthma, unspecified      Irritable bowel syndrome      Lump or mass in breast     Left breast lump -- aspiration benign.     Other malaise and fatigue     fibromyalgia     Other specified gastritis without mention of hemorrhage      Pain in joint, lower leg     patello-femoral syndrome     Rheumatoid arthritis(714.0)      Spindle cell carcinoma (H) 2013    Imo Update utility     Spondylitis, cervical     C5-C7 (MRI)     Stenosis, cervical spine     C5-C7 (MRI)     Tension headache      SurgHx:   Past Surgical History:   Procedure Laterality Date     C APPENDECTOMY      Ruptured at age 7 years     C  DELIVERY ONLY      , Low Cervical     DILATION AND CURETTAGE, HYSTEROSCOPY, ABLATE ENDOMETRIUM NOVASURE, COMBINED  2011    Procedure:COMBINED DILATION AND CURETTAGE,  HYSTEROSCOPY, ABLATE ENDOMETRIUM NOVASURE; hysteroscopy, dilation and curettage, novasure; Surgeon:JEREMY ANNA; Location:[facility]     EXCISE LESION TRUNK  9/12/2013    Procedure: EXCISE LESION TRUNK;  interlaminar epidural Steroid Injection Cervical -thoracic Levels (C7-T1)    Re-Excision of chest wall mass;  Surgeon: Jeremy Bolaños MD;  Location: [facility] OR     HC HYSTEROS W PERMANENT FALLOPAIN IMPLANT  2012    Essure done in office [facility]     INJECT BLOCK MEDIAL BRANCH CERVICAL/THORACIC/LUMBAR  6/12/2014    Procedure: INJECT BLOCK MEDIAL BRANCH CERVICAL / THORACIC / LUMBAR;  Surgeon: Josué Munson MD;  Location: [facility]     INJECT FACET JOINT  2/13/2014    Procedure: INJECT FACET JOINT;  diagnostic medial branch facet nerve block cervical levels 5-6, 6-7;  Surgeon: Josué Munson MD;  Location: [facility] OR     WISDOM ST GUIDEWIRE       FamHx:   Family History   Problem Relation Age of Onset     Arthritis Mother         Rheumatoid     Respiratory Mother         COPD     Hypertension Sister         1/2 sister     Neurologic Disorder Sister         1/2 sisiter  Very mild form of CP     Cardiovascular Maternal Grandmother      Heart Disease Maternal Aunt         Bypass at age 50's     SocHx:   Social History     Socioeconomic History     Marital status:      Spouse name: None     Number of children: None     Years of education: None     Highest education level: None   Occupational History     Employer: [employer]     Comment: bookkeeping   Social Needs     Financial resource strain: None     Food insecurity:     Worry: None     Inability: None     Transportation needs:     Medical: None     Non-medical: None   Tobacco Use     Smoking status: Current Every Day Smoker     Packs/day: 0.50     Years: 29.00     Pack years: 14.50     Types: Cigarettes     Smokeless tobacco: Never Used     Tobacco comment: 9/19/11 - 1pk/day   Substance and Sexual Activity     Alcohol use: No     Alcohol/week: 1.0 oz     Drug  use: No     Sexual activity: Yes     Partners: Male     Birth control/protection: Surgical     Comment: Essure   Lifestyle     Physical activity:     Days per week: None     Minutes per session: None     Stress: None   Relationships     Social connections:     Talks on phone: None     Gets together: None     Attends Restorationism service: None     Active member of club or organization: None     Attends meetings of clubs or organizations: None     Relationship status: None     Intimate partner violence:     Fear of current or ex partner: None     Emotionally abused: None     Physically abused: None     Forced sexual activity: None   Other Topics Concern     Parent/sibling w/ CABG, MI or angioplasty before 65F 55M? Not Asked      Service Not Asked     Blood Transfusions Not Asked     Caffeine Concern No     Comment: up to 6pm at night up to a pot of coffee a day      Occupational Exposure Not Asked     Hobby Hazards Not Asked     Sleep Concern Yes     Stress Concern Not Asked     Weight Concern Not Asked     Special Diet Not Asked     Back Care Not Asked     Exercise No     Bike Helmet Not Asked     Seat Belt Not Asked     Self-Exams Not Asked   Social History Narrative     None     Allergies:   Lyrica [pregabalin]; Droperidol; and Penicillins    Current Medications:   Current Outpatient Medications   Medication Sig Dispense Refill     albuterol (VENTOLIN HFA) 108 (90 Base) MCG/ACT Inhaler Inhale 2 puffs into the lungs every 6 hours as needed 18 g 4     ALPRAZolam (XANAX) 0.5 MG tablet One tablet mid day. MUST LAST 30 DAYS. 30 tablet 0     busPIRone (BUSPAR) 10 MG tablet TAKE 5 MG (1/2 TAB) IN THE MORNING AND 10 MG AT NIGHT 135 tablet 3     cetirizine (ZYRTEC) 10 MG tablet TAKE  ONE TABLET BY MOUTH EVERY DAY. 90 tablet 3     clonazePAM (KLONOPIN) 0.5 MG tablet TAKE ONE-HALF TO ONE TABLET BY MOUTH TWICE A DAY AS NEEDED FOR ANXIETY - MUST LAST 30 DAYS. 60 tablet 0     cyclobenzaprine (FLEXERIL) 10 MG tablet Take 2  tablets in the evening and 1 in the morning 90 tablet 1     CYMBALTA 60 MG capsule TAKE ONE CAPSULE BY MOUTH EVERY EVENING - KADY 90 capsule 1     fluticasone (FLONASE) 50 MCG/ACT spray SPRAY 2 SPRAYS INTO BOTH NOSTRILS DAILY. 16 g 11     folic acid (FOLVITE) 1 MG tablet Take 1 tablet (1 mg) by mouth daily 100 tablet 3     HYDROcodone-acetaminophen (NORCO) 7.5-325 MG per tablet TAKE 2 TABLETS BY MOUTH EVERY 6 HOURS AS NEEDED FOR PAIN--MAX OF 6 TABLETS PER DAY. 175 tablet 0     methotrexate sodium 2.5 MG TABS Take 8 tablets (20 mg) by mouth every 7 days 32 tablet 3     Multiple Vitamins-Minerals (MULTIVITAL PO)        topiramate (TOPAMAX) 25 MG tablet 1 tab daily x1week, then 1 tab twice daily x1 week, then 2 tabs AM, 1 tab PM x1 week then 2 tabs twice daily 70 tablet 1     verapamil (CALAN) 40 MG tablet Take 1 tablet (40 mg) by mouth 2 times daily 180 tablet 3     naloxone (NARCAN) nasal spray Spray 1 spray (4 mg) into one nostril alternating nostrils as needed for opioid reversal every 2-3 minutes until assistance arrives (Patient not taking: Reported on 3/1/2019) 0.2 mL 0     predniSONE (DELTASONE) 5 MG tablet Take 5 mg by mouth daily as needed for peripheral joint pain from rheumatoid arthritis; use sparingly. (Patient not taking: Reported on 3/6/2019.) 30 tablet 1     ROS: As described in HPI, otherwise positive for chronic fatigue; negative for fever/chills, fatigue, dizziness, weight changes, worrisome rashes, new lumps or masses, cough/SOB/CP, nausea/vomit, GI distress, dysuria, abnormal vaginal discharge, constipation/diarrhea, changes in ADL, or other systemic complaints    EXAM:  Vitals:    03/26/19 1333   BP: 122/78   Pulse: 88   Weight: 62.7 kg (138 lb 3 oz)    Body mass index is 23 kg/m .    Gen: Alert, oriented, appropriately interactive, NAD  CV: RRR, no murmurs, no extra heart sounds, 2+ peripheral pulses  Resp: CTAB, good effort without distress   Abdomen: soft, non tender, non distended, no masses,  no hernias. No inguinal lymphadenopathy.   Perineum: no lesions; normal appearing external genitalia  Bimanual exam:   Mild to moderate tenderness uterine tenderness fundally, possibly more prominent over the cornua bilaterally with otherwise normal mobile small anteverted uterus with normal contours, no adnexal tenderness nor masses, no bladder nor rectal tenderness, no pelvic floor pain  Lower extremities: non-tender, no edema  Skin: no lesions or rashes    Labs & Imaging:  Hgb 13.8  Plt 234  WBC 8.4  Cr 0.84    Lab Results   Component Value Date    PAP NIL 2018    PAP NIL 2015    PAP NIL 2011   HPV neg ()    Last Mammogram WNL (), repeat annual scan ordered    Pelvic Ultrasound ()  The uterus measured 7.4 x 3.1 x 4.2 cm with an endometrial thickness  of 0.4 cm. Bilateral uterine cornu echogenic foci were noted, possibly  representing Essure coils. No myometrial abnormality was demonstrated.  Neither ovary could be identified. There was no free pelvic fluid.                                                         IMPRESSION: Bilateral uterine cornu echogenic foci which may be  related to Essure coils. Nonvisualization of the ovaries. No apparent  pelvic mass.    ASSESSMENT/PLAN: Sherie Otero is a 48 year old  perimenopausal woman with history of essure sterilization and endometrial ablation who presents for evaluation of chronic deep pelvic pain as well as dyspareunia, both present since essure placement in appx .     1. Chronic pelvic pain in female  - Dyspareunia is likely secondary to chronic localized essure device inflammation based on history and exam. Discussed that the essure has no known systemic effects, is very unlikely to be related to her overall chronic pain nor other health issues including RA. If the pelvic pain and dyspareunia are deteriorating her quality of life, removal would be reasonable. If she desires removal of the essure devices, I would  recommend a TLH, SAMARA. We discussed risks and benefits of this surgery as well as anticipated recovery time. I am stressing that this issue is non urgent, and am advising her to carefully weigh risks and benefits. She is not currently in a relationship thus the dyspareunia not currently a concern. She will this about this and return with any further concerns, questions, or for treatment planning.     2. Tobacco abuse  - DX Hip/Pelvis/Spine; Future    Yoel Bowling MD  3/26/2019 4:46 PM

## 2019-03-28 DIAGNOSIS — M79.7 FIBROMYALGIA: ICD-10-CM

## 2019-03-29 RX ORDER — TOPIRAMATE 25 MG/1
50 TABLET, FILM COATED ORAL 2 TIMES DAILY
Qty: 120 TABLET | Refills: 0 | Status: SHIPPED | OUTPATIENT
Start: 2019-03-29 | End: 2019-06-03

## 2019-03-29 NOTE — TELEPHONE ENCOUNTER
"Requested Prescriptions   Pending Prescriptions Disp Refills     topiramate (TOPAMAX) 25 MG tablet 70 tablet 1    Last Written Prescription Date:  19  Last Fill Quantity: 70,  # refills: 1   Last office visit: 3/1/2019 with prescribing provider:     Future Office Visit:   Next 5 appointments (look out 90 days)    Apr 10, 2019  1:00 PM CDT  Return Visit with Celia Bettencourt PA-C  [facility] ([address] [address]  [phone]   2019  2:40 PM CDT  Return Visit with Ash Donald MD  [facility] ([address] [address]  [phone]   2019  1:30 PM CDT  Office Visit with Mauro Paredes MD  [facility] ([address] [address]  [phone]          Si tab daily x1week, then 1 tab twice daily x1 week, then 2 tabs AM, 1 tab PM x1 week then 2 tabs twice daily    Anti-Seizure Meds Protocol  Failed - 3/28/2019 12:47 PM       Failed - Review Authorizing provider's last note.     Refer to last progress notes: confirm request is for original authorizing provider (cannot be through other providers).         Passed - Recent (12 mo) or future (30 days) visit within the authorizing provider's specialty    Patient had office visit in the last 12 months or has a visit in the next 30 days with authorizing provider or within the authorizing provider's specialty.  See \"Patient Info\" tab in inbasket, or \"Choose Columns\" in Meds & Orders section of the refill encounter.             Passed - Normal CBC on file in past 26 months    Recent Labs   Lab Test 19  1347   WBC 8.4   RBC 3.98   HGB 13.8   HCT 41.5                   Passed - Normal ALT or AST on file in past 26 months    Recent Labs   Lab Test 19  1347   ALT 24     Recent Labs   Lab Test 19  1347   AST 19            Passed - " Normal platelet count on file in past 26 months    Recent Labs   Lab Test 03/25/19  1347                 Passed - Medication is active on med list       Passed - No active pregnancy on record       Passed - No positive pregnancy test in last 12 months        Rx refilled per RN protocol.  To get to next appointment.  Addie Kuhn, BSN, RN

## 2019-04-08 ENCOUNTER — MYC MEDICAL ADVICE (OUTPATIENT)
Dept: FAMILY MEDICINE | Facility: CLINIC | Age: 49
End: 2019-04-08

## 2019-04-08 NOTE — TELEPHONE ENCOUNTER
Patient is questioning if appointment with provider (4/18) should be rescheduled after her appointment with [facility] on 4/24? Please advise. Yazmin Mcdaniels LPN

## 2019-04-09 NOTE — PROGRESS NOTES
"[facility]    CHIEF COMPLAINT: Pain  -Fibromyalgia  -Neck pain  -Low back pain    INTERVAL HISTORY:  Last seen on 03/06/2019.        Recommendations/plan at the last visit included:  1. Physical Therapy: We will hold off on formal physical therapy at this time however would recommend that she start the silver sneakers as is her plan.  We discussed that when she gets into the pool she can even just walk around the pool.  2. Clinical Health Psychologist:  YES, she is going to review with her insurance    3. Diagnostic Studies: Scheduled for March 13, 2019  4. Medication Management:     1. Continue Cymbalta at 60mg at bedtime    2. Continue Flexeril   3. Continue Hydrocodone at max of 6 tabs/day.  We will decrease the number of tablets that she takes from 180/month to 175/month.  We did discuss the different ways that we can taper the medication.  We will go slow with this.    4. Continue topiramate  1/2-1 in AM. Continue take 2 tabs at bedtime. Increase as tolerated.  5. Further procedures recommended: We can review this again after getting MRI        Follow up with this provider: 4-5 weeks     Since her last visit, Sherie Otero reports:  -things have been worse. She states that due to the weather she is having more issues. She is also having sinus issues as well. She is doing flonase and netti pot without much help. She has been taking Prednisone consistently without much relief in her joint issues. She will discuss this with rheumatology.     -She is unable to tolerate the Topamax during the day. It makes her feel so sedated. She is noticing more anxiety and depression as well. She is scheduled to see Unruly to discuss these issues. She doesn't feel like she is \"who she was\" prior to changes.     -She started the silver sneakers. She feels like she is really knotted up in her shoulder blades and shoulders. She is wondering if lifting weights would help.       Pain Information:   Pain " quality:  stabbing, miserable, and throbbing   Pain rating: intensity ranges from 8/10 to 10/10, and averages 8/10 on a 0-10 scale.   Pain today 8/10      UDS 1/19/2019   CSA 2/04/2019    CURRENT RELEVANT PAIN MEDICATIONS:    Flexeril-1 in AM and 2 at HS  Cymbalta-60mg at night  Hydrocodone 7.5mg -currently taking 2 tablets three times a day   Topamax- 1/2-1 tab in AM, 2 tabs at HS  Ibuprofen-will take 800mg up to 3 times day, doesn't take daily    Patient is using the medication as prescribed:  YES  Is your medication helpful? No   Medication side effects? no side effect    Previous Medications: (H--helped; HI--Helped initially; SWH-- somewhat helpful, NH--No help; W--worse; SE--side effects)   Opiates: Hydrocodone H, Oxycodone SE  NSAIDS: Ibuprofent H  Muscle Relaxants: Tizanidine NH, Flexeril H, Robaxin NH SE stomach upset  Anti-migraine mediations: Verapamil H  Anti-depressants: Cymbalta H  Sleep aids: none  Anxiolytics: Xanax H, Clonazepam H, Valium H  Neuropathics: Gabapentin SE dizziness          Topicals: Lidocaine patches [facility]  Other medications not covered above: Savella H at first, then seemed to stop working, Lyrica SE shortness of breath, felt 'weird' on them, throat felt weird. Prednisone H for arthritis flare    Past Pain Treatments:  Pain Clinic:   No   PT: Yes, years ago. Has not done pool therapy.   Psychologist: No  Relaxation techniques/biofeedback: No  Chiropractor: No, was told not to because of neck.   Acupuncture: Yes for headaches.   Pharmacotherapy:           Opioids: Yes            Non-opioids:    Yes   TENs Unit:Yes  Injections: cervical medial branch blocks 6/12/2014  Self-care:   Yes, ice/heat, hot baths, theracare  Surgeries related to pain: No    [place] Board of Pharmacy Data Base Reviewed:    YES; As expected, no concern for misuse/abuse of controlled medications based on this report.      THE 4 As OF OPIOID MAINTENANCE ANALGESIA    Analgesia: Is pain relief clinically significant?  YES   Activity: Is patient functional and able to perform Activities of Daily Living? YES   Adverse effects: Is patient free from adverse side effects from opiates? YES   Adherence to Rx protocol: Is patient adhering to Controlled Substance Agreement and taking medications ONLY as ordered? YES       Is Narcan prescribed for opiate use >50 MME daily? N/A      Total Daily MME: 45    Medications:  Current Outpatient Medications   Medication Sig Dispense Refill     albuterol (VENTOLIN HFA) 108 (90 Base) MCG/ACT Inhaler Inhale 2 puffs into the lungs every 6 hours as needed 18 g 4     ALPRAZolam (XANAX) 0.5 MG tablet One tablet mid day. MUST LAST 30 DAYS. 30 tablet 0     busPIRone (BUSPAR) 10 MG tablet TAKE 5 MG (1/2 TAB) IN THE MORNING AND 10 MG AT NIGHT 135 tablet 3     cetirizine (ZYRTEC) 10 MG tablet TAKE  ONE TABLET BY MOUTH EVERY DAY. 90 tablet 3     clonazePAM (KLONOPIN) 0.5 MG tablet TAKE ONE-HALF TO ONE TABLET BY MOUTH TWICE A DAY AS NEEDED FOR ANXIETY - MUST LAST 30 DAYS. 60 tablet 0     cyclobenzaprine (FLEXERIL) 10 MG tablet Take 2 tablets in the evening and 1 in the morning 90 tablet 1     CYMBALTA 60 MG capsule TAKE ONE CAPSULE BY MOUTH EVERY EVENING - KADY 90 capsule 1     fluticasone (FLONASE) 50 MCG/ACT spray SPRAY 2 SPRAYS INTO BOTH NOSTRILS DAILY. 16 g 11     folic acid (FOLVITE) 1 MG tablet Take 1 tablet (1 mg) by mouth daily 100 tablet 3     HYDROcodone-acetaminophen (NORCO) 7.5-325 MG per tablet TAKE 2 TABLETS BY MOUTH EVERY 6 HOURS AS NEEDED FOR PAIN--MAX OF 6 TABLETS PER DAY. 175 tablet 0     methotrexate sodium 2.5 MG TABS Take 8 tablets (20 mg) by mouth every 7 days 32 tablet 3     Multiple Vitamins-Minerals (MULTIVITAL PO)        naloxone (NARCAN) nasal spray Spray 1 spray (4 mg) into one nostril alternating nostrils as needed for opioid reversal every 2-3 minutes until assistance arrives (Patient not taking: Reported on 3/1/2019) 0.2 mL 0     predniSONE (DELTASONE) 5 MG tablet Take 5 mg by mouth  "daily as needed for peripheral joint pain from rheumatoid arthritis; use sparingly. (Patient not taking: Reported on 3/6/2019.) 30 tablet 1     topiramate (TOPAMAX) 25 MG tablet Take 2 tablets (50 mg) by mouth 2 times daily 120 tablet 0     verapamil (CALAN) 40 MG tablet Take 1 tablet (40 mg) by mouth 2 times daily 180 tablet 3       Review of Systems: A 10-point review of systems was negative, with the exception of chronic pain issues, fever/chills, fatigue, weight gain, headache, dizziness, sinus infection, ringing in ear, itching, diarrhea, depression, anxiety and stress.       Social History: Reviewed; unchanged from previous consultation.      Family history: Reviewed; unchanged from previous consultation.     PHYSICAL EXAM:     Vitals:  Ht 1.651 m (5' 5\")   Wt 64 kg (141 lb)   BMI 23.46 kg/m    Body mass index is 23.46 kg/m .  5' 5\"  141 lbs 0 oz      Constitutional: healthy, alert and no distress  HEENT: Head atraumatic, normocephalic. Eyes without conjunctival injection or jaundice. Neck supple. No obvious neck masses.  Psychiatric/mental status: Alert, without lethargy or stupor. Appropriate affect. Mood normal.   Neurologic exam: Gait is normal         DIAGNOSTIC TESTS:  Imaging Studies:   No new imaging to review    Assessment:  Sherie Otero is a 48 year old female who presents today for follow up regarding her:    1.  Low back pain  2.  Neck pain  3.  Chronic pain syndrome  4.  Fibromyalgia    She is having worsening pain. She continues to have joint pain even while being on Prednisone. She is going to contact her rheumatologist about this. She would like to continue on the Topamax at current dosing. She continues to have a lot of muscle pain without much relief from the Flexeril.        Plan:  Diagnosis reviewed, treatment option addressed, and risk/benifits discussed.  Self-care instructions given.  I am recommending a multidisciplinary treatment plan to help this patient better manage pain.  "     1. Physical Therapy: continue silver sneakers; okay to try some light weight lifting  2. Clinical Health Psychologist:  YES, discuss with Unruly    3. Diagnostic Studies: none at this time  4. Medication Management:     1. Continue Cymbalta at 60mg at bedtime    2. Will try Skelaxin 800mg TID   3. Continue Hydrocodone at max of 6 tabs/day.  We will continue the number of  tablets at 175/month.  Hopefully we can continue a taper with improvement of weather.   4. Continue topiramate  1 in AM. Continue take 2 tabs at bedtime. Increase as tolerated.  5. Further procedures recommended: Trigger point injections        Follow up with this provider: 4-5 weeks     Total time spent face to face was 20 minutes and more than 50% of face to face time was spent in counseling and/or coordination of care regarding the diagnosis and recommendations above.      Celia Bettencourt PA-C   [facility]

## 2019-04-10 ENCOUNTER — OFFICE VISIT (OUTPATIENT)
Dept: PODIATRY | Facility: CLINIC | Age: 49
End: 2019-04-10
Payer: MEDICARE

## 2019-04-10 ENCOUNTER — TELEPHONE (OUTPATIENT)
Dept: FAMILY MEDICINE | Facility: CLINIC | Age: 49
End: 2019-04-10

## 2019-04-10 VITALS
BODY MASS INDEX: 23.49 KG/M2 | SYSTOLIC BLOOD PRESSURE: 114 MMHG | DIASTOLIC BLOOD PRESSURE: 78 MMHG | WEIGHT: 141 LBS | HEIGHT: 65 IN | TEMPERATURE: 98.1 F

## 2019-04-10 DIAGNOSIS — M54.2 CERVICALGIA: ICD-10-CM

## 2019-04-10 DIAGNOSIS — G89.4 CHRONIC PAIN SYNDROME: ICD-10-CM

## 2019-04-10 DIAGNOSIS — G89.29 CHRONIC BILATERAL LOW BACK PAIN WITHOUT SCIATICA: ICD-10-CM

## 2019-04-10 DIAGNOSIS — M54.50 CHRONIC BILATERAL LOW BACK PAIN WITHOUT SCIATICA: ICD-10-CM

## 2019-04-10 DIAGNOSIS — M79.7 FIBROMYALGIA: ICD-10-CM

## 2019-04-10 PROCEDURE — 99213 OFFICE O/P EST LOW 20 MIN: CPT | Performed by: PHYSICIAN ASSISTANT

## 2019-04-10 RX ORDER — HYDROCODONE BITARTRATE AND ACETAMINOPHEN 7.5; 325 MG/1; MG/1
TABLET ORAL
Qty: 175 TABLET | Refills: 0 | Status: SHIPPED | OUTPATIENT
Start: 2019-04-10 | End: 2019-05-15

## 2019-04-10 RX ORDER — METAXALONE 800 MG/1
800 TABLET ORAL 3 TIMES DAILY
Qty: 60 TABLET | Refills: 0 | Status: SHIPPED | OUTPATIENT
Start: 2019-04-10 | End: 2019-06-24

## 2019-04-10 ASSESSMENT — PAIN SCALES - GENERAL: PAINLEVEL: EXTREME PAIN (8)

## 2019-04-10 ASSESSMENT — MIFFLIN-ST. JEOR: SCORE: 1260.45

## 2019-04-10 NOTE — TELEPHONE ENCOUNTER
Metaxalone 800mg isn't covered under insurance, tizanidine 4mg tabs are covered. Do you want to switch or send off for a PA?      Thank You,  Austen Freitas, Pharmacy [facility] [place]

## 2019-04-10 NOTE — LETTER
4/10/2019         RE: Sherie Otero  [address] [address]        Dear Colleague,    Thank you for referring your patient, Sherie Otero, to the [facility]. Please see a copy of my visit note below.    [facility]    CHIEF COMPLAINT: Pain  -Fibromyalgia  -Neck pain  -Low back pain    INTERVAL HISTORY:  Last seen on 03/06/2019.        Recommendations/plan at the last visit included:  1. Physical Therapy: We will hold off on formal physical therapy at this time however would recommend that she start the silver sneakers as is her plan.  We discussed that when she gets into the pool she can even just walk around the pool.  2. Clinical Health Psychologist:  YES, she is going to review with her insurance    3. Diagnostic Studies: Scheduled for March 13, 2019  4. Medication Management:     1. Continue Cymbalta at 60mg at bedtime    2. Continue Flexeril   3. Continue Hydrocodone at max of 6 tabs/day.  We will decrease the number of tablets that she takes from 180/month to 175/month.  We did discuss the different ways that we can taper the medication.  We will go slow with this.    4. Continue topiramate  1/2-1 in AM. Continue take 2 tabs at bedtime. Increase as tolerated.  5. Further procedures recommended: We can review this again after getting MRI        Follow up with this provider: 4-5 weeks     Since her last visit, Sherie Otero reports:  -things have been worse. She states that due to the weather she is having more issues. She is also having sinus issues as well. She is doing flonase and netti pot without much help. She has been taking Prednisone consistently without much relief in her joint issues. She will discuss this with rheumatology.     -She is unable to tolerate the Topamax during the day. It makes her feel so sedated. She is noticing more anxiety and depression as well. She is scheduled to see [facility] to discuss these issues. She doesn't  "feel like she is \"who she was\" prior to changes.     -She started the silver sneakers. She feels like she is really knotted up in her shoulder blades and shoulders. She is wondering if lifting weights would help.       Pain Information:   Pain quality:  stabbing, miserable, and throbbing   Pain rating: intensity ranges from 8/10 to 10/10, and averages 8/10 on a 0-10 scale.   Pain today 8/10      UDS 1/19/2019   CSA 2/04/2019    CURRENT RELEVANT PAIN MEDICATIONS:    Flexeril-1 in AM and 2 at HS  Cymbalta-60mg at night  Hydrocodone 7.5mg -currently taking 2 tablets three times a day   Topamax- 1/2-1 tab in AM, 2 tabs at HS  Ibuprofen-will take 800mg up to 3 times day, doesn't take daily    Patient is using the medication as prescribed:  YES  Is your medication helpful? No   Medication side effects? no side effect    Previous Medications: (H--helped; HI--Helped initially; SWH-- somewhat helpful, NH--No help; W--worse; SE--side effects)   Opiates: Hydrocodone H, Oxycodone SE  NSAIDS: Ibuprofent H  Muscle Relaxants: Tizanidine NH, Flexeril H, Robaxin NH SE stomach upset  Anti-migraine mediations: Verapamil H  Anti-depressants: Cymbalta H  Sleep aids: none  Anxiolytics: Xanax H, Clonazepam H, Valium H  Neuropathics: Gabapentin SE dizziness          Topicals: Lidocaine patches [facility]  Other medications not covered above: Savella H at first, then seemed to stop working, Lyrica SE shortness of breath, felt 'weird' on them, throat felt weird. Prednisone H for arthritis flare    Past Pain Treatments:  Pain Clinic:   No   PT: Yes, years ago. Has not done pool therapy.   Psychologist: No  Relaxation techniques/biofeedback: No  Chiropractor: No, was told not to because of neck.   Acupuncture: Yes for headaches.   Pharmacotherapy:           Opioids: Yes            Non-opioids:    Yes   TENs Unit:Yes  Injections: cervical medial branch blocks 6/12/2014  Self-care:   Yes, ice/heat, hot baths, theracare  Surgeries related to pain: " No    [place] Board  Pharmacy Data Base Reviewed:    YES; As expected, no concern for misuse/abuse of controlled medications based on this report.      THE 4 As OF OPIOID MAINTENANCE ANALGESIA    Analgesia: Is pain relief clinically significant? YES   Activity: Is patient functional and able to perform Activities of Daily Living? YES   Adverse effects: Is patient free from adverse side effects from opiates? YES   Adherence to Rx protocol: Is patient adhering to Controlled Substance Agreement and taking medications ONLY as ordered? YES       Is Narcan prescribed for opiate use >50 MME daily? N/A      Total Daily MME: 45    Medications:  Current Outpatient Medications   Medication Sig Dispense Refill     albuterol (VENTOLIN HFA) 108 (90 Base) MCG/ACT Inhaler Inhale 2 puffs into the lungs every 6 hours as needed 18 g 4     ALPRAZolam (XANAX) 0.5 MG tablet One tablet mid day. MUST LAST 30 DAYS. 30 tablet 0     busPIRone (BUSPAR) 10 MG tablet TAKE 5 MG (1/2 TAB) IN THE MORNING AND 10 MG AT NIGHT 135 tablet 3     cetirizine (ZYRTEC) 10 MG tablet TAKE  ONE TABLET BY MOUTH EVERY DAY. 90 tablet 3     clonazePAM (KLONOPIN) 0.5 MG tablet TAKE ONE-HALF TO ONE TABLET BY MOUTH TWICE A DAY AS NEEDED FOR ANXIETY - MUST LAST 30 DAYS. 60 tablet 0     cyclobenzaprine (FLEXERIL) 10 MG tablet Take 2 tablets in the evening and 1 in the morning 90 tablet 1     CYMBALTA 60 MG capsule TAKE ONE CAPSULE BY MOUTH EVERY EVENING - KADY 90 capsule 1     fluticasone (FLONASE) 50 MCG/ACT spray SPRAY 2 SPRAYS INTO BOTH NOSTRILS DAILY. 16 g 11     folic acid (FOLVITE) 1 MG tablet Take 1 tablet (1 mg) by mouth daily 100 tablet 3     HYDROcodone-acetaminophen (NORCO) 7.5-325 MG per tablet TAKE 2 TABLETS BY MOUTH EVERY 6 HOURS AS NEEDED FOR PAIN--MAX OF 6 TABLETS PER DAY. 175 tablet 0     methotrexate sodium 2.5 MG TABS Take 8 tablets (20 mg) by mouth every 7 days 32 tablet 3     Multiple Vitamins-Minerals (MULTIVITAL PO)        naloxone (NARCAN)  "nasal spray Spray 1 spray (4 mg) into one nostril alternating nostrils as needed for opioid reversal every 2-3 minutes until assistance arrives (Patient not taking: Reported on 3/1/2019) 0.2 mL 0     predniSONE (DELTASONE) 5 MG tablet Take 5 mg by mouth daily as needed for peripheral joint pain from rheumatoid arthritis; use sparingly. (Patient not taking: Reported on 3/6/2019.) 30 tablet 1     topiramate (TOPAMAX) 25 MG tablet Take 2 tablets (50 mg) by mouth 2 times daily 120 tablet 0     verapamil (CALAN) 40 MG tablet Take 1 tablet (40 mg) by mouth 2 times daily 180 tablet 3       Review of Systems: A 10-point review of systems was negative, with the exception of chronic pain issues, fever/chills, fatigue, weight gain, headache, dizziness, sinus infection, ringing in ear, itching, diarrhea, depression, anxiety and stress.       Social History: Reviewed; unchanged from previous consultation.      Family history: Reviewed; unchanged from previous consultation.     PHYSICAL EXAM:     Vitals:  Ht 1.651 m (5' 5\")   Wt 64 kg (141 lb)   BMI 23.46 kg/m     Body mass index is 23.46 kg/m .  5' 5\"  141 lbs 0 oz      Constitutional: healthy, alert and no distress  HEENT: Head atraumatic, normocephalic. Eyes without conjunctival injection or jaundice. Neck supple. No obvious neck masses.  Psychiatric/mental status: Alert, without lethargy or stupor. Appropriate affect. Mood normal.   Neurologic exam: Gait is normal         DIAGNOSTIC TESTS:  Imaging Studies:   No new imaging to review    Assessment:  Sherie Otero is a 48 year old female who presents today for follow up regarding her:    1.  Low back pain  2.  Neck pain  3.  Chronic pain syndrome  4.  Fibromyalgia    She is having worsening pain. She continues to have joint pain even while being on Prednisone. She is going to contact her rheumatologist about this. She would like to continue on the Topamax at current dosing. She continues to have a lot of muscle pain " without much relief from the Flexeril.        Plan:  Diagnosis reviewed, treatment option addressed, and risk/benifits discussed.  Self-care instructions given.  I am recommending a multidisciplinary treatment plan to help this patient better manage pain.      1. Physical Therapy: continue silver sneakers; okay to try some light weight lifting  2. Clinical Health Psychologist:  YES, discuss with [facility]    3. Diagnostic Studies: none at this time  4. Medication Management:     1. Continue Cymbalta at 60mg at bedtime    2. Will try Skelaxin 800mg TID   3. Continue Hydrocodone at max of 6 tabs/day.  We will continue the number of  tablets at 175/month.  Hopefully we can continue a taper with improvement of weather.   4. Continue topiramate  1 in AM. Continue take 2 tabs at bedtime. Increase as tolerated.  5. Further procedures recommended: Trigger point injections        Follow up with this provider: 4-5 weeks     Total time spent face to face was 20 minutes and more than 50% of face to face time was spent in counseling and/or coordination of care regarding the diagnosis and recommendations above.      Celia Bettencourt PA-C   [facility]        Again, thank you for allowing me to participate in the care of your patient.        Sincerely,        Celia Bettencourt PA-C

## 2019-04-10 NOTE — PATIENT INSTRUCTIONS
After Visit Instructions:     Thank you for coming to [facility] for your care. It is my goal to partner with you to help you reach your optimal state of health.     I am recommending multidisciplinary care at this time.  The focus of care will be to continue gradual rehabilitation and pain management with medication adjustments as needed.    Continue daily self-care, identifying contributing factors, and monitoring variations in pain level. Continue to integrate self-care into your life.      1. Schedule pain psychology assessment/visit: Ask ganesh about relaxation techniques/biofeedback  2. Schedule physical therapy assessment/visit: Keep doing your pool exercises and try some low weightlifting   3. Schedule follow-up with Celia Bettencourt PA-C in 4-5 weeks. You will need to make this appointment. I will call you about the trigger point injections  4. Medication recommendations:   1. Continue Topamax   2. Skelaxin 800mg three times a day as needed for muscle pain/spasms  3. Hydrocodone-try to decrease to 5 tabs on some days      Celia Bettencourt PA-C  [facility]    Contact information: [facility]  Clinic Number:  [phone]   Call this number with any questions about your care and for scheduling assistance. Calls are returned Monday through Friday between 8 AM and 4:30 PM. We usually get back to you within 2 business days depending on the issue/request.           Medication Refills Policy:    For non-narcotic medications, please your pharmacy directly to request a refill and the pharmacy will call the [facility] Center for authorization. Please allow 3-4 days for these refills.    For narcotic refills, call the nurse triage line and leave a message requesting your refill along with the name of the pharmacy that you use. Narcotic prescriptions will be mailed to your pharmacy or you may pick them up at the clinic.   Please call 7-10 days before your refill is due  The above policy allows adequate time so that you do not run out of medication.    No Show - Late Cancellation - Late Arrival Policy  We believe regular attendance is key to your success in our program.    Any time you are unable to keep your appointment we ask that you call us at least 24 hours in advance to let us know. This will allow us to offer the appointment time to another patient. The following is our policy for missed appointments. This also applies to appointments cancelled with less than 24 hours notice.    After missing 3 appointments without calling first, we will cancel all of your future appointments at [facility].    At that point, you will not be able to resume services unless approved by your care team  We understand that unforseen circumstances arise, however, out of respect for all concerned and to provide this appointment to another patient, this policy will be enforced.    Please note that most follow up appointments are 30 minutes long. If you arrive late, your provider may not be able to see you for the entire 30 minutes. Please also note that if you arrive more than 15 minutes for any appointment, it may be rescheduled.

## 2019-04-10 NOTE — TELEPHONE ENCOUNTER
Can we send for a PA please. Tizanidine has been tried and was not helpful. Thank you.     Celia Bettencourt PA-C on 4/10/2019 at 2:17 PM

## 2019-04-16 ENCOUNTER — TELEPHONE (OUTPATIENT)
Dept: RHEUMATOLOGY | Facility: CLINIC | Age: 49
End: 2019-04-16

## 2019-04-16 DIAGNOSIS — M05.79 RHEUMATOID ARTHRITIS INVOLVING MULTIPLE SITES WITH POSITIVE RHEUMATOID FACTOR (H): ICD-10-CM

## 2019-04-16 DIAGNOSIS — Z79.899 HIGH RISK MEDICATION USE: Primary | ICD-10-CM

## 2019-04-16 RX ORDER — METHOTREXATE 2.5 MG/1
20 TABLET ORAL
Qty: 32 TABLET | Refills: 3 | Status: SHIPPED | OUTPATIENT
Start: 2019-04-16 | End: 2019-08-12

## 2019-04-16 NOTE — TELEPHONE ENCOUNTER
Routing refill request to provider for review/approval because:  Drug not on the [facility] refill protocol     Requested Prescriptions   Pending Prescriptions Disp Refills     methotrexate sodium 2.5 MG TABS  Last Written Prescription Date:  12/11/18  Last Fill Quantity: 32,  # refills: 3   Last office visit: 12/11/2018 with prescribing provider:     Future Office Visit:   Next 5 appointments (look out 90 days)    Apr 18, 2019  1:30 PM CDT  Office Visit with Mauro Paredes MD  [facility] [address] [place] [id number] [phone]        32 tablet 3     Sig: Take 8 tablets (20 mg) by mouth every 7 days       There is no refill protocol information for this order        Henrietta Morrow RN - BC

## 2019-04-17 NOTE — TELEPHONE ENCOUNTER
Attempted to contact Pt, l/m in detail notifying Ms. Otero that methotrexate has been refilled.  Next labs are due in mid-to-late June.  And to please schedule a follow up appointment to be within 3-4 months.  Having Pt return call to clinic @ [phone] should she have additional questions or concerns.    Gracie Hernandez, JACKSON  4/17/2019  8:53 AM

## 2019-04-17 NOTE — TELEPHONE ENCOUNTER
Rheumatology team: Please call to notify Ms. Otero that methotrexate has been refilled.  Next labs are due in mid-to-late June.  Please schedule a follow up appointment to be within 3-4 months.    Ash Donald MD  4/16/2019 10:32 PM

## 2019-04-17 NOTE — TELEPHONE ENCOUNTER
Attempted to contact Pt, l/m having Pt return call to clinic @ [phone].    Gracie Hernandez CMA  4/17/2019  11:47 AM

## 2019-04-18 ENCOUNTER — OFFICE VISIT (OUTPATIENT)
Dept: FAMILY MEDICINE | Facility: CLINIC | Age: 49
End: 2019-04-18
Payer: MEDICARE

## 2019-04-18 VITALS
WEIGHT: 144.2 LBS | SYSTOLIC BLOOD PRESSURE: 102 MMHG | OXYGEN SATURATION: 98 % | TEMPERATURE: 97.5 F | BODY MASS INDEX: 24 KG/M2 | HEART RATE: 102 BPM | DIASTOLIC BLOOD PRESSURE: 62 MMHG

## 2019-04-18 DIAGNOSIS — F41.1 GENERALIZED ANXIETY DISORDER: Primary | ICD-10-CM

## 2019-04-18 DIAGNOSIS — M79.7 FIBROMYALGIA: ICD-10-CM

## 2019-04-18 PROCEDURE — 99214 OFFICE O/P EST MOD 30 MIN: CPT | Performed by: FAMILY MEDICINE

## 2019-04-18 RX ORDER — ALPRAZOLAM 0.5 MG
TABLET ORAL
Qty: 30 TABLET | Refills: 0 | Status: SHIPPED | OUTPATIENT
Start: 2019-04-18 | End: 2019-05-16

## 2019-04-18 RX ORDER — CLONAZEPAM 0.5 MG/1
TABLET ORAL
Qty: 60 TABLET | Refills: 0 | Status: SHIPPED | OUTPATIENT
Start: 2019-04-18 | End: 2019-05-16

## 2019-04-18 RX ORDER — CYCLOBENZAPRINE HCL 10 MG
TABLET ORAL
Qty: 90 TABLET | Refills: 1 | Status: SHIPPED | OUTPATIENT
Start: 2019-04-18 | End: 2019-06-12

## 2019-04-18 NOTE — PROGRESS NOTES
SUBJECTIVE:   Sherie Otero is a 48 year old female who presents to clinic today for the following health issues:      HPI  Depression and Anxiety Follow-Up    Status since last visit: No change    Other associated symptoms:None    Complicating factors:     Significant life event: No     Current substance abuse: None    PHQ 10/1/2018 1/11/2019 3/1/2019   PHQ-9 Total Score 10 8 11   Q9: Thoughts of better off dead/self-harm past 2 weeks Not at all Not at all Not at all     CELIA-7 SCORE 10/1/2018 1/11/2019 3/1/2019   Total Score - - -   Total Score 12 0 9     PHQ-9  English  PHQ-9   Any Language  CELIA-7  Suicide Assessment Five-step Evaluation and Treatment (SAFE-T)  Additional history: as documented    Reviewed and updated as needed this visit by clinical staff  Tobacco  Allergies  Meds  Problems  Med Hx  Surg Hx  Fam Hx  Soc Hx          Reviewed and updated as needed this visit by Provider  Tobacco  Allergies  Meds  Problems  Med Hx  Surg Hx  Fam Hx           Patient today for follow-up on her generalized anxiety disorder.  She has [facility]ry appointment [facility] next week.  She is on clonazepam 0.5 mg twice daily with an afternoon dose of 0.5 mg alprazolam.  She is also on Cymbalta 90 mg daily (for pain management in addition to anxiety/depression) and BuSpar 5 mg a morning and 10 mg at bedtime.    Patient is also now established with chronic pain clinic for management of her fibromyalgia chronic low back pain, and overall chronic pain syndrome.  She is getting her opioid medications managed through them.  They also attempted to try her on Skelaxin which apparently is awaiting prior authorization.    Patient notes that she is only able to tolerate 50 mg of Topamax at bedtime and only 12.5 to 25 mg during the day as it does make her drowsy.    Review of Systems  10 point ROS of systems including Constitutional, Eyes, HENT, Respiratory, Cardiovascular, Gastroenterology,  Genitourinary, Integumentary, Muscularskeletal, Psychiatric were all negative except for pertinent positives noted in my HPI.     OBJECTIVE:   /62   Pulse 102   Temp 97.5  F (36.4  C) (Temporal)   Wt 65.4 kg (144 lb 3.2 oz)   SpO2 98%   BMI 24.00 kg/m    Body mass index is 24 kg/m .  Physical Exam   Constitutional: She appears well-developed and well-nourished. No distress.   Cardiovascular: Normal rate, regular rhythm and normal heart sounds. Exam reveals no friction rub.   No murmur heard.  Pulmonary/Chest: Effort normal and breath sounds normal. No stridor. She has no decreased breath sounds. She has no wheezes. She has no rhonchi. She has no rales.   Neurological: She is alert.   Psychiatric: She has a normal mood and affect. Her behavior is normal. Judgment and thought content normal.        ASSESSMENT/PLAN:       ICD-10-CM    1. Generalized anxiety disorder F41.1 ALPRAZolam (XANAX) 0.5 MG tablet     clonazePAM (KLONOPIN) 0.5 MG tablet   2. Fibromyalgia M79.7 cyclobenzaprine (FLEXERIL) 10 MG tablet     PLAN:  1.  Patient will keep her appointment with [facility] next week as planned.  We will await the recommendations for management of her anxiety.  She will follow-up with me in 1 month for review of the recommendations and treatment plan options.  2.  Patient continues see pain management clinic for management of her above-noted chronic pain issues.  One question I asked the patient to find out pain management clinic will be prescribing her Topamax or if it is expected that I will continue to do so.    Follow up with Provider - Return in about 1 month (around 5/16/2019) for medication recheck.      Mauro Paredes MD   [facility]

## 2019-04-24 ENCOUNTER — TRANSFERRED RECORDS (OUTPATIENT)
Dept: HEALTH INFORMATION MANAGEMENT | Facility: CLINIC | Age: 49
End: 2019-04-24

## 2019-04-29 ENCOUNTER — TELEPHONE (OUTPATIENT)
Dept: PODIATRY | Facility: CLINIC | Age: 49
End: 2019-04-29

## 2019-04-29 NOTE — TELEPHONE ENCOUNTER
Left detailed message asking patient to return call to schedule appointment for trigger point injections.  Rachael Madrigal, JACKSON

## 2019-05-01 NOTE — TELEPHONE ENCOUNTER
Spoke to patient and she is scheduled for trigger point injections 5/3/19.  Rachael Madrigal, CMA

## 2019-05-03 ENCOUNTER — TELEPHONE (OUTPATIENT)
Dept: FAMILY MEDICINE | Facility: OTHER | Age: 49
End: 2019-05-03

## 2019-05-12 ENCOUNTER — APPOINTMENT (OUTPATIENT)
Dept: CT IMAGING | Facility: CLINIC | Age: 49
End: 2019-05-12
Attending: PHYSICIAN ASSISTANT
Payer: MEDICARE

## 2019-05-12 ENCOUNTER — HOSPITAL ENCOUNTER (EMERGENCY)
Facility: CLINIC | Age: 49
Discharge: HOME | End: 2019-05-12
Attending: PHYSICIAN ASSISTANT | Admitting: PHYSICIAN ASSISTANT
Payer: MEDICARE

## 2019-05-12 VITALS
OXYGEN SATURATION: 96 % | RESPIRATION RATE: 18 BRPM | TEMPERATURE: 98.5 F | WEIGHT: 143 LBS | BODY MASS INDEX: 23.8 KG/M2 | DIASTOLIC BLOOD PRESSURE: 70 MMHG | HEART RATE: 90 BPM | SYSTOLIC BLOOD PRESSURE: 106 MMHG

## 2019-05-12 DIAGNOSIS — J32.3 SPHENOID SINUSITIS: ICD-10-CM

## 2019-05-12 PROCEDURE — 99284 EMERGENCY DEPT VISIT MOD MDM: CPT | Mod: Z6 | Performed by: PHYSICIAN ASSISTANT

## 2019-05-12 PROCEDURE — 99284 EMERGENCY DEPT VISIT MOD MDM: CPT | Mod: 25 | Performed by: PHYSICIAN ASSISTANT

## 2019-05-12 PROCEDURE — 70450 CT HEAD/BRAIN W/O DYE: CPT

## 2019-05-12 RX ORDER — CEFDINIR 300 MG/1
300 CAPSULE ORAL 2 TIMES DAILY
Qty: 20 CAPSULE | Refills: 0 | Status: SHIPPED | OUTPATIENT
Start: 2019-05-12 | End: 2019-06-24

## 2019-05-12 NOTE — ED PROVIDER NOTES
History     Chief Complaint   Patient presents with     Sinusitis     HPI  Sherie Otero is a 48 year old female who presents for evaluation of URI symptoms for the past 2 weeks including rhinorrhea, congestion, sinus pressure, right ear pressure/pain, and a dry cough.  Not improving with Flonase nasal spray and saline sinus rinse.  Has also attempted treatment with Advil Cold and Sinus over the past 2 days without improvement.  Her main concern is that she has a posterior headache off-and-on for the past 5 days.  Severe pain in the bilateral posterior occipital area which is worse with bending over coughing.  Severe pain lasts for 10 to 15 seconds after she stops doing activity that causes the pain.  She had a couple episodes when she is lying supine last night which worried her more.  She denies any neck pain or muscle tension.  Denies any upper extremity numbness, tingling, or weakness.  She does report a history of fibromyalgia.  Denies fever, but has felt chilled off and on.  Denies any recent head trauma.  She is not on any anticoagulants.        Allergies:  Allergies   Allergen Reactions     Lyrica [Pregabalin] Shortness Of Breath     Felt pressure on the throat area. jmp     Droperidol      Uncontrollable shaking       Penicillins        Problem List:    Patient Active Problem List    Diagnosis Date Noted     Chronic, continuous use of opioids 11/03/2018     Priority: Medium     Chronically on benzodiazepine therapy 11/03/2018     Priority: Medium     Cervical cancer screening      Priority: Medium     2011 ablation  2015 NIL pap. Plan: pap in 3 years.  8/1/18 NIL pap, neg HR HPV. cotest in 5 years.       Pelvic pain in female 08/01/2018     Priority: Medium     Chronic rhinitis 05/14/2018     Priority: Medium     Other migraine without status migrainosus, intractable 03/21/2017     Priority: Medium     Pulmonary nodules 01/19/2017     Priority: Medium     Abnormal CT of the chest 01/19/2017      Priority: Medium     Hilar lymphadenopathy 01/19/2017     Priority: Medium     Degenerative joint disease of cervical spine      Priority: Medium     multi level worst at C5-6       Osteoarthritis of cervical spine, unspecified spinal osteoarthritis complication status 03/21/2016     Priority: Medium     Panic attacks 03/01/2016     Priority: Medium     Elevated white blood cell count 01/14/2016     Priority: Medium     Rheumatoid arthritis involving multiple sites with positive rheumatoid factor (H) 01/12/2016     Priority: Medium     Intermittent asthma, uncomplicated 11/29/2015     Priority: Medium     Other chronic pain 11/18/2015     Priority: Medium     Major depressive disorder, recurrent episode, mild (H) 10/08/2015     Priority: Medium     NSAID induced gastritis 09/23/2015     Priority: Medium     Stenosis, cervical spine      Priority: Medium     C5-C7 (MRI)       Spondylitis, cervical      Priority: Medium     C5-C7 (MRI)       Cervicalgia 01/09/2014     Priority: Medium     Disturbance in sleep behavior 11/05/2013     Priority: Medium     Problem list name updated by automated process. Provider to review       SHANTANU (obstructive sleep apnea) 10/25/2013     Priority: Medium     Chronic fatigue syndrome 07/02/2013     Priority: Medium              Fibromyalgia 07/02/2013     Priority: Medium     Generalized anxiety disorder 07/02/2013     Priority: Medium     Diagnosis updated by automated process. Provider to review and confirm.       Tobacco abuse 07/02/2013     Priority: Medium     CARDIOVASCULAR SCREENING; LDL GOAL LESS THAN 160 10/31/2010     Priority: Medium        Past Medical History:    Past Medical History:   Diagnosis Date     Allergic rhinitis due to other allergen      Degenerative joint disease of cervical spine 2016     Generalized anxiety disorder      Hearing loss      Intrinsic asthma, unspecified      Irritable bowel syndrome      Lump or mass in breast 1996     Other malaise and fatigue       Other specified gastritis without mention of hemorrhage      Pain in joint, lower leg      Rheumatoid arthritis(714.0)      Spindle cell carcinoma (H) 2013     Spondylitis, cervical      Stenosis, cervical spine      Tension headache        Past Surgical History:    Past Surgical History:   Procedure Laterality Date     C APPENDECTOMY      Ruptured at age 7 years     C  DELIVERY ONLY      , Low Cervical     DILATION AND CURETTAGE, HYSTEROSCOPY, ABLATE ENDOMETRIUM NOVASURE, COMBINED  2011    Procedure:COMBINED DILATION AND CURETTAGE, HYSTEROSCOPY, ABLATE ENDOMETRIUM NOVASURE; hysteroscopy, dilation and curettage, novasure; Surgeon:JEREMY ANNA; Location:[facility] OR     EXCISE LESION TRUNK  2013    Procedure: EXCISE LESION TRUNK;  interlaminar epidural Steroid Injection Cervical -thoracic Levels (C7-T1)    Re-Excision of chest wall mass;  Surgeon: Jeremy Bolaños MD;  Location: [facility] OR     HC HYSTEROS W PERMANENT FALLOPAIN IMPLANT      Essure done in office [facility]     INJECT BLOCK MEDIAL BRANCH CERVICAL/THORACIC/LUMBAR  2014    Procedure: INJECT BLOCK MEDIAL BRANCH CERVICAL / THORACIC / LUMBAR;  Surgeon: Josué Munson MD;  Location: [facility] OR     INJECT FACET JOINT  2014    Procedure: INJECT FACET JOINT;  diagnostic medial branch facet nerve block cervical levels 5-6, 6-7;  Surgeon: Josué Munson MD;  Location: [facility] OR     WISDOM [facility]         Family History:    Family History   Problem Relation Age of Onset     Arthritis Mother         Rheumatoid     Respiratory Mother         COPD     Hypertension Sister         1/2 sister     Neurologic Disorder Sister         1/2 sisiter  Very mild form of CP     Cardiovascular Maternal Grandmother      Heart Disease Maternal Aunt         Bypass at age 50's       Social History:  Marital Status:   [5]  Social History     Tobacco Use     Smoking status: Current Every Day Smoker     Packs/day: 0.50     Years:  29.00     Pack years: 14.50     Types: Cigarettes     Smokeless tobacco: Never Used     Tobacco comment: 9/19/11 - 1pk/day   Substance Use Topics     Alcohol use: No     Alcohol/week: 1.0 oz     Drug use: No        Medications:      albuterol (VENTOLIN HFA) 108 (90 Base) MCG/ACT Inhaler   ALPRAZolam (XANAX) 0.5 MG tablet   busPIRone (BUSPAR) 10 MG tablet   cefdinir (OMNICEF) 300 MG capsule   cetirizine (ZYRTEC) 10 MG tablet   clonazePAM (KLONOPIN) 0.5 MG tablet   cyclobenzaprine (FLEXERIL) 10 MG tablet   CYMBALTA 60 MG capsule   fluticasone (FLONASE) 50 MCG/ACT spray   folic acid (FOLVITE) 1 MG tablet   HYDROcodone-acetaminophen (NORCO) 7.5-325 MG per tablet   methotrexate sodium 2.5 MG TABS   Multiple Vitamins-Minerals (MULTIVITAL PO)   predniSONE (DELTASONE) 5 MG tablet   topiramate (TOPAMAX) 25 MG tablet   verapamil (CALAN) 40 MG tablet   metaxalone (SKELAXIN) 800 MG tablet   naloxone (NARCAN) nasal spray         Review of Systems   All other systems reviewed and are negative.      Physical Exam   BP: 121/82  Pulse: 110  Temp: 98.5  F (36.9  C)  Resp: 18  Weight: 64.9 kg (143 lb)  SpO2: 96 %      Physical Exam   Constitutional: She is oriented to person, place, and time. No distress.   HENT:   Head: Normocephalic and atraumatic.   Right Ear: External ear normal.   Left Ear: External ear normal.   Nose: Right sinus exhibits maxillary sinus tenderness and frontal sinus tenderness. Left sinus exhibits maxillary sinus tenderness and frontal sinus tenderness.   Mouth/Throat: Oropharynx is clear and moist. No oropharyngeal exudate.   Eyes: Pupils are equal, round, and reactive to light. Conjunctivae and EOM are normal. Right eye exhibits no discharge. Left eye exhibits no discharge. No scleral icterus.   Neck: Normal range of motion. Neck supple. No thyromegaly present.   Cardiovascular: Normal rate, regular rhythm and normal heart sounds.   No murmur heard.  Pulmonary/Chest: Effort normal and breath sounds normal. No  respiratory distress. She has no wheezes. She has no rales. She exhibits no tenderness.   Abdominal: Soft. Bowel sounds are normal. She exhibits no distension and no mass. There is no tenderness. There is no rebound and no guarding.   Musculoskeletal: Normal range of motion. She exhibits no edema, tenderness or deformity.        Cervical back: Normal. She exhibits normal range of motion, no tenderness, no bony tenderness and no swelling.   No trigger point tenderness in the cervical spine.  No muscle spasm.  Normal range of motion with rotation, flexion, extension, and lateral bending.  Upper extremity strength is equal and appropriate.  Biceps and triceps DTRs 2 out of 4 without clonus.   Lymphadenopathy:     She has no cervical adenopathy.   Neurological: She is alert and oriented to person, place, and time. No cranial nerve deficit.   Skin: Skin is warm and dry. Capillary refill takes less than 2 seconds. No rash noted. She is not diaphoretic. No erythema.   Psychiatric: She has a normal mood and affect. Her behavior is normal. Thought content normal.   Nursing note and vitals reviewed.      ED Course        Procedures               Critical Care time:  none               Results for orders placed or performed during the hospital encounter of 05/12/19 (from the past 24 hour(s))   CT Head w/o Contrast    Narrative    CT SCAN OF THE HEAD WITHOUT CONTRAST   5/12/2019 1:48 PM     HISTORY: posterior HA x 1 week    TECHNIQUE:  Axial images of the head and coronal reformations without  IV contrast material. Radiation dose for this scan was reduced using  automated exposure control, adjustment of the mA and/or kV according  to patient size, or iterative reconstruction technique.    COMPARISON: Scan dated 8/23/2012    FINDINGS:     Intracranial contents: The ventricles are normal in size, shape and  configuration.  The brain parenchyma and subarachnoid spaces are  normal. There is no evidence of intracranial hemorrhage,  mass, acute  infarct or anomaly.    Visualized orbits/sinuses/mastoids:  The right sphenoid sinus is  nearly completely opacified.    Osseous structures/soft tissues:  There is no evidence of trauma.      Impression    IMPRESSION:  1. No acute pathology. No bleed, mass, or areas of acute infarction  are identified.     2. Right sphenoid sinus disease. The right sphenoid sinus is nearly  completely opacified.      CHELA ARGUELLES MD       Medications - No data to display    Assessments & Plan (with Medical Decision Making)  Sphenoid sinusitis     48 year old female presents for evaluation of URI symptoms for the past 2 weeks not improving with conservative care measures.  Symptoms of rhinorrhea, congestion, sinus pressure, right ear pressure/pain, and a dry cough.  Her other main concern is a posterior headache off and on for the past 5 days.  Severe pain in the bilateral posterior orbital space when bending over or coughing.  Duration of pain is about 15 seconds after stopping the activity that brings her pain on.  Last night she had 2 severe episodes while lying supine, which worried her as she was not performing 1 of her previous triggers.  Denies a prior history of headaches.  Denies any significant neck pain, muscle spasm, or upper extremity radicular type symptoms.  Attempted treatment with OTC cold medications, Flonase, and saline rinse without improvement.  No recent head trauma  and she is not taking anticoagulants.  Patient has been very stressed over the past 2 weeks as her father just .  She was caring for him at the end of his life here in the last week, and just returned home.  On exam blood pressure 121/82, pulse 110, temperature 98.5, and oxygen saturation 96% on room air.  Bilateral frontal and maxillary sinus tenderness to palpation.  Remainder the exam normal.  Neurologically intact with cranial nerves II through XII normal.  Neck with normal range of motion.  Nontender to palpation throughout.  CT  of the head displays no evidence for intracranial mass or intracranial hemorrhage.  Interestingly enough the sphenoid sinus had almost complete opacification.  Other sinuses were patent.  We discussed antibiotic therapy with the patient in the form of Omnicef.  She does have a penicillin allergy with hive reaction.  Discussed that there is up to a 5% cross reaction risk, and she agrees that this is appropriate to trial this medication.  Discussed the importance of probiotic therapy.  Push clear fluids.  Continue OTC therapy for continued decongestant therapy.  Indications for return reviewed with the patient.  She was in agreement with this plan and was suitable for discharge.     I have reviewed the nursing notes.    I have reviewed the findings, diagnosis, plan and need for follow up with the patient.          Medication List      Started    cefdinir 300 MG capsule  Commonly known as:  OMNICEF  300 mg, Oral, 2 TIMES DAILY            Final diagnoses:   Sphenoid sinusitis       Disclaimer: This note consists of symbols derived from keyboarding, dictation and/or voice recognition software. As a result, there may be errors in the script that have gone undetected. Please consider this when interpreting information found in this chart.      5/12/2019   Lionel Brennan PA-C   [facility] EMERGENCY DEPARTMENT     Lionel Brennan PA-C  05/12/19 1527

## 2019-05-12 NOTE — ED TRIAGE NOTES
Presents with sinus pressure and headache x1 week.  Has been taking advil cold and flu with little relief. Reports headache gets worse with coughing or bending over.

## 2019-05-12 NOTE — ED AVS SNAPSHOT
[facility]  [address] [place] [address]  Phone:  [phone]  Fax:  [phone]                                    Sherie Otero   MRN: [id number]    Department:  [facility]   Date of Visit:  5/12/2019           After Visit Summary Signature Page    I have received my discharge instructions, and my questions have been answered. I have discussed any challenges I see with this plan with the nurse or doctor.    ..........................................................................................................................................  Patient/Patient Representative Signature      ..........................................................................................................................................  Patient Representative Print Name and Relationship to Patient    ..................................................               ................................................  Date                                   Time    ..........................................................................................................................................  Reviewed by Signature/Title    ...................................................              ..............................................  Date                                               Time          22EPIC Rev 08/18

## 2019-05-14 NOTE — PROGRESS NOTES
[facility]    CHIEF COMPLAINT: Pain  -Fibromyalgia  -Neck pain  -Low back pain    INTERVAL HISTORY:  Last seen on 04/10/2019.        Recommendations/plan at the last visit included:  1. Physical Therapy: continue silver sneakers; okay to try some light weight lifting  2. Clinical Health Psychologist:  YES, discuss with [facility]    3. Diagnostic Studies: none at this time  4. Medication Management:     1. Continue Cymbalta at 60mg at bedtime    2. Will try Skelaxin 800mg TID   3. Continue Hydrocodone at max of 6 tabs/day.  We will continue the number of  tablets at 175/month.  Hopefully we can continue a taper with improvement of weather.   4. Continue topiramate  1 in AM. Continue take 2 tabs at bedtime. Increase as tolerated.  5. Further procedures recommended: Trigger point injections        Follow up with this provider: 4-5 weeks     Since her last visit, Sherie PAMELA BoothShanna reports:  -A little more activity and worsening mood ( a lot of stress).    She was seen in the ER on 5/12/2019 due to sinusitis. She was treated with Omnicef. She is not feeling better yet. She states that she increased her use of hydrocodone due to long drives helping her dad in hospice. She states with the increased activity and driving she was taking 8 Hydrocodone/day. With all this stress, the weather and the infection she is having a fibro flare.     She is still taking the Topamax at night, but is unable to take the morning dose due to sedation.        Pain Information:   Pain quality:  Aching, stabbing, miserable, and throbbing   Pain rating: intensity ranges from 9/10 to 10/10, and averages 8/10 on a 0-10 scale.   Pain today 10/10      UDS 1/19/2019   CSA 2/04/2019    CURRENT RELEVANT PAIN MEDICATIONS:    Flexeril-1 in AM and 2 at HS  Cymbalta-60mg at night  Hydrocodone 7.5mg -currently taking 2 tablets three times a day   Topamax-  2 tabs at HS  Ibuprofen-will take 800mg up to 3 times day, doesn't take  daily    Patient is using the medication as prescribed:  YES  Is your medication helpful? somewhat   Medication side effects? no side effect    Previous Medications: (H--helped; HI--Helped initially; SWH-- somewhat helpful, NH--No help; W--worse; SE--side effects)   Opiates: Hydrocodone H, Oxycodone SE  NSAIDS: Ibuprofen H  Muscle Relaxants: Tizanidine NH, Flexeril H, Robaxin NH SE stomach upset  Anti-migraine mediations: Verapamil H  Anti-depressants: Cymbalta H  Sleep aids: none  Anxiolytics: Xanax H, Clonazepam H, Valium H  Neuropathics: Gabapentin SE dizziness          Topicals: Lidocaine patches SW  Other medications not covered above: Savella H at first, then seemed to stop working, Lyrica SE shortness of breath, felt 'weird' on them, throat felt weird. Prednisone H for arthritis flare    Past Pain Treatments:  Pain Clinic:   No   PT: Yes, years ago. Has not done pool therapy.   Psychologist: No  Relaxation techniques/biofeedback: No  Chiropractor: No, was told not to because of neck.   Acupuncture: Yes for headaches.   Pharmacotherapy:           Opioids: Yes            Non-opioids:    Yes   TENs Unit:Yes  Injections: cervical medial branch blocks 6/12/2014  Self-care:   Yes, ice/heat, hot baths, theracare  Surgeries related to pain: No    [place] Board of Pharmacy Data Base Reviewed:    YES; As expected, no concern for misuse/abuse of controlled medications based on this report.      THE 4 As OF OPIOID MAINTENANCE ANALGESIA    Analgesia: Is pain relief clinically significant? YES   Activity: Is patient functional and able to perform Activities of Daily Living? YES   Adverse effects: Is patient free from adverse side effects from opiates? YES   Adherence to Rx protocol: Is patient adhering to Controlled Substance Agreement and taking medications ONLY as ordered? YES       Is Narcan prescribed for opiate use >50 MME daily? yes      Total Daily MME: 45    Medications:  Current Outpatient Medications    Medication Sig Dispense Refill     albuterol (VENTOLIN HFA) 108 (90 Base) MCG/ACT Inhaler Inhale 2 puffs into the lungs every 6 hours as needed 18 g 4     ALPRAZolam (XANAX) 0.5 MG tablet One tablet mid day. MUST LAST 30 DAYS. 30 tablet 0     busPIRone (BUSPAR) 10 MG tablet TAKE 5 MG (1/2 TAB) IN THE MORNING AND 10 MG AT NIGHT 135 tablet 3     cefdinir (OMNICEF) 300 MG capsule Take 1 capsule (300 mg) by mouth 2 times daily 20 capsule 0     cetirizine (ZYRTEC) 10 MG tablet TAKE  ONE TABLET BY MOUTH EVERY DAY. 90 tablet 3     clonazePAM (KLONOPIN) 0.5 MG tablet TAKE ONE-HALF TO ONE TABLET BY MOUTH TWICE A DAY AS NEEDED FOR ANXIETY - MUST LAST 30 DAYS. 60 tablet 0     cyclobenzaprine (FLEXERIL) 10 MG tablet Take 2 tablets in the evening and 1 in the morning 90 tablet 1     CYMBALTA 60 MG capsule TAKE ONE CAPSULE BY MOUTH EVERY EVENING - KADY 90 capsule 1     fluticasone (FLONASE) 50 MCG/ACT spray SPRAY 2 SPRAYS INTO BOTH NOSTRILS DAILY. 16 g 11     folic acid (FOLVITE) 1 MG tablet Take 1 tablet (1 mg) by mouth daily 100 tablet 3     HYDROcodone-acetaminophen (NORCO) 7.5-325 MG per tablet TAKE 2 TABLETS BY MOUTH EVERY 6 HOURS AS NEEDED FOR PAIN--MAX OF 6 TABLETS PER DAY. 175 tablet 0     metaxalone (SKELAXIN) 800 MG tablet Take 1 tablet (800 mg) by mouth 3 times daily (Patient not taking: Reported on 4/18/2019) 60 tablet 0     methotrexate sodium 2.5 MG TABS Take 8 tablets (20 mg) by mouth every 7 days 32 tablet 3     Multiple Vitamins-Minerals (MULTIVITAL PO)        naloxone (NARCAN) nasal spray Spray 1 spray (4 mg) into one nostril alternating nostrils as needed for opioid reversal every 2-3 minutes until assistance arrives (Patient not taking: Reported on 3/1/2019) 0.2 mL 0     predniSONE (DELTASONE) 5 MG tablet Take 5 mg by mouth daily as needed for peripheral joint pain from rheumatoid arthritis; use sparingly. 30 tablet 1     topiramate (TOPAMAX) 25 MG tablet Take 2 tablets (50 mg) by mouth 2 times daily 120  "tablet 0     verapamil (CALAN) 40 MG tablet Take 1 tablet (40 mg) by mouth 2 times daily 180 tablet 3       Review of Systems: A 10-point review of systems was negative, with the exception of chronic pain issues, fever/chills, fatigue, dizziness, sinus infection, earache, cough, palpitations, nausea, diarrhea, constipation,depression, anxiety and stress.       Social History: Reviewed; unchanged from previous consultation.      Family history: Reviewed; unchanged from previous consultation.     PHYSICAL EXAM:     Vitals:  /70   Pulse 104   Temp 98.7  F (37.1  C) (Temporal)   Ht 1.651 m (5' 5\")   Wt 65.8 kg (145 lb)   BMI 24.13 kg/m        Constitutional: healthy, alert and no distress  HEENT: Head atraumatic, normocephalic. Eyes without conjunctival injection or jaundice. Neck supple. No obvious neck masses.  Psychiatric/mental status: Alert, without lethargy or stupor. Appropriate affect. Mood normal.   Neurologic exam: Gait is normal         DIAGNOSTIC TESTS:  Imaging Studies:   No new imaging to review    Assessment:  Sherie Otero is a 48 year old female who presents today for follow up regarding her:    1.  Low back pain  2.  Neck pain  3.  Chronic pain syndrome  4.  Fibromyalgia    She is having worsening pain likely due to the stress that she has been experiencing recently.  She did meet [facility] and they did recommend a non-benzodiazepine plan for her anxiety.  Patient is unsure what exactly all that entails.  She does have follow-up with her primary care provider tomorrow.  She would like to continue on the Topamax but is unable to tolerate a morning dose due to sedation.  She does currently feel the Flexeril has been more helpful for her.        Plan:  Diagnosis reviewed, treatment option addressed, and risk/benifits discussed.  Self-care instructions given.  I am recommending a multidisciplinary treatment plan to help this patient better manage pain.      1. Physical " Therapy: continue silver sneakers;   2. Clinical Health Psychologist:  YES, discuss with [facility].  I would recommend that the patient do a follow-up appointment with [facility] regarding their plan for her anxiety.  My concern on if she stops the clonazepam and does not have another plan in place for the anxiety is that her fibromyalgia will remain in a flare, this would make it more difficult for her to be tapered off of her Norco.  Discussed the importance of having this under control.  Patient verbalized her understanding.  Discussed that I would agree with a non-benzodiazepine plan for her anxiety if possible.  3. Diagnostic Studies: none at this time  4. Medication Management:     1. Continue Cymbalta at 60mg at bedtime    2.  Okay to continue Flexeril   3. Continue Hydrocodone at max of 6 tabs/day.  We will continue the number of  tablets at 175/month, however discussed with the patient that we will decrease the number of tablets she gets next month.  She verbalized understanding.  I also discussed with the patient that she needs to call me prior to increasing the dose of her Norco.   4.  increase topiramate to 75 mg at bedtime  5. Further procedures recommended: Trigger point injections        Follow up with this provider: 4 weeks     Total time spent face to face was 25 minutes and more than 50% of face to face time was spent in counseling and/or coordination of care regarding the diagnosis and recommendations above.      Celia Bettencourt PA-C   [facility]

## 2019-05-15 ENCOUNTER — OFFICE VISIT (OUTPATIENT)
Dept: PODIATRY | Facility: CLINIC | Age: 49
End: 2019-05-15
Payer: MEDICARE

## 2019-05-15 VITALS
TEMPERATURE: 98.7 F | SYSTOLIC BLOOD PRESSURE: 106 MMHG | DIASTOLIC BLOOD PRESSURE: 70 MMHG | HEART RATE: 104 BPM | WEIGHT: 145 LBS | BODY MASS INDEX: 24.16 KG/M2 | HEIGHT: 65 IN

## 2019-05-15 DIAGNOSIS — M79.7 FIBROMYALGIA: ICD-10-CM

## 2019-05-15 DIAGNOSIS — J45.20 INTERMITTENT ASTHMA, UNCOMPLICATED: ICD-10-CM

## 2019-05-15 DIAGNOSIS — G89.4 CHRONIC PAIN SYNDROME: ICD-10-CM

## 2019-05-15 DIAGNOSIS — M54.2 CERVICALGIA: ICD-10-CM

## 2019-05-15 DIAGNOSIS — G89.29 CHRONIC BILATERAL LOW BACK PAIN WITHOUT SCIATICA: ICD-10-CM

## 2019-05-15 DIAGNOSIS — M54.50 CHRONIC BILATERAL LOW BACK PAIN WITHOUT SCIATICA: ICD-10-CM

## 2019-05-15 PROCEDURE — 99214 OFFICE O/P EST MOD 30 MIN: CPT | Performed by: PHYSICIAN ASSISTANT

## 2019-05-15 RX ORDER — HYDROCODONE BITARTRATE AND ACETAMINOPHEN 7.5; 325 MG/1; MG/1
TABLET ORAL
Qty: 175 TABLET | Refills: 0 | Status: SHIPPED | OUTPATIENT
Start: 2019-05-15 | End: 2019-06-12

## 2019-05-15 RX ORDER — ALBUTEROL SULFATE 90 UG/1
2 AEROSOL, METERED RESPIRATORY (INHALATION) EVERY 6 HOURS PRN
Qty: 18 G | Refills: 4 | Status: CANCELLED | OUTPATIENT
Start: 2019-05-15

## 2019-05-15 ASSESSMENT — MIFFLIN-ST. JEOR: SCORE: 1278.6

## 2019-05-15 NOTE — LETTER
5/15/2019         RE: Sherie Otero  [address] [address]        Dear Colleague,    Thank you for referring your patient, Sherie Otero, to the [facility]. Please see a copy of my visit note below.    [facility]    CHIEF COMPLAINT: Pain  -Fibromyalgia  -Neck pain  -Low back pain    INTERVAL HISTORY:  Last seen on 04/10/2019.        Recommendations/plan at the last visit included:  1. Physical Therapy: continue silver sneakers; okay to try some light weight lifting  2. Clinical Health Psychologist:  YES, discuss with [facility]    3. Diagnostic Studies: none at this time  4. Medication Management:     1. Continue Cymbalta at 60mg at bedtime    2. Will try Skelaxin 800mg TID   3. Continue Hydrocodone at max of 6 tabs/day.  We will continue the number of  tablets at 175/month.  Hopefully we can continue a taper with improvement of weather.   4. Continue topiramate  1 in AM. Continue take 2 tabs at bedtime. Increase as tolerated.  5. Further procedures recommended: Trigger point injections        Follow up with this provider: 4-5 weeks     Since her last visit, Sherie Otero reports:  -A little more activity and worsening mood ( a lot of stress).    She was seen in the ER on 5/12/2019 due to sinusitis. She was treated with Omnicef. She is not feeling better yet. She states that she increased her use of hydrocodone due to long drives helping her dad in hospice. She states with the increased activity and driving she was taking 8 Hydrocodone/day. With all this stress, the weather and the infection she is having a fibro flare.     She is still taking the Topamax at night, but is unable to take the morning dose due to sedation.        Pain Information:   Pain quality:  Aching, stabbing, miserable, and throbbing   Pain rating: intensity ranges from 9/10 to 10/10, and averages 8/10 on a 0-10 scale.   Pain today 10/10      UDS 1/19/2019   CSA  2/04/2019    CURRENT RELEVANT PAIN MEDICATIONS:    Flexeril-1 in AM and 2 at HS  Cymbalta-60mg at night  Hydrocodone 7.5mg -currently taking 2 tablets three times a day   Topamax-  2 tabs at HS  Ibuprofen-will take 800mg up to 3 times day, doesn't take daily    Patient is using the medication as prescribed:  YES  Is your medication helpful? somewhat   Medication side effects? no side effect    Previous Medications: (H--helped; HI--Helped initially; SWH-- somewhat helpful, NH--No help; W--worse; SE--side effects)   Opiates: Hydrocodone H, Oxycodone SE  NSAIDS: Ibuprofen H  Muscle Relaxants: Tizanidine NH, Flexeril H, Robaxin NH SE stomach upset  Anti-migraine mediations: Verapamil H  Anti-depressants: Cymbalta H  Sleep aids: none  Anxiolytics: Xanax H, Clonazepam H, Valium H  Neuropathics: Gabapentin SE dizziness          Topicals: Lidocaine patches [employer]  Other medications not covered above: Savella H at first, then seemed to stop working, Lyrica SE shortness of breath, felt 'weird' on them, throat felt weird. Prednisone H for arthritis flare    Past Pain Treatments:  Pain Clinic:   No   PT: Yes, years ago. Has not done pool therapy.   Psychologist: No  Relaxation techniques/biofeedback: No  Chiropractor: No, was told not to because of neck.   Acupuncture: Yes for headaches.   Pharmacotherapy:           Opioids: Yes            Non-opioids:    Yes   TENs Unit:Yes  Injections: cervical medial branch blocks 6/12/2014  Self-care:   Yes, ice/heat, hot baths, theracare  Surgeries related to pain: No    [place] Board of Pharmacy Data Base Reviewed:    YES; As expected, no concern for misuse/abuse of controlled medications based on this report.      THE 4 As OF OPIOID MAINTENANCE ANALGESIA    Analgesia: Is pain relief clinically significant? YES   Activity: Is patient functional and able to perform Activities of Daily Living? YES   Adverse effects: Is patient free from adverse side effects from opiates? YES   Adherence to  Rx protocol: Is patient adhering to Controlled Substance Agreement and taking medications ONLY as ordered? YES       Is Narcan prescribed for opiate use >50 MME daily? yes      Total Daily MME: 45    Medications:  Current Outpatient Medications   Medication Sig Dispense Refill     albuterol (VENTOLIN HFA) 108 (90 Base) MCG/ACT Inhaler Inhale 2 puffs into the lungs every 6 hours as needed 18 g 4     ALPRAZolam (XANAX) 0.5 MG tablet One tablet mid day. MUST LAST 30 DAYS. 30 tablet 0     busPIRone (BUSPAR) 10 MG tablet TAKE 5 MG (1/2 TAB) IN THE MORNING AND 10 MG AT NIGHT 135 tablet 3     cefdinir (OMNICEF) 300 MG capsule Take 1 capsule (300 mg) by mouth 2 times daily 20 capsule 0     cetirizine (ZYRTEC) 10 MG tablet TAKE  ONE TABLET BY MOUTH EVERY DAY. 90 tablet 3     clonazePAM (KLONOPIN) 0.5 MG tablet TAKE ONE-HALF TO ONE TABLET BY MOUTH TWICE A DAY AS NEEDED FOR ANXIETY - MUST LAST 30 DAYS. 60 tablet 0     cyclobenzaprine (FLEXERIL) 10 MG tablet Take 2 tablets in the evening and 1 in the morning 90 tablet 1     CYMBALTA 60 MG capsule TAKE ONE CAPSULE BY MOUTH EVERY EVENING - KADY 90 capsule 1     fluticasone (FLONASE) 50 MCG/ACT spray SPRAY 2 SPRAYS INTO BOTH NOSTRILS DAILY. 16 g 11     folic acid (FOLVITE) 1 MG tablet Take 1 tablet (1 mg) by mouth daily 100 tablet 3     HYDROcodone-acetaminophen (NORCO) 7.5-325 MG per tablet TAKE 2 TABLETS BY MOUTH EVERY 6 HOURS AS NEEDED FOR PAIN--MAX OF 6 TABLETS PER DAY. 175 tablet 0     metaxalone (SKELAXIN) 800 MG tablet Take 1 tablet (800 mg) by mouth 3 times daily (Patient not taking: Reported on 4/18/2019) 60 tablet 0     methotrexate sodium 2.5 MG TABS Take 8 tablets (20 mg) by mouth every 7 days 32 tablet 3     Multiple Vitamins-Minerals (MULTIVITAL PO)        naloxone (NARCAN) nasal spray Spray 1 spray (4 mg) into one nostril alternating nostrils as needed for opioid reversal every 2-3 minutes until assistance arrives (Patient not taking: Reported on 3/1/2019) 0.2 mL 0  "    predniSONE (DELTASONE) 5 MG tablet Take 5 mg by mouth daily as needed for peripheral joint pain from rheumatoid arthritis; use sparingly. 30 tablet 1     topiramate (TOPAMAX) 25 MG tablet Take 2 tablets (50 mg) by mouth 2 times daily 120 tablet 0     verapamil (CALAN) 40 MG tablet Take 1 tablet (40 mg) by mouth 2 times daily 180 tablet 3       Review of Systems: A 10-point review of systems was negative, with the exception of chronic pain issues, fever/chills, fatigue, dizziness, sinus infection, earache, cough, palpitations, nausea, diarrhea, constipation,depression, anxiety and stress.       Social History: Reviewed; unchanged from previous consultation.      Family history: Reviewed; unchanged from previous consultation.     PHYSICAL EXAM:     Vitals:  /70   Pulse 104   Temp 98.7  F (37.1  C) (Temporal)   Ht 1.651 m (5' 5\")   Wt 65.8 kg (145 lb)   BMI 24.13 kg/m         Constitutional: healthy, alert and no distress  HEENT: Head atraumatic, normocephalic. Eyes without conjunctival injection or jaundice. Neck supple. No obvious neck masses.  Psychiatric/mental status: Alert, without lethargy or stupor. Appropriate affect. Mood normal.   Neurologic exam: Gait is normal         DIAGNOSTIC TESTS:  Imaging Studies:   No new imaging to review    Assessment:  Sherie Otero is a 48 year old female who presents today for follow up regarding her:    1.  Low back pain  2.  Neck pain  3.  Chronic pain syndrome  4.  Fibromyalgia    She is having worsening pain likely due to the stress that she has been experiencing recently.  She did meet with [facility] and they did recommend a non-benzodiazepine plan for her anxiety.  Patient is unsure what exactly all that entails.  She does have follow-up with her primary care provider tomorrow.  She would like to continue on the Topamax but is unable to tolerate a morning dose due to sedation.  She does currently feel the Flexeril has been more helpful for " her.        Plan:  Diagnosis reviewed, treatment option addressed, and risk/benifits discussed.  Self-care instructions given.  I am recommending a multidisciplinary treatment plan to help this patient better manage pain.      1. Physical Therapy: continue silver sneakers;   2. Clinical Health Psychologist:  YES, discuss with [facility].  I would recommend that the patient do a follow-up appointment with [facility] regarding their plan for her anxiety.  My concern on if she stops the clonazepam and does not have another plan in place for the anxiety is that her fibromyalgia will remain in a flare, this would make it more difficult for her to be tapered off of her Norco.  Discussed the importance of having this under control.  Patient verbalized her understanding.  Discussed that I would agree with a non-benzodiazepine plan for her anxiety if possible.  3. Diagnostic Studies: none at this time  4. Medication Management:     1. Continue Cymbalta at 60mg at bedtime    2.  Okay to continue Flexeril   3. Continue Hydrocodone at max of 6 tabs/day.  We will continue the number of  tablets at 175/month, however discussed with the patient that we will decrease the number of tablets she gets next month.  She verbalized understanding.  I also discussed with the patient that she needs to call me prior to increasing the dose of her Norco.   4.  increase topiramate to 75 mg at bedtime  5. Further procedures recommended: Trigger point injections        Follow up with this provider: 4 weeks     Total time spent face to face was 25 minutes and more than 50% of face to face time was spent in counseling and/or coordination of care regarding the diagnosis and recommendations above.      Celia Bettencourt PA-C   [facility]        Again, thank you for allowing me to participate in the care of your patient.        Sincerely,        Celia Bettencourt PA-C

## 2019-05-15 NOTE — PATIENT INSTRUCTIONS
After Visit Instructions:     Thank you for coming to [facility] for your care. It is my goal to partner with you to help you reach your optimal state of health.     I am recommending multidisciplinary care at this time.  The focus of care will be to continue gradual rehabilitation and pain management with medication adjustments as needed.    Continue daily self-care, identifying contributing factors, and monitoring variations in pain level. Continue to integrate self-care into your life.      1. Schedule pain psychology assessment/visit: agree with Ganesh on meeting with individual therapist. Would recommend that you do a follow up with ganesh to see what their recommendations for anxiety treatment are.  2. Schedule physical therapy assessment/visit: continue silver sneakers  3. Schedule follow-up with Celia Bettencourt PA-C in 4 weeks. You will need to make this appointment.   4. Procedures recommended: trigger point injections   5. Medication recommendations:   1. Increase Topiramate to 75mg at bedtime  2. Continue Flexeril at current dosing  3. Continue Hydrocodone at current dosing, but try to decrease usage as able. Please call me before making any dosing adjustments.       Celia Bettencourt PA-C  [facility]  [facility]    Contact information: [facility]  Clinic Number:  [phone]   Call this number with any questions about your care and for scheduling assistance. Calls are returned Monday through Friday between 8 AM and 4:30 PM. We usually get back to you within 2 business days depending on the issue/request.           Medication Refills Policy:    For non-narcotic medications, please your pharmacy directly to request a refill and the pharmacy will call the [facility] for authorization. Please allow 3-4 days for these refills.    For narcotic refills, call the nurse triage line and leave a message requesting your  refill along with the name of the pharmacy that you use. Narcotic prescriptions will be mailed to your pharmacy or you may pick them up at the clinic.  Please call 7-10 days before your refill is due  The above policy allows adequate time so that you do not run out of medication.    No Show - Late Cancellation - Late Arrival Policy  We believe regular attendance is key to your success in our program.    Any time you are unable to keep your appointment we ask that you call us at least 24 hours in advance to let us know. This will allow us to offer the appointment time to another patient. The following is our policy for missed appointments. This also applies to appointments cancelled with less than 24 hours notice.    After missing 3 appointments without calling first, we will cancel all of your future appointments at [facility].    At that point, you will not be able to resume services unless approved by your care team  We understand that unforseen circumstances arise, however, out of respect for all concerned and to provide this appointment to another patient, this policy will be enforced.    Please note that most follow up appointments are 30 minutes long. If you arrive late, your provider may not be able to see you for the entire 30 minutes. Please also note that if you arrive more than 15 minutes for any appointment, it may be rescheduled.

## 2019-05-16 ENCOUNTER — OFFICE VISIT (OUTPATIENT)
Dept: FAMILY MEDICINE | Facility: CLINIC | Age: 49
End: 2019-05-16
Payer: MEDICARE

## 2019-05-16 DIAGNOSIS — J45.20 INTERMITTENT ASTHMA, UNCOMPLICATED: ICD-10-CM

## 2019-05-16 DIAGNOSIS — G89.29 OTHER CHRONIC PAIN: ICD-10-CM

## 2019-05-16 DIAGNOSIS — J32.3 SPHENOID SINUSITIS, UNSPECIFIED CHRONICITY: ICD-10-CM

## 2019-05-16 DIAGNOSIS — F33.0 MAJOR DEPRESSIVE DISORDER, RECURRENT EPISODE, MILD (H): ICD-10-CM

## 2019-05-16 DIAGNOSIS — F41.1 GENERALIZED ANXIETY DISORDER: Primary | ICD-10-CM

## 2019-05-16 DIAGNOSIS — F11.90 CHRONIC, CONTINUOUS USE OF OPIOIDS: ICD-10-CM

## 2019-05-16 DIAGNOSIS — Z79.899 CHRONICALLY ON BENZODIAZEPINE THERAPY: ICD-10-CM

## 2019-05-16 DIAGNOSIS — M05.79 RHEUMATOID ARTHRITIS INVOLVING MULTIPLE SITES WITH POSITIVE RHEUMATOID FACTOR (H): ICD-10-CM

## 2019-05-16 PROCEDURE — 99215 OFFICE O/P EST HI 40 MIN: CPT | Performed by: FAMILY MEDICINE

## 2019-05-16 RX ORDER — ALPRAZOLAM 0.25 MG
TABLET ORAL
Qty: 14 TABLET | Refills: 0 | Status: SHIPPED | OUTPATIENT
Start: 2019-05-16 | End: 2019-07-25

## 2019-05-16 RX ORDER — CLONAZEPAM 0.5 MG/1
0.5 TABLET ORAL 2 TIMES DAILY
Qty: 60 TABLET | Refills: 0 | Status: SHIPPED | OUTPATIENT
Start: 2019-05-16 | End: 2019-06-12

## 2019-05-16 RX ORDER — ALBUTEROL SULFATE 90 UG/1
2 AEROSOL, METERED RESPIRATORY (INHALATION) EVERY 6 HOURS PRN
Qty: 18 G | Refills: 1 | Status: SHIPPED | OUTPATIENT
Start: 2019-05-16 | End: 2020-03-23

## 2019-05-16 NOTE — PROGRESS NOTES
SUBJECTIVE:   Sherie Otero is a 48 year old female who presents to clinic today for the following   health issues:      Rechecking visit from [facility]       Additional history: as documented    Reviewed  and updated as needed this visit by clinical staff  Tobacco  Allergies  Meds  Problems  Med Hx  Surg Hx  Fam Hx  Soc Hx          Reviewed and updated as needed this visit by Provider  Tobacco  Allergies  Meds  Problems  Med Hx  Surg Hx  Fam Hx         Patient here today to follow-up on her visit with psychiatrist at [facility] a few days ago.  She has her visit notes with her today and I reviewed them with the patient.  The psychiatric provider noted that she will not prescribe benzodiazepines and recommended there I wean her off of them.  Patient informs me that she states that her understanding of the visit was that they do not want to see her unless she is off of the benzodiazepines altogether already.  She was, however, highly recommended to see a therapist.  She is not sure why the psychiatric provider she saw had to refer her to another person for this.      Patient continues to see pain management clinic for her pain management and this includes 6 tabs/day of Norco 7.5 mg.  She was also informed by the psychiatric provider that use of narcotics and benzodiazepines are dangerous and was glad that she had narcan available.      Review of Systems  10 point ROS of systems including Constitutional, Eyes, HENT, Respiratory, Cardiovascular, Gastroenterology, Genitourinary, Integumentary, Muscularskeletal, Psychiatric were all negative except for pertinent positives noted in my HPI.     OBJECTIVE:   BP (P) 110/70 (BP Location: Right arm, Patient Position: Chair, Cuff Size: Adult Regular)   Pulse (P) 120   Temp (P) 97.4  F (36.3  C) (Temporal)   Resp (P) 22   Wt (P) 61.7 kg (136 lb)   SpO2 (P) 98%   BMI (P) 22.63 kg/m    Body mass index is 22.63 kg/m   (pended).  Physical Exam   Constitutional: She appears well-developed and well-nourished. No distress.   Cardiovascular: Normal rate, regular rhythm and normal heart sounds. Exam reveals no friction rub.   No murmur heard.  Pulmonary/Chest: Effort normal and breath sounds normal. No stridor. She has no decreased breath sounds. She has no wheezes. She has no rhonchi. She has no rales.   Neurological: She is alert.   Psychiatric: Her speech is normal and behavior is normal. Thought content normal. Cognition and memory are impaired (she had difficulty remembering parts of our conversation today and had to have multiple points repeated to her). She exhibits a depressed mood (affect flat).        ASSESSMENT/PLAN:       ICD-10-CM    1. Generalized anxiety disorder F41.1 ALPRAZolam (XANAX) 0.25 MG tablet     clonazePAM (KLONOPIN) 0.5 MG tablet   2. Major depressive disorder, recurrent episode, mild (H) F33.0    3. Sphenoid sinusitis, unspecified chronicity J32.3 OTOLARYNGOLOGY REFERRAL   4. Intermittent asthma, uncomplicated J45.20 albuterol (VENTOLIN HFA) 108 (90 Base) MCG/ACT inhaler   5. Other chronic pain G89.29    6. Chronic, continuous use of opioids F11.90    7. Chronically on benzodiazepine therapy Z79.899    8. Rheumatoid arthritis involving multiple sites with positive rheumatoid factor (H) M05.79      PLAN:  1.  We had a long discussion and review of the recommendations that were brought forth to her by mary.  I informed patient that my interpretation of her visit is that they are willing to see patient to manage her anxiety symptoms but are not willing to use benzodiazepines.  I told patient that this was what I was anticipating and that what she needs to do is to find out what other options are available to her for anxiety management.    2.  She absolutely must schedule a therapist appointment for her anxiety.  This will also help with her pain management as Celia was recommending she see a clinical  health psychologist as well.  Patient seemed to have difficulty grasping the concept of seeing the medication provider and then a separate appointment with a different person to conduct her therapy.  I explained to her numerous times and in multiple different ways how a medication provider has different training and skillsets than a therapist and that she will need to see each one at [facility], not just a single person to do the therapy and medication management.    2.  We will now begin to slowly taper her off of her benzodiazepines.  She is currently on 0.5 mg twice daily of clonazepam and 0.5 mg of Xanax in the afternoon.  We will start with decreasing her Xanax to 0.25 mg daily and continue her clonazepam dosing as is.  She will do this for 2 weeks and then she will discontinue the Xanax.  After 2 weeks of this, she will follow-up with me and we will discuss next steps in the tapering process.      Patient Instructions   Call [facility]'s and schedule an appointment with:  1.  A therapist (talk doc)  2.  Fidel to discuss non-benzodiazepine medication options for managing anxiety, if available and appropriate for you.    Continue to follow-up with Celia for your pain management.        Follow up with Provider - Return in about 1 month (around 6/13/2019) for medication recheck.     I spent >40 minutes of face to face time with the patient, >50% of which was spent counseling and coordination of care regarding discussion on her recent psychiatric visit and how to wean off of benzodiazepines.      Mauro Paredes MD   [facility]

## 2019-05-16 NOTE — PATIENT INSTRUCTIONS
Call [facility] and schedule an appointment with:  1.  A therapist (talk doc)  2.  Fidel to discuss non-benzodiazepine medication options for managing anxiety, if available and appropriate for you.    Continue to follow-up with Celia for your pain management.

## 2019-05-17 ENCOUNTER — OFFICE VISIT (OUTPATIENT)
Dept: PODIATRY | Facility: CLINIC | Age: 49
End: 2019-05-17
Payer: MEDICARE

## 2019-05-17 VITALS
TEMPERATURE: 97.5 F | SYSTOLIC BLOOD PRESSURE: 98 MMHG | WEIGHT: 145 LBS | HEIGHT: 65 IN | DIASTOLIC BLOOD PRESSURE: 64 MMHG | BODY MASS INDEX: 24.16 KG/M2

## 2019-05-17 DIAGNOSIS — M79.18 MYOFASCIAL PAIN: Primary | ICD-10-CM

## 2019-05-17 PROCEDURE — 20553 NJX 1/MLT TRIGGER POINTS 3/>: CPT | Performed by: PHYSICIAN ASSISTANT

## 2019-05-17 ASSESSMENT — MIFFLIN-ST. JEOR: SCORE: 1278.6

## 2019-05-17 ASSESSMENT — PAIN SCALES - GENERAL: PAINLEVEL: EXTREME PAIN (8)

## 2019-05-17 NOTE — PATIENT INSTRUCTIONS
[facility]   Post Procedure Instructions    Today you had:  trigger point injections       Medications used:  lidocaine   bupivicaine          Go to the emergency room if you develop any shortness of breath    Monitor the injection sites for signs and symptoms of infection-fever, chills, redness, swelling, warmth, or drainage to areas.    You may have soreness at injection sites for up to 24 hours.    You may apply ice to the painful areas to help minimize the discomfort of the needle pokes.    Do not apply heat to sites for at least 12 hours.    You may use anti-inflammatory medications or Tylenol for pain control if necessary    Pain Clinic phone number during work hours Monday-Friday:  [phone]    After hours provider line: [phone]

## 2019-05-17 NOTE — LETTER
5/17/2019         RE: Sherie Otero  [address] [address]        Dear Colleague,    Thank you for referring your patient, Sherie Otero, to the [facility]. Please see a copy of my visit note below.    Pre Procedure Diagnosis: myofascial pain  Post procedure Diagnosis: Same  Procedure performed: trigger point injections  Anesthesia: none  Complications: none  Operators: Celia Bettencourt PA-C     Indications:   Sherie Otero is a 48 year old female with a history of myofascial pain.  Exam shows myofascial pain of the muscle groups listed below and they have tried conservative treatment including physical therapy and medications.    Options/alternatives, benefits and risks were discussed with the patient including bleeding, infection, tissue trauma and pnuemothorax.  Questions were answered to her satisfaction and she agrees to proceed. Voluntary informed consent was obtained and signed.     Vitals were reviewed: Yes  Allergies were reviewed:  Yes   Medications were reviewed:  Yes   Pre-procedure pain score: 8/10    Procedure:  After getting informed consent, a Pause for the Cause was performed.    Trigger points were identified by patient, and marked when appropriate.  The area was prepped with Chloroprep.    Using clean technique, injections were completed using a 25G, 1.5 inch needle.  After negative aspiration, injection was completed.  A total of 8 locations were injected.  When possible, tissue was retracted from the chest wall to avoid lung injury.    Muscle groups injected: all bilateral  Trapezius  Levator scapulae     Injection solution contained:  6ml of 1% lidocaine and 6ml of 0.5% bupivacaine.    Hemostasis was achieved, the area was cleaned, and bandaids were placed when appropriate.  The patient tolerated the procedure well.  Breath sounds were normal.      Post-procedure pain score: 0/10  Follow-up includes:   -repeat prn      Celia Bettencourt  PAC  [facility]      Again, thank you for allowing me to participate in the care of your patient.        Sincerely,        Celia Bettencourt PA-C

## 2019-05-17 NOTE — PROGRESS NOTES
Pre Procedure Diagnosis: myofascial pain  Post procedure Diagnosis: Same  Procedure performed: trigger point injections  Anesthesia: none  Complications: none  Operators: Celia Bettencourt PA-C     Indications:   Sherie Otero is a 48 year old female with a history of myofascial pain.  Exam shows myofascial pain of the muscle groups listed below and they have tried conservative treatment including physical therapy and medications.    Options/alternatives, benefits and risks were discussed with the patient including bleeding, infection, tissue trauma and pnuemothorax.  Questions were answered to her satisfaction and she agrees to proceed. Voluntary informed consent was obtained and signed.     Vitals were reviewed: Yes  Allergies were reviewed:  Yes   Medications were reviewed:  Yes   Pre-procedure pain score: 8/10    Procedure:  After getting informed consent, a Pause for the Cause was performed.    Trigger points were identified by patient, and marked when appropriate.  The area was prepped with Chloroprep.    Using clean technique, injections were completed using a 25G, 1.5 inch needle.  After negative aspiration, injection was completed.  A total of 8 locations were injected.  When possible, tissue was retracted from the chest wall to avoid lung injury.    Muscle groups injected: all bilateral  Trapezius  Levator scapulae     Injection solution contained:  6ml of 1% lidocaine and 6ml of 0.5% bupivacaine.    Hemostasis was achieved, the area was cleaned, and bandaids were placed when appropriate.  The patient tolerated the procedure well.  Breath sounds were normal.      Post-procedure pain score: 0/10  Follow-up includes:   -repeat prn      eClia Bettencourt, PAC  [facility]

## 2019-05-28 ENCOUNTER — HOSPITAL ENCOUNTER (OUTPATIENT)
Dept: MAMMOGRAPHY | Facility: CLINIC | Age: 49
Discharge: HOME | End: 2019-05-28
Attending: FAMILY MEDICINE | Admitting: FAMILY MEDICINE
Payer: MEDICARE

## 2019-05-28 DIAGNOSIS — Z12.31 VISIT FOR SCREENING MAMMOGRAM: ICD-10-CM

## 2019-05-28 DIAGNOSIS — Z79.899 HIGH RISK MEDICATION USE: ICD-10-CM

## 2019-05-28 LAB
ALBUMIN SERPL-MCNC: 3.3 G/DL (ref 3.4–5)
ALP SERPL-CCNC: 151 U/L (ref 40–150)
ALT SERPL W P-5'-P-CCNC: 33 U/L (ref 0–50)
AST SERPL W P-5'-P-CCNC: 20 U/L (ref 0–45)
BASOPHILS # BLD AUTO: 0.1 10E9/L (ref 0–0.2)
BASOPHILS NFR BLD AUTO: 1 %
BILIRUB DIRECT SERPL-MCNC: <0.1 MG/DL (ref 0–0.2)
BILIRUB SERPL-MCNC: 0.2 MG/DL (ref 0.2–1.3)
CREAT SERPL-MCNC: 0.96 MG/DL (ref 0.52–1.04)
CRP SERPL-MCNC: <2.9 MG/L (ref 0–8)
DIFFERENTIAL METHOD BLD: ABNORMAL
EOSINOPHIL # BLD AUTO: 0.3 10E9/L (ref 0–0.7)
EOSINOPHIL NFR BLD AUTO: 2 %
ERYTHROCYTE [DISTWIDTH] IN BLOOD BY AUTOMATED COUNT: 13.8 % (ref 10–15)
ERYTHROCYTE [SEDIMENTATION RATE] IN BLOOD BY WESTERGREN METHOD: 12 MM/H (ref 0–30)
GFR SERPL CREATININE-BSD FRML MDRD: 68 ML/MIN/{1.73_M2}
HCT VFR BLD AUTO: 41.5 % (ref 35–47)
HGB BLD-MCNC: 13.7 G/DL (ref 11.7–15.7)
LYMPHOCYTES # BLD AUTO: 6.5 10E9/L (ref 0.8–5.3)
LYMPHOCYTES NFR BLD AUTO: 47 %
MCH RBC QN AUTO: 34.7 PG (ref 26.5–33)
MCHC RBC AUTO-ENTMCNC: 33 G/DL (ref 31.5–36.5)
MCV RBC AUTO: 105 FL (ref 78–100)
MONOCYTES # BLD AUTO: 0.6 10E9/L (ref 0–1.3)
MONOCYTES NFR BLD AUTO: 4 %
NEUTROPHILS # BLD AUTO: 6.4 10E9/L (ref 1.6–8.3)
NEUTROPHILS NFR BLD AUTO: 46 %
PLATELET # BLD AUTO: 303 10E9/L (ref 150–450)
PLATELET # BLD EST: ABNORMAL 10*3/UL
PROT SERPL-MCNC: 7.2 G/DL (ref 6.8–8.8)
RBC # BLD AUTO: 3.95 10E12/L (ref 3.8–5.2)
RBC MORPH BLD: NORMAL
WBC # BLD AUTO: 13.9 10E9/L (ref 4–11)

## 2019-05-28 PROCEDURE — 86140 C-REACTIVE PROTEIN: CPT | Performed by: INTERNAL MEDICINE

## 2019-05-28 PROCEDURE — 82565 ASSAY OF CREATININE: CPT | Performed by: INTERNAL MEDICINE

## 2019-05-28 PROCEDURE — 85025 COMPLETE CBC W/AUTO DIFF WBC: CPT | Performed by: INTERNAL MEDICINE

## 2019-05-28 PROCEDURE — 85652 RBC SED RATE AUTOMATED: CPT | Performed by: INTERNAL MEDICINE

## 2019-05-28 PROCEDURE — 77067 SCR MAMMO BI INCL CAD: CPT

## 2019-05-28 PROCEDURE — 36415 COLL VENOUS BLD VENIPUNCTURE: CPT | Performed by: INTERNAL MEDICINE

## 2019-05-28 PROCEDURE — 80076 HEPATIC FUNCTION PANEL: CPT | Performed by: INTERNAL MEDICINE

## 2019-05-30 ENCOUNTER — MYC MEDICAL ADVICE (OUTPATIENT)
Dept: PODIATRY | Facility: CLINIC | Age: 49
End: 2019-05-30

## 2019-05-30 NOTE — RESULT ENCOUNTER NOTE
Sherie Otero,  Your results are normal.  Please let me know if you have any questions.    Sincerely,  Dr. Paredes

## 2019-06-03 DIAGNOSIS — M79.7 FIBROMYALGIA: ICD-10-CM

## 2019-06-05 NOTE — TELEPHONE ENCOUNTER
"Requested Prescriptions   Pending Prescriptions Disp Refills     topiramate (TOPAMAX) 25 MG tablet [Pharmacy Med Name: TOPIRAMATE 25MG TABS] 120 tablet 0     Sig: TAKE TWO TABLETS TWICE A DAY   Last Written Prescription Date:  3/29/19  Last Fill Quantity: 120,  # refills: 0   Last office visit: 5/16/2019 with prescribing provider:     Future Office Visit:   Next 5 appointments (look out 90 days)    Jun 12, 2019  1:30 PM CDT  Return Visit with Celia Bettencourt PA-C  [facility] ([address] [address]  [phone]   Jun 12, 2019  2:15 PM CDT  Office Visit with Mauro Paredes MD  [address] [address]  [phone]             Anti-Seizure Meds Protocol  Failed - 6/3/2019 10:43 PM        Failed - Review Authorizing provider's last note.      Refer to last progress notes: confirm request is for original authorizing provider (cannot be through other providers).          Failed - Normal CBC on file in past 26 months     Recent Labs   Lab Test 05/28/19  1249   WBC 13.9*   RBC 3.95   HGB 13.7   HCT 41.5                    Passed - Recent (12 mo) or future (30 days) visit within the authorizing provider's specialty     Patient had office visit in the last 12 months or has a visit in the next 30 days with authorizing provider or within the authorizing provider's specialty.  See \"Patient Info\" tab in inbasket, or \"Choose Columns\" in Meds & Orders section of the refill encounter.              Passed - Normal ALT or AST on file in past 26 months     Recent Labs   Lab Test 05/28/19  1249   ALT 33     Recent Labs   Lab Test 05/28/19  1249   AST 20             Passed - Normal platelet count on file in past 26 months     Recent Labs   Lab Test 05/28/19  1249                  Passed - Medication is active on med list        Passed - No active pregnancy on record    "     Passed - No positive pregnancy test in last 12 months        Routing refill request to provider for review/approval because:  Labs out of range:  CBC  A break in medication  T'd up 1 month for provider review.      SAMARA ContrerasN, RN

## 2019-06-06 RX ORDER — TOPIRAMATE 50 MG/1
50 TABLET, FILM COATED ORAL 2 TIMES DAILY
Qty: 60 TABLET | Refills: 0 | Status: SHIPPED | OUTPATIENT
Start: 2019-06-06 | End: 2019-06-24

## 2019-06-07 ENCOUNTER — MYC MEDICAL ADVICE (OUTPATIENT)
Dept: FAMILY MEDICINE | Facility: CLINIC | Age: 49
End: 2019-06-07

## 2019-06-07 DIAGNOSIS — F41.1 GENERALIZED ANXIETY DISORDER: Primary | ICD-10-CM

## 2019-06-07 DIAGNOSIS — F41.0 PANIC ATTACKS: ICD-10-CM

## 2019-06-07 DIAGNOSIS — F33.0 MAJOR DEPRESSIVE DISORDER, RECURRENT EPISODE, MILD (H): ICD-10-CM

## 2019-06-09 NOTE — TELEPHONE ENCOUNTER
Please contact patient and find out if she has followed up with [facility] with making an appointment to see the psychiatrist as well as setting up therapy (talk doc) appointment.  She was instructed to do this at her last office visit.  Will have staff notify patient to do this prior to seeing her at her follow-up appointment later this week.    Mauro Paredes MD

## 2019-06-11 NOTE — TELEPHONE ENCOUNTER
Patient has messaged that she is scheduled with counselor here at [place] on 6/25. Patient request to see our Psychiatry, conflict of interest (family and self) with seeing [facility]. New Mental Health referral placed for scheduling of patient with psychiatry here in [place] with Amanda Blanchard when scheduling is available. Yazmin Mcdaniels LPN

## 2019-06-11 NOTE — PROGRESS NOTES
[facility]    CHIEF COMPLAINT: Pain  -Fibromyalgia  -Neck pain  -Low back pain    INTERVAL HISTORY:  Last seen on 5/15/2019.        Recommendations/plan at the last visit included:  1. Physical Therapy: continue silver sneakers;   2. Clinical Health Psychologist:  YES, discuss with [facility].  I would recommend that the patient do a follow-up appointment with [facility] regarding their plan for her anxiety.  My concern on if she stops the clonazepam and does not have another plan in place for the anxiety is that her fibromyalgia will remain in a flare, this would make it more difficult for her to be tapered off of her Norco.  Discussed the importance of having this under control.  Patient verbalized her understanding.  Discussed that I would agree with a non-benzodiazepine plan for her anxiety if possible.  3. Diagnostic Studies: none at this time  4. Medication Management:     1. Continue Cymbalta at 60mg at bedtime    2.  Okay to continue Flexeril   3. Continue Hydrocodone at max of 6 tabs/day.  We will continue the number of  tablets at 175/month, however discussed with the patient that we will decrease the number of tablets she gets next month.  She verbalized understanding.  I also discussed with the patient that she needs to call me prior to increasing the dose of her Norco.   4.  increase topiramate to 75 mg at bedtime  5. Further procedures recommended: Trigger point injections      Follow up with this provider: 4 weeks     Since her last visit, Sherie Otero reports:    -Her stress has been better. She is having worsening panic attacks due to being weaned off the Xanax. She is thinking that due to the tension of this, the trigger point injections did not last long. She tried to increase the Topamax and felt more groggy in the morning. She has noticed that if she takes her Flexeril in the morning with her other meds this will make her tired as well, so she will sometimes take  this later in the day.    She did make an appointment with counseling here and is hoping to see the psychiatrist here as well.       Pain Information:   Pain quality:  Aching, stabbing, miserable, and throbbing   Pain rating: intensity ranges from 2/10 to 10/10, and averages 8/10 on a 0-10 scale.   Pain today 8/10      UDS 1/19/2019   CSA 2/04/2019    CURRENT RELEVANT PAIN MEDICATIONS:    Flexeril-1 in AM and 2 at HS  Cymbalta-60mg at night  Hydrocodone 7.5mg -currently taking 2 tablets three times a day   Topamax-  2 tabs at HS  Ibuprofen-will take 800mg up to 3 times day, doesn't take daily    Patient is using the medication as prescribed:  YES  Is your medication helpful? somewhat   Medication side effects? no side effect    Previous Medications: (H--helped; HI--Helped initially; SWH-- somewhat helpful, NH--No help; W--worse; SE--side effects)   Opiates: Hydrocodone H, Oxycodone SE  NSAIDS: Ibuprofen H  Muscle Relaxants: Tizanidine NH, Flexeril H, Robaxin NH SE stomach upset  Anti-migraine mediations: Verapamil H  Anti-depressants: Cymbalta H  Sleep aids: none  Anxiolytics: Xanax H, Clonazepam H, Valium H  Neuropathics: Gabapentin SE dizziness          Topicals: Lidocaine patches SW  Other medications not covered above: Savella H at first, then seemed to stop working, Lyrica SE shortness of breath, felt 'weird' on them, throat felt weird. Prednisone H for arthritis flare    Past Pain Treatments:  Pain Clinic:   No   PT: Yes, years ago. Has not done pool therapy.   Psychologist: No  Relaxation techniques/biofeedback: No  Chiropractor: No, was told not to because of neck.   Acupuncture: Yes for headaches.   Pharmacotherapy:           Opioids: Yes            Non-opioids:    Yes   TENs Unit:Yes  Injections: cervical medial branch blocks 6/12/2014  Self-care:   Yes, ice/heat, hot baths, theracare  Surgeries related to pain: No    [place] Board of Pharmacy Data Base Reviewed:    YES; As expected, no concern for  misuse/abuse of controlled medications based on this report.      THE 4 As OF OPIOID MAINTENANCE ANALGESIA    Analgesia: Is pain relief clinically significant? YES   Activity: Is patient functional and able to perform Activities of Daily Living? YES   Adverse effects: Is patient free from adverse side effects from opiates? YES   Adherence to Rx protocol: Is patient adhering to Controlled Substance Agreement and taking medications ONLY as ordered? YES       Is Narcan prescribed for opiate use >50 MME daily? yes      Total Daily MME: 37.5-45    Medications:  Current Outpatient Medications   Medication Sig Dispense Refill     albuterol (VENTOLIN HFA) 108 (90 Base) MCG/ACT inhaler Inhale 2 puffs into the lungs every 6 hours as needed for shortness of breath / dyspnea or wheezing 18 g 1     busPIRone (BUSPAR) 10 MG tablet TAKE 5 MG (1/2 TAB) IN THE MORNING AND 10 MG AT NIGHT 135 tablet 3     cefdinir (OMNICEF) 300 MG capsule Take 1 capsule (300 mg) by mouth 2 times daily 20 capsule 0     cetirizine (ZYRTEC) 10 MG tablet TAKE  ONE TABLET BY MOUTH EVERY DAY. 90 tablet 3     clonazePAM (KLONOPIN) 0.5 MG tablet Take 1 tablet (0.5 mg) by mouth 2 times daily .  MUST LAST 30 DAYS. 60 tablet 0     cyclobenzaprine (FLEXERIL) 10 MG tablet Take 2 tablets in the evening and 1 in the morning 90 tablet 1     CYMBALTA 60 MG capsule TAKE ONE CAPSULE BY MOUTH EVERY EVENING - KADY 90 capsule 1     fluticasone (FLONASE) 50 MCG/ACT spray SPRAY 2 SPRAYS INTO BOTH NOSTRILS DAILY. 16 g 11     folic acid (FOLVITE) 1 MG tablet Take 1 tablet (1 mg) by mouth daily 100 tablet 3     HYDROcodone-acetaminophen (NORCO) 7.5-325 MG per tablet TAKE 2 TABLETS BY MOUTH EVERY 6 HOURS AS NEEDED FOR PAIN--MAX OF 6 TABLETS PER DAY. 170 tablet 0     metaxalone (SKELAXIN) 800 MG tablet Take 1 tablet (800 mg) by mouth 3 times daily 60 tablet 0     methotrexate sodium 2.5 MG TABS Take 8 tablets (20 mg) by mouth every 7 days 32 tablet 3     Multiple Vitamins-Minerals  "(MULTIVITAL PO)        predniSONE (DELTASONE) 5 MG tablet Take 5 mg by mouth daily as needed for peripheral joint pain from rheumatoid arthritis; use sparingly. 30 tablet 1     topiramate (TOPAMAX) 50 MG tablet Take 1 tablet (50 mg) by mouth 2 times daily 60 tablet 0     verapamil (CALAN) 40 MG tablet Take 1 tablet (40 mg) by mouth 2 times daily 180 tablet 3     naloxone (NARCAN) nasal spray Spray 1 spray (4 mg) into one nostril alternating nostrils as needed for opioid reversal every 2-3 minutes until assistance arrives (Patient not taking: Reported on 6/12/2019) 0.2 mL 0       Review of Systems: A 10-point review of systems was negative, with the exception of chronic pain issues, fatigue, headache, dizziness, sinus infection, allergies, palpitations, diarrhea, constipation, steroid use, anxiety, and stress      Social History: Reviewed; unchanged from previous consultation.      Family history: Reviewed; unchanged from previous consultation.     PHYSICAL EXAM:     Vitals:  BP 98/60   Temp 98.4  F (36.9  C) (Temporal)   Ht 1.651 m (5' 5\")   Wt 62.6 kg (138 lb)   BMI 22.96 kg/m        Constitutional: healthy, alert and no distress  HEENT: Head atraumatic, normocephalic. Eyes without conjunctival injection or jaundice. Neck supple. No obvious neck masses.  Psychiatric/mental status: Alert, without lethargy or stupor. Appropriate affect. Mood normal.   Neurologic exam: Gait is normal         DIAGNOSTIC TESTS:  Imaging Studies:   No new imaging to review    Assessment:  Sherie Otero is a 48 year old female who presents today for follow up regarding her:    1.  Low back pain  2.  Neck pain  3.  Chronic pain syndrome  4.  Fibromyalgia      Overall she states that her activity level and her mood has been a little bit better.  She did feel better when she had the trigger point injections.  She is hoping to repeat those.  She has been unable to tolerate any increase in the Topamax due to increased sedation.  Stress " has been better.  She does plan to meet with a psychiatrist here in [place].        Plan:  Diagnosis reviewed, treatment option addressed, and risk/benifits discussed.  Self-care instructions given.  I am recommending a multidisciplinary treatment plan to help this patient better manage pain.      1. Physical Therapy: continue silver sneakers;   2. Clinical Health Psychologist:  YES, meet with providers in [place] per Dr. Paredes's recommendation.  3. Diagnostic Studies: none at this time  4. Medication Management:     1. Continue Cymbalta at 60mg at bedtime.  Patient to talk with Dr. Paredes about potentially increasing Cymbalta to taking 20 mg in the morning and 60 mg at bedtime.   2.  Okay to continue Flexeril   3. Continue Hydrocodone at max of 6 tabs/day.  We talked about different ways to continue a taper.  We talked about reducing her hydrocodone from 7.5 mg down to 5 mg.  At this time working to continue on the 7.5 mg but we will drop the number of tablets to 170/month.     4.  continue topiramate at 50 mg at bedtime  5. Further procedures recommended: Trigger point injections tomorrow    Follow up with this provider: 4 weeks     Total time spent face to face was 20 minutes and more than 50% of face to face time was spent in counseling and/or coordination of care regarding the diagnosis and recommendations above.      Celia Bettencourt PA-C   [facility]

## 2019-06-12 ENCOUNTER — MYC MEDICAL ADVICE (OUTPATIENT)
Dept: FAMILY MEDICINE | Facility: CLINIC | Age: 49
End: 2019-06-12

## 2019-06-12 ENCOUNTER — OFFICE VISIT (OUTPATIENT)
Dept: PALLIATIVE MEDICINE | Facility: CLINIC | Age: 49
End: 2019-06-12
Payer: MEDICARE

## 2019-06-12 VITALS
HEIGHT: 65 IN | WEIGHT: 138 LBS | TEMPERATURE: 98.4 F | BODY MASS INDEX: 22.99 KG/M2 | SYSTOLIC BLOOD PRESSURE: 98 MMHG | DIASTOLIC BLOOD PRESSURE: 60 MMHG

## 2019-06-12 DIAGNOSIS — M79.7 FIBROMYALGIA: ICD-10-CM

## 2019-06-12 DIAGNOSIS — G89.4 CHRONIC PAIN SYNDROME: ICD-10-CM

## 2019-06-12 DIAGNOSIS — F41.1 GENERALIZED ANXIETY DISORDER: ICD-10-CM

## 2019-06-12 DIAGNOSIS — M54.2 CERVICALGIA: ICD-10-CM

## 2019-06-12 DIAGNOSIS — M54.50 CHRONIC BILATERAL LOW BACK PAIN WITHOUT SCIATICA: ICD-10-CM

## 2019-06-12 DIAGNOSIS — G89.29 CHRONIC BILATERAL LOW BACK PAIN WITHOUT SCIATICA: ICD-10-CM

## 2019-06-12 PROCEDURE — 99213 OFFICE O/P EST LOW 20 MIN: CPT | Performed by: PHYSICIAN ASSISTANT

## 2019-06-12 RX ORDER — CLONAZEPAM 0.5 MG/1
0.5 TABLET ORAL 2 TIMES DAILY
Qty: 20 TABLET | Refills: 0 | Status: SHIPPED | OUTPATIENT
Start: 2019-06-15 | End: 2019-06-24

## 2019-06-12 RX ORDER — HYDROCODONE BITARTRATE AND ACETAMINOPHEN 7.5; 325 MG/1; MG/1
TABLET ORAL
Qty: 170 TABLET | Refills: 0 | Status: SHIPPED | OUTPATIENT
Start: 2019-06-12 | End: 2019-07-08

## 2019-06-12 RX ORDER — CYCLOBENZAPRINE HCL 10 MG
TABLET ORAL
Qty: 90 TABLET | Refills: 1 | Status: SHIPPED | OUTPATIENT
Start: 2019-06-12 | End: 2019-08-07

## 2019-06-12 ASSESSMENT — MIFFLIN-ST. JEOR: SCORE: 1246.84

## 2019-06-12 ASSESSMENT — PAIN SCALES - GENERAL: PAINLEVEL: EXTREME PAIN (8)

## 2019-06-12 NOTE — TELEPHONE ENCOUNTER
I can extend her current prescriptions to last her until her next scheduled appointment.  Will give her 10 more days of clonazepam at current dose and refill the flexeril.  Looks like all other medications should last until that visit.  She can let us know if I missed any.  We will see her on 6/24.    Will have staff notify patient.    Mauro Paredes MD

## 2019-06-12 NOTE — PATIENT INSTRUCTIONS
After Visit Instructions:     Thank you for coming to [facility] for your care. It is my goal to partner with you to help you reach your optimal state of health.     I am recommending multidisciplinary care at this time.  The focus of care will be to continue gradual rehabilitation and pain management with medication adjustments as needed.    Continue daily self-care, identifying contributing factors, and monitoring variations in pain level. Continue to integrate self-care into your life.      1. Schedule pain psychology assessment/visit: keep your appointment once scheduled  2. Schedule physical therapy assessment/visit: continue silver sneakers  3. Schedule follow-up with Celia Bettencourt PA-C in 4 weeks. You will need to make this appointment.   4. Procedures recommended: schedule trigger point injections with me tomorrow   5. Medication recommendations:   1. Hydrocodone 7.5mg/325m tablets to last until 2019.  2. Continue Topamax 50mg at bedtime  3. Continue Flexeril  4. Talk to Dr. Paredes about increasing Cymbalta to 20mg in AM and 60mg at bedtime      Celia Bettecnourt PA-C  [facility]  [facility]    Contact information: [facility]  Clinic Number:  [phone]   Call this number with any questions about your care and for scheduling assistance. Calls are returned Monday through Friday between 8 AM and 4:30 PM. We usually get back to you within 2 business days depending on the issue/request.           Medication Refills Policy:    For non-narcotic medications, please your pharmacy directly to request a refill and the pharmacy will call the [facility] for authorization. Please allow 3-4 days for these refills.    For narcotic refills, call the nurse triage line and leave a message requesting your refill along with the name of the pharmacy that you use. Narcotic prescriptions will be mailed to your pharmacy or you  may pick them up at the clinic.  Please call 7-10 days before your refill is due  The above policy allows adequate time so that you do not run out of medication.    No Show - Late Cancellation - Late Arrival Policy  We believe regular attendance is key to your success in our program.    Any time you are unable to keep your appointment we ask that you call us at least 24 hours in advance to let us know. This will allow us to offer the appointment time to another patient. The following is our policy for missed appointments. This also applies to appointments cancelled with less than 24 hours notice.    After missing 3 appointments without calling first, we will cancel all of your future appointments at [facility].    At that point, you will not be able to resume services unless approved by your care team  We understand that unforseen circumstances arise, however, out of respect for all concerned and to provide this appointment to another patient, this policy will be enforced.    Please note that most follow up appointments are 30 minutes long. If you arrive late, your provider may not be able to see you for the entire 30 minutes. Please also note that if you arrive more than 15 minutes for any appointment, it may be rescheduled.

## 2019-06-12 NOTE — TELEPHONE ENCOUNTER
[employer] message sent to patient regarding Rx.. Klonopin Rx walked to [facility] pharmacy. Renard/MA

## 2019-06-13 ENCOUNTER — OFFICE VISIT (OUTPATIENT)
Dept: PALLIATIVE MEDICINE | Facility: CLINIC | Age: 49
End: 2019-06-13
Payer: MEDICARE

## 2019-06-13 VITALS
HEIGHT: 65 IN | WEIGHT: 138 LBS | BODY MASS INDEX: 22.99 KG/M2 | SYSTOLIC BLOOD PRESSURE: 106 MMHG | DIASTOLIC BLOOD PRESSURE: 72 MMHG | TEMPERATURE: 98 F

## 2019-06-13 DIAGNOSIS — M79.18 MYOFASCIAL PAIN: Primary | ICD-10-CM

## 2019-06-13 PROCEDURE — 20553 NJX 1/MLT TRIGGER POINTS 3/>: CPT | Performed by: PHYSICIAN ASSISTANT

## 2019-06-13 ASSESSMENT — PAIN SCALES - GENERAL: PAINLEVEL: EXTREME PAIN (9)

## 2019-06-13 ASSESSMENT — MIFFLIN-ST. JEOR: SCORE: 1246.84

## 2019-06-13 NOTE — PATIENT INSTRUCTIONS
[facility]   Post Procedure Instructions    Today you had:  trigger point injections        Medications used:  lidocaine   bupivicaine   kenolog           Go to the emergency room if you develop any shortness of breath    Monitor the injection sites for signs and symptoms of infection-fever, chills, redness, swelling, warmth, or drainage to areas.    You may have soreness at injection sites for up to 24 hours.    You may apply ice to the painful areas to help minimize the discomfort of the needle pokes.    Do not apply heat to sites for at least 12 hours.    You may use anti-inflammatory medications or Tylenol for pain control if necessary    Pain Clinic phone number during work hours Monday-Friday:  [phone]    After hours provider line: [phone]

## 2019-06-13 NOTE — PROGRESS NOTES
Pre Procedure Diagnosis: myofascial pain  Post procedure Diagnosis: Same  Procedure performed: trigger point injections  Anesthesia: none  Complications: none  Operators: Celia Bettencourt PA-C     Indications:   Sherie Otero is a 48 year old female with a history of neck pain.  Exam shows myofascial pain of the muscle groups listed below and they have tried conservative treatment including medications and physical therapy.    Options/alternatives, benefits and risks were discussed with the patient including bleeding, infection, tissue trauma and pnuemothorax.  Questions were answered to her satisfaction and she agrees to proceed. Voluntary informed consent was obtained and signed.     Vitals were reviewed: Yes  Allergies were reviewed:  Yes   Medications were reviewed:  Yes   Pre-procedure pain score: 9/10    Procedure:  After getting informed consent, a Pause for the Cause was performed.    Trigger points were identified by patient, and marked when appropriate.  The area was prepped with Chloroprep.    Using clean technique, injections were completed using a 25G, 1.5 inch needle.  After negative aspiration, injection was completed.  A total of 9 locations were injected.  When possible, tissue was retracted from the chest wall to avoid lung injury.    Muscle groups injected: all bilateral  Trapezius  Levator scapulae    Injection solution contained:  6.5ml of 1% lidocaine and 6.5ml of 0.5% bupivacaine and 10mg Kenalog.    Hemostasis was achieved, the area was cleaned, and bandaids were placed when appropriate.  The patient tolerated the procedure well.  Breath sounds were normal.      Post-procedure pain score: 2/10  Follow-up includes:   -repeat prn      Celia Bettencourt, PAC  [facility]

## 2019-06-14 NOTE — TELEPHONE ENCOUNTER
I spoke with Celine Rasheed and asked her to give patient a call and discuss some behavioral health options to help manage her anxiety symptoms while she waits to see me at her scheduled appointment.  Perhaps Lynnette could work her in sooner, too.  We can discuss her medication management more at her upcoming appointment.  She should have enough of her current prescriptions to get her to that appointment.    Mauro Paredes MD

## 2019-06-17 NOTE — TELEPHONE ENCOUNTER
Phone Encounter   [facility] spoke with patient, by PCP request. Introduced self and role. Patient reports that her anxiety has been challenging to manage. She reports that she had a bad panic attack two days ago. She states that she will lay down and cry when she has them. She states that she doesn't know what to do and she has been going through a lot. She currently has no more Xanax. Patient has only just started working with her current PCP, about 5 months, and she is trying to determine whether it's a good fit as she wants to feel heard in her visits. She identified that it's hard to share her story as there is a lot in her past. She understands that she can see another provider if she doesn't feel it's a good fit and she's concerned that it may look bad on her if she switches. Validated patient's feelings and encouraged her to do what she felt that she needed to, as it's important to feel comfortable with her provider to be open and honest. Reflected that many providers are trying to reduce use of medications that can be addictive. Patient stated that she understood. Patient states that she plans to call the [facility] office to see if she can schedule with [facility] therapist, Lynnette, for this week and to also schedule with psychiatry as her PCP placed the referral. Patient also confirmed that she is scheduled with her PCP on 6/24.  She understands that she can call this writer with any further questions or concerns.

## 2019-06-24 ENCOUNTER — OFFICE VISIT (OUTPATIENT)
Dept: FAMILY MEDICINE | Facility: CLINIC | Age: 49
End: 2019-06-24
Payer: MEDICARE

## 2019-06-24 VITALS
TEMPERATURE: 98.2 F | HEIGHT: 65 IN | DIASTOLIC BLOOD PRESSURE: 62 MMHG | SYSTOLIC BLOOD PRESSURE: 108 MMHG | WEIGHT: 135 LBS | RESPIRATION RATE: 18 BRPM | BODY MASS INDEX: 22.49 KG/M2 | OXYGEN SATURATION: 99 % | HEART RATE: 76 BPM

## 2019-06-24 DIAGNOSIS — M79.7 FIBROMYALGIA: ICD-10-CM

## 2019-06-24 DIAGNOSIS — F41.1 GENERALIZED ANXIETY DISORDER: ICD-10-CM

## 2019-06-24 PROCEDURE — 99215 OFFICE O/P EST HI 40 MIN: CPT | Performed by: FAMILY MEDICINE

## 2019-06-24 RX ORDER — BUSPIRONE HYDROCHLORIDE 10 MG/1
TABLET ORAL
Qty: 135 TABLET | Refills: 3
Start: 2019-06-24 | End: 2019-07-12

## 2019-06-24 RX ORDER — TOPIRAMATE 50 MG/1
50 TABLET, FILM COATED ORAL 2 TIMES DAILY
Qty: 60 TABLET | Refills: 1 | Status: SHIPPED | OUTPATIENT
Start: 2019-06-24 | End: 2019-09-09

## 2019-06-24 RX ORDER — CLONAZEPAM 0.5 MG/1
TABLET ORAL
Qty: 22 TABLET | Refills: 0 | Status: SHIPPED | OUTPATIENT
Start: 2019-06-24 | End: 2019-07-12

## 2019-06-24 RX ORDER — DULOXETIN HYDROCHLORIDE 20 MG/1
20 CAPSULE, DELAYED RELEASE ORAL DAILY
Qty: 30 CAPSULE | Refills: 1 | Status: SHIPPED | OUTPATIENT
Start: 2019-06-24 | End: 2019-07-25

## 2019-06-24 ASSESSMENT — PAIN SCALES - GENERAL: PAINLEVEL: EXTREME PAIN (8)

## 2019-06-24 ASSESSMENT — MIFFLIN-ST. JEOR: SCORE: 1233.24

## 2019-06-24 NOTE — PROGRESS NOTES
Subjective     Sherie Otero is a 48 year old female who presents to clinic today for the following health issues:    HPI   Medication Followup / recheck medication     Taking Medication as prescribed: yes    Side Effects:  None    Medication Helping Symptoms:  yes           Here today for follow-up on anxiety management.  At last appointment, I tapered her off her afternoon Xanax prescription.  She states that her anxiety worsened and she was having problems dealing with it.  She started to delay her morning clonazepam dose so that there was enough coverage of her anxiety to get her through the afternoon.  She has been off the Xanax now for 2 weeks.  She would like to know of a different option.  She continues to take clonazepam 0.5 mg twice daily.    She felt it was important to tell me the nature behind her anxiety.  She states her  was killed by a medication overdose as a result of the overprescribing of medication to him by his psychiatrist at [facility] (this is why she does not want to go there).  She feels it was a cover-up by them and the police because he was a confidential informant for a larger biker gang and they found out and tried to kill them.  Because he had not testified or did anything formal, they were not placed in witness protection program.      She notes that Buspar in the past has made her drowsy.  However, she does not know if she has taken this without also being on benzodiazepines.  She notes that she is most anxious during the day and less at night.      She saw pain clinic doctor who recommended she add in a 20 mg qAM dose of Cymbalta for pain management as long as it was okay with me.    Reviewed and updated as needed this visit by Provider  Tobacco  Allergies  Meds  Problems  Med Hx  Surg Hx  Fam Hx         Review of Systems   ROS COMP: Constitutional, HEENT, cardiovascular, pulmonary, GI, , musculoskeletal, neuro, skin, endocrine and psych systems are  "negative, except as otherwise noted.      Objective    /62   Pulse 76   Temp 98.2  F (36.8  C) (Temporal)   Resp 18   Ht 1.651 m (5' 5\")   Wt 61.2 kg (135 lb)   SpO2 99%   Breastfeeding? No   BMI 22.47 kg/m    Body mass index is 22.47 kg/m .  Physical Exam   Constitutional: She appears well-developed and well-nourished. No distress.   Cardiovascular: Normal rate, regular rhythm and normal heart sounds. Exam reveals no friction rub.   No murmur heard.  Pulmonary/Chest: Effort normal and breath sounds normal. No stridor. She has no decreased breath sounds. She has no wheezes. She has no rhonchi. She has no rales.   Neurological: She is alert.   Psychiatric: Her speech is normal and behavior is normal. Thought content normal. Cognition and memory are normal. She exhibits a depressed mood (affect flat).                  Assessment & Plan     ASSESSMENT/ORDERS:    ICD-10-CM    1. Fibromyalgia M79.7 topiramate (TOPAMAX) 50 MG tablet     DULoxetine (CYMBALTA) 20 MG capsule   2. Generalized anxiety disorder F41.1 clonazePAM (KLONOPIN) 0.5 MG tablet     busPIRone (BUSPAR) 10 MG tablet     DULoxetine (CYMBALTA) 20 MG capsule     PLAN:  1.  Will add in 20 mg Cymbalta in AM as recommended by pain clinic.  2.  Will have her start to titrate the evening dose of Buspar to 15 mg nightly from 10 mg dose.  Will also cut down on clonazepam to 0.25 mg nightly.  Will keep all other medication the same at this time.  3.  Will eventually like to get her in with psychiatry provider here but waiting on her schedule to officially open up.       Tobacco Cessation:   reports that she has been smoking cigarettes.  She has a 14.50 pack-year smoking history. She has never used smokeless tobacco.  Tobacco Cessation Action Plan: Information offered: Patient not interested at this time            Return in about 2 weeks (around 7/8/2019) for medication recheck.    I spent >40 minutes of face to face time with the patient, >50% of which " was spent counseling and coordination of care regarding management of pain and anxiety medications.     Mauro Paredes MD  [facility]

## 2019-06-25 ENCOUNTER — OFFICE VISIT (OUTPATIENT)
Dept: PSYCHOLOGY | Facility: CLINIC | Age: 49
End: 2019-06-25
Payer: MEDICARE

## 2019-06-25 DIAGNOSIS — F33.1 MAJOR DEPRESSIVE DISORDER, RECURRENT EPISODE, MODERATE WITH ANXIOUS DISTRESS (H): Primary | ICD-10-CM

## 2019-06-25 DIAGNOSIS — F43.10 POST TRAUMATIC STRESS DISORDER (PTSD): ICD-10-CM

## 2019-06-25 PROCEDURE — 90791 PSYCH DIAGNOSTIC EVALUATION: CPT | Performed by: SOCIAL WORKER

## 2019-06-25 ASSESSMENT — ANXIETY QUESTIONNAIRES
2. NOT BEING ABLE TO STOP OR CONTROL WORRYING: NEARLY EVERY DAY
6. BECOMING EASILY ANNOYED OR IRRITABLE: NOT AT ALL
1. FEELING NERVOUS, ANXIOUS, OR ON EDGE: NEARLY EVERY DAY
GAD7 TOTAL SCORE: 16
5. BEING SO RESTLESS THAT IT IS HARD TO SIT STILL: SEVERAL DAYS
3. WORRYING TOO MUCH ABOUT DIFFERENT THINGS: NEARLY EVERY DAY
IF YOU CHECKED OFF ANY PROBLEMS ON THIS QUESTIONNAIRE, HOW DIFFICULT HAVE THESE PROBLEMS MADE IT FOR YOU TO DO YOUR WORK, TAKE CARE OF THINGS AT HOME, OR GET ALONG WITH OTHER PEOPLE: VERY DIFFICULT
7. FEELING AFRAID AS IF SOMETHING AWFUL MIGHT HAPPEN: NEARLY EVERY DAY

## 2019-06-25 ASSESSMENT — COLUMBIA-SUICIDE SEVERITY RATING SCALE - C-SSRS
3. HAVE YOU BEEN THINKING ABOUT HOW YOU MIGHT KILL YOURSELF?: NO
1. IN THE PAST MONTH, HAVE YOU WISHED YOU WERE DEAD OR WISHED YOU COULD GO TO SLEEP AND NOT WAKE UP?: NO
5. HAVE YOU STARTED TO WORK OUT OR WORKED OUT THE DETAILS OF HOW TO KILL YOURSELF? DO YOU INTEND TO CARRY OUT THIS PLAN?: NO
2. HAVE YOU ACTUALLY HAD ANY THOUGHTS OF KILLING YOURSELF?: NO
4. HAVE YOU HAD THESE THOUGHTS AND HAD SOME INTENTION OF ACTING ON THEM?: NO
4. HAVE YOU HAD THESE THOUGHTS AND HAD SOME INTENTION OF ACTING ON THEM?: NO
2. HAVE YOU ACTUALLY HAD ANY THOUGHTS OF KILLING YOURSELF LIFETIME?: NO
1. IN THE PAST MONTH, HAVE YOU WISHED YOU WERE DEAD OR WISHED YOU COULD GO TO SLEEP AND NOT WAKE UP?: NO
5. HAVE YOU STARTED TO WORK OUT OR WORKED OUT THE DETAILS OF HOW TO KILL YOURSELF? DO YOU INTEND TO CARRY OUT THIS PLAN?: NO

## 2019-06-25 ASSESSMENT — PATIENT HEALTH QUESTIONNAIRE - PHQ9
5. POOR APPETITE OR OVEREATING: NEARLY EVERY DAY
SUM OF ALL RESPONSES TO PHQ QUESTIONS 1-9: 13

## 2019-06-25 NOTE — Clinical Note
Good Afternoon,I met with this patient yesterday to complete a diagnostic assessment, and will be continuing to meet with her weekly for therapy services. Please let me know if there are any additional questions or concerns, and thank you for the referral. Lynnette

## 2019-06-25 NOTE — PROGRESS NOTES
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 the past 30 days, not counting the days that you were totally unable, for how many days did you cut back or reduce your usual activities or work because of any health condition? Record number of days 15     Attendance Agreement:  Client has signed Attendance Agreement:Yes      Collaboration:  Collaboration with other professionals is not indicated at this time.      Preliminary Treatment Plan:  The client reports no currently identified Zoroastrianism, ethnic or cultural issues relevant to therapy.     services are not indicated.    Modifications to assist communication are not indicated.    The concerns identified by the client will be addressed in therapy.    Initial Treatment will focus on: Depressed Mood - Client would like to experience a decrease in her symptoms of depression, process current life stressors and previous stressful and traumatic life events, as well as further develop adaptive coping skills to assist her in managing her symptoms.   Anxiety - Client would like to experience a decrease in her symptoms of anxiety, process current life stressors and previous stressful and traumatic life events, as well as further develop adaptive coping  skills to assist her in managing her symptoms  Grief / Loss - Client would like to process through her feelings of grief in regard to multiple significant losses and traumas.    As a preliminary treatment goal, client will experience a reduction in depressed mood, will develop more effective coping skills to manage depressive symptoms, will develop healthy cognitive patterns and beliefs, will increase ability to function adaptively and will continue to take medications as prescribed / participate in supportive activities and services , will experience a reduction in anxiety, will develop more effective coping skills to manage anxiety symptoms, will develop healthy cognitive patterns and beliefs and will increase ability to function adaptively and will increase understanding of normal grieving process, will engage in effective approach to address and resolve grief/loss issues and will process grief/loss issues in an adaptive manner.    The focus of initial interventions will be to alleviate anxiety, alleviate depressed mood, increase ability to function adaptively, increase coping skills, process losses, process traumas, provide homework to reinforce skill development, teach CBT skills, teach distress tolerance skills, teach mindfulness skills, teach relaxation strategies and teach stress mangement techniques.    Referral to another professional/service is not indicated at this time.    A Release of Information is not needed at this time.    Report to child / adult protection services was NA.    Client will have access to their [facility] medical record.    Lynnette Guaman, [facility]  June 25, 2019

## 2019-06-26 ASSESSMENT — ANXIETY QUESTIONNAIRES: GAD7 TOTAL SCORE: 16

## 2019-07-02 ENCOUNTER — OFFICE VISIT (OUTPATIENT)
Dept: PSYCHOLOGY | Facility: CLINIC | Age: 49
End: 2019-07-02
Payer: MEDICARE

## 2019-07-02 ENCOUNTER — MYC MEDICAL ADVICE (OUTPATIENT)
Dept: PALLIATIVE MEDICINE | Facility: CLINIC | Age: 49
End: 2019-07-02

## 2019-07-02 DIAGNOSIS — F33.1 MAJOR DEPRESSIVE DISORDER, RECURRENT EPISODE, MODERATE WITH ANXIOUS DISTRESS (H): Primary | ICD-10-CM

## 2019-07-02 DIAGNOSIS — F43.10 POST TRAUMATIC STRESS DISORDER: ICD-10-CM

## 2019-07-02 PROCEDURE — 90834 PSYTX W PT 45 MINUTES: CPT | Performed by: SOCIAL WORKER

## 2019-07-02 NOTE — PATIENT INSTRUCTIONS
"Between now and the next session, Sherie will make a short daily \"to do\" list each day, so that household tasks feel less overwhelming. She will also continue to utilize previous helpful coping skills, as well as coping skills discussed in session today (grounding, relaxation, distraction,  self from anxiety). She will also start to track her dreams and anxiety/panic symptoms as they occur, to process potential patterns.  "

## 2019-07-02 NOTE — PROGRESS NOTES
Progress Note    Patient Name: Sherie Otero  Date: 7/2/19         Service Type: Individual  Video Visit: No     Session Start Time: 12:00pm Session End Time: 12:45pm     Session Length: 45 minutes    Session #: 2    Attendees: Client attended alone     Treatment Plan Last Reviewed: Treatment plan was developed and reviewed during today's session.  PHQ-9 / CELIA-7 : completed on   PHQ-9:  CELIA-7:    DATA  Interactive Complexity: No  Crisis: No       Progress Since Last Session (Related to Symptoms / Goals / Homework):   Symptoms: Worsening Client reports that she has been experiencing panic attacks on a daily basis, often with no identifiable trigger, which is distressing to her. Coping skills were discussed in session today, and client feels as though she will be able to implement these outside of session. She also plans to follow up with her primary care provider in regard to recent medication changes.    Homework: N/A--previous session was a diagnostic assessment, so no homework was assigned.       Episode of Care Goals: N/A--Treatment plan goals were developed during today's session     Current / Ongoing Stressors and Concerns:   Client reports that her primary stressors at this time are not knowing when she is going to experience anxiety or panic  symptoms, and then worrying about how she will manage them when they occur, financial stress, her daughter's  safety and well-being as she [place] out of state, and her son residing with her, due to his disability, and recently  assisting him in filling out the paperwork for a legal guardian/wondering if and when this process will be completed.      Treatment Objective(s) Addressed in This Session:   N/A--Treatment plan objectives were developed during today's session       Intervention:   CBT:Client and I spent time in today's session discussing the current symptoms and situations in her life that she finds  Most challenging,  as well as the thoughts, emotions and behaviors that often accompany these symptoms and  situations.We discussed her starting to track her symptoms between now and the next session, making note of  symptoms she was experiencing, time of day, what was occurring at the time, as well as thoughts, emotions and  behaviors in response to the situation/symptoms, so that we could process this further in session, and identify any  potential patterns.      Motivational Interviewing: Therapist utilized open ended questions, reflective listening, affirmations, summarizing and  eliciting self-motivational statements to better understand client's perception of her current challenges, and to develop  a treatment plan that is meaningful and useful to her in working toward her identified goals.         ASSESSMENT: Current Emotional / Mental Status (status of significant symptoms):   Risk status (Self / Other harm or suicidal ideation)   Patient denies current fears or concerns for personal safety.   Patient denies current or recent suicidal ideation or behaviors.   Patientdenies current or recent homicidal ideation or behaviors.   Patient denies current or recent self injurious behavior or ideation.   Patient denies other safety concerns.   Patient Patient reports there has been no change in risk factors since their last session.     PatientPatient reports there has been no change in protective factors since their last session.     Recommended that patient call 911 or go to the local ED should there be a change in any of these risk factors.     Appearance:   Appropriate    Eye Contact:   Good    Psychomotor Behavior: Normal    Attitude:   Cooperative    Orientation:   All   Speech    Rate / Production: Normal     Volume:  Normal    Mood:    Normal   Affect:    Appropriate    Thought Content:  Clear    Thought Form:  Coherent  Logical    Insight:    Good      Medication Review:   No changes to current psychiatric  "medication(s)     Medication Compliance:   Yes     Changes in Health Issues:   None reported     Chemical Use Review:   Substance Use: Chemical use reviewed, no active concerns identified      Tobacco Use: No change in amount of tobacco use since last session.  Patient declined discussion at this time    Diagnosis:  1. Major depressive disorder, recurrent episode, moderate with anxious distress (H)    2. Post traumatic stress disorder        Collateral Reports Completed:   Not Applicable    PLAN: (Patient Tasks / Therapist Tasks / Other)  Between now and the next session, client will make a short daily \"to do\" list each day, so that household tasks feel less overwhelming. She will also continue to utilize previous helpful coping skills, as well as coping skills discussed in session today (grounding, relaxation, distraction,  self from anxiety). Client will start to track her dreams and anxiety/panic symptoms as they occur, to identify and process potential patterns    LLOYD Bianchi                                                         ______________________________________________________________________    Treatment Plan    Patient's Name: Sheire Otero  YOB: 1970    Date: 7/2/19    DSM5 Diagnoses: 296.32 (F33.1) Major Depressive Disorder, Recurrent Episode, Moderate With anxious distress or 309.81 (F43.10) Posttraumatic Stress Disorder (includes Posttraumatic Stress Disorder for Children 6 Years and Younger)  With dissociative symptoms     Psychosocial / Contextual Factors:       Client is a 48 year old female, who presents for a diagnostic assessment and individual therapy services, noting that her primary care provider had suggested that she access individual therapy services at this time, to assist her in addressing symptoms of depression and anxiety, as well as learning adaptive coping skills that could assist her in managing her symptoms, along with continued medication " "management of symptoms. Client reports that she has experienced symptoms of anxiety since childhood, has experienced multiple significant losses, for which she still has some unresolved grief, and has a history of multiple traumas as an adult. These symptoms are negatively impacting her daily functioning and quality of life. Client reports a positive support system of family and friends, though notes that she \"keeps her [place] small,\" and has difficulty opening up to and trusting others, due to her trauma history. Client reports that she has accessed therapy services previously, and had found this to be helpful.  Client does not identify any current safety concerns, noting that \"my children and family mean too much to me to ever harm myself.\" She was forward thinking and goal oriented, and states that she is motivated to further develop coping skills that can assist her in managing her symptoms, so that she can \"start to feel better.\"     WHODAS: WHODAS was completed at the time of the diagnostic assessment, with a score of 29    Referral / Collaboration:  Referral to another professional/service is not indicated at this time.    Anticipated number of session or this episode of care: 8-10      MeasurableTreatment Goal(s) related to diagnosis / functional impairment(s)  Goal 1: Patient will experience a decrease in reported symptoms of depression and anxiety, as evidenced by a decrease in PHQ-9 and CELIA-7 scores (baseline PHQ-9: 13 CELIA-7: 16), her report, and therapist's observation in session.       I will know I've met my goal when I start to feel happy again.      Objective #A (Patient Action)    Patient will decrease frequency and intensity of feeling down, depressed, hopeless and anxious, by identifying at least 2-3 triggers to her reported symptoms, as well as being able to identify at least 2-3 initial signs that she is starting to feel depressed or anxious, so that she will be able to make decisions about how " to cope with symptoms as they arise.  Status: New - Date: 7/2/19     Intervention(s)  Therapist will ask patient to track mood and anxiety symptoms, in order to notice and address patterns in the frequency and intensity of her symptoms, as well as to identify triggers and cues that accompany her symptoms    Objective #B  Patient will identify at least 2-3 negative or unhelpful thought processes and behaviors that she regularly engages in, and will challenge these thoughts, beliefs, and actions at least 1-2 times daily.  Status: New - Date: 7/2/19     Intervention(s)  Therapist will teach and practice in session with patient techniques for disengaging from, and/or challenging negative thought processes, as well as discussing ways in which she might engage in more self-compassionate thoughts and behaviors.      Objective #C  Patient will will increase interest, engagement, and pleasure in doing things, by effectively utilizing adaptive coping skills discussed in session, at least 1-2 times daily, to assist her in managing her reported symptoms of depression and anxiety.  Status: New - Date: 7/2/19     Intervention(s)  Therapist will assist patient in identifying situations in which she tends to feel a lack of motivation, as well as situations in which she experiences increased anxiety, which prevents her from doing things she wants to do, and will assist her in developing short and long term action plans for how she might manage this, making use of coping skills discussed in therapy, and through processing current life circumstances/significant life events identified as being related to her reported symptoms.      Goal 2: Patient will increase her capacity to engage in self-care and self-compassion practices, as evidenced by intentionally setting time aside to engage in activities that promote her health and well-being, at least 1-2 times weekly, and by noticing and addressing self-critical thoughts and feelings of  unresolved guilt in a healthy manner.      I will know I've met my goal when I don't feel guilty about things that have happened in my life anymore. I have a lot of guilt.      Objective #A (Patient Action)  Patient will compile a list of self-care activities that would promote her health and well-being, that she feels are realistic and do-able, and will practice engaging in these activities at least 3-5 times per week.      Status: New - Date: 7/2/19     Intervention(s)  Therapist will assist patient in identifying realistic self-care activities that she might engage in, develop a plan for regularly implementing these activities into her life, as well as identifying any potential barriers that might come up in the implementation of this plan, and planning ahead for ways in which she might address any barriers that arise.    Objective #B  Patient will identify at least 2-3 ways in which guilt has been impacting her life, and will develop both short and long term action plans, for how she will cope with, and manage her feelings of guilt, as she moves forward.                  .    Status: New - Date: 7/2/19     Intervention(s)  Therapist will assist patient in processing the impact of guilt on her life, and will assist her in identifying both healthy and unhealthy behaviors to cope with guilt, as well as identifying potential areas of change that she would like to see in her life. A plan for making these changes, that feels manageable and comfortable to patient will then be developed.        Patient has reviewed and agreed to the above plan.      Lynnette Guaman, [facility]  July 2, 2019

## 2019-07-08 ENCOUNTER — OFFICE VISIT (OUTPATIENT)
Dept: PALLIATIVE MEDICINE | Facility: CLINIC | Age: 49
End: 2019-07-08
Payer: MEDICARE

## 2019-07-08 VITALS
WEIGHT: 135 LBS | HEIGHT: 65 IN | DIASTOLIC BLOOD PRESSURE: 70 MMHG | BODY MASS INDEX: 22.49 KG/M2 | TEMPERATURE: 97.9 F | SYSTOLIC BLOOD PRESSURE: 112 MMHG

## 2019-07-08 DIAGNOSIS — G89.4 CHRONIC PAIN SYNDROME: ICD-10-CM

## 2019-07-08 DIAGNOSIS — M79.18 MYOFASCIAL PAIN: Primary | ICD-10-CM

## 2019-07-08 DIAGNOSIS — G89.29 CHRONIC BILATERAL LOW BACK PAIN WITHOUT SCIATICA: ICD-10-CM

## 2019-07-08 DIAGNOSIS — M79.7 FIBROMYALGIA: ICD-10-CM

## 2019-07-08 DIAGNOSIS — M54.50 CHRONIC BILATERAL LOW BACK PAIN WITHOUT SCIATICA: ICD-10-CM

## 2019-07-08 DIAGNOSIS — M54.2 CERVICALGIA: ICD-10-CM

## 2019-07-08 PROCEDURE — 20551 NJX 1 TENDON ORIGIN/INSJ: CPT | Performed by: PHYSICIAN ASSISTANT

## 2019-07-08 PROCEDURE — 99214 OFFICE O/P EST MOD 30 MIN: CPT | Mod: 25 | Performed by: PHYSICIAN ASSISTANT

## 2019-07-08 RX ORDER — HYDROCODONE BITARTRATE AND ACETAMINOPHEN 7.5; 325 MG/1; MG/1
TABLET ORAL
Qty: 170 TABLET | Refills: 0 | Status: SHIPPED | OUTPATIENT
Start: 2019-07-08 | End: 2019-08-07

## 2019-07-08 ASSESSMENT — MIFFLIN-ST. JEOR: SCORE: 1233.24

## 2019-07-08 ASSESSMENT — PAIN SCALES - GENERAL: PAINLEVEL: WORST PAIN (10)

## 2019-07-08 NOTE — PROGRESS NOTES
[facility]    CHIEF COMPLAINT: Pain  -Fibromyalgia  -Neck pain  -Low back pain    INTERVAL HISTORY:  Last seen on 6/12/2019.        Recommendations/plan at the last visit included:  1. Physical Therapy: continue silver sneakers;   2. Clinical Health Psychologist:  YES, meet with providers in [place] per Dr. Paredes's recommendation.  3. Diagnostic Studies: none at this time  4. Medication Management:                1. Continue Cymbalta at 60mg at bedtime.  Patient to talk with Dr. Paredes about potentially increasing Cymbalta to taking 20 mg in the morning and 60 mg at bedtime.              2.  Okay to continue Flexeril              3. Continue Hydrocodone at max of 6 tabs/day.  We talked about different ways to continue a taper.  We talked about reducing her hydrocodone from 7.5 mg down to 5 mg.  At this time working to continue on the 7.5 mg but we will drop the number of tablets to 170/month.                4.  continue topiramate at 50 mg at bedtime  5. Further procedures recommended: Trigger point injections tomorrow     Follow up with this provider: 4 weeks     Since her last visit, Sherie PAMELA BoothShanna reports:    She reports a jolting pain in her left lower back. She states that she feels that she is getting off balance as well and she feels this may be due to being decreased of klonopin. Her buspar was increased and she was started on Cymbalta.      She did increase her Norco when her pain was increased. She has 24 tablets left.       Pain Information:   Pain quality:  Aching, stabbing, miserable, and throbbing   Pain rating: intensity ranges from 3/10 to 10/10, and averages 8/10 on a 0-10 scale.   Pain today 10/10      UDS 1/19/2019   CSA 2/04/2019    CURRENT RELEVANT PAIN MEDICATIONS:    Flexeril-1 in AM and 2 at HS  Cymbalta-20mg in AM and 60mg at night  Hydrocodone 7.5mg -currently taking 2 tablets three times a day   Topamax-  2 tabs at HS  Ibuprofen-will take 800mg up to 3 times  day, doesn't take daily    Patient is using the medication as prescribed:  YES  Is your medication helpful? somewhat   Medication side effects? no side effect    Previous Medications: (H--helped; HI--Helped initially; SWH-- somewhat helpful, NH--No help; W--worse; SE--side effects)   Opiates: Hydrocodone H, Oxycodone SE  NSAIDS: Ibuprofen H  Muscle Relaxants: Tizanidine NH, Flexeril H, Robaxin NH SE stomach upset  Anti-migraine mediations: Verapamil H  Anti-depressants: Cymbalta H  Sleep aids: none  Anxiolytics: Xanax H, Clonazepam H, Valium H  Neuropathics: Gabapentin SE dizziness          Topicals: Lidocaine patches SW  Other medications not covered above: Savella H at first, then seemed to stop working, Lyrica SE shortness of breath, felt 'weird' on them, throat felt weird. Prednisone H for arthritis flare    Past Pain Treatments:  Pain Clinic:   No   PT: Yes, years ago. Has not done pool therapy.   Psychologist: No  Relaxation techniques/biofeedback: No  Chiropractor: No, was told not to because of neck.   Acupuncture: Yes for headaches.   Pharmacotherapy:           Opioids: Yes            Non-opioids:    Yes   TENs Unit:Yes  Injections: cervical medial branch blocks 6/12/2014  Self-care:   Yes, ice/heat, hot baths, theracare  Surgeries related to pain: No    [place] Board of Pharmacy Data Base Reviewed:    YES; As expected, no concern for misuse/abuse of controlled medications based on this report.      THE 4 As OF OPIOID MAINTENANCE ANALGESIA    Analgesia: Is pain relief clinically significant? YES   Activity: Is patient functional and able to perform Activities of Daily Living? YES   Adverse effects: Is patient free from adverse side effects from opiates? YES   Adherence to Rx protocol: Is patient adhering to Controlled Substance Agreement and taking medications ONLY as ordered? YES       Is Narcan prescribed for opiate use >50 MME daily? yes      Total Daily MME: 37.5-45    Medications:  Current  Outpatient Medications   Medication Sig Dispense Refill     albuterol (VENTOLIN HFA) 108 (90 Base) MCG/ACT inhaler Inhale 2 puffs into the lungs every 6 hours as needed for shortness of breath / dyspnea or wheezing 18 g 1     busPIRone (BUSPAR) 10 MG tablet TAKE 5 MG (1/2 TAB) IN THE MORNING AND 15 MG (1.5 TABS) AT NIGHT 135 tablet 3     cetirizine (ZYRTEC) 10 MG tablet TAKE  ONE TABLET BY MOUTH EVERY DAY. 90 tablet 3     clonazePAM (KLONOPIN) 0.5 MG tablet Take 1 tablet (0.5 mg) by mouth every morning AND 0.5 tablets (0.25 mg) At Bedtime. 22 tablet 0     cyclobenzaprine (FLEXERIL) 10 MG tablet Take 2 tablets in the evening and 1 in the morning 90 tablet 1     CYMBALTA 60 MG capsule TAKE ONE CAPSULE BY MOUTH EVERY EVENING - KADY 90 capsule 1     DULoxetine (CYMBALTA) 20 MG capsule Take 1 capsule (20 mg) by mouth daily .  Continue with 60 mg in Evening 30 capsule 1     fluticasone (FLONASE) 50 MCG/ACT spray SPRAY 2 SPRAYS INTO BOTH NOSTRILS DAILY. 16 g 11     folic acid (FOLVITE) 1 MG tablet Take 1 tablet (1 mg) by mouth daily 100 tablet 3     HYDROcodone-acetaminophen (NORCO) 7.5-325 MG per tablet TAKE 2 TABLETS BY MOUTH EVERY 6 HOURS AS NEEDED FOR PAIN--MAX OF 6 TABLETS PER DAY. 170 tablet 0     methotrexate sodium 2.5 MG TABS Take 8 tablets (20 mg) by mouth every 7 days 32 tablet 3     naloxone (NARCAN) nasal spray Spray 1 spray (4 mg) into one nostril alternating nostrils as needed for opioid reversal every 2-3 minutes until assistance arrives 0.2 mL 0     predniSONE (DELTASONE) 5 MG tablet Take 5 mg by mouth daily as needed for peripheral joint pain from rheumatoid arthritis; use sparingly. 30 tablet 1     topiramate (TOPAMAX) 50 MG tablet Take 1 tablet (50 mg) by mouth 2 times daily 60 tablet 1     verapamil (CALAN) 40 MG tablet Take 1 tablet (40 mg) by mouth 2 times daily 180 tablet 3       Review of Systems: A 10-point review of systems was negative, with the exception of chronic pain issues, fatigue, weight  "loss, headache, dizziness, allergies, palpitations, chest pain, diarrhea, constipation, depression, anxiety, stress, and mood swings      Social History: Reviewed; unchanged from previous consultation.      Family history: Reviewed; unchanged from previous consultation.     PHYSICAL EXAM:     Vitals:  /70   Temp 97.9  F (36.6  C) (Temporal)   Ht 1.651 m (5' 5\")   Wt 61.2 kg (135 lb)   BMI 22.47 kg/m        Constitutional: healthy, alert and no distress  HEENT: Head atraumatic, normocephalic. Eyes without conjunctival injection or jaundice. Neck supple. No obvious neck masses.  Psychiatric/mental status: Alert, without lethargy or stupor. Appropriate affect. Mood normal.   Neurologic exam: Gait is normal         DIAGNOSTIC TESTS:  Imaging Studies:   No new imaging to review    Assessment:  Sherie Otero is a 48 year old female who presents today for follow up regarding her:    1.  Low back pain  2.  Neck pain  3.  Chronic pain syndrome  4.  Fibromyalgia    Patient has been having worsening anxiety.  Her Klonopin is also been decreased therefore she has been feeling off balance and dizzy.  She is going to talk to her primary care provider about this.  She is waiting to get in with the psychiatrist.  She did move wrong and had an increase in low back pain.  Due to this she did increase her hydrocodone.  She will be out of day and a half early.  Discussed with the patient that I will refill her medications this time but I will no longer be giving her any early refills.  Discussed that if she is out early again I will give her medications to help with withdrawal.  Overall plan is to continue to taper off of these medications.  She is also requesting trigger point injections today.      Plan:  Diagnosis reviewed, treatment option addressed, and risk/benifits discussed.  Self-care instructions given.  I am recommending a multidisciplinary treatment plan to help this patient better manage pain.      1. Physical " Therapy: continue silver sneakers and previous PT exercises;   2. Clinical Health Psychologist:  YES, meet with providers in [place] per Dr. Paredes's recommendation.  3. Diagnostic Studies: none at this time  4. Medication Management:     1. Continue Cymbalta 20mg in AM and 60mg at bedtime.     2.  Okay to continue Flexeril, will need to monitor for serotonin syndrome due to combination of Cymbalta and Flexeril.    3. Continue Hydrocodone at max of 6 tabs/day.  Continue number of tablets at 170/month.     4.  continue topiramate at 50 mg at bedtime  5. Further procedures recommended: Trigger point injections today, see below    Follow up with this provider: 4 weeks     Total time spent face to face was 20 minutes and more than 50% of face to face time was spent in counseling and/or coordination of care regarding the diagnosis and recommendations above. This was time spent separate from procedure.       Celia Bettencourt PA-C   [facility]      Pre Procedure Diagnosis: myofascial pain  Post procedure Diagnosis: Same  Procedure performed: trigger point injections  Anesthesia: none  Complications: none  Operators: Celia Bettencourt PA-C     Indications:   Sherie Otero is a 48 year old female with a history of back pain.  Exam shows myofascial pain of the muscle groups listed below and they have tried conservative treatment including physical therapy and medications.    Options/alternatives, benefits and risks were discussed with the patient including bleeding, infection, tissue trauma and pnuemothorax.  Questions were answered to her satisfaction and she agrees to proceed. Voluntary informed consent was obtained and signed.     Vitals were reviewed: Yes  Allergies were reviewed:  Yes   Medications were reviewed:  Yes   Pre-procedure pain score: 10/10    Procedure:  After getting informed consent, a Pause for the Cause was performed.    Trigger points were identified by patient, and marked when  appropriate.  The area was prepped with Chloroprep.    Using clean technique, injections were completed using a 25G, 1.5 inch needle.  After negative aspiration, injection was completed.  A total of 10 locations were injected.  When possible, tissue was retracted from the chest wall to avoid lung injury.    Muscle groups injected:  Right lumbar paraspinals  Bilateral trapezius    Injection solution contained:  7.5ml of 1% lidocaine and 7.5ml of 0.5% bupivacaine.    Hemostasis was achieved, the area was cleaned, and bandaids were placed when appropriate.  The patient tolerated the procedure well.  Breath sounds were normal.      Post-procedure pain score: 0/10  Follow-up includes:   -repeat prn      Celia Bettencourt, PAC  [facility]

## 2019-07-08 NOTE — PATIENT INSTRUCTIONS
After Visit Instructions:     Thank you for coming to [facility] for your care. It is my goal to partner with you to help you reach your optimal state of health.     I am recommending multidisciplinary care at this time.  The focus of care will be to continue gradual rehabilitation and pain management with medication adjustments as needed.    Continue daily self-care, identifying contributing factors, and monitoring variations in pain level. Continue to integrate self-care into your life.        Schedule pain psychology assessment/visit: make an appointment once available    Schedule physical therapy assessment/visit: keep doing exercises    Schedule follow-up with Celia Bettencourt PA-C in 4 weeks. You will need to make this appointment.     Procedures recommended: trigger point injections. See below     Medication recommendations:     Norco-170 tabs to last 30 days. Max of 6/day    Continue Cymbalta    Continue Topamax       Celia Bettencourt PA-C  [facility]    Contact information: [facility]  Clinic Number:  [phone]     Call with any questions about your care and for scheduling assistance.     Calls are returned Monday through Friday between 8 AM and 4:30 PM. We usually get back to you within 2 business days depending on the issue/request.    If we are prescribing your medications:    For opioid medication refills, call the clinic or send a [employer] message 7 days in advance.  Please include:    Name of requested medication    Name of the pharmacy.    For non-opioid medications, call your pharmacy directly to request a refill. Please allow 3-4 days to be processed.     Per [place] State Law:    All controlled substance prescriptions must be filled within 30 days of being written.      For those controlled substances allowing refills, pickup must occur within 30 days of last fill.      We believe regular attendance is key to  your success in our program!      Any time you are unable to keep your appointment we ask that you call us at least 24 hours in advance to cancel.This will allow us to offer the appointment time to another patient.   Multiple missed appointments may lead to dismissal from the clinic.    [facility]   Post Procedure Instructions    Today you had:  trigger point injections      Medications used:  lidocaine   bupivicaine          Go to the emergency room if you develop any shortness of breath    Monitor the injection sites for signs and symptoms of infection-fever, chills, redness, swelling, warmth, or drainage to areas.    You may have soreness at injection sites for up to 24 hours.    You may apply ice to the painful areas to help minimize the discomfort of the needle pokes.    Do not apply heat to sites for at least 12 hours.    You may use anti-inflammatory medications or Tylenol for pain control if necessary    Pain Clinic phone number during work hours Monday-Friday:  [phone]    After hours provider line: [phone]

## 2019-07-10 ENCOUNTER — OFFICE VISIT (OUTPATIENT)
Dept: PSYCHOLOGY | Facility: CLINIC | Age: 49
End: 2019-07-10
Payer: MEDICARE

## 2019-07-10 ENCOUNTER — MYC MEDICAL ADVICE (OUTPATIENT)
Dept: PALLIATIVE MEDICINE | Facility: CLINIC | Age: 49
End: 2019-07-10

## 2019-07-10 DIAGNOSIS — F33.1 MAJOR DEPRESSIVE DISORDER, RECURRENT EPISODE, MODERATE WITH ANXIOUS DISTRESS (H): Primary | ICD-10-CM

## 2019-07-10 DIAGNOSIS — F43.10 POST TRAUMATIC STRESS DISORDER: ICD-10-CM

## 2019-07-10 PROCEDURE — 90834 PSYTX W PT 45 MINUTES: CPT | Performed by: SOCIAL WORKER

## 2019-07-10 ASSESSMENT — ANXIETY QUESTIONNAIRES
2. NOT BEING ABLE TO STOP OR CONTROL WORRYING: NEARLY EVERY DAY
1. FEELING NERVOUS, ANXIOUS, OR ON EDGE: NEARLY EVERY DAY
7. FEELING AFRAID AS IF SOMETHING AWFUL MIGHT HAPPEN: NEARLY EVERY DAY
GAD7 TOTAL SCORE: 16
5. BEING SO RESTLESS THAT IT IS HARD TO SIT STILL: SEVERAL DAYS
6. BECOMING EASILY ANNOYED OR IRRITABLE: NOT AT ALL
3. WORRYING TOO MUCH ABOUT DIFFERENT THINGS: NEARLY EVERY DAY
IF YOU CHECKED OFF ANY PROBLEMS ON THIS QUESTIONNAIRE, HOW DIFFICULT HAVE THESE PROBLEMS MADE IT FOR YOU TO DO YOUR WORK, TAKE CARE OF THINGS AT HOME, OR GET ALONG WITH OTHER PEOPLE: SOMEWHAT DIFFICULT

## 2019-07-10 ASSESSMENT — PATIENT HEALTH QUESTIONNAIRE - PHQ9
5. POOR APPETITE OR OVEREATING: NEARLY EVERY DAY
SUM OF ALL RESPONSES TO PHQ QUESTIONS 1-9: 12

## 2019-07-10 NOTE — PATIENT INSTRUCTIONS
Between now and the next session, Sherie will utilize the coping skills discussed today during times of increased anxiety (cognitive defusion/leaves on the stream exercise, setting aside a set time period each day to think about her worries, and attempting to separate herself from those thoughts/emotions the remainder of the day)

## 2019-07-10 NOTE — PROGRESS NOTES
"                                           Progress Note    Patient Name: Sherie Otero  Date: 7/10/19         Service Type: Individual  Video Visit: No     Session Start Time: 2:30pm  Session End Time: 3:22pm     Session Length: 52 minutes    Session #: 3    Attendees: Patient attended alone     Treatment Plan Last Reviewed: Treatment plan was developed and reviewed on 7/2/19  PHQ-9 / CELIA-7 : completed today, 7/10/19  PHQ-9: 12  CELIA-7: 16    DATA  Interactive Complexity: No  Crisis: No       Progress Since Last Session (Related to Symptoms / Goals / Homework):   Symptoms: No change Patient reports that she has not noticed much of a change in her symptoms between last week and today. She reports that she is still experiencing daily panic attacks, and has had anxiety in regard to recent medication changes, and increased chronic pain, as well as multiple other current life stressors.     Homework: Achieved / completed to satisfactionPatient reports that she has started to make smaller daily \"to do\" lists with only one or two tasks listed on it to complete, rather than feeling overwhelmed by her much larger list of tasks that she needs/wants to complete. She reports that this has been very helpful in managing some of her anxiety and self-critical thinking, noting that she plans to continue to practice this habit. Patient notes that she started to track her symptoms, dreams, and panic attacks as well, but did not have much time to do this in the past week. We spent some time discussing what she did notice on the days she tracked.       Episode of Care Goals: Satisfactory progress - ACTION (Actively working towards change); Intervened by reinforcing change plan / affirming steps taken Patient is accessing therapy services at this time to address her reported depression, anxiety, and trauma related symptoms. She will continue to track her symptoms, triggers, and behavioral responses to symptoms, and will also continue " "to implement coping strategies that have been discussed in therapy into her daily life.      Current / Ongoing Stressors and Concerns:    Patient reports that her primary stressors at this time are not knowing when she is going to experience anxiety or  Panic symptoms, and then worrying about how she will manage them when they occur, financial stress, worry over one  of her daughter's safety and well-being as she resides out of state, worry about not being able to financially assist her  other daughter as much as she would like, given her daughter's recent home purchase, worry about her son residing  with her, due to his disability, and recently assisting him in filling out the paperwork for a legal guardian/wondering when  this process will be completed.      Treatment Objective(s) Addressed in This Session:   Patient will decrease frequency and intensity of feeling down, depressed, hopeless and anxious, by identifying at least 2-3 triggers to her reported symptoms, as well as being able to identify at least 2-3 initial signs that she is starting to feel depressed or anxious, so that she will be able to make decisions about how to cope with symptoms as they arise.    Patient reports that she has continued to experience daily panic attacks, sometimes multiple panic attacks per day, in addition to the intense and frequent anxiety and depression symptoms. She states that sometimes she cannot identify a specific trigger for her symptoms, but at other times, can see that it is probably related to her thoughts/feelings about her son's mental health, both of her daughters' well-being, money and her current financial situation, as well as feeling overwhelmed by housework/yard work. She notes finding it very difficult to not think about these things almost constantly throughout the day, and feels as though these thoughts and the accompanying emotions of sadness, frustration, and feeling overwhelmed, sometimes \"take over.\" " "We discussed physical symptoms and thoughts that patient has noticed that she most often experiences prior to her symptoms intensifying, as well as coping skills that she has used now and previously, and which were effective or not (rubber band on wrist to snap during panic attacks, counting down, listening to nature sounds on tape/CD, and muscle relaxation exercises have all been helpful). We also spent some time in discussion about how we cannot necessarily control thoughts and emotions, however we can control our response, and talked about how noticing patterns of which thoughts and emotions are related to symptoms will be helpful for patient to be able to notice potential triggers to her symptoms, so that she can make mindful decisions about how she would like to cope/respond to distressing or unhelpful thoughts and emotions. Patient will continue to track her symptoms, and additional coping skills she might try were also discussed (putting aside a set period of time each day, during which she can think about the things that worry her, and \"shelving\" these thoughts/emotions the rest of the day, as well as the idea of  herself from her symptoms, noting to herself that she is not her anxiety or depression, so anxiety and depression don't get to make decisions for her about how to respond, and a mindfulness/meditation exercise).     Patient will identify at least 2-3 negative or unhelpful thought processes and behaviors that she regularly engages in,  and will challenge these thoughts, beliefs, and actions at least 1-2  times daily.     Patient notes that she is often very critical of herself, which is primarily related to the guilt that she feels over the trauma that her children experienced while patient was in a relationship  with/ to, their father. Her self-critical thoughts are also often related to her mental health, as well as the current condition of her home and gardens not looking as " clean/tidy as they  once did. We spent some time processing these thoughts, and talked about how patient might start to challenge these thoughts as they occur, and/or replace the thoughts with a more  neutral or self-affirming thought. Patient was receptive to this discussion, noting that making smaller daily to do lists has helped somewhat with the self-critical thinking. She is also  considering reaching out to friends for assistance in completing some of the tasks around her home that she is currently unable to physically complete, which she notes will be difficult for  her to do, though she knows that the benefits of the end result outweigh her decreased level of comfort in asking for help.        Intervention:   CBT: Patient and I spent time in today's session discussing the current symptoms and situations in her life that she  Finds most challenging, as well as the thoughts, emotions and behaviors that often accompany these symptoms and          situations.We discussed her continuing to track her symptoms between now and the next session, as she did not have  a lot of time to track this past week, making note of symptoms, time of day, what else was occurring at the time, and     any thoughts, emotions and behaviors in response to the situation/symptoms, so that we could continue to process  this further in therapy, and identify any potential patterns.      Motivational Interviewing: Therapist utilized open ended questions, reflective listening, affirmations, summarizing and  eliciting self-motivational statements to better understand patient's perception of her current challenges, and to develop  a plan for how she will continue to work toward her identified goals and objectives, that is meaningful and useful to her.         ASSESSMENT: Current Emotional / Mental Status (status of significant symptoms):   Risk status (Self / Other harm or suicidal ideation)   Patient denies current fears or concerns for  personal safety.   Patient denies current or recent suicidal ideation or behaviors.   Patientdenies current or recent homicidal ideation or behaviors.   Patient denies current or recent self injurious behavior or ideation.   Patient denies other safety concerns.   Patient Patient reports there has been no change in risk factors since their last session.     PatientPatient reports there has been no change in protective factors since their last session.     Recommended that patient call 911 or go to the local ED should there be a change in any of these risk factors.     Appearance:   Appropriate    Eye Contact:   Good    Psychomotor Behavior: Normal    Attitude:   Cooperative    Orientation:   All   Speech    Rate / Production: Normal     Volume:  Normal    Mood:    Normal   Affect:    Subdued    Thought Content:  Clear    Thought Form:  Coherent  Logical    Insight:    Good      Medication Review:   No changes to current psychiatric medication(s)     Medication Compliance:   Yes     Changes in Health Issues:   None reported     Chemical Use Review:   Substance Use: Chemical use reviewed, no active concerns identified      Tobacco Use: No change in amount of tobacco use since last session.  Patient declined discussion at this time    Diagnosis:  1. Major depressive disorder, recurrent episode, moderate with anxious distress (H)    2. Post traumatic stress disorder        Collateral Reports Completed:   Not Applicable    PLAN: (Patient Tasks / Therapist Tasks / Other)  Between now and the next therapy session, patient will utilize the coping skills discussed today during times of increased anxiety (cognitive defusion/leaves on the stream exercise, setting aside a set time period each day to think about her worries, and then attempting to separate herself from those thoughts/emotions the remainder of the day)    LLOYD Bianchi                                                      "    ______________________________________________________________________    Treatment Plan     Patient's Name: Sherie Otero               YOB: 1970     Date: 7/2/19     DSM5 Diagnoses: 296.32 (F33.1) Major Depressive Disorder, Recurrent Episode, Moderate With anxious distress or 309.81 (F43.10) Posttraumatic Stress Disorder (includes Posttraumatic Stress Disorder for Children 6 Years and Younger)  With dissociative symptoms      Psychosocial / Contextual Factors:         Client is a 48 year old female, who presents for a diagnostic assessment and individual therapy services, noting that her primary care provider had suggested that she access individual therapy services at this time, to assist her in addressing symptoms of depression and anxiety, as well as learning adaptive coping skills that could assist her in managing her symptoms, along with continued medication management of symptoms. Client reports that she has experienced symptoms of anxiety since childhood, has experienced multiple significant losses, for which she still has some unresolved grief, and has a history of multiple traumas as an adult. These symptoms are negatively impacting her daily functioning and quality of life. Client reports a positive support system of family and friends, though notes that she \"keeps her Crow small,\" and has difficulty opening up to and trusting others, due to her trauma history. Client reports that she has accessed therapy services previously, and had found this to be helpful.  Client does not identify any current safety concerns, noting that \"my children and family mean too much to me to ever harm myself.\" She was forward thinking and goal oriented, and states that she is motivated to further develop coping skills that can assist her in managing her symptoms, so that she can \"start to feel better.\"      WHODAS: WHODAS was completed at the time of the diagnostic assessment, with a score of " 29     Referral / Collaboration:  Referral to another professional/service is not indicated at this time.     Anticipated number of session or this episode of care: 8-10        MeasurableTreatment Goal(s) related to diagnosis / functional impairment(s)  Goal 1: Patient will experience a decrease in reported symptoms of depression and anxiety, as evidenced by a decrease in PHQ-9 and CELIA-7 scores (baseline PHQ-9: 13 CELIA-7: 16), her report, and therapist's observation in session.        I will know I've met my goal when I start to feel happy again.       Objective #A (Patient Action)                          Patient will decrease frequency and intensity of feeling down, depressed, hopeless and anxious, by identifying at least 2-3 triggers to her reported symptoms, as well as being able to identify at least 2-3 initial signs that she is starting to feel depressed or anxious, so that she will be able to make decisions about how to cope with symptoms as they arise.  Status: New - Date: 7/2/19      Intervention(s)  Therapist will ask patient to track mood and anxiety symptoms, in order to notice and address patterns in the frequency and intensity of her symptoms, as well as to identify triggers and cues that accompany her symptoms     Objective #B  Patient will identify at least 2-3 negative or unhelpful thought processes and behaviors that she regularly engages in, and will challenge these thoughts, beliefs, and actions at least 1-2 times daily.  Status: New - Date: 7/2/19      Intervention(s)  Therapist will teach and practice in session with patient techniques for disengaging from, and/or challenging negative thought processes, as well as discussing ways in which she might engage in more self-compassionate thoughts and behaviors.       Objective #C  Patient will will increase interest, engagement, and pleasure in doing things, by effectively utilizing adaptive coping skills discussed in session, at least 1-2 times  daily, to assist her in managing her reported symptoms of depression and anxiety.  Status: New - Date: 7/2/19      Intervention(s)  Therapist will assist patient in identifying situations in which she tends to feel a lack of motivation, as well as situations in which she experiences increased anxiety, which prevents her from doing things she wants to do, and will assist her in developing short and long term action plans for how she might manage this, making use of coping skills discussed in therapy, and through processing current life circumstances/significant life events identified as being related to her reported symptoms.        Goal 2: Patient will increase her capacity to engage in self-care and self-compassion practices, as evidenced by intentionally setting time aside to engage in activities that promote her health and well-being, at least 1-2 times weekly, and by noticing and addressing self-critical thoughts and feelings of unresolved guilt in a healthy manner.       I will know I've met my goal when I don't feel guilty about things that have happened in my life anymore. I have a lot of guilt.       Objective #A (Patient Action)  Patient will compile a list of self-care activities that would promote her health and well-being, that she feels are realistic and do-able, and will practice engaging in these activities at least 3-5 times per week.                            Status: New - Date: 7/2/19      Intervention(s)  Therapist will assist patient in identifying realistic self-care activities that she might engage in, develop a plan for regularly implementing these activities into her life, as well as identifying any potential barriers that might come up in the implementation of this plan, and planning ahead for ways in which she might address any barriers that arise.     Objective #B  Patient will identify at least 2-3 ways in which guilt has been impacting her life, and will develop both short and long term  action plans, for how she will cope with, and manage her feelings of guilt, as she moves forward.                  .                   Status: New - Date: 7/2/19      Intervention(s)  Therapist will assist patient in processing the impact of guilt on her life, and will assist her in identifying both healthy and unhealthy behaviors to cope with guilt, as well as identifying potential areas of change that she would like to see in her life. A plan for making these changes, that feels manageable and comfortable to patient will then be developed.           Patient has reviewed and agreed to the above plan.          Lynnette Guaman, [facility]  July 10, 2019

## 2019-07-11 ASSESSMENT — ANXIETY QUESTIONNAIRES: GAD7 TOTAL SCORE: 16

## 2019-07-11 NOTE — TELEPHONE ENCOUNTER
Will route to provider to review request for additional injections. Patient had TPI on 7/8/19. See request in MyChart below.    SAMARA NolandN, RN  Care Coordinator  [facility] [place]

## 2019-07-11 NOTE — TELEPHONE ENCOUNTER
I do not have any openings today. I would recommend waiting until next week for further injections.     Celia Bettencourt PA-C on 7/11/2019 at 10:51 AM

## 2019-07-12 ENCOUNTER — OFFICE VISIT (OUTPATIENT)
Dept: FAMILY MEDICINE | Facility: CLINIC | Age: 49
End: 2019-07-12
Payer: MEDICARE

## 2019-07-12 VITALS
DIASTOLIC BLOOD PRESSURE: 66 MMHG | RESPIRATION RATE: 18 BRPM | BODY MASS INDEX: 22.49 KG/M2 | OXYGEN SATURATION: 99 % | HEIGHT: 65 IN | WEIGHT: 135 LBS | TEMPERATURE: 98.4 F | HEART RATE: 102 BPM | SYSTOLIC BLOOD PRESSURE: 104 MMHG

## 2019-07-12 DIAGNOSIS — J31.0 CHRONIC RHINITIS: ICD-10-CM

## 2019-07-12 DIAGNOSIS — F41.1 GENERALIZED ANXIETY DISORDER: ICD-10-CM

## 2019-07-12 PROCEDURE — 99214 OFFICE O/P EST MOD 30 MIN: CPT | Performed by: FAMILY MEDICINE

## 2019-07-12 RX ORDER — BUSPIRONE HYDROCHLORIDE 15 MG/1
15 TABLET ORAL 2 TIMES DAILY
Qty: 60 TABLET | Refills: 1 | Status: SHIPPED | OUTPATIENT
Start: 2019-07-12 | End: 2019-07-25

## 2019-07-12 RX ORDER — FLUTICASONE PROPIONATE 50 MCG
SPRAY, SUSPENSION (ML) NASAL
Qty: 16 G | Refills: 11 | Status: SHIPPED | OUTPATIENT
Start: 2019-07-12 | End: 2022-01-20

## 2019-07-12 RX ORDER — CLONAZEPAM 0.5 MG/1
0.25 TABLET ORAL 2 TIMES DAILY
Qty: 14 TABLET | Refills: 0 | Status: SHIPPED | OUTPATIENT
Start: 2019-07-12 | End: 2019-07-25

## 2019-07-12 ASSESSMENT — PAIN SCALES - GENERAL: PAINLEVEL: EXTREME PAIN (9)

## 2019-07-12 ASSESSMENT — MIFFLIN-ST. JEOR: SCORE: 1233.24

## 2019-07-12 NOTE — PATIENT INSTRUCTIONS
Increase Buspar today to 15 mg twice daily.    Then starting on Tuesday (4 days from now), add in 5 mg afternoon dosing.  Every 3-4 days, increase afternoon dose by 5 mg for a total afternoon dose of 15 mg.  You should be on 15 mg three times daily by your next visit.

## 2019-07-12 NOTE — PROGRESS NOTES
"Subjective     Sherie Otero is a 48 year old female who presents to clinic today for the following health issues:    HPI   2 week recheck on meds         Here today to review anxiety medications.  At lat office visit, we decreased evening clonazepam dose to 0.25 mg and increased Buspar dose to 15 mg.  Since then, she has been stable, no medication side effects and no worsening in anxiety symptoms.  In fact, she increased her Buspar dose on her own in the AM from 5 mg to 10 mg daily.      She states that she has not been able to get in with our psychiatry provider due to insurance issues.  This was verified and found to be true.      Patient also had Cymbalta dose increased from 60 mg daily to having a 20 mg dose in the morning and 60 mg dose in the evening.  She is doing this for pain management as well. Continues to follow-up with pain clinic.    Patient has chronic rhinitis issues.  She needs Flonase refilled.      Reviewed and updated as needed this visit by Provider  Tobacco  Allergies  Meds  Problems  Med Hx  Surg Hx  Fam Hx         Review of Systems   ROS COMP: Constitutional, HEENT, cardiovascular, pulmonary, GI, , musculoskeletal, neuro, skin, endocrine and psych systems are negative, except as otherwise noted.      Objective    /66   Pulse 102   Temp 98.4  F (36.9  C) (Temporal)   Resp 18   Ht 1.651 m (5' 5\")   Wt 61.2 kg (135 lb)   SpO2 99%   Breastfeeding? No   BMI 22.47 kg/m    Body mass index is 22.47 kg/m .  Physical Exam   Constitutional: She appears well-developed and well-nourished.   Cardiovascular: Normal rate, regular rhythm, S1 normal, S2 normal and normal heart sounds.   No murmur heard.  Pulmonary/Chest: Effort normal and breath sounds normal. No respiratory distress. She has no wheezes. She has no rhonchi. She has no rales.   Neurological: She is alert.   Psychiatric: Her speech is normal and behavior is normal. Judgment and thought content normal. Her mood appears " anxious. Cognition and memory are normal. She exhibits a depressed mood (flat affect).                  Assessment & Plan     ASSESSMENT/ORDERS:    ICD-10-CM    1. Generalized anxiety disorder F41.1 clonazePAM (KLONOPIN) 0.5 MG tablet     busPIRone (BUSPAR) 15 MG tablet   2. Chronic rhinitis J31.0 fluticasone (FLONASE) 50 MCG/ACT nasal spray     PLAN:  1.  Will increase AM Buspar dose while decreasing AM clonazepam dose to 0.25 mg.  she will now be on 0.25 mg twice daily of clonazepam.  Patient was reminded of our goal to get her completely off of clonazepam.   Patient also wants to increase Buspar dose further if possible.  See below on instructions on how she can do this.  She has 10 mg pills at home to do this.  2.  Will await referral to psychiatry provier to help better stabilize anxiety medications.  I discussed patient with Amanda and she agrees with the goal of getting her off of benzodiazepines.  3.  Flonase refilled.    Patient Instructions   Increase Buspar today to 15 mg twice daily.    Then starting on Tuesday (4 days from now), add in 5 mg afternoon dosing.  Every 3-4 days, increase afternoon dose by 5 mg for a total afternoon dose of 15 mg.  You should be on 15 mg three times daily by your next visit.        Tobacco Cessation:   reports that she has been smoking cigarettes.  She has a 14.50 pack-year smoking history. She has never used smokeless tobacco.              Return in about 2 weeks (around 7/26/2019) for medication recheck.     I spent >30 minutes of face to face time with the patient, >50% of which was spent counseling and coordination of care regarding management of anxiety.     Mauro Paredes MD  [facility]

## 2019-07-15 NOTE — TELEPHONE ENCOUNTER
Outreach X1. Left a  requesting call back. Provided call back number.    SAMARA NolandN, RN  Care Coordinator  [facility] [place]

## 2019-07-15 NOTE — PROGRESS NOTES
Pre Procedure Diagnosis: myofascial pain  Post procedure Diagnosis: Same  Procedure performed: trigger point injections  Anesthesia: none  Complications: none  Operators: Celia Bettencourt PA-C     Indications:   Sherie Otero is a 48 year old female with a history of back pain.  Exam shows myofascial pain of the muscle groups listed below and they have tried conservative treatment including medications and physical therapy.    Options/alternatives, benefits and risks were discussed with the patient including bleeding, infection, tissue trauma and pnuemothorax.  Questions were answered to her satisfaction and she agrees to proceed. Voluntary informed consent was obtained and signed.     Vitals were reviewed: Yes  Allergies were reviewed:  Yes   Medications were reviewed:  Yes   Pre-procedure pain score: 10/10    Procedure:  After getting informed consent, a Pause for the Cause was performed.    Trigger points were identified by patient, and marked when appropriate.  The area was prepped with Chloroprep.    Using clean technique, injections were completed using a 25G, 1.5 inch needle.  After negative aspiration, injection was completed.  A total of 8 locations were injected.  When possible, tissue was retracted from the chest wall to avoid lung injury.    Muscle groups injected:  Right lumbar paraspinals  Bilateral trapezius     Injection solution contained:  4ml of 1% lidocaine and 4ml of 0.5% bupivacaine.    Hemostasis was achieved, the area was cleaned, and bandaids were placed when appropriate.  The patient tolerated the procedure well.  Breath sounds were normal.      Post-procedure pain score: 0/10  Follow-up includes:   -repeat prn      Celia Bettencourt, PAC  [facility]

## 2019-07-17 ENCOUNTER — OFFICE VISIT (OUTPATIENT)
Dept: PALLIATIVE MEDICINE | Facility: CLINIC | Age: 49
End: 2019-07-17
Payer: MEDICARE

## 2019-07-17 ENCOUNTER — OFFICE VISIT (OUTPATIENT)
Dept: PSYCHOLOGY | Facility: CLINIC | Age: 49
End: 2019-07-17
Payer: MEDICARE

## 2019-07-17 VITALS
SYSTOLIC BLOOD PRESSURE: 104 MMHG | HEIGHT: 65 IN | BODY MASS INDEX: 22.57 KG/M2 | WEIGHT: 135.5 LBS | TEMPERATURE: 98.3 F | DIASTOLIC BLOOD PRESSURE: 68 MMHG

## 2019-07-17 DIAGNOSIS — F33.1 MAJOR DEPRESSIVE DISORDER, RECURRENT EPISODE, MODERATE WITH ANXIOUS DISTRESS (H): Primary | ICD-10-CM

## 2019-07-17 DIAGNOSIS — F43.10 POST TRAUMATIC STRESS DISORDER: ICD-10-CM

## 2019-07-17 DIAGNOSIS — M79.18 MYOFASCIAL PAIN: Primary | ICD-10-CM

## 2019-07-17 PROCEDURE — 20552 NJX 1/MLT TRIGGER POINT 1/2: CPT | Performed by: PHYSICIAN ASSISTANT

## 2019-07-17 PROCEDURE — 90834 PSYTX W PT 45 MINUTES: CPT | Performed by: SOCIAL WORKER

## 2019-07-17 ASSESSMENT — PAIN SCALES - GENERAL: PAINLEVEL: WORST PAIN (10)

## 2019-07-17 ASSESSMENT — MIFFLIN-ST. JEOR: SCORE: 1235.5

## 2019-07-17 NOTE — PATIENT INSTRUCTIONS
[facility]   Post Procedure Instructions    Today you had:  trigger point injections      Medications used:  lidocaine   bupivicaine          Go to the emergency room if you develop any shortness of breath    Monitor the injection sites for signs and symptoms of infection-fever, chills, redness, swelling, warmth, or drainage to areas.    You may have soreness at injection sites for up to 24 hours.    You may apply ice to the painful areas to help minimize the discomfort of the needle pokes.    Do not apply heat to sites for at least 12 hours.    You may use anti-inflammatory medications or Tylenol for pain control if necessary    Pain Clinic phone number during work hours Monday-Friday:  [phone]    After hours provider line: [phone]

## 2019-07-17 NOTE — PROGRESS NOTES
Progress Note    Patient Name: Sherie Otero  Date: 7/17/19         Service Type: Individual  Video Visit: No     Session Start Time: 12:30pm  Session End Time: 1:15pm     Session Length: 45 minutes    Session #: 4    Attendees: Client attended alone     Treatment Plan Last Reviewed: Treatment plan was developed and reviewed on 7/2/19  PHQ-9 / CELIA-7 : completed on 7/10/19  PHQ-9: 12  CELIA-7: 16    DATA  Interactive Complexity: No  Crisis: No       Progress Since Last Session (Related to Symptoms / Goals / Homework):   Symptoms: No change Patient reports no significant changes to her reported symptoms since previous therapy session    Homework: Achieved / completed to satisfactionPatient reports that she tried all of the coping strategies discussed in the previous session, with some working better for her than others. She plans to continue practicing the coping strategies that worked well for her.       Episode of Care Goals: Satisfactory progress - ACTION (Actively working towards change); Intervened by reinforcing change plan / affirming steps taken  Patient is accessing therapy services at this time to address her reported depression, anxiety, and trauma related symptoms. She will continue to track her symptoms, triggers, and behavioral responses to symptoms, and will also continue to implement coping strategies that have been discussed in therapy into her daily life.      Current / Ongoing Stressors and Concerns:    Patient reports that her primary stressors at this time are not knowing when she is going to experience anxiety or        panic symptoms, and then worrying about how she will manage them when they occur, financial stress, worry over one           of her daughter's safety and well-being as she resides out of state, worry about not being able to financially assist her    other daughter as much as she would like, given her daughter's recent home purchase, worry  "about her son residing            with her, due to his disability, and recently assisting him in filling out the paperwork for a legal guardian/wondering when          this process will be completed.      Treatment Objective(s) Addressed in This Session:   Patient will decrease frequency and intensity of feeling down, depressed, hopeless and anxious, by identifying at least 2-3 triggers to her reported symptoms, as well as being able to identify at least 2-3 initial signs that she is starting to feel depressed or anxious, so that she will be able to make decisions about how to cope with symptoms as they arise.    Patient reports that her \"anxiety has been out of control\" this past week. She attributes this to her biological father passing away, never having had the opportunity to meet him, and thinking about this, as well as thinking about how her daughter will be going to a music festival in [place] for work, and worrying about her safety and well-being while she is there. Patient reports that her son is also about to be taken off of civil commitment, and she is worried that he will not continue to follow through on accessing services to maintain stability with his mental health. Patient reports that her daily chronic pain also continues to contribute to her feelings of depression and anxiety. Patient spent some time processing processing these stressors/worries and the impact that her symptoms have had on her life in the past week. She notes that she is sometimes able to make mindful decisions about how to cope, but continues to have regular panic attacks as well.    Patient will will increase interest, engagement, and pleasure in doing things, by effectively utilizing adaptive coping skills discussed in session, at least 1-2 times daily, to assist her in managing her reported symptoms of depression and anxiety.    Patient spent time discussing how she has been coping with her symptoms this past week, noting " "that it has been especially difficult to make decisions about how to cope when her anxiety is elevated. Time was spent discussing how patient might notice initial signs of depression and anxiety in an attempt to \"get ahead\" of the symptoms before they intensify. Patient was open to this suggestion, and notes physical symptoms and specific thoughts that often precede her symptoms. Patient has been attempting to utilize coping skills discussed in previous sessions, including trying to set aside her worries until a specific time of day each day, when she is then able to think about them for a set amount of time. She notes that this has been working somewhat but has been difficult. She wants to continue working at this as she likes this concept, and we discussed maybe starting by setting aside 2-3 shorter time spans to focus on her worry during the day, spaced out equally, and then gradually decreasing this as this technique becomes easier for her to use. Patient notes that she also plans to use more guided meditations in the next week, as she does feel as though this is a helpful coping technique when she reminds herself to take time out to use this technique.          Intervention:   CBT: Patient spent time in today's session discussing the current symptoms and situations in her life that she             finds most challenging, as well as the thoughts, emotions and behaviors that often accompany these  symptoms and situations. Patient reports feeling more able to notice her triggers, and the accompanying  thoughts and emotions that she is experiencing, so that she can make mindful decisions in regard to how she  would like to respond.       Motivational Interviewing: Therapist utilized open ended questions, reflective listening, affirmations, summarizing   And eliciting self-motivational statements to better understand patient's perception of her current challenges,   and to develop a plan for how she will continue to " work toward her identified goals and objectives, that is    meaningful and useful to her.      ASSESSMENT: Current Emotional / Mental Status (status of significant symptoms):   Risk status (Self / Other harm or suicidal ideation)   Patient denies current fears or concerns for personal safety.   Patient denies current or recent suicidal ideation or behaviors.   Patientdenies current or recent homicidal ideation or behaviors.   Patient denies current or recent self injurious behavior or ideation.   Patient denies other safety concerns.   Patient Patient reports there has been no change in risk factors since their last session.     PatientPatient reports there has been no change in protective factors since their last session.     Recommended that patient call 911 or go to the local ED should there be a change in any of these risk factors.     Appearance:   Appropriate    Eye Contact:   Good    Psychomotor Behavior: Normal    Attitude:   Cooperative    Orientation:   All   Speech    Rate / Production: Normal     Volume:  Normal    Mood:    Normal   Affect:    Appropriate    Thought Content:  Clear    Thought Form:  Coherent  Logical    Insight:    Good      Medication Review:   No changes to current psychiatric medication(s)     Medication Compliance:   Yes     Changes in Health Issues:   None reported     Chemical Use Review:   Substance Use: Chemical use reviewed, no active concerns identified      Tobacco Use: No change in amount of tobacco use since last session.  Patient declined discussion at this time    Diagnosis:  1. Major depressive disorder, recurrent episode, moderate with anxious distress (H)    2. Post traumatic stress disorder        Collateral Reports Completed:   Not Applicable    PLAN: (Patient Tasks / Therapist Tasks / Other)  Between now and the next session, patient will come up with a list of qualities about herself that she likes and/or accomplishments that she is proud of in her life. She will  "also attempt to focus on 1-2 self affirming thoughts each day.     Lynnette SHIPLEYSofía Guaman, LICSW                                                         ______________________________________________________________________    Treatment Plan     Patient's Name: Sherie Otero               YOB: 1970     Date: 7/2/19     DSM5 Diagnoses: 296.32 (F33.1) Major Depressive Disorder, Recurrent Episode, Moderate With anxious distress or 309.81 (F43.10) Posttraumatic Stress Disorder (includes Posttraumatic Stress Disorder for Children 6 Years and Younger)  With dissociative symptoms      Psychosocial / Contextual Factors:              Patient is a 48 year old female, who presents for a diagnostic assessment and individual therapy services, noting that her primary care provider had suggested that she access individual therapy services at this time, to assist her in addressing symptoms of depression and anxiety, as well as learning adaptive coping skills that could assist her in managing her symptoms, along with continued medication management of symptoms. Client reports that she has experienced symptoms of anxiety since childhood, has experienced multiple significant losses, for which she still has some unresolved grief, and has a history of multiple traumas as an adult. These symptoms are negatively impacting her daily functioning and quality of life. Client reports a positive support system of family and friends, though notes that she \"keeps her [place] small,\" and has difficulty opening up to and trusting others, due to her trauma history. Client reports that she has accessed therapy services previously, and had found this to be helpful.  Client does not identify any current safety concerns, noting that \"my children and family mean too much to me to ever harm myself.\" She was forward thinking and goal oriented, and states that she is motivated to further develop coping skills that can assist her in managing her " "symptoms, so that she can \"start to feel better.\"      WHODAS: WHODAS was completed at the time of the diagnostic assessment, with a score of 29     Referral / Collaboration:  Referral to another professional/service is not indicated at this time.     Anticipated number of session or this episode of care: 8-10        MeasurableTreatment Goal(s) related to diagnosis / functional impairment(s)  Goal 1: Patient will experience a decrease in reported symptoms of depression and anxiety, as evidenced by a decrease in PHQ-9 and CELIA-7 scores (baseline PHQ-9: 13 CELIA-7: 16), her report, and therapist's observation in session.        I will know I've met my goal when I start to feel happy again.       Objective #A (Patient Action)                          Patient will decrease frequency and intensity of feeling down, depressed, hopeless and anxious, by identifying at least 2-3 triggers to her reported symptoms, as well as being able to identify at least 2-3 initial signs that she is starting to feel depressed or anxious, so that she will be able to make decisions about how to cope with symptoms as they arise.  Status: New - Date: 7/2/19      Intervention(s)  Therapist will ask patient to track mood and anxiety symptoms, in order to notice and address patterns in the frequency and intensity of her symptoms, as well as to identify triggers and cues that accompany her symptoms     Objective #B  Patient will identify at least 2-3 negative or unhelpful thought processes and behaviors that she regularly engages in, and will challenge these thoughts, beliefs, and actions at least 1-2 times daily.  Status: New - Date: 7/2/19      Intervention(s)  Therapist will teach and practice in session with patient techniques for disengaging from, and/or challenging negative thought processes, as well as discussing ways in which she might engage in more self-compassionate thoughts and behaviors.       Objective #C  Patient will will increase " interest, engagement, and pleasure in doing things, by effectively utilizing adaptive coping skills discussed in session, at least 1-2 times daily, to assist her in managing her reported symptoms of depression and anxiety.  Status: New - Date: 7/2/19      Intervention(s)  Therapist will assist patient in identifying situations in which she tends to feel a lack of motivation, as well as situations in which she experiences increased anxiety, which prevents her from doing things she wants to do, and will assist her in developing short and long term action plans for how she might manage this, making use of coping skills discussed in therapy, and through processing current life circumstances/significant life events identified as being related to her reported symptoms.        Goal 2: Patient will increase her capacity to engage in self-care and self-compassion practices, as evidenced by intentionally setting time aside to engage in activities that promote her health and well-being, at least 1-2 times weekly, and by noticing and addressing self-critical thoughts and feelings of unresolved guilt in a healthy manner.       I will know I've met my goal when I don't feel guilty about things that have happened in my life anymore. I have a lot of guilt.       Objective #A (Patient Action)  Patient will compile a list of self-care activities that would promote her health and well-being, that she feels are realistic and do-able, and will practice engaging in these activities at least 3-5 times per week.                            Status: New - Date: 7/2/19      Intervention(s)  Therapist will assist patient in identifying realistic self-care activities that she might engage in, develop a plan for regularly implementing these activities into her life, as well as identifying any potential barriers that might come up in the implementation of this plan, and planning ahead for ways in which she might address any barriers that  arise.     Objective #B  Patient will identify at least 2-3 ways in which guilt has been impacting her life, and will develop both short and long term action plans, for how she will cope with, and manage her feelings of guilt, as she moves forward.                  .                   Status: New - Date: 7/2/19      Intervention(s)  Therapist will assist patient in processing the impact of guilt on her life, and will assist her in identifying both healthy and unhealthy behaviors to cope with guilt, as well as identifying potential areas of change that she would like to see in her life. A plan for making these changes, that feels manageable and comfortable to patient will then be developed.       Patient has reviewed and agreed to the above plan.      Lynnette Guaman, [facility]DA[facility]  July 17, 2019

## 2019-07-17 NOTE — PATIENT INSTRUCTIONS
Between now and the next session, Sherie will come up with a list of qualities about herself that she likes and/or accomplishments that she is proud of in her life. She will also attempt to focus on 1-2 self affirming thoughts each day.

## 2019-07-18 ENCOUNTER — MYC MEDICAL ADVICE (OUTPATIENT)
Dept: FAMILY MEDICINE | Facility: CLINIC | Age: 49
End: 2019-07-18

## 2019-07-18 ENCOUNTER — MYC MEDICAL ADVICE (OUTPATIENT)
Dept: PALLIATIVE MEDICINE | Facility: CLINIC | Age: 49
End: 2019-07-18

## 2019-07-19 NOTE — TELEPHONE ENCOUNTER
Chantal message from patient on 7/18 at 1145:    Francisco Yang I wanted to let you know I had no pain after injections until I got up this morning and most of my pain is back. I have my next appointment with you on August 7th do I need to make another appointment for more injections? I remember you said I would have to wait a month to have more.    ----------------  Reviewed chart. Pt received TPI injections on 7/17. Lidocaine and bupivicaine were used.  Notes say that injection may be repeated PRN.     The appointment on 8/7 was cancelled when the patient made the appointment for 7/17.     Message sent to pt:    Robert Rehman,     I am sorry that the trigger point injections didn't last longer. In a review of your appointment list, I see that the appointment on 8/7 was cancelled when you were able to get in to see Celia sooner.  I will have Celia review this message to determine the timing of the next injection and then we can see about scheduling another appointment.     Shannon Cervantes, SAMARAN, RN-BC  Patient Care Supervisor/Care Coordinator  [facility]

## 2019-07-22 ENCOUNTER — MYC MEDICAL ADVICE (OUTPATIENT)
Dept: PALLIATIVE MEDICINE | Facility: CLINIC | Age: 49
End: 2019-07-22

## 2019-07-22 NOTE — TELEPHONE ENCOUNTER
Called patient regarding her increased pain. Discussed that since her increased pain is related to her increased anxiety, I recommend that she meet with primary care to discuss treatment options for her anxiety.     She would like to try Ibuprofen, educated her that 2400mg/24 hours is maximum dose. She should take with food if she would like to try this. Recommended she try Flexeril 10mg three times a day instead of 10mn in AM and 20mg at HS.     She verbalized her understanding of the plan.     Celia Bettencourt PA-C on 7/22/2019 at 1:44 PM

## 2019-07-25 ENCOUNTER — OFFICE VISIT (OUTPATIENT)
Dept: FAMILY MEDICINE | Facility: CLINIC | Age: 49
End: 2019-07-25
Payer: MEDICARE

## 2019-07-25 ENCOUNTER — OFFICE VISIT (OUTPATIENT)
Dept: PSYCHOLOGY | Facility: CLINIC | Age: 49
End: 2019-07-25
Payer: MEDICARE

## 2019-07-25 VITALS
OXYGEN SATURATION: 98 % | DIASTOLIC BLOOD PRESSURE: 74 MMHG | SYSTOLIC BLOOD PRESSURE: 112 MMHG | WEIGHT: 136.6 LBS | TEMPERATURE: 97.6 F | RESPIRATION RATE: 18 BRPM | BODY MASS INDEX: 22.73 KG/M2 | HEART RATE: 129 BPM

## 2019-07-25 DIAGNOSIS — M79.7 FIBROMYALGIA: ICD-10-CM

## 2019-07-25 DIAGNOSIS — F33.1 MAJOR DEPRESSIVE DISORDER, RECURRENT EPISODE, MODERATE WITH ANXIOUS DISTRESS (H): Primary | ICD-10-CM

## 2019-07-25 DIAGNOSIS — F43.10 POST TRAUMATIC STRESS DISORDER: ICD-10-CM

## 2019-07-25 DIAGNOSIS — R26.89 BALANCE PROBLEMS: ICD-10-CM

## 2019-07-25 DIAGNOSIS — G43.819 OTHER MIGRAINE WITHOUT STATUS MIGRAINOSUS, INTRACTABLE: ICD-10-CM

## 2019-07-25 DIAGNOSIS — F41.1 GENERALIZED ANXIETY DISORDER: Primary | ICD-10-CM

## 2019-07-25 PROCEDURE — 90834 PSYTX W PT 45 MINUTES: CPT | Performed by: SOCIAL WORKER

## 2019-07-25 PROCEDURE — 99214 OFFICE O/P EST MOD 30 MIN: CPT | Performed by: FAMILY MEDICINE

## 2019-07-25 RX ORDER — CLONAZEPAM 0.5 MG/1
0.25 TABLET ORAL 2 TIMES DAILY
Qty: 30 TABLET | Refills: 0 | Status: SHIPPED | OUTPATIENT
Start: 2019-07-25 | End: 2019-08-12 | Stop reason: DRUGHIGH

## 2019-07-25 RX ORDER — DULOXETINE 40 MG/1
40 CAPSULE, DELAYED RELEASE ORAL DAILY
Qty: 30 CAPSULE | Refills: 1 | Status: SHIPPED | OUTPATIENT
Start: 2019-07-25 | End: 2019-09-10

## 2019-07-25 RX ORDER — BUSPIRONE HYDROCHLORIDE 15 MG/1
15 TABLET ORAL 2 TIMES DAILY
Qty: 60 TABLET | Refills: 1 | Status: SHIPPED | OUTPATIENT
Start: 2019-07-25 | End: 2019-09-04

## 2019-07-25 RX ORDER — VERAPAMIL HYDROCHLORIDE 40 MG/1
40 TABLET ORAL 2 TIMES DAILY
Qty: 180 TABLET | Refills: 3 | Status: SHIPPED | OUTPATIENT
Start: 2019-07-25 | End: 2020-08-06

## 2019-07-25 ASSESSMENT — PATIENT HEALTH QUESTIONNAIRE - PHQ9: 5. POOR APPETITE OR OVEREATING: NEARLY EVERY DAY

## 2019-07-25 ASSESSMENT — PAIN SCALES - GENERAL: PAINLEVEL: WORST PAIN (10)

## 2019-07-25 ASSESSMENT — ANXIETY QUESTIONNAIRES
6. BECOMING EASILY ANNOYED OR IRRITABLE: NEARLY EVERY DAY
5. BEING SO RESTLESS THAT IT IS HARD TO SIT STILL: NEARLY EVERY DAY
3. WORRYING TOO MUCH ABOUT DIFFERENT THINGS: NEARLY EVERY DAY
1. FEELING NERVOUS, ANXIOUS, OR ON EDGE: NEARLY EVERY DAY
2. NOT BEING ABLE TO STOP OR CONTROL WORRYING: NEARLY EVERY DAY
7. FEELING AFRAID AS IF SOMETHING AWFUL MIGHT HAPPEN: SEVERAL DAYS
IF YOU CHECKED OFF ANY PROBLEMS ON THIS QUESTIONNAIRE, HOW DIFFICULT HAVE THESE PROBLEMS MADE IT FOR YOU TO DO YOUR WORK, TAKE CARE OF THINGS AT HOME, OR GET ALONG WITH OTHER PEOPLE: EXTREMELY DIFFICULT
GAD7 TOTAL SCORE: 19

## 2019-07-25 NOTE — PATIENT INSTRUCTIONS
Between now and the next session, Sherie will intentionally take 5-15 minutes per day to engage in self-care/relaxation activities.

## 2019-07-25 NOTE — PROGRESS NOTES
Subjective     Sherie Otero is a 48 year old female who presents to clinic today for the following health issues:    HPI   Anxiety Follow-Up    How are you doing with your anxiety since your last visit? Worsened     Are you having other symptoms that might be associated with anxiety? Yes:  Cronic pain is getting worse    Have you had a significant life event? Housing Concerns and Grief or Loss     Are you feeling depressed? Yes:  having a lot going on with her dad passing away    Do you have any concerns with your use of alcohol or other drugs? No    Social History     Tobacco Use     Smoking status: Current Every Day Smoker     Packs/day: 0.50     Years: 29.00     Pack years: 14.50     Types: Cigarettes     Smokeless tobacco: Never Used     Tobacco comment: 9/19/11 - 1pk/day   Substance Use Topics     Alcohol use: No     Alcohol/week: 1.0 oz     Drug use: No     CELIA-7 SCORE 6/25/2019 7/10/2019 7/25/2019   Total Score - - -   Total Score 16 16 19     PHQ 3/1/2019 6/25/2019 7/10/2019   PHQ-9 Total Score 11 13 12   Q9: Thoughts of better off dead/self-harm past 2 weeks Not at all Not at all Not at all     No flowsheet data found.  No flowsheet data found.      Amount of exercise or physical activity: 4-5 days/week for an average of 30-45 minutes    Problems taking medications regularly: No    Medication side effects: Dizzy and lightheaded after taking the Buspar    Diet: regular (no restrictions)    Patient here today to review her anxiety.  Symptoms have been getting worse since her last office visit when I decreased her clonazepam to 0.25 mg twice daily from 0.5 mg AM and 0.25 mg PM dosing.  At same visit, I also increased Buspar dosing to 15 mg three times daily.    Patient states that the Buspar is making her dizzy and lightheaded.  She also notes that she is having more panic attacks.  She states that her panic attacks make her tense in the upper back and neck, the tension causes her to have neck issues which  lead to the balance problems.      She also states her pain management is worsening.  Has been following with the pain clinic.  Recently had trigger point injections which were effective the first time but recently were not effective.  She feels like perhaps if she got them every day they may actually work.    She continues to go to psychotherapy.  She is waiting for her insurance to approve her to see Amanda Blanchard CNP our new psychiatry provider.    Patient also has history of migraines and needs verapamil refilled.            Reviewed and updated as needed this visit by Provider  Tobacco  Allergies  Meds  Problems  Med Hx  Surg Hx  Fam Hx         Review of Systems   ROS COMP: Constitutional, HEENT, cardiovascular, pulmonary, GI, , musculoskeletal, neuro, skin, endocrine and psych systems are negative, except as otherwise noted.      Objective    /74   Pulse 129   Temp 97.6  F (36.4  C) (Temporal)   Resp 18   Wt 62 kg (136 lb 9.6 oz)   SpO2 98%   BMI 22.73 kg/m    Body mass index is 22.73 kg/m .  Physical Exam   Constitutional: She appears well-developed and well-nourished.   Cardiovascular: Normal rate, regular rhythm, S1 normal, S2 normal and normal heart sounds.   No murmur heard.  Pulmonary/Chest: Effort normal and breath sounds normal. No respiratory distress. She has no wheezes. She has no rhonchi. She has no rales.   Neurological: She is alert.   Psychiatric: Her speech is normal and behavior is normal. Judgment and thought content normal. Her mood appears anxious. Cognition and memory are normal. She exhibits a depressed mood (flat affect).                   Assessment & Plan     ASSESSMENT/ORDERS:    ICD-10-CM    1. Generalized anxiety disorder F41.1 DULoxetine 40 MG CPEP     busPIRone (BUSPAR) 15 MG tablet     clonazePAM (KLONOPIN) 0.5 MG tablet   2. Fibromyalgia M79.7 DULoxetine 40 MG CPEP   3. Other migraine without status migrainosus, intractable G43.819 verapamil (CALAN) 40 MG  tablet   4. Balance problems R26.89      PLAN:  1.  We will have her go back down to Buspar 15 mg twice daily from the three times daily dosing she was doing.  Increase AM Cymbalta dose to 40 mg and continue on the 60 mg PM dosing.  Will keep clonazepam dosing the same for the next month.  2.  Cymbalta dose increase may help pain management.  She also is interested in pursuing acupuncture, which I am fine with.  I also suggested she consider physical therapy referral for her neck, upper back and balance issues as they can help her with muscle relaxation techniques and assess her gait.    3.  Will await psychiatry provider appointment.     Tobacco Cessation:   reports that she has been smoking cigarettes.  She has a 14.50 pack-year smoking history. She has never used smokeless tobacco.              Return in about 4 weeks (around 8/22/2019) for medication recheck.    Mauro Paredes MD  [facility]

## 2019-07-25 NOTE — PROGRESS NOTES
Progress Note    Patient Name: Sherie Otero  Date: 7/25/19         Service Type: Individual  Video Visit: No     Session Start Time: 2:41pm Session End Time: 3:25pm     Session Length: 44 minutes    Session #: 5    Attendees: Client attended alone     Treatment Plan Last Reviewed: Treatment plan was developed and reviewed on 7/2/19  PHQ-9 / CELIA-7 : completed on 7/10/19  PHQ-9: 12  CELIA-7: 16  DATA  Interactive Complexity: No  Crisis: No       Progress Since Last Session (Related to Symptoms / Goals / Homework):   Symptoms: Worsening Patient feels as though her symptoms have worsened since her previous appointment. She reports that she has been having more frequent panic attacks, which she believes are related to ongoing chronic pain concerns, as well as multiple current life stressors.    Homework: Did not completePatient reports that her anxiety symptoms and panic attacks were too intense and frequent this past week for her to complete homework discussed in previous session (coming up with a list of personal qualities she likes about herself and/or accomplishments that she is proud of, and focusing on making 1-2 self-affirming thoughts daily).      Episode of Care Goals: Minimal progress - ACTION (Actively working towards change); Intervened by reinforcing change plan / affirming steps taken Patient reports that she has been experiencing very frequent and very intense panic attacks this past week, which has made it difficult for her to work toward her goals. She notes that she did try to engage in coping skills that are helpful to her, as much as she was able.      Current / Ongoing Stressors and Concerns:   Patient reports that her primary stressors at this time are not knowing when she is going to experience anxiety  or panic symptoms, and then worrying about how she will manage them when they occur, financial stress, worry over  one  of her daughter's safety and  "well-being, as she resides [place] of state, worry about not being able to financially assist  her other daughter as much as she would like, given her daughter's recent home purchase, worry about her son  Residing with her, due to his own struggles with mental health, as well as a history of substance use to cope with his  symptoms, and recently assisting him in filling out the paperwork for a legal guardian/wondering when this process will  be completed.      Treatment Objective(s) Addressed in This Session:   Patient will will increase interest, engagement, and pleasure in doing things, by effectively utilizing adaptive coping skills discussed in session, at least 1-2 times daily, to assist her in managing her reported symptoms of depression and anxiety.    Patient reports that over the past week, she has been struggling with increased panic attacks, and has had some difficulty in being able to effectively cope when they occur. She notes that she has mostly \"just tired myself out so much during the panic attack, that I eventually fall asleep.\" She notes that she has had limited success with coping skills that previously worked (walking in circles, washing her face with cold water, listening to music, hugging her dog). Patient processed the distress she has been experiencing in not feeling able to cope during these panic attacks, and how they often occur without an easily identifiable trigger, (though sometimes there is a identifiable trigger, such as a group of motorcycles driving past her home). She notes that she \"hasn't been able to leave home\" in the past week, due to the level of anxiety and panic that she has been feeling for much of the day, every day. Additional ideas for how patient might manage her anxiety and panic symptoms were discussed (meditation and breathing exercises, progressive muscle relaxation exercise, imagery exercise), as well as the idea of practicing these skills before she  needs to use " them, so that she feels able to use them when needed.     Patient will compile a list of self-care activities that would promote her health and well-being, that she feels are realistic and do-able, and will practice engaging in these activities at least 3-5 times per week.    Patient reports that due to the recent increase in anxiety and panic symptoms, she knows that she hasn't been taking care of herself as well as she should. Patient spent time processing multiple significant life stressors related to her mental health, chronic pain, current financial situation, and worries for her children. She notes that worrying about these stressors get in the way of her doing the things that she knows she needs to do, including finding time to allow herself to do things just for herself. Time was spent discussing and brainstorming ways in which client might complete household tasks is a way that feels less overwhelming for her than her current routine, as well as the idea of intentionally setting aside between 5-15 minutes of time each day where she engages in a self-care activity of her choice.      Intervention:   CBT: Patient spent time in today's session discussing the current symptoms and situations in her life that she              finds most challenging, as well as the thoughts, emotions and behaviors that often accompany these symptoms and  situations. Patient reports feeling more able to notice her triggers, and the accompanying thoughts and emotions that  she is experiencing, but notes that with her recent increase in panic attacks, has not felt as though she is as able as  usual to make mindful decisions in regard to how she would like to respond.      Motivational Interviewing: Therapist utilized open ended questions, reflective listening, affirmations, summarizing             and eliciting self-motivational statements to better understand patient's perception of her current challenges,             and to develop  a plan for how she will continue to work toward her identified goals and objectives, that is             meaningful and useful to her.         ASSESSMENT: Current Emotional / Mental Status (status of significant symptoms):   Risk status (Self / Other harm or suicidal ideation)   Patient denies current fears or concerns for personal safety.   Patient denies current or recent suicidal ideation or behaviors.   Patientdenies current or recent homicidal ideation or behaviors.   Patient denies current or recent self injurious behavior or ideation.   Patient denies other safety concerns.   Patient reports there has been no change in risk factors since their last session.     Patient reports there has been no change in protective factors since their last session.     Recommended that patient call 911 or go to the local ED should there be a change in any of these risk factors.     Appearance:   Appropriate    Eye Contact:   Good    Psychomotor Behavior: Normal    Attitude:   Cooperative    Orientation:   All   Speech    Rate / Production: Normal     Volume:  Normal    Mood:    Depressed    Affect:    Flat    Thought Content:  Clear    Thought Form:  Coherent  Logical    Insight:    Good      Medication Review:   No changes to current psychiatric medication(s)     Medication Compliance:   Yes     Changes in Health Issues:   None reported     Chemical Use Review:   Substance Use: Chemical use reviewed, no active concerns identified      Tobacco Use: No change in amount of tobacco use since last session.  Patient declined discussion at this time    Diagnosis:  1. Major depressive disorder, recurrent episode, moderate with anxious distress (H)    2. Post traumatic stress disorder        Collateral Reports Completed:   Not Applicable    PLAN: (Patient Tasks / Therapist Tasks / Other)  Between now and the next session, patient will intentionally take 5-15 minutes per day to engage in self-care/relaxation activities.     Lynnette PATHAK  "LLOYD [place]                                                         ______________________________________________________________________    Treatment Plan     Patient's Name: Sherie Otero               YOB: 1970     Date: 7/2/19     DSM5 Diagnoses: 296.32 (F33.1) Major Depressive Disorder, Recurrent Episode, Moderate With anxious distress or 309.81 (F43.10) Posttraumatic Stress Disorder (includes Posttraumatic Stress Disorder for Children 6 Years and Younger)  With dissociative symptoms      Psychosocial / Contextual Factors:              Patient is a 48 year old female, who presents for a diagnostic assessment and individual therapy services, noting that her primary care provider had suggested that she access individual therapy services at this time, to assist her in addressing symptoms of depression and anxiety, as well as learning adaptive coping skills that could assist her in managing her symptoms, along with continued medication management of symptoms. Client reports that she has experienced symptoms of anxiety since childhood, has experienced multiple significant losses, for which she still has some unresolved grief, and has a history of multiple traumas as an adult. These symptoms are negatively impacting her daily functioning and quality of life. 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 Client reports that she has accessed therapy services previously, and had found this to be helpful.  Client does not identify any current safety concerns, noting that \"my children and family mean too much to me to ever harm myself.\" She was forward thinking and goal oriented, and states that she is motivated to further develop coping skills that can assist her in managing her symptoms, so that she can \"start to feel better.\"      WHODAS: WHODAS was " completed at the time of the diagnostic assessment, with a score of 29     Referral / Collaboration:  Referral to another professional/service is not indicated at this time.     Anticipated number of session or this episode of care: 8-10        MeasurableTreatment Goal(s) related to diagnosis / functional impairment(s)  Goal 1: Patient will experience a decrease in reported symptoms of depression and anxiety, as evidenced by a decrease in PHQ-9 and CELIA-7 scores (baseline PHQ-9: 13 CELIA-7: 16), her report, and therapist's observation in session.        I will know I've met my goal when I start to feel happy again.       Objective #A (Patient Action)                          Patient will decrease frequency and intensity of feeling down, depressed, hopeless and anxious, by identifying at least 2-3 triggers to her reported symptoms, as well as being able to identify at least 2-3 initial signs that she is starting to feel depressed or anxious, so that she will be able to make decisions about how to cope with symptoms as they arise.  Status: New - Date: 7/2/19      Intervention(s)  Therapist will ask patient to track mood and anxiety symptoms, in order to notice and address patterns in the frequency and intensity of her symptoms, as well as to identify triggers and cues that accompany her symptoms     Objective #B  Patient will identify at least 2-3 negative or unhelpful thought processes and behaviors that she regularly engages in, and will challenge these thoughts, beliefs, and actions at least 1-2 times daily.  Status: New - Date: 7/2/19      Intervention(s)  Therapist will teach and practice in session with patient techniques for disengaging from, and/or challenging negative thought processes, as well as discussing ways in which she might engage in more self-compassionate thoughts and behaviors.       Objective #C  Patient will will increase interest, engagement, and pleasure in doing things, by effectively utilizing  adaptive coping skills discussed in session, at least 1-2 times daily, to assist her in managing her reported symptoms of depression and anxiety.  Status: New - Date: 7/2/19      Intervention(s)  Therapist will assist patient in identifying situations in which she tends to feel a lack of motivation, as well as situations in which she experiences increased anxiety, which prevents her from doing things she wants to do, and will assist her in developing short and long term action plans for how she might manage this, making use of coping skills discussed in therapy, and through processing current life circumstances/significant life events identified as being related to her reported symptoms.        Goal 2: Patient will increase her capacity to engage in self-care and self-compassion practices, as evidenced by intentionally setting time aside to engage in activities that promote her health and well-being, at least 1-2 times weekly, and by noticing and addressing self-critical thoughts and feelings of unresolved guilt in a healthy manner.       I will know I've met my goal when I don't feel guilty about things that have happened in my life anymore. I have a lot of guilt.       Objective #A (Patient Action)  Patient will compile a list of self-care activities that would promote her health and well-being, that she feels are realistic and do-able, and will practice engaging in these activities at least 3-5 times per week.     Status: New - Date: 7/2/19      Intervention(s)  Therapist will assist patient in identifying realistic self-care activities that she might engage in, develop a plan for regularly implementing these activities into her life, as well as identifying any potential barriers that might come up in the implementation of this plan, and planning ahead for ways in which she might address any barriers that arise.     Objective #B  Patient will identify at least 2-3 ways in which guilt has been impacting her life,  and will develop both short and long term action plans, for how she will cope with, and manage her feelings of guilt, as she moves forward.                  .                   Status: New - Date: 7/2/19      Intervention(s)  Therapist will assist patient in processing the impact of guilt on her life, and will assist her in identifying both healthy and unhealthy behaviors to cope with guilt, as well as identifying potential areas of change that she would like to see in her life. A plan for making these changes, that feels manageable and comfortable to patient will then be developed.    Patient has reviewed and agreed to the above plan.      Lynnette [place], [facility]  July 25, 2019

## 2019-07-26 ENCOUNTER — MYC MEDICAL ADVICE (OUTPATIENT)
Dept: FAMILY MEDICINE | Facility: CLINIC | Age: 49
End: 2019-07-26

## 2019-07-26 ASSESSMENT — ANXIETY QUESTIONNAIRES: GAD7 TOTAL SCORE: 19

## 2019-07-29 ENCOUNTER — MYC MEDICAL ADVICE (OUTPATIENT)
Dept: PALLIATIVE MEDICINE | Facility: CLINIC | Age: 49
End: 2019-07-29

## 2019-07-29 NOTE — TELEPHONE ENCOUNTER
I have not heard from [facility] and I would suggest she do what we had discussed at her last office visit 4 days ago for her pain management and discuss this with [facility] if they are feasible options for her.    PLAN:  ... She also is interested in pursuing acupuncture, which I am fine with.  I also suggested she consider physical therapy referral for her neck, upper back and balance issues as they can help her with muscle relaxation techniques and assess her gait...    Remind her to make her follow-up appointment 3.5 weeks from now (4 weeks from last appointment) as she currently does not have one on file.    Mauro Paredes MD

## 2019-07-30 ENCOUNTER — MYC MEDICAL ADVICE (OUTPATIENT)
Dept: FAMILY MEDICINE | Facility: CLINIC | Age: 49
End: 2019-07-30

## 2019-08-01 NOTE — PROGRESS NOTES
[facility]    CHIEF COMPLAINT: Pain  -Fibromyalgia  -Neck pain  -Low back pain    INTERVAL HISTORY:  Last seen on 7/17/2019 for TPIs.        Recommendations/plan at the last visit included:  1. Physical Therapy: continue silver sneakers and previous PT exercises;   2. Clinical Health Psychologist:  YES, meet with providers in [place] per Dr. Paredes's recommendation.  3. Diagnostic Studies: none at this time  4. Medication Management:     1. Continue Cymbalta 20mg in AM and 60mg at bedtime.     2.  Okay to continue Flexeril, will need to monitor for serotonin syndrome due to combination of Cymbalta and Flexeril.    3. Continue Hydrocodone at max of 6 tabs/day.  Continue number of tablets at 170/month.     4.  continue topiramate at 50 mg at bedtime  5. Further procedures recommended: Trigger point injections today, see below    Follow up with this provider: 4 weeks      Since her last visit, Sherie Otero reports:    Her pain continues to be worse. She continues to report uncontrolled anxiety and panic attacks. She has made an appointment with another provider to discuss her anxiety options. She continues to meet with her counselor weekly. She feels that her pain is worse due to increased stress and anxiety.       Pain Information:   Pain quality:  Aching, unbearable, tiring, exhausting, penetrating, stabbing, miserable, and throbbing   Pain rating: intensity ranges from 9/10 to 10/10, and averages 9+/10 on a 0-10 scale.   Pain today 9/10      UDS 1/19/2019   CSA 2/04/2019    CURRENT RELEVANT PAIN MEDICATIONS:  Clonazepam 0.5mg: taking 1/2 tab twice daily    Flexeril-1 in AM and 2 at HS  Cymbalta-40mg in AM and 60mg at night  Hydrocodone 7.5mg -currently taking 2 tablets three times a day   Topamax- 2 tabs at HS  Ibuprofen-will take 800mg up to 3 times day, doesn't take daily    Patient is using the medication as prescribed:  YES  Is your medication helpful? somewhat   Medication side  effects? no side effect    Previous Medications: (H--helped; HI--Helped initially; SWH-- somewhat helpful, NH--No help; W--worse; SE--side effects)   Opiates: Hydrocodone H, Oxycodone SE  NSAIDS: Ibuprofen H  Muscle Relaxants: Tizanidine NH, Flexeril H, Robaxin NH SE stomach upset  Anti-migraine mediations: Verapamil H  Anti-depressants: Cymbalta H  Sleep aids: none  Anxiolytics: Xanax H, Clonazepam H, Valium H  Neuropathics: Gabapentin SE dizziness          Topicals: Lidocaine patches SW  Other medications not covered above: Savella H at first, then seemed to stop working, Lyrica SE shortness of breath, felt 'weird' on them, throat felt weird. Prednisone H for arthritis flare    Past Pain Treatments:  Pain Clinic:   No   PT: Yes, years ago. Has not done pool therapy.   Psychologist: No  Relaxation techniques/biofeedback: No  Chiropractor: No, was told not to because of neck.   Acupuncture: Yes for headaches.   Pharmacotherapy:           Opioids: Yes            Non-opioids:    Yes   TENs Unit:Yes  Injections: cervical medial branch blocks 6/12/2014  Self-care:   Yes, ice/heat, hot baths, theracare  Surgeries related to pain: No    [place] Board of Pharmacy Data Base Reviewed:    YES; As expected, no concern for misuse/abuse of controlled medications based on this report.      THE 4 As OF OPIOID MAINTENANCE ANALGESIA    Analgesia: Is pain relief clinically significant? YES   Activity: Is patient functional and able to perform Activities of Daily Living? YES   Adverse effects: Is patient free from adverse side effects from opiates? YES   Adherence to Rx protocol: Is patient adhering to Controlled Substance Agreement and taking medications ONLY as ordered? YES       Is Narcan prescribed for opiate use >50 MME daily? yes      Total Daily MME: 37.5-45    Medications:  Current Outpatient Medications   Medication Sig Dispense Refill     busPIRone (BUSPAR) 15 MG tablet Take 1 tablet (15 mg) by mouth 2 times daily 60  tablet 1     cetirizine (ZYRTEC) 10 MG tablet TAKE  ONE TABLET BY MOUTH EVERY DAY. 90 tablet 3     clonazePAM (KLONOPIN) 0.5 MG tablet Take 0.5 tablets (0.25 mg) by mouth 2 times daily 30 tablet 0     cyclobenzaprine (FLEXERIL) 10 MG tablet Take 2 tablets in the evening and 1 in the morning 90 tablet 1     CYMBALTA 60 MG capsule TAKE ONE CAPSULE BY MOUTH EVERY EVENING - KADY 90 capsule 1     DULoxetine 40 MG CPEP Take 40 mg by mouth daily .  Continue with 60 mg in Evening 30 capsule 1     fluticasone (FLONASE) 50 MCG/ACT nasal spray SPRAY 2 SPRAYS INTO BOTH NOSTRILS DAILY. 16 g 11     folic acid (FOLVITE) 1 MG tablet Take 1 tablet (1 mg) by mouth daily 100 tablet 3     HYDROcodone-acetaminophen (NORCO) 7.5-325 MG per tablet TAKE 2 TABLETS BY MOUTH EVERY 6 HOURS AS NEEDED FOR PAIN--MAX OF 6 TABLETS PER DAY. 170 tablet 0     methotrexate sodium 2.5 MG TABS Take 8 tablets (20 mg) by mouth every 7 days 32 tablet 3     topiramate (TOPAMAX) 50 MG tablet Take 1 tablet (50 mg) by mouth 2 times daily 60 tablet 1     verapamil (CALAN) 40 MG tablet Take 1 tablet (40 mg) by mouth 2 times daily 180 tablet 3     albuterol (VENTOLIN HFA) 108 (90 Base) MCG/ACT inhaler Inhale 2 puffs into the lungs every 6 hours as needed for shortness of breath / dyspnea or wheezing (Patient not taking: Reported on 7/25/2019) 18 g 1     naloxone (NARCAN) nasal spray Spray 1 spray (4 mg) into one nostril alternating nostrils as needed for opioid reversal every 2-3 minutes until assistance arrives (Patient not taking: Reported on 7/17/2019) 0.2 mL 0     predniSONE (DELTASONE) 5 MG tablet Take 5 mg by mouth daily as needed for peripheral joint pain from rheumatoid arthritis; use sparingly. (Patient not taking: Reported on 7/25/2019) 30 tablet 1       Review of Systems: A 10-point review of systems was negative, with the exception of chronic pain issues, fatigue, headache, dizziness, sinus infection, allergies, palpitations, chest pain, diarrhea,  "constipation, depression, anxiety, stress, and mood swings      Social History: Reviewed; unchanged from previous consultation.      Family history: Reviewed; unchanged from previous consultation.     PHYSICAL EXAM:     Vitals:  /66   Temp 97.9  F (36.6  C) (Temporal)   Ht 1.651 m (5' 5\")   Wt 60.8 kg (134 lb)   BMI 22.30 kg/m        Constitutional: healthy, alert and no distress  HEENT: Head atraumatic, normocephalic. Eyes without conjunctival injection or jaundice. Neck supple. No obvious neck masses.  Psychiatric/mental status: Alert, without lethargy or stupor. Appropriate affect. Mood normal.   Neurologic exam: Gait is normal         DIAGNOSTIC TESTS:  Imaging Studies:   No new imaging to review    Assessment:  Sherie Otero is a 48 year old female who presents today for follow up regarding her:    1.  Low back pain  2.  Neck pain  3.  Chronic pain syndrome  4.  Fibromyalgia  5. Myofascial pain    Worsening pain associated with worsening anxiety. She will continue her weekly meetings with her counselor. She is also going to meet with another provider regarding her anxiety options. It has been very difficult to taper off of the oxycodone due to the anxiety. She is still waiting to meet with the psychiatrist as well.     She would like trigger point injections as well. She does find them helpful. Discussed that we should try to stick with repeating them no more than every 6 weeks.       Plan:  Diagnosis reviewed, treatment option addressed, and risk/benifits discussed.  Self-care instructions given.  I am recommending a multidisciplinary treatment plan to help this patient better manage pain.      1. Physical Therapy: continue silver sneakers and previous PT exercises;   2. Clinical Health Psychologist:  YES, has a plan in place  3. Diagnostic Studies: none at this time  4. Medication Management:     1. Continue Cymbalta 40mg in AM and 60mg at bedtime.     2.  Okay to continue Flexeril, will need to " monitor for serotonin syndrome due to combination of Cymbalta and Flexeril.    3. Continue Hydrocodone at max of 6 tabs/day.  Continue number of tablets at 170/month.     4.  continue topiramate at 50 mg at bedtime  5. Further procedures recommended: Trigger point injections today, see below    Follow up with this provider: 4 weeks     Total time spent face to face was 25 minutes and more than 50% of face to face time was spent in counseling and/or coordination of care regarding the diagnosis and recommendations above. This was time spent separate from procedure.       Celia Bettencourt PA-C   [facility]    Pre Procedure Diagnosis: myofascial pain  Post procedure Diagnosis: Same  Procedure performed: trigger point injections  Anesthesia: none  Complications: none  Operators: Celia Bettencourt PA-C     Indications:   Sherie Otero is a 48 year old female with a history of neck pain.  Exam shows myofascial pain of the muscle groups listed below and they have tried conservative treatment including physical therapy and medications.    Options/alternatives, benefits and risks were discussed with the patient including bleeding, infection, tissue trauma and pnuemothorax.  Questions were answered to her satisfaction and she agrees to proceed. Voluntary informed consent was obtained and signed.     Vitals were reviewed: Yes  Allergies were reviewed:  Yes   Medications were reviewed:  Yes   Pre-procedure pain score: 9/10    Procedure:  After getting informed consent, a Pause for the Cause was performed.    Trigger points were identified by patient, and marked when appropriate.  The area was prepped with Chloroprep.    Using clean technique, injections were completed using a 25G, 1.5 inch needle.  After negative aspiration, injection was completed.  A total of 6 locations were injected.  When possible, tissue was retracted from the chest wall to avoid lung injury.    Muscle groups injected:  Bilateral  trapezius and levator scapulae    Injection solution contained:  5ml of 1% lidocaine and 5ml of 0.5% bupivacaine.    Hemostasis was achieved, the area was cleaned, and bandaids were placed when appropriate.  The patient tolerated the procedure well.  Breath sounds were normal.     Post-procedure pain score: 0/10  Follow-up includes:   -repeat 6 weeks      IMER Lares  [facility]

## 2019-08-07 ENCOUNTER — OFFICE VISIT (OUTPATIENT)
Dept: PALLIATIVE MEDICINE | Facility: CLINIC | Age: 49
End: 2019-08-07
Payer: MEDICARE

## 2019-08-07 VITALS
HEIGHT: 65 IN | TEMPERATURE: 97.9 F | DIASTOLIC BLOOD PRESSURE: 66 MMHG | SYSTOLIC BLOOD PRESSURE: 112 MMHG | WEIGHT: 134 LBS | BODY MASS INDEX: 22.33 KG/M2

## 2019-08-07 DIAGNOSIS — M79.18 MYOFASCIAL PAIN: Primary | ICD-10-CM

## 2019-08-07 DIAGNOSIS — G89.29 CHRONIC BILATERAL LOW BACK PAIN WITHOUT SCIATICA: ICD-10-CM

## 2019-08-07 DIAGNOSIS — M54.2 CERVICALGIA: ICD-10-CM

## 2019-08-07 DIAGNOSIS — G89.4 CHRONIC PAIN SYNDROME: ICD-10-CM

## 2019-08-07 DIAGNOSIS — M54.50 CHRONIC BILATERAL LOW BACK PAIN WITHOUT SCIATICA: ICD-10-CM

## 2019-08-07 DIAGNOSIS — M79.7 FIBROMYALGIA: ICD-10-CM

## 2019-08-07 PROCEDURE — 99214 OFFICE O/P EST MOD 30 MIN: CPT | Mod: 25 | Performed by: PHYSICIAN ASSISTANT

## 2019-08-07 PROCEDURE — 20552 NJX 1/MLT TRIGGER POINT 1/2: CPT | Performed by: PHYSICIAN ASSISTANT

## 2019-08-07 RX ORDER — CYCLOBENZAPRINE HCL 10 MG
TABLET ORAL
Qty: 90 TABLET | Refills: 2 | Status: SHIPPED | OUTPATIENT
Start: 2019-08-07 | End: 2019-11-13

## 2019-08-07 RX ORDER — BUPIVACAINE HYDROCHLORIDE 5 MG/ML
5 INJECTION, SOLUTION PERINEURAL ONCE
Status: COMPLETED | OUTPATIENT
Start: 2019-08-07 | End: 2019-08-07

## 2019-08-07 RX ORDER — HYDROCODONE BITARTRATE AND ACETAMINOPHEN 7.5; 325 MG/1; MG/1
TABLET ORAL
Qty: 170 TABLET | Refills: 0 | Status: SHIPPED | OUTPATIENT
Start: 2019-08-07 | End: 2019-09-09

## 2019-08-07 RX ADMIN — BUPIVACAINE HYDROCHLORIDE 25 MG: 5 INJECTION, SOLUTION PERINEURAL at 14:07

## 2019-08-07 ASSESSMENT — MIFFLIN-ST. JEOR: SCORE: 1228.7

## 2019-08-07 ASSESSMENT — PAIN SCALES - GENERAL: PAINLEVEL: EXTREME PAIN (9)

## 2019-08-07 NOTE — PATIENT INSTRUCTIONS
After Visit Instructions:     Thank you for coming to [facility] for your care. It is my goal to partner with you to help you reach your optimal state of health.     I am recommending multidisciplinary care at this time.  The focus of care will be to continue gradual rehabilitation and pain management with medication adjustments as needed.    Continue daily self-care, identifying contributing factors, and monitoring variations in pain level. Continue to integrate self-care into your life.        Schedule pain psychology assessment/visit: continue counseling     Schedule follow-up with Celia Bettencourt PA-C in 4 weeks. You will need to make this appointment.     Procedures recommended: trigger point injections, see below     Referrals: [facility] with [facility] offers free acupuncture and massage on Wednesday evenings and Saturday mornings    Medication recommendations:     Continue current medications      Celia Bettencourt PA-C  [facility]  [facility]    Contact information: [facility]  Clinic Number:  [phone]     Call with any questions about your care and for scheduling assistance.     Calls are returned Monday through Friday between 8 AM and 4:30 PM. We usually get back to you within 2 business days depending on the issue/request.    If we are prescribing your medications:    For opioid medication refills, call the clinic or send a [employer] message 7 days in advance.  Please include:    Name of requested medication    Name of the pharmacy.    For non-opioid medications, call your pharmacy directly to request a refill. Please allow 3-4 days to be processed.     Per [place] State Law:    All controlled substance prescriptions must be filled within 30 days of being written.      For those controlled substances allowing refills, pickup must occur within 30 days of last fill.      We believe regular  attendance is key to your success in our program!      Any time you are unable to keep your appointment we ask that you call us at least 24 hours in advance to cancel.This will allow us to offer the appointment time to another patient.   Multiple missed appointments may lead to dismissal from the clinic.      [facility]   Post Procedure Instructions    Today you had:  trigger point injections       Medications used:  lidocaine   bupivicaine            Go to the emergency room if you develop any shortness of breath    Monitor the injection sites for signs and symptoms of infection-fever, chills, redness, swelling, warmth, or drainage to areas.    You may have soreness at injection sites for up to 24 hours.    You may apply ice to the painful areas to help minimize the discomfort of the needle pokes.    Do not apply heat to sites for at least 12 hours.    You may use anti-inflammatory medications or Tylenol for pain control if necessary    Pain Clinic phone number during work hours Monday-Friday:  [phone]    After hours provider line: [phone]

## 2019-08-09 DIAGNOSIS — M05.79 RHEUMATOID ARTHRITIS INVOLVING MULTIPLE SITES WITH POSITIVE RHEUMATOID FACTOR (H): ICD-10-CM

## 2019-08-09 NOTE — TELEPHONE ENCOUNTER
Routing refill request to provider for review/approval because:  Drug not on the G refill protocol     Requested Prescriptions   Pending Prescriptions Disp Refills     methotrexate sodium 2.5 MG TABS  Last Written Prescription Date:  4/16/19  Last Fill Quantity: 32,  # refills: 3   Last office visit: 12/11/2018 with prescribing provider:     Future Office Visit:   Next 5 appointments (look out 90 days)    Aug 12, 2019  2:40 PM CDT  Office Visit with Twin Castro MD  [facility] ([address] [address]  [phone]   Aug 15, 2019  3:30 PM CDT  Return Visit with Lynnette Guaman [facility] ([address] [address]  [phone]   Sep 09, 2019  1:00 PM CDT  Return Visit with Celia Bettencourt PA-C  [facility] ([address] [address]  [phone]   Sep 18, 2019  1:00 PM CDT  Return Visit with Celia Bettencourt PA-C  [facility] ([address] [address]  [phone]   Sep 19, 2019  2:00 PM CDT  Return Visit with Ash Donald MD  [facility] ([address] [address]  [phone]        32 tablet 3     Sig: Take 8 tablets (20 mg) by mouth every 7 days       There is no refill protocol information for this order        Henrietta Morrow, JULIO - BC

## 2019-08-12 ENCOUNTER — OFFICE VISIT (OUTPATIENT)
Dept: FAMILY MEDICINE | Facility: CLINIC | Age: 49
End: 2019-08-12
Payer: MEDICARE

## 2019-08-12 ENCOUNTER — MYC MEDICAL ADVICE (OUTPATIENT)
Dept: PALLIATIVE MEDICINE | Facility: CLINIC | Age: 49
End: 2019-08-12

## 2019-08-12 VITALS
TEMPERATURE: 97.8 F | SYSTOLIC BLOOD PRESSURE: 104 MMHG | OXYGEN SATURATION: 95 % | BODY MASS INDEX: 22.53 KG/M2 | HEART RATE: 111 BPM | HEIGHT: 65 IN | DIASTOLIC BLOOD PRESSURE: 66 MMHG | WEIGHT: 135.2 LBS

## 2019-08-12 DIAGNOSIS — F41.1 GENERALIZED ANXIETY DISORDER: Primary | ICD-10-CM

## 2019-08-12 DIAGNOSIS — Z79.899 HIGH RISK MEDICATION USE: Primary | ICD-10-CM

## 2019-08-12 PROCEDURE — 99214 OFFICE O/P EST MOD 30 MIN: CPT | Performed by: FAMILY MEDICINE

## 2019-08-12 RX ORDER — METHOTREXATE 2.5 MG/1
20 TABLET ORAL
Qty: 32 TABLET | Refills: 1 | Status: SHIPPED | OUTPATIENT
Start: 2019-08-12 | End: 2019-12-30

## 2019-08-12 RX ORDER — CLONAZEPAM 0.5 MG/1
0.5 TABLET ORAL 2 TIMES DAILY PRN
Qty: 60 TABLET | Refills: 0 | Status: SHIPPED | OUTPATIENT
Start: 2019-08-22 | End: 2019-09-23

## 2019-08-12 RX ORDER — CLONAZEPAM 0.5 MG/1
0.5 TABLET ORAL 2 TIMES DAILY PRN
Qty: 60 TABLET | Refills: 0 | COMMUNITY
Start: 2019-08-22 | End: 2019-09-04

## 2019-08-12 ASSESSMENT — ANXIETY QUESTIONNAIRES
2. NOT BEING ABLE TO STOP OR CONTROL WORRYING: NEARLY EVERY DAY
1. FEELING NERVOUS, ANXIOUS, OR ON EDGE: NEARLY EVERY DAY
GAD7 TOTAL SCORE: 21
5. BEING SO RESTLESS THAT IT IS HARD TO SIT STILL: NEARLY EVERY DAY
7. FEELING AFRAID AS IF SOMETHING AWFUL MIGHT HAPPEN: NEARLY EVERY DAY
IF YOU CHECKED OFF ANY PROBLEMS ON THIS QUESTIONNAIRE, HOW DIFFICULT HAVE THESE PROBLEMS MADE IT FOR YOU TO DO YOUR WORK, TAKE CARE OF THINGS AT HOME, OR GET ALONG WITH OTHER PEOPLE: EXTREMELY DIFFICULT
6. BECOMING EASILY ANNOYED OR IRRITABLE: NEARLY EVERY DAY
3. WORRYING TOO MUCH ABOUT DIFFERENT THINGS: NEARLY EVERY DAY

## 2019-08-12 ASSESSMENT — MIFFLIN-ST. JEOR: SCORE: 1234.14

## 2019-08-12 ASSESSMENT — PATIENT HEALTH QUESTIONNAIRE - PHQ9
SUM OF ALL RESPONSES TO PHQ QUESTIONS 1-9: 14
5. POOR APPETITE OR OVEREATING: NEARLY EVERY DAY

## 2019-08-12 NOTE — TELEPHONE ENCOUNTER
Rheumatology team: Please call to notify Ms. Otero that methotrexate has been refilled. Toxicity monitoring labs are due by the end of the month and may be performed at any [place] lab.    Ash Donald MD  8/12/2019 4:48 PM

## 2019-08-12 NOTE — PROGRESS NOTES
"Subjective     Sherie Otero is a 48 year old female who presents to clinic today for the following health issues:    Patient is trying to est care with a provider who will meet her needs with her depression/anxiety and fibromyalgia flare ups.         HPI   Depression and Anxiety Follow-Up    How are you doing with your depression since your last visit? No change    How are you doing with your anxiety since your last visit?  WORSE    Are you having other symptoms that might be associated with depression or anxiety? Yes:  chronic pain is \"out of control\"     Have you had a significant life event? Financial Concerns and OTHER: taking care of mentally handicap son      Do you have any concerns with your use of alcohol or other drugs? No    Social History     Tobacco Use     Smoking status: Current Every Day Smoker     Packs/day: 0.50     Years: 29.00     Pack years: 14.50     Types: Cigarettes     Smokeless tobacco: Never Used     Tobacco comment: 9/19/11 - 1pk/day   Substance Use Topics     Alcohol use: No     Alcohol/week: 1.0 oz     Drug use: No     PHQ 6/25/2019 7/10/2019 8/12/2019   PHQ-9 Total Score 13 12 14   Q9: Thoughts of better off dead/self-harm past 2 weeks Not at all Not at all Not at all     CELIA-7 SCORE 7/10/2019 7/25/2019 8/12/2019   Total Score - - -   Total Score 16 19 21     No flowsheet data found.  No flowsheet data found.      Suicide Assessment Five-step Evaluation and Treatment (SAFE-T)      SUBJECTIVE:  Sherie  is a 48 year old female who presents for: Reestablishing care.  She is concerned that her mental health is declining especially her anxiety.  It is causing her to have more pain.  She does not think to decrease in the Klonopin is working at all.  Her son is going through some difficult issues at this time.  She feels she needs to get back up on the Klonopin and possibly even some breakthrough Xanax.    Past Medical History:   Diagnosis Date     Allergic rhinitis due to other allergen  "     Degenerative joint disease of cervical spine 2016    multi level worst at C5-6     Generalized anxiety disorder      Hearing loss      Intrinsic asthma, unspecified      Irritable bowel syndrome      Lump or mass in breast     Left breast lump -- aspiration benign.     Other malaise and fatigue     fibromyalgia     Other specified gastritis without mention of hemorrhage      Pain in joint, lower leg     patello-femoral syndrome     Rheumatoid arthritis(714.0)      Spindle cell carcinoma (H) 2013    [employer] Update utility     Spondylitis, cervical (H)     C5-C7 (MRI)     Stenosis, cervical spine     C5-C7 (MRI)     Tension headache      Past Surgical History:   Procedure Laterality Date     C APPENDECTOMY      Ruptured at age 7 years     C  DELIVERY ONLY      , Low Cervical     DILATION AND CURETTAGE, HYSTEROSCOPY, ABLATE ENDOMETRIUM NOVASURE, COMBINED  2011    Procedure:COMBINED DILATION AND CURETTAGE, HYSTEROSCOPY, ABLATE ENDOMETRIUM NOVASURE; hysteroscopy, dilation and curettage, novasure; Surgeon:JEREMY ANNA; Location:[facility] OR     EXCISE LESION TRUNK  2013    Procedure: EXCISE LESION TRUNK;  interlaminar epidural Steroid Injection Cervical -thoracic Levels (C7-T1)    Re-Excision of chest wall mass;  Surgeon: Jeremy Bolaños MD;  Location:  OR     HC HYSTEROS W PERMANENT FALLOPAIN IMPLANT      Essure done in office [facility]     INJECT BLOCK MEDIAL BRANCH CERVICAL/THORACIC/LUMBAR  2014    Procedure: INJECT BLOCK MEDIAL BRANCH CERVICAL / THORACIC / LUMBAR;  Surgeon: Josué Munson MD;  Location:  OR     INJECT FACET JOINT  2014    Procedure: INJECT FACET JOINT;  diagnostic medial branch facet nerve block cervical levels 5-6, 6-7;  Surgeon: Josué Munson MD;  Location:  OR     [facility]       Social History     Tobacco Use     Smoking status: Current Every Day Smoker     Packs/day: 0.50     Years: 29.00     Pack years: 14.50      Types: Cigarettes     Smokeless tobacco: Never Used     Tobacco comment: 9/19/11 - 1pk/day   Substance Use Topics     Alcohol use: No     Alcohol/week: 1.0 oz     Current Outpatient Medications   Medication Sig Dispense Refill     albuterol (VENTOLIN HFA) 108 (90 Base) MCG/ACT inhaler Inhale 2 puffs into the lungs every 6 hours as needed for shortness of breath / dyspnea or wheezing 18 g 1     busPIRone (BUSPAR) 15 MG tablet Take 1 tablet (15 mg) by mouth 2 times daily (Patient taking differently: Take 15 mg by mouth 2 times daily ) 60 tablet 1     cetirizine (ZYRTEC) 10 MG tablet TAKE  ONE TABLET BY MOUTH EVERY DAY. 90 tablet 3     [START ON 8/22/2019] clonazePAM (KLONOPIN) 0.5 MG tablet Take 1 tablet (0.5 mg) by mouth 2 times daily as needed for anxiety To last 30 days 60 tablet 0     [START ON 8/22/2019] clonazePAM (KLONOPIN) 0.5 MG tablet Take 1 tablet (0.5 mg) by mouth 2 times daily as needed for anxiety Must last 30 days 60 tablet 0     cyclobenzaprine (FLEXERIL) 10 MG tablet Take 2 tablets in the evening and 1 in the morning 90 tablet 2     CYMBALTA 60 MG capsule TAKE ONE CAPSULE BY MOUTH EVERY EVENING - KADY 90 capsule 1     DULoxetine 40 MG CPEP Take 40 mg by mouth daily .  Continue with 60 mg in Evening 30 capsule 1     fluticasone (FLONASE) 50 MCG/ACT nasal spray SPRAY 2 SPRAYS INTO BOTH NOSTRILS DAILY. 16 g 11     folic acid (FOLVITE) 1 MG tablet Take 1 tablet (1 mg) by mouth daily 100 tablet 3     HYDROcodone-acetaminophen (NORCO) 7.5-325 MG per tablet TAKE 2 TABLETS BY MOUTH EVERY 6 HOURS AS NEEDED FOR PAIN--MAX OF 6 TABLETS PER DAY. Okay to fill on 8/9/2019. To start 8/10/2019 and last until 9/9/2019. 170 tablet 0     methotrexate sodium 2.5 MG TABS Take 8 tablets (20 mg) by mouth every 7 days 32 tablet 3     predniSONE (DELTASONE) 5 MG tablet Take 5 mg by mouth daily as needed for peripheral joint pain from rheumatoid arthritis; use sparingly. 30 tablet 1     topiramate (TOPAMAX) 50 MG tablet Take 1  "tablet (50 mg) by mouth 2 times daily 60 tablet 1     verapamil (CALAN) 40 MG tablet Take 1 tablet (40 mg) by mouth 2 times daily 180 tablet 3     naloxone (NARCAN) nasal spray Spray 1 spray (4 mg) into one nostril alternating nostrils as needed for opioid reversal every 2-3 minutes until assistance arrives (Patient not taking: Reported on 7/17/2019) 0.2 mL 0       REVIEW OF SYSTEMS:   5 point ROS negative except as noted above in HPI, including Gen., Resp, CV, GI &  system review.     OBJECTIVE:  Vitals: /66 (BP Location: Right arm, Patient Position: Sitting, Cuff Size: Adult Regular)   Pulse 111   Temp 97.8  F (36.6  C) (Temporal)   Ht 1.651 m (5' 5\")   Wt 61.3 kg (135 lb 3.2 oz)   SpO2 95%   BMI 22.50 kg/m    BMI= Body mass index is 22.5 kg/m .  She is alert appears in no distress.  Very flat affect and seems very anxious in the room.    ASSESSMENT:  #1 anxiety #2 fibromyalgia #3 chronic pain syndrome    PLAN:  Reviewed her chart.  Quite a bit there with differing opinions.  She does not feel comfortable with [facility] for personal reasons.  I explained to her that everyone is quite concerned about chronic opioid use in the edition of chronic benzodiazepine use.  And indeed she feels her 's death was related to overuse of possibly opioids and benzodiazepines and stimulants on his heart.  Her son has trouble with benzodiazepines.  She states that the BuSpar does not seem to be helping much she has been on this for a long time.  Is actually taking it now 10 mg in the morning and 15 mg in the evening.  She has really gone downhill  with her decrease in her dose of benzodiazepine clonazepam.  Discussed with her that we could go back up to 2  0.5 mg twice a day which I think is a reasonable dose but she may want to continue trying to decrease her opioids through the pain clinic.  We are getting her set up with psychiatry here.  Over 25 minutes was spent on this encounter over half of which was spent " face-to-face with the patient and discussing different therapeutic approaches and counseling.        Twin Castro MD  [facility]

## 2019-08-13 RX ORDER — FOLIC ACID 1 MG/1
1 TABLET ORAL DAILY
Qty: 100 TABLET | Refills: 3 | Status: SHIPPED | OUTPATIENT
Start: 2019-08-13 | End: 2019-12-30

## 2019-08-13 ASSESSMENT — ANXIETY QUESTIONNAIRES: GAD7 TOTAL SCORE: 21

## 2019-08-13 NOTE — TELEPHONE ENCOUNTER
Rheumatology team: Please call to notify Ms. Otero that folic acid has been refilled.  Ash Donald MD  8/13/2019 1:47 PM

## 2019-08-13 NOTE — TELEPHONE ENCOUNTER
Spoke with patient about her methotrexate and needing toxicity labs done, patient will have them done next week. She forgot to ask for the folic acid as she is also out of that. Please send a prescription for folic acid.  Tameka Castro CMA Rheumatology  8/13/2019 12:56 PM

## 2019-09-04 ENCOUNTER — OFFICE VISIT (OUTPATIENT)
Dept: FAMILY MEDICINE | Facility: CLINIC | Age: 49
End: 2019-09-04
Payer: MEDICARE

## 2019-09-04 VITALS
OXYGEN SATURATION: 98 % | DIASTOLIC BLOOD PRESSURE: 68 MMHG | BODY MASS INDEX: 22.73 KG/M2 | WEIGHT: 136.4 LBS | SYSTOLIC BLOOD PRESSURE: 116 MMHG | TEMPERATURE: 97.4 F | HEART RATE: 101 BPM | HEIGHT: 65 IN

## 2019-09-04 DIAGNOSIS — F33.0 MAJOR DEPRESSIVE DISORDER, RECURRENT EPISODE, MILD (H): ICD-10-CM

## 2019-09-04 DIAGNOSIS — F41.1 GENERALIZED ANXIETY DISORDER: Primary | ICD-10-CM

## 2019-09-04 DIAGNOSIS — J45.20 INTERMITTENT ASTHMA, UNCOMPLICATED: ICD-10-CM

## 2019-09-04 DIAGNOSIS — F11.90 CHRONIC, CONTINUOUS USE OF OPIOIDS: ICD-10-CM

## 2019-09-04 PROCEDURE — 99214 OFFICE O/P EST MOD 30 MIN: CPT | Performed by: FAMILY MEDICINE

## 2019-09-04 RX ORDER — BUSPIRONE HYDROCHLORIDE 15 MG/1
TABLET ORAL
Qty: 60 TABLET | Refills: 1 | Status: SHIPPED | OUTPATIENT
Start: 2019-09-04 | End: 2019-09-23 | Stop reason: ALTCHOICE

## 2019-09-04 RX ORDER — BUSPIRONE HYDROCHLORIDE 10 MG/1
10 TABLET ORAL DAILY
Qty: 30 TABLET | Refills: 1 | OUTPATIENT
Start: 2019-09-04 | End: 2024-08-08

## 2019-09-04 ASSESSMENT — ANXIETY QUESTIONNAIRES
7. FEELING AFRAID AS IF SOMETHING AWFUL MIGHT HAPPEN: NOT AT ALL
5. BEING SO RESTLESS THAT IT IS HARD TO SIT STILL: SEVERAL DAYS
6. BECOMING EASILY ANNOYED OR IRRITABLE: NOT AT ALL
1. FEELING NERVOUS, ANXIOUS, OR ON EDGE: MORE THAN HALF THE DAYS
3. WORRYING TOO MUCH ABOUT DIFFERENT THINGS: NEARLY EVERY DAY
2. NOT BEING ABLE TO STOP OR CONTROL WORRYING: NEARLY EVERY DAY
IF YOU CHECKED OFF ANY PROBLEMS ON THIS QUESTIONNAIRE, HOW DIFFICULT HAVE THESE PROBLEMS MADE IT FOR YOU TO DO YOUR WORK, TAKE CARE OF THINGS AT HOME, OR GET ALONG WITH OTHER PEOPLE: SOMEWHAT DIFFICULT
GAD7 TOTAL SCORE: 11

## 2019-09-04 ASSESSMENT — PATIENT HEALTH QUESTIONNAIRE - PHQ9
SUM OF ALL RESPONSES TO PHQ QUESTIONS 1-9: 10
5. POOR APPETITE OR OVEREATING: MORE THAN HALF THE DAYS

## 2019-09-04 ASSESSMENT — ASTHMA QUESTIONNAIRES
ACT_TOTALSCORE: 24
QUESTION_2 LAST FOUR WEEKS HOW OFTEN HAVE YOU HAD SHORTNESS OF BREATH: NOT AT ALL
QUESTION_5 LAST FOUR WEEKS HOW WOULD YOU RATE YOUR ASTHMA CONTROL: COMPLETELY CONTROLLED
QUESTION_3 LAST FOUR WEEKS HOW OFTEN DID YOUR ASTHMA SYMPTOMS (WHEEZING, COUGHING, SHORTNESS OF BREATH, CHEST TIGHTNESS OR PAIN) WAKE YOU UP AT NIGHT OR EARLIER THAN USUAL IN THE MORNING: NOT AT ALL
QUESTION_4 LAST FOUR WEEKS HOW OFTEN HAVE YOU USED YOUR RESCUE INHALER OR NEBULIZER MEDICATION (SUCH AS ALBUTEROL): ONCE A WEEK OR LESS
ACUTE_EXACERBATION_TODAY: NO
QUESTION_1 LAST FOUR WEEKS HOW MUCH OF THE TIME DID YOUR ASTHMA KEEP YOU FROM GETTING AS MUCH DONE AT WORK, SCHOOL OR AT HOME: NONE OF THE TIME

## 2019-09-04 ASSESSMENT — MIFFLIN-ST. JEOR: SCORE: 1234.59
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
## 2019-09-04 NOTE — LETTER
My Asthma Action Plan    Name: Sherie Otero   YOB: 1970  Date: 9/4/2019   My doctor: Twin Castro MD   My clinic: [facility]        My Rescue Medicine:   Albuterol inhaler (Proair/Ventolin/Proventil HFA)  2-4 puffs EVERY 4 HOURS as needed. Use a spacer if recommended by your provider.   My Asthma Severity:   Intermittent / Exercise Induced  Know your asthma triggers: Patient is aware of triggers.   None          GREEN ZONE   Good Control    I feel good    No cough or wheeze    Can work, sleep and play without asthma symptoms       Take your asthma control medicine every day.     1. If exercise triggers your asthma, take your rescue medication    15 minutes before exercise or sports, and    During exercise if you have asthma symptoms  2. Spacer to use with inhaler: If you have a spacer, make sure to use it with your inhaler             YELLOW ZONE Getting Worse  I have ANY of these:    I do not feel good    Cough or wheeze    Chest feels tight    Wake up at night   1. Keep taking your Green Zone medications  2. Start taking your rescue medicine:    every 20 minutes for up to 1 hour. Then every 4 hours for 24-48 hours.  3. If you stay in the Yellow Zone for more than 12-24 hours, contact your doctor.  4. If you do not return to the Green Zone in 12-24 hours or you get worse, start taking your oral steroid medicine if prescribed by your provider.           RED ZONE Medical Alert - Get Help  I have ANY of these:    I feel awful    Medicine is not helping    Breathing getting harder    Trouble walking or talking    Nose opens wide to breathe       1. Take your rescue medicine NOW  2. If your provider has prescribed an oral steroid medicine, start taking it NOW  3. Call your doctor NOW  4. If you are still in the Red Zone after 20 minutes and you have not reached your doctor:    Take your rescue medicine again and    Call 911 or go to the emergency room right away    See your  regular doctor within 2 weeks of an Emergency Room or Urgent Care visit for follow-up treatment.          Annual Reminders:  Meet with Asthma Educator,  Flu Shot in the Fall, consider Pneumonia Vaccination for patients with asthma (aged 19 and older).    Pharmacy:    [facility] [address]                         Asthma Triggers  How To Control Things That Make Your Asthma Worse    Triggers are things that make your asthma worse.  Look at the list below to help you find your triggers and   what you can do about them. You can help prevent asthma flare-ups by staying away from your triggers.      Trigger                                                          What you can do   Cigarette Smoke  Tobacco smoke can make asthma worse. Do not allow smoking in your home, car or around you.  Be sure no one smokes at a child s day care or school.  If you smoke, ask your health care provider for ways to help you quit.  Ask family members to quit too.  Ask your health care provider for a referral to Quit Plan to help you quit smoking, or call 0-280-344-PLAN.     Colds, Flu, Bronchitis  These are common triggers of asthma. Wash your hands often.  Don t touch your eyes, nose or mouth.  Get a flu shot every year.     Dust Mites  These are tiny bugs that live in cloth or carpet. They are too small to see. Wash sheets and blankets in hot water every week.   Encase pillows and mattress in dust mite proof covers.  Avoid having carpet if you can. If you have carpet, vacuum weekly.   Use a dust mask and HEPA vacuum.   Pollen and Outdoor Mold  Some people are allergic to trees, grass, or weed pollen, or molds. Try to keep your windows closed.  Limit time out doors when pollen count is high.   Ask you health care provider about taking medicine during allergy season.     Animal Dander  Some people are allergic to skin flakes, urine or saliva from pets with fur or  feathers. Keep pets with fur or feathers out of your home.    If you can t keep the pet outdoors, then keep the pet out of your bedroom.  Keep the bedroom door closed.  Keep pets off cloth furniture and away from stuffed toys.     Mice, Rats, and Cockroaches  Some people are allergic to the waste from these pests.   Cover food and garbage.  Clean up spills and food crumbs.  Store grease in the refrigerator.   Keep food out of the bedroom.   Indoor Mold  This can be a trigger if your home has high moisture. Fix leaking faucets, pipes, or other sources of water.   Clean moldy surfaces.  Dehumidify basement if it is damp and smelly.   Smoke, Strong Odors, and Sprays  These can reduce air quality. Stay away from strong odors and sprays, such as perfume, powder, hair spray, paints, smoke incense, paint, cleaning products, candles and new carpet.   Exercise or Sports  Some people with asthma have this trigger. Be active!  Ask your doctor about taking medicine before sports or exercise to prevent symptoms.    Warm up for 5-10 minutes before and after sports or exercise.     Other Triggers of Asthma  Cold air:  Cover your nose and mouth with a scarf.  Sometimes laughing or crying can be a trigger.  Some medicines and food can trigger asthma.

## 2019-09-05 ASSESSMENT — ASTHMA QUESTIONNAIRES: ACT_TOTALSCORE: 24

## 2019-09-05 ASSESSMENT — ANXIETY QUESTIONNAIRES: GAD7 TOTAL SCORE: 11

## 2019-09-09 ENCOUNTER — OFFICE VISIT (OUTPATIENT)
Dept: PALLIATIVE MEDICINE | Facility: CLINIC | Age: 49
End: 2019-09-09
Payer: MEDICARE

## 2019-09-09 VITALS
WEIGHT: 134 LBS | TEMPERATURE: 97.8 F | DIASTOLIC BLOOD PRESSURE: 68 MMHG | BODY MASS INDEX: 22.33 KG/M2 | HEIGHT: 65 IN | SYSTOLIC BLOOD PRESSURE: 112 MMHG

## 2019-09-09 DIAGNOSIS — M54.2 CERVICALGIA: ICD-10-CM

## 2019-09-09 DIAGNOSIS — G89.4 CHRONIC PAIN SYNDROME: ICD-10-CM

## 2019-09-09 DIAGNOSIS — M54.50 CHRONIC BILATERAL LOW BACK PAIN WITHOUT SCIATICA: ICD-10-CM

## 2019-09-09 DIAGNOSIS — G89.29 CHRONIC BILATERAL LOW BACK PAIN WITHOUT SCIATICA: ICD-10-CM

## 2019-09-09 DIAGNOSIS — M79.7 FIBROMYALGIA: ICD-10-CM

## 2019-09-09 PROCEDURE — 99214 OFFICE O/P EST MOD 30 MIN: CPT | Performed by: PHYSICIAN ASSISTANT

## 2019-09-09 RX ORDER — TOPIRAMATE 25 MG/1
75 TABLET, FILM COATED ORAL AT BEDTIME
Qty: 90 TABLET | Refills: 1 | Status: SHIPPED | OUTPATIENT
Start: 2019-09-09 | End: 2019-12-30

## 2019-09-09 RX ORDER — HYDROCODONE BITARTRATE AND ACETAMINOPHEN 7.5; 325 MG/1; MG/1
TABLET ORAL
Qty: 165 TABLET | Refills: 0 | Status: SHIPPED | OUTPATIENT
Start: 2019-09-09 | End: 2019-10-07

## 2019-09-09 ASSESSMENT — MIFFLIN-ST. JEOR: SCORE: 1223.7

## 2019-09-09 ASSESSMENT — PAIN SCALES - GENERAL: PAINLEVEL: EXTREME PAIN (9)

## 2019-09-09 NOTE — PATIENT INSTRUCTIONS
After Visit Instructions:     Thank you for coming to [facility] for your care. It is my goal to partner with you to help you reach your optimal state of health.     I am recommending multidisciplinary care at this time.  The focus of care will be to continue gradual rehabilitation and pain management with medication adjustments as needed.    Continue daily self-care, identifying contributing factors, and monitoring variations in pain level. Continue to integrate self-care into your life.        Schedule pain psychology assessment/visit: keep appointment as scheduled. Talk to your counselor about Cognitive Behavioral Therapy. Talk to the psychiatrist about your Cymbalta dosing and anxiety/depression.    Schedule physical therapy assessment/visit: Continue your PT exercises    Schedule follow-up with Celia Bettencourt PA-C in 4 weeks. You will need to make this appointment.     Procedures recommended: trigger points every 6 weeks as needed     Medication recommendations:     Continue Cymbalta 40mg in AM and 60mg at bedtime    Continue Flexeril 10mg three times daily as needed    Continue Norco, max of 6 tabs/day. 165 tabs to last 30 days    Increase Topiramate to 75mg at bedtime      Celia Bettencourt PA-C  [facility]  [place]    Contact information: [facility]  Clinic Number:  [phone]     Call with any questions about your care and for scheduling assistance.     Calls are returned Monday through Friday between 8 AM and 4:30 PM. We usually get back to you within 2 business days depending on the issue/request.    If we are prescribing your medications:    For opioid medication refills, call the clinic or send a [employer] message 7 days in advance.  Please include:    Name of requested medication    Name of the pharmacy.    For non-opioid medications, call your pharmacy directly to request a refill. Please allow 3-4 days to be processed.      Per [place] State Law:    All controlled substance prescriptions must be filled within 30 days of being written.      For those controlled substances allowing refills, pickup must occur within 30 days of last fill.      We believe regular attendance is key to your success in our program!      Any time you are unable to keep your appointment we ask that you call us at least 24 hours in advance to cancel.This will allow us to offer the appointment time to another patient.   Multiple missed appointments may lead to dismissal from the clinic.

## 2019-09-09 NOTE — PROGRESS NOTES
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 certainly has ongoing depressive symptoms and anxiety as well.    Medication side effects and alternatives reviewed. Health promotion activities recommended and reviewed today. All questions addressed. Education and counseling completed regarding risks and benefits of medications and psychotherapy options. Collaborative Care Psychiatry Service model reviewed today. Recommend she continue therapy for additional support.     Treatment Plan:     Will change Cymbalta to 40mg-KADY daily 60mg at HS, name brand only.     Stop Buspar today.       Start Depakote 250mg at bedtime.    Continue klonopin 0.5mg twice daily as we discussed, I did not refill this today as we discussed.    Continue all other medications as reviewed per electronic medical record today.     Safety plan reviewed. To the Emergency Department as needed or call after hours crisis line at [phone] or [phone]. [place] Crisis Text Line: Text [place] to 067861  or  Suicide LifeLine Chat: suicidepreventionlifeline.org/chat/    Continue individual  therapy as planned with Lynnette Guaman at [facility].    Schedule an appointment with me in 2 weeks or sooner as needed.  Call [facility] at [phone] to schedule.    Follow up with primary care provider as planned or for acute medical concerns.    Call the psychiatric nurse line with medication questions or concerns at [phone].    MyChart may be used to communicate with your provider, but this is not intended to be used for emergencies.    Crisis Resources:    National Suicide Prevention Lifeline: 893.129.3345 (TTY: 630.471.1386). Call anytime for help.  (www.suicidepreventionlifeline.org)  National [place] on Mental Illness (www.dolly.org): 768.939.8845 or 735-603-4005.   Mental Health Association (www.mentalhealth.org): 495.880.4367 or 200-446-2779.  [place] Crisis Text Line: Text [place] to 882120  Suicide LifeLine Chat: suicidepreSolv Staffingline.org/chat    Administrative Billing:   Time spent with patient was 60 minutes and greater than 50% of time or 40 minutes was spent in counseling and coordination of care regarding above diagnoses and treatment plan.    Patient Status:  Patient will continue to be seen for ongoing consultation and stabilization.    Signed:   Amanda Blanchard MSN, APRN, CNP  Psychiatry

## 2019-09-10 ENCOUNTER — OFFICE VISIT (OUTPATIENT)
Dept: PSYCHIATRY | Facility: CLINIC | Age: 49
End: 2019-09-10
Attending: FAMILY MEDICINE
Payer: MEDICARE

## 2019-09-10 VITALS
BODY MASS INDEX: 22.49 KG/M2 | HEIGHT: 65 IN | TEMPERATURE: 98.2 F | HEART RATE: 114 BPM | RESPIRATION RATE: 16 BRPM | OXYGEN SATURATION: 96 % | SYSTOLIC BLOOD PRESSURE: 100 MMHG | WEIGHT: 135 LBS | DIASTOLIC BLOOD PRESSURE: 72 MMHG

## 2019-09-10 DIAGNOSIS — F41.1 GENERALIZED ANXIETY DISORDER: ICD-10-CM

## 2019-09-10 DIAGNOSIS — F43.10 PTSD (POST-TRAUMATIC STRESS DISORDER): ICD-10-CM

## 2019-09-10 DIAGNOSIS — F33.0 MAJOR DEPRESSIVE DISORDER, RECURRENT EPISODE, MILD (H): Primary | ICD-10-CM

## 2019-09-10 PROCEDURE — 90792 PSYCH DIAG EVAL W/MED SRVCS: CPT | Performed by: NURSE PRACTITIONER

## 2019-09-10 ASSESSMENT — PATIENT HEALTH QUESTIONNAIRE - PHQ9
SUM OF ALL RESPONSES TO PHQ QUESTIONS 1-9: 11
5. POOR APPETITE OR OVEREATING: NEARLY EVERY DAY

## 2019-09-10 ASSESSMENT — ANXIETY QUESTIONNAIRES
5. BEING SO RESTLESS THAT IT IS HARD TO SIT STILL: SEVERAL DAYS
GAD7 TOTAL SCORE: 16
6. BECOMING EASILY ANNOYED OR IRRITABLE: MORE THAN HALF THE DAYS
1. FEELING NERVOUS, ANXIOUS, OR ON EDGE: NEARLY EVERY DAY
IF YOU CHECKED OFF ANY PROBLEMS ON THIS QUESTIONNAIRE, HOW DIFFICULT HAVE THESE PROBLEMS MADE IT FOR YOU TO DO YOUR WORK, TAKE CARE OF THINGS AT HOME, OR GET ALONG WITH OTHER PEOPLE: EXTREMELY DIFFICULT
7. FEELING AFRAID AS IF SOMETHING AWFUL MIGHT HAPPEN: SEVERAL DAYS
3. WORRYING TOO MUCH ABOUT DIFFERENT THINGS: NEARLY EVERY DAY
2. NOT BEING ABLE TO STOP OR CONTROL WORRYING: NEARLY EVERY DAY

## 2019-09-10 ASSESSMENT — MIFFLIN-ST. JEOR: SCORE: 1228.24

## 2019-09-10 ASSESSMENT — PAIN SCALES - GENERAL: PAINLEVEL: EXTREME PAIN (9)

## 2019-09-11 RX ORDER — DULOXETIN HYDROCHLORIDE 20 MG/1
40 CAPSULE, DELAYED RELEASE ORAL DAILY
Qty: 60 CAPSULE | Refills: 0 | Status: SHIPPED | OUTPATIENT
Start: 2019-09-11 | End: 2019-10-18

## 2019-09-11 RX ORDER — DIVALPROEX SODIUM 250 MG/1
250 TABLET, DELAYED RELEASE ORAL AT BEDTIME
Qty: 30 TABLET | Refills: 0 | Status: SHIPPED | OUTPATIENT
Start: 2019-09-11 | End: 2019-10-18 | Stop reason: SINTOL

## 2019-09-11 ASSESSMENT — ANXIETY QUESTIONNAIRES: GAD7 TOTAL SCORE: 16

## 2019-09-18 ENCOUNTER — OFFICE VISIT (OUTPATIENT)
Dept: PALLIATIVE MEDICINE | Facility: CLINIC | Age: 49
End: 2019-09-18
Payer: MEDICARE

## 2019-09-18 VITALS
HEIGHT: 65 IN | DIASTOLIC BLOOD PRESSURE: 78 MMHG | TEMPERATURE: 98.3 F | WEIGHT: 135.6 LBS | SYSTOLIC BLOOD PRESSURE: 126 MMHG | BODY MASS INDEX: 22.59 KG/M2

## 2019-09-18 DIAGNOSIS — M79.18 MYOFASCIAL PAIN: Primary | ICD-10-CM

## 2019-09-18 PROCEDURE — 20552 NJX 1/MLT TRIGGER POINT 1/2: CPT | Performed by: PHYSICIAN ASSISTANT

## 2019-09-18 RX ORDER — BUPIVACAINE HYDROCHLORIDE 5 MG/ML
7 INJECTION, SOLUTION PERINEURAL ONCE
Status: COMPLETED | OUTPATIENT
Start: 2019-09-18 | End: 2019-09-18

## 2019-09-18 RX ADMIN — BUPIVACAINE HYDROCHLORIDE 35 MG: 5 INJECTION, SOLUTION PERINEURAL at 13:18

## 2019-09-18 ASSESSMENT — MIFFLIN-ST. JEOR: SCORE: 1230.96

## 2019-09-18 ASSESSMENT — PAIN SCALES - GENERAL: PAINLEVEL: WORST PAIN (10)

## 2019-09-18 NOTE — PATIENT INSTRUCTIONS
[facility]   Post Procedure Instructions    Today you had:  trigger point injections      Medications used:  lidocaine   bupivicaine           Go to the emergency room if you develop any shortness of breath    Monitor the injection sites for signs and symptoms of infection-fever, chills, redness, swelling, warmth, or drainage to areas.    You may have soreness at injection sites for up to 24 hours.    You may apply ice to the painful areas to help minimize the discomfort of the needle pokes.    Do not apply heat to sites for at least 12 hours.    You may use anti-inflammatory medications or Tylenol for pain control if necessary    Pain Clinic phone number during work hours Monday-Friday:  [phone]    After hours provider line: [phone]

## 2019-09-18 NOTE — PROGRESS NOTES
Pre Procedure Diagnosis: myofascial pain  Post procedure Diagnosis: Same  Procedure performed: trigger point injections  Anesthesia: none  Complications: none  Operators: Celia Bettencourt PA-C     Indications:   Sherie Otero is a 49 year old female with a history of back pain.  Exam shows myofascial pain of the muscle groups listed below and they have tried conservative treatment including medications and physical therapy.    Options/alternatives, benefits and risks were discussed with the patient including bleeding, infection, tissue trauma and pnuemothorax.  Questions were answered to her satisfaction and she agrees to proceed. Voluntary informed consent was obtained and signed.     Vitals were reviewed: Yes  Allergies were reviewed:  Yes   Medications were reviewed:  Yes   Pre-procedure pain score: 10/10    Procedure:  After getting informed consent, a Pause for the Cause was performed.    Trigger points were identified by patient, and marked when appropriate.  The area was prepped with Chloroprep.    Using clean technique, injections were completed using a 25G, 1.5 inch needle.  After negative aspiration, injection was completed.  A total of 10 locations were injected.  When possible, tissue was retracted from the chest wall to avoid lung injury.    Muscle groups injected:  Bilateral trapezius    Injection solution contained:  7ml of 1% lidocaine and 7ml of 0.5% bupivacaine.    Hemostasis was achieved, the area was cleaned, and bandaids were placed when appropriate.  The patient tolerated the procedure well.  Breath sounds were normal.      Post-procedure pain score: 0/10  Follow-up includes:   -repeat prn      Celia Bettencourt, PAC  [facility]

## 2019-09-23 ENCOUNTER — OFFICE VISIT (OUTPATIENT)
Dept: PSYCHIATRY | Facility: CLINIC | Age: 49
End: 2019-09-23
Payer: MEDICARE

## 2019-09-23 VITALS
SYSTOLIC BLOOD PRESSURE: 102 MMHG | DIASTOLIC BLOOD PRESSURE: 72 MMHG | TEMPERATURE: 97.6 F | RESPIRATION RATE: 16 BRPM | BODY MASS INDEX: 22.65 KG/M2 | OXYGEN SATURATION: 98 % | WEIGHT: 136.1 LBS | HEART RATE: 111 BPM

## 2019-09-23 DIAGNOSIS — F41.1 GENERALIZED ANXIETY DISORDER: Primary | ICD-10-CM

## 2019-09-23 PROCEDURE — 99214 OFFICE O/P EST MOD 30 MIN: CPT | Performed by: NURSE PRACTITIONER

## 2019-09-23 RX ORDER — CLONAZEPAM 0.5 MG/1
0.5 TABLET ORAL 2 TIMES DAILY PRN
Qty: 60 TABLET | Refills: 0 | Status: SHIPPED | OUTPATIENT
Start: 2019-09-23 | End: 2019-10-18

## 2019-09-23 RX ORDER — DIVALPROEX SODIUM 125 MG/1
125 TABLET, DELAYED RELEASE ORAL 2 TIMES DAILY
Qty: 60 TABLET | Refills: 0 | Status: SHIPPED | OUTPATIENT
Start: 2019-09-23 | End: 2019-10-18 | Stop reason: SINTOL

## 2019-09-23 ASSESSMENT — PATIENT HEALTH QUESTIONNAIRE - PHQ9
SUM OF ALL RESPONSES TO PHQ QUESTIONS 1-9: 5
SUM OF ALL RESPONSES TO PHQ QUESTIONS 1-9: 5
10. IF YOU CHECKED OFF ANY PROBLEMS, HOW DIFFICULT HAVE THESE PROBLEMS MADE IT FOR YOU TO DO YOUR WORK, TAKE CARE OF THINGS AT HOME, OR GET ALONG WITH OTHER PEOPLE: VERY DIFFICULT

## 2019-09-23 ASSESSMENT — ANXIETY QUESTIONNAIRES
GAD7 TOTAL SCORE: 9
GAD7 TOTAL SCORE: 9
2. NOT BEING ABLE TO STOP OR CONTROL WORRYING: MORE THAN HALF THE DAYS
7. FEELING AFRAID AS IF SOMETHING AWFUL MIGHT HAPPEN: NOT AT ALL
7. FEELING AFRAID AS IF SOMETHING AWFUL MIGHT HAPPEN: NOT AT ALL
5. BEING SO RESTLESS THAT IT IS HARD TO SIT STILL: SEVERAL DAYS
1. FEELING NERVOUS, ANXIOUS, OR ON EDGE: SEVERAL DAYS
4. TROUBLE RELAXING: SEVERAL DAYS
GAD7 TOTAL SCORE: 9
3. WORRYING TOO MUCH ABOUT DIFFERENT THINGS: MORE THAN HALF THE DAYS
6. BECOMING EASILY ANNOYED OR IRRITABLE: MORE THAN HALF THE DAYS

## 2019-09-23 ASSESSMENT — PAIN SCALES - GENERAL: PAINLEVEL: EXTREME PAIN (9)

## 2019-09-23 NOTE — PATIENT INSTRUCTIONS
Treatment Plan:    Increase Depakote to 125mg twice daily and 250mg at bedtime    Continue Klonopin 0.5mg twice daily as needed for anxiety.    Continue Cymbalta 40mg daily and 60mg at bedtime, dispense as written.    Continue all other medications as reviewed per electronic medical record today.     Safety plan reviewed. To the Emergency Department as needed or call after hours crisis line at [phone] or [phone]. [place] Crisis Text [place]. Text [place] to 959052 or Suicide LifeLine Chat: suicideAntenna Software.org/chat/    Continue individual therapy as planned with Lynnette Guaman.    Schedule an appointment with me in 3-4 weeks or sooner as needed. Call [facility] at [phone] to schedule.    Follow up with primary care provider as planned or for acute medical concerns.    Call the psychiatric nurse line with medication questions or concerns at [phone].    LookAcrosshart may be used to communicate with your provider, but this is not intended to be used for emergencies.    Crisis Resources:    National Suicide Prevention Lifeline: 956.122.7342 (TTY: 713.306.7189). Call anytime for help.  (www.suicidepreventionlifeline.org)  National [place] on Mental Illness (www.dolly.org): 474.555.1598 or 713-816-8923.   Mental Health Association (www.mentalhealth.org): 322.544.4219 or 640-336-4561.  [place] Crisis [place]: Text [place] to 074186  Suicide LifeLine Chat: suicideAntenna Software.org/chat

## 2019-09-23 NOTE — PROGRESS NOTES
"    Outpatient Psychiatric Progress Note    Name: Sherie Otero   : 1970                    Primary Care Provider: Twin Castro MD   Therapist: Lynnette Guaman         PHQ-9 scores:  PHQ-9 SCORE 2019 9/10/2019 2019   PHQ-9 Total Score - - -   PHQ-9 Total Score MyChart - - 5 (Mild depression)   PHQ-9 Total Score 10 11 5       CELIA-7 scores:  CELIA-7 SCORE 2019 9/10/2019 2019   Total Score - - 9 (mild anxiety)   Total Score 11 16 9       Patient Identification:  Patient is a 49 year old year old,   White American female  who presents for return visit with me.  Patient is currently disabled. Patient attended the session alone. Patient prefers to be called: \"Sherie\".    Interim History:  I last saw Sherie Otero for outpatient psychiatry Consultation on 9/10/19.     During that appointment, patient had been experiencing increased anxiety following a decrease in her klonopin.  She had been treated with benzodiazepines for quite some time.  Taper of these have proved difficult with increased anxiety and panic.  She had taken Buspar for several years without much benefit.  Although an alternative to benzodiazepines, given her chronic use of benzo's her response to the Buspar has not been as robust.  This was discontinued today. We discussed low dose of Depakote for the antipanic effects and also to assist with further taper of benzodiazepines.  Her trauma symptoms appeared to be quit active, benzodiazepines may have been worsening symptoms of PTSD.  I  recommended tapering the klonopin in the future, however, we started Depakote 250mg at HS with plan to discuss taper of klonopin at her next visit.     Current medications include: albuterol (VENTOLIN HFA) 108 (90 Base) MCG/ACT inhaler, Inhale 2 puffs into the lungs every 6 hours as needed for shortness of breath / dyspnea or wheezing  cetirizine (ZYRTEC) 10 MG tablet, TAKE  ONE TABLET BY MOUTH EVERY DAY.  clonazePAM (KLONOPIN) 0.5 MG " tablet, Take 1 tablet (0.5 mg) by mouth 2 times daily as needed for anxiety Must last 30 days  cyclobenzaprine (FLEXERIL) 10 MG tablet, Take 2 tablets in the evening and 1 in the morning  CYMBALTA 60 MG capsule, TAKE ONE CAPSULE BY MOUTH EVERY EVENING - KADY  divalproex sodium delayed-release (DEPAKOTE) 250 MG DR tablet, Take 1 tablet (250 mg) by mouth At Bedtime  DULoxetine (CYMBALTA) 20 MG capsule, Take 2 capsules (40 mg) by mouth daily CYMBALTA-KADY  fluticasone (FLONASE) 50 MCG/ACT nasal spray, SPRAY 2 SPRAYS INTO BOTH NOSTRILS DAILY.  folic acid (FOLVITE) 1 MG tablet, Take 1 tablet (1 mg) by mouth daily  HYDROcodone-acetaminophen (NORCO) 7.5-325 MG per tablet, TAKE 2 TABLETS BY MOUTH EVERY 6 HOURS AS NEEDED FOR PAIN--MAX OF 6 TABLETS PER DAY. Okay to fill on 9/9/2019. To start 9/9/2019 and last until 10/9/2019.  methotrexate sodium 2.5 MG TABS, Take 8 tablets (20 mg) by mouth every 7 days  naloxone (NARCAN) nasal spray, Spray 1 spray (4 mg) into one nostril alternating nostrils as needed for opioid reversal every 2-3 minutes until assistance arrives  predniSONE (DELTASONE) 5 MG tablet, Take 5 mg by mouth daily as needed for peripheral joint pain from rheumatoid arthritis; use sparingly.  topiramate (TOPAMAX) 25 MG tablet, Take 3 tablets (75 mg) by mouth At Bedtime (Patient taking differently: Take 75 mg by mouth At Bedtime Taking 50mg currently)  verapamil (CALAN) 40 MG tablet, Take 1 tablet (40 mg) by mouth 2 times daily    No current facility-administered medications on file prior to visit.        The [place] Prescription Monitoring Program has been reviewed and there are no concerns about diversionary activity for controlled substances at this time.      I was able to review most recent Primary Care Provider, specialty provider, and therapy visit notes that I have access to.     Today, patient reports no change in anxiety following discontinuation of Buspar.  Reports some improvement following addition of  Depakote, more specifically sleep. She is tolerating it without side effects.  She feels her anxiety is well controlled with current BID use of Klonopin, however, patient understands that the goal is to decrease her use to PRN use.  She continues to take PRN Klonopin twice daily scheduled.  She did experience an episode of panic, triggered by her dog crawling under the fence into the neighbors yard.  This incident caused a dispute with her neighbor.  Patient ended up jumping the fence to obtain her dog.  She does feel her mood is better since the addition of Depakote. Patient reports she stopped Topamax about two weeks ago, I encouraged her to get in contact with pain management, I will also reach out to them.  Since she is currently undergoing medication changes I would not recommend restarting this right now however, will leave that up to them.       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    Social history updates:  States she continues to have some financial stress.  She states her son was on a civil commitment and is supposed to have a guardian.  She reports he has always had learning disabilities and chronic substance use.  He is currently living with patient.     Substance use updates:  Denies  Tobacco use: Yes Cigarettes  Ready to quit?  No      Vital Signs:   /72   Pulse 111   Temp 97.6  F (36.4  C) (Temporal)   Resp 16   Wt 61.7 kg (136 lb 1.6 oz)   SpO2 98%   Breastfeeding? No   BMI 22.65 kg/m      Labs:  Orders Only on 05/28/2019    Component Date Value Ref Range Status     Bilirubin Direct 05/28/2019 <0.1  0.0 - 0.2 mg/dL Final     Bilirubin Total 05/28/2019 0.2  0.2 - 1.3 mg/dL Final     Albumin 05/28/2019 3.3* 3.4 - 5.0 g/dL Final     Protein Total 05/28/2019 7.2  6.8 - 8.8 g/dL Final     Alkaline Phosphatase 05/28/2019 151* 40 - 150 U/L Final     ALT 05/28/2019 33  0 - 50 U/L Final     AST 05/28/2019 20  0 - 45 U/L Final     CRP Inflammation 05/28/2019 <2.9  0.0 - 8.0 mg/L Final     Sed Rate 05/28/2019 12  0 - 30 mm/h Final     Creatinine 05/28/2019 0.96  0.52 - 1.04 mg/dL Final     GFR Estimate 05/28/2019 68  >60 mL/min/[1.73_m2] Final    Comment: Non  GFR Calc  Starting 12/18/2018, serum creatinine based estimated GFR (eGFR) will be   calculated using the Chronic Kidney Disease Epidemiology Collaboration   (CKD-EPI) equation.       GFR Estimate If Black 05/28/2019 79  >60 mL/min/[1.73_m2] Final    Comment:  GFR Calc  Starting 12/18/2018, serum creatinine based estimated GFR (eGFR) will be   calculated using the Chronic Kidney Disease Epidemiology Collaboration   (CKD-EPI) equation.       WBC 05/28/2019 13.9* 4.0 - 11.0 10e9/L Final     RBC Count 05/28/2019 3.95  3.8 - 5.2 10e12/L Final     Hemoglobin 05/28/2019 13.7  11.7 - 15.7 g/dL Final     Hematocrit 05/28/2019 41.5  35.0 - 47.0 % Final     MCV 05/28/2019 105* 78 - 100 fl Final     MCH 05/28/2019 34.7* 26.5 - 33.0 pg Final     MCHC 05/28/2019 33.0  31.5 - 36.5 g/dL Final     RDW 05/28/2019 13.8  10.0 - 15.0 % Final     Platelet Count 05/28/2019 303  150 - 450 10e9/L Final     Diff Method 05/28/2019 Manual Differential   Final     % Neutrophils 05/28/2019 46.0  % Final     % Lymphocytes 05/28/2019 47.0  % Final     % Monocytes 05/28/2019 4.0  % Final     % Eosinophils 05/28/2019 2.0  % Final     % Basophils 05/28/2019 1.0  % Final     Absolute Neutrophil 05/28/2019 6.4  1.6 - 8.3 10e9/L Final     Absolute Lymphocytes 05/28/2019 6.5* 0.8 - 5.3 10e9/L  Final     Absolute Monocytes 05/28/2019 0.6  0.0 - 1.3 10e9/L Final     Absolute Eosinophils 05/28/2019 0.3  0.0 - 0.7 10e9/L Final     Absolute Basophils 05/28/2019 0.1  0.0 - 0.2 10e9/L Final     RBC Morphology 05/28/2019 Normal   Final     Platelet Estimate 05/28/2019 Automated count confirmed.  Platelet morphology is normal.   Final     Most recent laboratory results reviewed and no new labs.     Review of Systems:  10 systems (general, cardiovascular, respiratory, eyes, ENT, endocrine, GI, , M/S, neurological) were reviewed. Denies constipation, rash, tremor, shortness of breath or chest pain. The remaining systems are all unremarkable.    Mental Status Examination:  Appearance:  awake, alert, adequately groomed and appeared stated age  Attitude:  cooperative   Eye Contact:  adequate  Gait and Station: Normal  Psychomotor Behavior:  no evidence of tardive dyskinesia, dystonia, or tics and intact station, gait and muscle tone  Oriented to:  time, person, and place  Attention Span and Concentration:  Normal  Speech:   clear, coherent, regular rate and regular rhythm  Mood:  better  Affect:  mood congruent  Associations:  no loose associations  Thought Process:  logical, linear and goal oriented  Thought Content:  no evidence of suicidal ideation or homicidal ideation and no evidence of psychotic thought  Recent and Remote Memory:  intact Not formally assessed. No amnesia.  Fund of Knowledge: appropriate  Insight:  fair  Judgment:  fair  Impulse Control:  intact    Suicide Risk Assessment:  Today Sherie Otero denies suicidal thoughts or urges to harm self. Based on all available evidence, Sherie Otero does not appear to be at imminent risk for self-harm, does not meet criteria for a 72-hr hold, and therefore remains appropriate for ongoing outpatient level of care.  A thorough assessment of risk factors related to suicide and self-harm have been reviewed and are noted above. The patient convincingly denies  acute suicidality on several occasions. Local community safety resources reviewed and printed for patient to use if needed. There was no deceit detected, and the patient presented in a manner that was believable.     DSM5 Diagnosis:  296.31 (F33.0) Major Depressive Disorder, Recurrent Episode, Mild _  300.02 (F41.1) Generalized Anxiety Disorder  309.81 (F43.10) Posttraumatic Stress Disorder (includes Posttraumatic Stress Disorder for Children 6 Years and Younger)  Without dissociative symptoms    Medical comorbidities include:   Patient Active Problem List    Diagnosis Date Noted     Balance problems 07/25/2019     Priority: Medium     Chronic, continuous use of opioids 11/03/2018     Priority: Medium     Chronically on benzodiazepine therapy 11/03/2018     Priority: Medium     Cervical cancer screening      Priority: Medium     2011 ablation 2015 NIL pap. Plan: pap in 3 years.  8/1/18 NIL pap, neg HR HPV. cotest in 5 years.       Pelvic pain in female 08/01/2018     Priority: Medium     Chronic rhinitis 05/14/2018     Priority: Medium     Other migraine without status migrainosus, intractable 03/21/2017     Priority: Medium     Pulmonary nodules 01/19/2017     Priority: Medium     Abnormal CT of the chest 01/19/2017     Priority: Medium     Hilar lymphadenopathy 01/19/2017     Priority: Medium     Degenerative joint disease of cervical spine      Priority: Medium     multi level worst at C5-6       Osteoarthritis of cervical spine, unspecified spinal osteoarthritis complication status 03/21/2016     Priority: Medium     Panic attacks 03/01/2016     Priority: Medium     Elevated white blood cell count 01/14/2016     Priority: Medium     Rheumatoid arthritis involving multiple sites with positive rheumatoid factor (H) 01/12/2016     Priority: Medium     Intermittent asthma, uncomplicated 11/29/2015     Priority: Medium     Other chronic pain 11/18/2015     Priority: Medium     Major depressive disorder, recurrent  episode, mild (H) 10/08/2015     Priority: Medium     NSAID induced gastritis 09/23/2015     Priority: Medium     Stenosis, cervical spine      Priority: Medium     C5-C7 (MRI)       Spondylitis, cervical (H)      Priority: Medium     C5-C7 (MRI)       Cervicalgia 01/09/2014     Priority: Medium     Disturbance in sleep behavior 11/05/2013     Priority: Medium     Problem list name updated by automated process. Provider to review       SHANTANU (obstructive sleep apnea) 10/25/2013     Priority: Medium     Chronic fatigue syndrome 07/02/2013     Priority: Medium              Fibromyalgia 07/02/2013     Priority: Medium     Generalized anxiety disorder 07/02/2013     Priority: Medium     Diagnosis updated by automated process. Provider to review and confirm.       Tobacco abuse 07/02/2013     Priority: Medium     CARDIOVASCULAR SCREENING; LDL GOAL LESS THAN 160 10/31/2010     Priority: Medium       Assessment:  Sherie Otero is a 49 year old  female with a history of depression and anxiety dating back to childhood, symptoms have persisted since that time.  She has had two psychiatric hospitalizations and several medication trials since that time. She is currently prescribed Cymbalta 40mg daily and 60mg at HS for depression and anxiety as well as chronic pain. She has been treated with benzodiazepines for several years. Taper of these has proved difficult.  She was started on Depakote for antipanic benefits and appears to be tolerating this well. She does feel her mood is a bit better following addition of Depakote. We discussed increasing this to 125mg BID and 250mg at HS for antipanic effects in attempt to decrease use of Klonopin.  Patient was encouraged to use on PRN basis rather than scheduled following the increase of Depakote.      Medication side effects and alternatives were reviewed. Health promotion activities recommended and reviewed today. All questions addressed. Education and counseling completed  regarding risks and benefits of medications .  She is currently participating in individual therapy as well.    Treatment Plan:    Increase Depakote to 125mg twice daily and 250mg at bedtime    Continue Klonopin 0.5mg twice daily as needed only.    Continue Cymbalta 40mg daily and 60mg at bedtime, dispense as written.    Continue all other medications as reviewed per electronic medical record today.     Safety plan reviewed. To the Emergency Department as needed or call after hours crisis line at [phone] or [phone]. [place] Crisis Text Line. Text [place] to 362129 or Suicide LifeLine Chat: suicideDajie.org/chat/    Continue individual therapy as planned with Lynnette Deniz.    Schedule an appointment with me in 3-4 weeks or sooner as needed. Call [facility] at [phone] to schedule.    Follow up with primary care provider as planned or for acute medical concerns.    Call the psychiatric nurse line with medication questions or concerns at [phone].    Acision may be used to communicate with your provider, but this is not intended to be used for emergencies.    Crisis Resources:    National Suicide Prevention Lifeline: 393.509.1858 (TTY: 168.949.4018). Call anytime for help.  (www.suicidepreventionlifeline.org)  National [place] on Mental Illness (www.dolly.org): 680.793.8504 or 656-053-5533.   Mental Health Association (www.mentalhealth.org): 744.197.2659 or 215-455-4122.  [place] Crisis Text Line: Text [place] to 500043  Suicide LifeLine Chat: suicideDajie.[email or web address]/chat    Administrative Billing:   Time spent with patient was 30 minutes and greater than 50% of time or 20 minutes was spent in counseling and coordination of care regarding above diagnoses and treatment plan.    Patient Status:  Patient will continue to be seen for ongoing consultation and stabilization.    Signed:   Amanda Blanchard MSN, APRN, CNP   Psychiatry         Answers for HPI/ROS submitted by the  patient on 9/23/2019   If you checked off any problems, how difficult have these problems made it for you to do your work, take care of things at home, or get along with other people?: Very difficult  PHQ9 TOTAL SCORE: 5  CELIA 7 TOTAL SCORE: 9

## 2019-09-24 ASSESSMENT — ANXIETY QUESTIONNAIRES: GAD7 TOTAL SCORE: 9

## 2019-09-24 ASSESSMENT — PATIENT HEALTH QUESTIONNAIRE - PHQ9: SUM OF ALL RESPONSES TO PHQ QUESTIONS 1-9: 5

## 2019-09-25 ENCOUNTER — MYC MEDICAL ADVICE (OUTPATIENT)
Dept: PSYCHIATRY | Facility: CLINIC | Age: 49
End: 2019-09-25

## 2019-09-25 DIAGNOSIS — F33.0 MAJOR DEPRESSIVE DISORDER, RECURRENT EPISODE, MILD (H): Primary | ICD-10-CM

## 2019-09-25 RX ORDER — DIVALPROEX SODIUM 125 MG/1
125 CAPSULE, COATED PELLETS ORAL EVERY MORNING
Qty: 30 CAPSULE | Refills: 0 | Status: SHIPPED | OUTPATIENT
Start: 2019-09-25 | End: 2019-10-18

## 2019-09-25 NOTE — TELEPHONE ENCOUNTER
I am sorry to hear this is too sedating.  Her current Depakote is a timed release tablet and cannot be split or crushed.  I recommend switching to Depakote sprinkles 125mg capsule for one week, she may open the capsule and sprinkle about half of the sprinkles into small amount of food (pudding or applesauce work well), although this will not be an exact dose it will allow her to get used to a lower dose and increase up to 125mg with hopefully less sedating effects.      Do not chew, discard remaining sprinkles, take with food.    Amanda Blanchard MSN, APRN, CNP

## 2019-09-25 NOTE — TELEPHONE ENCOUNTER
I spoke to patient and relayed option for Depakote sprinkles by provider. RX send in for Depakote sprinkles. Patient understands instructions to take 1/2 or so until she feels like she can take a whole capsule.

## 2019-10-04 ENCOUNTER — HEALTH MAINTENANCE LETTER (OUTPATIENT)
Age: 49
End: 2019-10-04

## 2019-10-07 ENCOUNTER — OFFICE VISIT (OUTPATIENT)
Dept: PALLIATIVE MEDICINE | Facility: CLINIC | Age: 49
End: 2019-10-07
Payer: MEDICARE

## 2019-10-07 VITALS
WEIGHT: 137.5 LBS | HEIGHT: 65 IN | BODY MASS INDEX: 22.91 KG/M2 | SYSTOLIC BLOOD PRESSURE: 124 MMHG | TEMPERATURE: 97 F | DIASTOLIC BLOOD PRESSURE: 74 MMHG

## 2019-10-07 DIAGNOSIS — F11.90 CHRONIC, CONTINUOUS USE OF OPIOIDS: Primary | ICD-10-CM

## 2019-10-07 DIAGNOSIS — G89.29 CHRONIC BILATERAL LOW BACK PAIN WITHOUT SCIATICA: ICD-10-CM

## 2019-10-07 DIAGNOSIS — M54.2 CERVICALGIA: ICD-10-CM

## 2019-10-07 DIAGNOSIS — M54.50 CHRONIC BILATERAL LOW BACK PAIN WITHOUT SCIATICA: ICD-10-CM

## 2019-10-07 DIAGNOSIS — G89.4 CHRONIC PAIN SYNDROME: ICD-10-CM

## 2019-10-07 DIAGNOSIS — M79.7 FIBROMYALGIA: ICD-10-CM

## 2019-10-07 PROCEDURE — 99213 OFFICE O/P EST LOW 20 MIN: CPT | Performed by: PHYSICIAN ASSISTANT

## 2019-10-07 RX ORDER — HYDROCODONE BITARTRATE AND ACETAMINOPHEN 7.5; 325 MG/1; MG/1
TABLET ORAL
Qty: 165 TABLET | Refills: 0 | Status: SHIPPED | OUTPATIENT
Start: 2019-10-07 | End: 2019-11-29

## 2019-10-07 ASSESSMENT — MIFFLIN-ST. JEOR: SCORE: 1239.58

## 2019-10-07 ASSESSMENT — PAIN SCALES - GENERAL: PAINLEVEL: EXTREME PAIN (9)

## 2019-10-07 NOTE — PATIENT INSTRUCTIONS
After Visit Instructions:     Thank you for coming to [facility] for your care. It is my goal to partner with you to help you reach your optimal state of health.     I am recommending multidisciplinary care at this time.  The focus of care will be to continue gradual rehabilitation and pain management with medication adjustments as needed.    Continue daily self-care, identifying contributing factors, and monitoring variations in pain level. Continue to integrate self-care into your life.        Schedule follow-up with Celia Bettencourt PA-C in 4 weeks. You will need to make this appointment.     Medication recommendations:     Continue Norco 7.5mg every 6 hours as needed for severe pain. Max of 6 tabs/day    Okay to stop Flexeril to see if there is any difference in your muscle pain    Ask your psychiatrist about Amitriptyline/nortriptyline as these can help with both sleep and pain.      Celia Bettencourt PA-C  [facility]  [place]    Contact information: [facility] [place]  Clinic Number:  [phone]     Call with any questions about your care and for scheduling assistance.     Calls are returned Monday through Friday between 8 AM and 4:30 PM. We usually get back to you within 2 business days depending on the issue/request.    If we are prescribing your medications:    For opioid medication refills, call the clinic or send a [employer] message 7 days in advance.  Please include:    Name of requested medication    Name of the pharmacy.    For non-opioid medications, call your pharmacy directly to request a refill. Please allow 3-4 days to be processed.     Per [place] State Law:    All controlled substance prescriptions must be filled within 30 days of being written.      For those controlled substances allowing refills, pickup must occur within 30 days of last fill.      We believe regular attendance is key to your success in our program!      Any  time you are unable to keep your appointment we ask that you call us at least 24 hours in advance to cancel.This will allow us to offer the appointment time to another patient.   Multiple missed appointments may lead to dismissal from the clinic.

## 2019-10-07 NOTE — PROGRESS NOTES
[facility]    CHIEF COMPLAINT: Pain  -Fibromyalgia  -Neck pain  -Low back pain    INTERVAL HISTORY:  Last seen on 9/19/2019 for TPIs.        Recommendations/plan at the last visit included:    1. Physical Therapy: continue previous PT exercises;   2. Clinical Health Psychologist:  YES, has a plan in place, see above  3. Diagnostic Studies: none at this time  4. Medication Management:     1. Continue Cymbalta 40mg in AM and  60mg at  bedtime.     2.  Okay to continue Flexeril, will continue to monitor for serotonin syndrome due to combination of Cymbalta and Flexeril.    3. Continue Hydrocodone at max of 6 tabs/day.  Decrease number of tablets to 165/month.     4.  Increase topiramate to 75 mg at bedtime  5. Further procedures recommended: Trigger point injections every 6 weeks as needed    Follow up with this provider: 4 weeks     Since her last visit, Sherie Otero reports:    She states that she is not tolerating the increased Depakote dose. She started Prednisone due to the weather. She is working on a taper. She has 3 Norco left. She was recommended to stop the Topamax as well. She did not have any change with her pain with stopping the topamax. She is unsure if the Flexeril is helping. She states that she has trouble falling asleep and staying asleep. She has not tried melatonin for sleep.       Pain Information:   Pain quality: Aching, shooting, throbbing, stabbing, miserable, tiring, exhausting, penetrating, nagging, and unbearable    Pain rating: intensity ranges from 0/10 to 10/10, and averages 9/10 on a 0-10 scale.   Pain today 9/10    UDS 1/19/2019   CSA 2/04/2019    CURRENT RELEVANT PAIN MEDICATIONS:  Clonazepam 0.5mg: taking 1 tab twice daily    Flexeril-1 in AM and 2 at HS  Cymbalta-40mg in AM and 60mg at night  Hydrocodone 7.5mg -currently taking 2 tablets three times a day   Ibuprofen-will take 800mg up to 3 times day, doesn't take daily    Patient is using the medication as  prescribed:  YES  Is your medication helpful? somewhat   Medication side effects? no side effect    Previous Medications: (H--helped; HI--Helped initially; SWH-- somewhat helpful, NH--No help; W--worse; SE--side effects)   Opiates: Hydrocodone H, Oxycodone SE  NSAIDS: Ibuprofen H, Relafen SE stomach upset, Meloxicam NH  Muscle Relaxants: Tizanidine NH, Flexeril H, Robaxin NH SE stomach upset  Anti-migraine mediations: Verapamil H  Anti-depressants: Cymbalta H  Sleep aids: none  Anxiolytics: Xanax H, Clonazepam H, Valium H  Neuropathics: Gabapentin SE dizziness          Topicals: Lidocaine patches SW  Other medications not covered above: Savella H at first, then seemed to stop working, Lyrica SE shortness of breath, felt 'weird' on them, throat felt weird. Prednisone H for arthritis flare    Past Pain Treatments:  Pain Clinic:   No   PT: Yes, years ago. Has not done pool therapy.   Psychologist: No  Relaxation techniques/biofeedback: No  Chiropractor: No, was told not to because of neck.   Acupuncture: Yes for headaches.   Pharmacotherapy:           Opioids: Yes            Non-opioids:    Yes   TENs Unit:Yes  Injections: cervical medial branch blocks 6/12/2014  Self-care:   Yes, ice/heat, hot baths, theracare  Surgeries related to pain: No    [place] Board of Pharmacy Data Base Reviewed:    YES; As expected, no concern for misuse/abuse of controlled medications based on this report.      THE 4 As OF OPIOID MAINTENANCE ANALGESIA    Analgesia: Is pain relief clinically significant? YES   Activity: Is patient functional and able to perform Activities of Daily Living? YES   Adverse effects: Is patient free from adverse side effects from opiates? YES   Adherence to Rx protocol: Is patient adhering to Controlled Substance Agreement and taking medications ONLY as ordered? YES       Is Narcan prescribed for opiate use >50 MME daily? yes      Total Daily MME: 37.5-45    Medications:  Current Outpatient Medications    Medication Sig Dispense Refill     albuterol (VENTOLIN HFA) 108 (90 Base) MCG/ACT inhaler Inhale 2 puffs into the lungs every 6 hours as needed for shortness of breath / dyspnea or wheezing 18 g 1     cetirizine (ZYRTEC) 10 MG tablet TAKE  ONE TABLET BY MOUTH EVERY DAY. 90 tablet 3     clonazePAM (KLONOPIN) 0.5 MG tablet Take 1 tablet (0.5 mg) by mouth 2 times daily as needed for anxiety Must last 30 days 60 tablet 0     cyclobenzaprine (FLEXERIL) 10 MG tablet Take 2 tablets in the evening and 1 in the morning 90 tablet 2     CYMBALTA 60 MG capsule TAKE ONE CAPSULE BY MOUTH EVERY EVENING 90 capsule 0     divalproex sodium delayed-release (DEPAKOTE SPRINKLE) 125 MG DR capsule Take 125 mg by mouth every morning 30 capsule 0     divalproex sodium delayed-release (DEPAKOTE) 125 MG DR tablet Take 1 tablet (125 mg) by mouth 2 times daily 60 tablet 0     divalproex sodium delayed-release (DEPAKOTE) 250 MG DR tablet Take 1 tablet (250 mg) by mouth At Bedtime 30 tablet 0     DULoxetine (CYMBALTA) 20 MG capsule Take 2 capsules (40 mg) by mouth daily CYMBALTA-KADY 60 capsule 0     fluticasone (FLONASE) 50 MCG/ACT nasal spray SPRAY 2 SPRAYS INTO BOTH NOSTRILS DAILY. 16 g 11     folic acid (FOLVITE) 1 MG tablet Take 1 tablet (1 mg) by mouth daily 100 tablet 3     HYDROcodone-acetaminophen (NORCO) 7.5-325 MG per tablet TAKE 2 TABLETS BY MOUTH EVERY 6 HOURS AS NEEDED FOR PAIN--MAX OF 6 TABLETS PER DAY. Okay to fill on 9/9/2019. To start 9/9/2019 and last until 10/9/2019. 165 tablet 0     methotrexate sodium 2.5 MG TABS Take 8 tablets (20 mg) by mouth every 7 days 32 tablet 1     predniSONE (DELTASONE) 5 MG tablet Take 5 mg by mouth daily as needed for peripheral joint pain from rheumatoid arthritis; use sparingly. 30 tablet 1     topiramate (TOPAMAX) 25 MG tablet Take 3 tablets (75 mg) by mouth At Bedtime (Patient taking differently: Take 75 mg by mouth At Bedtime Taking 50mg currently) 90 tablet 1     verapamil (CALAN) 40 MG  "tablet Take 1 tablet (40 mg) by mouth 2 times daily 180 tablet 3     naloxone (NARCAN) nasal spray Spray 1 spray (4 mg) into one nostril alternating nostrils as needed for opioid reversal every 2-3 minutes until assistance arrives (Patient not taking: Reported on 10/7/2019) 0.2 mL 0       Review of Systems: A 10-point review of systems was negative, with the exception of chronic pain issues, fever/chills, fatigue, headache, dizziness, vision changes, sinus infection, ringing in the ears, allergies, cough, shortness of breath, palpitations, abdominal pain, nausea, diarrhea, constipation, urinary frequency, weakness, numbness/tingling, depression, anxiety, stress, and mood swings      Social History: Reviewed; unchanged from previous consultation.      Family history: Reviewed; unchanged from previous consultation.     PHYSICAL EXAM:     Vitals:  /74   Temp 97  F (36.1  C) (Temporal)   Ht 1.651 m (5' 5\")   Wt 62.4 kg (137 lb 8 oz)   BMI 22.88 kg/m        Constitutional: healthy, alert and no distress  HEENT: Head atraumatic, normocephalic. Eyes without conjunctival injection or jaundice. Neck supple. No obvious neck masses.  Psychiatric/mental status: Alert, without lethargy or stupor. Appropriate affect. Mood normal.   Neurologic exam: Gait is normal    DIAGNOSTIC TESTS:  Imaging Studies:   No new imaging to review    Assessment:  Sherie Otero is a 48 year old female who presents today for follow up regarding her:    1. Fibromyalgia  2. Chronic low back pain  3. Cervicalgia  4. Chronic pain syndrome  5. Chronic use of opioids    Patient continues to work with a psychiatrist regarding her anxiety.  Her Depakote was recently increased and she is not tolerating the dose.  Discussed to talk to her psychiatrist about that.  She is also been having trouble sleeping since the Topamax was stopped.  She has not noticed a change in her pain since the Topamax was stopped.  She is unsure if the Flexeril is doing " much for her.  She has not tried stopping it.    We discussed potentially thinking of the medication amitriptyline/nortriptyline to try at bedtime.  This could help with her pain as well as her sleep.  Would recommend that she discuss this with her psychiatrist first as they are making some other medication changes.  Discussed with the patient that we do not want to make too many medication changes as they were not sure what will be doing what.  With regards to the patient's pain medication she does only have 3 tablets left.  Discussed with patient that she cannot have an early refill.  She will need to wait until tomorrow in order to refill and this medication should not be started until Wednesday.  Working to continue to slowly work on trying to taper her off of the Norco and maximize non-opioid strategies for pain management.      Plan:  Diagnosis reviewed, treatment option addressed, and risk/benifits discussed.  Self-care instructions given.  I am recommending a multidisciplinary treatment plan to help this patient better manage pain.      1. Physical Therapy: continue previous PT exercises;   2. Clinical Health Psychologist:  YES, has a plan in place  3. Diagnostic Studies: none at this time  4. Medication Management:     1. Continue Cymbalta 40mg in AM and 60mg at  bedtime.     2.  Recommend that she try stopping the Flexeril to see if it has any change in her pain   3. Continue Hydrocodone at max of 6 tabs/day.  Continue at 165/month.     4.  Consider amitriptyline or nortriptyline but I would want her to discuss this with her psychiatrist first  5. Further procedures recommended: Trigger point injections every 6 weeks as needed    Follow up with this provider: 4 weeks     Total time spent face to face was 15 minutes and more than 50% of face to face time was spent in counseling and/or coordination of care regarding the diagnosis and recommendations above.       Celia Bettencourt PA-C   [facility]

## 2019-10-17 NOTE — PROGRESS NOTES
"    Outpatient Psychiatric Progress Note    Name: Sherie Otero   : 1970                    Primary Care Provider: Twin Castro MD   Therapist: Lynnette Guaman      Answers for HPI/ROS submitted by the patient on 10/18/2019   If you checked off any problems, how difficult have these problems made it for you to do your work, take care of things at home, or get along with other people?: Extremely difficult  PHQ9 TOTAL SCORE: 13  CELIA 7 TOTAL SCORE: 14    PHQ-9 scores:  PHQ-9 SCORE 9/10/2019 2019 10/18/2019   PHQ-9 Total Score - - -   PHQ-9 Total Score MyChart - 5 (Mild depression) 13 (Moderate depression)   PHQ-9 Total Score 11 5 13       CELIA-7 scores:  CELIA-7 SCORE 9/10/2019 2019 10/18/2019   Total Score - 9 (mild anxiety) 14 (moderate anxiety)   Total Score 16 9 14       Patient Identification:  Patient is a 49 year old year old,   White American female  who presents for return visit with me.  Patient is currently disabled. Patient attended the session alone. Patient prefers to be called: \"Sherie\".    Interim History:  I last saw Sherie Otero for outpatient psychiatry Return Visit on 19.     During that appointment, she remained on Cymbalta 40mg daily and 60mg at HS for depression and anxiety as well as chronic pain. She has been treated with benzodiazepines for several years. Taper of these has proved difficult.  She was started on Depakote 250mg at HS for antipanic benefits and appeared to tolerate this well. She also felt her mood was a bit better following addition of Depakote. We discussed increasing this to 125mg BID and 250mg at HS for antipanic effects in attempt to decrease use of Klonopin.  On  she reported daytime dosing of Depakote was causing her to feel sedated.  Recommended she switch to Depakote Sprinkles to allow for a smaller dose. She continued to have difficulty with Depakote but did not switch to sprinkles.  On 10/7 she reported she was able to tolerate " 125mg dosing at HS.    Current medications include: albuterol (VENTOLIN HFA) 108 (90 Base) MCG/ACT inhaler, Inhale 2 puffs into the lungs every 6 hours as needed for shortness of breath / dyspnea or wheezing  cetirizine (ZYRTEC) 10 MG tablet, TAKE  ONE TABLET BY MOUTH EVERY DAY.  cyclobenzaprine (FLEXERIL) 10 MG tablet, Take 2 tablets in the evening and 1 in the morning  fluticasone (FLONASE) 50 MCG/ACT nasal spray, SPRAY 2 SPRAYS INTO BOTH NOSTRILS DAILY.  HYDROcodone-acetaminophen (NORCO) 7.5-325 MG per tablet, TAKE 2 TABS BY MOUTH EVERY 6 HOURS AS NEEDED FOR PAIN. Fill on 10/8/2019. To start 10/9/2019 and last until 11/8/2019.  verapamil (CALAN) 40 MG tablet, Take 1 tablet (40 mg) by mouth 2 times daily  folic acid (FOLVITE) 1 MG tablet, Take 1 tablet (1 mg) by mouth daily (Patient not taking: Reported on 10/18/2019)  methotrexate sodium 2.5 MG TABS, Take 8 tablets (20 mg) by mouth every 7 days (Patient not taking: Reported on 10/18/2019)  naloxone (NARCAN) nasal spray, Spray 1 spray (4 mg) into one nostril alternating nostrils as needed for opioid reversal every 2-3 minutes until assistance arrives (Patient not taking: Reported on 10/7/2019)  predniSONE (DELTASONE) 5 MG tablet, Take 5 mg by mouth daily as needed for peripheral joint pain from rheumatoid arthritis; use sparingly. (Patient not taking: Reported on 10/18/2019)  topiramate (TOPAMAX) 25 MG tablet, Take 3 tablets (75 mg) by mouth At Bedtime (Patient not taking: Reported on 10/18/2019)    No current facility-administered medications on file prior to visit.        The [place] Prescription Monitoring Program has been reviewed and there are no concerns about diversionary activity for controlled substances at this time.      I was able to review most recent Primary Care Provider, specialty provider, and therapy visit notes that I have access to.     Today, patient reports Depakote has been causing her some GI upset, she is not sleeping well at night. She  "feels this was initially somewhat helpful but does not believe it is helping with mood, sleep or anxiety any longer.  She has been unable to increase her dose as she is having difficulty tolerating it. She hasn't been able to reduce her Klonopin use.  States she feels \"foggy\" and having blurred vision following addition the of Depakote.  She continues to have low energy, sadness, difficulties sleeping, feels overwhelmed.  \"I have neglected my house for over a year.\"  She states she did find an old Adderall prescription and took 1/4 of a 5mg tab. She states she was able to focus and get some things done in her home.  I advised her not to continue this as it may increase her anxiety.     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    Social history updates:  Her car is not currently working, she is getting rides and borrowing cars from others.    Substance use updates:  Denies  Tobacco use: Yes Cigarettes  Ready to quit?  No      Vital Signs:   /76   Pulse 117   Temp 96.8  F (36  C) (Temporal)   Resp 18   Wt 62.1 kg (136 lb 12.8 oz)   SpO2 93%   Breastfeeding? No   BMI 22.76 kg/m      Labs:  Orders Only on 05/28/2019   Component Date Value Ref Range Status     Bilirubin Direct 05/28/2019 <0.1  0.0 - 0.2 mg/dL Final     Bilirubin Total 05/28/2019 0.2  0.2 - 1.3 mg/dL Final     Albumin 05/28/2019 3.3* 3.4 - 5.0 g/dL Final     Protein Total 05/28/2019 7.2  6.8 - 8.8 g/dL Final     Alkaline Phosphatase 05/28/2019 151* 40 - 150 U/L Final     ALT " 05/28/2019 33  0 - 50 U/L Final     AST 05/28/2019 20  0 - 45 U/L Final     CRP Inflammation 05/28/2019 <2.9  0.0 - 8.0 mg/L Final     Sed Rate 05/28/2019 12  0 - 30 mm/h Final     Creatinine 05/28/2019 0.96  0.52 - 1.04 mg/dL Final     GFR Estimate 05/28/2019 68  >60 mL/min/[1.73_m2] Final    Comment: Non  GFR Calc  Starting 12/18/2018, serum creatinine based estimated GFR (eGFR) will be   calculated using the Chronic Kidney Disease Epidemiology Collaboration   (CKD-EPI) equation.       GFR Estimate If Black 05/28/2019 79  >60 mL/min/[1.73_m2] Final    Comment:  GFR Calc  Starting 12/18/2018, serum creatinine based estimated GFR (eGFR) will be   calculated using the Chronic Kidney Disease Epidemiology Collaboration   (CKD-EPI) equation.       WBC 05/28/2019 13.9* 4.0 - 11.0 10e9/L Final     RBC Count 05/28/2019 3.95  3.8 - 5.2 10e12/L Final     Hemoglobin 05/28/2019 13.7  11.7 - 15.7 g/dL Final     Hematocrit 05/28/2019 41.5  35.0 - 47.0 % Final     MCV 05/28/2019 105* 78 - 100 fl Final     MCH 05/28/2019 34.7* 26.5 - 33.0 pg Final     MCHC 05/28/2019 33.0  31.5 - 36.5 g/dL Final     RDW 05/28/2019 13.8  10.0 - 15.0 % Final     Platelet Count 05/28/2019 303  150 - 450 10e9/L Final     Diff Method 05/28/2019 Manual Differential   Final     % Neutrophils 05/28/2019 46.0  % Final     % Lymphocytes 05/28/2019 47.0  % Final     % Monocytes 05/28/2019 4.0  % Final     % Eosinophils 05/28/2019 2.0  % Final     % Basophils 05/28/2019 1.0  % Final     Absolute Neutrophil 05/28/2019 6.4  1.6 - 8.3 10e9/L Final     Absolute Lymphocytes 05/28/2019 6.5* 0.8 - 5.3 10e9/L Final     Absolute Monocytes 05/28/2019 0.6  0.0 - 1.3 10e9/L Final     Absolute Eosinophils 05/28/2019 0.3  0.0 - 0.7 10e9/L Final     Absolute Basophils 05/28/2019 0.1  0.0 - 0.2 10e9/L Final     RBC Morphology 05/28/2019 Normal   Final     Platelet Estimate 05/28/2019 Automated count confirmed.  Platelet morphology is normal.    Final     TSH   Date Value Ref Range Status   01/12/2016 0.61 0.40 - 4.00 mU/L Final     Most recent laboratory results reviewed and no new labs.    Review of Systems:  10 systems (general, cardiovascular, respiratory, eyes, ENT, endocrine, GI, , M/S, neurological) were reviewed. Most pertinent finding(s) is chronic pain.  She denies shortness of breath, chest pain, dizziness. The remaining systems are all unremarkable.    Mental Status Examination:  Appearance:  awake, alert, adequately groomed and appeared stated age, smoke odor  Attitude:  cooperative   Eye Contact:  good  Gait and Station: Normal  Psychomotor Behavior:  no evidence of tardive dyskinesia, dystonia, or tics and intact station, gait and muscle tone  Oriented to:  time, person, and place  Attention Span and Concentration:  Normal  Speech:   clear, coherent, regular rate and regular rhythm  Mood:  depressed  Affect:  intensity is blunted  Associations:  no loose associations  Thought Process:  logical, linear and goal oriented  Thought Content:  no evidence of suicidal ideation or homicidal ideation and no evidence of psychotic thought  Recent and Remote Memory:  intact Not formally assessed. No amnesia.  Fund of Knowledge: appropriate  Insight:  fair  Judgment:  fair  Impulse Control:  intact    Suicide Risk Assessment:  Today Sherie Otero denies suicidal thoughts or urges to harm self. Based on all available evidence, Sherie Otero does not appear to be at imminent risk for self-harm, does not meet criteria for a 72-hr hold, and therefore remains appropriate for ongoing outpatient level of care.  A thorough assessment of risk factors related to suicide and self-harm have been reviewed and are noted above. The patient convincingly denies acute suicidality on several occasions. Local community safety resources reviewed and printed for patient to use if needed. There was no deceit detected, and the patient presented in a manner that was  believable.     DSM5 Diagnosis:  296.31 (F33.0) Major Depressive Disorder, Recurrent Episode, Mild _  300.02 (F41.1) Generalized Anxiety Disorder  309.81 (F43.10) Posttraumatic Stress Disorder (includes Posttraumatic Stress Disorder for Children 6 Years and Younger)  Without dissociative symptoms    Medical comorbidities include:   Patient Active Problem List    Diagnosis Date Noted     Balance problems 07/25/2019     Priority: Medium     Chronic, continuous use of opioids 11/03/2018     Priority: Medium     Chronically on benzodiazepine therapy 11/03/2018     Priority: Medium     Cervical cancer screening      Priority: Medium     2011 ablation 2015 NIL pap. Plan: pap in 3 years.  8/1/18 NIL pap, neg HR HPV. cotest in 5 years.       Pelvic pain in female 08/01/2018     Priority: Medium     Chronic rhinitis 05/14/2018     Priority: Medium     Other migraine without status migrainosus, intractable 03/21/2017     Priority: Medium     Pulmonary nodules 01/19/2017     Priority: Medium     Abnormal CT of the chest 01/19/2017     Priority: Medium     Hilar lymphadenopathy 01/19/2017     Priority: Medium     Degenerative joint disease of cervical spine      Priority: Medium     multi level worst at C5-6       Osteoarthritis of cervical spine, unspecified spinal osteoarthritis complication status 03/21/2016     Priority: Medium     Panic attacks 03/01/2016     Priority: Medium     Elevated white blood cell count 01/14/2016     Priority: Medium     Rheumatoid arthritis involving multiple sites with positive rheumatoid factor (H) 01/12/2016     Priority: Medium     Intermittent asthma, uncomplicated 11/29/2015     Priority: Medium     Other chronic pain 11/18/2015     Priority: Medium     Major depressive disorder, recurrent episode, mild (H) 10/08/2015     Priority: Medium     NSAID induced gastritis 09/23/2015     Priority: Medium     Stenosis, cervical spine      Priority: Medium     C5-C7 (MRI)       Spondylitis,  cervical (H)      Priority: Medium     C5-C7 (MRI)       Cervicalgia 2014     Priority: Medium     Disturbance in sleep behavior 2013     Priority: Medium     Problem list name updated by automated process. Provider to review       SHANTANU (obstructive sleep apnea) 10/25/2013     Priority: Medium     Chronic fatigue syndrome 2013     Priority: Medium              Fibromyalgia 2013     Priority: Medium     Generalized anxiety disorder 2013     Priority: Medium     Diagnosis updated by automated process. Provider to review and confirm.       Tobacco abuse 2013     Priority: Medium     CARDIOVASCULAR SCREENING; LDL GOAL LESS THAN 160 10/31/2010     Priority: Medium       Assessment:  Sherie Otero is a 49 year old  female with a history of depression and anxiety dating back to childhood, symptoms have persisted since that time.  She has had two psychiatric hospitalizations and several medication trials since that time.  She is currently prescribed Cymbalta 40mg daily and 60mg at HS for depression and anxiety as well as chronic pain. She has been treated with benzodiazepines for several years. Taper of these has proved difficult.  Klonopin was decreased to 0.25mg BID in July but patient exhibited increased anxiety.  She was started on Depakote for antipanic benefits with plan to attempt taper of Klonopin again.  Patient has been unable to tolerate addition of Depakote so increasing to therapeutic doses has not been possible.  Therefore, will discontinue this.  She has had trials of gabapentin without benefits.  She has also taken Seroquel in the past with possible side effects.  She has also had trials of multiple other medications including Buspar, Effexor, Lexapro, Paxil, Prozac, Wellbutrin, Zoloft, Ativan, Valium, and Xanax.  She is not willing to consider Lamictal, states her cousin  of Twin Matthew's Syndrome. She may benefit from an antipsychotic, she has tried  Seroquel in the past.  We discussed starting Zyprexa, will start at 2.5mg daily as needed and 2.5-5mg at HS for sleep/anxiety/mood.  Hopefully this will stabilize her mood some and allow for further taper of benzodiazepine as well.   She has not had a TSH or Lipid panel in the last year, will get both of these ordered.  Medication side effects and alternatives were reviewed. Health promotion activities recommended and reviewed today. All questions addressed. Education and counseling completed regarding risks and benefits of medications and psychotherapy options.    Treatment Plan:    Continue Cymbalta 40mg daily and 60mg at bedtime    Stop Depakote today.    Start olanzapine 2.5mg daily as needed for anxiety and 2.5-5mg at HS for sleep, try taking olanzapine before using Klonopin.  Start with nighttime dose.    Continue Klonopin 0.5mg twice daily as needed for anxiety.    Continue all other medications as reviewed per electronic medical record today.     Obtain Lipid level as soon as possible.  This is a fasting lab, do not eat or drink anything after midnight, obtain lab in AM. Will also order TSH.    Safety plan reviewed. To the Emergency Department as needed or call after hours crisis line at [phone] or [phone]. [place] Crisis Text Line. Text [place] to 943781 or Suicide LifeLine Chat: suicidepreventionlifeline.org/chat/    Continue individual therapy as planned with Lynnette Guaman.    Schedule an appointment with me in 4 weeks or sooner as needed. Call [facility] at [phone] to schedule.    Follow up with primary care provider as planned or for acute medical concerns.    Call the psychiatric nurse line with medication questions or concerns at [phone].    SeniorCarehart may be used to communicate with your provider, but this is not intended to be used for emergencies.    Crisis Resources:    National Suicide Prevention Lifeline: 217.275.7990 (TTY: 314.550.1189). Call anytime for help.   (www.suicidepreventionlifeline.org)  National [place] on Mental Illness (www.dolly.org): 887.625.4914 or 370-704-1045.   Mental Health Association (www.mentalhealth.org): 802.767.2242 or 416-769-4750.  [place] Crisis Text Line: Text [place] to 846047  Suicide LifeLine Chat: suicideprebCommunities.org/chat    Administrative Billing:   Time spent with patient was 30 minutes and greater than 50% of time or 20 minutes was spent in counseling and coordination of care regarding above diagnoses and treatment plan.    Patient Status:  Patient will continue to be seen for ongoing consultation and stabilization.    Signed:   Amanda Blanchard MSN, APRN, CNP   Psychiatry

## 2019-10-18 ENCOUNTER — TELEPHONE (OUTPATIENT)
Dept: FAMILY MEDICINE | Facility: CLINIC | Age: 49
End: 2019-10-18

## 2019-10-18 ENCOUNTER — OFFICE VISIT (OUTPATIENT)
Dept: PSYCHIATRY | Facility: CLINIC | Age: 49
End: 2019-10-18
Payer: MEDICARE

## 2019-10-18 VITALS
SYSTOLIC BLOOD PRESSURE: 122 MMHG | HEART RATE: 117 BPM | TEMPERATURE: 96.8 F | BODY MASS INDEX: 22.76 KG/M2 | OXYGEN SATURATION: 93 % | RESPIRATION RATE: 18 BRPM | WEIGHT: 136.8 LBS | DIASTOLIC BLOOD PRESSURE: 76 MMHG

## 2019-10-18 DIAGNOSIS — F33.0 MAJOR DEPRESSIVE DISORDER, RECURRENT EPISODE, MILD (H): Primary | ICD-10-CM

## 2019-10-18 DIAGNOSIS — Z13.220 SCREENING FOR HYPERLIPIDEMIA: ICD-10-CM

## 2019-10-18 DIAGNOSIS — F41.1 GENERALIZED ANXIETY DISORDER: ICD-10-CM

## 2019-10-18 PROCEDURE — 99214 OFFICE O/P EST MOD 30 MIN: CPT | Performed by: NURSE PRACTITIONER

## 2019-10-18 RX ORDER — DULOXETIN HYDROCHLORIDE 20 MG/1
40 CAPSULE, DELAYED RELEASE ORAL DAILY
Qty: 60 CAPSULE | Refills: 0 | Status: SHIPPED | OUTPATIENT
Start: 2019-10-18 | End: 2019-11-20

## 2019-10-18 RX ORDER — CLONAZEPAM 0.5 MG/1
0.5 TABLET ORAL 2 TIMES DAILY PRN
Qty: 60 TABLET | Refills: 0 | Status: SHIPPED | OUTPATIENT
Start: 2019-10-18 | End: 2019-11-20

## 2019-10-18 RX ORDER — DULOXETINE HCL 60 MG
CAPSULE,DELAYED RELEASE (ENTERIC COATED) ORAL
Qty: 90 CAPSULE | Refills: 0 | Status: SHIPPED | OUTPATIENT
Start: 2019-10-18 | End: 2019-12-18

## 2019-10-18 RX ORDER — OLANZAPINE 5 MG/1
2.5 TABLET ORAL DAILY PRN
Qty: 45 TABLET | Refills: 0 | Status: SHIPPED | OUTPATIENT
Start: 2019-10-18 | End: 2019-11-14

## 2019-10-18 ASSESSMENT — ANXIETY QUESTIONNAIRES
GAD7 TOTAL SCORE: 14
3. WORRYING TOO MUCH ABOUT DIFFERENT THINGS: NEARLY EVERY DAY
GAD7 TOTAL SCORE: 14
1. FEELING NERVOUS, ANXIOUS, OR ON EDGE: MORE THAN HALF THE DAYS
GAD7 TOTAL SCORE: 14
4. TROUBLE RELAXING: NEARLY EVERY DAY
5. BEING SO RESTLESS THAT IT IS HARD TO SIT STILL: SEVERAL DAYS
6. BECOMING EASILY ANNOYED OR IRRITABLE: SEVERAL DAYS
7. FEELING AFRAID AS IF SOMETHING AWFUL MIGHT HAPPEN: SEVERAL DAYS
2. NOT BEING ABLE TO STOP OR CONTROL WORRYING: NEARLY EVERY DAY
7. FEELING AFRAID AS IF SOMETHING AWFUL MIGHT HAPPEN: SEVERAL DAYS

## 2019-10-18 ASSESSMENT — PATIENT HEALTH QUESTIONNAIRE - PHQ9
10. IF YOU CHECKED OFF ANY PROBLEMS, HOW DIFFICULT HAVE THESE PROBLEMS MADE IT FOR YOU TO DO YOUR WORK, TAKE CARE OF THINGS AT HOME, OR GET ALONG WITH OTHER PEOPLE: EXTREMELY DIFFICULT
SUM OF ALL RESPONSES TO PHQ QUESTIONS 1-9: 13
SUM OF ALL RESPONSES TO PHQ QUESTIONS 1-9: 13

## 2019-10-18 ASSESSMENT — PAIN SCALES - GENERAL: PAINLEVEL: EXTREME PAIN (9)

## 2019-10-18 NOTE — TELEPHONE ENCOUNTER
Prior Authorization Retail Medication Request    Medication/Dose: olanzapine 5 mg  ICD code (if different than what is on RX):     Previously Tried and Failed:     Rationale:       Insurance Name:  [employer] part d  Insurance ID:  [id number]      Pharmacy Information (if different than what is on RX)  Name:     Phone:

## 2019-10-18 NOTE — PATIENT INSTRUCTIONS
Treatment Plan:    Continue Cymbalta 40mg daily and 60mg at bedtime    Stop Depakote today.    Start olanzapine 2.5mg daily as needed for anxiety and 2.5-5mg at HS for sleep.    Continue Klonopin 0.5mg twice daily as needed for anxiety.    Continue all other medications as reviewed per electronic medical record today.     Obtain Lipid level as soon as possible.  This is a fasting lab, do not eat or drink anything after midnight, obtain lab in AM. Will also order TSH.    Safety plan reviewed. To the Emergency Department as needed or call after hours crisis line at [phone] or [phone]. [place] Crisis Text Line. Text [place] to 191575 or Suicide LifeLine Chat: LiveOps.org/chat/    Continue individual therapy as planned with Lynnette Guaman.    Schedule an appointment with me in 4 weeks or sooner as needed. Call [facility] at [phone] to schedule.    Follow up with primary care provider as planned or for acute medical concerns.    Call the psychiatric nurse line with medication questions or concerns at [phone].    Sweepery may be used to communicate with your provider, but this is not intended to be used for emergencies.    Crisis Resources:    National Suicide Prevention Lifeline: 585.345.6132 (TTY: 823.672.5357). Call anytime for help.  (www.suicidepreventionlifeline.org)  National [place] on Mental Illness (www.dolly.org): 414.243.3948 or 145-640-9702.   Mental Health Association (www.mentalhealth.org): 814.604.9435 or 818-609-1680.  [place] Crisis Text Line: Text [place] to 845842  Suicide LifeLine Chat: suicideSimtrol.org/chat

## 2019-10-19 ASSESSMENT — ANXIETY QUESTIONNAIRES: GAD7 TOTAL SCORE: 14

## 2019-10-19 ASSESSMENT — PATIENT HEALTH QUESTIONNAIRE - PHQ9: SUM OF ALL RESPONSES TO PHQ QUESTIONS 1-9: 13

## 2019-10-21 NOTE — TELEPHONE ENCOUNTER
Central Prior Authorization Team   Phone: [phone]    PA Initiation    Medication: olanzapine 5 mg  Insurance Company: [employer] - Phone [phone] Fax [phone]  Pharmacy Filling the Rx: [address]   Filling Pharmacy Phone: [phone]  Filling Pharmacy Fax:    Start Date: 10/21/2019

## 2019-10-21 NOTE — TELEPHONE ENCOUNTER
Central Prior Authorization Team   Phone: [phone]    Prior Authorization Approval    Authorization Effective Date: 1/1/2019  Authorization Expiration Date: 12/31/2019  Medication: olanzapine 5 mg  Approved Dose/Quantity: 45 per 30 days  Reference #: [id number]   Insurance Company: [employer] - Phone [phone] Fax [phone]  Expected CoPay:       CoPay Card Available:      Foundation Assistance Needed:    Which Pharmacy is filling the prescription (Not needed for infusion/clinic administered): [facility] [address]   Pharmacy Notified: Yes  Patient Notified: Yes  **Instructed pharmacy to notify patient when script is ready to /ship.**

## 2019-10-25 NOTE — PROGRESS NOTES
[facility]    CHIEF COMPLAINT: Pain  -Fibromyalgia  -Neck pain  -Low back pain    INTERVAL HISTORY:  Last seen on 10/7/19.        Recommendations/plan at the last visit included:  1. Physical Therapy: continue previous PT exercises;   2. Clinical Health Psychologist:  YES, has a plan in place  3. Diagnostic Studies: none at this time  4. Medication Management:     1. Continue Cymbalta 40mg in AM and 60mg at bedtime.     2.  Recommend that she try stopping the Flexeril to see if it has any change in her pain   3. Continue Hydrocodone at max of 6 tabs/day.  Continue at 165/month.     4.  Consider amitriptyline or nortriptyline but I would want her to discuss this with her psychiatrist first  5. Further procedures recommended: Trigger point injections every 6 weeks as needed    Follow up with this provider: 4 weeks     Since her last visit, Sherie Otero reports:    Patient states that things have not been going very well for her.  She states that she is typically more pain this time of year due to the weather changes.  She states she has had decreased activity due to her pain.  She states that her pain just does not feel like it is going away.  She states she had 4 nights where she had to take extra pain medication due to the pain.  She is tried using her TENS unit as well as multiple over-the-counter creams.  She feels that her fibromyalgia spots are worse as well as her spine and her neck.  She is thinking she may be going into a flare of her rheumatoid arthritis.  She does occasionally do steroids and have some at home if she has a flare.  She has not started that yet as she wanted to speak with me first.    She continues to meet with the psychiatrist and was recently started on olanzapine.  Patient states that she also recently heard about using guaifenesin for fibromyalgia pain.  She is wondering if I have heard about this.    She states that she does get some pain relief from the trigger  point injections but it is very short-lived, approximately 2 to 3 days.    She is also reporting some increased stress as her son needs to go to a rehabilitation center.  She is looking at sending him to [place].  He has an intake appointment tomorrow.    Pain Information:   Pain quality: Aching, throbbing, stabbing, miserable, tiring, exhausting, penetrating, gnawing, nagging, and unbearable    Pain rating: intensity ranges from 0/10 to 10/10, and averages 10/10 on a 0-10 scale.   Pain today 10/10    UDS 1/19/2019   CSA 2/04/2019    CURRENT RELEVANT PAIN MEDICATIONS:  Clonazepam 0.5mg: taking 1 tab twice daily    Flexeril-1 in AM and 2 at HS  Cymbalta-40mg in AM and 60mg at night  Hydrocodone 7.5mg -currently taking 2 tablets three times a day   Ibuprofen-will take 800mg up to 3 times day, doesn't take daily    Patient is using the medication as prescribed:  YES  Is your medication helpful? somewhat   Medication side effects? no side effect    Previous Medications: (H--helped; HI--Helped initially; SWH-- somewhat helpful, NH--No help; W--worse; SE--side effects)   Opiates: Hydrocodone H, Oxycodone SE  NSAIDS: Ibuprofen H, Relafen SE stomach upset, Meloxicam NH  Muscle Relaxants: Tizanidine NH, Flexeril H, Robaxin NH SE stomach upset  Anti-migraine mediations: Verapamil H  Anti-depressants: Cymbalta H  Sleep aids: none  Anxiolytics: Xanax H, Clonazepam H, Valium H  Neuropathics: Gabapentin SE dizziness          Topicals: Lidocaine patches SW  Other medications not covered above: Savella H at first, then seemed to stop working, Lyrica SE shortness of breath, felt 'weird' on them, throat felt weird. Prednisone H for arthritis flare    Past Pain Treatments:  Pain Clinic:   No   PT: Yes, years ago. Has not done pool therapy.   Psychologist: No  Relaxation techniques/biofeedback: No  Chiropractor: No, was told not to because of neck.   Acupuncture: Yes for headaches.   Pharmacotherapy:           Opioids: Yes             Non-opioids:    Yes   TENs Unit:Yes  Injections: cervical medial branch blocks 6/12/2014  Self-care:   Yes, ice/heat, hot baths, theracare  Surgeries related to pain: No    [place] Board of Pharmacy Data Base Reviewed:    YES; As expected, no concern for misuse/abuse of controlled medications based on this report.      THE 4 As OF OPIOID MAINTENANCE ANALGESIA    Analgesia: Is pain relief clinically significant? YES   Activity: Is patient functional and able to perform Activities of Daily Living? YES   Adverse effects: Is patient free from adverse side effects from opiates? YES   Adherence to Rx protocol: Is patient adhering to Controlled Substance Agreement and taking medications ONLY as ordered? YES       Is Narcan prescribed for opiate use >50 MME daily? yes      Total Daily MME: 37.5-45    Medications:  Current Outpatient Medications   Medication Sig Dispense Refill     albuterol (VENTOLIN HFA) 108 (90 Base) MCG/ACT inhaler Inhale 2 puffs into the lungs every 6 hours as needed for shortness of breath / dyspnea or wheezing 18 g 1     cetirizine (ZYRTEC) 10 MG tablet TAKE  ONE TABLET BY MOUTH EVERY DAY. 90 tablet 3     clonazePAM (KLONOPIN) 0.5 MG tablet Take 1 tablet (0.5 mg) by mouth 2 times daily as needed for anxiety Must last 30 days 60 tablet 0     cyclobenzaprine (FLEXERIL) 10 MG tablet Take 2 tablets in the evening and 1 in the morning 90 tablet 2     CYMBALTA 60 MG capsule TAKE ONE CAPSULE BY MOUTH EVERY EVENING 90 capsule 0     DULoxetine (CYMBALTA) 20 MG capsule Take 2 capsules (40 mg) by mouth daily CYMBALTA-KADY 60 capsule 0     fluticasone (FLONASE) 50 MCG/ACT nasal spray SPRAY 2 SPRAYS INTO BOTH NOSTRILS DAILY. 16 g 11     HYDROcodone-acetaminophen (NORCO) 7.5-325 MG per tablet TAKE 2 TABS BY MOUTH EVERY 6 HOURS AS NEEDED FOR PAIN. Fill on 10/8/2019. To start 10/9/2019 and last until 11/8/2019. 165 tablet 0     OLANZapine (ZYPREXA) 5 MG tablet Take 0.5 tablets (2.5 mg) by mouth daily as needed (for  "anxiety and 2.5-5mg at bedtime for anxiety/sleep, try taking this before using Klonopin) 45 tablet 0     verapamil (CALAN) 40 MG tablet Take 1 tablet (40 mg) by mouth 2 times daily 180 tablet 3     folic acid (FOLVITE) 1 MG tablet Take 1 tablet (1 mg) by mouth daily (Patient not taking: Reported on 10/18/2019) 100 tablet 3     methotrexate sodium 2.5 MG TABS Take 8 tablets (20 mg) by mouth every 7 days (Patient not taking: Reported on 10/18/2019) 32 tablet 1     naloxone (NARCAN) nasal spray Spray 1 spray (4 mg) into one nostril alternating nostrils as needed for opioid reversal every 2-3 minutes until assistance arrives (Patient not taking: Reported on 10/7/2019) 0.2 mL 0     predniSONE (DELTASONE) 5 MG tablet Take 5 mg by mouth daily as needed for peripheral joint pain from rheumatoid arthritis; use sparingly. (Patient not taking: Reported on 10/18/2019) 30 tablet 1     topiramate (TOPAMAX) 25 MG tablet Take 3 tablets (75 mg) by mouth At Bedtime (Patient not taking: Reported on 10/18/2019) 90 tablet 1       Review of Systems: A 10-point review of systems was negative, with the exception of chronic pain issues, fatigue, headache, dizziness, vision changes, sinus infection, allergies, heart palpitations, abdominal pain, nausea, diarrhea, constipation, frequency of urination, weakness, depression, anxiety, and stress     Social History: Reviewed; unchanged from previous consultation.      Family history: Reviewed; unchanged from previous consultation.     PHYSICAL EXAM:     Vitals:  /76   Temp 97  F (36.1  C) (Temporal)   Ht 1.651 m (5' 5\")   Wt 61.7 kg (136 lb)   BMI 22.63 kg/m          Constitutional: healthy, alert and no distress  HEENT: Head atraumatic, normocephalic. Eyes without conjunctival injection or jaundice. Neck supple. No obvious neck masses.  Psychiatric/mental status: Alert, without lethargy or stupor. Appropriate affect. Mood normal.   Neurologic exam: Gait is normal    DIAGNOSTIC " TESTS:  Imaging Studies:   No new imaging to review    Assessment:  Sherie Otero is a 48 year old female who presents today for follow up regarding her:    1. Fibromyalgia  2. Chronic low back pain  3. Cervicalgia  4. Chronic pain syndrome  5. Chronic use of opioids    I discussed with the patient that I really do think we need to get her off of the Norco.  I think that this is likely increasing her pain sensitivity.  I do not think that it is giving her much pain relief as well.  She continues to report a significant amount of pain each time she sees me.  I did discuss with the patient that I think she would do better if we get her off the Norco and start her on some low-dose naltrexone.  We discussed a small study was done using low-dose naltrexone of fibromyalgia and patient's reported an improvement in her pain.  I have not heard of using guaifenesin for fibromyalgia.  I would recommend that she discuss using this medication consistently with her primary care provider.      Plan:  Diagnosis reviewed, treatment option addressed, and risk/benifits discussed.  Self-care instructions given.  I am recommending a multidisciplinary treatment plan to help this patient better manage pain.      1. Physical Therapy: continue previous PT exercises;   2. Clinical Health Psychologist:  YES, has a plan in place  3. Diagnostic Studies: none at this time  4. Medication Management:     1. Continue Cymbalta 40mg in AM and 60mg at  bedtime.     2.  Flexeril 10mg, 1 tab TID PRN   3. Continue Hydrocodone at max of 6 tabs/day, however reduce dose from 7.5mg tablets to 5mg tablets.    4.  Plan to switch to Low dose naltrxone once we are able to get her off the Norco.  5. Further procedures recommended: Trigger point injections  as needed, has not provided long lasting benefit, therefore would not recommend repeating today    Follow up with this provider: 5 weeks     Total time spent face to face was 25 minutes and more than 50% of  face to face time was spent in counseling and/or coordination of care regarding the diagnosis and recommendations above.       Celia Bettencourt PA-C   [facility]

## 2019-10-28 ENCOUNTER — OFFICE VISIT (OUTPATIENT)
Dept: PALLIATIVE MEDICINE | Facility: CLINIC | Age: 49
End: 2019-10-28
Payer: MEDICARE

## 2019-10-28 VITALS
WEIGHT: 136 LBS | DIASTOLIC BLOOD PRESSURE: 76 MMHG | BODY MASS INDEX: 22.66 KG/M2 | HEIGHT: 65 IN | SYSTOLIC BLOOD PRESSURE: 116 MMHG | TEMPERATURE: 97 F

## 2019-10-28 DIAGNOSIS — G89.4 CHRONIC PAIN SYNDROME: ICD-10-CM

## 2019-10-28 DIAGNOSIS — M79.7 FIBROMYALGIA: ICD-10-CM

## 2019-10-28 DIAGNOSIS — M54.2 CERVICALGIA: ICD-10-CM

## 2019-10-28 DIAGNOSIS — M54.50 CHRONIC BILATERAL LOW BACK PAIN WITHOUT SCIATICA: ICD-10-CM

## 2019-10-28 DIAGNOSIS — G89.29 CHRONIC BILATERAL LOW BACK PAIN WITHOUT SCIATICA: ICD-10-CM

## 2019-10-28 DIAGNOSIS — F11.90 CHRONIC, CONTINUOUS USE OF OPIOIDS: Primary | ICD-10-CM

## 2019-10-28 PROCEDURE — 99214 OFFICE O/P EST MOD 30 MIN: CPT | Performed by: PHYSICIAN ASSISTANT

## 2019-10-28 RX ORDER — HYDROCODONE BITARTRATE AND ACETAMINOPHEN 5; 325 MG/1; MG/1
TABLET ORAL
Qty: 180 TABLET | Refills: 0 | Status: SHIPPED | OUTPATIENT
Start: 2019-10-28 | End: 2019-12-02 | Stop reason: DRUGHIGH

## 2019-10-28 ASSESSMENT — MIFFLIN-ST. JEOR: SCORE: 1232.77

## 2019-10-28 ASSESSMENT — PAIN SCALES - GENERAL: PAINLEVEL: WORST PAIN (10)

## 2019-11-06 ENCOUNTER — MYC MEDICAL ADVICE (OUTPATIENT)
Dept: PALLIATIVE MEDICINE | Facility: CLINIC | Age: 49
End: 2019-11-06

## 2019-11-09 ENCOUNTER — MYC MEDICAL ADVICE (OUTPATIENT)
Dept: PALLIATIVE MEDICINE | Facility: CLINIC | Age: 49
End: 2019-11-09

## 2019-11-09 DIAGNOSIS — M79.7 FIBROMYALGIA: Primary | ICD-10-CM

## 2019-11-12 RX ORDER — CELECOXIB 100 MG/1
100 CAPSULE ORAL 2 TIMES DAILY
Qty: 60 CAPSULE | Refills: 0 | Status: SHIPPED | OUTPATIENT
Start: 2019-11-12 | End: 2019-12-30

## 2019-11-13 ENCOUNTER — TELEPHONE (OUTPATIENT)
Dept: FAMILY MEDICINE | Facility: CLINIC | Age: 49
End: 2019-11-13

## 2019-11-13 DIAGNOSIS — F33.0 MAJOR DEPRESSIVE DISORDER, RECURRENT EPISODE, MILD (H): ICD-10-CM

## 2019-11-13 DIAGNOSIS — M79.7 FIBROMYALGIA: ICD-10-CM

## 2019-11-13 DIAGNOSIS — F41.1 GENERALIZED ANXIETY DISORDER: ICD-10-CM

## 2019-11-13 RX ORDER — CYCLOBENZAPRINE HCL 10 MG
TABLET ORAL
Qty: 90 TABLET | Refills: 2 | Status: SHIPPED | OUTPATIENT
Start: 2019-11-13 | End: 2020-03-17

## 2019-11-14 RX ORDER — OLANZAPINE 5 MG/1
TABLET ORAL
Qty: 45 TABLET | Refills: 0 | Status: SHIPPED | OUTPATIENT
Start: 2019-11-14 | End: 2019-12-16

## 2019-11-14 NOTE — TELEPHONE ENCOUNTER
Refill for: OLANZapine (ZYPREXA) 5 MG tablet    Last Appointment: 10/18/19    Next Appointment: 11/20/19    No Shows/Cancellations since last appointment: cancelled: 11/14    Last Refill in Epic (date and amount/how many days):   Disp Refills Start End KADY    OLANZapine (ZYPREXA) 5 MG tablet 45 tablet 0 10/18/2019  --   Sig - Route: Take 0.5 tablets (2.5 mg) by mouth daily as needed (for anxiety and 2.5-5mg at bedtime for anxiety/sleep, try taking this before using Klonopin) - Oral     Last office visit note reviewed and summarized below:  Treatment Plan:    Continue Cymbalta 40mg daily and 60mg at bedtime    Stop Depakote today.    Start olanzapine 2.5mg daily as needed for anxiety and 2.5-5mg at HS for sleep, try taking olanzapine before using Klonopin.  Start with nighttime dose.    Continue Klonopin 0.5mg twice daily as needed for anxiety.    Continue all other medications as reviewed per electronic medical record today.     Obtain Lipid level as soon as possible.  This is a fasting lab, do not eat or drink anything after midnight, obtain lab in AM. Will also order TSH.    Continue individual therapy as planned with Lynnette Guaman.    Schedule an appointment with me in 4 weeks or sooner as needed.    Will refill x1 as patient will be out before follow up.     Yasmin Jackson RN  11/14/19  12:41 PM

## 2019-11-18 ENCOUNTER — FCC EXTENDED DOCUMENTATION (OUTPATIENT)
Dept: PSYCHOLOGY | Facility: CLINIC | Age: 49
End: 2019-11-18

## 2019-11-18 DIAGNOSIS — M05.79 RHEUMATOID ARTHRITIS INVOLVING MULTIPLE SITES WITH POSITIVE RHEUMATOID FACTOR (H): ICD-10-CM

## 2019-11-18 RX ORDER — PREDNISONE 5 MG/1
5 TABLET ORAL DAILY PRN
Qty: 30 TABLET | Refills: 1 | OUTPATIENT
Start: 2019-11-18

## 2019-11-18 NOTE — TELEPHONE ENCOUNTER
predniSONE (DELTASONE) 5 MG tablet 30 tablet 1 1/23/2019  No   Sig - Route: Take 5 mg by mouth daily as needed for peripheral joint pain from rheumatoid arthritis; use sparingly. - Oral   Patient not taking: Reported on 10/18/2019        Sent to pharmacy as: predniSONE (DELTASONE) 5 MG tablet   Class: E-Prescribe   Order: [id number]   E-Prescribing Status: Receipt confirmed by pharmacy (1/23/2019  4:38 PM CST)     Shantelle Narvaez MA on 11/18/2019 at 1:34 PM

## 2019-11-18 NOTE — PROGRESS NOTES
"                    Discharge Summary  Multiple Sessions    Client Name: Sherie Otero MRN#: [id number] YOB: 1970      Intake / Discharge Date: 6/25/19 (intake date), 11/18/19 (discharge date)      DSM5 Diagnoses: (Sustained by DSM5 Criteria Listed Above)  Diagnoses: 296.32 (F33.1) Major Depressive Disorder, Recurrent Episode, Moderate With anxious distress  309.81 (F43.10) Posttraumatic Stress Disorder (includes Posttraumatic Stress Disorder for Children 6 Years and Younger)  With dissociative symptoms  Psychosocial & Contextual Factors:    Client is a 48 year old female, who presented for a diagnostic assessment and individual therapy services, noting that her primary care provider had suggested that she access individual therapy services, to assist her in addressing symptoms of depression and anxiety, as well as learning adaptive coping skills that could assist her in managing her symptoms, along with continued medication management of symptoms. Client reported that she has experienced symptoms of anxiety since childhood, has experienced multiple significant losses, for which she still has some unresolved grief, and has a history of multiple traumas as an adult. These symptoms were negatively impacting her daily functioning and quality of life. Client reported a positive support system of family and friends, though notes that she \"keeps her [place] small,\" and has difficulty opening up to and trusting others, due to her trauma history. Client reported that she has accessed therapy services previously, and had found this to be helpful.  Client did not identify any current safety concerns, noting that \"my children and family mean too much to me to ever harm myself.\"   WHODAS 2.0 (12 item) Score: WHODAS was completed at the time of the diagnostic assessment, with a score of 29          Presenting Concern:  Client reported the reason for seeking therapy at this time as wanting to address anxiety and " "trauma related symptoms, to process current life stressors, and to further develop coping skills to manage her symptoms. Client stated that her symptoms had resulted in the following functional impairments: management of the household/completion of tasks, relationship(s) and social interactions. She noted that she has had difficulties in completing routine household tasks due to her anxiety, has been \"unreliable in relationships,\" and has missed out on many events that she would have liked to attend, due to anxiety. Client reported that her symptoms were having a negative impact on her life, and that she wanted to take steps toward addressing and resolving this. Client reported that these problem(s) began around the age of 10 years old. She reported that around that time, her mother had a new boyfriend, and was spending time with him that client was used to her mother spending with her. This was highly distressing to the client, and she reports a hospitalization during this time, due to \"anxiety and agoraphobia.\" She cannot recall any other distressing events at that time, and denied a childhood history of abuse, neglect, or trauma. Client reported that she has attempted to resolve these concerns in the past through medication management, counseling, and a hospitalization (in childhood, reports no hospitalizations as an adult). Client reported that other professional(s) are involved in providing support / services, including her primary care provider and psychiatrist.      Reason for Discharge:  Client did not return      Disposition at Time of Last Encounter:   Comments:   This provider met with patient on multiple occasions. At the time of her last appointment, client reported that she continued to struggle with symptoms of depression, anxiety and trauma related symptoms, though was trying to incorporate adaptive coping strategies into her life to help manage these symptoms. Patient continued to work with her " psychiatrist and primary care provider in regard to medication management.      Risk Management:   Client denies a history of suicidal ideation, suicide attempts, self-injurious behavior, homicidal ideation, homicidal behavior and and other safety concerns  Recommended that patient call 911 or go to the local ED should there be a change in any of these risk factors.      Referred To:  N/A        Lynnette Guaman, LICSW   11/18/2019

## 2019-11-18 NOTE — TELEPHONE ENCOUNTER
Robi Ford, RN: Please call to notify Ms. Otero that prednisone refill request was refused; needs clinic follow-up.     Ash Donald MD  11/18/2019 5:22 PM

## 2019-11-18 NOTE — TELEPHONE ENCOUNTER
Pending Prescriptions:                       Disp   Refills    predniSONE (DELTASONE) 5 MG tablet        30 tab*1            Sig: Take 1 tablet (5 mg) by mouth daily as needed for           peripheral joint pain from rheumatoid arthritis;           use sparingly    Routing refill request to provider for review/approval because:  Prednisone not on protocol   Patient needs to be seen because:  Over due for f/u- LOV 12/11/2018, was to f/u in 3-4 mths.     Iliana Martinez RN on 11/18/2019 at 3:34 PM

## 2019-11-19 DIAGNOSIS — F41.1 GENERALIZED ANXIETY DISORDER: ICD-10-CM

## 2019-11-19 DIAGNOSIS — F33.0 MAJOR DEPRESSIVE DISORDER, RECURRENT EPISODE, MILD (H): ICD-10-CM

## 2019-11-19 NOTE — TELEPHONE ENCOUNTER
Called and informed patient of the message below.  She verbalized understanding and will call to schedule an apt at her own time.    Robi Ford RN....11/19/2019 3:14 PM

## 2019-11-20 ENCOUNTER — OFFICE VISIT (OUTPATIENT)
Dept: PSYCHIATRY | Facility: CLINIC | Age: 49
End: 2019-11-20
Payer: MEDICARE

## 2019-11-20 VITALS
HEART RATE: 111 BPM | BODY MASS INDEX: 24.16 KG/M2 | DIASTOLIC BLOOD PRESSURE: 76 MMHG | RESPIRATION RATE: 16 BRPM | WEIGHT: 145.2 LBS | TEMPERATURE: 97.1 F | SYSTOLIC BLOOD PRESSURE: 130 MMHG | OXYGEN SATURATION: 96 %

## 2019-11-20 DIAGNOSIS — F41.1 GENERALIZED ANXIETY DISORDER: ICD-10-CM

## 2019-11-20 PROCEDURE — 99214 OFFICE O/P EST MOD 30 MIN: CPT | Performed by: NURSE PRACTITIONER

## 2019-11-20 RX ORDER — CLONAZEPAM 0.5 MG/1
0.5 TABLET ORAL 2 TIMES DAILY PRN
Qty: 60 TABLET | Refills: 0 | Status: SHIPPED | OUTPATIENT
Start: 2019-11-20 | End: 2019-12-18

## 2019-11-20 RX ORDER — DULOXETIN HYDROCHLORIDE 20 MG/1
40 CAPSULE, DELAYED RELEASE ORAL DAILY
Qty: 60 CAPSULE | Refills: 0 | Status: SHIPPED | OUTPATIENT
Start: 2019-11-20 | End: 2019-12-18

## 2019-11-20 ASSESSMENT — ANXIETY QUESTIONNAIRES
1. FEELING NERVOUS, ANXIOUS, OR ON EDGE: MORE THAN HALF THE DAYS
4. TROUBLE RELAXING: SEVERAL DAYS
GAD7 TOTAL SCORE: 12
7. FEELING AFRAID AS IF SOMETHING AWFUL MIGHT HAPPEN: SEVERAL DAYS
7. FEELING AFRAID AS IF SOMETHING AWFUL MIGHT HAPPEN: SEVERAL DAYS
GAD7 TOTAL SCORE: 12
5. BEING SO RESTLESS THAT IT IS HARD TO SIT STILL: MORE THAN HALF THE DAYS
GAD7 TOTAL SCORE: 12
2. NOT BEING ABLE TO STOP OR CONTROL WORRYING: MORE THAN HALF THE DAYS
3. WORRYING TOO MUCH ABOUT DIFFERENT THINGS: NEARLY EVERY DAY
6. BECOMING EASILY ANNOYED OR IRRITABLE: SEVERAL DAYS

## 2019-11-20 ASSESSMENT — PATIENT HEALTH QUESTIONNAIRE - PHQ9
SUM OF ALL RESPONSES TO PHQ QUESTIONS 1-9: 7
SUM OF ALL RESPONSES TO PHQ QUESTIONS 1-9: 7
10. IF YOU CHECKED OFF ANY PROBLEMS, HOW DIFFICULT HAVE THESE PROBLEMS MADE IT FOR YOU TO DO YOUR WORK, TAKE CARE OF THINGS AT HOME, OR GET ALONG WITH OTHER PEOPLE: VERY DIFFICULT

## 2019-11-20 ASSESSMENT — PAIN SCALES - GENERAL: PAINLEVEL: EXTREME PAIN (9)

## 2019-11-20 NOTE — TELEPHONE ENCOUNTER
Routing refill request to provider for review/approval because:  Labs out of range:  PHQ9    Vinita Parikh RN on 11/19/2019 at 6:28 PM

## 2019-11-20 NOTE — PROGRESS NOTES
"    Outpatient Psychiatric Progress Note    Name: Sherie Otero   : 1970                    Primary Care Provider: Twin Castro MD   Therapist: Lynnette Guaman  Answers for HPI/ROS submitted by the patient on 2019   If you checked off any problems, how difficult have these problems made it for you to do your work, take care of things at home, or get along with other people?: Very difficult    PHQ9 TOTAL SCORE: 7  CELIA 7 TOTAL SCORE: 12      PHQ-9 scores:  PHQ-9 SCORE 2019 10/18/2019 2019   PHQ-9 Total Score - - -   PHQ-9 Total Score MyChart 5 (Mild depression) 13 (Moderate depression) 7 (Mild depression)   PHQ-9 Total Score 5 13 7       CELIA-7 scores:  CELIA-7 SCORE 2019 10/18/2019 2019   Total Score 9 (mild anxiety) 14 (moderate anxiety) 12 (moderate anxiety)   Total Score 9 14 12       Patient Identification:  Patient is a 49 year old year old,   White American female  who presents for return visit with me.  Patient is currently disabled. Patient attended the session alone. Patient prefers to be called: \"Sherie\".    Interim History:  I last saw Sherie Otero for outpatient psychiatry Return Visit on 10/18/19.     During that appointment, patient reported side effects from Depakote, thus increasing to therapeutic doses had not been possible.  Therefore, was discontinue. Zyprexa was added at 2.5mg daily as needed for anxiety and 2.5-5mg at HS for sleep/anxiety/mood.  We were hopeful this would help stabilize her mood some and allow for further taper of benzodiazepine as well.   TSH and Lipid panel were ordered for baseline.    Current medications include: albuterol (VENTOLIN HFA) 108 (90 Base) MCG/ACT inhaler, Inhale 2 puffs into the lungs every 6 hours as needed for shortness of breath / dyspnea or wheezing  celecoxib (CELEBREX) 100 MG capsule, Take 1 capsule (100 mg) by mouth 2 times daily Take with food.  cetirizine (ZYRTEC) 10 MG tablet, TAKE  ONE TABLET BY MOUTH " EVERY DAY.  clonazePAM (KLONOPIN) 0.5 MG tablet, Take 1 tablet (0.5 mg) by mouth 2 times daily as needed for anxiety Must last 30 days  cyclobenzaprine (FLEXERIL) 10 MG tablet, Take 2 tablets in the evening and 1 in the morning  CYMBALTA 60 MG capsule, TAKE ONE CAPSULE BY MOUTH EVERY EVENING  DULoxetine (CYMBALTA) 20 MG capsule, Take 2 capsules (40 mg) by mouth daily CYMBALTA-KADY  fluticasone (FLONASE) 50 MCG/ACT nasal spray, SPRAY 2 SPRAYS INTO BOTH NOSTRILS DAILY.  HYDROcodone-acetaminophen (NORCO) 5-325 MG tablet, Take 2 tablets every 8 hours as needed for severe pain. Fill 11/6/2019. Start 11/8/2019.  HYDROcodone-acetaminophen (NORCO) 7.5-325 MG per tablet, TAKE 2 TABS BY MOUTH EVERY 6 HOURS AS NEEDED FOR PAIN. Fill on 10/8/2019. To start 10/9/2019 and last until 11/8/2019.  OLANZapine (ZYPREXA) 5 MG tablet, Take 0.5 tablets (2.5mg) by mouth daily as needed for anxiety and 2.5-5mg at bedtime for anxiety/sleep, try taking this before using Klonopin  predniSONE (DELTASONE) 5 MG tablet, Take 5 mg by mouth daily as needed for peripheral joint pain from rheumatoid arthritis; use sparingly. (Patient taking differently: Take 5 mg by mouth daily as needed for peripheral joint pain from rheumatoid arthritis; use sparingly  Patient is on a taper currently at 10MG)  verapamil (CALAN) 40 MG tablet, Take 1 tablet (40 mg) by mouth 2 times daily  folic acid (FOLVITE) 1 MG tablet, Take 1 tablet (1 mg) by mouth daily (Patient not taking: Reported on 10/18/2019)  methotrexate sodium 2.5 MG TABS, Take 8 tablets (20 mg) by mouth every 7 days (Patient not taking: Reported on 10/18/2019)  naloxone (NARCAN) nasal spray, Spray 1 spray (4 mg) into one nostril alternating nostrils as needed for opioid reversal every 2-3 minutes until assistance arrives (Patient not taking: Reported on 10/7/2019)  topiramate (TOPAMAX) 25 MG tablet, Take 3 tablets (75 mg) by mouth At Bedtime (Patient not taking: Reported on 10/18/2019)    No current  "facility-administered medications on file prior to visit.        The [place] Prescription Monitoring Program has been reviewed and there are no concerns about diversionary activity for controlled substances at this time.      I was able to review most recent Primary Care Provider, specialty provider, and therapy visit notes that I have access to.     Today, patient reports she continues to experience some anxiety 3-4 days per week, patient reports this is an improvement from daily panic/high anxiety.  Reports following addition of olanzapine she feels \"exhausted\", reports she is taking this twice daily every day rather than PRN.  She has also continued to use PRN klonopin BID.  She also reports her pain is \"out of control\" she reports her pain medications are currently being adjusted which may be contributing to this.  She states overall she feels less depressed, is \"more even keeled\"  and has more energy.  She has gone through several boxes of stuff that had been sitting around her home since her husbands death.   She reports some mild side effects including restlessness, daytime tiredness/exhaustion, and increased urination following addition of olanzapine.  We discussed not taking scheduled rather using PRN, particularly her morning dose.     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    Social history updates:  Denies changes    Substance use updates:  Denies  Tobacco use: Yes Cigarettes  Ready to quit?  No      Vital Signs:   BP (!) 144/78   Pulse " 111   Temp 97.1  F (36.2  C) (Temporal)   Resp 16   Wt 65.9 kg (145 lb 3.2 oz)   SpO2 96%   Breastfeeding No   BMI 24.16 kg/m      Labs:  Orders Only on 05/28/2019   Component Date Value Ref Range Status     Bilirubin Direct 05/28/2019 <0.1  0.0 - 0.2 mg/dL Final     Bilirubin Total 05/28/2019 0.2  0.2 - 1.3 mg/dL Final     Albumin 05/28/2019 3.3* 3.4 - 5.0 g/dL Final     Protein Total 05/28/2019 7.2  6.8 - 8.8 g/dL Final     Alkaline Phosphatase 05/28/2019 151* 40 - 150 U/L Final     ALT 05/28/2019 33  0 - 50 U/L Final     AST 05/28/2019 20  0 - 45 U/L Final     CRP Inflammation 05/28/2019 <2.9  0.0 - 8.0 mg/L Final     Sed Rate 05/28/2019 12  0 - 30 mm/h Final     Creatinine 05/28/2019 0.96  0.52 - 1.04 mg/dL Final     GFR Estimate 05/28/2019 68  >60 mL/min/[1.73_m2] Final    Comment: Non  GFR Calc  Starting 12/18/2018, serum creatinine based estimated GFR (eGFR) will be   calculated using the Chronic Kidney Disease Epidemiology Collaboration   (CKD-EPI) equation.       GFR Estimate If Black 05/28/2019 79  >60 mL/min/[1.73_m2] Final    Comment:  GFR Calc  Starting 12/18/2018, serum creatinine based estimated GFR (eGFR) will be   calculated using the Chronic Kidney Disease Epidemiology Collaboration   (CKD-EPI) equation.       WBC 05/28/2019 13.9* 4.0 - 11.0 10e9/L Final     RBC Count 05/28/2019 3.95  3.8 - 5.2 10e12/L Final     Hemoglobin 05/28/2019 13.7  11.7 - 15.7 g/dL Final     Hematocrit 05/28/2019 41.5  35.0 - 47.0 % Final     MCV 05/28/2019 105* 78 - 100 fl Final     MCH 05/28/2019 34.7* 26.5 - 33.0 pg Final     MCHC 05/28/2019 33.0  31.5 - 36.5 g/dL Final     RDW 05/28/2019 13.8  10.0 - 15.0 % Final     Platelet Count 05/28/2019 303  150 - 450 10e9/L Final     Diff Method 05/28/2019 Manual Differential   Final     % Neutrophils 05/28/2019 46.0  % Final     % Lymphocytes 05/28/2019 47.0  % Final     % Monocytes 05/28/2019 4.0  % Final     % Eosinophils 05/28/2019 2.0  %  Final     % Basophils 05/28/2019 1.0  % Final     Absolute Neutrophil 05/28/2019 6.4  1.6 - 8.3 10e9/L Final     Absolute Lymphocytes 05/28/2019 6.5* 0.8 - 5.3 10e9/L Final     Absolute Monocytes 05/28/2019 0.6  0.0 - 1.3 10e9/L Final     Absolute Eosinophils 05/28/2019 0.3  0.0 - 0.7 10e9/L Final     Absolute Basophils 05/28/2019 0.1  0.0 - 0.2 10e9/L Final     RBC Morphology 05/28/2019 Normal   Final     Platelet Estimate 05/28/2019 Automated count confirmed.  Platelet morphology is normal.   Final     Most recent laboratory results reviewed and no new labs.     Review of Systems:  10 systems (general, cardiovascular, respiratory, eyes, ENT, endocrine, GI, , M/S, neurological) were reviewed. Denies dizziness, shortness of breath, or chest pain. The remaining systems are all unremarkable.    Mental Status Examination:  Appearance:  awake, alert, adequately groomed and appeared stated age  Attitude:  cooperative   Eye Contact:  good  Gait and Station: Normal  Psychomotor Behavior:  no evidence of tardive dyskinesia, dystonia, or tics and intact station, gait and muscle tone  Oriented to:  time, person, and place  Attention Span and Concentration:  Normal  Speech:   clear, coherent, regular rate and regular rhythm  Mood:  better  Affect:  mood congruent  Associations:  no loose associations  Thought Process:  logical, linear and goal oriented  Thought Content:  no evidence of suicidal ideation or homicidal ideation and no evidence of psychotic thought  Recent and Remote Memory:  intact Not formally assessed. No amnesia.  Fund of Knowledge: appropriate  Insight:  good  Judgment:  intact  Impulse Control:  intact    Suicide Risk Assessment:  Today Sherie Otero denies suicidal thoughts or urges to harm self. Based on all available evidence, Sherie Otero does not appear to be at imminent risk for self-harm, does not meet criteria for a 72-hr hold, and therefore remains appropriate for ongoing outpatient level of  care.  A thorough assessment of risk factors related to suicide and self-harm have been reviewed and are noted above. The patient convincingly denies acute suicidality on several occasions. Local community safety resources reviewed and printed for patient to use if needed. There was no deceit detected, and the patient presented in a manner that was believable.     DSM5 Diagnosis:  296.31 (F33.0) Major Depressive Disorder, Recurrent Episode, Mild _  300.02 (F41.1) Generalized Anxiety Disorder  309.81 (F43.10) Posttraumatic Stress Disorder (includes Posttraumatic Stress Disorder for Children 6 Years and Younger)  Without dissociative symptoms    Medical comorbidities include:   Patient Active Problem List    Diagnosis Date Noted     Balance problems 07/25/2019     Priority: Medium     Chronic, continuous use of opioids 11/03/2018     Priority: Medium     Chronically on benzodiazepine therapy 11/03/2018     Priority: Medium     Cervical cancer screening      Priority: Medium     2011 ablation  2015 NIL pap. Plan: pap in 3 years.  8/1/18 NIL pap, neg HR HPV. cotest in 5 years.       Pelvic pain in female 08/01/2018     Priority: Medium     Chronic rhinitis 05/14/2018     Priority: Medium     Other migraine without status migrainosus, intractable 03/21/2017     Priority: Medium     Pulmonary nodules 01/19/2017     Priority: Medium     Abnormal CT of the chest 01/19/2017     Priority: Medium     Hilar lymphadenopathy 01/19/2017     Priority: Medium     Degenerative joint disease of cervical spine      Priority: Medium     multi level worst at C5-6       Osteoarthritis of cervical spine, unspecified spinal osteoarthritis complication status 03/21/2016     Priority: Medium     Panic attacks 03/01/2016     Priority: Medium     Elevated white blood cell count 01/14/2016     Priority: Medium     Rheumatoid arthritis involving multiple sites with positive rheumatoid factor (H) 01/12/2016     Priority: Medium     Intermittent  asthma, uncomplicated 11/29/2015     Priority: Medium     Other chronic pain 11/18/2015     Priority: Medium     Major depressive disorder, recurrent episode, mild (H) 10/08/2015     Priority: Medium     NSAID induced gastritis 09/23/2015     Priority: Medium     Stenosis, cervical spine      Priority: Medium     C5-C7 (MRI)       Spondylitis, cervical (H)      Priority: Medium     C5-C7 (MRI)       Cervicalgia 01/09/2014     Priority: Medium     Disturbance in sleep behavior 11/05/2013     Priority: Medium     Problem list name updated by automated process. Provider to review       SHANTANU (obstructive sleep apnea) 10/25/2013     Priority: Medium     Chronic fatigue syndrome 07/02/2013     Priority: Medium              Fibromyalgia 07/02/2013     Priority: Medium     Generalized anxiety disorder 07/02/2013     Priority: Medium     Diagnosis updated by automated process. Provider to review and confirm.       Tobacco abuse 07/02/2013     Priority: Medium     CARDIOVASCULAR SCREENING; LDL GOAL LESS THAN 160 10/31/2010     Priority: Medium       Assessment:  Sherie Otero is a 49 year old  female with a history of depression and anxiety dating back to childhood, symptoms have persisted since that time.  She has had two psychiatric hospitalizations and several medication trials since that time.  She is currently prescribed Cymbalta 40mg daily and 60mg at HS for depression and anxiety as well as chronic pain. She has been treated with benzodiazepines for several years. Taper of these has proved difficult.  Klonopin was decreased to 0.25mg BID in July but patient exhibited increased anxiety thus this was increased back to 0.5mg BID.  She was started on Depakote for antipanic benefits with plan to attempt taper of Klonopin again.  Patient has been unable to tolerate addition of Depakote so increasing to therapeutic doses was not possible.  Thus this was discontinued.  She has had trials of gabapentin without  benefits.  She has also taken Seroquel in the past with possible side effects.  She has also had trials of multiple other medications including Buspar, Effexor, Lexapro, Paxil, Prozac, Wellbutrin, Zoloft, Ativan, Valium, and Xanax.  She is not willing to consider Lamictal, states her cousin  of Twin Matthew's Syndrome. She has also had trials of Seroquel. We started Zyprexa at 2.5mg daily as needed and 2.5-5mg at HS for sleep/anxiety/mood.  Hopefully this will stabilize her mood some and allow for further taper of benzodiazepine as well.   She reports she continues to take Klonopin 0.5mg BIDPRN for anxiety and does not feel the olanzapine alone is as helpful.  However, she does report improved mood following addition of olanzapine.  She is experiencing some mild side effects, she was taking Zyprexa scheduled BID rather than PRN, encouraged her to cut back on AM dose to see if these subside.  Overall she has had marked improvement in her mood with combination of Cymbalta and olanzapine.  She continues to have difficulty managing anxiety, although this is somewhat better at this point.  She continues to use Klonopin 0.5mg BID PRN.  I have no concerns about misuse or diversion at this time. If further taper is necessary in the future I would recommend sending her to addiction medicine.     Medication side effects and alternatives were reviewed. Health promotion activities recommended and reviewed today. All questions addressed. Education and counseling completed regarding risks and benefits of medications and psychotherapy options.    Treatment Plan:    Continue Cymbalta 40mg daily and 60mg at bedtime    Decrease Zyprexa to 2.5mg daily as needed only, for anxiety and 2.5-5mg at bedtime.    Continue Klonopin 0.5mg twice daily as needed for anxiety    Continue all other medications as reviewed per electronic medical record today.     Safety plan reviewed. To the Emergency Department as needed or call after hours  crisis line at [phone] or [phone]. [place] Crisis Text [place]. Text [place] to 075844 or Suicide LifeLine Chat: suicideY Combinator.org/chat/    To schedule individual or family therapy, call [facility] at [phone].     Continue individual therapy as planned with Lynnette Guaman.    Schedule an appointment with me in 4 weeks or sooner as needed. Call [facility] at [phone] to schedule.    Follow up with primary care provider as planned or for acute medical concerns.    Call the psychiatric nurse line with medication questions or concerns at [phone].    Formative Labshart may be used to communicate with your provider, but this is not intended to be used for emergencies.    Crisis Resources:    National Suicide Prevention Lifeline: 112.270.6492 (TTY: 894.260.9554). Call anytime for help.  (www.suicidepreventionlifeline.org)  National [place] on Mental Illness (www.dolly.org): 898.598.5052 or 652-921-2520.   Mental Health Association (www.mentalhealth.org): 495.789.6545 or 316-706-0714.  [place] Crisis Text Line: Text [place] to 227024  Suicide LifeLine Chat: suicideY [email or web address].org/chat    Administrative Billing:   Time spent with patient was 30 minutes and greater than 50% of time or 20 minutes was spent in counseling and coordination of care regarding above diagnoses and treatment plan.    Patient Status:  Patient will continue to be seen for ongoing consultation and stabilization.    Signed:   Amanda Blanchard MSN, APRN, CNP   Psychiatry

## 2019-11-20 NOTE — TELEPHONE ENCOUNTER
"Requested Prescriptions   Pending Prescriptions Disp Refills     DULoxetine (CYMBALTA) 20 MG capsule 60 capsule 0     Sig: Take 2 capsules (40 mg) by mouth daily CYMBALTA-KADY   Last Written Prescription Date:  10/18/19  Last Fill Quantity: 60,  # refills: 0   Last office visit: 9/4/2019 with prescribing provider:  Matthew   Future Office Visit:   Next 5 appointments (look out 90 days)    Nov 20, 2019  2:15 PM CST  Return Visit with CYN Nugent CNP  [address] [address]  [phone]   Nov 29, 2019  2:00 PM CST  PHYSICAL with Twin Castro MD  [address] [address]  [phone]   Dec 02, 2019  2:30 PM CST  Return Visit with Celia Bettencourt PA-C  [address] [address]  [phone]             Serotonin-Norepinephrine Reuptake Inhibitors  Failed - 11/19/2019  4:58 PM        Failed - PHQ-9 score of less than 5 in past 6 months     Please review last PHQ-9 score.   PHQ-9 score:    PHQ-9 SCORE 10/18/2019   PHQ-9 Total Score -   PHQ-9 Total Score MyChart 13 (Moderate depression)   PHQ-9 Total Score 13           Passed - Blood pressure under 140/90 in past 12 months     BP Readings from Last 3 Encounters:   10/28/19 116/76   10/18/19 122/76   10/07/19 124/74           Passed - Medication is active on med list        Passed - Patient is age 18 or older        Passed - No active pregnancy on record        Passed - No positive pregnancy test in past 12 months        Passed - Recent (6 mo) or future (30 days) visit within the authorizing provider's specialty     Patient had office visit in the last 6 months or has a visit in the next 30 days with authorizing provider or within the authorizing provider's specialty.  See \"Patient Info\" tab in inbasket, or \"Choose " "Columns\" in Meds & Orders section of the refill encounter.              "

## 2019-11-20 NOTE — PATIENT INSTRUCTIONS
Treatment Plan:    Continue Cymbalta 40mg daily and 60mg at bedtime    Decrease Zyprexa to 2.5mg as needed for anxiety and 2.5-5mg at bedtime.    Continue Klonopin 0.5mg twice daily as needed for anxiety    Continue all other medications as reviewed per electronic medical record today.     Safety plan reviewed. To the Emergency Department as needed or call after hours crisis line at [phone] or [phone]. [place] Crisis Text Line. Text [place] to 290721 or Suicide LifeLine Chat: suicideStereotaxis.org/chat/    To schedule individual or family therapy, call [facility] at [phone].     Continue individual therapy as planned with Lynnette Guaman.    Schedule an appointment with me in 4 weeks or sooner as needed. Call [facility] at [phone] to schedule.    Follow up with primary care provider as planned or for acute medical concerns.    Call the psychiatric nurse line with medication questions or concerns at [phone].    [email or web address] may be used to communicate with your provider, but this is not intended to be used for emergencies.    Crisis Resources:    National Suicide Prevention Lifeline: 351.615.4757 (TTY: 591.661.2726). Call anytime for help.  (www.suicidepreventionlifeline.org)  National [place] on Mental Illness (www.dolly.org): 780.529.8377 or 584-345-9445.   Mental Health Association (www.mentalhealth.org): 122.771.9714 or 014-337-2231.  [place] Crisis Text Line: Text [place] to 319865  Suicide LifeLine Chat: suicideStereotaxis.org/chat

## 2019-11-21 ASSESSMENT — ANXIETY QUESTIONNAIRES: GAD7 TOTAL SCORE: 12

## 2019-11-21 ASSESSMENT — PATIENT HEALTH QUESTIONNAIRE - PHQ9: SUM OF ALL RESPONSES TO PHQ QUESTIONS 1-9: 7

## 2019-11-26 ENCOUNTER — MYC MEDICAL ADVICE (OUTPATIENT)
Dept: PALLIATIVE MEDICINE | Facility: CLINIC | Age: 49
End: 2019-11-26

## 2019-11-27 NOTE — TELEPHONE ENCOUNTER
Chantal message from patient on 11/26 at 1215:    Francisco Yang I have to stop taking the Celebrex it s tearing my stomach up and my stomach is bloated bad I think because I have IBS. My pain is still out of control and with the lower dose of pain meds my pain is worse. I m still on prednisone on 5 mg for 2 more days that s not helping either with the weather changes. I m frustrated   -----------------    Message sent to pt:    Robert Rehman,     I am sorry to hear that you are not tolerating the Celebrex medication. I will have Celia review this message and will get back to you with any other suggestions.     Take care,     SAMARA LujanN, RN-BC  Patient Care Supervisor  [facility] [place]

## 2019-11-29 ENCOUNTER — OFFICE VISIT (OUTPATIENT)
Dept: FAMILY MEDICINE | Facility: CLINIC | Age: 49
End: 2019-11-29
Payer: MEDICARE

## 2019-11-29 VITALS
HEIGHT: 65 IN | DIASTOLIC BLOOD PRESSURE: 72 MMHG | BODY MASS INDEX: 24.93 KG/M2 | WEIGHT: 149.6 LBS | TEMPERATURE: 97.5 F | HEART RATE: 105 BPM | SYSTOLIC BLOOD PRESSURE: 126 MMHG | OXYGEN SATURATION: 98 %

## 2019-11-29 DIAGNOSIS — Z00.00 ENCOUNTER FOR MEDICARE ANNUAL WELLNESS EXAM: Primary | ICD-10-CM

## 2019-11-29 PROCEDURE — G0438 PPPS, INITIAL VISIT: HCPCS | Performed by: FAMILY MEDICINE

## 2019-11-29 ASSESSMENT — ENCOUNTER SYMPTOMS
DIZZINESS: 1
CHILLS: 1
NAUSEA: 1
HEMATOCHEZIA: 0
FREQUENCY: 1
HEMATURIA: 0
SORE THROAT: 0
DIARRHEA: 1
PALPITATIONS: 1
EYE PAIN: 0
CONSTIPATION: 1
ARTHRALGIAS: 1
HEADACHES: 1
JOINT SWELLING: 1
COUGH: 0
ABDOMINAL PAIN: 1
SHORTNESS OF BREATH: 1
FEVER: 0
PARESTHESIAS: 0
MYALGIAS: 1
NERVOUS/ANXIOUS: 1
HEARTBURN: 1
WEAKNESS: 1
DYSURIA: 0

## 2019-11-29 ASSESSMENT — ACTIVITIES OF DAILY LIVING (ADL)
CURRENT_FUNCTION: LAUNDRY REQUIRES ASSISTANCE
CURRENT_FUNCTION: PREPARING MEALS REQUIRES ASSISTANCE
CURRENT_FUNCTION: HOUSEWORK REQUIRES ASSISTANCE
CURRENT_FUNCTION: SHOPPING REQUIRES ASSISTANCE

## 2019-11-29 ASSESSMENT — MIFFLIN-ST. JEOR: SCORE: 1294.46

## 2019-11-29 NOTE — PATIENT INSTRUCTIONS
Patient Education   Personalized Prevention Plan  You are due for the preventive services outlined below.  Your care team is available to assist you in scheduling these services.  If you have already completed any of these items, please share that information with your care team to update in your medical record.  Health Maintenance Due   Topic Date Due     Colonoscopy  08/26/1978     Zoster (Shingles) Vaccine (1 of 2) 08/26/2018     Pneumococcal Vaccine (1 of 1 - PPSV23) 02/05/2019     Annual Wellness Visit  08/01/2019     Flu Vaccine (1) 09/01/2019     Asthma Action Plan - yearly  10/01/2019

## 2019-11-29 NOTE — PROGRESS NOTES
"SUBJECTIVE:   Sherie Otero is a 49 year old female who presents for Preventive Visit.  Are you in the first 12 months of your Medicare coverage?  No    Healthy Habits:     In general, how would you rate your overall health?  Fair    Frequency of exercise:  2-3 days/week    Do you usually eat at least 4 servings of fruit and vegetables a day, include whole grains    & fiber and avoid regularly eating high fat or \"junk\" foods?  Yes    Ability to successfully perform activities of daily living:  Shopping requires assistance, preparing meals requires assistance, housework requires assistance and laundry requires assistance    Home Safety:  No safety concerns identified    Hearing Impairment:  No hearing concerns    In the past 6 months, have you been bothered by leaking of urine?  No    In general, how would you rate your overall mental or emotional health?  Fair      PHQ-2 Total Score: 2    Additional concerns today:  No    Do you feel safe in your environment? Yes    Have you ever done Advance Care Planning? (For example, a Health Directive, POLST, or a discussion with a medical provider or your loved ones about your wishes): No, advance care planning information given to patient to review.  Patient plans to discuss their wishes with loved ones or provider.            Fall risk  Fallen 2 or more times in the past year?: No  Any fall with injury in the past year?: No      Cognitive Screening   1) Repeat 3 items (Leader, Season, Table)    2) Clock draw: NORMAL  3) 3 item recall: Recalls 2 objects   Results: NORMAL clock, 1-2 items recalled: COGNITIVE IMPAIRMENT LESS LIKELY    Mini-CogTM Copyright LARA Fonseca. Licensed by the author for use in [facility]; reprinted with permission (mago@.[facility]). All rights reserved.      Do you have sleep apnea, excessive snoring or daytime drowsiness?: yes    Reviewed and updated as needed this visit by clinical staff  Tobacco  Allergies  Meds  Med Hx  Surg Hx  Fam Hx "  Soc Hx        Reviewed and updated as needed this visit by Provider        Social History     Tobacco Use     Smoking status: Current Every Day Smoker     Packs/day: 0.50     Years: 29.00     Pack years: 14.50     Types: Cigarettes     Smokeless tobacco: Never Used     Tobacco comment: 9/19/11 - 1pk/day   Substance Use Topics     Alcohol use: No     Alcohol/week: 1.7 standard drinks     If you drink alcohol do you typically have >3 drinks per day or >7 drinks per week? Not applicable    Alcohol Use 11/29/2019   Prescreen: >3 drinks/day or >7 drinks/week? Not Applicable   Prescreen: >3 drinks/day or >7 drinks/week? -   No flowsheet data found.            Current providers sharing in care for this patient include: She sees psychiatry.  She sees a counselor.  These are for PTSD and depression and anxiety.  She is in a chronic pain clinic for opioid dependence chronic pain syndrome fibromyalgia.  She sees rheumatology for rheumatoid arthritis.  Patient Care Team:  Twin Castro MD as PCP - General (Family Practice)  Mauro Paredes MD as Assigned PCP    The following health maintenance items are reviewed in Epic and correct as of today:  Health Maintenance   Topic Date Due     COLONOSCOPY  08/26/1978     LIPID  03/06/2014     ZOSTER IMMUNIZATION (1 of 2) 08/26/2018     PNEUMOCOCCAL IMMUNIZATION 19-64 MEDIUM RISK (1 of 1 - PPSV23) 02/05/2019     MEDICARE ANNUAL WELLNESS VISIT  08/01/2019     INFLUENZA VACCINE (1) 09/01/2019     ASTHMA ACTION PLAN  10/01/2019     URINE DRUG SCREEN  01/14/2020     ASTHMA CONTROL TEST  03/04/2020     PHQ-9  05/20/2020     CELIA ASSESSMENT  11/20/2020     MAMMO SCREENING  05/28/2021     HPV  08/01/2023     PAP  08/01/2023     DTAP/TDAP/TD IMMUNIZATION (2 - Td) 03/19/2024     HIV SCREENING  Completed     DEPRESSION ACTION PLAN  Completed     MIGRAINE ACTION PLAN  Completed     IPV IMMUNIZATION  Aged Out     MENINGITIS IMMUNIZATION  Aged Out           Review of Systems  "  Constitutional: Positive for chills. Negative for fever.   HENT: Positive for congestion and hearing loss. Negative for ear pain and sore throat.    Eyes: Positive for visual disturbance. Negative for pain.   Respiratory: Positive for shortness of breath. Negative for cough.    Cardiovascular: Positive for palpitations. Negative for chest pain and peripheral edema.   Gastrointestinal: Positive for abdominal pain, constipation, diarrhea, heartburn and nausea. Negative for hematochezia.   Genitourinary: Positive for frequency and urgency. Negative for dysuria, genital sores and hematuria.   Musculoskeletal: Positive for arthralgias, joint swelling and myalgias.   Skin: Negative for rash.   Neurological: Positive for dizziness, weakness and headaches. Negative for paresthesias.   Psychiatric/Behavioral: Positive for mood changes. The patient is nervous/anxious.      These are all chronic complaints and being dealt with.    OBJECTIVE:   /72 (BP Location: Right arm, Patient Position: Sitting, Cuff Size: Adult Large)   Pulse 105   Temp 97.5  F (36.4  C) (Temporal)   Ht 1.651 m (5' 5\")   Wt 67.9 kg (149 lb 9.6 oz)   SpO2 98%   BMI 24.89 kg/m   Estimated body mass index is 24.89 kg/m  as calculated from the following:    Height as of this encounter: 1.651 m (5' 5\").    Weight as of this encounter: 67.9 kg (149 lb 9.6 oz).  Physical Exam  GENERAL: healthy, alert and no distress  EYES: Eyes grossly normal to inspection, PERRL and conjunctivae and sclerae normal  HENT: ear canals and TM's normal, nose and mouth without ulcers or lesions  NECK: no adenopathy, no asymmetry, masses, or scars and thyroid normal to palpation  RESP: lungs clear to auscultation - no rales, rhonchi or wheezes  BREAST: normal without masses, tenderness or nipple discharge and no palpable axillary masses or adenopathy  CV: regular rate and rhythm, normal S1 S2, no S3 or S4, no murmur, click or rub, no peripheral edema and peripheral pulses " "strong  ABDOMEN: soft, nontender, no hepatosplenomegaly, no masses and bowel sounds normal  MS: no gross musculoskeletal defects noted, no edema  SKIN: no suspicious lesions or rashes  NEURO: Normal strength and tone, mentation intact and speech normal  PSYCH: mentation appears normal, affect flat and anxious    Diagnostic Test Results:  Labs reviewed in Epic    ASSESSMENT / PLAN:   1. Encounter for Medicare annual wellness exam  Reviewed her office notes from her multiple specialist.  She will continue to see them and follow their requests.      COUNSELING:  Reviewed preventive health counseling, as reflected in patient instructions       Regular exercise       Healthy diet/nutrition       Vision screening       Hearing screening       Colon cancer screening    Estimated body mass index is 24.89 kg/m  as calculated from the following:    Height as of this encounter: 1.651 m (5' 5\").    Weight as of this encounter: 67.9 kg (149 lb 9.6 oz).         reports that she has been smoking cigarettes. She has a 14.50 pack-year smoking history. She has never used smokeless tobacco.  Tobacco Cessation Action Plan: Self help information given to patient    Appropriate preventive services were discussed with this patient, including applicable screening as appropriate for cardiovascular disease, diabetes, osteopenia/osteoporosis, and glaucoma.  As appropriate for age/gender, discussed screening for colorectal cancer, prostate cancer, breast cancer, and cervical cancer. Checklist reviewing preventive services available has been given to the patient.    Reviewed patients plan of care and provided an AVS. The Basic Care Plan (routine screening as documented in Health Maintenance) for Sherie meets the Care Plan requirement. This Care Plan has been established and reviewed with the Patient.    Counseling Resources:  ATP IV Guidelines  Pooled Cohorts Equation Calculator  Breast Cancer Risk Calculator  FRAX Risk Assessment  ICSI " Preventive Guidelines  Dietary Guidelines for Americans, 2010  USDA's MyPlate  ASA Prophylaxis  Lung CA Screening    Twin Castro MD  [facility]    Identified Health Risks:

## 2019-12-02 ENCOUNTER — OFFICE VISIT (OUTPATIENT)
Dept: PALLIATIVE MEDICINE | Facility: CLINIC | Age: 49
End: 2019-12-02
Payer: MEDICARE

## 2019-12-02 VITALS
SYSTOLIC BLOOD PRESSURE: 132 MMHG | WEIGHT: 145 LBS | HEIGHT: 65 IN | BODY MASS INDEX: 24.16 KG/M2 | DIASTOLIC BLOOD PRESSURE: 87 MMHG | HEART RATE: 90 BPM

## 2019-12-02 DIAGNOSIS — G89.29 CHRONIC BILATERAL LOW BACK PAIN WITHOUT SCIATICA: ICD-10-CM

## 2019-12-02 DIAGNOSIS — M54.2 CERVICALGIA: ICD-10-CM

## 2019-12-02 DIAGNOSIS — G89.4 CHRONIC PAIN SYNDROME: ICD-10-CM

## 2019-12-02 DIAGNOSIS — M79.7 FIBROMYALGIA: ICD-10-CM

## 2019-12-02 DIAGNOSIS — F11.90 CHRONIC, CONTINUOUS USE OF OPIOIDS: Primary | ICD-10-CM

## 2019-12-02 DIAGNOSIS — M54.50 CHRONIC BILATERAL LOW BACK PAIN WITHOUT SCIATICA: ICD-10-CM

## 2019-12-02 PROCEDURE — 99214 OFFICE O/P EST MOD 30 MIN: CPT | Performed by: PHYSICIAN ASSISTANT

## 2019-12-02 RX ORDER — HYDROCODONE BITARTRATE AND ACETAMINOPHEN 7.5; 325 MG/1; MG/1
TABLET ORAL
Qty: 150 TABLET | Refills: 0 | Status: SHIPPED | OUTPATIENT
Start: 2019-12-02 | End: 2019-12-30

## 2019-12-02 ASSESSMENT — MIFFLIN-ST. JEOR: SCORE: 1273.6

## 2019-12-02 ASSESSMENT — PAIN SCALES - GENERAL: PAINLEVEL: WORST PAIN (10)

## 2019-12-02 NOTE — PATIENT INSTRUCTIONS
After Visit Instructions:     Thank you for coming to [facility] for your care. It is my goal to partner with you to help you reach your optimal state of health.     I am recommending multidisciplinary care at this time.  The focus of care will be to continue gradual rehabilitation and pain management with medication adjustments as needed.    Continue daily self-care, identifying contributing factors, and monitoring variations in pain level. Continue to integrate self-care into your life.        Schedule follow-up with Celia Bettencourt PA-C in 4 weeks. You will need to make this appointment.     Medication recommendations:     Norco 7.5/325: take 1 tablet every 4-6 hours as needed for severe pain. Max of 5 tablets/day.      Celia Bettencourt PA-C  [facility]  [facility]    Contact information: [facility]  Clinic Number:  [phone]     Call with any questions about your care and for scheduling assistance.     Calls are returned Monday through Friday between 8 AM and 4:30 PM. We usually get back to you within 2 business days depending on the issue/request.    If we are prescribing your medications:    For opioid medication refills, call the clinic or send a [employer] message 7 days in advance.  Please include:    Name of requested medication    Name of the pharmacy.    For non-opioid medications, call your pharmacy directly to request a refill. Please allow 3-4 days to be processed.     Per [place] State Law:    All controlled substance prescriptions must be filled within 30 days of being written.      For those controlled substances allowing refills, pickup must occur within 30 days of last fill.      We believe regular attendance is key to your success in our program!      Any time you are unable to keep your appointment we ask that you call us at least 24 hours in advance to cancel.This will allow us to offer the appointment time to another  patient.   Multiple missed appointments may lead to dismissal from the clinic.

## 2019-12-02 NOTE — PROGRESS NOTES
[facility]    CHIEF COMPLAINT: Pain  -Fibromyalgia  -Neck pain  -Low back pain    INTERVAL HISTORY:  Last seen on 10/7/19.        Recommendations/plan at the last visit included:  1. Physical Therapy: continue previous PT exercises;   2. Clinical Health Psychologist:  YES, has a plan in place  3. Diagnostic Studies: none at this time  4. Medication Management:                1. Continue Cymbalta 40mg in AM and 60mg at bedtime.                2.  Flexeril 10mg, 1 tab TID PRN              3. Continue Hydrocodone at max of 6 tabs/day, however reduce dose from 7.5mg tablets to 5mg tablets.               4.  Plan to switch to Low dose naltrexone once we are able to get her off the Norco.  5. Further procedures recommended: Trigger point injections  as needed, has not provided long lasting benefit, therefore would not recommend repeating today     Follow up with this provider: 5 weeks    Since her last visit, Sherie Otero reports:    She continues to have a flare of her fibromyalgia and arthritis. She states that the Olanzapine was helping with her fatigue but she had such increased pain that she was unable to do much for activity. She tried Aleve but this was not helpful.     She has 11 Norco left. She reports having bone aches with the decreased dose during the 1st week.      Pain Information:   Pain quality: Aching, burning, shooting, throbbing, stabbing, miserable, numb, tiring, exhausting, penetrating, gnawing, nagging, and unbearable    Pain rating: intensity ranges from 9/10 to 10/10, and averages 10/10 on a 0-10 scale.   Pain today 10/10    UDS 1/19/2019   CSA 2/04/2019    CURRENT RELEVANT PAIN MEDICATIONS:  Clonazepam 0.5mg: taking 1 tab twice daily    Flexeril-1 in AM and 2 at HS    Cymbalta-40mg in AM and 60mg at night  Hydrocodone 7.5mg -currently taking 2 tablets three times a day   Ibuprofen-will take 800mg up to 3 times day, doesn't take daily    Patient is using the medication as  prescribed:  YES  Is your medication helpful? somewhat   Medication side effects? no side effect    Previous Medications: (H--helped; HI--Helped initially; SWH-- somewhat helpful, NH--No help; W--worse; SE--side effects)   Opiates: Hydrocodone H, Oxycodone SE  NSAIDS: Ibuprofen H, Relafen SE stomach upset, Meloxicam NH  Muscle Relaxants: Tizanidine NH, Flexeril H, Robaxin NH SE stomach upset  Anti-migraine mediations: Verapamil H  Anti-depressants: Cymbalta H  Sleep aids: none  Anxiolytics: Xanax H, Clonazepam H, Valium H  Neuropathics: Gabapentin SE dizziness          Topicals: Lidocaine patches SW  Other medications not covered above: Savella H at first, then seemed to stop working, Lyrica SE shortness of breath, felt 'weird' on them, throat felt weird. Prednisone H for arthritis flare    Past Pain Treatments:  Pain Clinic:   No   PT: Yes, years ago. Has not done pool therapy.   Psychologist: No  Relaxation techniques/biofeedback: No  Chiropractor: No, was told not to because of neck.   Acupuncture: Yes for headaches.   Pharmacotherapy:           Opioids: Yes            Non-opioids:    Yes   TENs Unit:Yes  Injections: cervical medial branch blocks 6/12/2014  Self-care:   Yes, ice/heat, hot baths, theracare  Surgeries related to pain: No    [place] Board of Pharmacy Data Base Reviewed:    YES; As expected, no concern for misuse/abuse of controlled medications based on this report.      THE 4 As OF OPIOID MAINTENANCE ANALGESIA    Analgesia: Is pain relief clinically significant? YES   Activity: Is patient functional and able to perform Activities of Daily Living? YES   Adverse effects: Is patient free from adverse side effects from opiates? YES   Adherence to Rx protocol: Is patient adhering to Controlled Substance Agreement and taking medications ONLY as ordered? YES       Is Narcan prescribed for opiate use >50 MME daily? yes      Total Daily MME: 37.5-45    Medications:  Current Outpatient Medications    Medication Sig Dispense Refill     HYDROcodone-acetaminophen (NORCO) 7.5-325 MG per tablet Take 1 tablet every 4-6 hours as needed for severe pain. Max of 5 tablets per day. Fill 12/2/2019. Start 12/3/2019. 150 tablet 0     albuterol (VENTOLIN HFA) 108 (90 Base) MCG/ACT inhaler Inhale 2 puffs into the lungs every 6 hours as needed for shortness of breath / dyspnea or wheezing 18 g 1     celecoxib (CELEBREX) 100 MG capsule Take 1 capsule (100 mg) by mouth 2 times daily Take with food. (Patient not taking: Reported on 11/29/2019) 60 capsule 0     cetirizine (ZYRTEC) 10 MG tablet TAKE  ONE TABLET BY MOUTH EVERY DAY. 90 tablet 3     clonazePAM (KLONOPIN) 0.5 MG tablet Take 1 tablet (0.5 mg) by mouth 2 times daily as needed for anxiety Must last 30 days 60 tablet 0     cyclobenzaprine (FLEXERIL) 10 MG tablet Take 2 tablets in the evening and 1 in the morning 90 tablet 2     CYMBALTA 60 MG capsule TAKE ONE CAPSULE BY MOUTH EVERY EVENING 90 capsule 0     DULoxetine (CYMBALTA) 20 MG capsule Take 2 capsules (40 mg) by mouth daily CYMBALTA-KADY 60 capsule 0     fluticasone (FLONASE) 50 MCG/ACT nasal spray SPRAY 2 SPRAYS INTO BOTH NOSTRILS DAILY. 16 g 11     folic acid (FOLVITE) 1 MG tablet Take 1 tablet (1 mg) by mouth daily (Patient not taking: Reported on 10/18/2019) 100 tablet 3     methotrexate sodium 2.5 MG TABS Take 8 tablets (20 mg) by mouth every 7 days (Patient not taking: Reported on 10/18/2019) 32 tablet 1     naloxone (NARCAN) nasal spray Spray 1 spray (4 mg) into one nostril alternating nostrils as needed for opioid reversal every 2-3 minutes until assistance arrives 0.2 mL 0     OLANZapine (ZYPREXA) 5 MG tablet Take 0.5 tablets (2.5mg) by mouth daily as needed for anxiety and 2.5-5mg at bedtime for anxiety/sleep, try taking this before using Klonopin 45 tablet 0     predniSONE (DELTASONE) 5 MG tablet Take 5 mg by mouth daily as needed for peripheral joint pain from rheumatoid arthritis; use sparingly. (Patient  "not taking: Reported on 11/29/2019) 30 tablet 1     topiramate (TOPAMAX) 25 MG tablet Take 3 tablets (75 mg) by mouth At Bedtime (Patient not taking: Reported on 10/18/2019) 90 tablet 1     verapamil (CALAN) 40 MG tablet Take 1 tablet (40 mg) by mouth 2 times daily 180 tablet 3       Review of Systems: A 10-point review of systems was negative, with the exception of chronic pain issues, fever/chills, fatigue, headache, dizziness, vision changes, sinus infection, earache, allergies, itching, cough, swelling in feet, high blood pressure, palpitations, chest pain, abdominal pain, diarrhea, steroid use, urinary frequency, weakness, numbness/tingling, anxiety, stress, and mood swings      Social History: Reviewed; unchanged from previous consultation.      Family history: Reviewed; unchanged from previous consultation.     PHYSICAL EXAM:     Vitals:  /87   Pulse 90   Ht 1.651 m (5' 5\")   Wt 65.8 kg (145 lb)   BMI 24.13 kg/m          Constitutional: healthy, alert and no distress  HEENT: Head atraumatic, normocephalic. Eyes without conjunctival injection or jaundice. Neck supple. No obvious neck masses.  Psychiatric/mental status: Alert, without lethargy or stupor. Appropriate affect. Mood normal.   Neurologic exam: Gait is normal    DIAGNOSTIC TESTS:  Imaging Studies:   No new imaging to review    Assessment:  Sherie Otero is a 48 year old female who presents today for follow up regarding her:    1. Fibromyalgia  2. Chronic low back pain  3. Cervicalgia  4. Chronic pain syndrome  5. Chronic use of opioids    We again discussed the patient's Norco use.  We did do a higher decrease the last time and I did agree to increasing it a little bit this month but I did discuss with the patient that part of why it is difficult to come off of the Norco with her dependence on this medication.    We did again discuss low-dose naltrexone for her fibromyalgia.  We discussed that we could still use periodic pain medications " if needed for arthritic flares.        Plan:  Diagnosis reviewed, treatment option addressed, and risk/benifits discussed.  Self-care instructions given.  I am recommending a multidisciplinary treatment plan to help this patient better manage pain.      1. Physical Therapy: continue previous PT exercises;   2. Clinical Health Psychologist:  YES, has a plan in place  3. Diagnostic Studies: none at this time  4. Medication Management:     1. Continue Cymbalta 40mg in AM and 60mg at bedtime.     2.  Flexeril 10mg, 1 tab TID PRN   3.  We are going to change her hydrocodone to 7.5/325 with instructions to take 1 tablet every 4-6 hours as needed for severe pain.  This is changing her from taking 2 tablets every 8 hours.   4.  Plan to switch to Low dose naltrxone once we are able to get her off the Norco.  5. Further procedures recommended: none at this time    Follow up with this provider: 4 weeks     Total time spent face to face was 15 minutes and more than 50% of face to face time was spent in counseling and/or coordination of care regarding the diagnosis and recommendations above.       Celia Bettencourt PA-C   [facility]

## 2019-12-05 ENCOUNTER — MYC MEDICAL ADVICE (OUTPATIENT)
Dept: PSYCHIATRY | Facility: CLINIC | Age: 49
End: 2019-12-05

## 2019-12-06 NOTE — TELEPHONE ENCOUNTER
I am sorry to hear you are not feeling very well.  We reduced the olanzapine due to daytime tiredness and restlessness however, if you felt your mood was better taking 2.5mg scheduled every morning you can certainly resume this dose.    Amanda Blanchard MSN, APRN, CNP

## 2019-12-06 NOTE — TELEPHONE ENCOUNTER
[employer] message sent to Sherie with Amanda's suggestion to resume previous dose of olanzapine.    Kati Meade RN on 12/6/2019 at 5:19 PM

## 2019-12-06 NOTE — TELEPHONE ENCOUNTER
Last office visit: 11/20/19  Next office visit: 12/18/19    Last office visit note reviewed and summarized below:  Treatment Plan:    Continue Cymbalta 40mg daily and 60mg at bedtime    Decrease Zyprexa to 2.5mg daily as needed only, for anxiety and 2.5-5mg at bedtime.    Continue Klonopin 0.5mg twice daily as needed for anxiety    Continue all other medications as reviewed per electronic medical record today.     Continue individual therapy as planned with Lynnette Guaman.    Schedule an appointment with me in 4 weeks or sooner as needed.    Routing to provider for review.      Yasmin Jackson RN  12/06/19  10:15 AM

## 2019-12-09 ENCOUNTER — MYC MEDICAL ADVICE (OUTPATIENT)
Dept: FAMILY MEDICINE | Facility: CLINIC | Age: 49
End: 2019-12-09

## 2019-12-12 ENCOUNTER — MYC MEDICAL ADVICE (OUTPATIENT)
Dept: FAMILY MEDICINE | Facility: CLINIC | Age: 49
End: 2019-12-12

## 2019-12-13 DIAGNOSIS — F41.1 GENERALIZED ANXIETY DISORDER: ICD-10-CM

## 2019-12-13 DIAGNOSIS — F33.0 MAJOR DEPRESSIVE DISORDER, RECURRENT EPISODE, MILD (H): ICD-10-CM

## 2019-12-16 RX ORDER — OLANZAPINE 5 MG/1
TABLET ORAL
Qty: 45 TABLET | Refills: 0 | Status: SHIPPED | OUTPATIENT
Start: 2019-12-16 | End: 2019-12-18

## 2019-12-16 NOTE — TELEPHONE ENCOUNTER
Routing refill request to provider for review/approval because:  Labs not current:  A1C, Lipid  PHQ9 score on 11/20/2019 out of range = 7  T'd up 1 refill request for provider review.      Requested Prescriptions   Pending Prescriptions Disp Refills     OLANZapine (ZYPREXA) 5 MG tablet 45 tablet 0     Sig: Take 0.5 tablets (2.5mg) by mouth daily as needed for anxiety and 2.5-5mg at bedtime for anxiety/sleep, try taking this before using Klonopin   Last Written Prescription Date:  11/14/2019  Last Fill Quantity: 45,  # refills: 0   Last office visit: 11/29/2019 with prescribing provider:     Future Office Visit:   Next 5 appointments (look out 90 days)    Dec 18, 2019  2:15 PM CST  [facility] [address] [place] [id number] [phone]   Dec 30, 2019  2:00 PM CST  [facility] Visit with Celia Bettencourt PA-C  [facility] [address] [place] [id number] [phone]         PHQ-9 score:    PHQ-9 SCORE 11/20/2019   PHQ-9 Total Score -   PHQ-9 Total Score MyChart 7 (Mild depression)   PHQ-9 Total Score 7       Antipsychotic Medications Failed - 12/13/2019  2:48 PM        Failed - Lipid panel on file within the past 12 months     No lab results found.          Failed - A1c or Glucose on file in past 12 months     Recent Labs   Lab Test 01/12/16  1221   GLC 83     Please review patients last 3 weights. If a weight gain of >10 lbs exists, you may refill the prescription once after instructing the patient to schedule an appointment within the next 30 days.    Wt Readings from Last 3 Encounters:   12/02/19 65.8 kg (145 lb)   11/29/19 67.9 kg (149 lb 9.6 oz)   11/20/19 65.9 kg (145 lb 3.2 oz)           Passed - Blood pressure under 140/90 in past 12 months     BP Readings from Last 3 Encounters:   12/02/19 132/87   11/29/19 126/72   11/20/19 130/76            "Passed - Patient is 12 years of age or older        Passed - CBC on file in past 12 months     Recent Labs   Lab Test 05/28/19  1249   WBC 13.9*   RBC 3.95   HGB 13.7   HCT 41.5              Passed - Heart Rate on file within past 12 months     Pulse Readings from Last 3 Encounters:   12/02/19 90   11/29/19 105   11/20/19 111           Passed - Medication is active on med list        Passed - Patient is not pregnant        Passed - No positve pregnancy test on file in past 12 months        Passed - Recent (6 mo) or future (30 days) visit within the authorizing provider's specialty     Patient had office visit in the last 6 months or has a visit in the next 30 days with authorizing provider or within the authorizing provider's specialty.  See \"Patient Info\" tab in inbasket, or \"Choose Columns\" in Meds & Orders section of the refill encounter.          Kendy Robles RN      "

## 2019-12-17 ENCOUNTER — MYC MEDICAL ADVICE (OUTPATIENT)
Dept: PALLIATIVE MEDICINE | Facility: CLINIC | Age: 49
End: 2019-12-17

## 2019-12-18 ENCOUNTER — OFFICE VISIT (OUTPATIENT)
Dept: PSYCHIATRY | Facility: CLINIC | Age: 49
End: 2019-12-18
Payer: MEDICARE

## 2019-12-18 ENCOUNTER — TELEPHONE (OUTPATIENT)
Dept: FAMILY MEDICINE | Facility: CLINIC | Age: 49
End: 2019-12-18

## 2019-12-18 VITALS
DIASTOLIC BLOOD PRESSURE: 68 MMHG | HEART RATE: 120 BPM | RESPIRATION RATE: 16 BRPM | OXYGEN SATURATION: 97 % | WEIGHT: 146.6 LBS | SYSTOLIC BLOOD PRESSURE: 122 MMHG | BODY MASS INDEX: 24.4 KG/M2 | TEMPERATURE: 97.3 F

## 2019-12-18 DIAGNOSIS — Z13.220 SCREENING FOR HYPERLIPIDEMIA: Primary | ICD-10-CM

## 2019-12-18 DIAGNOSIS — F33.0 MAJOR DEPRESSIVE DISORDER, RECURRENT EPISODE, MILD (H): ICD-10-CM

## 2019-12-18 DIAGNOSIS — Z13.220 SCREENING FOR HYPERLIPIDEMIA: ICD-10-CM

## 2019-12-18 DIAGNOSIS — F41.1 GENERALIZED ANXIETY DISORDER: ICD-10-CM

## 2019-12-18 DIAGNOSIS — Z23 NEED FOR PROPHYLACTIC VACCINATION AND INOCULATION AGAINST INFLUENZA: Primary | ICD-10-CM

## 2019-12-18 LAB — TSH SERPL DL<=0.005 MIU/L-ACNC: 1.3 MU/L (ref 0.4–4)

## 2019-12-18 PROCEDURE — 99214 OFFICE O/P EST MOD 30 MIN: CPT | Mod: 25 | Performed by: NURSE PRACTITIONER

## 2019-12-18 PROCEDURE — 36415 COLL VENOUS BLD VENIPUNCTURE: CPT | Performed by: NURSE PRACTITIONER

## 2019-12-18 PROCEDURE — G0009 ADMIN PNEUMOCOCCAL VACCINE: HCPCS | Performed by: NURSE PRACTITIONER

## 2019-12-18 PROCEDURE — 90682 RIV4 VACC RECOMBINANT DNA IM: CPT | Performed by: NURSE PRACTITIONER

## 2019-12-18 PROCEDURE — 84443 ASSAY THYROID STIM HORMONE: CPT | Performed by: NURSE PRACTITIONER

## 2019-12-18 RX ORDER — DULOXETIN HYDROCHLORIDE 20 MG/1
40 CAPSULE, DELAYED RELEASE ORAL DAILY
Qty: 60 CAPSULE | Refills: 0 | Status: SHIPPED | OUTPATIENT
Start: 2019-12-18 | End: 2020-02-24

## 2019-12-18 RX ORDER — DULOXETINE HCL 60 MG
CAPSULE,DELAYED RELEASE (ENTERIC COATED) ORAL
Qty: 90 CAPSULE | Refills: 0 | Status: SHIPPED | OUTPATIENT
Start: 2019-12-18 | End: 2020-02-24

## 2019-12-18 RX ORDER — OLANZAPINE 5 MG/1
2.5 TABLET ORAL 2 TIMES DAILY
Qty: 60 TABLET | Refills: 0 | Status: SHIPPED | OUTPATIENT
Start: 2019-12-18 | End: 2020-01-24

## 2019-12-18 RX ORDER — CLONAZEPAM 0.5 MG/1
0.5 TABLET ORAL 2 TIMES DAILY PRN
Qty: 60 TABLET | Refills: 0 | Status: SHIPPED | OUTPATIENT
Start: 2019-12-18 | End: 2020-01-24

## 2019-12-18 ASSESSMENT — ANXIETY QUESTIONNAIRES
7. FEELING AFRAID AS IF SOMETHING AWFUL MIGHT HAPPEN: SEVERAL DAYS
2. NOT BEING ABLE TO STOP OR CONTROL WORRYING: NEARLY EVERY DAY
4. TROUBLE RELAXING: NEARLY EVERY DAY
GAD7 TOTAL SCORE: 17
GAD7 TOTAL SCORE: 17
6. BECOMING EASILY ANNOYED OR IRRITABLE: MORE THAN HALF THE DAYS
7. FEELING AFRAID AS IF SOMETHING AWFUL MIGHT HAPPEN: SEVERAL DAYS
1. FEELING NERVOUS, ANXIOUS, OR ON EDGE: NEARLY EVERY DAY
GAD7 TOTAL SCORE: 17
5. BEING SO RESTLESS THAT IT IS HARD TO SIT STILL: MORE THAN HALF THE DAYS
3. WORRYING TOO MUCH ABOUT DIFFERENT THINGS: NEARLY EVERY DAY

## 2019-12-18 ASSESSMENT — PATIENT HEALTH QUESTIONNAIRE - PHQ9
10. IF YOU CHECKED OFF ANY PROBLEMS, HOW DIFFICULT HAVE THESE PROBLEMS MADE IT FOR YOU TO DO YOUR WORK, TAKE CARE OF THINGS AT HOME, OR GET ALONG WITH OTHER PEOPLE: EXTREMELY DIFFICULT
SUM OF ALL RESPONSES TO PHQ QUESTIONS 1-9: 19
SUM OF ALL RESPONSES TO PHQ QUESTIONS 1-9: 19

## 2019-12-18 ASSESSMENT — PAIN SCALES - GENERAL: PAINLEVEL: WORST PAIN (10)

## 2019-12-18 NOTE — PROGRESS NOTES
"    Outpatient Psychiatric Progress Note    Name: Sherie Otero   : 1970                    Primary Care Provider: Twin Castro MD   Therapist: None currently - Was seeing Lynnette Valadezherbie    Answers for HPI/ROS submitted by the patient on 2019   If you checked off any problems, how difficult have these problems made it for you to do your work, take care of things at home, or get along with other people?: Extremely difficult  PHQ9 TOTAL SCORE: 19  CELIA 7 TOTAL SCORE: 17      PHQ-9 scores:  PHQ-9 SCORE 10/18/2019 2019 2019   PHQ-9 Total Score - - -   PHQ-9 Total Score MyChart 13 (Moderate depression) 7 (Mild depression) 19 (Moderately severe depression)   PHQ-9 Total Score 13 7 19       CELIA-7 scores:  CELIA-7 SCORE 10/18/2019 2019 2019   Total Score 14 (moderate anxiety) 12 (moderate anxiety) 17 (severe anxiety)   Total Score 14 12 17       Patient Identification:  Patient is a 49 year old year old,   White American female  who presents for return visit with me.  Patient is currently disabled. Patient attended the session alone. Patient prefers to be called: \"Sherie\".    Interim History:  I last saw Sherie Otero for outpatient psychiatry Return Visit on 19.     During that appointment, she continued to experience some anxiety 3-4 days per week, patient reported this was an improvement from daily panic/high anxiety.  Reports following addition of olanzapine she feels \"exhausted\", reports she is taking this twice daily every day rather than PRN.  She has also continued to use PRN klonopin BID.   She stated overall she felt less depressed, \"more even keeled\"  and had more energy.  She reported some mild side effects including restlessness, daytime tiredness/exhaustion, and increased urination following addition of olanzapine.  We discussed not taking scheduled rather using PRN, particularly her morning dose to decrease daytime sedation. Patient reached out on  " to report worsening mood symptoms (low energy) following reduction of olanzapine. Patient was encouraged to resume scheduled daytime dose.    Current medications include: albuterol (VENTOLIN HFA) 108 (90 Base) MCG/ACT inhaler, Inhale 2 puffs into the lungs every 6 hours as needed for shortness of breath / dyspnea or wheezing  cetirizine (ZYRTEC) 10 MG tablet, TAKE  ONE TABLET BY MOUTH EVERY DAY.  clonazePAM (KLONOPIN) 0.5 MG tablet, Take 1 tablet (0.5 mg) by mouth 2 times daily as needed for anxiety Must last 30 days  cyclobenzaprine (FLEXERIL) 10 MG tablet, Take 2 tablets in the evening and 1 in the morning  CYMBALTA 60 MG capsule, TAKE ONE CAPSULE BY MOUTH EVERY EVENING  DULoxetine (CYMBALTA) 20 MG capsule, Take 2 capsules (40 mg) by mouth daily CYMBALTA-KADY  fluticasone (FLONASE) 50 MCG/ACT nasal spray, SPRAY 2 SPRAYS INTO BOTH NOSTRILS DAILY.  HYDROcodone-acetaminophen (NORCO) 7.5-325 MG per tablet, Take 1 tablet every 4-6 hours as needed for severe pain. Max of 5 tablets per day. Fill 12/2/2019. Start 12/3/2019.  naloxone (NARCAN) nasal spray, Spray 1 spray (4 mg) into one nostril alternating nostrils as needed for opioid reversal every 2-3 minutes until assistance arrives  OLANZapine (ZYPREXA) 5 MG tablet, Take 0.5 tablets (2.5mg) by mouth daily as needed for anxiety and 2.5-5mg at bedtime for anxiety/sleep, try taking this before using Klonopin  verapamil (CALAN) 40 MG tablet, Take 1 tablet (40 mg) by mouth 2 times daily  celecoxib (CELEBREX) 100 MG capsule, Take 1 capsule (100 mg) by mouth 2 times daily Take with food. (Patient not taking: Reported on 11/29/2019)  folic acid (FOLVITE) 1 MG tablet, Take 1 tablet (1 mg) by mouth daily (Patient not taking: Reported on 10/18/2019)  methotrexate sodium 2.5 MG TABS, Take 8 tablets (20 mg) by mouth every 7 days (Patient not taking: Reported on 10/18/2019)  predniSONE (DELTASONE) 5 MG tablet, Take 5 mg by mouth daily as needed for peripheral joint pain from  "rheumatoid arthritis; use sparingly. (Patient not taking: Reported on 11/29/2019)  topiramate (TOPAMAX) 25 MG tablet, Take 3 tablets (75 mg) by mouth At Bedtime (Patient not taking: Reported on 10/18/2019)    No current facility-administered medications on file prior to visit.        The [place] Prescription Monitoring Program has been reviewed and there are no concerns about diversionary activity for controlled substances at this time.      I was able to review most recent Primary Care Provider, specialty provider, and therapy visit notes that I have access to.     Today, patient reports mood symptoms have improved some after restarting olanzapine 2.5mg scheduled daily dose.    Feel her anxiety has been all over the place.\"  Most anxiety mid day.  Feels like her mood is better when taking olanzapine.  Also feels like her    Restlessness which she attributes to reduction of pain medications.       She was seeing Lynnette Guaman.      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    Social history updates:  Daughter got  in [place], patient was not invited to the wedding.    Substance use updates:  Denies  Tobacco use: Yes Cigarettes  Ready to quit?  No     Vital Signs:   Pulse 120   Temp 97.3  F (36.3  C) (Temporal)   Resp 16   Wt 66.5 kg (146 lb 9.6 oz)   SpO2 97%   BMI 24.40 kg/m      Labs:  Orders Only on 12/18/2019   Component Date Value Ref Range Status     TSH 12/18/2019 1.30  0.40 - 4.00 mU/L Final     Most recent laboratory results " reviewed.    Review of Systems:  10 systems (general, cardiovascular, respiratory, eyes, ENT, endocrine, GI, , M/S, neurological) were reviewed. Denies chest pain, shortness of breath, dizziness. The remaining systems are all unremarkable.    Mental Status Examination:  Appearance:  awake, alert, adequately groomed and appeared stated age  Attitude:  cooperative   Eye Contact:  good  Gait and Station: Normal  Psychomotor Behavior:  no evidence of tardive dyskinesia, dystonia, or tics and intact station, gait and muscle tone  Oriented to:  time, person, and place  Attention Span and Concentration:  Normal  Speech:   clear, coherent, regular rate and regular rhythm  Mood:  depressed, anxious  Affect:  mood congruent, tearful  Associations:  no loose associations  Thought Process:  logical, linear and goal oriented  Thought Content:  no evidence of suicidal ideation or homicidal ideation and no evidence of psychotic thought  Recent and Remote Memory:  intact Not formally assessed. No amnesia.  Fund of Knowledge: appropriate  Insight:  good  Judgment:  intact  Impulse Control:  intact    Suicide Risk Assessment:  Today Sherie Otero denies suicidal thoughts or urges to harm self. Based on all available evidence, Sherie Otero does not appear to be at imminent risk for self-harm, does not meet criteria for a 72-hr hold, and therefore remains appropriate for ongoing outpatient level of care.  A thorough assessment of risk factors related to suicide and self-harm have been reviewed and are noted above. The patient convincingly denies acute suicidality on several occasions. Local community safety resources reviewed and printed for patient to use if needed. There was no deceit detected, and the patient presented in a manner that was believable.     DSM5 Diagnosis:  296.31 (F33.0) Major Depressive Disorder, Recurrent Episode, Mild _  300.02 (F41.1) Generalized Anxiety Disorder  309.81 (F43.10) Posttraumatic Stress  Disorder (includes Posttraumatic Stress Disorder for Children 6 Years and Younger)  Without dissociative symptoms    Medical comorbidities include:   Patient Active Problem List    Diagnosis Date Noted     Balance problems 07/25/2019     Priority: Medium     Chronic, continuous use of opioids 11/03/2018     Priority: Medium     Chronically on benzodiazepine therapy 11/03/2018     Priority: Medium     Cervical cancer screening      Priority: Medium     2011 ablation  2015 NIL pap. Plan: pap in 3 years.  8/1/18 NIL pap, neg HR HPV. cotest in 5 years.       Pelvic pain in female 08/01/2018     Priority: Medium     Chronic rhinitis 05/14/2018     Priority: Medium     Other migraine without status migrainosus, intractable 03/21/2017     Priority: Medium     Pulmonary nodules 01/19/2017     Priority: Medium     Abnormal CT of the chest 01/19/2017     Priority: Medium     Hilar lymphadenopathy 01/19/2017     Priority: Medium     Degenerative joint disease of cervical spine      Priority: Medium     multi level worst at C5-6       Osteoarthritis of cervical spine, unspecified spinal osteoarthritis complication status 03/21/2016     Priority: Medium     Panic attacks 03/01/2016     Priority: Medium     Elevated white blood cell count 01/14/2016     Priority: Medium     Rheumatoid arthritis involving multiple sites with positive rheumatoid factor (H) 01/12/2016     Priority: Medium     Intermittent asthma, uncomplicated 11/29/2015     Priority: Medium     Other chronic pain 11/18/2015     Priority: Medium     Major depressive disorder, recurrent episode, mild (H) 10/08/2015     Priority: Medium     NSAID induced gastritis 09/23/2015     Priority: Medium     Stenosis, cervical spine      Priority: Medium     C5-C7 (MRI)       Spondylitis, cervical (H)      Priority: Medium     C5-C7 (MRI)       Cervicalgia 01/09/2014     Priority: Medium     Disturbance in sleep behavior 11/05/2013     Priority: Medium     Problem list name  updated by automated process. Provider to review       SHANTANU (obstructive sleep apnea) 10/25/2013     Priority: Medium     Chronic fatigue syndrome 07/02/2013     Priority: Medium              Fibromyalgia 07/02/2013     Priority: Medium     Generalized anxiety disorder 07/02/2013     Priority: Medium     Diagnosis updated by automated process. Provider to review and confirm.       Tobacco abuse 07/02/2013     Priority: Medium     CARDIOVASCULAR SCREENING; LDL GOAL LESS THAN 160 10/31/2010     Priority: Medium       Assessment:  Sherie Otero is a 49 year old  female with a history of depression and anxiety dating back to childhood, symptoms have persisted since that time.  She has had two psychiatric hospitalizations and several medication trials since that time.  She is currently prescribed Cymbalta 40mg daily and 60mg at HS for depression and anxiety as well as chronic pain. She has been treated with benzodiazepines for several years. Taper of these has proved difficult.  Klonopin was decreased to 0.25mg BID in July but patient exhibited increased anxiety thus this was increased back to 0.5mg BID. She has had trials of gabapentin without benefits.  She has also taken Seroquel in the past with possible side effects.  She has also had trials of multiple other medications including Buspar, Effexor, Lexapro, Paxil, Prozac, Wellbutrin, Zoloft, Ativan, Valium, Depakote, and Xanax. We started Zyprexa for sleep/anxiety/mood given poor response to multiple other medication trials.  In addition, there was hope this may allow for further taper of benzodiazepine as well.   She reported improved mood with dose of 2.5mg daily and 5mg at HS, however had reported some possible side effects thus this was decreased.  Unfortunately her depressive symptoms increased when this dose was stopped.  She continues to experience ongoing anxiety as well as now increased depressive symptoms although has had an increase in stress  related to her daughters wedding.  I am hopeful we can utilize more frequent dosing of Zyprexa in an effort to stabilize her anxiety and continue to taper benzodiazepines, but none-the-less it would be beneficial for her mood.  Will monitor for increased restlessness/sedation.  She was seeing Lynnette Cherideidre for therapy however, she recently resigned.  I am going to put in a new referral to get her going in therapy again.  Consider a trial of Trintellix, clonidine, or trazodone if this is not effective.     Medication side effects and alternatives were reviewed. Health promotion activities recommended and reviewed today. All questions addressed. Education and counseling completed regarding risks and benefits of medications and psychotherapy options.    Treatment Plan:    Continue Cymbalta 40mg daily and 60mg at bedtime.    Continue Klonopin 0.5mg twice daily as needed for anxiety.    Increase Olanzapine to 2.5mg twice daily for anxiety and mood and 2.5-5mg at bedtime.    Continue all other medications as reviewed per electronic medical record today.     Safety plan reviewed. To the Emergency Department as needed or call after hours crisis line at [phone] or [phone]. [place] Crisis Text [place]. Text [place] to 754660 or Suicide LifeLine Chat: suicidepreventionlifeline.org/chat/    I am going to enter a new referral for individual therapy since Lynnette left [place].  [facility] will be calling you, if you do not hear from them please contact them at [phone].     Schedule an appointment with me in 4 weeks or sooner as needed. Call [facility] at [phone] to schedule.    Follow up with primary care provider as planned or for acute medical concerns.    Call the psychiatric nurse line with medication questions or concerns at [phone].    [employer] may be used to communicate with your provider, but this is not intended to be used for emergencies.    Crisis Resources:     National Suicide Prevention Lifeline: 700.676.8684 (TTY: 286.793.1124). Call anytime for help.  (www.suicidepreventionlifeline.org)  National [place] on Mental Illness (www.dolly.org): 331.703.4751 or 081-549-3787.   Mental Health Association (www.mentalhealth.org): 151.437.3740 or 066-907-8405.  [place] Crisis Text [place]: Text [place] to 802766  Suicide LifeLine Chat: suicidepreGo-Page Digital Media.org/chat    Administrative Billing:   Time spent with patient was 30 minutes and greater than 50% of time or 20 minutes was spent in counseling and coordination of care regarding above diagnoses and treatment plan.    Patient Status:  Patient will continue to be seen for ongoing consultation and stabilization.    Signed:   Amanda Blanchard MSN, APRN, CNP   Psychiatry

## 2019-12-18 NOTE — PATIENT INSTRUCTIONS
Treatment Plan:    Continue Cymbalta 40mg daily and 60mg at bedtime.    Continue Klonopin 0.5mg twice daily as needed for anxiety.    Increase Olanzapine to 2.5mg twice daily for anxiety and mood and 2.5-5mg at bedtime.    Continue all other medications as reviewed per electronic medical record today.     Safety plan reviewed. To the Emergency Department as needed or call after hours crisis line at [phone] or [phone]. [place] Crisis Text [place]. Text [place] to 972742 or Suicide LifeLine Chat: [employer].org/chat/    I am going to enter a new referral for individual therapy since Lynnette left [place].  [facility] will be calling you, if you do not hear from them please contact them at [phone].     Schedule an appointment with me in 4 weeks or sooner as needed. Call [facility] at [phone] to schedule.    Follow up with primary care provider as planned or for acute medical concerns.    Call the psychiatric nurse line with medication questions or concerns at [phone].    [employer] may be used to communicate with your provider, but this is not intended to be used for emergencies.    Crisis Resources:    National Suicide Prevention Lifeline: 705.590.8204 (TTY: 875.228.6260). Call anytime for help.  (www.suicidepreventionlifeline.org)  National [place] on Mental Illness (www.dolly.org): 660.158.4601 or 753-333-3356.   Mental Health Association (www.mentalhealth.org): 324.664.7841 or 658-834-8845.  [place] Crisis Text [place]: Text [place] to 132858  Suicide LifeLine Chat: suicideFanergies.org/chat

## 2019-12-18 NOTE — TELEPHONE ENCOUNTER
Plan from 12/2/19 OV is pasted below. Routing to provider to review.  ------------------------------------------------  Plan:  Diagnosis reviewed, treatment option addressed, and risk/benifits discussed.  Self-care instructions given.  I am recommending a multidisciplinary treatment plan to help this patient better manage pain.       1. Physical Therapy: continue previous PT exercises;   2. Clinical Health Psychologist:  YES, has a plan in place  3. Diagnostic Studies: none at this time  4. Medication Management:                1. Continue Cymbalta 40mg in AM and 60mg at bedtime.                2.  Flexeril 10mg, 1 tab TID PRN              3.  We are going to change her hydrocodone to 7.5/325 with instructions to take 1 tablet every 4-6 hours as needed for severe pain.  This is changing her from taking 2 tablets every 8 hours.              4.  Plan to switch to Low dose naltrxone once we are able to get her off the Norco.  5. Further procedures recommended: none at this time     Follow up with this provider: 4 weeks   ------------------------------------------------------------  ANUP Noland, RN  Care Coordinator  [facility]

## 2019-12-18 NOTE — TELEPHONE ENCOUNTER
Central Prior Authorization Team  Phone: [phone]    PA Initiation    Medication: olanzapine 5 mg  Insurance Company: [employer] - Phone [phone] Fax [phone]  Pharmacy Filling the Rx: [address]   Filling Pharmacy Phone: [phone]  Filling Pharmacy Fax:    Start Date: 12/18/2019

## 2019-12-19 ASSESSMENT — PATIENT HEALTH QUESTIONNAIRE - PHQ9: SUM OF ALL RESPONSES TO PHQ QUESTIONS 1-9: 19

## 2019-12-19 ASSESSMENT — ANXIETY QUESTIONNAIRES: GAD7 TOTAL SCORE: 17

## 2019-12-19 NOTE — TELEPHONE ENCOUNTER
Prior Authorization Approval    Authorization Effective Date: 1/1/2019  Authorization Expiration Date: 12/31/2019  Medication: olanzapine 5 mg- APPROVED   Approved Dose/Quantity:   Reference #:     Insurance Company: [employer] - Phone [phone] Fax [phone]  Expected CoPay:       CoPay Card Available:      Foundation Assistance Needed:    Which Pharmacy is filling the prescription (Not needed for infusion/clinic administered): [facility] [address]   Pharmacy Notified: Yes  Patient Notified: Comment:  **Instructed pharmacy to notify patient when script is ready to /ship.**

## 2019-12-27 NOTE — PROGRESS NOTES
[facility]    CHIEF COMPLAINT: Pain  -Fibromyalgia  -Neck pain  -Low back pain    INTERVAL HISTORY:  Last seen on 12/2/19.        Recommendations/plan at the last visit included:  1. Physical Therapy: continue previous PT exercises;   2. Clinical Health Psychologist:  YES, has a plan in place  3. Diagnostic Studies: none at this time  4. Medication Management:     1. Continue Cymbalta 40mg in AM and 60mg at bedtime.     2.  Flexeril 10mg, 1 tab TID PRN   3.  We are going to change her hydrocodone to 7.5/325 with instructions to take 1 tablet every 4-6 hours as needed for severe pain.  This is changing her from taking 2 tablets every 8 hours.   4.  Plan to switch to Low dose naltrxone once we are able to get her off the Norco.  5. Further procedures recommended: none at this time    Follow up with this provider: 4 weeks    Since her last visit, Sherie Otero reports:    She states that she overtook her pain medications. She took her last 2 pain medications today. She states that she tried Aleve and that did not seem to help. She feels worse with this weather. She reports gaining weight as well and feels that this is making her feel worse as well. Her Olanzapine was increased. She states that she is unable to sleep. She states that she hurts all over. Her arthritis pain is rotating, but she is having increased Fibromyalgia pain as well. She states that she had an episode of dizziness as well.       Pain Information:   Pain quality: Aching, burning, shooting, throbbing, stabbing, miserable, numb, tiring, exhausting, penetrating, gnawing, nagging, and unbearable    Pain rating: intensity ranges from 9/10 to 10/10, and averages 8/10 on a 0-10 scale.   Pain today 10/10    UDS 1/19/2019   CSA 2/04/2019    CURRENT RELEVANT PAIN MEDICATIONS:  Clonazepam 0.5mg: taking 1 tab twice daily    Flexeril-1 in AM and 2 at HS  Cymbalta-40mg in AM and 60mg at night  Hydrocodone 7.5mg -currently taking 2  tablets three times a day   Ibuprofen-will take 800mg up to 3 times day, doesn't take daily    Patient is using the medication as prescribed:  YES  Is your medication helpful? somewhat   Medication side effects? no side effect    Previous Medications: (H--helped; HI--Helped initially; SWH-- somewhat helpful, NH--No help; W--worse; SE--side effects)   Opiates: Hydrocodone H, Oxycodone SE  NSAIDS: Ibuprofen H, Relafen SE stomach upset, Meloxicam NH  Muscle Relaxants: Tizanidine NH, Flexeril H, Robaxin NH SE stomach upset  Anti-migraine mediations: Verapamil H  Anti-depressants: Cymbalta H  Sleep aids: none  Anxiolytics: Xanax H, Clonazepam H, Valium H  Neuropathics: Gabapentin SE dizziness          Topicals: Lidocaine patches SW  Other medications not covered above: Savella H at first, then seemed to stop working, Lyrica SE shortness of breath, felt 'weird' on them, throat felt weird. Prednisone H for arthritis flare    Past Pain Treatments:  Pain Clinic:   No   PT: Yes, years ago. Has not done pool therapy.   Psychologist: No  Relaxation techniques/biofeedback: No  Chiropractor: No, was told not to because of neck.   Acupuncture: Yes for headaches.   Pharmacotherapy:           Opioids: Yes            Non-opioids:    Yes   TENs Unit:Yes  Injections: cervical medial branch blocks 6/12/2014  Self-care:   Yes, ice/heat, hot baths, theracare  Surgeries related to pain: No    [place] Board of Pharmacy Data Base Reviewed:    YES; As expected, no concern for misuse/abuse of controlled medications based on this report.      THE 4 As OF OPIOID MAINTENANCE ANALGESIA    Analgesia: Is pain relief clinically significant? YES   Activity: Is patient functional and able to perform Activities of Daily Living? YES   Adverse effects: Is patient free from adverse side effects from opiates? YES   Adherence to Rx protocol: Is patient adhering to Controlled Substance Agreement and taking medications ONLY as ordered? No      Is Narcan  prescribed for opiate use >50 MME daily? yes      Total Daily MME: 37.5    Medications:  Current Outpatient Medications   Medication Sig Dispense Refill     albuterol (VENTOLIN HFA) 108 (90 Base) MCG/ACT inhaler Inhale 2 puffs into the lungs every 6 hours as needed for shortness of breath / dyspnea or wheezing 18 g 1     cetirizine (ZYRTEC) 10 MG tablet TAKE  ONE TABLET BY MOUTH EVERY DAY. 90 tablet 3     clonazePAM (KLONOPIN) 0.5 MG tablet Take 1 tablet (0.5 mg) by mouth 2 times daily as needed for anxiety Must last 30 days 60 tablet 0     cyclobenzaprine (FLEXERIL) 10 MG tablet Take 2 tablets in the evening and 1 in the morning 90 tablet 2     CYMBALTA 60 MG capsule TAKE ONE CAPSULE BY MOUTH EVERY EVENING 90 capsule 0     DULoxetine (CYMBALTA) 20 MG capsule Take 2 capsules (40 mg) by mouth daily CYMBALTA-KADY 60 capsule 0     fluticasone (FLONASE) 50 MCG/ACT nasal spray SPRAY 2 SPRAYS INTO BOTH NOSTRILS DAILY. 16 g 11     HYDROcodone-acetaminophen (NORCO) 7.5-325 MG per tablet Take 1 tablet every 4-6 hours as needed for severe pain. Max of 5 tablets per day. Fill/start 12/30/2019. 150 tablet 0     naloxone (NARCAN) nasal spray Spray 1 spray (4 mg) into one nostril alternating nostrils as needed for opioid reversal every 2-3 minutes until assistance arrives 0.2 mL 0     OLANZapine (ZYPREXA) 5 MG tablet Take 0.5 tablets (2.5 mg) by mouth 2 times daily And 2.5-5mg at bedtime for anxiety/sleep, try taking this before using Klonopin 60 tablet 0     verapamil (CALAN) 40 MG tablet Take 1 tablet (40 mg) by mouth 2 times daily 180 tablet 3       Review of Systems: A 10-point review of systems was negative, with the exception of chronic pain issues, malaise, fatigue, weight gain, headache, dizziness, vision changes, sinus infection, ringing in the ears, allergies, easy bruising, cough, swelling in feet, abdominal pain, urinary frequency and urgency, weakness, depression, anxiety, stress, and mood swings      Social History:  "Reviewed; unchanged from previous consultation.      Family history: Reviewed; unchanged from previous consultation.     PHYSICAL EXAM:     Vitals:  /82   Temp 97.7  F (36.5  C) (Temporal)   Ht 1.651 m (5' 5\")   Wt 68.9 kg (152 lb)   BMI 25.29 kg/m          Constitutional: healthy, alert and no distress  HEENT: Head atraumatic, normocephalic. Eyes without conjunctival injection or jaundice. Neck supple. No obvious neck masses.  Psychiatric/mental status: Alert, without lethargy or stupor. Appropriate affect. Mood normal.   Neurologic exam: Gait is normal    DIAGNOSTIC TESTS:  Imaging Studies:   No new imaging to review    Assessment:  Sherie Otero is a 48 year old female who presents today for follow up regarding her:    1. Fibromyalgia  2. Chronic low back pain  3. Cervicalgia  4. Chronic pain syndrome  5. Chronic use of opioids    We again discussed the patient's Norco use.  Patient is out of her Norco early by a couple of days.  Unfortunately this is not the first time that this is happened.  I did discuss with the patient that I typically would not do any further refills as she has now ran out early a few times.  She verbalized her understanding of this.  I discussed with the patient the importance of decreasing the utilization of the [place] for her pain control.  We talked about how narcotic pain medications can decrease pain tolerance and increase sensitivity.  She did really well on Savella in the past.  She states she took this about 10 years ago.  She is unsure if the Cymbalta is helping.  Unfortunately we cannot use Savella and Cymbalta at the same time.  At this time what we will do is we will start trying to taper her off of the Cymbalta.  Once we get her off of the Cymbalta we will start her again on Savella and hopefully this will help us get her off of the Norco.  I did discuss with the patient that if she does need to overutilize her medications again I will not refill them any further.  " She verbalized her understanding of this.  If Savella becomes ineffective again for her we could consider low-dose naltrexone.      Plan:  Diagnosis reviewed, treatment option addressed, and risk/benifits discussed.  Self-care instructions given.  I am recommending a multidisciplinary treatment plan to help this patient better manage pain.      1. Physical Therapy: continue previous PT exercises;   2. Clinical Health Psychologist:  YES, has a plan in place  3. Diagnostic Studies: none at this time  4. Medication Management:     1.  Taper Cymbalta to 20 mg in the morning and 60 mg at bedtime for 1 to 2 weeks.  Then decrease to 60 mg at bedtime   2.  Flexeril 10mg, 1 tab TID PRN   3.  Continue hydrocodone to 7.5/325 with instructions to take 1 tablet every 4-6 hours as needed for severe pain. Max of 5 tablets/day   4.  Plan to start Savella after off Cymbalta  5. Further procedures recommended: none at this time    Follow up with this provider: 4 weeks     Total time spent face to face was 20 minutes and more than 50% of face to face time was spent in counseling and/or coordination of care regarding the diagnosis and recommendations above.       Celia Bettencourt PA-C   [facility]

## 2019-12-30 ENCOUNTER — OFFICE VISIT (OUTPATIENT)
Dept: PALLIATIVE MEDICINE | Facility: CLINIC | Age: 49
End: 2019-12-30
Payer: MEDICARE

## 2019-12-30 VITALS
BODY MASS INDEX: 25.33 KG/M2 | SYSTOLIC BLOOD PRESSURE: 120 MMHG | WEIGHT: 152 LBS | DIASTOLIC BLOOD PRESSURE: 82 MMHG | TEMPERATURE: 97.7 F | HEIGHT: 65 IN

## 2019-12-30 DIAGNOSIS — G89.4 CHRONIC PAIN SYNDROME: ICD-10-CM

## 2019-12-30 DIAGNOSIS — M54.50 CHRONIC BILATERAL LOW BACK PAIN WITHOUT SCIATICA: ICD-10-CM

## 2019-12-30 DIAGNOSIS — M79.7 FIBROMYALGIA: Primary | ICD-10-CM

## 2019-12-30 DIAGNOSIS — F11.90 CHRONIC, CONTINUOUS USE OF OPIOIDS: ICD-10-CM

## 2019-12-30 DIAGNOSIS — M54.2 CERVICALGIA: ICD-10-CM

## 2019-12-30 DIAGNOSIS — G89.29 CHRONIC BILATERAL LOW BACK PAIN WITHOUT SCIATICA: ICD-10-CM

## 2019-12-30 PROCEDURE — 99214 OFFICE O/P EST MOD 30 MIN: CPT | Performed by: PHYSICIAN ASSISTANT

## 2019-12-30 RX ORDER — HYDROCODONE BITARTRATE AND ACETAMINOPHEN 7.5; 325 MG/1; MG/1
TABLET ORAL
Qty: 150 TABLET | Refills: 0 | Status: SHIPPED | OUTPATIENT
Start: 2019-12-30 | End: 2020-01-27

## 2019-12-30 ASSESSMENT — MIFFLIN-ST. JEOR: SCORE: 1305.35

## 2019-12-30 ASSESSMENT — PAIN SCALES - GENERAL: PAINLEVEL: WORST PAIN (10)

## 2019-12-30 NOTE — PATIENT INSTRUCTIONS
After Visit Instructions:     Thank you for coming to [facility] for your care. It is my goal to partner with you to help you reach your optimal state of health.     I am recommending multidisciplinary care at this time.  The focus of care will be to continue gradual rehabilitation and pain management with medication adjustments as needed.    Continue daily self-care, identifying contributing factors, and monitoring variations in pain level. Continue to integrate self-care into your life.        Schedule follow-up with Celia Bettencourt PA-C in 4 weeks. You will need to make this appointment.     Medication recommendations:     Cymbalta: Taper to 20mg in the Morning and 60mg at bedtime for 1-2 weeks. Then taper to 60mg at bedtime. After we get you off of Cymbalta, the plan will be to retrial Savella in hopes that this can assist in getting you off of Hydrocodone.     In the future, we can try Low Dose Naltrexone if Savella stops being beneficial       Celia Bettencourt PA-C  [facility]  [place]    Contact information: [facility] [place]  Clinic Number:  [phone]     Call with any questions about your care and for scheduling assistance.     Calls are returned Monday through Friday between 8 AM and 4:30 PM. We usually get back to you within 2 business days depending on the issue/request.    If we are prescribing your medications:    For opioid medication refills, call the clinic or send a [email or web address] message 7 days in advance.  Please include:    Name of requested medication    Name of the pharmacy.    For non-opioid medications, call your pharmacy directly to request a refill. Please allow 3-4 days to be processed.     Per [place] State Law:    All controlled substance prescriptions must be filled within 30 days of being written.      For those controlled substances allowing refills, pickup must occur within 30 days of last fill.      We believe regular  attendance is key to your success in our program!      Any time you are unable to keep your appointment we ask that you call us at least 24 hours in advance to cancel.This will allow us to offer the appointment time to another patient.   Multiple missed appointments may lead to dismissal from the clinic.

## 2020-01-14 ENCOUNTER — MYC MEDICAL ADVICE (OUTPATIENT)
Dept: PALLIATIVE MEDICINE | Facility: CLINIC | Age: 50
End: 2020-01-14

## 2020-01-14 DIAGNOSIS — M79.7 FIBROMYALGIA: Primary | ICD-10-CM

## 2020-01-15 NOTE — TELEPHONE ENCOUNTER
Routing to provider to review. Patient has a follow up scheduled for 1/27/20.    SAMARA NolandN, RN  Care Coordinator  [facility] [place]

## 2020-01-20 RX ORDER — GABAPENTIN 100 MG/1
CAPSULE ORAL
Qty: 90 CAPSULE | Refills: 0 | Status: SHIPPED | OUTPATIENT
Start: 2020-01-20 | End: 2020-02-24

## 2020-01-22 NOTE — PROGRESS NOTES
"[facility]    CHIEF COMPLAINT: Pain  -Fibromyalgia  -Neck pain  -Low back pain    INTERVAL HISTORY:  Last seen on 12/30/19.        Recommendations/plan at the last visit included:  1. Physical Therapy: continue previous PT exercises;   2. Clinical Health Psychologist:  YES, has a plan in place  3. Diagnostic Studies: none at this time  4. Medication Management:     1.  Taper Cymbalta to 20 mg in the morning and 60 mg at bedtime for 1 to 2 weeks.  Then decrease to 60 mg at bedtime   2.  Flexeril 10mg, 1 tab TID PRN   3.  Continue hydrocodone to 7.5/325 with instructions to take 1 tablet every 4-6 hours as needed for severe pain. Max of 5 tablets/day   4.  Plan to start Savella after off Cymbalta  5. Further procedures recommended: none at this time    Follow up with this provider: 4 weeks     Since her last visit, Sherie PAMELA BoothShanna reports:    She states that she started the Gabapentin but states that it makes her feel \"weird\". She states that she did not take it last night and does not have that feeling. She is wondering if she can try Toradol. She states that she tolerated this without stomach upset. She states that she is on a Prednisone taper. She states that she fell out of a truck that she borrowed. She is frustrated by her weight gain. She states that with the Cymbalta she is down to 20mg in the AM. She has been on this dose for 4 days. She reports recently starting CBD oil.     Pain Information:   Pain quality: Aching, burning, shooting, throbbing, stabbing, miserable, numb, tiring, exhausting, penetrating, gnawing, nagging, and unbearable    Pain rating: intensity ranges from 9/10 to 10/10, and averages 9/10 on a 0-10 scale.   Pain today 10/10    UDS 1/19/2019   CSA 2/04/2019    CURRENT RELEVANT PAIN MEDICATIONS:  Clonazepam 0.5mg: taking 1 tab twice daily    Flexeril-1 in AM and 2 at HS  Cymbalta-20mg in AM   Hydrocodone 7.5mg -currently taking 2 tablets three times a day "   Ibuprofen-will take 800mg up to 3 times day, doesn't take daily    Patient is using the medication as prescribed:  YES  Is your medication helpful? somewhat   Medication side effects? no side effect    Previous Medications: (H--helped; HI--Helped initially; S[facility]-- somewhat helpful, NH--No help; W--worse; SE--side effects)   Opiates: Hydrocodone H, Oxycodone SE  NSAIDS: Ibuprofen H, Relafen SE stomach upset, Meloxicam NH  Muscle Relaxants: Tizanidine NH, Flexeril H, Robaxin NH SE stomach upset  Anti-migraine mediations: Verapamil H  Anti-depressants: Cymbalta H  Sleep aids: none  Anxiolytics: Xanax H, Clonazepam H, Valium H  Neuropathics: Gabapentin SE dizziness          Topicals: Lidocaine patches SW  Other medications not covered above: Savella H at first, then seemed to stop working, Lyrica SE shortness of breath, felt 'weird' on them, throat felt weird. Prednisone H for arthritis flare    Past Pain Treatments:  Pain Clinic:   No   PT: Yes, years ago. Has not done pool therapy.   Psychologist: No  Relaxation techniques/biofeedback: No  Chiropractor: No, was told not to because of neck.   Acupuncture: Yes for headaches.   Pharmacotherapy:           Opioids: Yes            Non-opioids:    Yes   TENs Unit:Yes  Injections: cervical medial branch blocks 6/12/2014  Self-care:   Yes, ice/heat, hot baths, theracare  Surgeries related to pain: No    [place] Board of Pharmacy Data Base Reviewed:    YES; As expected, no concern for misuse/abuse of controlled medications based on this report.      THE 4 As OF OPIOID MAINTENANCE ANALGESIA    Analgesia: Is pain relief clinically significant? YES   Activity: Is patient functional and able to perform Activities of Daily Living? YES   Adverse effects: Is patient free from adverse side effects from opiates? YES   Adherence to Rx protocol: Is patient adhering to Controlled Substance Agreement and taking medications ONLY as ordered? No      Is Narcan prescribed for opiate use >50  MME daily? yes      Total Daily MME: 37.5    Medications:  Current Outpatient Medications   Medication Sig Dispense Refill     albuterol (VENTOLIN HFA) 108 (90 Base) MCG/ACT inhaler Inhale 2 puffs into the lungs every 6 hours as needed for shortness of breath / dyspnea or wheezing 18 g 1     cetirizine (ZYRTEC) 10 MG tablet TAKE ONE TABLET BY MOUTH ONCE DAILY 90 tablet 2     clonazePAM (KLONOPIN) 0.5 MG tablet Take 1 tablet (0.5 mg) by mouth 2 times daily as needed for anxiety Must last 30 days 60 tablet 0     [START ON 2/24/2020] clonazePAM (KLONOPIN) 0.5 MG tablet Take 1 tablet (0.5 mg) by mouth 2 times daily as needed for anxiety 6 tablet 0     cyclobenzaprine (FLEXERIL) 10 MG tablet Take 2 tablets in the evening and 1 in the morning 90 tablet 2     DULoxetine (CYMBALTA) 20 MG capsule Take 2 capsules (40 mg) by mouth daily CYMBALTA-KADY 60 capsule 0     fluticasone (FLONASE) 50 MCG/ACT nasal spray SPRAY 2 SPRAYS INTO BOTH NOSTRILS DAILY. 16 g 11     gabapentin (NEURONTIN) 100 MG capsule Take 1 capsule at bedtime for 1 week, then take 1 capsule twice daily for 1 week, then take 1 capsule three times a day 90 capsule 0     HYDROcodone-acetaminophen (NORCO) 7.5-325 MG per tablet Take 1 tablet every 4-6 hours as needed for severe pain. Max of 5 tablets per day. Fill/start 12/30/2019. 150 tablet 0     naloxone (NARCAN) nasal spray Spray 1 spray (4 mg) into one nostril alternating nostrils as needed for opioid reversal every 2-3 minutes until assistance arrives 0.2 mL 0     OLANZapine (ZYPREXA) 5 MG tablet Take 0.5 tablets (2.5 mg) by mouth every morning And 1 tab(5mg) at 2pm and 1 tab(5mg) at bedtime. 75 tablet 1     verapamil (CALAN) 40 MG tablet Take 1 tablet (40 mg) by mouth 2 times daily 180 tablet 3     CYMBALTA 60 MG capsule TAKE ONE CAPSULE BY MOUTH EVERY EVENING (Patient not taking: Reported on 1/24/2020) 90 capsule 0       Review of Systems: A 10-point review of systems was negative, with the exception of  "chronic pain issues, fever/chills, fatigue, weight gain, headache, dizziness, vision changes, sinus infection, earache, allergies, immune deficiency, fainting, palpitations, abdominal pain, nausea, diarrhea, constipation, steroid use, osteoporosis, urinary frequency and urgency, weakness, numbness/tingling, tremor, depression, anxiety, stress, and mood swings      Social History: Reviewed; unchanged from previous consultation.      Family history: Reviewed; unchanged from previous consultation.     PHYSICAL EXAM:     Vitals:  /80   Temp 97.4  F (36.3  C) (Temporal)   Ht 1.651 m (5' 5\")   Wt 71.2 kg (157 lb)   BMI 26.13 kg/m        Constitutional: healthy, alert and no distress  HEENT: Head atraumatic, normocephalic. Eyes without conjunctival injection or jaundice. Neck supple. No obvious neck masses.  Psychiatric/mental status: Alert, without lethargy or stupor. Appropriate affect. Mood normal.   Neurologic exam: Gait is normal    DIAGNOSTIC TESTS:  Imaging Studies:   No new imaging to review    Assessment:  Sherie Otero is a 48 year old female who presents today for follow up regarding her:    1. Fibromyalgia  2. Chronic low back pain  3. Cervicalgia  4. Chronic pain syndrome  5. Chronic use of opioids    The patient significantly came down on her Cymbalta as she is very interested in getting back on Savella.  We did talk about the potential risk of the combination of Savella and the olanzapine.  I will reach out to my colleagues and discuss this further with them on their opinion on the combination.  I would like her to continue on the Cymbalta for another 4 days at 20 mg daily.  If she continues to feel the \"zaps\" and that she has with coming off of the Cymbalta I recommend she go to every other day 20 mg for another week.  She is also going to do some research on serotonin syndrome for the risk of Savella and olanzapine together.  Discussed with the patient that if Savella is not an option we really " should try to get her off of the Norco quicker and try some low-dose naltrexone.  Patient states that she has tolerated Toradol well and this has been significantly helpful for her in the past.  I did agree to for 10 tablets to last 30 days.  Side effects discussed.    Plan:  Diagnosis reviewed, treatment option addressed, and risk/benifits discussed.  Self-care instructions given.  I am recommending a multidisciplinary treatment plan to help this patient better manage pain.      1. Physical Therapy: continue previous PT exercises;   2. Clinical Health Psychologist:  YES, has a plan in place  3. Diagnostic Studies: none at this time  4. Medication Management:     1.  Continue Cymbalta 20 mg in the morning for another 4 days.  Then depending on how she is doing with coming off of it she could go to 20 mg every other day for another week   2.  Flexeril 10mg, 1 tab TID PRN   3.  Continue hydrocodone to 7.5/325 with instructions to take 1 tablet every 4-6 hours as needed for severe pain. Max of 5 tablets/day   4.  Consider Savella   5.  Consider low-dose naltrexone   6.  Continue gabapentin 100 mg at bedtime for another week.  If after another week she continues to have side effects in the morning and recommend that she stop it.  If that sensation goes away she can try increasing it to twice a day.  5. Further procedures recommended: none at this time  6.  Urine drug screen and opiate agreement updated today    Follow up with this provider: 4 weeks     Total time spent face to face was 20 minutes and more than 50% of face to face time was spent in counseling and/or coordination of care regarding the diagnosis and recommendations above.       Celia Bettencourt PA-C   [facility]

## 2020-01-23 NOTE — PROGRESS NOTES
"    Outpatient Psychiatric Progress Note    Name: Sherie Otero   : 1970                    Primary Care Provider: Twin Castro MD   Therapist: None currently     Answers for HPI/ROS submitted by the patient on 2020   If you checked off any problems, how difficult have these problems made it for you to do your work, take care of things at home, or get along with other people?: Extremely difficult  PHQ9 TOTAL SCORE: 10  CELIA 7 TOTAL SCORE: 19    PHQ-9 scores:  PHQ-9 SCORE 2019   PHQ-9 Total Score - - -   PHQ-9 Total Score MyChart 7 (Mild depression) 19 (Moderately severe depression) 10 (Moderate depression)   PHQ-9 Total Score 7 19 10       CELIA-7 scores:  CELIA-7 SCORE 2019   Total Score 12 (moderate anxiety) 17 (severe anxiety) 19 (severe anxiety)   Total Score 12 17 19       Patient Identification:  Patient is a 49 year old year old,   White American female  who presents for return visit with me.  Patient is currently disabled. Patient attended the session alone. Patient prefers to be called: \"Sherie\".    Interim History:  I last saw Sherie Otero for outpatient psychiatry Return Visit on 19.     During that appointment, patient reported mood symptoms had improved some after restarting olanzapine 2.5mg scheduled daily dose.  However, she felt her anxiety has been \"all over the place.\"  Most anxiety is reported to be mid day.  She felt her restlessness was due to pain medications rather than olanzapine. Olanzapine was increase to 2.5mg twice daily for anxiety and mood and 2.5-5mg at bedtime.    Current medications include: albuterol (VENTOLIN HFA) 108 (90 Base) MCG/ACT inhaler, Inhale 2 puffs into the lungs every 6 hours as needed for shortness of breath / dyspnea or wheezing  cetirizine (ZYRTEC) 10 MG tablet, TAKE  ONE TABLET BY MOUTH EVERY DAY.  clonazePAM (KLONOPIN) 0.5 MG tablet, Take 1 tablet (0.5 mg) by mouth 2 times daily as " "needed for anxiety Must last 30 days  cyclobenzaprine (FLEXERIL) 10 MG tablet, Take 2 tablets in the evening and 1 in the morning  DULoxetine (CYMBALTA) 20 MG capsule, Take 2 capsules (40 mg) by mouth daily CYMBALTA-KADY  fluticasone (FLONASE) 50 MCG/ACT nasal spray, SPRAY 2 SPRAYS INTO BOTH NOSTRILS DAILY.  gabapentin (NEURONTIN) 100 MG capsule, Take 1 capsule at bedtime for 1 week, then take 1 capsule twice daily for 1 week, then take 1 capsule three times a day  HYDROcodone-acetaminophen (NORCO) 7.5-325 MG per tablet, Take 1 tablet every 4-6 hours as needed for severe pain. Max of 5 tablets per day. Fill/start 12/30/2019.  naloxone (NARCAN) nasal spray, Spray 1 spray (4 mg) into one nostril alternating nostrils as needed for opioid reversal every 2-3 minutes until assistance arrives  OLANZapine (ZYPREXA) 5 MG tablet, Take 0.5 tablets (2.5 mg) by mouth 2 times daily And 2.5-5mg at bedtime for anxiety/sleep, try taking this before using Klonopin  verapamil (CALAN) 40 MG tablet, Take 1 tablet (40 mg) by mouth 2 times daily  CYMBALTA 60 MG capsule, TAKE ONE CAPSULE BY MOUTH EVERY EVENING (Patient not taking: Reported on 1/24/2020)    No current facility-administered medications on file prior to visit.        The [place] Prescription Monitoring Program has been reviewed and there are no concerns about diversionary activity for controlled substances at this time.      I was able to review most recent Primary Care Provider, specialty provider, and therapy visit notes that I have access to.     Today, patient reports overall her mood is \"more even keel, unless there is a catastrophe.\"  She reports she has experienced decreased irritability.  She does have some anxiety which she notes is worse during the middle of the day.  She states restarting daytime olanzapine has been somewhat helpful but would like further increase.  She reports during episodes of \"panic mode\" she is utilizing breathing exercises Neurontin for " "pain, noting \"I don't like how it makes me feel.\" She elaborates by stating it causes her to feel \"spacey.\"  She is also working with pain management to adjust further medications.  States they are currently decreasing her Cymbalta as they are planning to start Savella.  She reports she has taken this is the past with some benefit on mood as well as pain.     She was seeing Lynnette Guaman for individual therapy, it appears she did not continue therapy after Lynnette resigned in November.  State she has not established psychology services yet.       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    Social history updates:  Believes her mom's cancer may be back, she notes her mom's PET scan results are coming.  She has also had some recent stress related to her son being deemed a \"disabled adult child.\"  Her son is currently living with her.    Substance use updates:  Denies  Tobacco use: No    Vital Signs:   /82   Pulse 110   Temp 97.9  F (36.6  C) (Temporal)   Resp 14   Wt 70.1 kg (154 lb 9.6 oz)   SpO2 96%   Breastfeeding No   BMI 25.73 kg/m      Labs:  Orders Only on 12/18/2019   Component Date Value Ref Range Status     TSH 12/18/2019 1.30  0.40 - 4.00 mU/L Final     Last Comprehensive Metabolic Panel:  Sodium   Date Value Ref Range Status   01/12/2016 142 133 - 144 mmol/L Final     Potassium   Date Value Ref Range Status   01/12/2016 4.0 3.4 - 5.3 mmol/L Final     Chloride   Date Value Ref Range Status   01/12/2016 106 94 - 109 mmol/L Final "     Carbon Dioxide   Date Value Ref Range Status   01/12/2016 33 (H) 20 - 32 mmol/L Final     Anion Gap   Date Value Ref Range Status   01/12/2016 3 3 - 14 mmol/L Final     Glucose   Date Value Ref Range Status   01/12/2016 83 70 - 99 mg/dL Final     Urea Nitrogen   Date Value Ref Range Status   01/12/2016 12 7 - 30 mg/dL Final     Creatinine   Date Value Ref Range Status   05/28/2019 0.96 0.52 - 1.04 mg/dL Final     GFR Estimate   Date Value Ref Range Status   05/28/2019 68 >60 mL/min/[1.73_m2] Final     Comment:     Non  GFR Calc  Starting 12/18/2018, serum creatinine based estimated GFR (eGFR) will be   calculated using the Chronic Kidney Disease Epidemiology Collaboration   (CKD-EPI) equation.       Calcium   Date Value Ref Range Status   07/31/2018 9.1 8.5 - 10.1 mg/dL Final     Bilirubin Total   Date Value Ref Range Status   05/28/2019 0.2 0.2 - 1.3 mg/dL Final     Alkaline Phosphatase   Date Value Ref Range Status   05/28/2019 151 (H) 40 - 150 U/L Final     ALT   Date Value Ref Range Status   05/28/2019 33 0 - 50 U/L Final     AST   Date Value Ref Range Status   05/28/2019 20 0 - 45 U/L Final     Lab Results   Component Value Date    CHOL 204 03/06/2009     Lab Results   Component Value Date    HDL 91 03/06/2009     Lab Results   Component Value Date    LDL 63 03/06/2009     Lab Results   Component Value Date    TRIG 247 03/06/2009     Most recent laboratory results reviewed and no new labs.    Review of Systems:  10 systems (general, cardiovascular, respiratory, eyes, ENT, endocrine, GI, , M/S, neurological) were reviewed. Denies chest pain, shortness of breath, dizziness. The remaining systems are all unremarkable.    Mental Status Examination:  Appearance:  awake, alert, adequately groomed and appeared stated age  Attitude:  cooperative   Eye Contact:  good  Gait and Station: Normal  Psychomotor Behavior:  no evidence of tardive dyskinesia, dystonia, or tics and intact station, gait and  muscle tone  Oriented to:  time, person, and place  Attention Span and Concentration:  Normal  Speech:   clear, coherent, regular rate and regular rhythm  Mood:  depressed, anxious  Affect:  mood congruent, tearful  Associations:  no loose associations  Thought Process:  logical, linear and goal oriented  Thought Content:  no evidence of suicidal ideation or homicidal ideation and no evidence of psychotic thought  Recent and Remote Memory:  intact Not formally assessed. No amnesia.  Fund of Knowledge: appropriate  Insight:  good  Judgment:  intact  Impulse Control:  intact    Suicide Risk Assessment:  Today Sherie Otero denies suicidal thoughts or urges to harm self. Based on all available evidence, Sherie Otero does not appear to be at imminent risk for self-harm, does not meet criteria for a 72-hr hold, and therefore remains appropriate for ongoing outpatient level of care.  A thorough assessment of risk factors related to suicide and self-harm have been reviewed and are noted above. The patient convincingly denies acute suicidality on several occasions. Local community safety resources reviewed and printed for patient to use if needed. There was no deceit detected, and the patient presented in a manner that was believable.     DSM5 Diagnosis:  296.31 (F33.0) Major Depressive Disorder, Recurrent Episode, Mild _  300.02 (F41.1) Generalized Anxiety Disorder  309.81 (F43.10) Posttraumatic Stress Disorder (includes Posttraumatic Stress Disorder for Children 6 Years and Younger)  Without dissociative symptoms    Medical comorbidities include:   Patient Active Problem List    Diagnosis Date Noted     Balance problems 07/25/2019     Priority: Medium     Chronic, continuous use of opioids 11/03/2018     Priority: Medium     Chronically on benzodiazepine therapy 11/03/2018     Priority: Medium     Cervical cancer screening      Priority: Medium     2011 ablation  2015 NIL pap. Plan: pap in 3 years.  8/1/18 NIL pap,  neg HR HPV. cotest in 5 years.       Pelvic pain in female 08/01/2018     Priority: Medium     Chronic rhinitis 05/14/2018     Priority: Medium     Other migraine without status migrainosus, intractable 03/21/2017     Priority: Medium     Pulmonary nodules 01/19/2017     Priority: Medium     Abnormal CT of the chest 01/19/2017     Priority: Medium     Hilar lymphadenopathy 01/19/2017     Priority: Medium     Degenerative joint disease of cervical spine      Priority: Medium     multi level worst at C5-6       Osteoarthritis of cervical spine, unspecified spinal osteoarthritis complication status 03/21/2016     Priority: Medium     Panic attacks 03/01/2016     Priority: Medium     Elevated white blood cell count 01/14/2016     Priority: Medium     Rheumatoid arthritis involving multiple sites with positive rheumatoid factor (H) 01/12/2016     Priority: Medium     Intermittent asthma, uncomplicated 11/29/2015     Priority: Medium     Other chronic pain 11/18/2015     Priority: Medium     Major depressive disorder, recurrent episode, mild (H) 10/08/2015     Priority: Medium     NSAID induced gastritis 09/23/2015     Priority: Medium     Stenosis, cervical spine      Priority: Medium     C5-C7 (MRI)       Spondylitis, cervical (H)      Priority: Medium     C5-C7 (MRI)       Cervicalgia 01/09/2014     Priority: Medium     Disturbance in sleep behavior 11/05/2013     Priority: Medium     Problem list name updated by automated process. Provider to review       SHANTANU (obstructive sleep apnea) 10/25/2013     Priority: Medium     Chronic fatigue syndrome 07/02/2013     Priority: Medium              Fibromyalgia 07/02/2013     Priority: Medium     Generalized anxiety disorder 07/02/2013     Priority: Medium     Diagnosis updated by automated process. Provider to review and confirm.       Tobacco abuse 07/02/2013     Priority: Medium     CARDIOVASCULAR SCREENING; LDL GOAL LESS THAN 160 10/31/2010     Priority: Medium        Assessment:  Sherie Otero is a 49 year old  female with a history of depression and anxiety dating back to childhood, symptoms have persisted since that time.  She has had two psychiatric hospitalizations and several medication trials since that time.   She has been treated with benzodiazepines for several years. Taper of these has proved difficult.  Klonopin was decreased to 0.25mg BID in July but patient exhibited increased anxiety thus this was increased back to 0.5mg BID. She has had trials of gabapentin without benefit for anxiety.  She has also taken Seroquel in the past with possible side effects.  She has also had trials of multiple other medications including Buspar, Effexor, Lexapro, Paxil, Prozac, Wellbutrin, Zoloft, Ativan, Valium, Depakote, and Xanax. We started Zyprexa for sleep/anxiety/mood given poor response to multiple other medication trials.  There was hope this may allow for further taper of benzodiazepine as well.   She reported improved mood with dose of 2.5mg daily and 5mg at HS, however had reported some possible side effects thus this was decreased.  Unfortunately her depressive symptoms increased when this dose was stopped.  Since olanzapine was restarted she appears to be doing better.  However, continues to have some ongoing anxiety.  Her Cymbalta is currently being tapered by pain medicine with plan to start Savella.  We will need to watch her mood and anxiety given taper of Cymbalta.  In the mean time, she is requesting to increase olanzapine further.  I think this is reasonable, discussed risks and benefits at great length.   I also recommend DBT, I will put this referral in today.  She is due for some baseline labs as well.  Patient was informed of my upcoming resignation. Treatment options discussed with patient.  Patient is interested in continuing [facility] for further monitoring and medication adjustments.      Medication side effects and alternatives were reviewed.  Health promotion activities recommended and reviewed today. All questions addressed. Education and counseling completed regarding risks and benefits of medications and psychotherapy options.    Treatment Plan:    Continue Cymbalta as managed by pain management, currently on taper.    Continue Klonopin 0.5mg twice daily as needed for anxiety.    Increase olanzapine to 2.5mg every morning and 5mg at 2pm and 5mg at bedtime.    Please obtain baseline labs that were ordered on your way out today.    Continue all other medications as reviewed per electronic medical record today.     Safety plan reviewed. To the Emergency Department as needed or call after hours crisis line at [phone] or [phone]. [place] Crisis Text Line. Text [place] to 900706 or Suicide LifeLine Chat: [email or web address]/chat/    Please contact [facility] [phone] to schedule an individual therapy.    Recommend DBT as well, I did send a referral.    Patient was informed of my upcoming resignation. Treatment options discussed with patient.  Patient is interested in continuing [facility].  An appointment has been scheduled with Dr. Richardson in [place] on Thursday Feb 27th at 9:15am.  Please contact [facility] at [phone] if you need to make changes to this appointment.  If you do not keep your appointment you will need to contact your PCP for further refills.    Follow up with primary care provider as planned or for acute medical concerns.    Call the psychiatric nurse line with medication questions or concerns at [phone].    Z[employer]m XR[employer] may be used to communicate with your provider, but this is not intended to be used for emergencies.    Crisis Resources:    National Suicide Prevention Lifeline: 160.658.7914 (TTY: 617.203.5946). Call anytime for help.  (www.suicidepreventionlifeline.org)  National [place] on Mental Illness (www.dolly.org): 903.131.1878 or 182-741-8465.   Mental Health Association  (www.mentalhealth.org): 877.717.2113 or 276-160-9297.  [place] Crisis Text Line: Text [place] to 345595  Suicide LifeLine Chat: [email or web address].org/chat    Administrative Billing:   Time spent with patient was 30 minutes and greater than 50% of time or 20 minutes was spent in counseling and coordination of care regarding above diagnoses and treatment plan.    Patient Status:  Patient will continue to be seen for ongoing consultation and stabilization.    Signed:   Amanda Blanchard MSN, APRN, CNP   Psychiatry

## 2020-01-24 ENCOUNTER — OFFICE VISIT (OUTPATIENT)
Dept: PSYCHIATRY | Facility: CLINIC | Age: 50
End: 2020-01-24
Payer: MEDICARE

## 2020-01-24 ENCOUNTER — HOSPITAL ENCOUNTER (OUTPATIENT)
Facility: CLINIC | Age: 50
Discharge: HOME | End: 2020-01-24
Attending: NURSE PRACTITIONER | Admitting: NURSE PRACTITIONER
Payer: MEDICARE

## 2020-01-24 VITALS
RESPIRATION RATE: 14 BRPM | TEMPERATURE: 97.9 F | OXYGEN SATURATION: 96 % | BODY MASS INDEX: 25.73 KG/M2 | HEART RATE: 110 BPM | WEIGHT: 154.6 LBS | SYSTOLIC BLOOD PRESSURE: 120 MMHG | DIASTOLIC BLOOD PRESSURE: 82 MMHG

## 2020-01-24 DIAGNOSIS — Z00.00 ROUTINE HISTORY AND PHYSICAL EXAMINATION OF ADULT: Primary | ICD-10-CM

## 2020-01-24 DIAGNOSIS — F33.0 MAJOR DEPRESSIVE DISORDER, RECURRENT EPISODE, MILD (H): ICD-10-CM

## 2020-01-24 DIAGNOSIS — F41.1 GENERALIZED ANXIETY DISORDER: ICD-10-CM

## 2020-01-24 LAB
ALBUMIN SERPL-MCNC: 3.8 G/DL (ref 3.4–5)
ALP SERPL-CCNC: 149 U/L (ref 40–150)
ALT SERPL W P-5'-P-CCNC: 25 U/L (ref 0–50)
ANION GAP SERPL CALCULATED.3IONS-SCNC: 1 MMOL/L (ref 3–14)
AST SERPL W P-5'-P-CCNC: 15 U/L (ref 0–45)
BASOPHILS # BLD AUTO: 0.1 10E9/L (ref 0–0.2)
BASOPHILS NFR BLD AUTO: 0.6 %
BILIRUB SERPL-MCNC: 0.2 MG/DL (ref 0.2–1.3)
BUN SERPL-MCNC: 12 MG/DL (ref 7–30)
CALCIUM SERPL-MCNC: 9.2 MG/DL (ref 8.5–10.1)
CHLORIDE SERPL-SCNC: 107 MMOL/L (ref 94–109)
CHOLEST SERPL-MCNC: 180 MG/DL
CO2 SERPL-SCNC: 33 MMOL/L (ref 20–32)
CREAT SERPL-MCNC: 0.82 MG/DL (ref 0.52–1.04)
DIFFERENTIAL METHOD BLD: ABNORMAL
EOSINOPHIL NFR BLD AUTO: 0.5 %
ERYTHROCYTE [DISTWIDTH] IN BLOOD BY AUTOMATED COUNT: 12.1 % (ref 10–15)
GFR SERPL CREATININE-BSD FRML MDRD: 83 ML/MIN/{1.73_M2}
GLUCOSE SERPL-MCNC: 101 MG/DL (ref 70–99)
HCT VFR BLD AUTO: 43.6 % (ref 35–47)
HDLC SERPL-MCNC: 72 MG/DL
HGB BLD-MCNC: 14.6 G/DL (ref 11.7–15.7)
IMM GRANULOCYTES # BLD: 0.1 10E9/L (ref 0–0.4)
IMM GRANULOCYTES NFR BLD: 0.6 %
LDLC SERPL CALC-MCNC: 76 MG/DL
LYMPHOCYTES # BLD AUTO: 2.2 10E9/L (ref 0.8–5.3)
LYMPHOCYTES NFR BLD AUTO: 20.1 %
MCH RBC QN AUTO: 33.3 PG (ref 26.5–33)
MCHC RBC AUTO-ENTMCNC: 33.5 G/DL (ref 31.5–36.5)
MCV RBC AUTO: 100 FL (ref 78–100)
MONOCYTES # BLD AUTO: 0.5 10E9/L (ref 0–1.3)
MONOCYTES NFR BLD AUTO: 4.5 %
NEUTROPHILS # BLD AUTO: 8.1 10E9/L (ref 1.6–8.3)
NEUTROPHILS NFR BLD AUTO: 73.7 %
NONHDLC SERPL-MCNC: 105 MG/DL
NRBC # BLD AUTO: 0 10*3/UL
NRBC BLD AUTO-RTO: 0 /100
PLATELET # BLD AUTO: 285 10E9/L (ref 150–450)
POTASSIUM SERPL-SCNC: 4.4 MMOL/L (ref 3.4–5.3)
PROT SERPL-MCNC: 8 G/DL (ref 6.8–8.8)
RBC # BLD AUTO: 4.38 10E12/L (ref 3.8–5.2)
SODIUM SERPL-SCNC: 141 MMOL/L (ref 133–144)
TRIGL SERPL-MCNC: 144 MG/DL
WBC # BLD AUTO: 11 10E9/L (ref 4–11)

## 2020-01-24 PROCEDURE — 99214 OFFICE O/P EST MOD 30 MIN: CPT | Performed by: NURSE PRACTITIONER

## 2020-01-24 PROCEDURE — 80053 COMPREHEN METABOLIC PANEL: CPT | Performed by: NURSE PRACTITIONER

## 2020-01-24 PROCEDURE — 85025 COMPLETE CBC W/AUTO DIFF WBC: CPT | Performed by: NURSE PRACTITIONER

## 2020-01-24 PROCEDURE — 36415 COLL VENOUS BLD VENIPUNCTURE: CPT | Performed by: NURSE PRACTITIONER

## 2020-01-24 PROCEDURE — 80061 LIPID PANEL: CPT | Performed by: NURSE PRACTITIONER

## 2020-01-24 RX ORDER — CLONAZEPAM 0.5 MG/1
0.5 TABLET ORAL 2 TIMES DAILY PRN
Qty: 60 TABLET | Refills: 0 | Status: SHIPPED | OUTPATIENT
Start: 2020-01-24 | End: 2020-03-02

## 2020-01-24 RX ORDER — OLANZAPINE 5 MG/1
2.5 TABLET ORAL EVERY MORNING
Qty: 75 TABLET | Refills: 1 | Status: SHIPPED | OUTPATIENT
Start: 2020-01-24 | End: 2020-03-23

## 2020-01-24 RX ORDER — CLONAZEPAM 0.5 MG/1
0.5 TABLET ORAL 2 TIMES DAILY PRN
Qty: 6 TABLET | Refills: 0 | Status: SHIPPED | OUTPATIENT
Start: 2020-02-24 | End: 2020-02-27

## 2020-01-24 ASSESSMENT — ANXIETY QUESTIONNAIRES
GAD7 TOTAL SCORE: 19
3. WORRYING TOO MUCH ABOUT DIFFERENT THINGS: NEARLY EVERY DAY
2. NOT BEING ABLE TO STOP OR CONTROL WORRYING: NEARLY EVERY DAY
7. FEELING AFRAID AS IF SOMETHING AWFUL MIGHT HAPPEN: MORE THAN HALF THE DAYS
GAD7 TOTAL SCORE: 19
6. BECOMING EASILY ANNOYED OR IRRITABLE: MORE THAN HALF THE DAYS
7. FEELING AFRAID AS IF SOMETHING AWFUL MIGHT HAPPEN: MORE THAN HALF THE DAYS
4. TROUBLE RELAXING: NEARLY EVERY DAY
5. BEING SO RESTLESS THAT IT IS HARD TO SIT STILL: NEARLY EVERY DAY
1. FEELING NERVOUS, ANXIOUS, OR ON EDGE: NEARLY EVERY DAY
GAD7 TOTAL SCORE: 19

## 2020-01-24 ASSESSMENT — PAIN SCALES - GENERAL: PAINLEVEL: EXTREME PAIN (9)

## 2020-01-24 ASSESSMENT — PATIENT HEALTH QUESTIONNAIRE - PHQ9
SUM OF ALL RESPONSES TO PHQ QUESTIONS 1-9: 10
10. IF YOU CHECKED OFF ANY PROBLEMS, HOW DIFFICULT HAVE THESE PROBLEMS MADE IT FOR YOU TO DO YOUR WORK, TAKE CARE OF THINGS AT HOME, OR GET ALONG WITH OTHER PEOPLE: EXTREMELY DIFFICULT
SUM OF ALL RESPONSES TO PHQ QUESTIONS 1-9: 10

## 2020-01-24 NOTE — PATIENT INSTRUCTIONS
Treatment Plan:    Continue Cymbalta as managed by pain management, currently on taper.    Continue Klonopin 0.5mg twice daily as needed for anxiety.    Increase olanzapine to 2.5mg every morning and 5mg at 2pm and 5mg at bedtime.    Please obtain baseline labs that were ordered on your way out today.    Continue all other medications as reviewed per electronic medical record today.     Safety plan reviewed. To the Emergency Department as needed or call after hours crisis line at [phone] or [phone]. [place] Crisis Text Line. Text [place] to 667537 or Suicide LifeLine Chat: [email or web address].[email or web address]/chat/    Please contact [facility] [phone] to schedule an individual therapy.    Recommend DBT as well, I did send a referral.    Patient was informed of my upcoming resignation. Treatment options discussed with patient.  Patient is interested in continuing [facility].  An appointment has been scheduled with Dr. Richardson in [place] on Thursday Feb 27th at 9:15am.  Please contact [facility] at [phone] if you need to make changes to this appointment.  If you do not keep your appointment you will need to contact your PCP for further refills.    Follow up with primary care provider as planned or for acute medical concerns.    Call the psychiatric nurse line with medication questions or concerns at [phone].    [place] may be used to communicate with your provider, but this is not intended to be used for emergencies.    Crisis Resources:    National Suicide Prevention Lifeline: 182.155.1585 (TTY: 943.554.3437). Call anytime for help.  (www.suicidepreventionlifeline.org)  National [place] on Mental Illness (www.dolly.org): 806.125.2214 or 628-014-8526.   Mental Health Association (www.mentalhealth.org): 661.180.8622 or 555-429-4293.  [place] Crisis Text [place]: Text [place] to 567720  Suicide LifeLine Chat: [employer].[email or web address]/chat

## 2020-01-25 DIAGNOSIS — J31.0 CHRONIC RHINITIS: ICD-10-CM

## 2020-01-25 ASSESSMENT — PATIENT HEALTH QUESTIONNAIRE - PHQ9: SUM OF ALL RESPONSES TO PHQ QUESTIONS 1-9: 10

## 2020-01-25 ASSESSMENT — ANXIETY QUESTIONNAIRES: GAD7 TOTAL SCORE: 19

## 2020-01-27 ENCOUNTER — OFFICE VISIT (OUTPATIENT)
Dept: PALLIATIVE MEDICINE | Facility: CLINIC | Age: 50
End: 2020-01-27
Payer: MEDICARE

## 2020-01-27 VITALS
HEIGHT: 65 IN | BODY MASS INDEX: 26.16 KG/M2 | TEMPERATURE: 97.4 F | DIASTOLIC BLOOD PRESSURE: 80 MMHG | SYSTOLIC BLOOD PRESSURE: 132 MMHG | WEIGHT: 157 LBS

## 2020-01-27 DIAGNOSIS — M79.7 FIBROMYALGIA: Primary | ICD-10-CM

## 2020-01-27 DIAGNOSIS — M54.50 CHRONIC BILATERAL LOW BACK PAIN WITHOUT SCIATICA: ICD-10-CM

## 2020-01-27 DIAGNOSIS — F11.90 CHRONIC, CONTINUOUS USE OF OPIOIDS: ICD-10-CM

## 2020-01-27 DIAGNOSIS — G89.29 CHRONIC BILATERAL LOW BACK PAIN WITHOUT SCIATICA: ICD-10-CM

## 2020-01-27 DIAGNOSIS — M54.2 CERVICALGIA: ICD-10-CM

## 2020-01-27 DIAGNOSIS — G89.4 CHRONIC PAIN SYNDROME: ICD-10-CM

## 2020-01-27 PROCEDURE — 99214 OFFICE O/P EST MOD 30 MIN: CPT | Performed by: PHYSICIAN ASSISTANT

## 2020-01-27 RX ORDER — CETIRIZINE HYDROCHLORIDE 10 MG/1
TABLET ORAL
Qty: 90 TABLET | Refills: 2 | Status: SHIPPED | OUTPATIENT
Start: 2020-01-27 | End: 2022-01-20

## 2020-01-27 RX ORDER — KETOROLAC TROMETHAMINE 10 MG/1
10 TABLET, FILM COATED ORAL EVERY 6 HOURS PRN
Qty: 10 TABLET | Refills: 0 | Status: SHIPPED | OUTPATIENT
Start: 2020-01-27 | End: 2020-02-17

## 2020-01-27 RX ORDER — HYDROCODONE BITARTRATE AND ACETAMINOPHEN 7.5; 325 MG/1; MG/1
TABLET ORAL
Qty: 150 TABLET | Refills: 0 | Status: SHIPPED | OUTPATIENT
Start: 2020-01-27 | End: 2020-02-24

## 2020-01-27 ASSESSMENT — MIFFLIN-ST. JEOR: SCORE: 1328.03

## 2020-01-27 ASSESSMENT — PAIN SCALES - GENERAL: PAINLEVEL: WORST PAIN (10)

## 2020-01-27 NOTE — TELEPHONE ENCOUNTER
"  Requested Prescriptions   Pending Prescriptions Disp Refills     cetirizine (ZYRTEC) 10 MG tablet [Pharmacy Med Name: CETIRIZINE HCL 10MG TABS] 90 tablet 3     Sig: TAKE ONE TABLET BY MOUTH ONCE DAILY   Last Written Prescription Date:  12/13/2018  Last Fill Quantity: 90,  # refills: 3   Last office visit: 11/29/2019    Future Office Visit:   Next 5 appointments (look out 90 days)    Jan 27, 2020  2:00 PM CST  [facility] Celia Bettencourt PA-C  [facility] [address] [place] [id number]  [phone]             Antihistamines Protocol Passed - 1/25/2020 11:42 AM        Passed - Patient is 3-64 years of age     Apply weight-based dosing for peds patients age 3 - 12 years of age.    Forward request to provider for patients under the age of 3 or over the age of 64.          Passed - Recent (12 mo) or future (30 days) visit within the authorizing provider's specialty     Patient has had an office visit with the authorizing provider or a provider within the authorizing providers department within the previous 12 mos or has a future within next 30 days. See \"Patient Info\" tab in inbasket, or \"Choose Columns\" in Meds & Orders section of the refill encounter.              Passed - Medication is active on med list      Prescription approved per [facility] Refill Protocol.  Kendy Robles RN      "

## 2020-01-27 NOTE — LETTER
[facility]  01/27/20    Patient: Sherie Otero  YOB: 1970  Medical Record Number: [id number]                                                                  Opioid / Opioid Plus Controlled Substance Agreement    I understand that my care provider has prescribed an opioid (narcotic) controlled substance to help manage my condition(s). I am taking this medicine to help me function or work. I know this is strong medicine, and that it can cause serious side effects. Opioid medicine can be sedating, addicting and may cause a dependency on the drug. They can affect my ability to drive or think, and cause depression. They need to be taken exactly as prescribed. Combining opioids with certain medicines or chemicals (such as cocaine, sedatives and tranquilizers, sleeping pills, meth) can be dangerous or even fatal. Also, if I stop opioids suddenly, I may have severe withdrawal symptoms. Last, I understand that opioids do not work for all types of pain nor for all patients. If not helpful, I may be asked to stop them.    I am also being prescribed a benzodiazepine (tranquilizer) controlled substance.   I understand this type of medication is sedating, and can increase the risk of death when taken together with opioids.  I have talked to my care team about the option of having a prescription for Narcan to use to reverse the opioid medicine, in case I get too sleepy. I will be very careful to take my medicines only as directed.    The risks, benefits, and side effects of these medicine(s) were explained to me. I agree that:    1. I will take part in other treatments as advised by my care team. This may be psychiatry or counseling, physical therapy, behavioral therapy, group treatment or a referral to a pain clinic. I will reduce or stop my medicine when my care team tells me to do so.  2. I will take my medicines as prescribed. I will not change the dose or schedule unless my  care team tells me to. There will be no refills if I  run out early.   I may be contactedwithout warning and asked to complete a urine drug test or pill count at any time.   3. I will keep all my appointments, and understand this is part of the monitoring of opioids. My care team may require an office visit for EVERY opioid/controlled substance refill. If I miss appointments or don t follow instructions, my care team may stop my medicine.  4. I will not ask other providers to prescribe controlled substances, and I will not accept controlled substances from other people. If I need another prescribed controlled substance for a new reason, I will tell my care team within 1 business day.  5. I will use one pharmacy to fill all of my controlled substance prescriptions, and it is up to me to make sure that I do not run out of my medicines on weekends or holidays. If my care team is willing to refill my opioid prescription without a visit, I must request refills only during office hours, refills may take up to 3 days to process, and it may take up to 5 to 7 days for my medicine to be mailed and ready at my pharmacy. Prescriptions will not be mailed anywhere except my pharmacy.        [id number]  Rev 12/18         Registration to scan to EHR                             Page 1 of 2               Controlled Substance Agreement Opioid        [facility]  01/27/20  Patient: Sherie Otero  YOB: 1970  Medical Record Number: [id number]                                                                  6. I am responsible for my prescriptions. If the medicine/prescription is lost or stolen, it will not be replaced. I also agree not to share controlled substance medicines with anyone.  7. I agree to not use ANY illegal or recreational drugs. This includes marijuana, cocaine, bath salts or other drugs. I agree not to use alcohol unless my care team says I may.          I agree to give urine  samples whenever asked. If I don t give a urine sample, the care team may stop my medicine.    8. If I enroll in the [place] Medical Marijuana program, I will tell my care team. I will also sign an agreement to share my medical records with my care team.   9. I will bring in my list of medicines (or my medicine bottles) each time I come to the clinic.   10. I will tell my care team right away if I become pregnant or have a new medical problem treated outside of my regular clinic.  11. I understand that this medicine can affect my thinking and judgment. It may be unsafe for me to drive, use machinery and do dangerous tasks. I will not do any of these things until I know how the medicine affects me. If my dose changes, I will wait to see how it affects me. I will contact my care team if I have concerns about medicine side effects.    I understand that if I do not follow any of the conditions above, my prescriptions or treatment may be stopped.      I agree that my provider, clinic care team, and pharmacy may work with any city, state or federal law enforcement agency that investigates the misuse, sale, or other diversion of my controlled medicine. I will allow my provider to discuss my care with or share a copy of this agreement with any other treating provider, pharmacy or emergency room where I receive care. I agree to give up (waive) any right of privacy or confidentiality with respect to these consents.     I have read this agreement and have asked questions about anything I did not understand.      ________________________________________________________________________  Patient signature - Date/Time -  Sherie Otero                                      ________________________________________________________________________  Witness signature                                                            ________________________________________________________________________  Provider signature Martha MAY  AGUSTÍN Bettencourt      [id number]  Rev 12/18         Registration to scan to EHR                         Page 2 of 2                   Controlled Substance Agreement Opioid           Page 1 of 2  Opioid Pain Medicines (also known as Narcotics)  What You Need to Know    What are opioids?   Opioids are pain medicines that must be prescribed by a doctor.  They are also known as narcotics.    Examples are:     morphine (MS Contin, Catrachita)    oxycodone (Oxycontin)    oxycodone and acetaminophen (Percocet)    hydrocodone and acetaminophen (Vicodin, Norco)     fentanyl patch (Duragesic)     hydromorphone (Dilaudid)     methadone     What do opioids do well?   Opioids are best for short-term pain after a surgery or injury. They also work well for cancer pain. Unlike other pain medicines, they do not cause liver or kidney failure or ulcers. They may help some people with long-lasting (chronic) pain.     What do opioids NOT do well?   Opioids never get rid of pain entirely, and they do not work well for most patients with chronic pain. Opioids do not reduce swelling, one of the causes of pain. They also don t work well for nerve pain.                           For informational purposes only.  Not to replace the advice of your care provider.  Copyright [address] [facility]. All right reserved. [employer] 864212-[id number] 02/18.      Page 2 of 2    Risks and side effects   Talk to your doctor before you start or decide to keep taking one of these medicines. Side effects include:    Lowering your breathing rate enough to cause death    Overdose, including death, especially if taking higher than prescribed doses    Long-term opioid use    Worse depression symptoms; less pleasure in things you usually enjoy    Feeling tired or sluggish    Slower thoughts or cloudy thinking    Being more sensitive to pain over time; pain is harder to control    Trouble sleeping or restless sleep    Changes in hormone levels (for example, less  testosterone)    Changes in sex drive or ability to have sex    Constipation    Unsafe driving    Itching and sweating    Feeling dizzy    Nausea, vomiting and dry mouth    What else should I know about opioids?  When someone takes opioids for too long or too often, they become dependent. This means that if you stop or reduce the medicine too quickly, you will have withdrawal symptoms.    Dependence is not the same as addiction. Addiction is when people keep using a substance that harms their body, their mind or their relations with others. If you have a history of drug or alcohol abuse, taking opioids can cause a relapse.    Over time, opioids don t work as well. Most people will need higher and higher doses. The higher the dose, the more serious the side effects. We don t know the long-term effects of opioids.      Prescribed opioids aren't the best way to manage chronic pain    Other ways to manage pain include:      Ibuprofen or acetaminophen.  You should always try this first.      Treat health problems that may be causing pain.      acupuncture or massage, deep breathing, meditation, visual imagery, aromatherapy.      Use heat or ice at the pain site      Physical therapy and exercise      Stop smoking      See a counselor or therapist                                                  People who have used opioids for a long time may have a lower quality of life, worse depression, higher levels of pain and more visits to doctors.    Never share your opioids with others. Be sure to store opioids in a secure place, locked if possible.Young children can easily swallow them and overdose.     You can overdose on opioids.  Signs of overdose include decrease or loss of consciousness, slowed breathing, trouble waking and blue lips.  If someone is worried about overdose, they should call 911.    If you are at risk for overdose, you may get naloxone (Narcan, a medicine that reverses the effects of opioids.  If you  overdose, a friend or family member can give you Narcan while waiting for the ambulance.  They need to know the signs of overdose and how to give Narcan.    While you're taking opioids:    Don't use alcohol or street drugs. Taking them together can cause death.    Don't take any of these medicines unless your doctor says its okay.  Taking these with opioids can cause death.    Benzodiazepines (such as lorazepam         or diazepam)    Muscle relaxers (such as cyclobenzaprine)    sleeping pills    other opioids    Safe disposal of opioids  Find your area drug take-back program, your pharmacy mail-back program, buy a special disposal bag (such as Deterra) from your pharmacy or flush them down the toilet.  Use the guidelines at:  www.fda.gov/drugs/resourcesforyou

## 2020-01-27 NOTE — PATIENT INSTRUCTIONS
After Visit Instructions:     Thank you for coming to [facility] for your care. It is my goal to partner with you to help you reach your optimal state of health.     I am recommending multidisciplinary care at this time.  The focus of care will be to continue gradual rehabilitation and pain management with medication adjustments as needed.    Continue daily self-care, identifying contributing factors, and monitoring variations in pain level. Continue to integrate self-care into your life.        Schedule follow-up with Celia Bettencourt PA-C in 4 weeks. You will need to make this appointment.     Labs: urine drug screen     Medication recommendations:     Continue Cymbalta 20mg daily for another 4 days. If still having your zaps go to every other day for 1 week before stopping    I'll get back to on Savella    Toradol 10mg every 6 hours as needed. 10 tablets for 30 days. Take with food.      Celia Bettencourt PA-C  [facility]  [facility]    Contact information: [facility]  Clinic Number:  [phone]     Call with any questions about your care and for scheduling assistance.     Calls are returned Monday through Friday between 8 AM and 4:30 PM. We usually get back to you within 2 business days depending on the issue/request.    If we are prescribing your medications:    For opioid medication refills, call the clinic or send a [employer] message 7 days in advance.  Please include:    Name of requested medication    Name of the pharmacy.    For non-opioid medications, call your pharmacy directly to request a refill. Please allow 3-4 days to be processed.     Per [place] State Law:    All controlled substance prescriptions must be filled within 30 days of being written.      For those controlled substances allowing refills, pickup must occur within 30 days of last fill.      We believe regular attendance is key to your success in our program!      Any  time you are unable to keep your appointment we ask that you call us at least 24 hours in advance to cancel.This will allow us to offer the appointment time to another patient.   Multiple missed appointments may lead to dismissal from the clinic.

## 2020-01-28 ENCOUNTER — TELEPHONE (OUTPATIENT)
Dept: FAMILY MEDICINE | Facility: CLINIC | Age: 50
End: 2020-01-28

## 2020-01-28 NOTE — TELEPHONE ENCOUNTER
Prior Authorization Retail Medication Request    Medication/Dose: ketorolac 10mg  ICD code (if different than what is on RX):     Previously Tried and Failed:     Rationale:       Insurance Name:  wellcare part d  Insurance ID:  [id number]      Pharmacy Information (if different than what is on RX)  Name:     Phone:

## 2020-01-30 LAB — PAIN DRUG SCR UR W RPTD MEDS: NORMAL

## 2020-01-31 ENCOUNTER — MYC MEDICAL ADVICE (OUTPATIENT)
Dept: PALLIATIVE MEDICINE | Facility: CLINIC | Age: 50
End: 2020-01-31

## 2020-01-31 NOTE — TELEPHONE ENCOUNTER
Central Prior Authorization Team   Phone: [phone]    PA Initiation    Medication: ketorolac 10mg  Insurance Company: [employer] - Phone [phone] Fax [phone]  Pharmacy Filling the Rx: [address]   Filling Pharmacy Phone: [phone]  Filling Pharmacy Fax: [phone]  Start Date: 1/31/2020

## 2020-02-03 NOTE — TELEPHONE ENCOUNTER
Central Prior Authorization Team   Phone: [phone]    Prior Authorization Approval    Authorization Effective Date: 1/24/2020  Authorization Expiration Date:    Medication: ketorolac 10mg  Approved Dose/Quantity:   Reference #: [id number]   Insurance Company: [employer] - Phone [phone] Fax [phone]  Expected CoPay:       CoPay Card Available:      Foundation Assistance Needed:    Which Pharmacy is filling the prescription (Not needed for infusion/clinic administered): [facility] [address]   Pharmacy Notified: Yes  Patient Notified: Yes, **Instructed pharmacy to notify patient when script is ready to /ship.**

## 2020-02-05 ENCOUNTER — MYC MEDICAL ADVICE (OUTPATIENT)
Dept: PALLIATIVE MEDICINE | Facility: CLINIC | Age: 50
End: 2020-02-05

## 2020-02-05 DIAGNOSIS — M79.7 FIBROMYALGIA: Primary | ICD-10-CM

## 2020-02-05 NOTE — TELEPHONE ENCOUNTER
Attempted to call patient regarding Savella and Urine drug screen results. There was no answer or identifying information on voicemail, therefore no voicemail was left. Will try again later.     Celia Bettencourt PA-C on 2/5/2020 at 2:57 PM

## 2020-02-06 ENCOUNTER — TELEPHONE (OUTPATIENT)
Dept: FAMILY MEDICINE | Facility: CLINIC | Age: 50
End: 2020-02-06

## 2020-02-06 NOTE — TELEPHONE ENCOUNTER
[facility] Prior Authorization Team Request    Medication: Savella 12.5mg  Dosing: One tablet daily for one week then one tablet twice a day.  NDC (required for Medicaid members): [address]-21    Insurance   BIN: [id number]  PCN: [id number]  Grp: [id number]  ID: [id number]    CoverMyMeds Key (if applicable):     Additional documentation:     Filling Pharmacy: [facility]  Phone Number: [phone]  Contact: DARLENE PHARM HENRRY PA ([id number]) please send all responses to this pool.  Pharmacy NPI (required for Medicaid members):[id number]

## 2020-02-06 NOTE — TELEPHONE ENCOUNTER
Reviewed urine drug screen with the patient today.  She states that she did have a panic attack and took 1 of her Xanax is that she had left over.  Discussed with the patient that this is generally not recommended when she is already on a benzodiazepine along with the combination of her hydrocodone.  Discussed the increased risk of overdose with this combination.  Discussed that the patient should not use any further Xanax unless approved by her psychiatrist.  She verbalized her understanding.  We also talked about the THC.  Patient is been getting a CBD cream from the Internet.  Discussed that this is likely with the sources I do not recommend that she continue to use that CBD cream.    Plan to start Savella 12.5 mg daily for 1 week then 12.5 mg twice daily.  Reviewed instructions with the patient.    Celia Bettencourt PA-C on 2/6/2020 at 11:34 AM

## 2020-02-06 NOTE — TELEPHONE ENCOUNTER
Closing this encounter as there is a duplicate encounter.     Celia Bettencourt PA-C on 2/6/2020 at 8:39 AM

## 2020-02-10 NOTE — TELEPHONE ENCOUNTER
Central Prior Authorization Team  Phone: [phone]    PA Initiation    Medication: Savella 12.5mg  Insurance Company: [employer] - Phone [phone] Fax [phone]  Pharmacy Filling the Rx: [address]   Filling Pharmacy Phone: [phone]  Filling Pharmacy Fax:    Start Date: 2/10/2020

## 2020-02-11 NOTE — TELEPHONE ENCOUNTER
Prior Authorization Approval    Authorization Effective Date: 2/6/2020  Authorization Expiration Date:    Medication: Savella 12.5mg- APPROVED   Approved Dose/Quantity:   Reference #:     Insurance Company: [employer] - Phone [phone] Fax [phone]  Expected CoPay:       CoPay Card Available:      Foundation Assistance Needed:    Which Pharmacy is filling the prescription (Not needed for infusion/clinic administered): [facility] [address]   Pharmacy Notified: Yes  Patient Notified: Comment:  **Instructed pharmacy to notify patient when script is ready to /ship.**

## 2020-02-15 DIAGNOSIS — M54.50 CHRONIC BILATERAL LOW BACK PAIN WITHOUT SCIATICA: ICD-10-CM

## 2020-02-15 DIAGNOSIS — M79.7 FIBROMYALGIA: ICD-10-CM

## 2020-02-15 DIAGNOSIS — G89.4 CHRONIC PAIN SYNDROME: ICD-10-CM

## 2020-02-15 DIAGNOSIS — G89.29 CHRONIC BILATERAL LOW BACK PAIN WITHOUT SCIATICA: ICD-10-CM

## 2020-02-15 DIAGNOSIS — M54.2 CERVICALGIA: ICD-10-CM

## 2020-02-17 RX ORDER — KETOROLAC TROMETHAMINE 10 MG/1
10 TABLET, FILM COATED ORAL EVERY 6 HOURS PRN
Qty: 10 TABLET | Refills: 0 | Status: SHIPPED | OUTPATIENT
Start: 2020-02-17 | End: 2020-03-08

## 2020-02-17 NOTE — TELEPHONE ENCOUNTER
Ketorolac 10 MG       Last Written Prescription Date:  1/27/2020  Last Fill Quantity: 10,   # refills: 0  Last Office Visit: 11/29/19  Future Office visit:    Next 5 appointments (look out 90 days)    Feb 24, 2020  2:00 PM CST  Return Visit with Celia Bettencourt PA-C  [facility] [address] [place] [id number] [phone]           Routing refill request to provider for review/approval because:  Drug not on the [facility], UMP or  Health refill protocol or controlled substance

## 2020-02-21 NOTE — PROGRESS NOTES
"[facility]    CHIEF COMPLAINT: Pain  -Fibromyalgia  -Neck pain  -Low back pain    INTERVAL HISTORY:  Last seen on 01/27/2020.        Recommendations/plan at the last visit included:  1. Physical Therapy: continue previous PT exercises;   2. Clinical Health Psychologist:  YES, has a plan in place  3. Diagnostic Studies: none at this time  4. Medication Management:                1.  Continue Cymbalta 20 mg in the morning for another 4 days.  Then depending on how she is doing with coming off of it she could go to 20 mg every other day for another week              2.  Flexeril 10mg, 1 tab TID PRN              3.  Continue hydrocodone to 7.5/325 with instructions to take 1 tablet every 4-6 hours as needed for severe pain. Max of 5 tablets/day              4.  Consider Savella              5.  Consider low-dose naltrexone              6.  Continue gabapentin 100 mg at bedtime for another week.  If after another week she continues to have side effects in the morning and recommend that she stop it.  If that sensation goes away she can try increasing it to twice a day.  5. Further procedures recommended: none at this time  6.  Urine drug screen and opiate agreement updated today     Follow up with this provider: 4 weeks     Since her last visit, Sheriecha Otero reports:    After her last visit we started her on Savella. She has not noticed a change in her pain. She states that she has been getting crabbier easier. She states that she has had some good days where she has had some energy. She states that for the last 2 weeks, she has noticed some \"bone pain\" in arms and legs. She reports increased sweating as well. She is unable to drink as much coffee as she gets sick from it. She states that she has been gaining weight as well.       Pain Information:   Pain quality: Aching, burning, shooting, throbbing, stabbing, miserable, numb, tiring, exhausting, penetrating, gnawing, nagging, and " unbearable    Pain rating: intensity ranges from 9/10 to 10/10, and averages 9/10 on a 0-10 scale.   Pain today 9/10    UDS 01/27/2020   CSA 01/27/2020    CURRENT RELEVANT PAIN MEDICATIONS:  Clonazepam 0.5mg: taking 1 tab twice daily    Flexeril-1 in AM and 2 at HS  Savella 12.5mg twice daily  Hydrocodone 7.5mg -currently taking 2 tablets three times a day   Ibuprofen-will take 800mg up to 3 times day, doesn't take daily  Toradol 10mg-1 tablet daily as needed    Patient is using the medication as prescribed:  YES  Is your medication helpful? somewhat   Medication side effects? no side effect    Previous Medications: (H--helped; HI--Helped initially; SWH-- somewhat helpful, NH--No help; W--worse; SE--side effects)   Opiates: Hydrocodone H, Oxycodone SE  NSAIDS: Ibuprofen H, Relafen SE stomach upset, Meloxicam NH  Muscle Relaxants: Tizanidine NH, Flexeril H, Robaxin NH SE stomach upset  Anti-migraine mediations: Verapamil H  Anti-depressants: Cymbalta H  Sleep aids: none  Anxiolytics: Xanax H, Clonazepam H, Valium H  Neuropathics: Gabapentin SE dizziness          Topicals: Lidocaine patches [facility]  Other medications not covered above: Savella H at first, then seemed to stop working, Lyrica SE shortness of breath, felt 'weird' on them, throat felt weird. Prednisone H for arthritis flare    Past Pain Treatments:  Pain Clinic:   No   PT: Yes, years ago. Has not done pool therapy.   Psychologist: No  Relaxation techniques/biofeedback: No  Chiropractor: No, was told not to because of neck.   Acupuncture: Yes for headaches.   Pharmacotherapy:           Opioids: Yes            Non-opioids:    Yes   TENs Unit:Yes  Injections: cervical medial branch blocks 6/12/2014  Self-care:   Yes, ice/heat, hot baths, theracare  Surgeries related to pain: No    [place] Board of Pharmacy Data Base Reviewed:    YES; As expected, no concern for misuse/abuse of controlled medications based on this report.      THE 4 As OF OPIOID MAINTENANCE  ANALGESIA    Analgesia: Is pain relief clinically significant? YES   Activity: Is patient functional and able to perform Activities of Daily Living? YES   Adverse effects: Is patient free from adverse side effects from opiates? YES   Adherence to Rx protocol: Is patient adhering to Controlled Substance Agreement and taking medications ONLY as ordered? No      Is Narcan prescribed for opiate use >50 MME daily? yes      Total Daily MME: 37.5    Medications:  Current Outpatient Medications   Medication Sig Dispense Refill     albuterol (VENTOLIN HFA) 108 (90 Base) MCG/ACT inhaler Inhale 2 puffs into the lungs every 6 hours as needed for shortness of breath / dyspnea or wheezing 18 g 1     cetirizine (ZYRTEC) 10 MG tablet TAKE ONE TABLET BY MOUTH ONCE DAILY 90 tablet 2     clonazePAM (KLONOPIN) 0.5 MG tablet Take 1 tablet (0.5 mg) by mouth 2 times daily as needed for anxiety Must last 30 days 60 tablet 0     clonazePAM (KLONOPIN) 0.5 MG tablet Take 1 tablet (0.5 mg) by mouth 2 times daily as needed for anxiety 6 tablet 0     cyclobenzaprine (FLEXERIL) 10 MG tablet Take 2 tablets in the evening and 1 in the morning 90 tablet 2     fluticasone (FLONASE) 50 MCG/ACT nasal spray SPRAY 2 SPRAYS INTO BOTH NOSTRILS DAILY. 16 g 11     HYDROcodone-acetaminophen (NORCO) 7.5-325 MG per tablet Take 1 tablet every 4-6 hours as needed for severe pain. Max of 5 tablets per day. Fill 2/28/2020 Start 3/1/2020. 150 tablet 0     ketorolac (TORADOL) 10 MG tablet Take 1 tablet (10 mg) by mouth every 6 hours as needed for moderate pain 10 tablet 0     milnacipran (SAVELLA) 25 MG TABS tablet Take 1 tablet twice daily. 60 tablet 0     naloxone (NARCAN) nasal spray Spray 1 spray (4 mg) into one nostril alternating nostrils as needed for opioid reversal every 2-3 minutes until assistance arrives 0.2 mL 0     OLANZapine (ZYPREXA) 5 MG tablet Take 0.5 tablets (2.5 mg) by mouth every morning And 1 tab(5mg) at 2pm and 1 tab(5mg) at bedtime. 75 tablet  1     verapamil (CALAN) 40 MG tablet Take 1 tablet (40 mg) by mouth 2 times daily 180 tablet 3       Review of Systems: A 10-point review of systems was negative, with the exception of chronic pain issues,  fatigue, weight gain, headache, dizziness, vision changes, sinus infection, allergies, easy bruising,  palpitations, nausea, diarrhea, constipation, urinary frequency and urgency, weakness, numbness/tingling,  depression, anxiety, stress, and mood swings      Social History: Reviewed; unchanged from previous consultation.      Family history: Reviewed; unchanged from previous consultation.     PHYSICAL EXAM:     Vitals:  /76   Temp 98.4  F (36.9  C) (Temporal)   Wt 72.1 kg (159 lb)   Breastfeeding No   BMI 26.46 kg/m        Constitutional: healthy, alert and no distress  HEENT: Head atraumatic, normocephalic. Eyes without conjunctival injection or jaundice. Neck supple. No obvious neck masses.  Psychiatric/mental status: Alert, without lethargy or stupor. Appropriate affect. Mood normal.   Neurologic exam: Gait is normal    DIAGNOSTIC TESTS:  Imaging Studies:   No new imaging to review    Assessment:  Sherie Otero is a 48 year old female who presents today for follow up regarding her:    1. Fibromyalgia  2. Chronic low back pain  3. Cervicalgia  4. Chronic pain syndrome  5. Chronic use of opioids    Patient has had some mood changes with the Savella and that she feels more crabby.  She is noted any pain relief at the dose that she is currently on.  We discussed stopping the Savella versus trying to continue it and see if her mood stabilizes.  She would prefer to continue it.  We will slowly titrate the Savella.      Plan:  Diagnosis reviewed, treatment option addressed, and risk/benifits discussed.  Self-care instructions given.  I am recommending a multidisciplinary treatment plan to help this patient better manage pain.      1. Physical Therapy: continue previous PT exercises;   2. Clinical Health  Psychologist:  YES, has a plan in place  3. Diagnostic Studies: none at this time  4. Medication Management:     1.  Continue  Flexeril 10mg, 1 tab TID PRN   2.  Continue hydrocodone to 7.5/325 with instructions to take 1 tablet every 4-6 hours as needed for severe pain. Max of 5 tablets/day   3.  Increase Savella to 12.5 mg in the morning and 25 mg at bedtime for 1 week then 25 mg twice daily   5.  Consider low-dose naltrexone  5. Further procedures recommended: none at this time      Follow up with this provider: 4 weeks     Total time spent face to face was 15 minutes and more than 50% of face to face time was spent in counseling and/or coordination of care regarding the diagnosis and recommendations above.       Celia Bettencourt PA-C   [facility]

## 2020-02-24 ENCOUNTER — OFFICE VISIT (OUTPATIENT)
Dept: PALLIATIVE MEDICINE | Facility: CLINIC | Age: 50
End: 2020-02-24
Payer: MEDICARE

## 2020-02-24 VITALS
TEMPERATURE: 98.4 F | WEIGHT: 159 LBS | SYSTOLIC BLOOD PRESSURE: 118 MMHG | BODY MASS INDEX: 26.46 KG/M2 | DIASTOLIC BLOOD PRESSURE: 76 MMHG

## 2020-02-24 DIAGNOSIS — M54.2 CERVICALGIA: ICD-10-CM

## 2020-02-24 DIAGNOSIS — F41.1 GENERALIZED ANXIETY DISORDER: ICD-10-CM

## 2020-02-24 DIAGNOSIS — M54.50 CHRONIC BILATERAL LOW BACK PAIN WITHOUT SCIATICA: ICD-10-CM

## 2020-02-24 DIAGNOSIS — G89.29 CHRONIC BILATERAL LOW BACK PAIN WITHOUT SCIATICA: ICD-10-CM

## 2020-02-24 DIAGNOSIS — M79.7 FIBROMYALGIA: Primary | ICD-10-CM

## 2020-02-24 DIAGNOSIS — F11.90 CHRONIC, CONTINUOUS USE OF OPIOIDS: ICD-10-CM

## 2020-02-24 DIAGNOSIS — G89.4 CHRONIC PAIN SYNDROME: ICD-10-CM

## 2020-02-24 PROCEDURE — 99213 OFFICE O/P EST LOW 20 MIN: CPT | Performed by: PHYSICIAN ASSISTANT

## 2020-02-24 RX ORDER — CLONAZEPAM 0.5 MG/1
0.5 TABLET ORAL 2 TIMES DAILY PRN
Qty: 60 TABLET | Refills: 0 | Status: CANCELLED | OUTPATIENT
Start: 2020-02-24

## 2020-02-24 RX ORDER — HYDROCODONE BITARTRATE AND ACETAMINOPHEN 7.5; 325 MG/1; MG/1
TABLET ORAL
Qty: 150 TABLET | Refills: 0 | Status: SHIPPED | OUTPATIENT
Start: 2020-02-24 | End: 2020-03-23

## 2020-02-24 ASSESSMENT — PAIN SCALES - GENERAL: PAINLEVEL: EXTREME PAIN (9)

## 2020-02-24 NOTE — PATIENT INSTRUCTIONS
After Visit Instructions:     Thank you for coming to [facility] for your care. It is my goal to partner with you to help you reach your optimal state of health.     I am recommending multidisciplinary care at this time.  The focus of care will be to continue gradual rehabilitation and pain management with medication adjustments as needed.    Continue daily self-care, identifying contributing factors, and monitoring variations in pain level. Continue to integrate self-care into your life.        Schedule follow-up with Celia Bettencourt PA-C in 4 weeks. You will need to make this appointment.     Medication recommendations:     Savella: increase to 12.5mg in AM and 25mg at bedtime for 1 week, then 25mg twice daily    Norco: continue at 1 tablet every 4-6 hours as needed. Max of 5/day.     Continue Flexeril as needed      Celia Bettencourt PA-C  [facility]    Contact information: [facility]  Clinic Number:  [phone]     Call with any questions about your care and for scheduling assistance.     Calls are returned Monday through Friday between 8 AM and 4:30 PM. We usually get back to you within 2 business days depending on the issue/request.    If we are prescribing your medications:    For opioid medication refills, call the clinic or send a [employer] message 7 days in advance.  Please include:    Name of requested medication    Name of the pharmacy.    For non-opioid medications, call your pharmacy directly to request a refill. Please allow 3-4 days to be processed.     Per [place] State Law:    All controlled substance prescriptions must be filled within 30 days of being written.      For those controlled substances allowing refills, pickup must occur within 30 days of last fill.      We believe regular attendance is key to your success in our program!      Any time you are unable to keep your appointment we ask that you call us at  least 24 hours in advance to cancel.This will allow us to offer the appointment time to another patient.   Multiple missed appointments may lead to dismissal from the clinic.

## 2020-02-27 DIAGNOSIS — F41.1 GENERALIZED ANXIETY DISORDER: ICD-10-CM

## 2020-02-27 NOTE — TELEPHONE ENCOUNTER
Patient last seen by Amanda 1/24/20-- this provider no longer works for .  [facility]   unable to prescribe, as she has not yet seen patient.     Will route to PCP for review.    Yasmin Jackson, RN    Nurse Liaison  [facility]

## 2020-02-27 NOTE — TELEPHONE ENCOUNTER
Reason for Call:  Medication refill    Do you use a [facility]?  Name of the pharmacy and phone number for the current request:  Pharmacy in [place] [phone]    Name of the medication requested: Clonozopam    Other request: Pt is overdue to fill this medication and had to cancel the appointment due to illness. Pt's primary referred her to [facility] for medication refills and pt has transferred care to [facility]. Pt looking for a refill an is currently out of medication.     Can we leave a detailed message on this number? YES    Phone number patient can be reached at: Home number on file [phone] (home)    Best Time: Any    Call taken on 2/27/2020 at 11:24 AM by Yann Sauceda

## 2020-03-02 RX ORDER — CLONAZEPAM 0.5 MG/1
0.5 TABLET ORAL 2 TIMES DAILY PRN
Qty: 60 TABLET | Refills: 0 | Status: SHIPPED | OUTPATIENT
Start: 2020-03-02 | End: 2020-03-29

## 2020-03-05 DIAGNOSIS — M54.2 CERVICALGIA: ICD-10-CM

## 2020-03-05 DIAGNOSIS — M54.50 CHRONIC BILATERAL LOW BACK PAIN WITHOUT SCIATICA: ICD-10-CM

## 2020-03-05 DIAGNOSIS — G89.4 CHRONIC PAIN SYNDROME: ICD-10-CM

## 2020-03-05 DIAGNOSIS — M79.7 FIBROMYALGIA: ICD-10-CM

## 2020-03-05 DIAGNOSIS — G89.29 CHRONIC BILATERAL LOW BACK PAIN WITHOUT SCIATICA: ICD-10-CM

## 2020-03-05 NOTE — TELEPHONE ENCOUNTER
Toradol        Last Written Prescription Date:  2/17/20  Last Fill Quantity: 10,   # refills: 0  Last Office Visit: 11/29/19  Future Office visit:    Next 5 appointments (look out 90 days)    Mar 23, 2020  2:00 PM CDT  [facility] Cel[facility]-C  [facility] [address] [address]  [phone]   Mar 26, 2020  1:15 PM CDT  Return Visit with Jacinta Richardson DO  [facility] [address], [address]  [place] [address] [phone]           Routing refill request to provider for review/approval because:  Drug not on the [facility], [facility] or [facility] refill protocol or controlled substance

## 2020-03-08 RX ORDER — KETOROLAC TROMETHAMINE 10 MG/1
TABLET, FILM COATED ORAL
Qty: 10 TABLET | Refills: 0 | Status: SHIPPED | OUTPATIENT
Start: 2020-03-08 | End: 2020-03-31

## 2020-03-15 DIAGNOSIS — M79.7 FIBROMYALGIA: ICD-10-CM

## 2020-03-17 RX ORDER — CYCLOBENZAPRINE HCL 10 MG
TABLET ORAL
Qty: 90 TABLET | Refills: 2 | Status: SHIPPED | OUTPATIENT
Start: 2020-03-17 | End: 2020-08-21

## 2020-03-17 NOTE — TELEPHONE ENCOUNTER
Requested Prescriptions   Pending Prescriptions Disp Refills     cyclobenzaprine (FLEXERIL) 10 MG tablet 90 tablet 2     Sig: Take 2 tablets in the evening and 1 in the morning       There is no refill protocol information for this order        Fibromyalgia [M79.7]     Last Written Prescription Date:  11/13/2019  Last Fill Quantity: 90,  # refills: 2   Last office visit: 11/29/2019 with prescribing provider:     Future Office Visit:   Next 5 appointments (look out 90 days)    Mar 23, 2020  2:00 PM CDT  [facility] Cel[facility]court PA-C  [facility] [address] [place] [id number]  [phone]   Mar 26, 2020  1:15 PM CDT  [facility] DO  [facility] [address]  [facility] [address]  [phone]         Routing refill request to provider for review/approval because:  Drug not on the [facility] refill protocol     Kendy Robles RN

## 2020-03-19 ENCOUNTER — MYC MEDICAL ADVICE (OUTPATIENT)
Dept: PALLIATIVE MEDICINE | Facility: CLINIC | Age: 50
End: 2020-03-19

## 2020-03-22 DIAGNOSIS — M79.7 FIBROMYALGIA: ICD-10-CM

## 2020-03-23 ENCOUNTER — VIRTUAL VISIT (OUTPATIENT)
Dept: PALLIATIVE MEDICINE | Facility: CLINIC | Age: 50
End: 2020-03-23
Payer: MEDICARE

## 2020-03-23 DIAGNOSIS — M54.50 CHRONIC BILATERAL LOW BACK PAIN WITHOUT SCIATICA: ICD-10-CM

## 2020-03-23 DIAGNOSIS — G89.29 CHRONIC BILATERAL LOW BACK PAIN WITHOUT SCIATICA: ICD-10-CM

## 2020-03-23 DIAGNOSIS — M54.2 CERVICALGIA: ICD-10-CM

## 2020-03-23 DIAGNOSIS — G89.4 CHRONIC PAIN SYNDROME: ICD-10-CM

## 2020-03-23 DIAGNOSIS — F11.90 CHRONIC, CONTINUOUS USE OF OPIOIDS: Primary | ICD-10-CM

## 2020-03-23 DIAGNOSIS — M79.7 FIBROMYALGIA: ICD-10-CM

## 2020-03-23 PROCEDURE — G2012 BRIEF CHECK IN BY MD/QHP: HCPCS | Performed by: PHYSICIAN ASSISTANT

## 2020-03-23 RX ORDER — HYDROCODONE BITARTRATE AND ACETAMINOPHEN 7.5; 325 MG/1; MG/1
TABLET ORAL
Qty: 150 TABLET | Refills: 0 | Status: SHIPPED | OUTPATIENT
Start: 2020-03-23 | End: 2020-04-23

## 2020-03-23 ASSESSMENT — PAIN SCALES - GENERAL: PAINLEVEL: WORST PAIN (10)

## 2020-03-23 NOTE — PROGRESS NOTES
"Sherie Otero is a 49 year old female who is being evaluated via a billable telephone visit.      The patient has been notified of following:     \"This telephone visit will be conducted via a call between you and your physician/provider. We have found that certain health care needs can be provided without the need for a physical exam.  This service lets us provide the care you need with a short phone conversation.  If a prescription is necessary we can send it directly to your pharmacy.  If lab work is needed we can place an order for that and you can then stop by our lab to have the test done at a later time.    If during the course of the call the physician/provider feels a telephone visit is not appropriate, you will not be charged for this service.\"     Sherie Otero complains of    Chief Complaint   Patient presents with     Pain     Telephone visit due to COVID-19     Last seen on 02/24/2020.  Recommendations at last visit:  1. Physical Therapy: continue previous PT exercises;   2. Clinical Health Psychologist:  YES, has a plan in place  3. Diagnostic Studies: none at this time  4. Medication Management:                1.  Continue  Flexeril 10mg, 1 tab TID PRN              2.  Continue hydrocodone to 7.5/325 with instructions to take 1 tablet every 4-6 hours as needed for severe pain. Max of 5 tablets/day              3.  Increase Savella to 12.5 mg in the morning and 25 mg at bedtime for 1 week then 25 mg twice daily              5.  Consider low-dose naltrexone  5. Further procedures recommended: none at this time        Follow up with this provider: 4 weeks    I have reviewed and updated the patient's Past Medical History, Social History, Family History and Medication List.    ALLERGIES  Gabapentin; Lyrica [pregabalin]; Droperidol; and Penicillins    Additional provider notes:     She is going to be having a phone visit with her rheumatologist tomorrow. She states that she is having a flare in her " spine, left knee and right ankle. She is hoping to start on Prednisone. She states this happens with the weather changes.     She states that she has had a few good days this month with the Savella. She states that she is tolerating this well.     She denies any new medical change.         Assessment:  Sherie Otero is a 48 year old female who presents today for follow up regarding her:     1. Fibromyalgia  2. Chronic low back pain  3. Cervicalgia  4. Chronic pain syndrome  5. Chronic use of opioids     Patient is tolerating Savella well.  Plan to increase the dose that she did have a few days this last month that were better than others.  We will have her take 25 mg in the morning and 50 mg at bedtime for 1 week then 50 mg twice daily.  We will continue on the 25 mg tablets in case she does not tolerated that she can go back down to the last tolerated dose.  We will continue on the Norco at the current dosing for now.  She can continue Flexeril as needed.        Plan:  Diagnosis reviewed, treatment option addressed, and risk/benifits discussed.  Self-care instructions given.  I am recommending a multidisciplinary treatment plan to help this patient better manage pain.       1. Physical Therapy: continue previous PT exercises;   2. Clinical Health Psychologist:  YES, has a plan in place  3. Diagnostic Studies: none at this time  4. Medication Management:                1.  Continue  Flexeril 10mg, 1 tab TID PRN              2.  Continue hydrocodone to 7.5/325 with instructions to take 1 tablet every 4-6 hours as needed for severe pain. Max of 5 tablets/day              3.  Increase Savella to 25 mg in the morning and 50 mg at bedtime for 1 week then 50 mg twice daily              5.  Consider low-dose naltrexone  5. Further procedures recommended: none at this time        Follow up with this provider: 4 weeks     Phone call duration: 6 minutes    Celia Bettencourt [facility]

## 2020-03-23 NOTE — PROGRESS NOTES
The patient has been notified of following:     This telephone visit will be conducted via a call between you and your provider. We have found that certain health care needs can be provided without the need for a physical exam.  This service lets us provide the care you need with a phone conversation.  If a prescription is necessary we can send it directly to your pharmacy.  If lab work is needed we can place an order for that and you can then stop by our lab to have the test done at a later time. This is a billable service but we do not know the cost at this time.     Analy Dwyer on 3/23/2020 at 1:47 PM

## 2020-03-24 ENCOUNTER — VIRTUAL VISIT (OUTPATIENT)
Dept: RHEUMATOLOGY | Facility: CLINIC | Age: 50
End: 2020-03-24
Payer: MEDICARE

## 2020-03-24 DIAGNOSIS — G89.29 CHRONIC MIDLINE LOW BACK PAIN WITH BILATERAL SCIATICA: Primary | ICD-10-CM

## 2020-03-24 DIAGNOSIS — M54.42 CHRONIC MIDLINE LOW BACK PAIN WITH BILATERAL SCIATICA: Primary | ICD-10-CM

## 2020-03-24 DIAGNOSIS — M54.41 CHRONIC MIDLINE LOW BACK PAIN WITH BILATERAL SCIATICA: Primary | ICD-10-CM

## 2020-03-24 PROCEDURE — G2012 BRIEF CHECK IN BY MD/QHP: HCPCS | Performed by: INTERNAL MEDICINE

## 2020-03-24 RX ORDER — PREDNISONE 10 MG/1
10 TABLET ORAL DAILY
Qty: 5 TABLET | Refills: 1 | Status: SHIPPED | OUTPATIENT
Start: 2020-03-24 | End: 2020-04-27

## 2020-03-24 NOTE — PROGRESS NOTES
"Sherie Otero is a 49 year old female who is being evaluated via a billable telephone visit.      The patient has been notified of following:     \"This telephone visit will be conducted via a call between you and your physician/provider. We have found that certain health care needs can be provided without the need for a physical exam.  This service lets us provide the care you need with a short phone conversation.  If a prescription is necessary we can send it directly to your pharmacy.  If lab work is needed we can place an order for that and you can then stop by our lab to have the test done at a later time.    If during the course of the call the physician/provider feels a telephone visit is not appropriate, you will not be charged for this service.\"     Sherie Otero complains of    Chief Complaint   Patient presents with     Arthritis     RA. Flare still going on.     I have reviewed and updated the patient's Past Medical History, Social History, Family History and Medication List.    ALLERGIES  Gabapentin; Lyrica [pregabalin]; Droperidol; and Penicillins    Additional provider notes:   Sherie Otero is a 49 year old female with a past medical history significant for migraines, chronic rhinitis, anxiety, depression, asthma, NSAID induced gastritis, SHANTANU, fibromyalgia, and rheumatoid arthritis who is seen for follow-up of rheumatoid arthritis.     12/11/2018: she reported doing fairly well.  Nodule on the right fourth PIP that is not painful.  Joints are doing well.  She is off of prednisone.  Occasionally she has worsening joint pains with barometric pressure changes so she takes prednisone 10-20 mg once.  Still with back pain.  Back pain response to steroids.  She says that she has a known history of spinal stenosis.  She is following with her PCP regarding her back pain.  She says that her pain medications are being reduced for fibromyalgia.     4/11/2019: patient cancelled follow-up " "appointment    5/22/2019: patient cancelled follow-up appointment    9/19/2019: patient cancelled follow-up appointment    3/20/2020: [employer] messages from Ms. Otero: \"Hi  I have a bad arthritis flare and was wondering if you could refill my prednisone one more time and I promise to make an appointment with you when you have appointments open.\"  And \"It s my spine, one knee, and the first time my right ankle. I try stretching out it s worse in the morning and I ve tried ice. I took 20 mg of prednisone yesterday and it helped but it was the last of the prednisone I had left.\"    Today, 3/24/2020: she reports doing poorly.  Arthritis flare in her spine, knee, and ankle.  Spine pain occurs about 4x/year and radiates to both legs, worse to the right though.  Only prednisone helps with the back pain.  Activity worsens the back pain.   Knee pain is worse with activity; improves with rest.  Ankles pain is worse with activity, better with worse.  Flare hasn't changed since it started last week.      Denies fevers, chills, nausea, vomiting, constipation, diarrhea. No abdominal pain. No chest pain/pressure, palpitations, or shortness of breath. No LE swelling.  No oral or nasal sores.  No rash. No sicca symptoms.      14 point ROS completed and negative except as noted in the history of present illness.    Note that this is a telephone call as there is a COVID-19 pandemic currently and therefore social distancing is recommended.  Telephone visits are for the safety of the patients and the clinic staff.      RF/CCP  Recent Labs   Lab Test 07/31/18  1545 02/05/16  1620   CCPIGG 29* 32*   RHF 21* 21*     CBC  Recent Labs   Lab Test 01/24/20  1438 05/28/19  1249 03/25/19  1347  07/31/18  1545 01/19/17  1038   WBC 11.0 13.9* 8.4   < > 14.8* 10.3   RBC 4.38 3.95 3.98   < > 4.06 4.39   HGB 14.6 13.7 13.8   < > 13.4 14.4   HCT 43.6 41.5 41.5   < > 40.4 42.6    105* 104*   < > 100 97   RDW 12.1 13.8 13.8   < > 13.1 " 12.4    303 234   < > 223 242   MCH 33.3* 34.7* 34.7*   < > 33.0 32.8   MCHC 33.5 33.0 33.3   < > 33.2 33.8   NEUTROPHIL 73.7 46.0 65.5   < > 91.9 54.3   LYMPH 20.1 47.0 28.6   < > 6.8 31.9   MONOCYTE 4.5 4.0 4.9   < > 1.1 11.7   EOSINOPHIL 0.5 2.0 0.4   < > 0.1 1.3   BASOPHIL 0.6 1.0 0.4   < > 0.1 0.6   ANEU 8.1 6.4 5.5   < > 13.6* 5.6   ALYM 2.2 6.5* 2.4   < > 1.0 3.3   MAURIZIO 0.5 0.6 0.4   < > 0.2 1.2   AEOS  --  0.3  --   --  0.0 0.1   ABAS 0.1 0.1 0.0   < > 0.0 0.1    < > = values in this interval not displayed.     CMP  Recent Labs   Lab Test 01/24/20  1438 05/28/19  1249 03/25/19  1347  07/31/18  1545  01/12/16  1221 09/14/15  1530     --   --   --   --   --  142 140   POTASSIUM 4.4  --   --   --   --   --  4.0 3.9   CHLORIDE 107  --   --   --   --   --  106 107   CO2 33*  --   --   --   --   --  33* 28   ANIONGAP 1*  --   --   --   --   --  3 5   *  --   --   --   --   --  83 95   BUN 12  --   --   --   --   --  12 7   CR 0.82 0.96 0.84   < > 0.90  --  0.78 0.76   GFRESTIMATED 83 68 80   < > 66  --  80 82   GFRESTBLACK >90 79 >90   < > 80  --  >90   GFR Calc   >90   GFR Calc     ANDRESSA 9.2  --   --   --  9.1  --  8.5 8.4*   BILITOTAL 0.2 0.2 0.3   < > 0.2  --  0.3 0.2   ALBUMIN 3.8 3.3* 3.5   < > 3.7  --  3.9 3.3*   PROTTOTAL 8.0 7.2 6.9   < > 7.5  --  7.4 6.7*   ALKPHOS 149 151* 157*   < > 122  --  129 124   AST 15 20 19   < > 15  --  14 14   ALT 25 33 24   < > 18   < > 24 20    < > = values in this interval not displayed.     Calcium/VitaminD  Recent Labs   Lab Test 01/24/20  1438 07/31/18  1545 01/12/16  1221   ANDRESSA 9.2 9.1 8.5   VITDT  --  39 34     ESR/CRP  Recent Labs   Lab Test 05/28/19  1249 03/01/19  1306 10/30/18  1320   SED 12 10 23   CRP <2.9 11.9* 16.9*     Lipid Panel  Recent Labs   Lab Test 01/24/20  1438   CHOL 180   TRIG 144   HDL 72   LDL 76   NHDL 105     Hepatitis B  Recent Labs   Lab Test 07/31/18  1545   HBCAB Nonreactive   HEPBANG  Nonreactive     Hepatitis C  Recent Labs   Lab Test 02/05/16  1620   HCVAB Nonreactive   Assay performance characteristics have not been established for newborns,   infants, and children       Lyme ab screening  Recent Labs   Lab Test 11/29/16  1515   LYMEGM 0.12     HIV Screening  Recent Labs   Lab Test 07/31/18  1545   HIAGAB Nonreactive       Assessment/Plan:  1.  Seropositive rheumatoid arthritis (RF 21, CCP 32): Previously followed by Jeff Penaloza and [facility].  Previously on HCQ (GI upset), SSZ (patient does not recall using SSZ, this is from record reivew), MTX (toxicity monitoring noncompliance).  Inflammatory arthritis symptoms started in 1999 with sudden onset polyarticular pain/stiffness/swelling that resolved with prednisone.  Since then she has had polyarticular pain involving her MCPs, wrists, knees, and feet; and morning stiffness for at least a couple hours but worse with arthritis flares; each arthritis flare consists of worsening peripheral joint pain and stiffness and presence of neck stiffness; each time this resolves with prednisone 10-20mg daily that she typically uses for 5 days or less.  No clear synovitis on exam when I saw her previously, possibly due to prednisone use, but previous rheumatology evaluations did not see synovitis either. Therefore, in summary she has a history and labs consistent with rheumatoid arthritis but lacks physical exam findings when I first evaluated her on 7/31/2018 while on prednisone 10mg daily.  MTX 15mg once weekly started in July 2018 and was able to taper off prednisone so she did well on it, but ultimately it was stopped because of toxicity monitoring noncompliance.  Mechanical back pain is her current problem that she says responds well to prednisone 10mg daily x5days so that's what she'd like; see #3.  Prednisone sent.  Knee and ankle pain are mechanical and need in clinic eval; she says this also responds to prednisone.  Unfortunately I think Ms. Otero  would improve with other tx and needs in clinic eval; advised making an appointment to be in 4-5 months.   - Prednisone 10mg daily x5days, then stop    2.  Nodule on the right fourth PIP: Bony hypertrophy from osteoarthritis versus rheumatoid nodule.  Monitor for now.     3. Back pain: advised that she continue to follow with her PCP and consider spine surgeon eval. She reports hx of spinal stenosis. Back pain radiates to both legs, is mechanical in nature.  Advised PT and gave referral for when pandemic is over, until then I directed her to [facility] spine conditioning program handouts available online. Also see #1.      4. Cigarette Smoking: Encouraged complete cessation and discussed the health benefits.       5. Chronic Pain Syndrome / Fibromyalgia: management per PCP     Phone call duration: 8 minutes and 2 seconds, starting at 10:02     Follow up: I asked Ms. Otero to call today to schedule an appointment to be in 3-4 months. Specialty scheduling number: [phone]     Ash Donald MD  3/24/2020

## 2020-03-25 NOTE — PROGRESS NOTES
"Sherie Otero is a 49 year old female who is being evaluated via a billable telephone visit.      The patient has been notified of following:     \"This telephone visit will be conducted via a call between you and your physician/provider. We have found that certain health care needs can be provided without the need for a physical exam.  This service lets us provide the care you need with a short phone conversation.  If a prescription is necessary we can send it directly to your pharmacy.  If lab work is needed we can place an order for that and you can then stop by our lab to have the test done at a later time.    If during the course of the call the physician/provider feels a telephone visit is not appropriate, you will not be charged for this service.\"     Sherie Otero complains of    Chief Complaint   Patient presents with     Consult     PHQ 12/18/2019 1/24/2020 3/26/2020   PHQ-9 Total Score 19 10 9   Q9: Thoughts of better off dead/self-harm past 2 weeks Not at all Not at all Not at all     CELIA-7 SCORE 12/18/2019 1/24/2020 3/26/2020   Total Score 17 (severe anxiety) 19 (severe anxiety) -   Total Score 17 19 21       I have reviewed and updated the patient's Past Medical History, Social History, Family History and Medication List.    ALLERGIES  Gabapentin; Lyrica [pregabalin]; Droperidol; and Penicillins    Additional provider notes:   Pt last seen by Amanda Blanchard NP (psychiatric NP in [place]) for follow-up on 01/27/20 at which time she:      Continued Cymbalta as managed by pain management, currently on taper.    Continued Klonopin 0.5mg twice daily as needed for anxiety.    Increased olanzapine to 2.5mg every morning and 5mg at 2pm and 5mg at bedtime.    Today the pt reports ongoing anxiety and panic attacks. Feels the olanzapine has been somewhat helpful and has continued this at the prescribed dose. Still having frequent panic attacks. Was weaned completely off Cymbalta to start Savella with pain " provider. Only at lower dose of Savella currently. Will be increasing Savella dose this next Tuesday. Has been on before and tolerated well. She is sleeping better on olanzapine. Has been trying to get the house together and more organized. Is doing a lot of reading during the day. Trying to stay distracted. Still struggles with a lot of pain. Denies any thoughts of suicide or self-harm.     Pt reports a history of taking Adderall for attention deficit problems. She wonders if this would be helpful due to poor focus/concentration, difficulty completing tasks, and other attention problems. She reports she has never been formally tested and inquires if this would be possible.     Assessment/Plan:  296.31 (F33.0) Major Depressive Disorder, Recurrent Episode, Mild _  300.02 (F41.1) Generalized Anxiety Disorder  309.81 (F43.10) Posttraumatic Stress Disorder (includes Posttraumatic Stress Disorder for Children 6 Years and Younger)  Without dissociative symptoms  Panic Disorder   Hx of self-reported ADD/ADHD    Pt with ongoing anxiety and panic and also poorly controlled pain (all likely playing into one another). Coronavirus situation also not helpful for anxiety. Still no therapy. Remains benzodiazepine dependent. Pt bummed I declined a dose increase. Educated pt on risks of long-term benzodiazepine use and need to continue to increase dose to get same effect (tolerance). She is currently at lower dose than she has been on in past. I discussed possibility of suboxone. I feel this might be a good option for her to manage pain but also to help decrease anxiety.      Some of her ongoing and recent worsening anxiety likely related to tapering off her Cymbalta. Hopefully she will receive similar antianxiety benefits with Savella. Savella currently being titrated.     We will start a trial with clonidine to see if this will help dampen her fight/flight system a little. May also be helpful as she tries to decrease her pain  meds. She is on verapamil for headaches but wonders if it actually does much. No BP today due to phone visit but recent pressures on verapamil have been 130-120/80-70 past few months.     Pt admitted to taking a xanax she had left over and inquired about getting/using xanax for acute anxiety along with the Klonopin. I discussed this is dangerous and that she should avoid taking xanax and klonopin together as they are both benzodiazepines and can further decrease respiratory drive and potentially lead to death (especially given her opioid dependence). I also recommended safe disposal of such medication that is not being used.     Also discussed how prednisone can worsen anxiety and mood symptoms, especially in those with underlying mental health conditions. Pt currently on a steroid burst for arthritis flare.     Referral placed for ADD/ADHD testing at request of patient. I discussed I almost never prescribe stimulants for ADD/ADHD (particularly in adults) unless psychological testing has been completed.     Consider updated urine drug screen at next visit.     Treatment Plan:    Continue Savella titration per pain med provider    Continue Klonopin 0.5 mg twice daily as needed for anxiety/panic    Continue olanzapine 2.5 mg in AM, 5 mg in afternoon, and 5 mg at bedtime for anxiety/panic/mood.     Start clonidine 0.1 mg twice daily. Can take half tab (0.05 mg twice daily if 0.1 mg too sedating).     Instructed to attempt to get blood pressure checked between now and next visit (ie purchase BP cuff if finances allow, have nurse visit, check at drug store, etc).     If clonidine helpful for anxiety but BP on lower end, may need to consider working with pcp to decrease or discontinue verapamil since pt feels does not do much.      Referral placed for ADD/ADHD testing. Pt requested.     Continue to STRONGLY recommend therapy.     Continue all other cares per primary care provider.     Continue all other medications as  reviewed per electronic medical record today.     Safety plan reviewed. To the Emergency Department as needed or call after hours crisis line at [phone] or [phone]. [place] Crisis Text Line. Text [place] to 470574 or Suicide LifeLine Chat: suicidepreventionlifeline.org/chat    Schedule an appointment with me in 4-6 weeks or sooner as needed. Call [facility] at [phone] to schedule.    Follow up with primary care provider as planned or for acute medical concerns.    Call the psychiatric nurse line with medication questions or concerns at [phone].    [employer] may be used to communicate with your provider, but this is not intended to be used for emergencies.    Phone call duration: 29 minutes  Start: 1:24p  Stop: 1:43p    Jacinta Richardson, DO  [facility]     Pt will continue to be seen for treatment and stabilization. May need to consider long-term psychiatric care if available due to pt's chronic and difficult-to-treat sxs. May be more appropriate for long-term psychiatric care.

## 2020-03-26 ENCOUNTER — VIRTUAL VISIT (OUTPATIENT)
Dept: PSYCHIATRY | Facility: OTHER | Age: 50
End: 2020-03-26
Payer: MEDICARE

## 2020-03-26 DIAGNOSIS — R41.840 ATTENTION DEFICIT: ICD-10-CM

## 2020-03-26 DIAGNOSIS — F33.0 MAJOR DEPRESSIVE DISORDER, RECURRENT EPISODE, MILD (H): Primary | ICD-10-CM

## 2020-03-26 DIAGNOSIS — F43.10 PTSD (POST-TRAUMATIC STRESS DISORDER): ICD-10-CM

## 2020-03-26 DIAGNOSIS — F41.0 PANIC DISORDER WITHOUT AGORAPHOBIA: ICD-10-CM

## 2020-03-26 DIAGNOSIS — F41.1 GENERALIZED ANXIETY DISORDER: ICD-10-CM

## 2020-03-26 PROCEDURE — G2012 BRIEF CHECK IN BY MD/QHP: HCPCS | Performed by: PSYCHIATRY & NEUROLOGY

## 2020-03-26 ASSESSMENT — PATIENT HEALTH QUESTIONNAIRE - PHQ9
5. POOR APPETITE OR OVEREATING: NEARLY EVERY DAY
SUM OF ALL RESPONSES TO PHQ QUESTIONS 1-9: 9

## 2020-03-26 ASSESSMENT — ANXIETY QUESTIONNAIRES
1. FEELING NERVOUS, ANXIOUS, OR ON EDGE: NEARLY EVERY DAY
7. FEELING AFRAID AS IF SOMETHING AWFUL MIGHT HAPPEN: NEARLY EVERY DAY
6. BECOMING EASILY ANNOYED OR IRRITABLE: NEARLY EVERY DAY
GAD7 TOTAL SCORE: 21
IF YOU CHECKED OFF ANY PROBLEMS ON THIS QUESTIONNAIRE, HOW DIFFICULT HAVE THESE PROBLEMS MADE IT FOR YOU TO DO YOUR WORK, TAKE CARE OF THINGS AT HOME, OR GET ALONG WITH OTHER PEOPLE: EXTREMELY DIFFICULT
3. WORRYING TOO MUCH ABOUT DIFFERENT THINGS: NEARLY EVERY DAY
2. NOT BEING ABLE TO STOP OR CONTROL WORRYING: NEARLY EVERY DAY
5. BEING SO RESTLESS THAT IT IS HARD TO SIT STILL: NEARLY EVERY DAY

## 2020-03-27 ASSESSMENT — ANXIETY QUESTIONNAIRES: GAD7 TOTAL SCORE: 21

## 2020-03-29 ENCOUNTER — MYC MEDICAL ADVICE (OUTPATIENT)
Dept: PSYCHIATRY | Facility: CLINIC | Age: 50
End: 2020-03-29

## 2020-03-29 ENCOUNTER — MYC MEDICAL ADVICE (OUTPATIENT)
Dept: PSYCHIATRY | Facility: OTHER | Age: 50
End: 2020-03-29

## 2020-03-29 RX ORDER — CLONIDINE HYDROCHLORIDE 0.1 MG/1
0.1 TABLET ORAL 2 TIMES DAILY
Qty: 60 TABLET | Refills: 1 | Status: SHIPPED | OUTPATIENT
Start: 2020-03-29 | End: 2020-04-27

## 2020-03-29 RX ORDER — CLONAZEPAM 0.5 MG/1
0.5 TABLET ORAL 2 TIMES DAILY PRN
Qty: 60 TABLET | Refills: 0 | Status: SHIPPED | OUTPATIENT
Start: 2020-04-01 | End: 2020-04-27

## 2020-03-29 NOTE — PATIENT INSTRUCTIONS
Treatment Plan:    Continue Savella titration per pain med provider    Continue Klonopin 0.5 mg twice daily as needed for anxiety/panic    Continue olanzapine 2.5 mg in AM, 5 mg in afternoon, and 5 mg at bedtime for anxiety/panic/mood.     Start clonidine 0.1 mg twice daily. Can take half tab (0.05 mg twice daily if 0.1 mg too sedating).     Instructed to attempt to get blood pressure checked between now and next visit (ie purchase BP cuff if finances allow, have nurse visit, check at drug store, etc).     If clonidine helpful for anxiety but BP on lower end, may need to consider working with pcp to decrease or discontinue verapamil since pt feels does not do much.      Referral placed for ADD/ADHD testing. Pt requested.     Continue to STRONGLY recommend therapy.     Continue all other cares per primary care provider.     Continue all other medications as reviewed per electronic medical record today.     Safety plan reviewed. To the Emergency Department as needed or call after hours crisis line at [phone] or [phone]. [place] Crisis Text Line. Text [place] to 830947 or Suicide LifeLine Chat: suicidepreventionlifeline.org/chat    Schedule an appointment with me in 4-6 weeks or sooner as needed. Call [facility] at [phone] to schedule.    Follow up with primary care provider as planned or for acute medical concerns.    Call the psychiatric nurse line with medication questions or concerns at [phone].    MyChart may be used to communicate with your provider, but this is not intended to be used for emergencies.

## 2020-03-30 DIAGNOSIS — M54.50 CHRONIC BILATERAL LOW BACK PAIN WITHOUT SCIATICA: ICD-10-CM

## 2020-03-30 DIAGNOSIS — G89.4 CHRONIC PAIN SYNDROME: ICD-10-CM

## 2020-03-30 DIAGNOSIS — M79.7 FIBROMYALGIA: ICD-10-CM

## 2020-03-30 DIAGNOSIS — G89.29 CHRONIC BILATERAL LOW BACK PAIN WITHOUT SCIATICA: ICD-10-CM

## 2020-03-30 DIAGNOSIS — M54.2 CERVICALGIA: ICD-10-CM

## 2020-03-30 NOTE — TELEPHONE ENCOUNTER
Routing to provider for review.    Yasmin Jackson, RN    Nurse Liaison  [facility]

## 2020-03-30 NOTE — TELEPHONE ENCOUNTER
Patient responded to provider's message later in the day.   No action needed.    Yasmin Jackson RN    Nurse Liaison  [facility]

## 2020-03-31 RX ORDER — KETOROLAC TROMETHAMINE 10 MG/1
TABLET, FILM COATED ORAL
Qty: 10 TABLET | Refills: 0 | Status: SHIPPED | OUTPATIENT
Start: 2020-03-31 | End: 2020-04-20

## 2020-03-31 NOTE — TELEPHONE ENCOUNTER
Ketorolac 10 MG       Last Written Prescription Date:  3/8/2020  Last Fill Quantity: 10,   # refills: 0  Last Office Visit: 11/29/19  Future Office visit:       Routing refill request to provider for review/approval because:  Drug not on the [facility], P or [facility] refill protocol or controlled substance

## 2020-04-13 DIAGNOSIS — M79.7 FIBROMYALGIA: ICD-10-CM

## 2020-04-13 NOTE — TELEPHONE ENCOUNTER
Pending Prescriptions:                       Disp   Refills    milnacipran (SAVELLA) 25 MG TABS tablet   120 ta*0            Sig: Take 1 tablet in the morning and 2 tablets at           bedtime for 1 week, then 2 tablets twice daily    Last refill 03/23/2020  Last office visit 3/23/2020 tele visit    Next appointment No future appointment     Jaymie Merrill MA

## 2020-04-19 DIAGNOSIS — G89.29 CHRONIC BILATERAL LOW BACK PAIN WITHOUT SCIATICA: ICD-10-CM

## 2020-04-19 DIAGNOSIS — M79.7 FIBROMYALGIA: ICD-10-CM

## 2020-04-19 DIAGNOSIS — M54.50 CHRONIC BILATERAL LOW BACK PAIN WITHOUT SCIATICA: ICD-10-CM

## 2020-04-19 DIAGNOSIS — M54.2 CERVICALGIA: ICD-10-CM

## 2020-04-19 DIAGNOSIS — G89.4 CHRONIC PAIN SYNDROME: ICD-10-CM

## 2020-04-20 RX ORDER — KETOROLAC TROMETHAMINE 10 MG/1
TABLET, FILM COATED ORAL
Qty: 10 TABLET | Refills: 0 | Status: SHIPPED | OUTPATIENT
Start: 2020-04-20 | End: 2020-05-04

## 2020-04-20 NOTE — TELEPHONE ENCOUNTER
Ketorolac 10 MG       Last Written Prescription Date:  3/31/2020  Last Fill Quantity: 10,   # refills: 0  Last Office Visit: 11/29/19  Future Office visit:       Routing refill request to provider for review/approval because:  Drug not on the [facility], P or [facility] refill protocol or controlled substance
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
## 2020-04-23 ENCOUNTER — VIRTUAL VISIT (OUTPATIENT)
Dept: PALLIATIVE MEDICINE | Facility: CLINIC | Age: 50
End: 2020-04-23
Payer: MEDICARE

## 2020-04-23 DIAGNOSIS — G89.4 CHRONIC PAIN SYNDROME: ICD-10-CM

## 2020-04-23 DIAGNOSIS — M79.7 FIBROMYALGIA: ICD-10-CM

## 2020-04-23 DIAGNOSIS — M54.2 CERVICALGIA: ICD-10-CM

## 2020-04-23 DIAGNOSIS — M54.50 CHRONIC BILATERAL LOW BACK PAIN WITHOUT SCIATICA: ICD-10-CM

## 2020-04-23 DIAGNOSIS — G89.29 CHRONIC BILATERAL LOW BACK PAIN WITHOUT SCIATICA: ICD-10-CM

## 2020-04-23 DIAGNOSIS — F11.90 CHRONIC, CONTINUOUS USE OF OPIOIDS: Primary | ICD-10-CM

## 2020-04-23 PROCEDURE — 99441 ZZC PHYSICIAN TELEPHONE EVALUATION 5-10 MIN: CPT | Performed by: PHYSICIAN ASSISTANT

## 2020-04-23 RX ORDER — HYDROCODONE BITARTRATE AND ACETAMINOPHEN 7.5; 325 MG/1; MG/1
TABLET ORAL
Qty: 150 TABLET | Refills: 0 | Status: SHIPPED | OUTPATIENT
Start: 2020-04-23 | End: 2020-05-27

## 2020-04-23 ASSESSMENT — PAIN SCALES - GENERAL: PAINLEVEL: EXTREME PAIN (9)

## 2020-04-23 NOTE — PATIENT INSTRUCTIONS
After Visit Instructions:     Thank you for coming to [facility] for your care. It is my goal to partner with you to help you reach your optimal state of health.     I am recommending multidisciplinary care at this time.  The focus of care will be to continue gradual rehabilitation and pain management with medication adjustments as needed.    Continue daily self-care, identifying contributing factors, and monitoring variations in pain level. Continue to integrate self-care into your life.        Schedule follow-up with Celia Bettencourt PA-C in 4 weeks. You will need to make this appointment.     Medication recommendations:     Savella: increase to 50mg in AM and 75mg at bedtime x1 week, then increase to 75mg twice daily    Continue Flexeril 10mg 1 tab three times daily as needed    Continue Norco 7.5/325 1 tablet every 4-6 hours as needed for severe pain. Max of 5/day    Continue Toradol 10mg as needed. 10 tabs to last 30 days.      Celia Bettencourt PA-C  [facility]    Contact information: [facility]  Clinic Number:  [phone]     Call with any questions about your care and for scheduling assistance.     Calls are returned Monday through Friday between 8 AM and 4:30 PM. We usually get back to you within 2 business days depending on the issue/request.    If we are prescribing your medications:    For opioid medication refills, call the clinic or send a CodeS[employer] message 7 days in advance.  Please include:    Name of requested medication    Name of the pharmacy.    For non-opioid medications, call your pharmacy directly to request a refill. Please allow 3-4 days to be processed.     Per [place] State Law:    All controlled substance prescriptions must be filled within 30 days of being written.      For those controlled substances allowing refills, pickup must occur within 30 days of last fill.      We believe regular attendance is key  to your success in our program!      Any time you are unable to keep your appointment we ask that you call us at least 24 hours in advance to cancel.This will allow us to offer the appointment time to another patient.   Multiple missed appointments may lead to dismissal from the clinic.

## 2020-04-23 NOTE — PROGRESS NOTES
"Sherie Otero is a 49 year old female who is being evaluated via a billable telephone visit.      The patient has been notified of following:     \"This telephone visit will be conducted via a call between you and your physician/provider. We have found that certain health care needs can be provided without the need for a physical exam.  This service lets us provide the care you need with a short phone conversation.  If a prescription is necessary we can send it directly to your pharmacy.  If lab work is needed we can place an order for that and you can then stop by our lab to have the test done at a later time.    Telephone visits are billed at different rates depending on your insurance coverage. During this emergency period, for some insurers they may be billed the same as an in-person visit.  Please reach out to your insurance provider with any questions.    If during the course of the call the physician/provider feels a telephone visit is not appropriate, you will not be charged for this service.\"    Patient has given verbal consent for Telephone visit?  Yes    How would you like to obtain your AVS? Candie      Last seen on 03/23/2020 for a virtual visit. Recommendations at last visit included:  1. Physical Therapy: continue previous PT exercises;   2. Clinical Health Psychologist:  YES, has a plan in place  3. Diagnostic Studies: none at this time  4. Medication Management:                1.  Continue  Flexeril 10mg, 1 tab TID PRN              2.  Continue hydrocodone to 7.5/325 with instructions to take 1 tablet every 4-6 hours as needed for severe pain. Max of 5 tablets/day              3.  Increase Savella to 25 mg in the morning and 50 mg at bedtime for 1 week then 50 mg twice daily              5.  Consider low-dose naltrexone  5. Further procedures recommended: none at this time        Follow up with this provider: 4 weeks    THE 4 As OF OPIOID MAINTENANCE ANALGESIA    Analgesia: Is pain relief " clinically significant? YES   Activity: Is patient functional and able to perform Activities of Daily Living? YES   Adverse effects: Is patient free from adverse side effects from opiates? YES   Adherence to Rx protocol: Is patient adhering to Controlled Substance Agreement and taking medications ONLY as ordered? No        Is Narcan prescribed for opiate use >50 MME daily? yes        Total Daily MME: 37.5    Current Visit:  Patient states that she is doing well. She states that she is tolerating the Savella well. She states that she is doing more activity since being on the Savalla. She states that she did yard work yesterday and is hurting more today. She is interested in increasing the dose further.     She was put on clonidine for her anxiety but states that it has been making her tired. She meets with them again next week.       Assessment:  Sherie Otero is a 48 year old female who presents today for follow up regarding her:     1. Fibromyalgia  2. Chronic low back pain  3. Cervicalgia  4. Chronic pain syndrome  5. Chronic use of opioids       Patient is tolerating Savella well.  Plan to increase the dose. No other changes.         Plan:  Diagnosis reviewed, treatment option addressed, and risk/benifits discussed.  Self-care instructions given.  I am recommending a multidisciplinary treatment plan to help this patient better manage pain.       1. Physical Therapy: continue previous PT exercises;   2. Clinical Health Psychologist:  YES, has a plan in place  3. Diagnostic Studies: none at this time  4. Medication Management:                1.  Continue  Flexeril 10mg, 1 tab TID PRN              2.  Continue hydrocodone to 7.5/325 with instructions to take 1 tablet every 4-6 hours as needed for severe pain. Max of 5 tablets/day              3.  Increase Savella to 50 mg in the morning and 75 mg at bedtime for 1 week then 75 mg twice daily              5.  Consider low-dose naltrexone  5. Further procedures  recommended: none at this time        Follow up with this provider: 4 weeks   Phone call duration: 8 minutes    Celia Bettencourt [facility]

## 2020-04-27 ENCOUNTER — VIRTUAL VISIT (OUTPATIENT)
Dept: PSYCHIATRY | Facility: OTHER | Age: 50
End: 2020-04-27
Payer: MEDICARE

## 2020-04-27 ENCOUNTER — TELEPHONE (OUTPATIENT)
Dept: PSYCHIATRY | Facility: CLINIC | Age: 50
End: 2020-04-27

## 2020-04-27 DIAGNOSIS — F33.0 MAJOR DEPRESSIVE DISORDER, RECURRENT EPISODE, MILD (H): ICD-10-CM

## 2020-04-27 DIAGNOSIS — F41.0 PANIC DISORDER WITHOUT AGORAPHOBIA: ICD-10-CM

## 2020-04-27 DIAGNOSIS — F43.10 PTSD (POST-TRAUMATIC STRESS DISORDER): ICD-10-CM

## 2020-04-27 DIAGNOSIS — F33.1 MODERATE EPISODE OF RECURRENT MAJOR DEPRESSIVE DISORDER (H): ICD-10-CM

## 2020-04-27 DIAGNOSIS — F41.1 GENERALIZED ANXIETY DISORDER: Primary | ICD-10-CM

## 2020-04-27 PROCEDURE — 99214 OFFICE O/P EST MOD 30 MIN: CPT | Performed by: PSYCHIATRY & NEUROLOGY

## 2020-04-27 RX ORDER — OLANZAPINE 5 MG/1
5 TABLET ORAL 3 TIMES DAILY
Qty: 90 TABLET | Refills: 1 | Status: SHIPPED | OUTPATIENT
Start: 2020-04-27 | End: 2020-06-01

## 2020-04-27 RX ORDER — CLONAZEPAM 0.5 MG/1
0.5 TABLET ORAL 2 TIMES DAILY PRN
Qty: 60 TABLET | Refills: 0 | Status: SHIPPED | OUTPATIENT
Start: 2020-05-02 | End: 2020-06-01

## 2020-04-27 RX ORDER — CLONIDINE HYDROCHLORIDE 0.1 MG/1
0.05 TABLET ORAL 2 TIMES DAILY
Qty: 30 TABLET | Refills: 1 | Status: SHIPPED | OUTPATIENT
Start: 2020-04-27 | End: 2020-06-01

## 2020-04-27 ASSESSMENT — ANXIETY QUESTIONNAIRES
7. FEELING AFRAID AS IF SOMETHING AWFUL MIGHT HAPPEN: NEARLY EVERY DAY
6. BECOMING EASILY ANNOYED OR IRRITABLE: NEARLY EVERY DAY
IF YOU CHECKED OFF ANY PROBLEMS ON THIS QUESTIONNAIRE, HOW DIFFICULT HAVE THESE PROBLEMS MADE IT FOR YOU TO DO YOUR WORK, TAKE CARE OF THINGS AT HOME, OR GET ALONG WITH OTHER PEOPLE: EXTREMELY DIFFICULT
1. FEELING NERVOUS, ANXIOUS, OR ON EDGE: NEARLY EVERY DAY
2. NOT BEING ABLE TO STOP OR CONTROL WORRYING: NEARLY EVERY DAY
3. WORRYING TOO MUCH ABOUT DIFFERENT THINGS: NEARLY EVERY DAY
5. BEING SO RESTLESS THAT IT IS HARD TO SIT STILL: NEARLY EVERY DAY
GAD7 TOTAL SCORE: 21

## 2020-04-27 ASSESSMENT — PATIENT HEALTH QUESTIONNAIRE - PHQ9
5. POOR APPETITE OR OVEREATING: NEARLY EVERY DAY
SUM OF ALL RESPONSES TO PHQ QUESTIONS 1-9: 14

## 2020-04-27 NOTE — TELEPHONE ENCOUNTER
Reason for call:  Medication   If this is a refill request, has the caller requested the refill from the pharmacy already? N/A  Will the patient be using a [facility]? Yes  Name of the pharmacy and phone number for the current request:   [facility] [place], [place] - [address]  (Pharmacy) [phone]         Name of the medication requested: OLANZapine (ZYPREXA) 5 MG tablet    Other request: Ruchi (pharmacist) wants to verify directions.    Phone number to reach patient: N/A    Best Time:  Anytime    Can we leave a detailed message on this number?  Not Applicable    Travel screening: Not Applicable

## 2020-04-27 NOTE — PATIENT INSTRUCTIONS
Treatment Plan:    Continue clonidine at 0.5 mg twice daily for anxiety    Continue Klonopin 0.5 mg twice daily for anxiety. Can take 1 mg together on really bad days if needed (but not to ever exceed 1 mg total in one day). May make you more tired to take 1 mg so do not drive or operate machinery that demands full-attention until you know how it will affect you.     Increase olanzapine to 5 mg three times daily for mood, anxiety, agitation/irritability.     Continue titration of Savella with your pain provider.     Continue to STRONGLY recommend individual therapy and also DBT.     Call to schedule for threrapy, DBT, and ADHD testing. #[phone]    Continue all other medications as reviewed per electronic medical record today.     Safety plan reviewed. To the Emergency Department as needed or call after hours crisis line at [phone] or [phone]. [place] Crisis Text Line. Text [place] to 483749 or Suicide LifeLine Chat: suicidepreventionlifeline.org/chat    Schedule an appointment with me in 4 weeks or sooner as needed. Call [facility] at [phone] to schedule.    Follow up with primary care provider as planned or for acute medical concerns.    Call the psychiatric nurse line with medication questions or concerns at [phone].    weeSpringt may be used to communicate with your provider, but this is not intended to be used for emergencies.    Great ADHD resource!   [email or web address]     Risks of benzodiazepine (Ativan, Xanax, Klonopin, Valium, etc) use including, but not limited to, sedation, tolerance, risk for addiction/dependence. Do not drink alcohol while taking benzodiazepines due to risk of trouble breathing and potential death. Do not drive or operate heavy machinery until it is known how the drug affects you. Discuss with physician or pharmacist before ever taking a benzodiazepine with a narcotic/opioid pain medication.

## 2020-04-27 NOTE — TELEPHONE ENCOUNTER
Phone call to pharmacy. Olanzapine rx clarified per Dr Richardson's last tx plan 4/24/20:      Continue clonidine at 0.5 mg twice daily for anxiety    Continue Klonopin 0.5 mg twice daily for anxiety. Can take 1 mg together on really bad days if needed (but not to ever exceed 1 mg total in one day). May make you more tired to take 1 mg so do not drive or operate machinery that demands full-attention until you know how it will affect you.     Increase olanzapine to 5 mg three times daily for mood, anxiety, agitation/irritability.     Kati Meade RN on 4/27/2020 at 1:42 PM

## 2020-04-28 ASSESSMENT — ANXIETY QUESTIONNAIRES: GAD7 TOTAL SCORE: 21

## 2020-04-29 ENCOUNTER — MYC MEDICAL ADVICE (OUTPATIENT)
Dept: PSYCHIATRY | Facility: CLINIC | Age: 50
End: 2020-04-29

## 2020-04-30 NOTE — TELEPHONE ENCOUNTER
I spoke to [facility] pharmacy. The sold Sherie her Clonazepam on 4/2/20 for a 30 day supply. Can fill 5/2/20.

## 2020-05-01 DIAGNOSIS — M54.50 CHRONIC BILATERAL LOW BACK PAIN WITHOUT SCIATICA: ICD-10-CM

## 2020-05-01 DIAGNOSIS — G89.29 CHRONIC BILATERAL LOW BACK PAIN WITHOUT SCIATICA: ICD-10-CM

## 2020-05-01 DIAGNOSIS — G89.4 CHRONIC PAIN SYNDROME: ICD-10-CM

## 2020-05-01 DIAGNOSIS — M54.2 CERVICALGIA: ICD-10-CM

## 2020-05-01 DIAGNOSIS — M79.7 FIBROMYALGIA: ICD-10-CM

## 2020-05-04 RX ORDER — KETOROLAC TROMETHAMINE 10 MG/1
TABLET, FILM COATED ORAL
Qty: 10 TABLET | Refills: 0 | Status: SHIPPED | OUTPATIENT
Start: 2020-05-04 | End: 2020-06-24

## 2020-05-04 NOTE — TELEPHONE ENCOUNTER
Toradol      Last Written Prescription Date:  4/20/2020  Last Fill Quantity: 10,   # refills: 0  Last Office Visit: 11/29/2019  Future Office visit:    Next 5 appointments (look out 90 days)    May 19, 2020 10:00 AM CDT  Telephone Visit with Addie Anguiano, [facility] ([facility]) [address]  [place] [facility] [place] [address]  [phone]           Routing refill request to provider for review/approval because:  Drug not on the [facility], [facility] or  Health refill protocol or controlled substance

## 2020-05-19 ENCOUNTER — VIRTUAL VISIT (OUTPATIENT)
Dept: PSYCHOLOGY | Facility: CLINIC | Age: 50
End: 2020-05-19
Attending: NURSE PRACTITIONER
Payer: MEDICARE

## 2020-05-19 DIAGNOSIS — F43.10 POST TRAUMATIC STRESS DISORDER: ICD-10-CM

## 2020-05-19 DIAGNOSIS — F33.1 MAJOR DEPRESSIVE DISORDER, RECURRENT EPISODE, MODERATE WITH ANXIOUS DISTRESS (H): Primary | ICD-10-CM

## 2020-05-19 PROCEDURE — 99207 ZZC NO BILLABLE SERVICE THIS VISIT: CPT | Mod: TEL | Performed by: SOCIAL WORKER

## 2020-05-19 ASSESSMENT — ANXIETY QUESTIONNAIRES
6. BECOMING EASILY ANNOYED OR IRRITABLE: NEARLY EVERY DAY
5. BEING SO RESTLESS THAT IT IS HARD TO SIT STILL: NEARLY EVERY DAY
GAD7 TOTAL SCORE: 21
3. WORRYING TOO MUCH ABOUT DIFFERENT THINGS: NEARLY EVERY DAY
2. NOT BEING ABLE TO STOP OR CONTROL WORRYING: NEARLY EVERY DAY
IF YOU CHECKED OFF ANY PROBLEMS ON THIS QUESTIONNAIRE, HOW DIFFICULT HAVE THESE PROBLEMS MADE IT FOR YOU TO DO YOUR WORK, TAKE CARE OF THINGS AT HOME, OR GET ALONG WITH OTHER PEOPLE: EXTREMELY DIFFICULT
4. TROUBLE RELAXING: NEARLY EVERY DAY
7. FEELING AFRAID AS IF SOMETHING AWFUL MIGHT HAPPEN: NEARLY EVERY DAY
1. FEELING NERVOUS, ANXIOUS, OR ON EDGE: NEARLY EVERY DAY

## 2020-05-19 ASSESSMENT — PATIENT HEALTH QUESTIONNAIRE - PHQ9: SUM OF ALL RESPONSES TO PHQ QUESTIONS 1-9: 10

## 2020-05-19 NOTE — PROGRESS NOTES
"                 Progress Note - Initial Session    Client Name:  Sherie Otero Date: 5/19/2020         Service Type: Phone Visit     Session Start Time: 10:00  Session End Time: 10:50 am     Session Length:   50 minutes    Session #:   1    Attendees: Client    Reason for Telemedicine Visit: Services only offered telehealth    Originating Site (Patient Location): Patient's home    Distant Site (Provider Location): Provider Remote Setting     The patient has been notified of the following:      \"We have found that certain health care needs can be provided without the need for a face to face visit.  This service lets us provide the care you need with a phone conversation.       I will have full access to your [place] medical record during this entire phone call.   I will be taking notes for your medical record.      Since this is like an office visit, we will bill your insurance company for this service.       There are potential benefits and risks of telephone visits (e.g. limits to patient confidentiality) that differ from in-person visits.?  Confidentiality still applies for telephone services, and nobody will record the visit.  It is important to be in a quiet, private space that is free of distractions (including cell phone or other devices) during the visit.??      If during the course of the call I believe a telephone visit is not appropriate, you will not be charged for this service\"     Consent has been obtained for this service by care team member: Yes    DATA:  Diagnostic Assessment in progress.  Unable to complete documentation at the conclusion of the first session due to needing to spend time with patient building rapport, patient reported high anxiety today.  Also time was needed to complete questionnaires.      Interactive Complexity: No  Crisis: No    Intervention:  Rapport Building   Assessing for safety  Normalizing patient experiences  Reflective Listening      ASSESSMENT:  Mental Status " Assessment:  Appearance:   N/A due to phone session   Eye Contact:   N/A due to phone session   Psychomotor Behavior: N/A due to phone session   Attitude:   Cooperative  Guarded   Orientation:   All  Speech   Rate / Production: Normal/ Responsive   Volume:  Normal   Mood:    Anxious  Depressed  Irritable  Sad   Affect:    Appropriate  Tearful  Thought Content:  Perservative  Rumination   Thought Form:  Logical  Tangential   Insight:    Fair       Safety Issues and Plan for Safety and Risk Management:     [place] Suicide Severity Rating Scale (Lifetime/Recent)  [place] Suicide Severity Rating (Lifetime/Recent) 6/25/2019   1. Wish to be Dead (Lifetime) No   1. Wish to be Dead (Recent) No   2. Non-Specific Active Suicidal Thoughts (Lifetime) No   2. Non-Specific Active Suicidal Thoughts (Recent) No   3. Active Suicidal Ideation with any Methods (Not Plan) Without Intent to Act (Lifetime) No   3. Active Sucidal Ideation with any Methods (Not Plan) Without Intent to Act (Recent) No   4. Active Suicidal Ideation with Some Intent to Act, Without Specific Plan (Lifetime) No   4. Active Suicidal Ideation with Some Intent to Act, Without Specific Plan (Recent) No   5. Active Suicidal Ideation with Specific Plan and Intent (Lifetime) No   5. Active Suicidal Ideation with Specific Plan and Intent (Recent) No   Most Severe Ideation Rating (Lifetime) NA     Patient denies current fears or concerns for personal safety.  Patient denies current or recent suicidal ideation or behaviors.  Patient denies current or recent homicidal ideation or behaviors.  Patient denies current or recent self injurious behavior or ideation.  Patient denies other safety concerns.  Recommended that patient call 911 or go to the local ED should there be a change in any of these risk factors.  Patient reports there are no firearms in the house.     Diagnostic Criteria:  A) Recurrent episode(s) - symptoms have been present during the same 2-week period  and represent a change from previous functioning 5 or more symptoms (required for diagnosis)   - Depressed mood. Note: In children and adolescents, can be irritable mood.     - Diminished interest or pleasure in all, or almost all, activities.    - Fatigue or loss of energy.    - Feelings of worthlessness or inappropriate and excessive guilt.    - Diminished ability to think or concentrate, or indecisiveness.   B) The symptoms cause clinically significant distress or impairment in social, occupational, or other important areas of functioning  A. The person has been exposed to a traumatic event in which both of the following were present:  B. The traumatic event is persistently reexperienced in one (or more) of the following ways:  C. Persistent avoidance of stimuli associated with the trauma and numbing of general responsiveness (not present before the trauma), as indicated by three (or more) of the following:  D. Persistent symptoms of increased arousal (not present before the trauma), as indicated by two (or more) of the following:  E. Duration of the disturbance is more than 1 month.  F. The disturbance causes clinically significant distress or impairment in social, occupational, or other important areas of functioning.      DSM5 Diagnoses: (Sustained by DSM5 Criteria Listed Above)  Diagnoses: 296.32 (F33.1) Major Depressive Disorder, Recurrent Episode, Moderate With anxious distress  309.81 (F43.10) Posttraumatic Stress Disorder (includes Posttraumatic Stress Disorder for Children 6 Years and Younger)  Without dissociative symptoms  Psychosocial & Contextual Factors: History of trauma, Twin daughters age 20, son age 29 with disabilities.  On disability. Reports PTSD Dx in 2009.    WHODAS 2.0 (12 item):   WHODAS 2.0 Total Score 9/10/2019   Total Score 38       Collateral Reports Completed:  Plan to coordinate care with PCP and other providers as needed.        PLAN: (Homework, other):  Plan to complete DA at  upcoming session.        Addie Anguiano, [facility]  May 26, 2020

## 2020-05-20 ASSESSMENT — ANXIETY QUESTIONNAIRES: GAD7 TOTAL SCORE: 21

## 2020-05-27 ENCOUNTER — VIRTUAL VISIT (OUTPATIENT)
Dept: PALLIATIVE MEDICINE | Facility: CLINIC | Age: 50
End: 2020-05-27
Payer: MEDICARE

## 2020-05-27 DIAGNOSIS — F11.90 CHRONIC, CONTINUOUS USE OF OPIOIDS: ICD-10-CM

## 2020-05-27 DIAGNOSIS — M54.2 CERVICALGIA: ICD-10-CM

## 2020-05-27 DIAGNOSIS — M79.7 FIBROMYALGIA: Primary | ICD-10-CM

## 2020-05-27 DIAGNOSIS — M54.50 CHRONIC BILATERAL LOW BACK PAIN WITHOUT SCIATICA: ICD-10-CM

## 2020-05-27 DIAGNOSIS — G89.4 CHRONIC PAIN SYNDROME: ICD-10-CM

## 2020-05-27 DIAGNOSIS — G89.29 CHRONIC BILATERAL LOW BACK PAIN WITHOUT SCIATICA: ICD-10-CM

## 2020-05-27 PROCEDURE — 99441 ZZC PHYSICIAN TELEPHONE EVALUATION 5-10 MIN GOVT: CPT | Performed by: PHYSICIAN ASSISTANT

## 2020-05-27 RX ORDER — HYDROCODONE BITARTRATE AND ACETAMINOPHEN 7.5; 325 MG/1; MG/1
TABLET ORAL
Qty: 150 TABLET | Refills: 0 | Status: SHIPPED | OUTPATIENT
Start: 2020-05-27 | End: 2020-06-24

## 2020-05-27 ASSESSMENT — PAIN SCALES - GENERAL: PAINLEVEL: EXTREME PAIN (9)

## 2020-05-27 NOTE — PROGRESS NOTES
"Sherie Otero is a 49 year old female who is being evaluated via a billable telephone visit.      The patient has been notified of following:     \"This telephone visit will be conducted via a call between you and your physician/provider. We have found that certain health care needs can be provided without the need for a physical exam.  This service lets us provide the care you need with a short phone conversation.  If a prescription is necessary we can send it directly to your pharmacy.  If lab work is needed we can place an order for that and you can then stop by our lab to have the test done at a later time.    Telephone visits are billed at different rates depending on your insurance coverage. During this emergency period, for some insurers they may be billed the same as an in-person visit.  Please reach out to your insurance provider with any questions.    If during the course of the call the physician/provider feels a telephone visit is not appropriate, you will not be charged for this service.\"    Patient has given verbal consent for Telephone visit?  Yes    How would you like to obtain your AVS? [facility]     CHIEF COMPLAINT: Pain  -Fibromyalgia  -Neck pain  -Low back pain     INTERVAL HISTORY:  Last seen on 04/23/2020 for a virtual visit.     Recommendations/plan at the last visit included:  1. Physical Therapy: continue previous PT exercises;   2. Clinical Health Psychologist:  YES, has a plan in place  3. Diagnostic Studies: none at this time  4. Medication Management:                1.  Continue  Flexeril 10mg, 1 tab TID PRN              2.  Continue hydrocodone to 7.5/325 with instructions to take 1 tablet every 4-6 hours as needed for severe pain. Max of 5 tablets/day              3.  Increase Savella to 50 mg in the morning and 75 mg at bedtime for 1 week then 75 mg twice daily              5.  Consider low-dose naltrexone  5. Further procedures " recommended: none at this time        Follow up with this provider: 4 weeks     Since her last visit, Sherie Otero reports:    She states that she continues to have a lot of pain. She states that she has a lot of anxiety and the increased humidity. She has been working with psychiatry and has started counseling as well.       THE 4 As OF OPIOID MAINTENANCE ANALGESIA    Analgesia: Is pain relief clinically significant? YES   Activity: Is patient functional and able to perform Activities of Daily Living? YES   Adverse effects: Is patient free from adverse side effects from opiates? YES   Adherence to Rx protocol: Is patient adhering to Controlled Substance Agreement and taking medications ONLY as ordered? No        Is Narcan prescribed for opiate use >50 MME daily? yes        Total Daily MME: 37.5      Assessment:  Sherie Otero is a 49 year old female who presents today for follow up regarding her:     1. Fibromyalgia  2. Chronic low back pain  3. Cervicalgia  4. Chronic pain syndrome  5. Chronic use of opioids     Worsening pain with increased anxiety and humid weather. She has started to work with counseling and continues to work with psychiatry as well.            Plan:  Diagnosis reviewed, treatment option addressed, and risk/benifits discussed.  Self-care instructions given.  I am recommending a multidisciplinary treatment plan to help this patient better manage pain.       1. Physical Therapy: continue previous PT exercises;   2. Clinical Health Psychologist:  YES, has a plan in place  3. Diagnostic Studies: none at this time  4. Medication Management:                1.  Continue  Flexeril 10mg, 1 tab TID PRN              2.  Continue hydrocodone to 7.5/325 with instructions to take 1 tablet every 4-6 hours as needed for severe pain. Max of 5 tablets/day              3.  Continue Savella 75mg twice daily, if anxiety becomes better we could consider increasing, if it worsens, we may want to taper to make  sure it is not contributing to anxiety.              5.  Consider low-dose naltrexone  5. Further procedures recommended: none at this time        Follow up with this provider: 4 weeks   Phone call duration: 7 minutes    Celia Bettencourt [facility]

## 2020-05-27 NOTE — PATIENT INSTRUCTIONS
After Visit Instructions:     Thank you for coming to [facility] for your care. It is my goal to partner with you to help you reach your optimal state of health.     I am recommending multidisciplinary care at this time.  The focus of care will be to continue gradual rehabilitation and pain management with medication adjustments as needed.    Continue daily self-care, identifying contributing factors, and monitoring variations in pain level. Continue to integrate self-care into your life.        Schedule pain psychology assessment/visit: continue current plan    Schedule follow-up with Celia Bettencourt PA-C in 4 weeks. You will need to make this appointment.     Medication recommendations:     Continue savella 75mg twice daily    Continue Norco 7.5/325: 1 tab every 4-6 hours as needed. Max 5/day.      Celia Bettencourt PA-C  [facility]    Contact information: [facility]  Clinic Number:  [phone]     Call with any questions about your care and for scheduling assistance.     Calls are returned Monday through Friday between 8 AM and 4:30 PM. We usually get back to you within 2 business days depending on the issue/request.    If we are prescribing your medications:    For opioid medication refills, call the clinic or send a divorce360 message 7 days in advance.  Please include:    Name of requested medication    Name of the pharmacy.    For non-opioid medications, call your pharmacy directly to request a refill. Please allow 3-4 days to be processed.     Per [place] State Law:    All controlled substance prescriptions must be filled within 30 days of being written.      For those controlled substances allowing refills, pickup must occur within 30 days of last fill.      We believe regular attendance is key to your success in our program!      Any time you are unable to keep your appointment we ask that you call us at least 24 hours in  advance to cancel.This will allow us to offer the appointment time to another patient.   Multiple missed appointments may lead to dismissal from the clinic.

## 2020-05-29 ENCOUNTER — VIRTUAL VISIT (OUTPATIENT)
Dept: PSYCHOLOGY | Facility: CLINIC | Age: 50
End: 2020-05-29
Payer: MEDICARE

## 2020-05-29 DIAGNOSIS — F33.1 MAJOR DEPRESSIVE DISORDER, RECURRENT EPISODE, MODERATE WITH ANXIOUS DISTRESS (H): Primary | ICD-10-CM

## 2020-05-29 DIAGNOSIS — F43.10 POST TRAUMATIC STRESS DISORDER: ICD-10-CM

## 2020-05-29 PROCEDURE — 90791 PSYCH DIAGNOSTIC EVALUATION: CPT | Mod: 95 | Performed by: SOCIAL WORKER

## 2020-05-29 NOTE — PROGRESS NOTES
Subjective    Sherie Otero is a 49 year old female who presents to clinic today with {Side:5061} because of:  No chief complaint on file.     HPI   {Chronic and Acute Problems:724375}  {additional problems for the provider to add (optional):581110}    Review of Systems  {ROS Choices (Optional):604701}    Problem List  Patient Active Problem List    Diagnosis Date Noted     Balance problems 07/25/2019     Priority: Medium     Chronic, continuous use of opioids 11/03/2018     Priority: Medium     Chronically on benzodiazepine therapy 11/03/2018     Priority: Medium     Cervical cancer screening      Priority: Medium     2011 ablation  2015 NIL pap. Plan: pap in 3 years.  8/1/18 NIL pap, neg HR HPV. cotest in 5 years.       Pelvic pain in female 08/01/2018     Priority: Medium     Chronic rhinitis 05/14/2018     Priority: Medium     Other migraine without status migrainosus, intractable 03/21/2017     Priority: Medium     Pulmonary nodules 01/19/2017     Priority: Medium     Abnormal CT of the chest 01/19/2017     Priority: Medium     Hilar lymphadenopathy 01/19/2017     Priority: Medium     Degenerative joint disease of cervical spine      Priority: Medium     multi level worst at C5-6       Osteoarthritis of cervical spine, unspecified spinal osteoarthritis complication status 03/21/2016     Priority: Medium     Panic attacks 03/01/2016     Priority: Medium     Elevated white blood cell count 01/14/2016     Priority: Medium     Rheumatoid arthritis involving multiple sites with positive rheumatoid factor (H) 01/12/2016     Priority: Medium     Intermittent asthma, uncomplicated 11/29/2015     Priority: Medium     Other chronic pain 11/18/2015     Priority: Medium     Major depressive disorder, recurrent episode, mild (H) 10/08/2015     Priority: Medium     NSAID induced gastritis 09/23/2015     Priority: Medium     Stenosis, cervical spine      Priority: Medium     C5-C7 (MRI)       Spondylitis, cervical (H)       Priority: Medium     C5-C7 (MRI)       Cervicalgia 01/09/2014     Priority: Medium     Disturbance in sleep behavior 11/05/2013     Priority: Medium     Problem list name updated by automated process. Provider to review       SHANTANU (obstructive sleep apnea) 10/25/2013     Priority: Medium     Chronic fatigue syndrome 07/02/2013     Priority: Medium              Fibromyalgia 07/02/2013     Priority: Medium     Generalized anxiety disorder 07/02/2013     Priority: Medium     Diagnosis updated by automated process. Provider to review and confirm.       Tobacco abuse 07/02/2013     Priority: Medium     CARDIOVASCULAR SCREENING; LDL GOAL LESS THAN 160 10/31/2010     Priority: Medium      Medications  cetirizine (ZYRTEC) 10 MG tablet, TAKE ONE TABLET BY MOUTH ONCE DAILY  clonazePAM (KLONOPIN) 0.5 MG tablet, Take 1 tablet (0.5 mg) by mouth 2 times daily as needed for anxiety Must last 30 days  cloNIDine (CATAPRES) 0.1 MG tablet, Take 0.5 tablets (0.05 mg) by mouth 2 times daily  cyclobenzaprine (FLEXERIL) 10 MG tablet, Take 2 tablets in the evening and 1 in the morning  fluticasone (FLONASE) 50 MCG/ACT nasal spray, SPRAY 2 SPRAYS INTO BOTH NOSTRILS DAILY.  HYDROcodone-acetaminophen (NORCO) 7.5-325 MG per tablet, Take 1 tablet every 4-6 hours as needed for severe pain. Max of 5 tablets per day. Fill 5/27/2020 Start 5/30/2020.  ketorolac (TORADOL) 10 MG tablet, TAKE ONE TABLET BY MOUTH EVERY 6 HOURS AS NEEDED FOR MODERATE PAIN  milnacipran (SAVELLA) 25 MG TABS tablet, Take 2 tablets in the morning and 3 tablets at bedtime for 1 week then take 3 tablets twice daily  naloxone (NARCAN) nasal spray, Spray 1 spray (4 mg) into one nostril alternating nostrils as needed for opioid reversal every 2-3 minutes until assistance arrives (Patient not taking: Reported on 4/27/2020)  OLANZapine (ZYPREXA) 5 MG tablet, Take 1 tablet (5 mg) by mouth 3 times daily And 1 tab(5mg) at 2pm and 1 tab(5mg) at bedtime.  VENTOLIN  (90 Base)  "MCG/ACT inhaler, INHALE 2 PUFFS INTO THE LUNGS EVERY 6 HOURS AS NEEDED FOR SHORTNESS OF BREATH, DIFFICULTY BREATHING OR WHEEZING.  verapamil (CALAN) 40 MG tablet, Take 1 tablet (40 mg) by mouth 2 times daily    No current facility-administered medications on file prior to visit.     Allergies  Allergies   Allergen Reactions     Gabapentin Shortness Of Breath     Lyrica [Pregabalin] Shortness Of Breath     Felt pressure on the throat area. jmp     Droperidol      Uncontrollable shaking       Penicillins      Reviewed and updated as needed this visit by Provider           Objective    There were no vitals taken for this visit.  Normalized weight-for-age data not available for patients older than 20 years.    Physical Exam  {Exam choices (Optional):420897}    {Diagnostics (Optional):832195::\"None\"}      Assessment & Plan    {Diagnosis Options:314780}    Follow Up  No follow-ups on file.  {other follow up (Optional):069226}    Jacinta Richardosn, DO        "
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
## 2020-06-01 ENCOUNTER — VIRTUAL VISIT (OUTPATIENT)
Dept: PSYCHIATRY | Facility: OTHER | Age: 50
End: 2020-06-01
Payer: MEDICARE

## 2020-06-01 ENCOUNTER — TELEPHONE (OUTPATIENT)
Dept: PSYCHIATRY | Facility: CLINIC | Age: 50
End: 2020-06-01

## 2020-06-01 DIAGNOSIS — F43.10 PTSD (POST-TRAUMATIC STRESS DISORDER): ICD-10-CM

## 2020-06-01 DIAGNOSIS — F41.0 PANIC DISORDER WITHOUT AGORAPHOBIA: ICD-10-CM

## 2020-06-01 DIAGNOSIS — F33.0 MAJOR DEPRESSIVE DISORDER, RECURRENT EPISODE, MILD (H): ICD-10-CM

## 2020-06-01 DIAGNOSIS — F41.1 GENERALIZED ANXIETY DISORDER: ICD-10-CM

## 2020-06-01 DIAGNOSIS — F41.1 GENERALIZED ANXIETY DISORDER: Primary | ICD-10-CM

## 2020-06-01 PROCEDURE — 99214 OFFICE O/P EST MOD 30 MIN: CPT | Mod: TEL | Performed by: PSYCHIATRY & NEUROLOGY

## 2020-06-01 RX ORDER — CLONIDINE HYDROCHLORIDE 0.1 MG/1
0.05 TABLET ORAL AT BEDTIME
Qty: 15 TABLET | Refills: 1 | Status: SHIPPED | OUTPATIENT
Start: 2020-06-01 | End: 2021-01-20

## 2020-06-01 RX ORDER — HYDROXYZINE HYDROCHLORIDE 25 MG/1
25-50 TABLET, FILM COATED ORAL 3 TIMES DAILY PRN
Qty: 90 TABLET | Refills: 0 | Status: SHIPPED | OUTPATIENT
Start: 2020-06-01 | End: 2020-07-20

## 2020-06-01 RX ORDER — ESCITALOPRAM OXALATE 5 MG/1
5 TABLET ORAL DAILY
Qty: 30 TABLET | Refills: 0 | Status: SHIPPED | OUTPATIENT
Start: 2020-06-01 | End: 2020-06-25

## 2020-06-01 RX ORDER — OLANZAPINE 5 MG/1
5 TABLET ORAL 3 TIMES DAILY
Qty: 90 TABLET | Refills: 1 | Status: SHIPPED | OUTPATIENT
Start: 2020-06-01 | End: 2020-07-05

## 2020-06-01 RX ORDER — CLONAZEPAM 0.5 MG/1
0.5 TABLET ORAL 2 TIMES DAILY PRN
Qty: 60 TABLET | Refills: 0 | Status: SHIPPED | OUTPATIENT
Start: 2020-06-01 | End: 2020-06-30

## 2020-06-01 RX ORDER — OLANZAPINE 5 MG/1
5 TABLET ORAL 3 TIMES DAILY
Qty: 90 TABLET | Refills: 1 | Status: SHIPPED | OUTPATIENT
Start: 2020-06-01 | End: 2020-06-01

## 2020-06-01 NOTE — Clinical Note
Just FYI. No action needed. She has been referred for long-term psychiatric care. I will be bridging care for up to 8-10 weeks until she can get an appt for long-term psychiatry.     -[facility]iatry

## 2020-06-01 NOTE — PROGRESS NOTES
"Sherie Otero is a 49 year old female who is being evaluated via a billable telephone visit.      The patient has been notified of following:     \"This telephone visit will be conducted via a call between you and your physician/provider. We have found that certain health care needs can be provided without the need for a physical exam.  This service lets us provide the care you need with a short phone conversation.  If a prescription is necessary we can send it directly to your pharmacy.  If lab work is needed we can place an order for that and you can then stop by our lab to have the test done at a later time.    Telephone visits are billed at different rates depending on your insurance coverage. During this emergency period, for some insurers they may be billed the same as an in-person visit.  Please reach out to your insurance provider with any questions.    If during the course of the call the physician/provider feels a telephone visit is not appropriate, you will not be charged for this service.\"    Patient has given verbal consent for Telephone visit?  Yes    What phone number would you like to be contacted at? [phone]    How would you like to obtain your AVS? [facility]        Outpatient Psychiatric Progress Note    Name: Sherie Otero   : 1970                    Primary Care Provider: Twin Castro MD   Therapist: Has recently started with LLOYD Young    PHQ-9 scores:  PHQ-9 SCORE 3/26/2020 2020 2020   PHQ-9 Total Score - - -   PHQ-9 Total Score [facility] - - -   PHQ-9 Total Score 9 14 10       CELIA-7 scores:  CELIA-7 SCORE 3/26/2020 2020 2020   Total Score - - -   Total Score 21 21 21       Patient Identification:  Patient is a 49 year old,   White American female  who presents for return visit with me.  Patient is currently disabled. Patient attended the phone session alone. Patient prefers to be called: \"Sherie\".    Interim History:  I last saw Sherie SANTIAGO" Shanna for outpatient psychiatry Return Visit on 4/27/2020. During that appointment, we:     Continue clonidine at 0.5 mg twice daily for anxiety    Continue Klonopin 0.5 mg twice daily for anxiety. Can take 1 mg together on really bad days if needed (but not to ever exceed 1 mg total in one day). May make you more tired to take 1 mg so do not drive or operate machinery that demands full-attention until you know how it will affect you.     Increase olanzapine to 5 mg three times daily for mood, anxiety, agitation/irritability.     Continue titration of Savella with your pain provider.     Continue to STRONGLY recommend individual therapy and also DBT.     Call to schedule for threrapy, DBT, and ADHD testing. #[phone]    Today, pt reports having some increased anxiety. Doesn't matter if home alone or with people in her house. Started to get worse about two weeks ago. Had construction going on in her home at that time. She is going to rent out a room in her basement and has been stressed trying to clean that out. Her son is going to rehab Thursday and pt trying to make sure he doesn't relapse. Tried taking consolidated dose of Klonopin 1 mg in AM and that was not very helpful by afternoon.  It was helpful in the morning but then it wore off too quickly she states.  She has been listening to relaxing music, listens to calming sounds on TV like ocean waves, working on deep breathing.  Clonidine in AM makes her too tired but she still takes half a tablet at bedtime which is helpful for sleep and her anxiety.  She continues to tolerate olanzapine 3 times daily well.  She is sleeping well and clonidine.  Mood is holding up okay, it seems to be mostly her anxiety that continues to be out of control.  She denies any thoughts of suicide or self-harm.  No change in drug or alcohol use status.  She continues to complain that she felt her symptoms were under good control until she switched doctors and all of her  medications got decreased/changed.    Psychiatric ROS:  Sherie SANTIAGO Shanna reports mood has been: Still a little depressed  Anxiety has been: Worse  Sleep has been: Good on clonidine  Aretha sxs: None  Psychosis sxs: None  ADHD/ADD sxs: None  PTSD sxs: Stable  PHQ9 and GAD7 scores were reviewed today.   Medication side effects: Yes: Sedation from clonidine  Current stressors include: See HPI  Coping mechanisms and supports include: Deep Breathing, Therapy, Family and Hobbies    Current medications include:   Current Outpatient Medications   Medication Sig     cetirizine (ZYRTEC) 10 MG tablet TAKE ONE TABLET BY MOUTH ONCE DAILY     clonazePAM (KLONOPIN) 0.5 MG tablet Take 1 tablet (0.5 mg) by mouth 2 times daily as needed for anxiety Must last 30 days     cloNIDine (CATAPRES) 0.1 MG tablet Take 0.5 tablets (0.05 mg) by mouth 2 times daily     cyclobenzaprine (FLEXERIL) 10 MG tablet Take 2 tablets in the evening and 1 in the morning     fluticasone (FLONASE) 50 MCG/ACT nasal spray SPRAY 2 SPRAYS INTO BOTH NOSTRILS DAILY.     HYDROcodone-acetaminophen (NORCO) 7.5-325 MG per tablet Take 1 tablet every 4-6 hours as needed for severe pain. Max of 5 tablets per day. Fill 5/27/2020 Start 5/30/2020.     ketorolac (TORADOL) 10 MG tablet TAKE ONE TABLET BY MOUTH EVERY 6 HOURS AS NEEDED FOR MODERATE PAIN     milnacipran (SAVELLA) 25 MG TABS tablet Take 2 tablets in the morning and 3 tablets at bedtime for 1 week then take 3 tablets twice daily     OLANZapine (ZYPREXA) 5 MG tablet Take 1 tablet (5 mg) by mouth 3 times daily And 1 tab(5mg) at 2pm and 1 tab(5mg) at bedtime.     VENTOLIN  (90 Base) MCG/ACT inhaler INHALE 2 PUFFS INTO THE LUNGS EVERY 6 HOURS AS NEEDED FOR SHORTNESS OF BREATH, DIFFICULTY BREATHING OR WHEEZING.     verapamil (CALAN) 40 MG tablet Take 1 tablet (40 mg) by mouth 2 times daily     naloxone (NARCAN) nasal spray Spray 1 spray (4 mg) into one nostril alternating nostrils as needed for opioid reversal  every 2-3 minutes until assistance arrives (Patient not taking: Reported on 4/27/2020)     No current facility-administered medications for this visit.        The [place] Prescription Monitoring Program has been reviewed and there are no concerns about diversionary activity for controlled substances at this time.     Past Medical/Surgical History:  Past Medical History:   Diagnosis Date     Allergic rhinitis due to other allergen      Degenerative joint disease of cervical spine 2016    multi level worst at C5-6     Generalized anxiety disorder      Hearing loss      Intrinsic asthma, unspecified      Irritable bowel syndrome      Lump or mass in breast 1996    Left breast lump 1996-- aspiration benign.     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    Im[employer] Update utility     Spondylitis, cervical (H)     C5-C7 (MRI)     Stenosis, cervical spine     C5-C7 (MRI)     Tension headache       has a past medical history of Allergic rhinitis due to other allergen, Degenerative joint disease of cervical spine (2016), Generalized anxiety disorder, Hearing loss, Intrinsic asthma, unspecified, Irritable bowel syndrome, Lump or mass in breast (1996), Other malaise and fatigue, Other specified gastritis without mention of hemorrhage, Pain in joint, lower leg, Rheumatoid arthritis(714.0), Spindle cell carcinoma (H) (8/27/2013), Spondylitis, cervical (H), Stenosis, cervical spine, and Tension headache.    Social History:  Reviewed. No changes to social history.     Vital Signs:   None. This is phone visit.     Labs:  Most recent laboratory results reviewed and no new labs.     Review of Systems:  10 systems (general, cardiovascular, respiratory, eyes, ENT, endocrine, GI, , M/S, neurological) were reviewed. Most pertinent finding(s)  is/are: chronic pain. The remaining systems are all unremarkable.    Mental Status Examination (limited as this is by phone):  Attitude:  cooperative   Oriented to:  person, place, time, and situation  Attention Span and Concentration:  normal  Speech:  clear, coherent, regular rate, rhythm, and volume  Language: intact  Mood:  anxious  Affect:  appropriate and in normal range  Associations:  no loose associations  Thought Process:  logical, linear and goal oriented  Thought Content:  no evidence of suicidal ideation or homicidal ideation, no evidence of psychotic thought, no auditory hallucinations present and no visual hallucinations present  Recent and Remote Memory:  Intact to interview. Not formally assessed. No amnesia.  Fund of Knowledge: appropriate  Insight:  fair  Judgment:  fair, adequate for safety  Impulse Control:  fair    Suicide Risk Assessment:  Today Sherie Otero reports no suicidal ideation. Based on all available evidence including the factors cited above, Sherie Otero does not appear to be at imminent risk for self-harm, does not meet criteria for a 72-hr hold, and therefore remains appropriate for ongoing outpatient level of care.  A thorough assessment of risk factors related to suicide and self-harm have been reviewed and are noted above. The patient convincingly denies suicidality on several occasions. Local community safety resources printed and reviewed for patient to use if needed. There was no deceit detected, and the patient presented in a manner that was believable.     DSM5 Diagnosis:  296.31 (F33.0) Major Depressive Disorder, Recurrent Episode, Mild _  300.02 (F41.1) Generalized Anxiety Disorder  309.81 (F43.10) Posttraumatic Stress Disorder (includes Posttraumatic Stress Disorder for Children 6 Years and Younger)  Without dissociative symptoms  Panic Disorder   Hx of self-reported ADD/ADHD    Medical comorbidities include:   Patient Active Problem List    Diagnosis Date Noted      Balance problems 07/25/2019     Priority: Medium     Chronic, continuous use of opioids 11/03/2018     Priority: Medium     Chronically on benzodiazepine therapy 11/03/2018     Priority: Medium     Cervical cancer screening      Priority: Medium     2011 ablation 2015 NIL pap. Plan: pap in 3 years.  8/1/18 NIL pap, neg HR HPV. cotest in 5 years.       Pelvic pain in female 08/01/2018     Priority: Medium     Chronic rhinitis 05/14/2018     Priority: Medium     Other migraine without status migrainosus, intractable 03/21/2017     Priority: Medium     Pulmonary nodules 01/19/2017     Priority: Medium     Abnormal CT of the chest 01/19/2017     Priority: Medium     Hilar lymphadenopathy 01/19/2017     Priority: Medium     Degenerative joint disease of cervical spine      Priority: Medium     multi level worst at C5-6       Osteoarthritis of cervical spine, unspecified spinal osteoarthritis complication status 03/21/2016     Priority: Medium     Panic attacks 03/01/2016     Priority: Medium     Elevated white blood cell count 01/14/2016     Priority: Medium     Rheumatoid arthritis involving multiple sites with positive rheumatoid factor (H) 01/12/2016     Priority: Medium     Intermittent asthma, uncomplicated 11/29/2015     Priority: Medium     Other chronic pain 11/18/2015     Priority: Medium     Major depressive disorder, recurrent episode, mild (H) 10/08/2015     Priority: Medium     NSAID induced gastritis 09/23/2015     Priority: Medium     Stenosis, cervical spine      Priority: Medium     C5-C7 (MRI)       Spondylitis, cervical (H)      Priority: Medium     C5-C7 (MRI)       Cervicalgia 01/09/2014     Priority: Medium     Disturbance in sleep behavior 11/05/2013     Priority: Medium     Problem list name updated by automated process. Provider to review       SHANTANU (obstructive sleep apnea) 10/25/2013     Priority: Medium     Chronic fatigue syndrome 07/02/2013     Priority: Medium              Fibromyalgia  07/02/2013     Priority: Medium     Generalized anxiety disorder 07/02/2013     Priority: Medium     Diagnosis updated by automated process. Provider to review and confirm.       Tobacco abuse 07/02/2013     Priority: Medium     CARDIOVASCULAR SCREENING; LDL GOAL LESS THAN 160 10/31/2010     Priority: Medium       Psychosocial & Contextual Factors:  See HPI    Assessment:  Sherie Otero reports overall stability of her mood despite some down days that she feels are manageable. Anxiety continues to be quite bad especially due to situational factors. I am encouraged she has started therapy as this will be key to some of her recovery and improvement of sxs. We discussed goals of medications and that they are intended to bridge only part of the gap for MH sx improvement and developing healthier and additional non-medication coping skills. See treatment plan below for some suggestions. I will also include a link with several more ideas.     Regarding medication, I do feel she might be in need of a little more serotonin since Jj is more so engaged with norepinephrine. Pt is agreeable to starting Lexapro. We will also start hydroxyzine so she can take this in addition to her Klonopin. I am not going to increase Klonopin. Due to her chronic and complex case, I have referred her for long-term psychiatric care and will bridge for up to 8 weeks (or within reason) until she can get in to a community clinic. She has been seen within the [facility] model since Sept 2019 with little improvement and need for frequent medication changes. She would best be served by a long-term provider with whom she can build continued rapport.     Medication side effects and alternatives were reviewed. Health promotion activities recommended and reviewed today. All questions addressed. Education and counseling completed regarding risks and benefits of medications and psychotherapy options.     Treatment Plan:    Change clonidine to 0.5 mg at  bedtime daily for anxiety/sleep    Continue Klonopin 0.5 mg twice daily for anxiety    Continue olanzapine to 5 mg three times daily for mood, anxiety, agitation/irritability.    Start Lexapro 5 mg daily for anxiety and mood    Start hydroxyzine 25-50 mg up to three times daily as needed for anxiety and sleep.    Continue Savella at direction of your pain medicine provider.     Continue individual therapy and also continue to consider DBT or day treatment program if anxiety continues to worsen.     Call to schedule DBT and ADHD testing. #[phone]    Discussed non-medication ways to manage anxiety: weighted blanket, adult coloring books, guided meditation videos on YouTube, deep breathing, washable craft paint on windows in bright colors and patterns.    You have been referred to long-term psychiatric care due to your complex and chronic mental health needs. I work within the collaborative care model meaning I typically see people on average for about 3 visits before they are referred for long-term care or back to their primary care provider for ongoing management. The referral has been placed and someone should call within the week to get you scheduled. I typically bridge care for up to 8 weeks.     Continue all other medications as reviewed per electronic medical record today.     Safety plan reviewed. To the Emergency Department as needed or call after hours crisis line at [phone] or [phone]. [place] Crisis Text Line. Text [place] to 070643 or Suicide LifeLine Chat: suicidepreventionlifeline.org/chat    Schedule an appointment with me in 4-6 weeks or sooner as needed. Call [facility] at [phone] to schedule. Cancel your appointment with me if you establish new care sooner than our follow-up appointment.     Follow up with primary care provider as planned or for acute medical concerns.    Call the psychiatric nurse line with medication questions or concerns at  [phone].    MyChart may be used to communicate with your provider, but this is not intended to be used for emergencies.    50 Strategies to beat anxiety from [employer]  https://[email or web address].com/us/blog/in-practice/201503/07-zkgeaybzja-epab-anxiety    Administrative Billing:   Phone Call Duration: 25 Minutes  Start: 11:02a  Stop: 11:27a    Time spent with patient was 25 minutes and greater than 50% of time or 15 minutes was spent in counseling and coordination of care regarding above diagnoses and treatment plan. Complex case. Pt with multiple psychiatric diagnoses and worsening sxs that have been difficult to manage. High complexity decision making regarding medication changes and discussion of non-medication coping strategies.     Patient Status:  The patient is being referred to long term community psychiatry care and provider will provide bridging until patient is established with new community provider.     Signed:   Jacinta Richardson DO  [facility]

## 2020-06-01 NOTE — TELEPHONE ENCOUNTER
Olanzapine directions state to take one tablet by mouth three times a day AND take one tablet at 2:00pm and take one tablet at bedtime.   Is this supposed to be three times a day TOTAL?? Or three times a day PLUS 2:00pm and at bedtime like ordered?  You can call pharmacy to confirm directions [phone] or send new rx if needed..     thanks   Deanna Fierro [facility]   [phone]

## 2020-06-01 NOTE — Clinical Note
Just FYI. No action needed. Pt has been referred for long-term psychiatric care as there has been little improvement in the [facility] model since Sept 2019 despite frequent medication changes. She would best served in a long-term model of care. I am glad she has started therapy.    Thanks,    -Jacinta  [facility]

## 2020-06-02 NOTE — PATIENT INSTRUCTIONS
Treatment Plan:    Change clonidine to 0.5 mg at bedtime daily for anxiety/sleep    Continue Klonopin 0.5 mg twice daily for anxiety    Continue olanzapine to 5 mg three times daily for mood, anxiety, agitation/irritability.    Start Lexapro 5 mg daily for anxiety and mood    Start hydroxyzine 25-50 mg up to three times daily as needed for anxiety and sleep.    Continue Savella at direction of your pain medicine provider.     Continue individual therapy and also continue to consider DBT or day treatment program if anxiety continues to worsen.     Call to schedule DBT and ADHD testing. #[phone]    Discussed non-medication ways to manage anxiety: weighted blanket, adult coloring books, guided meditation videos on YouTube, deep breathing, washable craft paint on windows in bright colors and patterns.    You have been referred to long-term psychiatric care due to your complex and chronic mental health needs. I work within the collaborative care model meaning I typically see people on average for about 3 visits before they are referred for long-term care or back to their primary care provider for ongoing management. The referral has been placed and someone should call within the week to get you scheduled. I typically bridge care for up to 8 weeks.     Continue all other medications as reviewed per electronic medical record today.     Safety plan reviewed. To the Emergency Department as needed or call after hours crisis line at [phone] or [phone]. [place] Crisis Text Line. Text [place] to 178682 or Suicide LifeLine Chat: suicidepreventionlifeline.org/chat    Schedule an appointment with me in 4-6 weeks or sooner as needed. Call [facility] at [phone] to schedule. Cancel your appointment with me if you establish new care sooner than our follow-up appointment.     Follow up with primary care provider as planned or for acute medical concerns.    Call the psychiatric nurse line with medication  questions or concerns at [phone].    Pcssot may be used to communicate with your provider, but this is not intended to be used for emergencies.    50 Strategies to beat anxiety from [employer]  https://[email or web address].[email or web address]/us/blog/in-practice/201503/87-cwygaubblj-eipm-anxiety

## 2020-06-15 DIAGNOSIS — M79.7 FIBROMYALGIA: ICD-10-CM

## 2020-06-15 NOTE — TELEPHONE ENCOUNTER
Last Written Prescription Date:  4/23/2020  Last Fill Quantity: 240 tablet,  # refills: 0   Last office visit: 11/29/2019    Future Office Visit:  6/24/2020    Requested Prescriptions   Pending Prescriptions Disp Refills     milnacipran (SAVELLA) 25 MG TABS tablet 240 tablet 0     Sig: Take 2 tablets in the morning and 3 tablets at bedtime for 1 week then take 3 tablets twice daily       There is no refill protocol information for this order              Tere Cronin on 6/15/2020 at 9:27 AM

## 2020-06-18 ENCOUNTER — MYC MEDICAL ADVICE (OUTPATIENT)
Dept: PALLIATIVE MEDICINE | Facility: CLINIC | Age: 50
End: 2020-06-18

## 2020-06-18 NOTE — TELEPHONE ENCOUNTER
Chantal message from patient on 6/18 at 1038:    Francisco Yang when I picked up my Savella I read the paperwork and it says if you have new or worse panic attacks to contact Dr I ve been having horrible panic attacks for over a month we talked about it on my last visit. How should I decrease my dose? Last night I only took 50 mg instead of 75mg and this morning I only took 50 mg is that ok?   -------------  Message sent to pt:      Francisco Rehman,     I'll have Celia review your message and we'll get back to you with further instructions for how to decrease the dose. Please stay at 50 mg twice daily until you hear back from us.     Thank you,     SAMARA LujanN, RN-BC  Patient Care Supervisor  [facility]

## 2020-06-19 ENCOUNTER — VIRTUAL VISIT (OUTPATIENT)
Dept: PSYCHOLOGY | Facility: CLINIC | Age: 50
End: 2020-06-19
Payer: MEDICARE

## 2020-06-19 DIAGNOSIS — F43.10 POST TRAUMATIC STRESS DISORDER: ICD-10-CM

## 2020-06-19 DIAGNOSIS — F33.1 MAJOR DEPRESSIVE DISORDER, RECURRENT EPISODE, MODERATE WITH ANXIOUS DISTRESS (H): Primary | ICD-10-CM

## 2020-06-19 PROCEDURE — 90834 PSYTX W PT 45 MINUTES: CPT | Mod: TEL | Performed by: SOCIAL WORKER

## 2020-06-19 ASSESSMENT — ANXIETY QUESTIONNAIRES
5. BEING SO RESTLESS THAT IT IS HARD TO SIT STILL: SEVERAL DAYS
6. BECOMING EASILY ANNOYED OR IRRITABLE: SEVERAL DAYS
4. TROUBLE RELAXING: SEVERAL DAYS
3. WORRYING TOO MUCH ABOUT DIFFERENT THINGS: NEARLY EVERY DAY
1. FEELING NERVOUS, ANXIOUS, OR ON EDGE: NEARLY EVERY DAY
7. FEELING AFRAID AS IF SOMETHING AWFUL MIGHT HAPPEN: NEARLY EVERY DAY
2. NOT BEING ABLE TO STOP OR CONTROL WORRYING: NEARLY EVERY DAY
GAD7 TOTAL SCORE: 15
IF YOU CHECKED OFF ANY PROBLEMS ON THIS QUESTIONNAIRE, HOW DIFFICULT HAVE THESE PROBLEMS MADE IT FOR YOU TO DO YOUR WORK, TAKE CARE OF THINGS AT HOME, OR GET ALONG WITH OTHER PEOPLE: EXTREMELY DIFFICULT

## 2020-06-19 ASSESSMENT — PATIENT HEALTH QUESTIONNAIRE - PHQ9: SUM OF ALL RESPONSES TO PHQ QUESTIONS 1-9: 6

## 2020-06-19 NOTE — TELEPHONE ENCOUNTER
[employer] message sent to patient with providers message below.   Bethany Cabello CMA on 6/19/2020 at 8:38 AM

## 2020-06-19 NOTE — TELEPHONE ENCOUNTER
Okay to go down by 25mg weekly until off. Please reach out if having trouble coming off.     Celia Bettencourt PA-C on 6/19/2020 at 8:01 AM
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
## 2020-06-20 ASSESSMENT — ANXIETY QUESTIONNAIRES: GAD7 TOTAL SCORE: 15

## 2020-06-24 ENCOUNTER — VIRTUAL VISIT (OUTPATIENT)
Dept: PALLIATIVE MEDICINE | Facility: CLINIC | Age: 50
End: 2020-06-24
Payer: MEDICARE

## 2020-06-24 DIAGNOSIS — M54.2 CERVICALGIA: ICD-10-CM

## 2020-06-24 DIAGNOSIS — F41.1 GENERALIZED ANXIETY DISORDER: ICD-10-CM

## 2020-06-24 DIAGNOSIS — G89.4 CHRONIC PAIN SYNDROME: ICD-10-CM

## 2020-06-24 DIAGNOSIS — M79.7 FIBROMYALGIA: ICD-10-CM

## 2020-06-24 DIAGNOSIS — M54.50 CHRONIC BILATERAL LOW BACK PAIN WITHOUT SCIATICA: ICD-10-CM

## 2020-06-24 DIAGNOSIS — G89.29 CHRONIC BILATERAL LOW BACK PAIN WITHOUT SCIATICA: ICD-10-CM

## 2020-06-24 DIAGNOSIS — F33.0 MAJOR DEPRESSIVE DISORDER, RECURRENT EPISODE, MILD (H): ICD-10-CM

## 2020-06-24 PROCEDURE — 99441 ZZC PHYSICIAN TELEPHONE EVALUATION 5-10 MIN: CPT | Performed by: PHYSICIAN ASSISTANT

## 2020-06-24 RX ORDER — HYDROCODONE BITARTRATE AND ACETAMINOPHEN 7.5; 325 MG/1; MG/1
TABLET ORAL
Qty: 150 TABLET | Refills: 0 | Status: SHIPPED | OUTPATIENT
Start: 2020-06-24 | End: 2020-07-24

## 2020-06-24 ASSESSMENT — PAIN SCALES - GENERAL: PAINLEVEL: WORST PAIN (10)

## 2020-06-24 NOTE — PATIENT INSTRUCTIONS
After Visit Instructions:     Thank you for coming to [facility] for your care. It is my goal to partner with you to help you reach your optimal state of health.     I am recommending multidisciplinary care at this time.  The focus of care will be to continue gradual rehabilitation and pain management with medication adjustments as needed.    Continue daily self-care, identifying contributing factors, and monitoring variations in pain level. Continue to integrate self-care into your life.        Schedule pain psychology assessment/visit: continue current plan    Schedule follow-up with Celia Bettencourt PA-C in 4 weeks. You will need to make this appointment.     Procedures recommended: trigger point injections if needed    Medication recommendations:   1.  Continue  Flexeril 10mg, 1 tab TID PRN   2.  Continue hydrocodone to 7.5/325 with instructions to take 1 tablet every 4-6 hours as needed for severe pain. Max of 5 tablets/day  3.  Continue Savella at the lower dose 25mg twice daily  4.  Consider low-dose naltrexone      Celia Bettencourt PA-C  [facility]  [facility]    Contact information: [facility]  Clinic Number:  [phone]     Call with any questions about your care and for scheduling assistance.     Calls are returned Monday through Friday between 8 AM and 4:30 PM. We usually get back to you within 2 business days depending on the issue/request.    If we are prescribing your medications:    For opioid medication refills, call the clinic or send a [employer] message 7 days in advance.  Please include:    Name of requested medication    Name of the pharmacy.    For non-opioid medications, call your pharmacy directly to request a refill. Please allow 3-4 days to be processed.     Per [place] State Law:    All controlled substance prescriptions must be filled within 30 days of being written.      For those controlled substances allowing  refills, pickup must occur within 30 days of last fill.      We believe regular attendance is key to your success in our program!      Any time you are unable to keep your appointment we ask that you call us at least 24 hours in advance to cancel.This will allow us to offer the appointment time to another patient.   Multiple missed appointments may lead to dismissal from the clinic.

## 2020-06-24 NOTE — PROGRESS NOTES
"Sherie Otero is a 49 year old female who is being evaluated via a billable telephone visit.      The patient has been notified of following:     \"This telephone visit will be conducted via a call between you and your physician/provider. We have found that certain health care needs can be provided without the need for a physical exam.  This service lets us provide the care you need with a short phone conversation.  If a prescription is necessary we can send it directly to your pharmacy.  If lab work is needed we can place an order for that and you can then stop by our lab to have the test done at a later time.    Telephone visits are billed at different rates depending on your insurance coverage. During this emergency period, for some insurers they may be billed the same as an in-person visit.  Please reach out to your insurance provider with any questions.    If during the course of the call the physician/provider feels a telephone visit is not appropriate, you will not be charged for this service.\"    Patient has given verbal consent for Telephone visit?  Yes    How would you like to obtain your AVS? [facility]     CHIEF COMPLAINT: Pain  -Fibromyalgia  -Neck pain  -Low back pain     INTERVAL HISTORY:  Last seen on 05/27/2020 for a virtual visit.     Recommendations/plan at the last visit included:  1. Physical Therapy: continue previous PT exercises;   2. Clinical Health Psychologist:  YES, has a plan in place  3. Diagnostic Studies: none at this time  4. Medication Management:                1.  Continue  Flexeril 10mg, 1 tab TID PRN              2.  Continue hydrocodone to 7.5/325 with instructions to take 1 tablet every 4-6 hours as needed for severe pain. Max of 5 tablets/day              3.  Continue Savella 75mg twice daily, if anxiety becomes better we could consider increasing, if it worsens, we may want to taper to make sure it is not contributing to " anxiety.              5.  Consider low-dose naltrexone  5. Further procedures recommended: none at this time        Follow up with this provider: 4 weeks     Since her last visit, Sherie Otero reports:    She states that she is okay. She is down on the Savella to 1 in AM and 1 at PM. She states that her anxiety is better with coming down. She continues to work with psychiatrist and counseling. She goes to counseling weekly. She states that she is all knotted up from anxiety.     Previous Medications: (H--helped; HI--Helped initially; SWH-- somewhat helpful, NH--No help; W--worse; SE--side effects)   Opiates: Hydrocodone H, Oxycodone SE  NSAIDS: Ibuprofen H, Relafen SE stomach upset, Meloxicam NH  Muscle Relaxants: Tizanidine NH, Flexeril H, Robaxin NH SE stomach upset  Anti-migraine mediations: Verapamil H  Anti-depressants: Cymbalta H  Sleep aids: none  Anxiolytics: Xanax H, Clonazepam H, Valium H  Neuropathics: Gabapentin SE dizziness, Topamax SE sedation          Topicals: Lidocaine patches SW  Other medications not covered above: Savella H SE increased anxiety once up to higher dose, Lyrica SE shortness of breath, felt 'weird' on them, throat felt weird. Prednisone H for arthritis flare    THE 4 As OF OPIOID MAINTENANCE ANALGESIA    Analgesia: Is pain relief clinically significant? YES   Activity: Is patient functional and able to perform Activities of Daily Living? YES   Adverse effects: Is patient free from adverse side effects from opiates? YES   Adherence to Rx protocol: Is patient adhering to Controlled Substance Agreement and taking medications ONLY as ordered? No        Is Narcan prescribed for opiate use >50 MME daily? yes        Total Daily MME: 37.5      Assessment:  Sherie Otero is a 49 year old female who presents today for follow up regarding her:     1. Fibromyalgia  2. Chronic low back pain  3. Cervicalgia  4. Chronic pain syndrome  5. Chronic use of opioids     She was having worsening  anxiety with the higher dose of Savella therefore she started tapering. She would like to stay on the lower dose to see how she does with it. She continues to work with her psychiatrist on her anxiety.     We also discussed repeating the trigger point injections if she felt that she needed them.        Plan:  Diagnosis reviewed, treatment option addressed, and risk/benifits discussed.  Self-care instructions given.  I am recommending a multidisciplinary treatment plan to help this patient better manage pain.       1. Physical Therapy: continue previous PT exercises;   2. Clinical Health Psychologist:  YES, has a plan in place  3. Diagnostic Studies: none at this time  4. Medication Management:                1.  Continue  Flexeril 10mg, 1 tab TID PRN              2.  Continue hydrocodone to 7.5/325 with instructions to take 1 tablet every 4-6 hours as needed for severe pain. Max of 5 tablets/day              3.  Continue Savella at the lower dose 25mg twice daily              4.  Consider low-dose naltrexone  5. Further procedures recommended: consider trigger point injections if needed        Follow up with this provider: 4 weeks     Phone call duration: 9 minutes    Celia Bettencourt [facility]

## 2020-06-24 NOTE — PROGRESS NOTES
"Sherie Otero is a 49 year old female who is being evaluated via a billable telephone visit.      The patient has been notified of following:     \"This telephone visit will be conducted via a call between you and your physician/provider. We have found that certain health care needs can be provided without the need for a physical exam.  This service lets us provide the care you need with a short phone conversation.  If a prescription is necessary we can send it directly to your pharmacy.  If lab work is needed we can place an order for that and you can then stop by our lab to have the test done at a later time.    Telephone visits are billed at different rates depending on your insurance coverage. During this emergency period, for some insurers they may be billed the same as an in-person visit.  Please reach out to your insurance provider with any questions.    If during the course of the call the physician/provider feels a telephone visit is not appropriate, you will not be charged for this service.\"    Patient has given verbal consent for Telephone visit?  Yes    What phone number would you like to be contacted at? [phone]    How would you like to obtain your AVS? [facility]        Outpatient Psychiatric Progress Note    Name: Sherie Otero   : 1970                    Primary Care Provider: Twin Castro MD   Therapist: LLOYD Young    PHQ-9 scores:  PHQ-9 SCORE 2020   PHQ-9 Total Score - - -   PHQ-9 Total Score [facility] - - -   PHQ-9 Total Score 14 10 6       CELIA-7 scores:  CELIA-7 SCORE 2020   Total Score - - -   Total Score 21 21 15       Patient Identification:  Patient is a 49 year old,   White American female  who presents for return visit with me.  Patient is currently disabled. Patient attended the phone session alone. Patient prefers to be called: \"Sherie\".    Interim History:  I last saw Sherie Otero for outpatient " psychiatry Return Visit on 6/1/2020. During that appointment, we:     Change clonidine to 0.5 mg at bedtime daily for anxiety/sleep    Continue Klonopin 0.5 mg twice daily for anxiety    Continue olanzapine to 5 mg three times daily for mood, anxiety, agitation/irritability.    Start Lexapro 5 mg daily for anxiety and mood    Start hydroxyzine 25-50 mg up to three times daily as needed for anxiety and sleep.    Continue Savella at direction of your pain medicine provider.     Continue individual therapy and also continue to consider DBT or day treatment program if anxiety continues to worsen.     Call to schedule DBT and ADHD testing. #[phone]    Discussed non-medication ways to manage anxiety: weighted blanket, adult coloring books, guided meditation videos on YouTube, deep breathing, washable craft paint on windows in bright colors and patterns.    You have been referred to long-term psychiatric care due to your complex and chronic mental health needs. I work within the collaborative care model meaning I typically see people on average for about 3 visits before they are referred for long-term care or back to their primary care provider for ongoing management. The referral has been placed and someone should call within the week to get you scheduled. I typically bridge care for up to 8 weeks.     Continue all other medications as reviewed per electronic medical record today.     Safety plan reviewed. To the Emergency Department as needed or call after hours crisis line at [phone] or [phone]. [place] Crisis Text Line. Text [place] to 314025 or Suicide LifeLine Chat: suicidepreventionlifeline.org/chat    Schedule an appointment with me in 4-6 weeks or sooner as needed. Call [facility] at [phone] to schedule. Cancel your appointment with me if you establish new care sooner than our follow-up appointment.     Looking forward to having son back home. He spent some time in care home and is  "getting out. He will be starting CD treatment when he gets out. She reports her mood has been a little better. Anxiety still bad. Still has frequent panic attacks. Reports she felt better before when she was on higher dose of Klonopin. When asked about other methods she has used to manage her anxiety she states: Essential oils, peppermint, breathing, listen to calming music on TV, tries to distract self, cleaning house. Stopped Savella. Denies SI. No drugs/alcohol.     Per Dr. Mojica's note from same-day shared, team-based visit:  Patient reported that she has \"daily panic attacks\" with minimal success from the medications. She described having 5 or more panic attacks per day that can last for 30 minutes to several hours. She started working with a therapist and said \"it's too early to tell\" if she finds it helpful. She finds that she sleeps well at night but feels exhausted during the day. The process of therapy was reviewed with her and she was encouraged to continue going for a prolonged period of time to give herself the best chance to benefit from treatment. She indicated she understood and would continue to attend sessions.    Psychiatric ROS:  Sherie Otero reports mood has been: better  Anxiety has been: same, bad  Sleep has been: better on clonidine  Aretha sxs: None  Psychosis sxs: None  ADHD/ADD sxs: None  PTSD sxs: Stable  PHQ9 and GAD7 scores were reviewed today.   Medication side effects: denies  Current stressors include: See HPI  Coping mechanisms and supports include: Deep Breathing, Therapy, Family and Hobbies    Current medications include:   Current Outpatient Medications   Medication Sig     cetirizine (ZYRTEC) 10 MG tablet TAKE ONE TABLET BY MOUTH ONCE DAILY     clonazePAM (KLONOPIN) 0.5 MG tablet Take 1 tablet (0.5 mg) by mouth 2 times daily as needed for anxiety Must last 30 days     cloNIDine (CATAPRES) 0.1 MG tablet Take 0.5 tablets (0.05 mg) by mouth At Bedtime     cyclobenzaprine " (FLEXERIL) 10 MG tablet Take 2 tablets in the evening and 1 in the morning     escitalopram (LEXAPRO) 5 MG tablet Take 1 tablet (5 mg) by mouth daily     fluticasone (FLONASE) 50 MCG/ACT nasal spray SPRAY 2 SPRAYS INTO BOTH NOSTRILS DAILY.     HYDROcodone-acetaminophen (NORCO) 7.5-325 MG per tablet Take 1 tablet every 4-6 hours as needed for severe pain. Max of 5 tablets per day. Fill 6/26/2020 Start 6/29/2020.     hydrOXYzine (ATARAX) 25 MG tablet Take 1-2 tablets (25-50 mg) by mouth 3 times daily as needed for itching     ketorolac (TORADOL) 10 MG tablet TAKE ONE TABLET BY MOUTH EVERY 6 HOURS AS NEEDED FOR MODERATE PAIN     milnacipran (SAVELLA) 25 MG TABS tablet Take 3 tablets (75 mg) by mouth 2 times daily     naloxone (NARCAN) nasal spray Spray 1 spray (4 mg) into one nostril alternating nostrils as needed for opioid reversal every 2-3 minutes until assistance arrives     OLANZapine (ZYPREXA) 5 MG tablet Take 1 tablet (5 mg) by mouth 3 times daily     VENTOLIN  (90 Base) MCG/ACT inhaler INHALE 2 PUFFS INTO THE LUNGS EVERY 6 HOURS AS NEEDED FOR SHORTNESS OF BREATH, DIFFICULTY BREATHING OR WHEEZING.     verapamil (CALAN) 40 MG tablet Take 1 tablet (40 mg) by mouth 2 times daily     No current facility-administered medications for this visit.      The [place] Prescription Monitoring Program has been reviewed and there are no concerns about diversionary activity for controlled substances at this time.     Past Medical/Surgical History:  Past Medical History:   Diagnosis Date     Allergic rhinitis due to other allergen      Degenerative joint disease of cervical spine 2016    multi level worst at C5-6     Generalized anxiety disorder      Hearing loss      Intrinsic asthma, unspecified      Irritable bowel syndrome      Lump or mass in breast 1996    Left breast lump 1996-- aspiration benign.     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    Imo Update utility     Spondylitis, cervical (H)     C5-C7 (MRI)     Stenosis, cervical spine     C5-C7 (MRI)     Tension headache       has a past medical history of Allergic rhinitis due to other allergen, Degenerative joint disease of cervical spine (2016), Generalized anxiety disorder, Hearing loss, Intrinsic asthma, unspecified, Irritable bowel syndrome, Lump or mass in breast (1996), Other malaise and fatigue, Other specified gastritis without mention of hemorrhage, Pain in joint, lower leg, Rheumatoid arthritis(714.0), Spindle cell carcinoma (H) (8/27/2013), Spondylitis, cervical (H), Stenosis, cervical spine, and Tension headache.    Social History:  Reviewed. No changes to social history.     Vital Signs:   None. This is phone visit.     Labs:  Most recent laboratory results reviewed and no new labs.     Review of Systems:  10 systems (general, cardiovascular, respiratory, eyes, ENT, endocrine, GI, , M/S, neurological) were reviewed. Most pertinent finding(s) is/are: chronic pain. The remaining systems are all unremarkable.    Mental Status Examination (limited as this is by phone):  Attitude:  cooperative   Oriented to:  person, place, time, and situation  Attention Span and Concentration:  normal  Speech:  clear, coherent, regular rate, rhythm, and volume  Language: intact  Mood:  anxious  Affect:  appropriate and in normal range; does not seem anxious  Associations:  no loose associations  Thought Process:  logical, linear and goal oriented  Thought Content:  no evidence of suicidal ideation or homicidal ideation, no evidence of psychotic thought, no auditory hallucinations present and no visual hallucinations present  Recent and Remote Memory:  Intact to interview. Not formally assessed. No amnesia.  Fund of Knowledge: appropriate  Insight:  fair  Judgment:  fair, adequate for  safety  Impulse Control:  fair    Suicide Risk Assessment:  Today Sherie Otero reports no suicidal ideation. Based on all available evidence including the factors cited above, Sherie Otero does not appear to be at imminent risk for self-harm, does not meet criteria for a 72-hr hold, and therefore remains appropriate for ongoing outpatient level of care.  A thorough assessment of risk factors related to suicide and self-harm have been reviewed and are noted above. The patient convincingly denies suicidality on several occasions. Local community safety resources printed and reviewed for patient to use if needed. There was no deceit detected, and the patient presented in a manner that was believable.     DSM5 Diagnosis:  296.31 (F33.0) Major Depressive Disorder, Recurrent Episode, Mild _  300.02 (F41.1) Generalized Anxiety Disorder  309.81 (F43.10) Posttraumatic Stress Disorder (includes Posttraumatic Stress Disorder for Children 6 Years and Younger)  Without dissociative symptoms  Panic Disorder   Hx of self-reported ADD/ADHD    Medical comorbidities include:   Patient Active Problem List    Diagnosis Date Noted     Balance problems 07/25/2019     Priority: Medium     Chronic, continuous use of opioids 11/03/2018     Priority: Medium     Chronically on benzodiazepine therapy 11/03/2018     Priority: Medium     Cervical cancer screening      Priority: Medium     2011 ablation  2015 NIL pap. Plan: pap in 3 years.  8/1/18 NIL pap, neg HR HPV. cotest in 5 years.       Pelvic pain in female 08/01/2018     Priority: Medium     Chronic rhinitis 05/14/2018     Priority: Medium     Other migraine without status migrainosus, intractable 03/21/2017     Priority: Medium     Pulmonary nodules 01/19/2017     Priority: Medium     Abnormal CT of the chest 01/19/2017     Priority: Medium     Hilar lymphadenopathy 01/19/2017     Priority: Medium     Degenerative joint disease of cervical spine      Priority: Medium     multi  level worst at C5-6       Osteoarthritis of cervical spine, unspecified spinal osteoarthritis complication status 03/21/2016     Priority: Medium     Panic attacks 03/01/2016     Priority: Medium     Elevated white blood cell count 01/14/2016     Priority: Medium     Rheumatoid arthritis involving multiple sites with positive rheumatoid factor (H) 01/12/2016     Priority: Medium     Intermittent asthma, uncomplicated 11/29/2015     Priority: Medium     Other chronic pain 11/18/2015     Priority: Medium     Major depressive disorder, recurrent episode, mild (H) 10/08/2015     Priority: Medium     NSAID induced gastritis 09/23/2015     Priority: Medium     Stenosis, cervical spine      Priority: Medium     C5-C7 (MRI)       Spondylitis, cervical (H)      Priority: Medium     C5-C7 (MRI)       Cervicalgia 01/09/2014     Priority: Medium     Disturbance in sleep behavior 11/05/2013     Priority: Medium     Problem list name updated by automated process. Provider to review       SHANTANU (obstructive sleep apnea) 10/25/2013     Priority: Medium     Chronic fatigue syndrome 07/02/2013     Priority: Medium              Fibromyalgia 07/02/2013     Priority: Medium     Generalized anxiety disorder 07/02/2013     Priority: Medium     Diagnosis updated by automated process. Provider to review and confirm.       Tobacco abuse 07/02/2013     Priority: Medium     CARDIOVASCULAR SCREENING; LDL GOAL LESS THAN 160 10/31/2010     Priority: Medium       Psychosocial & Contextual Factors:  See HPI    Assessment:  Sherie Otero reports overall stability of her mood despite some down days that she feels are manageable. Anxiety continues to be bad within the context of ongoing psychosocial stressors. I am encouraged she has started/continued therapy as this will be key to some of her recovery and improvement of sxs. We discussed goals of medications and that they are intended to bridge only part of the gap for MH sx improvement and  developing healthier and additional non-medication coping skills. I have again included a link with several ideas to continue to review.     Pt no longer taking Savella for pain. Pt is agreeable to increasing her Lexapro. We will continue hydroxyzine so she can take this in addition to her Klonopin. Will also increase olanzapine as this has been helpful. I am not going to increase Klonopin. Due to her chronic and complex case, she had been referred for long-term psychiatric care and I will bridge her care until she can get in to a community clinic but she will need to get an appointment scheduled somewhere. She has been seen within the [facility] model since Sept 2019 with little improvement and need for frequent medication changes. She would best be served by a long-term provider with whom she can build continued rapport.     Medication side effects and alternatives were reviewed. Health promotion activities recommended and reviewed today. All questions addressed. Education and counseling completed regarding risks and benefits of medications and psychotherapy options.     Treatment Plan:    Continue clonidine 0.5 mg at bedtime daily for anxiety/sleep.    Continue Klonopin 0.5 mg twice daily as needed for anxiety    Increase olanzapine to 7.5 mg two times daily as needed for anxiety and stay at 5 mg at bedtime.     Increase Lexapro to 20 mg daily as tolerated for anxiety and mood. Take 10 mg for about a week then can take 20 mg daily.     Start hydroxyzine 25-50 mg up to three times daily as needed for anxiety and sleep.    Continue individual therapy and also continue to consider DBT or day treatment program if anxiety continues to worsen.     Call to schedule DBT and ADHD testing. #[phone]    Discussed non-medication ways to manage anxiety: weighted blanket, adult coloring books, guided meditation videos on YouTube, deep breathing, washable craft paint on windows in bright colors and patterns.    You have been  referred to long-term psychiatric care due to your complex and chronic mental health needs. I work within the collaborative care model meaning I typically see people on average for about 3 visits before they are referred for long-term care or back to their primary care provider for ongoing management. Please call [phone] to schedule with a provider. I will continue to bridge care only if there is an appointment scheduled with a long-term psychiatric provider. You can also choose to have your care returned to your primary care provider.     Continue all other medications as reviewed per electronic medical record today.     Safety plan reviewed. To the Emergency Department as needed or call after hours crisis line at [phone] or [phone]. [place] Crisis Text Line. Text [place] to 938857 or Suicide LifeLine Chat: suicidepreinTarvoline.org/chat    Schedule an appointment with me in 4-6 weeks or sooner as needed. Call [facility] at [phone] to schedule. Cancel your appointment with me if you establish new care sooner than our follow-up appointment.     Follow up with primary care provider as planned or for acute medical concerns.    Call the psychiatric nurse line with medication questions or concerns at [phone].    [employer]vis[employer]art may be used to communicate with your provider, but this is not intended to be used for emergencies.    50 Strategies to beat anxiety from [email or web address]  [email or web address]-[email or web address]    Administrative Billing:   Phone Call Duration: 20 Minutes  Start: 3:19p  Stop: 3:39p    Time spent with patient was 20 minutes and greater than 50% of time or 15 minutes was spent in counseling and coordination of care regarding above diagnoses and treatment plan. Complex case. Pt with multiple psychiatric diagnoses and sxs that have been difficult to manage. High complexity decision making regarding medication changes and  discussion of non-medication coping strategies.     Patient Status:  Pt reports not being called for long-term care appt and did not look at her AVS summary in [facility] [facility] for number to call. Pt again instructed to call the number to schedule long-term psychiatric care.   From previous visit, 6/1/20:  The patient is being referred to long term community psychiatry care and provider will provide bridging until patient is established with new community provider.     Signed:   Jacinta Richardson, DO  [facility]

## 2020-06-25 RX ORDER — ESCITALOPRAM OXALATE 5 MG/1
5 TABLET ORAL DAILY
Qty: 30 TABLET | Refills: 0 | Status: SHIPPED | OUTPATIENT
Start: 2020-06-25 | End: 2020-08-21

## 2020-06-25 RX ORDER — OLANZAPINE 5 MG/1
TABLET ORAL
Qty: 75 TABLET | Refills: 0 | OUTPATIENT
Start: 2020-06-25

## 2020-06-25 NOTE — TELEPHONE ENCOUNTER
Prescription was sent 6/1/20 for #90 with 1 refills.  Pharmacy notified via E-Prescribe refusal.     Vinita Parikh RN on 6/25/2020 at 10:21 AM

## 2020-06-26 ENCOUNTER — VIRTUAL VISIT (OUTPATIENT)
Dept: PSYCHOLOGY | Facility: CLINIC | Age: 50
End: 2020-06-26
Payer: MEDICARE

## 2020-06-26 DIAGNOSIS — F33.1 MAJOR DEPRESSIVE DISORDER, RECURRENT EPISODE, MODERATE WITH ANXIOUS DISTRESS (H): Primary | ICD-10-CM

## 2020-06-26 DIAGNOSIS — F43.10 POST TRAUMATIC STRESS DISORDER: ICD-10-CM

## 2020-06-26 PROCEDURE — 90834 PSYTX W PT 45 MINUTES: CPT | Mod: TEL | Performed by: SOCIAL WORKER
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
## 2020-06-29 ENCOUNTER — VIRTUAL VISIT (OUTPATIENT)
Dept: PSYCHIATRY | Facility: OTHER | Age: 50
End: 2020-06-29
Payer: MEDICARE

## 2020-06-29 ENCOUNTER — VIRTUAL VISIT (OUTPATIENT)
Dept: PSYCHOLOGY | Facility: CLINIC | Age: 50
End: 2020-06-29
Payer: MEDICARE

## 2020-06-29 DIAGNOSIS — F43.10 PTSD (POST-TRAUMATIC STRESS DISORDER): ICD-10-CM

## 2020-06-29 DIAGNOSIS — F41.0 PANIC DISORDER WITHOUT AGORAPHOBIA: Primary | ICD-10-CM

## 2020-06-29 DIAGNOSIS — F41.1 GENERALIZED ANXIETY DISORDER: Primary | ICD-10-CM

## 2020-06-29 DIAGNOSIS — F41.1 GENERALIZED ANXIETY DISORDER: ICD-10-CM

## 2020-06-29 DIAGNOSIS — F33.0 MAJOR DEPRESSIVE DISORDER, RECURRENT EPISODE, MILD (H): ICD-10-CM

## 2020-06-29 DIAGNOSIS — F41.0 PANIC ATTACKS: ICD-10-CM

## 2020-06-29 PROCEDURE — 99443 ZZC PHYSICIAN TELEPHONE EVALUATION 21-30 MIN: CPT | Performed by: PSYCHOLOGIST

## 2020-06-29 PROCEDURE — 99442 ZZC PHYSICIAN TELEPHONE EVALUATION 11-20 MIN: CPT | Performed by: PSYCHIATRY & NEUROLOGY

## 2020-06-29 NOTE — PROGRESS NOTES
"Collaborative Care Psychiatry Service (CCPS)  June 29, 2020    Behavioral Health Clinician Progress Note    Patient Name: Sherie Otero is a 49 year old female who is being evaluated via a telephone visit.      The patient has been notified of the following:     \"We have found that certain health care needs can be provided without the need for a face to face visit.  This service lets us provide the care you need with a short phone conversation.      I will have full access to your [facility] medical record during this entire phone call.   I will be taking notes for your medical record.     Since this is like an office visit, we will bill your insurance company for this service.  Please check with your medical insurance if this type of telephone visit/virtual care is covered.  You may be responsible for the cost of this service if insurance coverage is denied.      There are potential benefits and risks of telephone visits (e.g. limits to patient confidentiality) that differ from in-person visits.?  Confidentiality still applies for telephone services, and nobody will record the visit.  It is important to be in a quiet, private space that is free of distractions (including cell phone or other devices) during the visit.??     If during the course of the call I believe a telephone visit is not appropriate, you will not be charged for this service\"    Consent has been obtained for this service by care team member: yes.    Start time: 02:45pm    End time: 03:10pm     Service Type:  Consult Note      Service Location:   Phone call (patient / identified key support person reached)     Session Start Time: 02:45pm  Session End Time: 03:10pm      Session Length: 16 - 37      Attendees: Patient    Visit Activities (Refresh list every visit): [facility] [place] Only    Diagnostic Assessment Date: 09/10/2019 by CYN Schrader, CNP  See Flowsheets for today's PHQ-9 and CELIA-7 results  Previous PHQ-9:   PHQ-9 SCORE " "4/27/2020 5/19/2020 6/19/2020   PHQ-9 Total Score - - -   PHQ-9 Total Score MyChart - - -   PHQ-9 Total Score 14 10 6     Previous CELIA-7:   CELIA-7 SCORE 4/27/2020 5/19/2020 6/19/2020   Total Score - - -   Total Score 21 21 15     WHODAS 2.0 Total Score 9/10/2019   Total Score 38        DATA  Extended Session (60+ minutes): No  Interactive Complexity: No  Crisis: No    Medication Compliance:  Yes      Chemical Use Review:   Substance Use: Chemical use reviewed, no active concerns identified      Tobacco Use: No current tobacco use.      Current Stressors / Issues:  Patient reported that she has \"daily panic attacks\" with minimal success from the medications. She described having 5 or more panic attacks per day that can last for 30 minutes to several hours. She started working with a therapist and said \"it's too early to tell\" if she finds it helpful. She finds that she sleeps well at night but feels exhausted during the day. The process of therapy was reviewed with her and she was encouraged to continue going for a prolonged period of time to give herself the best chance to benefit from treatment. She indicated she understood and would continue to attend sessions.      Changes in Health Issues:   None reported    Assessment: Current Emotional / Mental Status (status of significant symptoms):  Risk status (Self / Other harm or suicidal ideation)  Patient denies a history of suicidal ideation, suicide attempts, self-injurious behavior, homicidal ideation, homicidal behavior and and other safety concerns  Patient denies current fears or concerns for personal safety.  Patient denies current or recent suicidal ideation or behaviors.  Patient denies current or recent homicidal ideation or behaviors.  Patient denies current or recent self injurious behavior or ideation.  Patient denies other safety concerns.  A safety and risk management plan has not been developed at this time, however patient was encouraged to call [place] " Crisis / 911 should there be a change in any of these risk factors.    Appearance:   Unable to assess over the phone   Eye Contact:   Unable to assess over the phone   Psychomotor Behavior: Unable to assess over the phone   Attitude:   Cooperative   Orientation:   All  Speech   Rate / Production: Normal    Volume:  Normal   Mood:    Anxious   Affect:    Unable to assess over the phone   Thought Content:  Clear   Thought Form:  Coherent  Logical   Insight:    Fair     Diagnoses:  1. Generalized anxiety disorder    2. Panic attacks        Collateral Reports Completed:  Not Applicable    Plan: (Homework, other):  Patient was given information about behavioral services and encouraged to schedule a follow up appointment with the clinic [facility] in conjunction with next CCPS appointment.  She was also given information about mental health symptoms and treatment options .  CD Recommendations: No indications of CD issues.      Wood Mojica PsyD, LP      Wolfgang Mojica PsyD  June 29, 2020

## 2020-06-30 ENCOUNTER — TELEPHONE (OUTPATIENT)
Dept: PSYCHIATRY | Facility: OTHER | Age: 50
End: 2020-06-30

## 2020-06-30 DIAGNOSIS — F41.1 GENERALIZED ANXIETY DISORDER: ICD-10-CM

## 2020-06-30 RX ORDER — CLONAZEPAM 0.5 MG/1
0.5 TABLET ORAL 2 TIMES DAILY PRN
Qty: 60 TABLET | Refills: 0 | Status: SHIPPED | OUTPATIENT
Start: 2020-06-30 | End: 2021-04-29

## 2020-06-30 NOTE — TELEPHONE ENCOUNTER
Reason for call:  Medication refill status   If this is a refill request, has the caller requested the refill from the pharmacy already? No , patient had appt yesterday with Dr Richardson Will the patient be using a [facility]? Yes  Name of the pharmacy and phone number for the current request:    [facility], [place] - [address]  (Pharmacy) [phone]         Name of the medication requested:clonazePAM (KLONOPIN) 0.5 MG tablet        Other request: patient was connected to [facility] psychiatry in [place] after her visit yesterday for long term psychiatry via [facility], patient had to schedule her own appt but was provided all info   Also has ADHD testing set up with Dr Yates [facility] 7.24 @ 7.31 via video visit       Jessica from [facility] called on behalf of patient    Phone number to reach patient:   Best Time:      Can we leave a detailed message on this number?  YES    Travel screening: Not Applicable

## 2020-06-30 NOTE — TELEPHONE ENCOUNTER
Refill for: Clonazepam 0.5mg    Last Appointment: 6/29/20    Next Appointment: not scheduled    No Shows/Cancellations since last appointment: none    Last Refill in Epic (date and amount/how many days):   clonazePAM (KLONOPIN) 0.5 MG tablet  60 tablet  0  6/1/2020   No    Sig - Route: Take 1 tablet (0.5 mg) by mouth 2 times daily as needed for anxiety Must last 30 days - Oral    Sent to pharmacy as: clonazePAM 0.5 MG Oral Tablet (klonoPIN)    Class: E-Prescribe    Order: [id number]    E-Prescribing Status: Receipt confirmed by pharmacy (6/1/2020 11:29 AM CDT)     Last office visit note reviewed and summarized below:    Treatment Plan: 6/29/20    Change clonidine to 0.5 mg at bedtime daily for anxiety/sleep    Continue Klonopin 0.5 mg twice daily for anxiety    Continue olanzapine to 5 mg three times daily for mood, anxiety, agitation/irritability.    Start Lexapro 5 mg daily for anxiety and mood    Start hydroxyzine 25-50 mg up to three times daily as needed for anxiety and sleep.    Continue Savella at direction of your pain medicine provider.     Continue individual therapy and also continue to consider DBT or day treatment program if anxiety continues to worsen.     Call to schedule DBT and ADHD testing. #[phone]  :      Routing to provider for review.      Giulia Lane, RN   Nurse Liaison  [facility]

## 2020-07-02 ENCOUNTER — VIRTUAL VISIT (OUTPATIENT)
Dept: PSYCHOLOGY | Facility: CLINIC | Age: 50
End: 2020-07-02
Payer: MEDICARE

## 2020-07-02 DIAGNOSIS — F43.10 POST TRAUMATIC STRESS DISORDER (PTSD): ICD-10-CM

## 2020-07-02 DIAGNOSIS — F33.1 MAJOR DEPRESSIVE DISORDER, RECURRENT EPISODE, MODERATE WITH ANXIOUS DISTRESS (H): ICD-10-CM

## 2020-07-02 DIAGNOSIS — F41.1 GENERALIZED ANXIETY DISORDER: Primary | ICD-10-CM

## 2020-07-02 PROCEDURE — 90834 PSYTX W PT 45 MINUTES: CPT | Mod: TEL | Performed by: SOCIAL WORKER

## 2020-07-02 ASSESSMENT — ANXIETY QUESTIONNAIRES
3. WORRYING TOO MUCH ABOUT DIFFERENT THINGS: NEARLY EVERY DAY
7. FEELING AFRAID AS IF SOMETHING AWFUL MIGHT HAPPEN: NEARLY EVERY DAY
IF YOU CHECKED OFF ANY PROBLEMS ON THIS QUESTIONNAIRE, HOW DIFFICULT HAVE THESE PROBLEMS MADE IT FOR YOU TO DO YOUR WORK, TAKE CARE OF THINGS AT HOME, OR GET ALONG WITH OTHER PEOPLE: SOMEWHAT DIFFICULT
1. FEELING NERVOUS, ANXIOUS, OR ON EDGE: NEARLY EVERY DAY
5. BEING SO RESTLESS THAT IT IS HARD TO SIT STILL: MORE THAN HALF THE DAYS
GAD7 TOTAL SCORE: 18
4. TROUBLE RELAXING: NEARLY EVERY DAY
2. NOT BEING ABLE TO STOP OR CONTROL WORRYING: NEARLY EVERY DAY
6. BECOMING EASILY ANNOYED OR IRRITABLE: SEVERAL DAYS

## 2020-07-02 ASSESSMENT — PATIENT HEALTH QUESTIONNAIRE - PHQ9: SUM OF ALL RESPONSES TO PHQ QUESTIONS 1-9: 10

## 2020-07-03 ASSESSMENT — ANXIETY QUESTIONNAIRES: GAD7 TOTAL SCORE: 18

## 2020-07-05 RX ORDER — OLANZAPINE 5 MG/1
TABLET ORAL
Qty: 120 TABLET | Refills: 1 | Status: SHIPPED | OUTPATIENT
Start: 2020-07-05 | End: 2021-04-29

## 2020-07-05 RX ORDER — ESCITALOPRAM OXALATE 20 MG/1
20 TABLET ORAL DAILY
Qty: 30 TABLET | Refills: 1 | Status: SHIPPED | OUTPATIENT
Start: 2020-07-05 | End: 2021-09-21

## 2020-07-05 NOTE — PATIENT INSTRUCTIONS
Treatment Plan:    Continue clonidine 0.5 mg at bedtime daily for anxiety/sleep.    Continue Klonopin 0.5 mg twice daily as needed for anxiety    Increase olanzapine to 7.5 mg two times daily as needed for anxiety and stay at 5 mg at bedtime.     Increase Lexapro to 20 mg daily as tolerated for anxiety and mood. Take 10 mg for about a week then can take 20 mg daily.     Start hydroxyzine 25-50 mg up to three times daily as needed for anxiety and sleep.    Continue individual therapy and also continue to consider DBT or day treatment program if anxiety continues to worsen.     Call to schedule DBT and ADHD testing. #[phone]    Discussed non-medication ways to manage anxiety: weighted blanket, adult coloring books, guided meditation videos on YouTube, deep breathing, washable craft paint on windows in bright colors and patterns.    You have been referred to long-term psychiatric care due to your complex and chronic mental health needs. I work within the collaborative care model meaning I typically see people on average for about 3 visits before they are referred for long-term care or back to their primary care provider for ongoing management. Please call [phone] to schedule with a provider. I will continue to bridge care only if there is an appointment scheduled with a long-term psychiatric provider. You can also choose to have your care returned to your primary care provider.     Continue all other medications as reviewed per electronic medical record today.     Safety plan reviewed. To the Emergency Department as needed or call after hours crisis line at [phone] or [phone]. [place] Crisis Text Line. Text [place] to 460271 or Suicide LifeLine Chat: suicidepreventionlifeline.org/chat    Schedule an appointment with me in 4-6 weeks or sooner as needed. Call [facility] at [phone] to schedule. Cancel your appointment with me if you establish new care sooner than our follow-up  appointment.     Follow up with primary care provider as planned or for acute medical concerns.    Call the psychiatric nurse line with medication questions or concerns at [phone].    MyChart may be used to communicate with your provider, but this is not intended to be used for emergencies.    Risks of benzodiazepine (Ativan, Xanax, Klonopin, Valium, etc) use including, but not limited to, sedation, tolerance, risk for addiction/dependence. Do not drink alcohol while taking benzodiazepines due to risk of trouble breathing and potential death. Do not drive or operate heavy machinery until it is known how the drug affects you. Discuss with physician or pharmacist before ever taking a benzodiazepine with a narcotic/opioid pain medication.     50 Strategies to beat anxiety from [employer]com  [email or web address]/us/[email or web address]-fguxgcwxcl-qkdc-[email or web address]
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
## 2020-07-09 ENCOUNTER — VIRTUAL VISIT (OUTPATIENT)
Dept: PSYCHOLOGY | Facility: CLINIC | Age: 50
End: 2020-07-09
Payer: MEDICARE

## 2020-07-09 DIAGNOSIS — F41.1 GENERALIZED ANXIETY DISORDER: Primary | ICD-10-CM

## 2020-07-09 DIAGNOSIS — F33.1 MAJOR DEPRESSIVE DISORDER, RECURRENT EPISODE, MODERATE WITH ANXIOUS DISTRESS (H): ICD-10-CM

## 2020-07-09 PROCEDURE — 90834 PSYTX W PT 45 MINUTES: CPT | Mod: TEL | Performed by: SOCIAL WORKER
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
## 2020-07-16 ENCOUNTER — VIRTUAL VISIT (OUTPATIENT)
Dept: PSYCHOLOGY | Facility: CLINIC | Age: 50
End: 2020-07-16
Payer: MEDICARE

## 2020-07-16 DIAGNOSIS — F33.1 MAJOR DEPRESSIVE DISORDER, RECURRENT EPISODE, MODERATE WITH ANXIOUS DISTRESS (H): ICD-10-CM

## 2020-07-16 DIAGNOSIS — F41.1 GENERALIZED ANXIETY DISORDER: Primary | ICD-10-CM

## 2020-07-16 PROCEDURE — 90834 PSYTX W PT 45 MINUTES: CPT | Mod: TEL | Performed by: SOCIAL WORKER

## 2020-07-16 ASSESSMENT — PATIENT HEALTH QUESTIONNAIRE - PHQ9: SUM OF ALL RESPONSES TO PHQ QUESTIONS 1-9: 10

## 2020-07-16 ASSESSMENT — ANXIETY QUESTIONNAIRES
7. FEELING AFRAID AS IF SOMETHING AWFUL MIGHT HAPPEN: NEARLY EVERY DAY
GAD7 TOTAL SCORE: 17
2. NOT BEING ABLE TO STOP OR CONTROL WORRYING: NEARLY EVERY DAY
1. FEELING NERVOUS, ANXIOUS, OR ON EDGE: NEARLY EVERY DAY
3. WORRYING TOO MUCH ABOUT DIFFERENT THINGS: NEARLY EVERY DAY
6. BECOMING EASILY ANNOYED OR IRRITABLE: SEVERAL DAYS
5. BEING SO RESTLESS THAT IT IS HARD TO SIT STILL: SEVERAL DAYS
IF YOU CHECKED OFF ANY PROBLEMS ON THIS QUESTIONNAIRE, HOW DIFFICULT HAVE THESE PROBLEMS MADE IT FOR YOU TO DO YOUR WORK, TAKE CARE OF THINGS AT HOME, OR GET ALONG WITH OTHER PEOPLE: EXTREMELY DIFFICULT
4. TROUBLE RELAXING: NEARLY EVERY DAY

## 2020-07-17 DIAGNOSIS — F41.1 GENERALIZED ANXIETY DISORDER: ICD-10-CM

## 2020-07-17 ASSESSMENT — ANXIETY QUESTIONNAIRES: GAD7 TOTAL SCORE: 17

## 2020-07-20 RX ORDER — HYDROXYZINE HYDROCHLORIDE 25 MG/1
25-50 TABLET, FILM COATED ORAL 3 TIMES DAILY PRN
Qty: 90 TABLET | Refills: 0 | Status: SHIPPED | OUTPATIENT
Start: 2020-07-20 | End: 2023-07-31

## 2020-07-22 ENCOUNTER — VIRTUAL VISIT (OUTPATIENT)
Dept: PSYCHOLOGY | Facility: CLINIC | Age: 50
End: 2020-07-22
Payer: MEDICARE

## 2020-07-22 DIAGNOSIS — F41.1 GENERALIZED ANXIETY DISORDER: Primary | ICD-10-CM

## 2020-07-22 DIAGNOSIS — F33.1 MAJOR DEPRESSIVE DISORDER, RECURRENT EPISODE, MODERATE WITH ANXIOUS DISTRESS (H): ICD-10-CM

## 2020-07-22 PROCEDURE — 90834 PSYTX W PT 45 MINUTES: CPT | Mod: TEL | Performed by: SOCIAL WORKER
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
## 2020-07-24 ENCOUNTER — VIRTUAL VISIT (OUTPATIENT)
Dept: PSYCHOLOGY | Facility: CLINIC | Age: 50
End: 2020-07-24
Payer: MEDICARE

## 2020-07-24 ENCOUNTER — VIRTUAL VISIT (OUTPATIENT)
Dept: PALLIATIVE MEDICINE | Facility: CLINIC | Age: 50
End: 2020-07-24
Payer: MEDICARE

## 2020-07-24 VITALS — BODY MASS INDEX: 26.46 KG/M2 | HEIGHT: 65 IN

## 2020-07-24 DIAGNOSIS — G89.4 CHRONIC PAIN SYNDROME: ICD-10-CM

## 2020-07-24 DIAGNOSIS — M54.50 CHRONIC BILATERAL LOW BACK PAIN WITHOUT SCIATICA: ICD-10-CM

## 2020-07-24 DIAGNOSIS — M79.7 FIBROMYALGIA: ICD-10-CM

## 2020-07-24 DIAGNOSIS — F33.1 MAJOR DEPRESSIVE DISORDER, RECURRENT EPISODE, MODERATE WITH ANXIOUS DISTRESS (H): ICD-10-CM

## 2020-07-24 DIAGNOSIS — F41.1 GENERALIZED ANXIETY DISORDER: Primary | ICD-10-CM

## 2020-07-24 DIAGNOSIS — G89.29 CHRONIC BILATERAL LOW BACK PAIN WITHOUT SCIATICA: ICD-10-CM

## 2020-07-24 DIAGNOSIS — M54.2 CERVICALGIA: ICD-10-CM

## 2020-07-24 PROCEDURE — 99207 ZZC NO BILLABLE SERVICE THIS VISIT: CPT | Performed by: PSYCHOLOGIST

## 2020-07-24 PROCEDURE — 99441 ZZC PHYSICIAN TELEPHONE EVALUATION 5-10 MIN: CPT | Performed by: PHYSICIAN ASSISTANT

## 2020-07-24 RX ORDER — KETOROLAC TROMETHAMINE 10 MG/1
TABLET, FILM COATED ORAL
Qty: 10 TABLET | Refills: 1 | Status: SHIPPED | OUTPATIENT
Start: 2020-07-24 | End: 2020-10-19

## 2020-07-24 RX ORDER — HYDROCODONE BITARTRATE AND ACETAMINOPHEN 7.5; 325 MG/1; MG/1
TABLET ORAL
Qty: 150 TABLET | Refills: 0 | Status: SHIPPED | OUTPATIENT
Start: 2020-07-24 | End: 2020-08-21

## 2020-07-24 ASSESSMENT — PAIN SCALES - GENERAL: PAINLEVEL: SEVERE PAIN (7)

## 2020-07-24 NOTE — PROGRESS NOTES
"Sherie Otero is a 49 year old female who is being evaluated via a billable telephone visit.      The patient has been notified of following:     \"This telephone visit will be conducted via a call between you and your physician/provider. We have found that certain health care needs can be provided without the need for a physical exam.  This service lets us provide the care you need with a short phone conversation.  If a prescription is necessary we can send it directly to your pharmacy.  If lab work is needed we can place an order for that and you can then stop by our lab to have the test done at a later time.    Telephone visits are billed at different rates depending on your insurance coverage. During this emergency period, for some insurers they may be billed the same as an in-person visit.  Please reach out to your insurance provider with any questions.    If during the course of the call the physician/provider feels a telephone visit is not appropriate, you will not be charged for this service.\"    Patient has given verbal consent for Telephone visit?  Yes    How would you like to obtain your AVS? [facility]     CHIEF COMPLAINT: Pain  -Fibromyalgia  -Neck pain  -Low back pain     INTERVAL HISTORY:  Last seen on 06/24/2020 for a virtual visit.     Recommendations/plan at the last visit included:  1. Physical Therapy: continue previous PT exercises;   2. Clinical Health Psychologist:  YES, has a plan in place  3. Diagnostic Studies: none at this time  4. Medication Management:                1.  Continue  Flexeril 10mg, 1 tab TID PRN              2.  Continue hydrocodone to 7.5/325 with instructions to take 1 tablet every 4-6 hours as needed for severe pain. Max of 5 tablets/day              3.  Continue Savella at the lower dose 25mg twice daily              4.  Consider low-dose naltrexone  5. Further procedures recommended: consider trigger point injections if " needed        Follow up with this provider: 4 weeks     Since her last visit, Sherienela Boothson reports:    She states that she continues to have panic attacks. She started with a new psychiatrist. Buspar was added and klonopin was increased. That won't be started until 28th. She states that she is completely off the Savella. She has been off of it now for almost 4 weeks.        Pain Information:              Pain today 7/10     UDS 1/27/2020   CSA 2/04/2019     CURRENT RELEVANT PAIN MEDICATIONS:  Clonazepam 0.5mg: taking 1 tab twice daily    Flexeril-1 in AM and 2 at HS  Cymbalta-20mg in AM   Hydrocodone 7.5mg -currently taking 2 tablets three times a day   Ibuprofen-will take 800mg up to 3 times day, doesn't take daily-  Toradol 10mg     Patient is using the medication as prescribed:  YES  Is your medication helpful?     somewhat   Medication side effects? no side effect     Previous Medications: (H--helped; HI--Helped initially; SWH-- somewhat helpful, NH--No help; W--worse; SE--side effects)   Opiates: Hydrocodone H, Oxycodone SE  NSAIDS: Ibuprofen H, Relafen SE stomach upset, Meloxicam NH  Muscle Relaxants: Tizanidine NH, Flexeril H, Robaxin NH SE stomach upset  Anti-migraine mediations: Verapamil H  Anti-depressants: Cymbalta H  Sleep aids: none  Anxiolytics: Xanax H, Clonazepam H, Valium H  Neuropathics: Gabapentin SE dizziness          Topicals: Lidocaine patches SW  Other medications not covered above: Savella H at first, then seemed to stop working, Lyrica SE shortness of breath, felt 'weird' on them, throat felt weird. Prednisone H for arthritis flare     Past Pain Treatments:  Pain Clinic:   No   PT: Yes, years ago. Has not done pool therapy.   Psychologist: No  Relaxation techniques/biofeedback: No  Chiropractor: No, was told not to because of neck.   Acupuncture: Yes for headaches.   Pharmacotherapy:           Opioids: Yes            Non-opioids:    Yes   TENs Unit:Yes  Injections: cervical medial  branch blocks 6/12/2014  Self-care:   Yes, ice/heat, hot baths, theracare  Surgeries related to pain: No     [place] Board of Pharmacy Data Base Reviewed:    YES; As expected, no concern for misuse/abuse of controlled medications based on this report.        THE 4 As OF OPIOID MAINTENANCE ANALGESIA    Analgesia: Is pain relief clinically significant? YES   Activity: Is patient functional and able to perform Activities of Daily Living? YES   Adverse effects: Is patient free from adverse side effects from opiates? YES   Adherence to Rx protocol: Is patient adhering to Controlled Substance Agreement and taking medications ONLY as ordered? No        Is Narcan prescribed for opiate use >50 MME daily? yes        Total Daily MME: 37.5    Medications:  Current Outpatient Prescriptions          Current Outpatient Medications   Medication Sig Dispense Refill     albuterol (VENTOLIN HFA) 108 (90 Base) MCG/ACT inhaler Inhale 2 puffs into the lungs every 6 hours as needed for shortness of breath / dyspnea or wheezing 18 g 1     cetirizine (ZYRTEC) 10 MG tablet TAKE ONE TABLET BY MOUTH ONCE DAILY 90 tablet 2     clonazePAM (KLONOPIN) 0.5 MG tablet Take 1 tablet (0.5 mg) by mouth 2 times daily as needed for anxiety Must last 30 days 60 tablet 0     [START ON 2/24/2020] clonazePAM (KLONOPIN) 0.5 MG tablet Take 1 tablet (0.5 mg) by mouth 2 times daily as needed for anxiety 6 tablet 0     cyclobenzaprine (FLEXERIL) 10 MG tablet Take 2 tablets in the evening and 1 in the morning 90 tablet 2     DULoxetine (CYMBALTA) 20 MG capsule Take 2 capsules (40 mg) by mouth daily CYMBALTA-KADY 60 capsule 0     fluticasone (FLONASE) 50 MCG/ACT nasal spray SPRAY 2 SPRAYS INTO BOTH NOSTRILS DAILY. 16 g 11     gabapentin (NEURONTIN) 100 MG capsule Take 1 capsule at bedtime for 1 week, then take 1 capsule twice daily for 1 week, then take 1 capsule three times a day 90 capsule 0     HYDROcodone-acetaminophen (NORCO) 7.5-325 MG per tablet Take 1  tablet every 4-6 hours as needed for severe pain. Max of 5 tablets per day. Fill/start 12/30/2019. 150 tablet 0     naloxone (NARCAN) nasal spray Spray 1 spray (4 mg) into one nostril alternating nostrils as needed for opioid reversal every 2-3 minutes until assistance arrives 0.2 mL 0     OLANZapine (ZYPREXA) 5 MG tablet Take 0.5 tablets (2.5 mg) by mouth every morning And 1 tab(5mg) at 2pm and 1 tab(5mg) at bedtime. 75 tablet 1     verapamil (CALAN) 40 MG tablet Take 1 tablet (40 mg) by mouth 2 times daily 180 tablet 3     CYMBALTA 60 MG capsule TAKE ONE CAPSULE BY MOUTH EVERY EVENING (Patient not taking: Reported on 1/24/2020) 90 capsule 0                Assessment:  Sherie Otero is a 49 year old female who presents today for follow up regarding her:     1. Fibromyalgia  2. Chronic low back pain  3. Cervicalgia  4. Chronic pain syndrome  5. Chronic use of opioids     Her pain is overall stable, however, she continues to struggle with anxiety and panic attacks. She did start with a new psychiatrist. At this time, we'll continue her current plan. As her anxiety has not changed with being off the Savella, we could consider starting it again, once her anxiety is under better control. With the recent addition of new medications for hear anxiety, I would prefer to to wait on starting Savella incase she has side effects.        Plan:  Diagnosis reviewed, treatment option addressed, and risk/benifits discussed.  Self-care instructions given.  I am recommending a multidisciplinary treatment plan to help this patient better manage pain.       1. Physical Therapy: continue previous PT exercises;   2. Clinical Health Psychologist:  YES, has a plan in place  3. Diagnostic Studies: none at this time  4. Medication Management:                1.  Continue  Flexeril 10mg, 1 tab TID PRN              2.  Continue hydrocodone to 7.5/325 with instructions to take 1 tablet every 4-6 hours as needed for severe pain. Max of 5  tablets/day              3.  Consider restarting Savella once her anxiety is under better control              4.  Consider low-dose naltrexone  5. Further procedures recommended: consider trigger point injections if needed        Follow up with this provider: 4 weeks     Phone call duration: 6 minutes    Celia Bettencourt [facility]

## 2020-07-24 NOTE — PATIENT INSTRUCTIONS
After Visit Instructions:     Thank you for coming to [facility] for your care. It is my goal to partner with you to help you reach your optimal state of health.     I am recommending multidisciplinary care at this time.  The focus of care will be to continue gradual rehabilitation and pain management with medication adjustments as needed.    Continue daily self-care, identifying contributing factors, and monitoring variations in pain level. Continue to integrate self-care into your life.        Schedule pain psychology assessment/visit: continue current plan    Schedule physical therapy assessment/visit not at this time    Schedule follow-up with Celia Bettencourt PA-C in 4 weeks. You will need to make this appointment.     Procedures recommended: trigger point injections as needed     Medication recommendations:    1.  Continue  Flexeril 10mg, 1 tab TID PRN              2.  Continue hydrocodone to 7.5/325 with instructions to take 1  tablet every 4-6 hours as needed for severe pain. Max of 5  tablets/day              3.  Consider restarting Savella once her anxiety is under better  control                   Celia Bettencourt PA-C  [facility]  [place]    Contact information: [facility]  Clinic Number:  [phone]     Call with any questions about your care and for scheduling assistance.     Calls are returned Monday through Friday between 8 AM and 4:30 PM. We usually get back to you within 2 business days depending on the issue/request.    If we are prescribing your medications:    For opioid medication refills, call the clinic or send a [employer] message 7 days in advance.  Please include:    Name of requested medication    Name of the pharmacy.    For non-opioid medications, call your pharmacy directly to request a refill. Please allow 3-4 days to be processed.     Per [place] State Law:    All controlled substance prescriptions must be  filled within 30 days of being written.      For those controlled substances allowing refills, pickup must occur within 30 days of last fill.      We believe regular attendance is key to your success in our program!      Any time you are unable to keep your appointment we ask that you call us at least 24 hours in advance to cancel.This will allow us to offer the appointment time to another patient.   Multiple missed appointments may lead to dismissal from the clinic.
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
## 2020-07-30 ENCOUNTER — VIRTUAL VISIT (OUTPATIENT)
Dept: PSYCHOLOGY | Facility: CLINIC | Age: 50
End: 2020-07-30
Payer: MEDICARE

## 2020-07-30 DIAGNOSIS — F33.1 MAJOR DEPRESSIVE DISORDER, RECURRENT EPISODE, MODERATE WITH ANXIOUS DISTRESS (H): ICD-10-CM

## 2020-07-30 DIAGNOSIS — F41.1 GENERALIZED ANXIETY DISORDER: Primary | ICD-10-CM

## 2020-07-30 DIAGNOSIS — F43.10 POST TRAUMATIC STRESS DISORDER (PTSD): ICD-10-CM

## 2020-07-30 PROCEDURE — 90834 PSYTX W PT 45 MINUTES: CPT | Mod: TEL | Performed by: SOCIAL WORKER

## 2020-07-30 ASSESSMENT — ANXIETY QUESTIONNAIRES
3. WORRYING TOO MUCH ABOUT DIFFERENT THINGS: NEARLY EVERY DAY
6. BECOMING EASILY ANNOYED OR IRRITABLE: SEVERAL DAYS
7. FEELING AFRAID AS IF SOMETHING AWFUL MIGHT HAPPEN: NEARLY EVERY DAY
1. FEELING NERVOUS, ANXIOUS, OR ON EDGE: NEARLY EVERY DAY
GAD7 TOTAL SCORE: 19
IF YOU CHECKED OFF ANY PROBLEMS ON THIS QUESTIONNAIRE, HOW DIFFICULT HAVE THESE PROBLEMS MADE IT FOR YOU TO DO YOUR WORK, TAKE CARE OF THINGS AT HOME, OR GET ALONG WITH OTHER PEOPLE: EXTREMELY DIFFICULT
4. TROUBLE RELAXING: NEARLY EVERY DAY
2. NOT BEING ABLE TO STOP OR CONTROL WORRYING: NEARLY EVERY DAY
5. BEING SO RESTLESS THAT IT IS HARD TO SIT STILL: NEARLY EVERY DAY

## 2020-07-30 ASSESSMENT — PATIENT HEALTH QUESTIONNAIRE - PHQ9: SUM OF ALL RESPONSES TO PHQ QUESTIONS 1-9: 9

## 2020-07-31 ENCOUNTER — VIRTUAL VISIT (OUTPATIENT)
Dept: PSYCHOLOGY | Facility: CLINIC | Age: 50
End: 2020-07-31
Payer: MEDICARE

## 2020-07-31 DIAGNOSIS — F90.0 ADHD (ATTENTION DEFICIT HYPERACTIVITY DISORDER), INATTENTIVE TYPE: Primary | ICD-10-CM

## 2020-07-31 PROCEDURE — 90791 PSYCH DIAGNOSTIC EVALUATION: CPT | Mod: 95 | Performed by: PSYCHOLOGIST

## 2020-07-31 ASSESSMENT — ANXIETY QUESTIONNAIRES: GAD7 TOTAL SCORE: 19
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
## 2020-08-03 NOTE — PROGRESS NOTES
Progress Note - Initial Session  Disclaimer: Voice recognition software was used to generate this note. As a result, wrong word or 'sound-a-like' substitutions may have occurred due to the inherent limitations of voice recognition software. There may be errors in the script that have gone undetected. Please consider this when interpreting information found in this chart.     Client Name:  Sherie Otero Date: 7/24/2020         Service Type: Individual     Session Start Time: 10:30 AM  session End Time:   11:15 AM     Session Length: 45    Session #: 1    Attendees: Client attended alone    Service Modality:  Video Visit:    Telemedicine Visit: The patient's condition can be safely assessed and treated via synchronous audio and visual telemedicine encounter.      Reason for Telemedicine Visit: Services only offered telehealth    Originating Site (Patient Location): Patient's home    Distant Site (Provider Location): Provider Remote Setting    Consent:  The patient/guardian has verbally consented to: the potential risks and benefits of telemedicine (video visit) versus in person care; bill my insurance or make self-payment for services provided; and responsibility for payment of non-covered services.     Patient would like the video invitation sent by: Send to e-mail at: [email or web address]     Mode of Communication:  Video Conference via Netflix    As the provider I attest to compliance with applicable laws and regulations related to telemedicine.       DATA:  Diagnostic Assessment in progress.  Unable to complete documentation at the conclusion of the first session due to needing more information.  Client presents for session 1 of ADHD assessment.  She is referred by her primary care physician notes she was on ADHD medications for a while previously, Adderall in 2018 did help her focus and have more energy.  Principal complaints are starting 20 things at once and never finishing any of them.  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  She started on [id number] but put off completing the surveys.  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  Leave she for some problems about 10 years ago.  Difficulty concentrating piles of paper and things around the house.  Disabled by chronic fatigue and fibromyalgia since    in 2016 they have been together for 24 years.  She has 3 adult children.  Her eldest child is been diagnosed with ADHD and she believes her mother likely has it.  Reports depression runs on mother's side she never met her father.  Describes a good childhood, [employer] student.  Anxiety began in high school.  Recalls having panic attacks beginning around age 11.  Went away for many years but came back last year panic attacks consist of difficulty breathing, chest pains feeling lightheaded peripheral numbing fear of dying.  Interactive Complexity: No  Crisis: No    Intervention:  Assessment and psychoeducation regarding ADHD in adults     ASSESSMENT:  Mental Status Assessment:  Appearance:   Appropriate   Eye Contact:   Good   Psychomotor Behavior: Normal   Attitude:   Cooperative   Orientation:   All  Speech   Rate / Production: Normal/ Responsive   Volume:  Normal   Mood:    Normal  Affect:    Appropriate   Thought Content:  Clear   Thought Form:  Coherent  Logical   Insight:    Fair       Safety Issues and Plan for Safety and Risk Management:     [place] Suicide Severity Rating Scale (Short Version)  [place] Suicide Severity Rating (Short Version) 2019   Over the past 2 weeks have you felt down, depressed, or hopeless? - yes   Over the past 2 weeks have you had thoughts of killing yourself? - no   Have you ever attempted to kill yourself? - no   Q1 Wished to be Dead (Past Month) no -   Q2 Suicidal Thoughts (Past Month) no -   Q6 Suicide Behavior (Lifetime) no -     Patient denies current fears or concerns for  personal safety.  Patient denies current or recent suicidal ideation or behaviors.  Patient denies current or recent homicidal ideation or behaviors.  Patient denies current or recent self injurious behavior or ideation.  Patient denies other safety concerns.  Recommended that patient call 911 or go to the local ED should there be a change in any of these risk factors.  Patient reports there are no firearms in the house.     Diagnostic Criteria:  1. Recurrent unexpected panic attacks and meets criteria 2, 3, and 4 (below)  2. At least one of the attacks has been followed by 1 month (or more) of one (or more) of the following:     (a) persistent concern about having additional attacks     (b) worry about the implications of the attack or its consequences     (c) a significant change in behavior related to the attacks  3. Absence of agoraphobia  4. The panic attacks are not to the the direct physiological effects of a substance or general medical condition  5. The panic attacks are not better accounted for by another mental disorder, such as social phobia, specific phobia, OCD, PTSD, or separation anxiety disorder  A) Recurrent episode(s) - symptoms have been present during the same 2-week period and represent a change from previous functioning 5 or more symptoms (required for diagnosis)   - Depressed mood. Note: In children and adolescents, can be irritable mood.     - Diminished interest or pleasure in all, or almost all, activities.    - Decreased sleep.    - Psychomotor activity agitation.    - Fatigue or loss of energy.    - Feelings of worthlessness or inappropriate and excessive guilt.    - Diminished ability to think or concentrate, or indecisiveness.   B) The symptoms cause clinically significant distress or impairment in social, occupational, or other important areas of functioning  C) The episode is not attributable to the physiological effects of a substance or to another medical condition  D) The  occurence of major depressive episode is not better explained by other thought / psychotic disorders  E) There has never been a manic episode or hypomanic episode      DSM5 Diagnoses: (Sustained by DSM5 Criteria Listed Above)  Diagnoses: 296.32 (F33.1) Major Depressive Disorder, Recurrent Episode, Moderate _  300.01 (F41.0) Panic Disorder  Psychosocial & Contextual Factors:   Rule out ADHD depression caused by medical disorder  WHODAS 2.0 (12 item):   WHODAS 2.0 Total Score 9/10/2019   Total Score 38       Collateral Reports Completed:  Not Applicable      PLAN: (Homework, other):  Patient stated that she may follow up for ongoing services with [facility].  Client was emailed ADHD adult rating scales for self and collateral these to be filled out on return before next visit.      Daniel Yates  August 3, 2020
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
## 2020-08-05 DIAGNOSIS — G43.819 OTHER MIGRAINE WITHOUT STATUS MIGRAINOSUS, INTRACTABLE: ICD-10-CM

## 2020-08-06 ENCOUNTER — VIRTUAL VISIT (OUTPATIENT)
Dept: PSYCHOLOGY | Facility: CLINIC | Age: 50
End: 2020-08-06
Payer: MEDICARE

## 2020-08-06 DIAGNOSIS — F41.1 GENERALIZED ANXIETY DISORDER: ICD-10-CM

## 2020-08-06 DIAGNOSIS — F90.0 ADHD (ATTENTION DEFICIT HYPERACTIVITY DISORDER), INATTENTIVE TYPE: Primary | ICD-10-CM

## 2020-08-06 DIAGNOSIS — F33.1 MAJOR DEPRESSIVE DISORDER, RECURRENT EPISODE, MODERATE WITH ANXIOUS DISTRESS (H): ICD-10-CM

## 2020-08-06 PROCEDURE — 90834 PSYTX W PT 45 MINUTES: CPT | Mod: 95 | Performed by: SOCIAL WORKER

## 2020-08-06 RX ORDER — VERAPAMIL HYDROCHLORIDE 40 MG/1
TABLET ORAL
Qty: 180 TABLET | Refills: 0 | Status: SHIPPED | OUTPATIENT
Start: 2020-08-06 | End: 2021-02-17

## 2020-08-06 NOTE — TELEPHONE ENCOUNTER
Prescription approved per [facility] Refill Protocol.    SAMARA GaleN, RN  [facility]
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
## 2020-08-18 ENCOUNTER — VIRTUAL VISIT (OUTPATIENT)
Dept: PSYCHOLOGY | Facility: CLINIC | Age: 50
End: 2020-08-18
Payer: MEDICARE

## 2020-08-18 DIAGNOSIS — F90.2 ADHD (ATTENTION DEFICIT HYPERACTIVITY DISORDER), COMBINED TYPE: Primary | ICD-10-CM

## 2020-08-18 PROCEDURE — 96130 PSYCL TST EVAL PHYS/QHP 1ST: CPT | Mod: 95 | Performed by: PSYCHOLOGIST

## 2020-08-18 PROCEDURE — 96131 PSYCL TST EVAL PHYS/QHP EA: CPT | Mod: 95 | Performed by: PSYCHOLOGIST

## 2020-08-20 ENCOUNTER — VIRTUAL VISIT (OUTPATIENT)
Dept: PSYCHOLOGY | Facility: CLINIC | Age: 50
End: 2020-08-20
Payer: MEDICARE

## 2020-08-20 DIAGNOSIS — F43.10 POST TRAUMATIC STRESS DISORDER (PTSD): ICD-10-CM

## 2020-08-20 DIAGNOSIS — F90.2 ADHD (ATTENTION DEFICIT HYPERACTIVITY DISORDER), COMBINED TYPE: Primary | ICD-10-CM

## 2020-08-20 DIAGNOSIS — F33.1 MAJOR DEPRESSIVE DISORDER, RECURRENT EPISODE, MODERATE WITH ANXIOUS DISTRESS (H): ICD-10-CM

## 2020-08-20 DIAGNOSIS — F41.1 GENERALIZED ANXIETY DISORDER: ICD-10-CM

## 2020-08-20 PROCEDURE — 90834 PSYTX W PT 45 MINUTES: CPT | Mod: 95 | Performed by: SOCIAL WORKER

## 2020-08-20 ASSESSMENT — ANXIETY QUESTIONNAIRES
4. TROUBLE RELAXING: NEARLY EVERY DAY
3. WORRYING TOO MUCH ABOUT DIFFERENT THINGS: NEARLY EVERY DAY
IF YOU CHECKED OFF ANY PROBLEMS ON THIS QUESTIONNAIRE, HOW DIFFICULT HAVE THESE PROBLEMS MADE IT FOR YOU TO DO YOUR WORK, TAKE CARE OF THINGS AT HOME, OR GET ALONG WITH OTHER PEOPLE: VERY DIFFICULT
6. BECOMING EASILY ANNOYED OR IRRITABLE: SEVERAL DAYS
GAD7 TOTAL SCORE: 19
5. BEING SO RESTLESS THAT IT IS HARD TO SIT STILL: NEARLY EVERY DAY
7. FEELING AFRAID AS IF SOMETHING AWFUL MIGHT HAPPEN: NEARLY EVERY DAY
2. NOT BEING ABLE TO STOP OR CONTROL WORRYING: NEARLY EVERY DAY
1. FEELING NERVOUS, ANXIOUS, OR ON EDGE: NEARLY EVERY DAY

## 2020-08-20 ASSESSMENT — PATIENT HEALTH QUESTIONNAIRE - PHQ9: SUM OF ALL RESPONSES TO PHQ QUESTIONS 1-9: 10
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
## 2020-08-21 ENCOUNTER — VIRTUAL VISIT (OUTPATIENT)
Dept: PALLIATIVE MEDICINE | Facility: CLINIC | Age: 50
End: 2020-08-21
Payer: MEDICARE

## 2020-08-21 VITALS — HEIGHT: 65 IN | BODY MASS INDEX: 26.46 KG/M2

## 2020-08-21 DIAGNOSIS — M79.7 FIBROMYALGIA: ICD-10-CM

## 2020-08-21 DIAGNOSIS — G89.4 CHRONIC PAIN SYNDROME: ICD-10-CM

## 2020-08-21 DIAGNOSIS — M54.50 CHRONIC BILATERAL LOW BACK PAIN WITHOUT SCIATICA: ICD-10-CM

## 2020-08-21 DIAGNOSIS — F11.90 CHRONIC, CONTINUOUS USE OF OPIOIDS: Primary | ICD-10-CM

## 2020-08-21 DIAGNOSIS — M54.2 CERVICALGIA: ICD-10-CM

## 2020-08-21 DIAGNOSIS — G89.29 CHRONIC BILATERAL LOW BACK PAIN WITHOUT SCIATICA: ICD-10-CM

## 2020-08-21 PROCEDURE — 99441 ZZC PHYSICIAN TELEPHONE EVALUATION 5-10 MIN: CPT | Performed by: PHYSICIAN ASSISTANT

## 2020-08-21 RX ORDER — CYCLOBENZAPRINE HCL 10 MG
TABLET ORAL
Qty: 90 TABLET | Refills: 2 | Status: SHIPPED | OUTPATIENT
Start: 2020-08-21 | End: 2020-10-19

## 2020-08-21 RX ORDER — HYDROCODONE BITARTRATE AND ACETAMINOPHEN 7.5; 325 MG/1; MG/1
TABLET ORAL
Qty: 150 TABLET | Refills: 0 | Status: SHIPPED | OUTPATIENT
Start: 2020-08-21 | End: 2020-09-21

## 2020-08-21 RX ORDER — BUPROPION HYDROCHLORIDE 100 MG/1
TABLET, EXTENDED RELEASE ORAL
COMMUNITY
Start: 2020-08-19 | End: 2021-04-29

## 2020-08-21 RX ORDER — BUSPIRONE HYDROCHLORIDE 5 MG/1
10 TABLET ORAL 3 TIMES DAILY
COMMUNITY
End: 2021-01-20

## 2020-08-21 ASSESSMENT — PAIN SCALES - GENERAL: PAINLEVEL: EXTREME PAIN (8)

## 2020-08-21 ASSESSMENT — ANXIETY QUESTIONNAIRES: GAD7 TOTAL SCORE: 19

## 2020-08-21 NOTE — PROGRESS NOTES
"Sherie Otero is a 49 year old female who is being evaluated via a billable telephone visit.      The patient has been notified of following:     \"This telephone visit will be conducted via a call between you and your physician/provider. We have found that certain health care needs can be provided without the need for a physical exam.  This service lets us provide the care you need with a short phone conversation.  If a prescription is necessary we can send it directly to your pharmacy.  If lab work is needed we can place an order for that and you can then stop by our lab to have the test done at a later time.    Telephone visits are billed at different rates depending on your insurance coverage. During this emergency period, for some insurers they may be billed the same as an in-person visit.  Please reach out to your insurance provider with any questions.    If during the course of the call the physician/provider feels a telephone visit is not appropriate, you will not be charged for this service.\"    Patient has given verbal consent for Telephone visit?  Yes    How would you like to obtain your AVS? [facility]     CHIEF COMPLAINT: Pain  -Fibromyalgia  -Neck pain  -Low back pain     INTERVAL HISTORY:  Last seen on 07/24/2020 for a virtual visit.     Recommendations/plan at the last visit included:  1. Physical Therapy: continue previous PT exercises;   2. Clinical Health Psychologist:  YES, has a plan in place  3. Diagnostic Studies: none at this time  4. Medication Management:                1.  Continue  Flexeril 10mg, 1 tab TID PRN              2.  Continue hydrocodone to 7.5/325 with instructions to take 1 tablet every 4-6 hours as needed for severe pain. Max of 5 tablets/day              3.  Consider restarting Savella once her anxiety is under better control              4.  Consider low-dose naltrexone  5. Further procedures recommended: consider trigger point " "injections if needed        Follow up with this provider: 4 weeks     Since her last visit, Shreie Otero reports:    Patient states that she is \"hanging in there\". She states that she started Wellbutrin yesterday for her anxiety. She states that they seem to be getting better. She states that her pain is worse from recently overdoing it with yard work and the humidity doesn't help either. She reports having a few good days. She does attend counseling weekly.         Pain Information:              Pain today 8/10     UDS 1/27/2020   CSA 2/04/2019     CURRENT RELEVANT PAIN MEDICATIONS:  Clonazepam 0.5mg: taking 1 tab twice daily    Flexeril-1 in AM and 2 at HS  Cymbalta-20mg in AM   Hydrocodone 7.5mg -currently taking 2 tablets three times a day   Ibuprofen-will take 800mg up to 3 times day, doesn't take daily-  Toradol 10mg     Patient is using the medication as prescribed:  YES  Is your medication helpful?     somewhat   Medication side effects? no side effect     Previous Medications: (H--helped; HI--Helped initially; SWH-- somewhat helpful, NH--No help; W--worse; SE--side effects)   Opiates: Hydrocodone H, Oxycodone SE  NSAIDS: Ibuprofen H, Relafen SE stomach upset, Meloxicam NH  Muscle Relaxants: Tizanidine NH, Flexeril H, Robaxin NH SE stomach upset  Anti-migraine mediations: Verapamil H  Anti-depressants: Cymbalta H  Sleep aids: none  Anxiolytics: Xanax H, Clonazepam H, Valium H  Neuropathics: Gabapentin SE dizziness          Topicals: Lidocaine patches SW  Other medications not covered above: Savella H at first, then seemed to stop working, Lyrica SE shortness of breath, felt 'weird' on them, throat felt weird. Prednisone H for arthritis flare     Past Pain Treatments:  Pain Clinic:   No   PT: Yes, years ago. Has not done pool therapy.   Psychologist: No  Relaxation techniques/biofeedback: No  Chiropractor: No, was told not to because of neck.   Acupuncture: Yes for headaches.   Pharmacotherapy: "           Opioids: Yes            Non-opioids:    Yes   TENs Unit:Yes  Injections: cervical medial branch blocks 6/12/2014  Self-care:   Yes, ice/heat, hot baths, theracare  Surgeries related to pain: No     [place] Board of Pharmacy Data Base Reviewed:    YES; As expected, no concern for misuse/abuse of controlled medications based on this report.        THE 4 As OF OPIOID MAINTENANCE ANALGESIA    Analgesia: Is pain relief clinically significant? YES   Activity: Is patient functional and able to perform Activities of Daily Living? YES   Adverse effects: Is patient free from adverse side effects from opiates? YES   Adherence to Rx protocol: Is patient adhering to Controlled Substance Agreement and taking medications ONLY as ordered? No        Is Narcan prescribed for opiate use >50 MME daily? yes        Total Daily MME: 37.5    Medications:  Current Outpatient Prescriptions          Current Outpatient Medications   Medication Sig Dispense Refill     albuterol (VENTOLIN HFA) 108 (90 Base) MCG/ACT inhaler Inhale 2 puffs into the lungs every 6 hours as needed for shortness of breath / dyspnea or wheezing 18 g 1     cetirizine (ZYRTEC) 10 MG tablet TAKE ONE TABLET BY MOUTH ONCE DAILY 90 tablet 2     clonazePAM (KLONOPIN) 0.5 MG tablet Take 1 tablet (0.5 mg) by mouth 2 times daily as needed for anxiety Must last 30 days 60 tablet 0     [START ON 2/24/2020] clonazePAM (KLONOPIN) 0.5 MG tablet Take 1 tablet (0.5 mg) by mouth 2 times daily as needed for anxiety 6 tablet 0     cyclobenzaprine (FLEXERIL) 10 MG tablet Take 2 tablets in the evening and 1 in the morning 90 tablet 2     DULoxetine (CYMBALTA) 20 MG capsule Take 2 capsules (40 mg) by mouth daily CYMBALTA-KADY 60 capsule 0     fluticasone (FLONASE) 50 MCG/ACT nasal spray SPRAY 2 SPRAYS INTO BOTH NOSTRILS DAILY. 16 g 11     gabapentin (NEURONTIN) 100 MG capsule Take 1 capsule at bedtime for 1 week, then take 1 capsule twice daily for 1 week, then take 1 capsule  three times a day 90 capsule 0     HYDROcodone-acetaminophen (NORCO) 7.5-325 MG per tablet Take 1 tablet every 4-6 hours as needed for severe pain. Max of 5 tablets per day. Fill/start 12/30/2019. 150 tablet 0     naloxone (NARCAN) nasal spray Spray 1 spray (4 mg) into one nostril alternating nostrils as needed for opioid reversal every 2-3 minutes until assistance arrives 0.2 mL 0     OLANZapine (ZYPREXA) 5 MG tablet Take 0.5 tablets (2.5 mg) by mouth every morning And 1 tab(5mg) at 2pm and 1 tab(5mg) at bedtime. 75 tablet 1     verapamil (CALAN) 40 MG tablet Take 1 tablet (40 mg) by mouth 2 times daily 180 tablet 3     CYMBALTA 60 MG capsule TAKE ONE CAPSULE BY MOUTH EVERY EVENING (Patient not taking: Reported on 1/24/2020) 90 capsule 0                Assessment:  Sherie Otero is a 49 year old female who presents today for follow up regarding her:     1. Fibromyalgia  2. Chronic low back pain  3. Cervicalgia  4. Chronic pain syndrome  5. Chronic use of opioids       Her pain is overall stable. Anxiety sounds like it is getting better. Would like to try different medications for her pain/fibromyalgia, however with the different medications for anxiety, would prefer to wait on this at this time. Continue current plan.        Plan:  Diagnosis reviewed, treatment option addressed, and risk/benifits discussed.  Self-care instructions given.  I am recommending a multidisciplinary treatment plan to help this patient better manage pain.       1. Physical Therapy: continue previous PT exercises;   2. Clinical Health Psychologist:  YES, has a plan in place  3. Diagnostic Studies: none at this time  4. Medication Management:                1.  Continue  Flexeril 10mg, 1 tab TID PRN              2.  Continue hydrocodone to 7.5/325 with instructions to take 1 tablet every 4-6 hours as needed for severe pain. Max of 5 tablets/day              3.  Consider restarting Savella once her anxiety is under better  control              4.  Consider low-dose naltrexone  5. Further procedures recommended: consider trigger point injections if needed        Follow up with this provider: 4 weeks     Phone call duration: 7 minutes    Celia Bettencourt [facility]

## 2020-08-21 NOTE — PATIENT INSTRUCTIONS
After Visit Instructions:     Thank you for coming to [facility] for your care. It is my goal to partner with you to help you reach your optimal state of health.     I am recommending multidisciplinary care at this time.  The focus of care will be to continue gradual rehabilitation and pain management with medication adjustments as needed.    Continue daily self-care, identifying contributing factors, and monitoring variations in pain level. Continue to integrate self-care into your life.          Schedule follow-up with Celia Bettencourt PA-C in 4 weeks. You will need to make this appointment.     Medication recommendations:   1.  Continue  Flexeril 10mg, 1 tab TID PRN   2.  Continue hydrocodone to 7.5/325 with instructions to take 1 tablet every 4-6 hours as needed for severe pain. Max of 5 tablets/day  3.  Consider restarting Savella once her anxiety is under better control  4.  Consider low-dose naltrexone      Celia Bettencourt PA-C  [facility]  [facility]    Contact information: [facility] [place]  Clinic Number:  [phone]     Call with any questions about your care and for scheduling assistance.     Calls are returned Monday through Friday between 8 AM and 4:30 PM. We usually get back to you within 2 business days depending on the issue/request.    If we are prescribing your medications:    For opioid medication refills, call the clinic or send a [employer] message 7 days in advance.  Please include:    Name of requested medication    Name of the pharmacy.    For non-opioid medications, call your pharmacy directly to request a refill. Please allow 3-4 days to be processed.     Per [place] State Law:    All controlled substance prescriptions must be filled within 30 days of being written.      For those controlled substances allowing refills, pickup must occur within 30 days of last fill.      We believe regular attendance is key to your success in  our program!      Any time you are unable to keep your appointment we ask that you call us at least 24 hours in advance to cancel.This will allow us to offer the appointment time to another patient.   Multiple missed appointments may lead to dismissal from the clinic.

## 2020-08-25 NOTE — PROGRESS NOTES
Progress Note  Disclaimer: Voice recognition software was used to generate this note. As a result, wrong word or 'sound-a-like' substitutions may have occurred due to the inherent limitations of voice recognition software. There may be errors in the script that have gone undetected. Please consider this when interpreting information found in this chart.       Client Name: Sherie Otero      Date: 8/18/2020      Service Type: Individual      Session Start Time: 12:00 PM  session End Time: 12:45 PM     Session Length: 45    Session #: 3    Attendees: Client attended alone    Service Modality:  Video Visit:    Telemedicine Visit: The patient's condition can be safely assessed and treated via synchronous audio and visual telemedicine encounter.      Reason for Telemedicine Visit: Services only offered telehealth    Originating Site (Patient Location): Patient's home    Distant Site (Provider Location): Provider Remote Setting    Consent:  The patient/guardian has verbally consented to: the potential risks and benefits of telemedicine (video visit) versus in person care; bill my insurance or make self-payment for services provided; and responsibility for payment of non-covered services.     Patient would like the video invitation sent by: Send to e-mail at: [email or web address]}     Mode of Communication:  Video Conference via [employer]    As the provider I attest to compliance with applicable laws and regulations related to telemedicine.         DATA  Interactive Complexity: No  Crisis: No            DATA   ADHD Assessment feedback session. Reviewed results of testing. See [facility] extended documentation for results. Explained diagnosis and treatment options. Recommended medication evaluation be scheduled with PCP. Also provided and reviewed ADHD patient handout. Pt will schedule follow-up with me after seeing PCP.     Progress Since Last Session (Related to Symptoms / Goals /  Homework):   Symptoms: Improving Client is taking action    Homework: Achieved / completed to satisfaction      Episode of Care Goals: Satisfactory progress - ACTION (Actively working towards change); Intervened by reinforcing change plan / affirming steps taken     Current / Ongoing Stressors and Concerns:   Difficulty with attention and concentration     Treatment Objective(s) Addressed in This Session:   Reviewed results of testing, provided ADHD Psychoeducation tips and resources, encouraged client to make appointment for medication evaluation.       Intervention:   psychoeducation regarding ADHD        ASSESSMENT: Current Emotional / Mental Status (status of significant symptoms):   Risk status (Self / Other harm or suicidal ideation)   Client denies current fears or concerns for personal safety.   Client denies current or recent suicidal ideation or behaviors.   Client denies current or recent homicidal ideation or behaviors.   Client denies current or recent self injurious behavior or ideation.   Client denies other safety concerns.   Recommended that patient call 911 or go to the local ED should there be a change in any of these risk factors.     Appearance:   Appropriate    Eye Contact:   Good    Psychomotor Behavior: Normal    Attitude:   Cooperative    Orientation:   All   Speech    Rate / Production: Normal     Volume:  Normal    Mood:    Normal   Affect:    Appropriate    Thought Content:  Clear    Thought Form:  Coherent  Logical    Insight:    Good      Medication Review:   No changes to current psychiatric medication(s)     Medication Compliance:   Yes     Changes in Health Issues:   None reported     Chemical Use Review:   Substance Use: Chemical use reviewed, no active concerns identified      Tobacco Use: No change in amount of tobacco use since last session.  Provided encouragement to quit      Collateral Reports Completed:   Not Applicable    PLAN: (Client Tasks / Therapist Tasks /  Other)  Client has a mild to moderate case of ADHD combined.  See [facility] extended documentation for testing report.  Report copy to primary care physician.        Daniel Yates    Psychological Testing   Billing/Services Summary       Initial interview/Record Review 7/24/2020 96455    Intake 7/31/2020 80909    Interactive feedback Session 8/18/2020 51965    Testing Evaluation Services Base: 80939  (1st 60 mins) Add-on: 88292  (each addtl 60 mins)   Test Scoring and Interpretation  (Start/Stop), (Date, Day #) 60 minutes   Integration/Report Generation   (Start/Stop), (Date, Day #) 60 minutes   Clinical Decision Making/Battery Modification   (Start/Stop), (Date, Day #) 0 minutes   Post-Service Work   (Start/Stop), (Date, Day #) 0 minutes   Total Time: 120 minutes (2 hours, 00 minutes)   Total Units:              Diagnosis(es): (ICD-10)  F 90.2 ADHD combined

## 2020-08-27 ENCOUNTER — VIRTUAL VISIT (OUTPATIENT)
Dept: PSYCHOLOGY | Facility: CLINIC | Age: 50
End: 2020-08-27
Payer: MEDICARE

## 2020-08-27 DIAGNOSIS — F33.1 MAJOR DEPRESSIVE DISORDER, RECURRENT EPISODE, MODERATE WITH ANXIOUS DISTRESS (H): ICD-10-CM

## 2020-08-27 DIAGNOSIS — F41.1 GENERALIZED ANXIETY DISORDER: ICD-10-CM

## 2020-08-27 DIAGNOSIS — F90.2 ADHD (ATTENTION DEFICIT HYPERACTIVITY DISORDER), COMBINED TYPE: Primary | ICD-10-CM

## 2020-08-27 PROCEDURE — 90834 PSYTX W PT 45 MINUTES: CPT | Mod: 95 | Performed by: SOCIAL WORKER
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
## 2020-09-03 ENCOUNTER — VIRTUAL VISIT (OUTPATIENT)
Dept: PSYCHOLOGY | Facility: CLINIC | Age: 50
End: 2020-09-03
Payer: MEDICARE

## 2020-09-03 DIAGNOSIS — F90.2 ADHD (ATTENTION DEFICIT HYPERACTIVITY DISORDER), COMBINED TYPE: Primary | ICD-10-CM

## 2020-09-03 DIAGNOSIS — F33.1 MAJOR DEPRESSIVE DISORDER, RECURRENT EPISODE, MODERATE WITH ANXIOUS DISTRESS (H): ICD-10-CM

## 2020-09-03 DIAGNOSIS — F41.1 GENERALIZED ANXIETY DISORDER: ICD-10-CM

## 2020-09-03 PROCEDURE — 90834 PSYTX W PT 45 MINUTES: CPT | Mod: 95 | Performed by: SOCIAL WORKER

## 2020-09-03 ASSESSMENT — ANXIETY QUESTIONNAIRES
GAD7 TOTAL SCORE: 17
6. BECOMING EASILY ANNOYED OR IRRITABLE: SEVERAL DAYS
2. NOT BEING ABLE TO STOP OR CONTROL WORRYING: NEARLY EVERY DAY
3. WORRYING TOO MUCH ABOUT DIFFERENT THINGS: NEARLY EVERY DAY
7. FEELING AFRAID AS IF SOMETHING AWFUL MIGHT HAPPEN: NEARLY EVERY DAY
1. FEELING NERVOUS, ANXIOUS, OR ON EDGE: NEARLY EVERY DAY
5. BEING SO RESTLESS THAT IT IS HARD TO SIT STILL: SEVERAL DAYS
IF YOU CHECKED OFF ANY PROBLEMS ON THIS QUESTIONNAIRE, HOW DIFFICULT HAVE THESE PROBLEMS MADE IT FOR YOU TO DO YOUR WORK, TAKE CARE OF THINGS AT HOME, OR GET ALONG WITH OTHER PEOPLE: EXTREMELY DIFFICULT
4. TROUBLE RELAXING: NEARLY EVERY DAY

## 2020-09-03 ASSESSMENT — PATIENT HEALTH QUESTIONNAIRE - PHQ9: SUM OF ALL RESPONSES TO PHQ QUESTIONS 1-9: 9
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
## 2020-09-04 ASSESSMENT — ANXIETY QUESTIONNAIRES: GAD7 TOTAL SCORE: 17

## 2020-09-17 ENCOUNTER — VIRTUAL VISIT (OUTPATIENT)
Dept: PSYCHOLOGY | Facility: CLINIC | Age: 50
End: 2020-09-17
Payer: MEDICARE

## 2020-09-17 DIAGNOSIS — F43.10 POST TRAUMATIC STRESS DISORDER (PTSD): ICD-10-CM

## 2020-09-17 DIAGNOSIS — F41.1 GENERALIZED ANXIETY DISORDER: ICD-10-CM

## 2020-09-17 DIAGNOSIS — F33.1 MAJOR DEPRESSIVE DISORDER, RECURRENT EPISODE, MODERATE WITH ANXIOUS DISTRESS (H): ICD-10-CM

## 2020-09-17 DIAGNOSIS — F90.2 ADHD (ATTENTION DEFICIT HYPERACTIVITY DISORDER), COMBINED TYPE: Primary | ICD-10-CM

## 2020-09-17 PROCEDURE — 90834 PSYTX W PT 45 MINUTES: CPT | Mod: 95 | Performed by: SOCIAL WORKER

## 2020-09-17 ASSESSMENT — PATIENT HEALTH QUESTIONNAIRE - PHQ9: SUM OF ALL RESPONSES TO PHQ QUESTIONS 1-9: 7

## 2020-09-17 ASSESSMENT — ANXIETY QUESTIONNAIRES
IF YOU CHECKED OFF ANY PROBLEMS ON THIS QUESTIONNAIRE, HOW DIFFICULT HAVE THESE PROBLEMS MADE IT FOR YOU TO DO YOUR WORK, TAKE CARE OF THINGS AT HOME, OR GET ALONG WITH OTHER PEOPLE: VERY DIFFICULT
7. FEELING AFRAID AS IF SOMETHING AWFUL MIGHT HAPPEN: NEARLY EVERY DAY
GAD7 TOTAL SCORE: 19
3. WORRYING TOO MUCH ABOUT DIFFERENT THINGS: NEARLY EVERY DAY
6. BECOMING EASILY ANNOYED OR IRRITABLE: SEVERAL DAYS
1. FEELING NERVOUS, ANXIOUS, OR ON EDGE: NEARLY EVERY DAY
4. TROUBLE RELAXING: NEARLY EVERY DAY
5. BEING SO RESTLESS THAT IT IS HARD TO SIT STILL: NEARLY EVERY DAY
2. NOT BEING ABLE TO STOP OR CONTROL WORRYING: NEARLY EVERY DAY

## 2020-09-18 ASSESSMENT — ANXIETY QUESTIONNAIRES: GAD7 TOTAL SCORE: 19

## 2020-09-21 ENCOUNTER — VIRTUAL VISIT (OUTPATIENT)
Dept: PALLIATIVE MEDICINE | Facility: CLINIC | Age: 50
End: 2020-09-21
Payer: MEDICARE

## 2020-09-21 VITALS — BODY MASS INDEX: 26.46 KG/M2 | HEIGHT: 65 IN

## 2020-09-21 DIAGNOSIS — M79.7 FIBROMYALGIA: ICD-10-CM

## 2020-09-21 DIAGNOSIS — M54.2 CERVICALGIA: ICD-10-CM

## 2020-09-21 DIAGNOSIS — G89.4 CHRONIC PAIN SYNDROME: ICD-10-CM

## 2020-09-21 DIAGNOSIS — G89.29 CHRONIC BILATERAL LOW BACK PAIN WITHOUT SCIATICA: ICD-10-CM

## 2020-09-21 DIAGNOSIS — M54.50 CHRONIC BILATERAL LOW BACK PAIN WITHOUT SCIATICA: ICD-10-CM

## 2020-09-21 PROCEDURE — 99441 ZZC PHYSICIAN TELEPHONE EVALUATION 5-10 MIN: CPT | Performed by: PHYSICIAN ASSISTANT

## 2020-09-21 RX ORDER — HYDROCODONE BITARTRATE AND ACETAMINOPHEN 7.5; 325 MG/1; MG/1
TABLET ORAL
Qty: 150 TABLET | Refills: 0 | Status: SHIPPED | OUTPATIENT
Start: 2020-09-21 | End: 2020-10-19

## 2020-09-21 ASSESSMENT — PAIN SCALES - GENERAL: PAINLEVEL: SEVERE PAIN (7)

## 2020-09-21 NOTE — PROGRESS NOTES
"Sherie Otero is a 49 year old female who is being evaluated via a billable telephone visit.      The patient has been notified of following:     \"This telephone visit will be conducted via a call between you and your physician/provider. We have found that certain health care needs can be provided without the need for a physical exam.  This service lets us provide the care you need with a short phone conversation.  If a prescription is necessary we can send it directly to your pharmacy.  If lab work is needed we can place an order for that and you can then stop by our lab to have the test done at a later time.    Telephone visits are billed at different rates depending on your insurance coverage. During this emergency period, for some insurers they may be billed the same as an in-person visit.  Please reach out to your insurance provider with any questions.    If during the course of the call the physician/provider feels a telephone visit is not appropriate, you will not be charged for this service.\"    Patient has given verbal consent for Telephone visit?  Yes    How would you like to obtain your AVS? [facility]     CHIEF COMPLAINT: Pain  -Fibromyalgia  -Neck pain  -Low back pain     INTERVAL HISTORY:  Last seen on 08/21/2020 for a virtual visit.     Recommendations/plan at the last visit included:  1. Physical Therapy: continue previous PT exercises;   2. Clinical Health Psychologist:  YES, has a plan in place  3. Diagnostic Studies: none at this time  4. Medication Management:                1.  Continue  Flexeril 10mg, 1 tab TID PRN              2.  Continue hydrocodone to 7.5/325 with instructions to take 1 tablet every 4-6 hours as needed for severe pain. Max of 5 tablets/day              3.  Consider restarting Savella once her anxiety is under better control              4.  Consider low-dose naltrexone  5. Further procedures recommended: consider trigger point " "injections if needed        Follow up with this provider: 4 weeks     Since her last visit, Sherie Otero reports:    She has been \"okay\". She states that her Wellbutrin and Buspar were increased and she has been having side effects. She does not feel that her anxiety is under control right now. She continues with counseling.      Pain Information:              Pain today 7/10     UDS 1/27/2020   CSA 2/04/2019     CURRENT RELEVANT PAIN MEDICATIONS:  Clonazepam 0.5mg: taking 1 tab twice daily    Flexeril-1 in AM and 2 at HS  Hydrocodone 7.5mg -currently taking 2 tablets three times a day   Ibuprofen-will take 800mg up to 3 times day, doesn't take daily-  Toradol 10mg     Patient is using the medication as prescribed:  YES  Is your medication helpful?     somewhat   Medication side effects? no side effect     Previous Medications: (H--helped; HI--Helped initially; SWH-- somewhat helpful, NH--No help; W--worse; SE--side effects)   Opiates: Hydrocodone H, Oxycodone SE  NSAIDS: Ibuprofen H, Relafen SE stomach upset, Meloxicam NH  Muscle Relaxants: Tizanidine NH, Flexeril H, Robaxin NH SE stomach upset  Anti-migraine mediations: Verapamil H  Anti-depressants: Cymbalta H  Sleep aids: none  Anxiolytics: Xanax H, Clonazepam H, Valium H  Neuropathics: Gabapentin SE dizziness          Topicals: Lidocaine patches SW  Other medications not covered above: Savella H at first, then seemed to stop working, Lyrica SE shortness of breath, felt 'weird' on them, throat felt weird. Prednisone H for arthritis flare     Past Pain Treatments:  Pain Clinic:   No   PT: Yes, years ago. Has not done pool therapy.   Psychologist: No  Relaxation techniques/biofeedback: No  Chiropractor: No, was told not to because of neck.   Acupuncture: Yes for headaches.   Pharmacotherapy:           Opioids: Yes            Non-opioids:    Yes   TENs Unit:Yes  Injections: cervical medial branch blocks 6/12/2014  Self-care:   Yes, ice/heat, hot baths, " theracare  Surgeries related to pain: No     [place] Board of Pharmacy Data Base Reviewed:    YES; As expected, no concern for misuse/abuse of controlled medications based on this report.        THE 4 As OF OPIOID MAINTENANCE ANALGESIA    Analgesia: Is pain relief clinically significant? YES   Activity: Is patient functional and able to perform Activities of Daily Living? YES   Adverse effects: Is patient free from adverse side effects from opiates? YES   Adherence to Rx protocol: Is patient adhering to Controlled Substance Agreement and taking medications ONLY as ordered? No        Is Narcan prescribed for opiate use >50 MME daily? yes        Total Daily MME: 37.5    Medications:  Current Outpatient Prescriptions          Current Outpatient Medications   Medication Sig Dispense Refill     albuterol (VENTOLIN HFA) 108 (90 Base) MCG/ACT inhaler Inhale 2 puffs into the lungs every 6 hours as needed for shortness of breath / dyspnea or wheezing 18 g 1     cetirizine (ZYRTEC) 10 MG tablet TAKE ONE TABLET BY MOUTH ONCE DAILY 90 tablet 2     clonazePAM (KLONOPIN) 0.5 MG tablet Take 1 tablet (0.5 mg) by mouth 2 times daily as needed for anxiety Must last 30 days 60 tablet 0     [START ON 2/24/2020] clonazePAM (KLONOPIN) 0.5 MG tablet Take 1 tablet (0.5 mg) by mouth 2 times daily as needed for anxiety 6 tablet 0     cyclobenzaprine (FLEXERIL) 10 MG tablet Take 2 tablets in the evening and 1 in the morning 90 tablet 2     DULoxetine (CYMBALTA) 20 MG capsule Take 2 capsules (40 mg) by mouth daily CYMBALTA-KADY 60 capsule 0     fluticasone (FLONASE) 50 MCG/ACT nasal spray SPRAY 2 SPRAYS INTO BOTH NOSTRILS DAILY. 16 g 11     gabapentin (NEURONTIN) 100 MG capsule Take 1 capsule at bedtime for 1 week, then take 1 capsule twice daily for 1 week, then take 1 capsule three times a day 90 capsule 0     HYDROcodone-acetaminophen (NORCO) 7.5-325 MG per tablet Take 1 tablet every 4-6 hours as needed for severe pain. Max of 5 tablets  per day. Fill/start 12/30/2019. 150 tablet 0     naloxone (NARCAN) nasal spray Spray 1 spray (4 mg) into one nostril alternating nostrils as needed for opioid reversal every 2-3 minutes until assistance arrives 0.2 mL 0     OLANZapine (ZYPREXA) 5 MG tablet Take 0.5 tablets (2.5 mg) by mouth every morning And 1 tab(5mg) at 2pm and 1 tab(5mg) at bedtime. 75 tablet 1     verapamil (CALAN) 40 MG tablet Take 1 tablet (40 mg) by mouth 2 times daily 180 tablet 3     CYMBALTA 60 MG capsule TAKE ONE CAPSULE BY MOUTH EVERY EVENING (Patient not taking: Reported on 1/24/2020) 90 capsule 0              Assessment:  Sherie Otero is a 50 year old female who presents today for follow up regarding her:     1. Fibromyalgia  2. Chronic low back pain  3. Cervicalgia  4. Chronic pain syndrome  5. Chronic use of opioids     Unfortunately she continues to struggle with her anxiety. She continues to work with her psychiatrist on this. We'll continue her current plan. Overall, we do plan to try different medications for her pain/fibromyalgia, but she has been having many changes in medications for anxiety, so we'll continue the current plan.        Plan:  Diagnosis reviewed, treatment option addressed, and risk/benifits discussed.  Self-care instructions given.  I am recommending a multidisciplinary treatment plan to help this patient better manage pain.       1. Physical Therapy: continue previous PT exercises;   2. Clinical Health Psychologist:  YES, has a plan in place  3. Diagnostic Studies: none at this time  4. Medication Management:                1.  Continue  Flexeril 10mg, 1 tab TID PRN              2.  Continue hydrocodone to 7.5/325 with instructions to take 1 tablet every 4-6 hours as needed for severe pain. Max of 5 tablets/day              3.  Consider restarting Savella once her anxiety is under better control              4.  Consider low-dose naltrexone  5. Further procedures recommended: consider trigger point  injections if needed        Follow up with this provider: 4 weeks     Phone call duration: 8 minutes    Celia Bettencourt [facility]

## 2020-09-21 NOTE — TELEPHONE ENCOUNTER
Message from Qudini[employer]:  Original authorizing provider: AGUSTÍN Owens would like a refill of the following medications:  ALPRAZolam (XANAX) 0.5 MG tablet [Selina Reveles PA-C]  HYDROcodone-acetaminophen (NORCO) 7.5-325 MG per tablet [Selina Reveles PA-C]    Preferred pharmacy: Other - [facility]    Comment:     Quality 226: Preventive Care And Screening: Tobacco Use: Screening And Cessation Intervention: Patient screened for tobacco use and is an ex/non-smoker Detail Level: Detailed Quality 431: Preventive Care And Screening: Unhealthy Alcohol Use - Screening: Patient screened for unhealthy alcohol use using a single question and scores less than 2 times per year

## 2020-09-21 NOTE — PATIENT INSTRUCTIONS
After Visit Instructions:     Thank you for coming to [facility] for your care. It is my goal to partner with you to help you reach your optimal state of health.     I am recommending multidisciplinary care at this time.  The focus of care will be to continue gradual rehabilitation and pain management with medication adjustments as needed.    Continue daily self-care, identifying contributing factors, and monitoring variations in pain level. Continue to integrate self-care into your life.          Schedule follow-up with Celia Bettencourt PA-C in 4 weeks. You will need to make this appointment.     Medication recommendations:     1.  Continue  Flexeril 10mg, 1 tab TID PRN              2.  Continue hydrocodone to 7.5/325 with instructions to take 1 tablet every 4-6 hours as needed for severe pain.  Max of 5 tablets/day              3.  Consider restarting Savella once her anxiety is under better control              4.  Consider low-dose naltrexone      Celia Bettencourt PA-C  [facility]  [facility]    Contact information: [facility] [place]  Clinic Number:  [phone]     Call with any questions about your care and for scheduling assistance.     Calls are returned Monday through Friday between 8 AM and 4:30 PM. We usually get back to you within 2 business days depending on the issue/request.    If we are prescribing your medications:    For opioid medication refills, call the clinic or send a OffersBy.Me message 7 days in advance.  Please include:    Name of requested medication    Name of the pharmacy.    For non-opioid medications, call your pharmacy directly to request a refill. Please allow 3-4 days to be processed.     Per [place] State Law:    All controlled substance prescriptions must be filled within 30 days of being written.      For those controlled substances allowing refills, pickup must occur within 30 days of last fill.      We believe  regular attendance is key to your success in our program!      Any time you are unable to keep your appointment we ask that you call us at least 24 hours in advance to cancel.This will allow us to offer the appointment time to another patient.   Multiple missed appointments may lead to dismissal from the clinic.
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
## 2020-09-29 ENCOUNTER — VIRTUAL VISIT (OUTPATIENT)
Dept: PSYCHOLOGY | Facility: CLINIC | Age: 50
End: 2020-09-29
Payer: MEDICARE

## 2020-09-29 DIAGNOSIS — F90.2 ADHD (ATTENTION DEFICIT HYPERACTIVITY DISORDER), COMBINED TYPE: Primary | ICD-10-CM

## 2020-09-29 PROCEDURE — 90834 PSYTX W PT 45 MINUTES: CPT | Mod: 95 | Performed by: PSYCHOLOGIST

## 2020-09-29 NOTE — Clinical Note
She seems to be getting some benefit from welbutrin. No side effects noted. I did caution her not to run out of it.

## 2020-09-29 NOTE — PROGRESS NOTES
Progress Note  Disclaimer: Voice recognition software was used to generate this note. As a result, wrong word or 'sound-a-like' substitutions may have occurred due to the inherent limitations of voice recognition software. There may be errors in the script that have gone undetected. Please consider this when interpreting information found in this chart.       Client Name: Sherie Otero      Date: 9/29/2020      Service Type: Individual      Session Start Time: 1:00 PM  session End Time: 1:45 PM     Session Length: 45    Session #: 4    Attendees: Client attended alone    Service Modality:  Video Visit:    Telemedicine Visit: The patient's condition can be safely assessed and treated via synchronous audio and visual telemedicine encounter.      Reason for Telemedicine Visit: Services only offered telehealth    Originating Site (Patient Location): Patient's home    Distant Site (Provider Location): Provider Remote Setting    Consent:  The patient/guardian has verbally consented to: the potential risks and benefits of telemedicine (video visit) versus in person care; bill my insurance or make self-payment for services provided; and responsibility for payment of non-covered services.     Patient would like the video invitation sent by: Send to e-mail at: [email or web address]}     Mode of Communication:  Video Conference via [employer]    As the provider I attest to compliance with applicable laws and regulations related to telemedicine.         DATA  Interactive Complexity: No  Crisis: No            DATA   ADHD Assessment feedback session. Psychiatry at [facility] Rola Morelos. Welbutrin 150 twice daily. Lexapro retained at same dose. Sleep OK.  Still not focusing. But has gotten a couple things done around the house. Gets up earlier now. Can get right up.  Had not gotten any of the recommended books.  Doing some walking, streaching, using barbells.   Pain goes with the  weather. Always there.  Ultimate goal is house be clean.     Progress Since Last Session (Related to Symptoms / Goals / Homework):   Symptoms: Improving Client is taking action    Homework: Achieved / completed to satisfaction      Episode of Care Goals: Satisfactory progress - ACTION (Actively working towards change); Intervened by reinforcing change plan / affirming steps taken     Current / Ongoing Stressors and Concerns:   Difficulty with attention and concentration     Treatment Objective(s) Addressed in This Session:   Reviewed results of testing, provided ADHD Psychoeducation tips and resources, encouraged client to make appointment for medication evaluation.       Intervention:   psychoeducation regarding ADHD        ASSESSMENT: Current Emotional / Mental Status (status of significant symptoms):   Risk status (Self / Other harm or suicidal ideation)   Client denies current fears or concerns for personal safety.   Client denies current or recent suicidal ideation or behaviors.   Client denies current or recent homicidal ideation or behaviors.   Client denies current or recent self injurious behavior or ideation.   Client denies other safety concerns.   Recommended that patient call 911 or go to the local ED should there be a change in any of these risk factors.     Appearance:   Appropriate    Eye Contact:   Good    Psychomotor Behavior: Normal    Attitude:   Cooperative    Orientation:   All   Speech    Rate / Production: Normal     Volume:  Normal    Mood:    Normal   Affect:    Appropriate    Thought Content:  Clear    Thought Form:  Coherent  Logical    Insight:    Good      Medication Review:   No changes to current psychiatric medication(s)     Medication Compliance:   Yes     Changes in Health Issues:   None reported     Chemical Use Review:   Substance Use: Chemical use reviewed, no active concerns identified      Tobacco Use: No change in amount of tobacco use since last session.  Provided  encouragement to quit      Collateral Reports Completed:   Not Applicable    PLAN: (Client Tasks / Therapist Tasks / Other)  Client has a mild to moderate case of ADHD combined.  See [facility] extended documentation for testing report.  Report copy to primary care physician.        Daniel Yates

## 2020-10-01 ENCOUNTER — VIRTUAL VISIT (OUTPATIENT)
Dept: PSYCHOLOGY | Facility: OTHER | Age: 50
End: 2020-10-01
Payer: MEDICARE

## 2020-10-01 DIAGNOSIS — F41.1 GENERALIZED ANXIETY DISORDER: ICD-10-CM

## 2020-10-01 DIAGNOSIS — F33.1 MAJOR DEPRESSIVE DISORDER, RECURRENT EPISODE, MODERATE WITH ANXIOUS DISTRESS (H): ICD-10-CM

## 2020-10-01 DIAGNOSIS — F90.2 ADHD (ATTENTION DEFICIT HYPERACTIVITY DISORDER), COMBINED TYPE: Primary | ICD-10-CM

## 2020-10-01 PROCEDURE — 90834 PSYTX W PT 45 MINUTES: CPT | Mod: 95 | Performed by: SOCIAL WORKER

## 2020-10-01 ASSESSMENT — ANXIETY QUESTIONNAIRES
3. WORRYING TOO MUCH ABOUT DIFFERENT THINGS: NEARLY EVERY DAY
2. NOT BEING ABLE TO STOP OR CONTROL WORRYING: NEARLY EVERY DAY
5. BEING SO RESTLESS THAT IT IS HARD TO SIT STILL: NEARLY EVERY DAY
GAD7 TOTAL SCORE: 18
1. FEELING NERVOUS, ANXIOUS, OR ON EDGE: NEARLY EVERY DAY
7. FEELING AFRAID AS IF SOMETHING AWFUL MIGHT HAPPEN: NEARLY EVERY DAY
4. TROUBLE RELAXING: NEARLY EVERY DAY
6. BECOMING EASILY ANNOYED OR IRRITABLE: NOT AT ALL
IF YOU CHECKED OFF ANY PROBLEMS ON THIS QUESTIONNAIRE, HOW DIFFICULT HAVE THESE PROBLEMS MADE IT FOR YOU TO DO YOUR WORK, TAKE CARE OF THINGS AT HOME, OR GET ALONG WITH OTHER PEOPLE: EXTREMELY DIFFICULT

## 2020-10-01 ASSESSMENT — PATIENT HEALTH QUESTIONNAIRE - PHQ9: SUM OF ALL RESPONSES TO PHQ QUESTIONS 1-9: 8

## 2020-10-02 ASSESSMENT — ANXIETY QUESTIONNAIRES: GAD7 TOTAL SCORE: 18
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
## 2020-10-05 ENCOUNTER — MYC MEDICAL ADVICE (OUTPATIENT)
Dept: FAMILY MEDICINE | Facility: CLINIC | Age: 50
End: 2020-10-05

## 2020-10-07 NOTE — TELEPHONE ENCOUNTER
Patient calling back, would like to know if she and her son can be seen today 10/07/20 or tomorrow 10/08/20, as they have procedures this Friday 10/0920. Please call to advise

## 2020-10-08 ENCOUNTER — OFFICE VISIT (OUTPATIENT)
Dept: FAMILY MEDICINE | Facility: CLINIC | Age: 50
End: 2020-10-08
Payer: MEDICARE

## 2020-10-08 VITALS
BODY MASS INDEX: 26.37 KG/M2 | SYSTOLIC BLOOD PRESSURE: 124 MMHG | OXYGEN SATURATION: 97 % | HEIGHT: 66 IN | HEART RATE: 102 BPM | DIASTOLIC BLOOD PRESSURE: 76 MMHG | TEMPERATURE: 97.1 F | WEIGHT: 164.1 LBS | RESPIRATION RATE: 16 BRPM

## 2020-10-08 DIAGNOSIS — Z01.818 PREOP GENERAL PHYSICAL EXAM: Primary | ICD-10-CM

## 2020-10-08 DIAGNOSIS — K08.9 DENTAL DISEASE: ICD-10-CM

## 2020-10-08 DIAGNOSIS — R14.0 ABDOMINAL BLOATING: ICD-10-CM

## 2020-10-08 PROCEDURE — 99214 OFFICE O/P EST MOD 30 MIN: CPT | Performed by: FAMILY MEDICINE

## 2020-10-08 ASSESSMENT — MIFFLIN-ST. JEOR: SCORE: 1363.16

## 2020-10-08 ASSESSMENT — ASTHMA QUESTIONNAIRES
QUESTION_2 LAST FOUR WEEKS HOW OFTEN HAVE YOU HAD SHORTNESS OF BREATH: NOT AT ALL
QUESTION_5 LAST FOUR WEEKS HOW WOULD YOU RATE YOUR ASTHMA CONTROL: COMPLETELY CONTROLLED
QUESTION_1 LAST FOUR WEEKS HOW MUCH OF THE TIME DID YOUR ASTHMA KEEP YOU FROM GETTING AS MUCH DONE AT WORK, SCHOOL OR AT HOME: NONE OF THE TIME
QUESTION_4 LAST FOUR WEEKS HOW OFTEN HAVE YOU USED YOUR RESCUE INHALER OR NEBULIZER MEDICATION (SUCH AS ALBUTEROL): NOT AT ALL
ACT_TOTALSCORE: 25
QUESTION_3 LAST FOUR WEEKS HOW OFTEN DID YOUR ASTHMA SYMPTOMS (WHEEZING, COUGHING, SHORTNESS OF BREATH, CHEST TIGHTNESS OR PAIN) WAKE YOU UP AT NIGHT OR EARLIER THAN USUAL IN THE MORNING: NOT AT ALL

## 2020-10-08 ASSESSMENT — PAIN SCALES - GENERAL: PAINLEVEL: SEVERE PAIN (7)

## 2020-10-08 NOTE — PATIENT INSTRUCTIONS

## 2020-10-08 NOTE — PROGRESS NOTES
[address] [id number] [phone]  Dept: [phone]    PRE-OP EVALUATION:  Today's date: 10/8/2020    Sherie Otero (: 1970) presents for pre-operative evaluation assessment as requested by Dr. Mendez.  She requires evaluation and anesthesia risk assessment prior to undergoing surgery/procedure for treatment of Tooth decay and broken teeth.    Fax number for surgical facility: [phone]  Primary Physician: Twin Castro  Type of Anesthesia Anticipated: to be determined    Preop Questionnnaire:  Pre-op Questionnaire 10/8/2020   [facility]   Surgeon: Anne Marie   Surgery Date: oct 9th   Time of Surgery: noon   Where patient plans to recover: At home with family   1. Have you ever had a heart attack or stroke? No   2. Have you ever had surgery on your heart or blood vessels, such as a stent placement, a coronary artery bypass, or surgery on an artery in your head, neck, heart, or legs? No   3. Do you have chest pain with activity? No   4. Do you have a history of  heart failure? No   5. Do you currently have a cold, bronchitis or symptoms of other infection? No   6. Do you have a cough, shortness of breath, or wheezing? No   7. Do you or anyone in your family have previous history of blood clots? No   8. Do you or does anyone in your family have a serious bleeding problem such as prolonged bleeding following surgeries or cuts? No   9. Have you ever had problems with anemia or been told to take iron pills? No   10. Have you had any abnormal blood loss such as black, tarry or bloody stools, or abnormal vaginal bleeding? No   11. Have you ever had a blood transfusion? No   Are you willing to have a blood transfusion if it is medically needed before, during, or after your surgery? Yes   13. Have you or any of your relatives ever had problems with anesthesia? No   14. Do you have sleep apnea, excessive snoring or daytime  drowsiness? YES - daytime drowsiness   14a. Do you have a CPAP machine? No   15. Do you have any artifical heart valves or other implanted medical devices like a pacemaker, defibrillator, or continuous glucose monitor? No   16. Do you have artificial joints? No   17. Are you allergic to latex? No   18. Is there any chance that you may be pregnant? No         HPI:     HPI related to upcoming procedure: Needs tooth extraction because of dental decay.          MEDICAL HISTORY:     Patient Active Problem List    Diagnosis Date Noted     Balance problems 07/25/2019     Priority: Medium     Chronic, continuous use of opioids 11/03/2018     Priority: Medium     Chronically on benzodiazepine therapy 11/03/2018     Priority: Medium     Cervical cancer screening      Priority: Medium     2011 ablation  2015 NIL pap. Plan: pap in 3 years.  8/1/18 NIL pap, neg HR HPV. cotest in 5 years.       Pelvic pain in female 08/01/2018     Priority: Medium     Chronic rhinitis 05/14/2018     Priority: Medium     Other migraine without status migrainosus, intractable 03/21/2017     Priority: Medium     Pulmonary nodules 01/19/2017     Priority: Medium     Abnormal CT of the chest 01/19/2017     Priority: Medium     Hilar lymphadenopathy 01/19/2017     Priority: Medium     Degenerative joint disease of cervical spine      Priority: Medium     multi level worst at C5-6       Osteoarthritis of cervical spine, unspecified spinal osteoarthritis complication status 03/21/2016     Priority: Medium     Panic attacks 03/01/2016     Priority: Medium     Elevated white blood cell count 01/14/2016     Priority: Medium     Rheumatoid arthritis involving multiple sites with positive rheumatoid factor (H) 01/12/2016     Priority: Medium     Intermittent asthma, uncomplicated 11/29/2015     Priority: Medium     Other chronic pain 11/18/2015     Priority: Medium     Major depressive disorder, recurrent episode, mild (H) 10/08/2015     Priority: Medium      NSAID induced gastritis 2015     Priority: Medium     Stenosis, cervical spine      Priority: Medium     C5-C7 (MRI)       Spondylitis, cervical (H)      Priority: Medium     C5-C7 (MRI)       Cervicalgia 2014     Priority: Medium     Disturbance in sleep behavior 2013     Priority: Medium     Problem list name updated by automated process. Provider to review       SHANTANU (obstructive sleep apnea) 10/25/2013     Priority: Medium     Chronic fatigue syndrome 2013     Priority: Medium              Fibromyalgia 2013     Priority: Medium     Generalized anxiety disorder 2013     Priority: Medium     Diagnosis updated by automated process. Provider to review and confirm.       Tobacco abuse 2013     Priority: Medium     CARDIOVASCULAR SCREENING; LDL GOAL LESS THAN 160 10/31/2010     Priority: Medium      Past Medical History:   Diagnosis Date     Allergic rhinitis due to other allergen      Degenerative joint disease of cervical spine 2016    multi level worst at C5-6     Generalized anxiety disorder      Hearing loss      Intrinsic asthma, unspecified      Irritable bowel syndrome      Lump or mass in breast     Left breast lump -- aspiration benign.     Other malaise and fatigue     fibromyalgia     Other specified gastritis without mention of hemorrhage      Pain in joint, lower leg     patello-femoral syndrome     Rheumatoid arthritis(714.0)      Spindle cell carcinoma (H) 2013    Im[employer] Update utility     Spondylitis, cervical (H)     C5-C7 (MRI)     Stenosis, cervical spine     C5-C7 (MRI)     Tension headache      Past Surgical History:   Procedure Laterality Date     C APPENDECTOMY      Ruptured at age 7 years     C  DELIVERY ONLY      , Low Cervical     DILATION AND CURETTAGE, HYSTEROSCOPY, ABLATE ENDOMETRIUM NOVASURE, COMBINED  2011    Procedure:COMBINED DILATION AND CURETTAGE, HYSTEROSCOPY, ABLATE ENDOMETRIUM NOVASURE; hysteroscopy,  dilation and curettage, novasure; Surgeon:JEREMY ANNA; Location:[facility] OR     EXCISE LESION TRUNK  9/12/2013    Procedure: EXCISE LESION TRUNK;  interlaminar epidural Steroid Injection Cervical -thoracic Levels (C7-T1)    Re-Excision of chest wall mass;  Surgeon: Jeremy Bolaños MD;  Location: [facility] OR     HC HYSTEROS W PERMANENT FALLOPAIN IMPLANT  2012    Essure done in office [facility]     INJECT BLOCK MEDIAL BRANCH CERVICAL/THORACIC/LUMBAR  6/12/2014    Procedure: INJECT BLOCK MEDIAL BRANCH CERVICAL / THORACIC / LUMBAR;  Surgeon: Josué Munson MD;  Location: [facility]     INJECT FACET JOINT  2/13/2014    Procedure: INJECT FACET JOINT;  diagnostic medial branch facet nerve block cervical levels 5-6, 6-7;  Surgeon: Josué Munson MD;  Location: [facility] OR     WISDOM [facility] GUIDEWIRE       Current Outpatient Medications   Medication Sig Dispense Refill     buPROPion (WELLBUTRIN SR) 100 MG 12 hr tablet Taking 150MG       busPIRone (BUSPAR) 5 MG tablet Take 10 mg by mouth 3 times daily        cetirizine (ZYRTEC) 10 MG tablet TAKE ONE TABLET BY MOUTH ONCE DAILY 90 tablet 2     clonazePAM (KLONOPIN) 0.5 MG tablet Take 1 tablet (0.5 mg) by mouth 2 times daily as needed for anxiety Must last 30 days (Patient taking differently: Take 1 mg by mouth 2 times daily as needed for anxiety Must last 30 days) 60 tablet 0     cloNIDine (CATAPRES) 0.1 MG tablet Take 0.5 tablets (0.05 mg) by mouth At Bedtime 15 tablet 1     cyclobenzaprine (FLEXERIL) 10 MG tablet Take 2 tablets in the evening and 1 in the morning 90 tablet 2     escitalopram (LEXAPRO) 20 MG tablet Take 1 tablet (20 mg) by mouth daily 30 tablet 1     fluticasone (FLONASE) 50 MCG/ACT nasal spray SPRAY 2 SPRAYS INTO BOTH NOSTRILS DAILY. 16 g 11     HYDROcodone-acetaminophen (NORCO) 7.5-325 MG per tablet Take 1 tablet every 4-6 hours as needed for severe pain. Max of 5 tablets per day. Fill 9/24/2020 Start 9/26/2020. 150 tablet 0     hydrOXYzine (ATARAX) 25 MG tablet Take  1-2 tablets (25-50 mg) by mouth 3 times daily as needed for itching 90 tablet 0     ketorolac (TORADOL) 10 MG tablet TAKE ONE TABLET BY MOUTH EVERY 6 HOURS AS NEEDED FOR MODERATE PAIN 10 tablet 1     naloxone (NARCAN) nasal spray Spray 1 spray (4 mg) into one nostril alternating nostrils as needed for opioid reversal every 2-3 minutes until assistance arrives 0.2 mL 0     OLANZapine (ZYPREXA) 5 MG tablet Take 7.5 mg by mouth twice daily as needed for anxiety and 5 mg at bedtime. 120 tablet 1     VENTOLIN  (90 Base) MCG/ACT inhaler INHALE 2 PUFFS INTO THE LUNGS EVERY 6 HOURS AS NEEDED FOR SHORTNESS OF BREATH, DIFFICULTY BREATHING OR WHEEZING. 18 g 0     verapamil (CALAN) 40 MG tablet TAKE ONE TABLET BY MOUTH TWICE A  tablet 0     OTC products: None, except as noted above    Allergies   Allergen Reactions     Gabapentin Shortness Of Breath     Lyrica [Pregabalin] Shortness Of Breath     Felt pressure on the throat area. jmp     Droperidol      Uncontrollable shaking       Penicillins       Latex Allergy: NO    Social History     Tobacco Use     Smoking status: Current Every Day Smoker     Packs/day: 0.50     Years: 29.00     Pack years: 14.50     Types: Cigarettes     Smokeless tobacco: Never Used     Tobacco comment: 9/19/11 - 1pk/day   Substance Use Topics     Alcohol use: Not Currently     Alcohol/week: 1.7 standard drinks     Comment: rare     History   Drug Use No       REVIEW OF SYSTEMS:   CONSTITUTIONAL: NEGATIVE for fever, chills, change in weight  INTEGUMENTARY/SKIN: NEGATIVE for worrisome rashes, moles or lesions  EYES: NEGATIVE for vision changes or irritation  ENT/MOUTH: NEGATIVE for ear, mouth and throat problems  RESP: NEGATIVE for significant cough or SOB  BREAST: NEGATIVE for masses, tenderness or discharge  CV: NEGATIVE for chest pain, palpitations or peripheral edema  GI: Abdominal bloating  : NEGATIVE for frequency, dysuria, or hematuria  MUSCULOSKELETAL: NEGATIVE for significant  "arthralgias or myalgia  NEURO: NEGATIVE for weakness, dizziness or paresthesias  ENDOCRINE: NEGATIVE for temperature intolerance, skin/hair changes  HEME: NEGATIVE for bleeding problems  PSYCHIATRIC: NEGATIVE for changes in mood or affect    EXAM:   /76   Pulse 102   Temp 97.1  F (36.2  C) (Temporal)   Resp 16   Ht 1.664 m (5' 5.5\")   Wt 74.4 kg (164 lb 1.6 oz)   SpO2 97%   BMI 26.89 kg/m      GENERAL APPEARANCE: healthy, alert and no distress     EYES: EOMI, PERRL     HENT: ear canals and TM's normal and nose and mouth without ulcers or lesions     NECK: no adenopathy, no asymmetry, masses, or scars and thyroid normal to palpation     RESP: lungs clear to auscultation - no rales, rhonchi or wheezes     CV: regular rates and rhythm, normal S1 S2, no S3 or S4 and no murmur, click or rub     ABDOMEN: bowel sounds normal, no palpable masses and possibly slightly distended.     MS: extremities normal- no gross deformities noted, no evidence of inflammation in joints, FROM in all extremities.     SKIN: no suspicious lesions or rashes     NEURO: Normal strength and tone, sensory exam grossly normal, mentation intact and speech normal     PSYCH: mentation appears normal. and affect normal/bright     LYMPHATICS: No cervical adenopathy    DIAGNOSTICS:   No labs or EKG required for low risk surgery (cataract, skin procedure, breast biopsy, etc)    Recent Labs   Lab Test 01/24/20  1438 05/28/19  1249 01/12/16  1221 01/12/16  1221   HGB 14.6 13.7   < > 14.4  14.3    303   < > 214  213     --   --  142   POTASSIUM 4.4  --   --  4.0   CR 0.82 0.96   < > 0.78    < > = values in this interval not displayed.        IMPRESSION:   Reason for surgery/procedure: Dental decay    The proposed surgical procedure is considered LOW risk.    REVISED CARDIAC RISK INDEX  The patient has the following serious cardiovascular risks for perioperative complications such as (MI, PE, VFib and 3  AV Block):  No serious " cardiac risks  INTERPRETATION: 0 risks: Class I (very low risk - 0.4% complication rate)    The patient has the following additional risks for perioperative complications:  No identified additional risks      ICD-10-CM    1. Preop general physical exam  Z01.818        RECOMMENDATIONS:         --Patient is to take all scheduled medications on the day of surgery EXCEPT for modifications listed below.    APPROVAL GIVEN to proceed with proposed procedure, without further diagnostic evaluation       Signed Electronically by: Twin Castro MD    Copy of this evaluation report is provided to requesting physician.    She also brings up abdominal bloating.  She is noticed this getting worse over the last weeks.  Feels distended.  She is concerned that she has some fallopian tube implants for contraception and apparently there is been a recall on these.  They have been in since around 2010.  Her abdomen exam is fairly benign could be some bloating but I have not examined her before .  Discussed moving forward and were going to set her up for a CT scan of the abdomen and pelvis with contrast depending on the results from that we will be have her see OB/GYN to discuss this..    [facility] Preop Guidelines    Revised Cardiac Risk Index

## 2020-10-08 NOTE — NURSING NOTE
Health Maintenance Due   Topic Date Due     COLORECTAL CANCER SCREENING  08/26/1978     ZOSTER IMMUNIZATION (1 of 2) 08/26/2018     Pneumococcal Vaccine: Pediatrics (0 to 5 Years) and At-Risk Patients (6 to 64 Years) (1 of 1 - PPSV23) 02/05/2019     ASTHMA ACTION PLAN  10/01/2019     ASTHMA CONTROL TEST  03/04/2020     INFLUENZA VACCINE (1) 09/01/2020     Health Maintenance reviewed at today's visit patient asked to schedule/complete:   Colon Cancer:  Patient agrees to schedule will do the cologuard. She has it at home to complete.    Lemuel Lopez, CMA

## 2020-10-09 ASSESSMENT — ASTHMA QUESTIONNAIRES: ACT_TOTALSCORE: 25

## 2020-10-16 NOTE — PROGRESS NOTES
"  Sherie Otero is a 50 year old female who is being evaluated via a billable telephone visit.      The patient has been notified of following:     \"This telephone visit will be conducted via a call between you and your physician/provider. We have found that certain health care needs can be provided without the need for a physical exam.  This service lets us provide the care you need with a short phone conversation.  If a prescription is necessary we can send it directly to your pharmacy.  If lab work is needed we can place an order for that and you can then stop by our lab to have the test done at a later time.    Telephone visits are billed at different rates depending on your insurance coverage. During this emergency period, for some insurers they may be billed the same as an in-person visit.  Please reach out to your insurance provider with any questions.    If during the course of the call the physician/provider feels a telephone visit is not appropriate, you will not be charged for this service.\"    Patient has given verbal consent for Telephone visit?  Yes    What phone number would you like to be contacted at? [phone]    How would you like to obtain your AVS? Candie Dwyer CMA (AAMA)    Phone call duration: 35 minutes    Parul Reyes MD                                [facility]  Follow up clinic visit    Date of visit: 10/16/2020    Chief complaint:   Chief Complaint   Patient presents with     Pain     Telephone visit due to COVID-19       Interval history:  Sherie Otero is a 50 year old female with a history of SHANTANU, CEILA, NSAID-induced gastritis, depression and chronic fatigue syndrome who follows with Celia Bettencourt PA-C for fibromyalgia, neck pain, low back pain. Last seen by Celia on 9/21/2020.  She is seeing me while Celia is on leave.     Initial pain history:  \"- pain varried with the weather  - Dx with FM in 1998  - Dx with RA 2005/2006\"  - SE " from Wellbutrin and Buspar    Recommendations/plan at the last visit included:     1. Physical Therapy: continue previous PT exercises;   2. Clinical Health Psychologist:  YES, has a plan in place  3. Diagnostic Studies: none at this time  4. Medication Management:                1.  Continue  Flexeril 10mg, 1 tab TID PRN              2.  Continue hydrocodone to 7.5/325 with instructions to take 1 tablet every 4-6 hours as needed for severe pain. Max of 5 tablets/day              3.  Consider restarting Savella once her anxiety is under better control              4.  Consider low-dose naltrexone  5. Further procedures recommended: consider trigger point injections if needed    Since her last visit, Sherie PAMELA Otero reports:  - Virtual visit with rheumatology in 3/24/2020. Recommended PT and PO prednisone  - Hasn't been to follow up with rheum yet  - Rheum also recommended a spinal conditioning program. She has not done that yet.   - She is working with a psychiatrist. Having anxiety AE. She is on Wellbutrin and olanzapine. Anxiety is somewhat improved with decreased olanzapine and increased buproprion  - She discontinued Savella with the concern that it was causing panic attacks, but this hasn't changed since its discharge  -   Pain scores:  Pain intensity on average is 7 on a scale of 0-10.     Current pain treatments:   Clonazepam 1mg: taking 1 tab twice daily    Flexeril-1 in AM and 2 at HS  Hydrocodone 7.5mg -currently taking 2 tablets (15 mg) qAM, 1 qnoon, 2 qPM, #5 total per day   Ibuprofen-1-2 times a month  Toradol 10mg- takes every one or two weeks    Other notable medications:   - Buproprion 150mg  - Buspar 10 mg TID  - Clonidine 0.05 at bedtime  - Escitalopram 20mg at bedtime     Patient is using the medication as prescribed:  YES  Is your medication helpful? YES   Side Effects: dizziness and off-balance, panic-y    Past pain treatments:  Previous Medications: (H--helped; HI--Helped initially; SWH--  somewhat helpful, NH--No help; W--worse; SE--side effects)   Opiates: Hydrocodone H, Oxycodone SE  NSAIDS: Ibuprofen H, Relafen SE stomach upset, Meloxicam NH  Muscle Relaxants: Tizanidine NH, Flexeril H, Robaxin NH SE stomach upset  Anti-migraine mediations: Verapamil H  Anti-depressants: Cymbalta H, Savella H at first, then seemed to stop working   Sleep aids: none  Anxiolytics: Xanax H, Clonazepam H, Valium H  Neuropathics: Gabapentin SE dizziness, Lyrica SE shortness of breath, felt 'weird' on them, throat felt weird.         Topicals: Lidocaine patches SW  Other medications not covered above:Prednisone H for arthritis flare     Past Pain Treatments:  Pain Clinic:   No   PT: Yes, years ago. Has not done pool therapy.   Psychologist: No  Relaxation techniques/biofeedback: No  Chiropractor: No, was told not to because of neck.   Acupuncture: Yes for headaches.   Pharmacotherapy:           Opioids: Yes            Non-opioids:    Yes   TENs Unit:Yes  Injections:    - cervical medial branch blocks 6/12/2014  Self-care:   Yes, ice/heat, hot baths, theracare  Surgeries related to pain: No    Medications:  Current Outpatient Medications   Medication Sig Dispense Refill     buPROPion (WELLBUTRIN SR) 100 MG 12 hr tablet Taking 150MG       busPIRone (BUSPAR) 5 MG tablet Take 10 mg by mouth 3 times daily        cetirizine (ZYRTEC) 10 MG tablet TAKE ONE TABLET BY MOUTH ONCE DAILY 90 tablet 2     clonazePAM (KLONOPIN) 0.5 MG tablet Take 1 tablet (0.5 mg) by mouth 2 times daily as needed for anxiety Must last 30 days (Patient taking differently: Take 1 mg by mouth 2 times daily as needed for anxiety Must last 30 days) 60 tablet 0     cloNIDine (CATAPRES) 0.1 MG tablet Take 0.5 tablets (0.05 mg) by mouth At Bedtime 15 tablet 1     cyclobenzaprine (FLEXERIL) 10 MG tablet Take 2 tablets in the evening and 1 in the morning 90 tablet 2     escitalopram (LEXAPRO) 20 MG tablet Take 1 tablet (20 mg) by mouth daily 30 tablet 1      fluticasone (FLONASE) 50 MCG/ACT nasal spray SPRAY 2 SPRAYS INTO BOTH NOSTRILS DAILY. 16 g 11     HYDROcodone-acetaminophen (NORCO) 7.5-325 MG per tablet Take 1 tablet every 4-6 hours as needed for severe pain. Max of 5 tablets per day. Fill 9/24/2020 Start 9/26/2020. 150 tablet 0     hydrOXYzine (ATARAX) 25 MG tablet Take 1-2 tablets (25-50 mg) by mouth 3 times daily as needed for itching 90 tablet 0     ketorolac (TORADOL) 10 MG tablet TAKE ONE TABLET BY MOUTH EVERY 6 HOURS AS NEEDED FOR MODERATE PAIN 10 tablet 1     naloxone (NARCAN) nasal spray Spray 1 spray (4 mg) into one nostril alternating nostrils as needed for opioid reversal every 2-3 minutes until assistance arrives 0.2 mL 0     OLANZapine (ZYPREXA) 5 MG tablet Take 7.5 mg by mouth twice daily as needed for anxiety and 5 mg at bedtime. 120 tablet 1     VENTOLIN  (90 Base) MCG/ACT inhaler INHALE 2 PUFFS INTO THE LUNGS EVERY 6 HOURS AS NEEDED FOR SHORTNESS OF BREATH, DIFFICULTY BREATHING OR WHEEZING. 18 g 0     verapamil (CALAN) 40 MG tablet TAKE ONE TABLET BY MOUTH TWICE A  tablet 0       Medical History: any changes in medical history since they were last seen? No    Review of Systems:  The 14 system ROS was reviewed from the intake questionnaire, and is positive for: pain, anxiety, teeth pulled  Any bowel or bladder problems: IBS, both diarrhea and constipation  Mood: OK      Controlled Substance Agreement:   Encounter-Level CSA - 02/04/2019:    Controlled Substance Agreement - Scan on 2/4/2019  4:27 PM: CONTROLLED MEDICATION AGREEMENT OR CONTROLLED SUBSTANCE AGREEMENT     Encounter-Level CSA - 01/30/2018:    Controlled Substance Agreement - Scan on 2/4/2018  9:27 AM: CONTROLLED SUBSTANCE AGREEMENT     Encounter-Level CSA - 09/22/2015:    Controlled Substance Agreement - Scan on 9/23/2015 10:20 AM: CONTROLLED SUBSTANCE AGREEMENT     Encounter-Level CSA - 08/06/2014:    Controlled Substance Agreement - Scan on 9/10/2014 10:20 AM: Controlled  Medication Agreement-08/06/14     Patient-Level CSA:    Controlled Substance Agreement - Opioid - Scan on 1/27/2020  2:58 PM: controlled substance agreement  Controlled Substance Agreement - Opioid - Scan on 1/24/2019  1:31 PM: signed 1/11/2019          UDS:   Pain Drug SCR UR W RPTD Meds   Date Value Ref Range Status   01/27/2020 FINAL  Final     Comment:     (Note)  ====================================================================  [facility] COMP DRUG ANALYSIS,UR  ====================================================================  Test                             Result       Flag       Units        Drug Present and Declared for Prescription Verification   7-aminoclonazepam              66           EXPECTED   ng/mg creat    7-aminoclonazepam is an expected metabolite of clonazepam. Source    of clonazepam is a scheduled prescription medication.   Hydrocodone                    1978         EXPECTED   ng/mg creat   Hydromorphone                  522          EXPECTED   ng/mg creat   Dihydrocodeine                 247          EXPECTED   ng/mg creat   Norhydrocodone                 2088         EXPECTED   ng/mg creat    Sources of hydrocodone include scheduled prescription    medications. Hydromorphone, dihydrocodeine and norhydrocodone are    expected metabolites of hydrocodone. Hydromorphone and    dihydrocodeine are also available as scheduled prescription    medications.  Drug Present not Declared for Prescription Verification   Oxazepam                       113          UNEXPECTED ng/mg creat   Temazepam                      91           UNEXPECTED ng/mg creat    Oxazepam and temazepam are expected metabolites of diazepam.     Oxazepam is also an expected metabolite of other benzodiazepine    drugs, including chlordiazepoxide, prazepam, clorazepate,    halazepam, and temazepam.  Oxazepam and temazepam are available    as scheduled prescription medications.   Alprazolam                     125           UNEXPECTED ng/mg creat    Source of alprazolam is a scheduled prescription medication.   Carboxy-THC                    78           UNEXPECTED ng/mg creat    Carboxy-THC is a metabolite of tetrahydrocannabinol  (THC).    Source of THC is most commonly illicit, but THC is also present    in a scheduled prescription medication.   Olanzapine                     PRESENT      UNEXPECTED               Acetaminophen                  PRESENT      UNEXPECTED               Verapamil                      PRESENT      UNEXPECTED              Drug Absent but Declared for Prescription Verification   Gabapentin                     Not Detected UNEXPECTED               Duloxetine                     Not Detected UNEXPECTED              ====================================================================  Test                      Result    Flag   Units      Ref Range        Creatinine              32               mg/dL      >=20            ====================================================================  Declared Medications:  The flagging and interpretation on this report are based on the  following declared medications.  Unexpected results may arise from  inaccuracies in the declared medications.  **Note: The testing scope of this panel includes these medications:  Clonazepam  Duloxetine (Cymbalta)  Gabapentin  Hydrocodone  ====================================================================  For clinical consultation, please call [phone].  ====================================================================  Analysis performed by [employer] [place] [address]          THE 4 As OF OPIOID MAINTENANCE ANALGESIA    Analgesia: Is pain relief clinically significant? NO   Activity: Is patient functional and able to perform Activities of Daily Living? NO   Adverse effects: Is patient free from adverse side effects from opiates? YES   Adherence to Rx protocol: Is patient adhering to Controlled Substance  Agreement and taking medications ONLY as ordered? YES    MME: 37.5    Is Narcan prescribed for opiate use >50 MME daily? YES     :  [place] Prescription Drug Monitoring Program (PDMP) Link  Checked and as expected      Assessment:   Sherie Otero is a 50 year old female who is seen at the pain clinic for:       Fibromyalgia  Rheumatoid arthritis involving multiple sites with positive rheumatoid factor (H)  Chronic bilateral low back pain without sciatica  Cervicalgia  Chronic pain syndrome    1. Mental Health - the patient's mental health concerns, specifically anxiety, affect her experience of pain and contribute to her clinically significant distress.    Patient is on relatively high dose of opioids for fibromyalgia pain, which is not indicated. We discussed that this is not appropriate, and most likely not beneficial in the long term. We discussed starting to taper down at our next visit.   We also discussed her other pain and anxiety medications. She is currently on several medications with serotonergic activity, including buspirone, escitalopram, olanzapine, and cyclobenzaprine.   She does not feel that the cyclobenzaprine is helpful. We will therefore stop it and trial Norflex.   Ideally she would benefit from an SNRI for dual pain and anxiety management. She has discussed starting Savella with Celia in the past. I would not add it to her current regimen however, as I am concerned about serotonin syndrome. May consider reaching out to her psychiatrist in the future after anxiety is more under control to consider switching from escitalopram to Savella.       Plan:  Additional Workup:   1. - Diagnostic Studies:  no  2. - Laboratory studies:  No  3. - Urine toxicology screen today: No     Therapies/Referrals:   4. - Physical Therapy: Pain PT via video visit  5. - Clinical Health Psychologist to address issues of relaxation, behavioral change, coping style, and other factors important to improvement: not at  this time. She sees both a primary psychologist and psychiatrist.  6. - Rheumatology: discussed that after her March 2020 rheumatology consult it was recommended she follow up in 4-5 months. She has not yet done this. Recommended she call to schedule a follow up visit.     Medication Management:   7. - Continue hydrocodone 7.5mg, max #5 per day. Plan to start taper at next visit as appropriate.   8. - Stop Flexeril  9. - Start Norflex 100 mg BID  10. - Continue Toradol sparingly. Currently using 1 every 1-2 weeks.     Further procedures recommended:   11. - Not at this time.     Follow up: 1 month    Parul Reyes MD  [facility]

## 2020-10-19 ENCOUNTER — VIRTUAL VISIT (OUTPATIENT)
Dept: PALLIATIVE MEDICINE | Facility: CLINIC | Age: 50
End: 2020-10-19
Payer: MEDICARE

## 2020-10-19 DIAGNOSIS — M54.50 CHRONIC BILATERAL LOW BACK PAIN WITHOUT SCIATICA: ICD-10-CM

## 2020-10-19 DIAGNOSIS — M05.79 RHEUMATOID ARTHRITIS INVOLVING MULTIPLE SITES WITH POSITIVE RHEUMATOID FACTOR (H): ICD-10-CM

## 2020-10-19 DIAGNOSIS — M79.7 FIBROMYALGIA: Primary | ICD-10-CM

## 2020-10-19 DIAGNOSIS — G89.4 CHRONIC PAIN SYNDROME: ICD-10-CM

## 2020-10-19 DIAGNOSIS — M54.2 CERVICALGIA: ICD-10-CM

## 2020-10-19 DIAGNOSIS — G89.29 CHRONIC BILATERAL LOW BACK PAIN WITHOUT SCIATICA: ICD-10-CM

## 2020-10-19 PROCEDURE — 99443 PR PHYSICIAN TELEPHONE EVALUATION 21-30 MIN: CPT | Mod: 95 | Performed by: STUDENT IN AN ORGANIZED HEALTH CARE EDUCATION/TRAINING PROGRAM

## 2020-10-19 RX ORDER — ORPHENADRINE CITRATE 100 MG/1
100 TABLET, EXTENDED RELEASE ORAL 2 TIMES DAILY
Qty: 60 TABLET | Refills: 0 | Status: SHIPPED | OUTPATIENT
Start: 2020-10-19 | End: 2021-01-20

## 2020-10-19 RX ORDER — KETOROLAC TROMETHAMINE 10 MG/1
TABLET, FILM COATED ORAL
Qty: 10 TABLET | Refills: 1 | Status: SHIPPED | OUTPATIENT
Start: 2020-10-19 | End: 2021-02-23

## 2020-10-19 RX ORDER — HYDROCODONE BITARTRATE AND ACETAMINOPHEN 7.5; 325 MG/1; MG/1
TABLET ORAL
Qty: 150 TABLET | Refills: 0 | Status: SHIPPED | OUTPATIENT
Start: 2020-10-24 | End: 2020-11-20

## 2020-10-19 ASSESSMENT — PAIN SCALES - GENERAL: PAINLEVEL: SEVERE PAIN (7)

## 2020-10-19 NOTE — PATIENT INSTRUCTIONS
1. I refilled your hydrocodone and Toradol prescriptions.   2. Stop Flexeril  3. Start Norflex. More common side effects include dry eyes, dry mouth and urinary retention.   Blurred vision and sedation can occur. Feel free to call with any questions.   Follow up with me in 1 month    Parul Reyes MD  [facility]    ----------------------------------------------------------------  Clinic Number:  [phone]     Call with any questions about your care and for scheduling assistance.     Calls are returned Monday through Friday between 8 AM and 4:30 PM. We usually get back to you within 2 business days depending on the issue/request.    If we are prescribing your medications:    For opioid medication refills, call the clinic or send a [employer] message 7 days in advance.  Please include:    Name of requested medication    Name of the pharmacy.    For non-opioid medications, call your pharmacy directly to request a refill. Please allow 3-4 days to be processed.     Per [place] State Law:    All controlled substance prescriptions must be filled within 30 days of being written.      For those controlled substances allowing refills, pickup must occur within 30 days of last fill.      We believe regular attendance is key to your success in our program!      Any time you are unable to keep your appointment we ask that you call us at least 24 hours in advance to cancel.This will allow us to offer the appointment time to another patient.     Multiple missed appointments may lead to dismissal from the clinic.

## 2020-10-29 ENCOUNTER — VIRTUAL VISIT (OUTPATIENT)
Dept: PALLIATIVE MEDICINE | Facility: CLINIC | Age: 50
End: 2020-10-29
Payer: MEDICARE

## 2020-10-29 DIAGNOSIS — G89.29 CHRONIC BILATERAL LOW BACK PAIN WITHOUT SCIATICA: ICD-10-CM

## 2020-10-29 DIAGNOSIS — M05.79 RHEUMATOID ARTHRITIS INVOLVING MULTIPLE SITES WITH POSITIVE RHEUMATOID FACTOR (H): ICD-10-CM

## 2020-10-29 DIAGNOSIS — M79.7 FIBROMYALGIA: Primary | ICD-10-CM

## 2020-10-29 DIAGNOSIS — G89.4 CHRONIC PAIN SYNDROME: ICD-10-CM

## 2020-10-29 DIAGNOSIS — M54.50 CHRONIC BILATERAL LOW BACK PAIN WITHOUT SCIATICA: ICD-10-CM

## 2020-10-29 PROCEDURE — 97161 PT EVAL LOW COMPLEX 20 MIN: CPT | Mod: GP | Performed by: PHYSICAL THERAPIST

## 2020-10-29 NOTE — PROGRESS NOTES
"PHYSICAL THERAPY INITIAL EVALUATION and PLAN OF CARE    Patient Name:  Sherie Otero  : 1970  MRN:[id number]    The patient has been notified of the following:  \"This virtual visit will be conducted between you and your provider.  We have found that certain health care needs can be provided without the need for physical presence.  This service lets us provide the care you need with a virtual visit.\"    Due to external, as well as internal  Health [place] management of the COVID 19 Virus, Sherie Otero, was not seen in our clinic.  As a substitution, we implemented a virtual visit to manage this patient's condition utilizing the [employer] virtual visit platform.  The provider, Yann Haro PT , reviewed the patient's chart and spoke with the patient to determine the following telemedicine visit is appropriate and effective for the patient's care.    The following type of visit was completed:  Telephone Visit:  The [employer] platform uses a synchronous HIPAA compliant video stream for this encounter.  Telephone start time:  9:40am  Telephone end time:  10:02am  Provider location:  [facility] [place]  Patient location: Home  Patient has given verbal consent for telephone visit? Yes      SUBJECTIVE:  PRESENTATION AND ETIOLOGY  Chief Complaint:   Fibromyalgia, RA, LBP limiting the ability to perform activities of daily living.      Onset / Etiology: longstanding    Pattern Since Onset: Worsened    Pertinent Medical  / Surgical History: Epic Snapshot Reviewed:  Contributing factors to the severity / complexity of the patient's chief complaint include: see providers notes    Pain:  Frequency: Constant or Activity Dependent     LEVEL OF FUNCTION AT START OF CARE  Current Aggrevating Activities / Functional Limitations: walk 2-5', sit 5', stand 5'    Patient's selected goals for physical therapy: tolerate daily routine better    CURRENT / PREVIOUS INTERVENTION(S):     Relieving Activities / Self Care / " Exercise: no regular exercise routine    Previous / Current therapies for current chief complaint: PT    DEMOGRAPHICS  Employment Status: not working    ===============================================================  OBJECTIVE:  GAIT, LOCOMOTION, and BALANCE:  Gait and Locomotion: Antalgic with first few steps per pt.  Pt notes some balance issues.    RANGE OF MOTION:   Active: not formally assessed.  Pt acknowledges no stretching routine.    MUSCLE PERFORMANCE:   Strength: not formally assessed      ===============================================================  Today's Treatment:  Initial evaluation  Ed on chronic pain PT POC, walking program/equivalent  ===============================================================  ASSESSMENT:  Physical Therapy Diagnosis: Musculoskeletal Patterns    Patient requires PT intervention for the following impairments: Limited knowledge of condition and / or self care - inability to control symptoms, Muscle strength and endurance limitations, Pain and Deconditioning    Anticipated Goals and Expected Outcomes:EDUCATION AND SELF CARE  Independent and safe with home exercise / self care program in 6 week(s).  Good working knowledge of posture principles / joint protection in 6 week(s).  FUNCTIONAL MOBILITY  Ambulation without increased pain or symptoms for community distances on all surfaces in 12 week(s).  ADL'S  Standing tolerance 20 minutes without increased pain or symptoms in 12 week(s).  Sitting tolerance 20 minutes without increased pain or symptoms in 12 week(s).  Homemaking / Yardwork without increased pain or symptoms in 12 week(s).    Rehab potential for achieving goals: fair.    ===============================================================  PLAN:   Patient will benefit from skilled physical therapy consisting of: neuromuscular reeducation of: movement, balance, coordination, kinesthetic sense, posture and proprioception for sitting and / or standing activities, education  in self care / home management training to include instruction in: joint protection principles and symptom control techniques, therapeutic activities to achieve improved functional performance in: daily actvities and therapeutic exercise to develop: strength and endurance and joint stability    Assessment will be ongoing with changes in treatment as indicated.  Benefits/risks/alternatives to treatment have been reviewed and the patient has been instructed to contact this office if there are any questions or concerns.  This plan of care has been discussed with the patient and the patient is in agreement.     Frequency / Duration:  Patient will be seen for a total of 8-12 visits; at a frequency of every 1-3 weeks.    Total Visit Time: 22  minutes            Yann Haro          PT                                Date:  10/29/2020    =====================================================  **  Referring Provider Certification: Referring Provider reviewing certifies that the above treatment / plan of care is required and authorized, and that the patient's plan will be reviewed every ninety (90) days **.   ======================================================     PRESENT: NA    MULTIDISCIPLINARY PATIENT / FAMILY EDUCATION RECORD  Department:  Physical Therapy    Readiness to Learn: Ability to understand verbal instructions,Ability to understand written instructions,Knowledge of educational needs / treatment plan  Specific Barriers to Learning: None  Referrals: None  Learning Needs: Rehabilitation techniques to improve functional independence  Who: Patient  How: Demonstration,Verbal instructions,Written instructions  Response: Appropriate verbal response,Asked questions,Demonstrated ability,Verbalized recall / understanding

## 2020-11-02 ENCOUNTER — TELEPHONE (OUTPATIENT)
Dept: PALLIATIVE MEDICINE | Facility: OTHER | Age: 50
End: 2020-11-02

## 2020-11-02 DIAGNOSIS — M79.18 MYOFASCIAL PAIN: ICD-10-CM

## 2020-11-02 DIAGNOSIS — M79.7 FIBROMYALGIA: Primary | ICD-10-CM

## 2020-11-02 RX ORDER — BACLOFEN 10 MG/1
5 TABLET ORAL 3 TIMES DAILY PRN
Qty: 90 TABLET | Refills: 0 | Status: SHIPPED | OUTPATIENT
Start: 2020-11-02 | End: 2020-11-20

## 2020-11-02 NOTE — TELEPHONE ENCOUNTER
Script Eprescribed to pharmacy in substitution for Norflex per pharmacy request    Will send this to MA team to notify patient.    Signed Prescriptions:                        Disp   Refills    baclofen (LIORESAL) 10 MG tablet           90 tab*0        Sig: Take 0.5 tablets (5 mg) by mouth 3 times daily as           needed for muscle spasms  Authorizing Provider: HONG DHILLON MD  [facility]

## 2020-11-02 NOTE — TELEPHONE ENCOUNTER
Received fax request from   [facility] - [place], [place] - [address]  [address]  [facility] [address]  Phone: [phone] Fax: [phone]     pharmacy requesting medication change for orphenadrine ER (NORFLEX) 100 MG 12 hr tablet to Baclofen or Tizanidine     Pharmacy Comment: Please change orphenadrine ER (NORFLEX) 100 MG 12 hr tablet to baclofen or tizanidine per formulary.    Pt last seen on 10/19/20    Next appt scheduled for 11/20/20    Will send to last  provider  Seen for review.    Argentina Solorio [facility]  [place]

## 2020-11-07 ENCOUNTER — HEALTH MAINTENANCE LETTER (OUTPATIENT)
Age: 50
End: 2020-11-07

## 2020-11-12 ENCOUNTER — VIRTUAL VISIT (OUTPATIENT)
Dept: PSYCHOLOGY | Facility: OTHER | Age: 50
End: 2020-11-12
Payer: MEDICARE

## 2020-11-12 DIAGNOSIS — F33.1 MAJOR DEPRESSIVE DISORDER, RECURRENT EPISODE, MODERATE WITH ANXIOUS DISTRESS (H): ICD-10-CM

## 2020-11-12 DIAGNOSIS — F90.2 ADHD (ATTENTION DEFICIT HYPERACTIVITY DISORDER), COMBINED TYPE: Primary | ICD-10-CM

## 2020-11-12 DIAGNOSIS — F41.1 GENERALIZED ANXIETY DISORDER: ICD-10-CM

## 2020-11-12 PROCEDURE — 90832 PSYTX W PT 30 MINUTES: CPT | Mod: 95 | Performed by: SOCIAL WORKER
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
## 2020-11-20 ENCOUNTER — VIRTUAL VISIT (OUTPATIENT)
Dept: PALLIATIVE MEDICINE | Facility: CLINIC | Age: 50
End: 2020-11-20
Payer: MEDICARE

## 2020-11-20 DIAGNOSIS — M54.2 CERVICALGIA: ICD-10-CM

## 2020-11-20 DIAGNOSIS — G89.29 CHRONIC BILATERAL LOW BACK PAIN WITHOUT SCIATICA: ICD-10-CM

## 2020-11-20 DIAGNOSIS — M54.50 CHRONIC BILATERAL LOW BACK PAIN WITHOUT SCIATICA: ICD-10-CM

## 2020-11-20 DIAGNOSIS — M79.18 MYOFASCIAL PAIN: ICD-10-CM

## 2020-11-20 DIAGNOSIS — G89.4 CHRONIC PAIN SYNDROME: ICD-10-CM

## 2020-11-20 DIAGNOSIS — M79.7 FIBROMYALGIA: ICD-10-CM

## 2020-11-20 PROCEDURE — 99443 PR PHYSICIAN TELEPHONE EVALUATION 21-30 MIN: CPT | Mod: 95 | Performed by: STUDENT IN AN ORGANIZED HEALTH CARE EDUCATION/TRAINING PROGRAM

## 2020-11-20 RX ORDER — ATOMOXETINE 40 MG/1
80 CAPSULE ORAL DAILY
COMMUNITY
Start: 2020-11-13 | End: 2021-04-29

## 2020-11-20 RX ORDER — BACLOFEN 10 MG/1
10 TABLET ORAL 3 TIMES DAILY PRN
Qty: 90 TABLET | Refills: 0 | Status: SHIPPED | OUTPATIENT
Start: 2020-11-20 | End: 2021-03-24 | Stop reason: SINTOL

## 2020-11-20 RX ORDER — HYDROCODONE BITARTRATE AND ACETAMINOPHEN 7.5; 325 MG/1; MG/1
TABLET ORAL
Qty: 150 TABLET | Refills: 0 | Status: SHIPPED | OUTPATIENT
Start: 2020-11-20 | End: 2020-12-24

## 2020-11-20 ASSESSMENT — PAIN SCALES - GENERAL: PAINLEVEL: MODERATE PAIN (5)

## 2020-11-20 NOTE — PATIENT INSTRUCTIONS
1. Increase baclofen to 10 mg three times daily as needed. I sent a new prescription to your pharmacy.     2. I sent a refill of your hydrocodone to your pharmacy.     3. Continue to see Durga Haro with physical therapy     4. Make an appointment with rheumatology. This was recommended at your last visit.     Follow up: 2 mo via video visit.     Parul Reyes MD  [facility]      ----------------------------------------------------------------  Clinic Number:  [phone]     Call with any questions about your care and for scheduling assistance.     Calls are returned Monday through Friday between 8 AM and 4:30 PM. We usually get back to you within 2 business days depending on the issue/request.    If we are prescribing your medications:    For opioid medication refills, call the clinic or send a [facility] message 7 days in advance.  Please include:    Name of requested medication    Name of the pharmacy.    For non-opioid medications, call your pharmacy directly to request a refill. Please allow 3-4 days to be processed.     Per [place] State Law:    All controlled substance prescriptions must be filled within 30 days of being written.      For those controlled substances allowing refills, pickup must occur within 30 days of last fill.      We believe regular attendance is key to your success in our program!      Any time you are unable to keep your appointment we ask that you call us at least 24 hours in advance to cancel.This will allow us to offer the appointment time to another patient.     Multiple missed appointments may lead to dismissal from the clinic.

## 2020-11-20 NOTE — PROGRESS NOTES
"Sherie Otero is a 50 year old female who is being evaluated via a billable telephone visit.      The patient has been notified of following:     \"This telephone visit will be conducted via a call between you and your physician/provider. We have found that certain health care needs can be provided without the need for a physical exam.  This service lets us provide the care you need with a short phone conversation.  If a prescription is necessary we can send it directly to your pharmacy.  If lab work is needed we can place an order for that and you can then stop by our lab to have the test done at a later time.    Telephone visits are billed at different rates depending on your insurance coverage. During this emergency period, for some insurers they may be billed the same as an in-person visit.  Please reach out to your insurance provider with any questions.    If during the course of the call the physician/provider feels a telephone visit is not appropriate, you will not be charged for this service.\"    Patient has given verbal consent for Telephone visit?  Yes    What phone number would you like to be contacted at? [phone]    How would you like to obtain your AVS? Candie Brown MA  [facility]      Phone call duration: 26 minutes    Parul Reyes MD                              [facility]  Follow up clinic visit    Date of visit: 11/19/2020    Chief complaint:   Chief Complaint   Patient presents with     Pain     Telephone visit due to covid-19       Interval history:  Sherie Otero is a 50 year old female with a history of depression, SHANTANU, CELIA, NSAID-induced gastritis, chornic fatigue syndrome who follows with Celia Bettencourt PA-C for:  - fibromyalgia.   - neck pain  - low back pain  Last seen by me on 10/19/2020. she is seeing me while Celia is on leave.     Initial pain history:  \"\"- pain varried with the weather  - Dx with FM " "in 1998  - Dx with RA 2005/2006\"  - SE from Wellbutrin and Buspar\"    Interval pain history 10/19/2020:  \"- Virtual visit with rheumatology in 3/24/2020. Recommended PT and PO prednisone  - Hasn't been to follow up with rheum yet  - Rheum also recommended a spinal conditioning program. She has not done that yet.   - She is working with a psychiatrist. Having anxiety AE. She is on Wellbutrin and olanzapine. Anxiety is somewhat improved with decreased olanzapine and increased buproprion  - She discontinued Savella with the concern that it was causing panic attacks, but this hasn't changed since its discharge\"    Recommendations/plan at the last visit included:  Additional Workup:   1. - Diagnostic Studies:  no  2. - Laboratory studies:  No  3. - Urine toxicology screen today: No      Therapies/Referrals:   4. - Physical Therapy: Pain PT via video visit  5. - Clinical Health Psychologist to address issues of relaxation, behavioral change, coping style, and other factors important to improvement: not at this time. She sees both a primary psychologist and psychiatrist.  6. - Rheumatology: discussed that after her March 2020 rheumatology consult it was recommended she follow up in 4-5 months. She has not yet done this. Recommended she call to schedule a follow up visit.      Medication Management:   7. - Continue hydrocodone 7.5mg, max #5 per day. Plan to start taper at next visit as appropriate.   8. - Stop Flexeril  9. - Start Norflex 100 mg BID  10. - Continue Toradol sparingly. Currently using 1 every 1-2 weeks.      Further procedures recommended:   11. - Not at this time.     Since her last visit, Sherie Otero reports:  - saw Durga Haro for pain PT - had 1 appointment. Missed Tuesday. Had to put down 2 of her animals. This was heart wrenching.   - norflex not on formulary - switched to baclofen - this is not at all helpful.   - Has not yet scheduled a follow up visit with rheumatology  - Now taking the " hydrocodone 15mg q4.5 hrs twice a day and 7.5 mg 4.5 hrs after that - pain is better with scheduled this way  - No changes in location or character of pain    Pain scores:  Pain intensity on average is 3 on a scale of 0-10.     Current pain treatments:   Clonazepam 1mg: taking 1 tab twice daily    Hydrocodone 7.5mg -currently taking 2 tablets (15 mg) qAM, 1 qnoon, 2 qPM, #5 total per day   Toradol 10mg- takes every one or two weeks - takes this when snow or rain is coming. Varries with weather   Baclofen 5 mg TID     Other notable medications:   - Buproprion 150mg  - Buspar 10 mg TID  - Escitalopram 20mg at bedtime  - olanzapine - lowered to minimize sedation - doing well on lower dose    Patient is using the medication as prescribed:  YES  Is your medication helpful? YES   Side Effects: Dizziness from Strattera - has been on this 1 week. WIll work with psychiatrist on this     Past pain treatments:  Previous Medications: (H--helped; HI--Helped initially; S[facility]-- somewhat helpful, NH--No help; W--worse; SE--side effects)   Opiates: Hydrocodone H, Oxycodone SE  NSAIDS: Ibuprofen - stopped because on Ttoradol, Relafen SE stomach upset, Meloxicam NH  Muscle Relaxants: Tizanidine NH, Robaxin NH SE stomach upset; Flexeril - NH  Anti-migraine mediations: Verapamil H  Anti-depressants: Cymbalta H, Savella H at first, then seemed to stop working   Sleep aids: none  Anxiolytics: Xanax H, Clonazepam H, Valium H  Neuropathics: Gabapentin SE dizziness, Lyrica SE shortness of breath, felt 'weird' on them, throat felt weird.         Topicals: Lidocaine patches SW  Other medications not covered above:Prednisone H for arthritis flare. - Clonidine - stopped because other medications helped her sleep     Past Pain Treatments:  Pain Clinic:   No   PT: Yes, years ago. Has not done pool therapy.   Psychologist: No  Relaxation techniques/biofeedback: No  Chiropractor: No, was told not to because of neck.   Acupuncture: Yes for  headaches.   Pharmacotherapy:           Opioids: Yes            Non-opioids:    Yes   TENs Unit:Yes  Injections:               - cervical medial branch blocks 6/12/2014  Self-care:   Yes, ice/heat, hot baths, theracare  Surgeries related to pain: No    Medications:  Current Outpatient Medications   Medication Sig Dispense Refill     baclofen (LIORESAL) 10 MG tablet Take 0.5 tablets (5 mg) by mouth 3 times daily as needed for muscle spasms 90 tablet 0     buPROPion (WELLBUTRIN SR) 100 MG 12 hr tablet Taking 150MG       busPIRone (BUSPAR) 5 MG tablet Take 10 mg by mouth 3 times daily        cetirizine (ZYRTEC) 10 MG tablet TAKE ONE TABLET BY MOUTH ONCE DAILY 90 tablet 2     clonazePAM (KLONOPIN) 0.5 MG tablet Take 1 tablet (0.5 mg) by mouth 2 times daily as needed for anxiety Must last 30 days (Patient taking differently: Take 1 mg by mouth 2 times daily as needed for anxiety Must last 30 days) 60 tablet 0     cloNIDine (CATAPRES) 0.1 MG tablet Take 0.5 tablets (0.05 mg) by mouth At Bedtime 15 tablet 1     escitalopram (LEXAPRO) 20 MG tablet Take 1 tablet (20 mg) by mouth daily 30 tablet 1     fluticasone (FLONASE) 50 MCG/ACT nasal spray SPRAY 2 SPRAYS INTO BOTH NOSTRILS DAILY. 16 g 11     HYDROcodone-acetaminophen (NORCO) 7.5-325 MG per tablet Take 1 tablet every 4-6 hours as needed for severe pain. Max of 5 tablets per day. Fill 10/24/2020 Start 10/26/2020. 150 tablet 0     hydrOXYzine (ATARAX) 25 MG tablet Take 1-2 tablets (25-50 mg) by mouth 3 times daily as needed for itching 90 tablet 0     ketorolac (TORADOL) 10 MG tablet TAKE ONE TABLET BY MOUTH EVERY 6 HOURS AS NEEDED FOR MODERATE PAIN. Do not take on same day you take ibuprofen or other NSAIDs 10 tablet 1     naloxone (NARCAN) nasal spray Spray 1 spray (4 mg) into one nostril alternating nostrils as needed for opioid reversal every 2-3 minutes until assistance arrives (Patient not taking: Reported on 10/19/2020) 0.2 mL 0     OLANZapine (ZYPREXA) 5 MG tablet  Take 7.5 mg by mouth twice daily as needed for anxiety and 5 mg at bedtime. 120 tablet 1     orphenadrine ER (NORFLEX) 100 MG 12 hr tablet Take 1 tablet (100 mg) by mouth 2 times daily 60 tablet 0     VENTOLIN  (90 Base) MCG/ACT inhaler INHALE 2 PUFFS INTO THE LUNGS EVERY 6 HOURS AS NEEDED FOR SHORTNESS OF BREATH, DIFFICULTY BREATHING OR WHEEZING. 18 g 0     verapamil (CALAN) 40 MG tablet TAKE ONE TABLET BY MOUTH TWICE A  tablet 0       Medical History: any changes in medical history since they were last seen? No - will go for CT of abdomen given abdominal distension    Review of Systems:  The 14 system ROS was reviewed from the intake questionnaire, and is positive for: abdominal distension as above  Any bowel or bladder problems: IBS related constipation and diarrhea. No recent changes  Mood: pretty good    Physical Exam:  not currently breastfeeding.  Psych: Mood is pretty good. Affect is congruent. Thought process is logical. Thought content normal.  Neuro: CN II - XII grossly intact. Speech is fluent    Controlled Substance Agreement:   Encounter-Level CSA - 02/04/2019:    Controlled Substance Agreement - Scan on 2/4/2019  4:27 PM: CONTROLLED MEDICATION AGREEMENT OR CONTROLLED SUBSTANCE AGREEMENT     Encounter-Level CSA - 01/30/2018:    Controlled Substance Agreement - Scan on 2/4/2018  9:27 AM: CONTROLLED SUBSTANCE AGREEMENT     Encounter-Level CSA - 09/22/2015:    Controlled Substance Agreement - Scan on 9/23/2015 10:20 AM: CONTROLLED SUBSTANCE AGREEMENT     Encounter-Level CSA - 08/06/2014:    Controlled Substance Agreement - Scan on 9/10/2014 10:20 AM: Controlled Medication Agreement-08/06/14     Patient-Level CSA:    Controlled Substance Agreement - Opioid - Scan on 1/27/2020  2:58 PM: controlled substance agreement  Controlled Substance Agreement - Opioid - Scan on 1/24/2019  1:31 PM: signed 1/11/2019          UDS:   Pain Drug SCR UR W RPTD Meds   Date Value Ref Range Status   01/27/2020  FINAL  Final     Comment:     (Note)  ====================================================================  [facility] COMP DRUG ANALYSIS,UR  ====================================================================  Test                             Result       Flag       Units        Drug Present and Declared for Prescription Verification   7-aminoclonazepam              66           EXPECTED   ng/mg creat    7-aminoclonazepam is an expected metabolite of clonazepam. Source    of clonazepam is a scheduled prescription medication.   Hydrocodone                    1978         EXPECTED   ng/mg creat   Hydromorphone                  522          EXPECTED   ng/mg creat   Dihydrocodeine                 247          EXPECTED   ng/mg creat   Norhydrocodone                 2088         EXPECTED   ng/mg creat    Sources of hydrocodone include scheduled prescription    medications. Hydromorphone, dihydrocodeine and norhydrocodone are    expected metabolites of hydrocodone. Hydromorphone and    dihydrocodeine are also available as scheduled prescription    medications.  Drug Present not Declared for Prescription Verification   Oxazepam                       113          UNEXPECTED ng/mg creat   Temazepam                      91           UNEXPECTED ng/mg creat    Oxazepam and temazepam are expected metabolites of diazepam.     Oxazepam is also an expected metabolite of other benzodiazepine    drugs, including chlordiazepoxide, prazepam, clorazepate,    halazepam, and temazepam.  Oxazepam and temazepam are available    as scheduled prescription medications.   Alprazolam                     125          UNEXPECTED ng/mg creat    Source of alprazolam is a scheduled prescription medication.   Carboxy-THC                    78           UNEXPECTED ng/mg creat    Carboxy-THC is a metabolite of tetrahydrocannabinol  (THC).    Source of THC is most commonly illicit, but THC is also present    in a scheduled prescription medication.    Olanzapine                     PRESENT      UNEXPECTED               Acetaminophen                  PRESENT      UNEXPECTED               Verapamil                      PRESENT      UNEXPECTED              Drug Absent but Declared for Prescription Verification   Gabapentin                     Not Detected UNEXPECTED               Duloxetine                     Not Detected UNEXPECTED              ====================================================================  Test                      Result    Flag   Units      Ref Range        Creatinine              32               mg/dL      >=20            ====================================================================  Declared Medications:  The flagging and interpretation on this report are based on the  following declared medications.  Unexpected results may arise from  inaccuracies in the declared medications.  **Note: The testing scope of this panel includes these medications:  Clonazepam  Duloxetine (Cymbalta)  Gabapentin  Hydrocodone  ====================================================================  For clinical consultation, please call [phone].  ====================================================================  Analysis performed by [employer] [place] [address]          THE 4 As OF OPIOID MAINTENANCE ANALGESIA    Analgesia: Is pain relief clinically significant? YES   Activity: Is patient functional and able to perform Activities of Daily Living? YES   Adverse effects: Is patient free from adverse side effects from opiates? YES   Adherence to Rx protocol: Is patient adhering to Controlled Substance Agreement and taking medications ONLY as ordered? YES    MME: 37.5    Is Narcan prescribed for opiate use >50 MME daily? YES     :  [place] Prescription Drug Monitoring Program (PDMP) Link  Checked and as expected      Assessment:   Sheriecha Otero is a 50 year old female who is seen at the pain clinic for:       Chronic  bilateral low back pain without sciatica  Cervicalgia  Chronic pain syndrome  Fibromyalgia  Myofascial pain    1. Mental Health - the patient's mental health concerns, specifically CELIA, depression, affect her experience of pain and contribute to her clinically significant distress.    Norflex was not covered and switched to baclofen. She has not noticed benefit from baclofen for muscle spasms. We can increase it to 10 mg TID.   Doing better on same dose of opioids with scheduled doses.   Only had 1 visit with Durga Haro due to animal deaths. Has next visit scheduled.     Plan:  Additional Workup:   1. - Diagnostic Studies:  no  2. - Laboratory studies:  no  3. - Urine toxicology screen today: no     Therapies/Referrals:   4. - Physical Therapy: Continue with Durga Haro pain PT  5. - Clinical Health Psychologist to address issues of relaxation, behavioral change, coping style, and other factors important to improvement: sees both primary psychologist and psychiatrist  6. Reminded her again to schedule follow up with rheumatology. March 2020 rheumatology consult recommended she follow up in 4-5 months.    Medication Management:   7. - Increase baclofen to 10mg TID  8. - Continue hydrocodone 7.5 mg. Max #5 per day. Continue to work on taper as able  9. - Continue Toradol sparingly  Further procedures recommended:   10. - Not at this time    Follow up: video visit 2 mo    Parul Reyes MD  [facility]

## 2020-12-16 ENCOUNTER — HOSPITAL ENCOUNTER (OUTPATIENT)
Dept: CT IMAGING | Facility: CLINIC | Age: 50
End: 2020-12-16
Attending: FAMILY MEDICINE
Payer: MEDICARE

## 2020-12-16 ENCOUNTER — HOSPITAL ENCOUNTER (OUTPATIENT)
Dept: MAMMOGRAPHY | Facility: CLINIC | Age: 50
End: 2020-12-16
Attending: FAMILY MEDICINE
Payer: MEDICARE

## 2020-12-16 DIAGNOSIS — R14.0 ABDOMINAL BLOATING: ICD-10-CM

## 2020-12-16 DIAGNOSIS — Z12.31 VISIT FOR SCREENING MAMMOGRAM: ICD-10-CM

## 2020-12-16 PROCEDURE — 74177 CT ABD & PELVIS W/CONTRAST: CPT

## 2020-12-16 PROCEDURE — 250N000009 HC RX 250: Performed by: RADIOLOGY

## 2020-12-16 PROCEDURE — 250N000011 HC RX IP 250 OP 636: Performed by: RADIOLOGY

## 2020-12-16 PROCEDURE — 77067 SCR MAMMO BI INCL CAD: CPT

## 2020-12-16 RX ORDER — IOPAMIDOL 755 MG/ML
100 INJECTION, SOLUTION INTRAVASCULAR ONCE
Status: COMPLETED | OUTPATIENT
Start: 2020-12-16 | End: 2020-12-16

## 2020-12-16 RX ADMIN — IOPAMIDOL 80 ML: 755 INJECTION, SOLUTION INTRAVENOUS at 13:57

## 2020-12-16 RX ADMIN — SODIUM CHLORIDE 60 ML: 9 INJECTION, SOLUTION INTRAVENOUS at 13:57

## 2020-12-17 ENCOUNTER — TELEPHONE (OUTPATIENT)
Dept: FAMILY MEDICINE | Facility: CLINIC | Age: 50
End: 2020-12-17

## 2020-12-17 NOTE — TELEPHONE ENCOUNTER
----- Message from Twin Castro MD sent at 12/17/2020  1:18 PM CST -----  CT of the abdomen was pretty much normal they could see some nodules in the lungs that have become smaller in size since previous CT PET scan of 2017

## 2020-12-17 NOTE — TELEPHONE ENCOUNTER
Patient was informed that the CT of the abdomen was pretty much normal, they could see some nodules in the lungs that have become smaller in size since previous CT PET scan of 2017.    Lemuel Lopez, CMA

## 2020-12-17 NOTE — RESULT ENCOUNTER NOTE
CT of the abdomen was pretty much normal they could see some nodules in the lungs that have become smaller in size since previous CT PET scan of 2017

## 2020-12-21 ENCOUNTER — TELEPHONE (OUTPATIENT)
Dept: PSYCHOLOGY | Facility: OTHER | Age: 50
End: 2020-12-21

## 2020-12-21 NOTE — TELEPHONE ENCOUNTER
Patient did not show for previous appointment on 12/10/2020, left patient message on that date regarding missed appointment and reminded her of upcoming session.    Today this writer attempted to call patient to confirm the appointment.  Left voicemail for patient with tomorrow's date and time of appointment.  Reminded patient of attendance policy, asking patient to call scheduling at [phone] as soon as possible if she doesn't plan to keep the appointment.

## 2020-12-22 ENCOUNTER — MEDICAL CORRESPONDENCE (OUTPATIENT)
Dept: HEALTH INFORMATION MANAGEMENT | Facility: CLINIC | Age: 50
End: 2020-12-22

## 2020-12-22 ENCOUNTER — VIRTUAL VISIT (OUTPATIENT)
Dept: PSYCHOLOGY | Facility: CLINIC | Age: 50
End: 2020-12-22
Payer: MEDICARE

## 2020-12-22 DIAGNOSIS — F90.2 ADHD (ATTENTION DEFICIT HYPERACTIVITY DISORDER), COMBINED TYPE: Primary | ICD-10-CM

## 2020-12-22 DIAGNOSIS — F33.1 MAJOR DEPRESSIVE DISORDER, RECURRENT EPISODE, MODERATE WITH ANXIOUS DISTRESS (H): ICD-10-CM

## 2020-12-22 DIAGNOSIS — F41.1 GENERALIZED ANXIETY DISORDER: ICD-10-CM

## 2020-12-22 PROCEDURE — 90834 PSYTX W PT 45 MINUTES: CPT | Mod: 95 | Performed by: SOCIAL WORKER

## 2020-12-22 ASSESSMENT — ANXIETY QUESTIONNAIRES
6. BECOMING EASILY ANNOYED OR IRRITABLE: SEVERAL DAYS
3. WORRYING TOO MUCH ABOUT DIFFERENT THINGS: NEARLY EVERY DAY
GAD7 TOTAL SCORE: 16
1. FEELING NERVOUS, ANXIOUS, OR ON EDGE: NEARLY EVERY DAY
7. FEELING AFRAID AS IF SOMETHING AWFUL MIGHT HAPPEN: NEARLY EVERY DAY
IF YOU CHECKED OFF ANY PROBLEMS ON THIS QUESTIONNAIRE, HOW DIFFICULT HAVE THESE PROBLEMS MADE IT FOR YOU TO DO YOUR WORK, TAKE CARE OF THINGS AT HOME, OR GET ALONG WITH OTHER PEOPLE: SOMEWHAT DIFFICULT
5. BEING SO RESTLESS THAT IT IS HARD TO SIT STILL: NOT AT ALL
4. TROUBLE RELAXING: NEARLY EVERY DAY
2. NOT BEING ABLE TO STOP OR CONTROL WORRYING: NEARLY EVERY DAY

## 2020-12-22 ASSESSMENT — PATIENT HEALTH QUESTIONNAIRE - PHQ9: SUM OF ALL RESPONSES TO PHQ QUESTIONS 1-9: 8
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
## 2020-12-23 DIAGNOSIS — G89.29 CHRONIC BILATERAL LOW BACK PAIN WITHOUT SCIATICA: ICD-10-CM

## 2020-12-23 DIAGNOSIS — M54.2 CERVICALGIA: ICD-10-CM

## 2020-12-23 DIAGNOSIS — M79.7 FIBROMYALGIA: ICD-10-CM

## 2020-12-23 DIAGNOSIS — M54.50 CHRONIC BILATERAL LOW BACK PAIN WITHOUT SCIATICA: ICD-10-CM

## 2020-12-23 DIAGNOSIS — G89.4 CHRONIC PAIN SYNDROME: ICD-10-CM

## 2020-12-23 ASSESSMENT — ANXIETY QUESTIONNAIRES: GAD7 TOTAL SCORE: 16

## 2020-12-23 NOTE — TELEPHONE ENCOUNTER
Received call from patient requesting refill(s) of HYDROcodone-acetaminophen (NORCO) 7.5-325 MG per tablet     Last picked up from pharmacy on 11/27/20    Patient's last office/virtual visit by prescribing provider on 11/20/20  Next office/virtual appointment scheduled for 01/20/21     checked in the past 6 months? Yes If no, print current report and give to RN    Last urine drug screen date 01/27/20  Current opioid agreement on file (completed within the last year) Yes Date of opioid agreement: 01/27/20    [facility]     [facility] [address] [place] [address] - [address]  [address]  [facility] [address]  Phone: [phone] Fax: [phone]    Will route to nursing pool for review and preparation of prescription(s).     Argentina Solorio [facility]  [place]

## 2020-12-24 RX ORDER — HYDROCODONE BITARTRATE AND ACETAMINOPHEN 7.5; 325 MG/1; MG/1
TABLET ORAL
Qty: 150 TABLET | Refills: 0 | Status: SHIPPED | OUTPATIENT
Start: 2020-12-24 | End: 2021-01-20

## 2020-12-24 NOTE — TELEPHONE ENCOUNTER
Medication refill information reviewed.     Due date for HYDROcodone-acetaminophen (NORCO) 7.5-325 MG per tablet is 12/27/20     Prescriptions prepped for review.     Will route to provider.     Dhiraj Burger RN  Care Coordinator   [facility] [place]

## 2020-12-24 NOTE — TELEPHONE ENCOUNTER
Norco refill approved on behalf of Dr. Reyes who is on leave.    Brendan Rushing DO  [facility]

## 2020-12-24 NOTE — TELEPHONE ENCOUNTER
Spoke to patient stating [facility]     Fill 12/24/2020 Start 12/27/2020      Jaymie Merrill MA

## 2020-12-29 NOTE — TELEPHONE ENCOUNTER
Message from That:  Original authorizing provider: AGUSTÍN Owens would like a refill of the following medications:  ALPRAZolam (XANAX) 0.5 MG tablet [Selina Reveles PA-C]  clonazePAM (KLONOPIN) 0.5 MG tablet [Selina Reveles PA-C]  HYDROcodone-acetaminophen (NORCO) 7.5-325 MG per tablet [Selina Reveles PA-C]    Preferred pharmacy: [address]     Comment:     Number Of Stages: 1

## 2021-01-14 ENCOUNTER — VIRTUAL VISIT (OUTPATIENT)
Dept: PSYCHOLOGY | Facility: CLINIC | Age: 51
End: 2021-01-14
Payer: MEDICARE

## 2021-01-14 DIAGNOSIS — F41.1 GENERALIZED ANXIETY DISORDER: ICD-10-CM

## 2021-01-14 DIAGNOSIS — F33.1 MAJOR DEPRESSIVE DISORDER, RECURRENT EPISODE, MODERATE WITH ANXIOUS DISTRESS (H): ICD-10-CM

## 2021-01-14 DIAGNOSIS — F90.2 ADHD (ATTENTION DEFICIT HYPERACTIVITY DISORDER), COMBINED TYPE: Primary | ICD-10-CM

## 2021-01-14 PROCEDURE — 90834 PSYTX W PT 45 MINUTES: CPT | Mod: 95 | Performed by: SOCIAL WORKER

## 2021-01-14 ASSESSMENT — ANXIETY QUESTIONNAIRES
5. BEING SO RESTLESS THAT IT IS HARD TO SIT STILL: SEVERAL DAYS
2. NOT BEING ABLE TO STOP OR CONTROL WORRYING: NEARLY EVERY DAY
4. TROUBLE RELAXING: NEARLY EVERY DAY
1. FEELING NERVOUS, ANXIOUS, OR ON EDGE: NEARLY EVERY DAY
IF YOU CHECKED OFF ANY PROBLEMS ON THIS QUESTIONNAIRE, HOW DIFFICULT HAVE THESE PROBLEMS MADE IT FOR YOU TO DO YOUR WORK, TAKE CARE OF THINGS AT HOME, OR GET ALONG WITH OTHER PEOPLE: EXTREMELY DIFFICULT
7. FEELING AFRAID AS IF SOMETHING AWFUL MIGHT HAPPEN: NEARLY EVERY DAY
GAD7 TOTAL SCORE: 16
6. BECOMING EASILY ANNOYED OR IRRITABLE: NOT AT ALL
3. WORRYING TOO MUCH ABOUT DIFFERENT THINGS: NEARLY EVERY DAY

## 2021-01-14 ASSESSMENT — PATIENT HEALTH QUESTIONNAIRE - PHQ9: SUM OF ALL RESPONSES TO PHQ QUESTIONS 1-9: 8
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
## 2021-01-15 ENCOUNTER — HEALTH MAINTENANCE LETTER (OUTPATIENT)
Age: 51
End: 2021-01-15

## 2021-01-15 ASSESSMENT — ANXIETY QUESTIONNAIRES: GAD7 TOTAL SCORE: 16

## 2021-01-15 NOTE — PROGRESS NOTES
Sherie is a 50 year old who is being evaluated via a billable telephone visit.      What phone number would you like to be contacted at? [phone]  How would you like to obtain your AVS? [facility]     CHIEF COMPLAINT: Pain  -Fibromyalgia  -Neck pain  -Low back pain     INTERVAL HISTORY:  Last seen on 0 11/20/2020 for a virtual visit.     Recommendations/plan at the last visit included:  Additional Workup:   1. - Diagnostic Studies:  no  2. - Laboratory studies:  no  3. - Urine toxicology screen today: no      Therapies/Referrals:   4. - Physical Therapy: Continue with [facility] pain PT  5. - Clinical Health Psychologist to address issues of relaxation, behavioral change, coping style, and other factors important to improvement: sees both primary psychologist and psychiatrist  6. Reminded her again to schedule follow up with rheumatology. March 2020 rheumatology consult recommended she follow up in 4-5 months.     Medication Management:   7. - Increase baclofen to 10mg TID  8. - Continue hydrocodone 7.5 mg. Max #5 per day. Continue to work on taper as able  9. - Continue Toradol sparingly  Further procedures recommended:   10. - Not at this time     Follow up: video visit 2 mo  Since her last visit, Sherie Otero reports:    She is doing better. She states that she is taking 2 pain meds every 4,5 hours she stays ahead of her pain. She is sleeping better as well. She is still working with her psychiatrist on her anxiety.     She was unable to get Norflex. She is now taking Baclofen. She states that if she takes a full tablet she needs to lay down with it. She does feel the Baclofen is more effective than Flexeril.      Pain Information:              Pain today 2/10     UDS 1/27/2020   CSA 2/04/2019     CURRENT RELEVANT PAIN MEDICATIONS:  Clonazepam 0.5mg: taking 1 tab twice daily    Baclofen 10mg-1 at bedtime, occasionally 1/2 during the day  Hydrocodone 7.5mg  -currently taking 2 tablets three times a day   Toradol 10mg-using sparingly     Patient is using the medication as prescribed:  YES  Is your medication helpful?     somewhat   Medication side effects? no side effect     Previous Medications: (H--helped; HI--Helped initially; SWH-- somewhat helpful, NH--No help; W--worse; SE--side effects)   Opiates: Hydrocodone H, Oxycodone SE  NSAIDS: Ibuprofen H, Relafen SE stomach upset, Meloxicam NH  Muscle Relaxants: Tizanidine NH, Flexeril H, Robaxin NH SE stomach upset  Anti-migraine mediations: Verapamil H  Anti-depressants: Cymbalta H  Sleep aids: none  Anxiolytics: Xanax H, Clonazepam H, Valium H  Neuropathics: Gabapentin SE dizziness          Topicals: Lidocaine patches SW  Other medications not covered above: Savella H at first, then seemed to stop working, Lyrica SE shortness of breath, felt 'weird' on them, throat felt weird. Prednisone H for arthritis flare     Past Pain Treatments:  Pain Clinic:   No   PT: Yes, years ago. Has not done pool therapy.   Psychologist: No  Relaxation techniques/biofeedback: No  Chiropractor: No, was told not to because of neck.   Acupuncture: Yes for headaches.   Pharmacotherapy:           Opioids: Yes            Non-opioids:    Yes   TENs Unit:Yes  Injections: cervical medial branch blocks 6/12/2014  Self-care:   Yes, ice/heat, hot baths, theracare  Surgeries related to pain: No     [place] Board of Pharmacy Data Base Reviewed:    YES; As expected, no concern for misuse/abuse of controlled medications based on this report. Checked on 1/20/21        THE 4 As OF OPIOID MAINTENANCE ANALGESIA    Analgesia: Is pain relief clinically significant? YES   Activity: Is patient functional and able to perform Activities of Daily Living? YES   Adverse effects: Is patient free from adverse side effects from opiates? YES   Adherence to Rx protocol: Is patient adhering to Controlled Substance Agreement and taking medications ONLY as ordered?  No        Is Narcan prescribed for opiate use >50 MME daily? yes        Total Daily MME: 37.5    Medications:  Current Outpatient Prescriptions          Current Outpatient Medications   Medication Sig Dispense Refill     albuterol (VENTOLIN HFA) 108 (90 Base) MCG/ACT inhaler Inhale 2 puffs into the lungs every 6 hours as needed for shortness of breath / dyspnea or wheezing 18 g 1     cetirizine (ZYRTEC) 10 MG tablet TAKE ONE TABLET BY MOUTH ONCE DAILY 90 tablet 2     clonazePAM (KLONOPIN) 0.5 MG tablet Take 1 tablet (0.5 mg) by mouth 2 times daily as needed for anxiety Must last 30 days 60 tablet 0     [START ON 2/24/2020] clonazePAM (KLONOPIN) 0.5 MG tablet Take 1 tablet (0.5 mg) by mouth 2 times daily as needed for anxiety 6 tablet 0     cyclobenzaprine (FLEXERIL) 10 MG tablet Take 2 tablets in the evening and 1 in the morning 90 tablet 2     DULoxetine (CYMBALTA) 20 MG capsule Take 2 capsules (40 mg) by mouth daily CYMBALTA-KADY 60 capsule 0     fluticasone (FLONASE) 50 MCG/ACT nasal spray SPRAY 2 SPRAYS INTO BOTH NOSTRILS DAILY. 16 g 11     gabapentin (NEURONTIN) 100 MG capsule Take 1 capsule at bedtime for 1 week, then take 1 capsule twice daily for 1 week, then take 1 capsule three times a day 90 capsule 0     HYDROcodone-acetaminophen (NORCO) 7.5-325 MG per tablet Take 1 tablet every 4-6 hours as needed for severe pain. Max of 5 tablets per day. Fill/start 12/30/2019. 150 tablet 0     naloxone (NARCAN) nasal spray Spray 1 spray (4 mg) into one nostril alternating nostrils as needed for opioid reversal every 2-3 minutes until assistance arrives 0.2 mL 0     OLANZapine (ZYPREXA) 5 MG tablet Take 0.5 tablets (2.5 mg) by mouth every morning And 1 tab(5mg) at 2pm and 1 tab(5mg) at bedtime. 75 tablet 1     verapamil (CALAN) 40 MG tablet Take 1 tablet (40 mg) by mouth 2 times daily 180 tablet 3     CYMBALTA 60 MG capsule TAKE ONE CAPSULE BY MOUTH EVERY EVENING (Patient not taking: Reported on 1/24/2020) 90 capsule 0               Assessment:  Sherie Otero is a 50 year old female who presents today for follow up regarding her:     4. Fibromyalgia  5. Chronic low back pain  6. Cervicalgia  7. Chronic pain syndrome  8. Chronic use of opioids    Pain has been improved with taking her Norco on a scheduled regimen of 2 tabs, 2 tabs and 1 tab, spaced about every 4.5 hours. Discussed doing a slight taper at her next visit. Would likely change her to the 5mg tablets, but have her continue the regimen that she is doing. Continue Baclofen as it is more effective. No other changes.          Plan:  Diagnosis reviewed, treatment option addressed, and risk/benifits discussed.  Self-care instructions given.  I am recommending a multidisciplinary treatment plan to help this patient better manage pain.       1. Physical Therapy: continue previous PT exercises;   2. Clinical Health Psychologist:  YES, has a plan in place  3. Diagnostic Studies: none at this time  4. Medication Management:                1.  Continue Baclofen 10mg, 0.5-1 tab TID PRN              2.  Continue hydrocodone to 7.5/325 with instructions to take 2 tablets, 2 tablets and 1 tablet with dosing spread by 4.5 hours. pain. Max of 5 tablets/day              3.  Consider restarting Savella once her anxiety is under better control              4.  Consider low-dose naltrexone  5. Further procedures recommended: consider trigger point injections if needed        Follow up with this provider: 4 weeks     The total TIME spent on this patient on the day of the appointment was 17 minutes.      Celia Bettencourt [facility]

## 2021-01-20 ENCOUNTER — VIRTUAL VISIT (OUTPATIENT)
Dept: PALLIATIVE MEDICINE | Facility: CLINIC | Age: 51
End: 2021-01-20
Payer: MEDICARE

## 2021-01-20 DIAGNOSIS — G89.4 CHRONIC PAIN SYNDROME: ICD-10-CM

## 2021-01-20 DIAGNOSIS — M54.2 CERVICALGIA: ICD-10-CM

## 2021-01-20 DIAGNOSIS — M54.50 CHRONIC BILATERAL LOW BACK PAIN WITHOUT SCIATICA: ICD-10-CM

## 2021-01-20 DIAGNOSIS — G89.29 CHRONIC BILATERAL LOW BACK PAIN WITHOUT SCIATICA: ICD-10-CM

## 2021-01-20 DIAGNOSIS — M79.7 FIBROMYALGIA: ICD-10-CM

## 2021-01-20 PROCEDURE — 99442 PR PHYSICIAN TELEPHONE EVALUATION 11-20 MIN: CPT | Mod: 95 | Performed by: PHYSICIAN ASSISTANT

## 2021-01-20 RX ORDER — HYDROCODONE BITARTRATE AND ACETAMINOPHEN 7.5; 325 MG/1; MG/1
TABLET ORAL
Qty: 150 TABLET | Refills: 0 | Status: SHIPPED | OUTPATIENT
Start: 2021-01-20 | End: 2021-02-23 | Stop reason: DRUGHIGH

## 2021-01-20 NOTE — PATIENT INSTRUCTIONS
After Visit Instructions:     Thank you for coming to [facility] for your care. It is my goal to partner with you to help you reach your optimal state of health.     I am recommending multidisciplinary care at this time.  The focus of care will be to continue gradual rehabilitation and pain management with medication adjustments as needed.    Continue daily self-care, identifying contributing factors, and monitoring variations in pain level. Continue to integrate self-care into your life.          Schedule follow-up with Celia Bettencourt PA-C in 4 weeks. You will need to make this appointment.     Medication recommendations:    1.  Continue Baclofen 10mg, 0.5-1 tab TID PRN              2.  Continue hydrocodone to 7.5/325 with instructions to take 2 tablets, 2 tablets and 1 tablet with  dosing spread by 4.5 hours. pain. Max of 5       Celia Bettencourt PA-C  [facility]    Contact information: [facility]  Clinic Number:  [phone]     Call with any questions about your care and for scheduling assistance.     Calls are returned Monday through Friday between 8 AM and 4:30 PM. We usually get back to you within 2 business days depending on the issue/request.    If we are prescribing your medications:    For opioid medication refills, call the clinic or send a [employer] message 7 days in advance.  Please include:    Name of requested medication    Name of the pharmacy.    For non-opioid medications, call your pharmacy directly to request a refill. Please allow 3-4 days to be processed.     Per [place] State Law:    All controlled substance prescriptions must be filled within 30 days of being written.      For those controlled substances allowing refills, pickup must occur within 30 days of last fill.      We believe regular attendance is key to your success in our program!      Any time you are unable to keep your appointment we  ask that you call us at least 24 hours in advance to cancel.This will allow us to offer the appointment time to another patient.   Multiple missed appointments may lead to dismissal from the clinic.

## 2021-02-02 DIAGNOSIS — F50.9 EATING DISORDER, UNSPECIFIED: ICD-10-CM

## 2021-02-02 DIAGNOSIS — Z79.899 ENCOUNTER FOR LONG-TERM (CURRENT) USE OF HIGH-RISK MEDICATION: ICD-10-CM

## 2021-02-02 DIAGNOSIS — F41.1 GENERALIZED ANXIETY DISORDER: ICD-10-CM

## 2021-02-02 DIAGNOSIS — F33.1 MAJOR DEPRESSIVE DISORDER, RECURRENT EPISODE, MODERATE (H): Primary | ICD-10-CM

## 2021-02-02 LAB
CHOLEST SERPL-MCNC: 161 MG/DL
FOLATE SERPL-MCNC: 5.4 NG/ML
HBA1C MFR BLD: 5.6 % (ref 0–5.6)
HDLC SERPL-MCNC: 67 MG/DL
IRON SATN MFR SERPL: 12 % (ref 15–46)
IRON SERPL-MCNC: 30 UG/DL (ref 35–180)
LDLC SERPL CALC-MCNC: 77 MG/DL
NONHDLC SERPL-MCNC: 94 MG/DL
T4 FREE SERPL-MCNC: 1.01 NG/DL (ref 0.76–1.46)
TIBC SERPL-MCNC: 260 UG/DL (ref 240–430)
TRIGL SERPL-MCNC: 85 MG/DL
TSH SERPL DL<=0.005 MIU/L-ACNC: 0.96 MU/L (ref 0.4–4)
VIT B12 SERPL-MCNC: 384 PG/ML (ref 193–986)

## 2021-02-02 PROCEDURE — 80061 LIPID PANEL: CPT | Performed by: NURSE PRACTITIONER

## 2021-02-02 PROCEDURE — 83550 IRON BINDING TEST: CPT | Performed by: NURSE PRACTITIONER

## 2021-02-02 PROCEDURE — 36415 COLL VENOUS BLD VENIPUNCTURE: CPT | Performed by: NURSE PRACTITIONER

## 2021-02-02 PROCEDURE — 84443 ASSAY THYROID STIM HORMONE: CPT | Performed by: NURSE PRACTITIONER

## 2021-02-02 PROCEDURE — 83036 HEMOGLOBIN GLYCOSYLATED A1C: CPT | Performed by: NURSE PRACTITIONER

## 2021-02-02 PROCEDURE — 83540 ASSAY OF IRON: CPT | Performed by: NURSE PRACTITIONER

## 2021-02-02 PROCEDURE — 82306 VITAMIN D 25 HYDROXY: CPT | Performed by: NURSE PRACTITIONER

## 2021-02-02 PROCEDURE — 82607 VITAMIN B-12: CPT | Performed by: NURSE PRACTITIONER

## 2021-02-02 PROCEDURE — 84439 ASSAY OF FREE THYROXINE: CPT | Performed by: NURSE PRACTITIONER

## 2021-02-02 PROCEDURE — 82746 ASSAY OF FOLIC ACID SERUM: CPT | Performed by: NURSE PRACTITIONER

## 2021-02-05 LAB
DEPRECATED CALCIDIOL+CALCIFEROL SERPL-MC: <25 UG/L (ref 20–75)
VITAMIN D2 SERPL-MCNC: <5 UG/L
VITAMIN D3 SERPL-MCNC: 20 UG/L

## 2021-02-08 ENCOUNTER — VIRTUAL VISIT (OUTPATIENT)
Dept: PSYCHOLOGY | Facility: CLINIC | Age: 51
End: 2021-02-08
Payer: MEDICARE

## 2021-02-08 DIAGNOSIS — F33.1 MAJOR DEPRESSIVE DISORDER, RECURRENT EPISODE, MODERATE WITH ANXIOUS DISTRESS (H): ICD-10-CM

## 2021-02-08 DIAGNOSIS — F90.2 ADHD (ATTENTION DEFICIT HYPERACTIVITY DISORDER), COMBINED TYPE: Primary | ICD-10-CM

## 2021-02-08 DIAGNOSIS — F41.1 GENERALIZED ANXIETY DISORDER: ICD-10-CM

## 2021-02-08 PROCEDURE — 90834 PSYTX W PT 45 MINUTES: CPT | Mod: 95 | Performed by: SOCIAL WORKER

## 2021-02-08 ASSESSMENT — ANXIETY QUESTIONNAIRES
1. FEELING NERVOUS, ANXIOUS, OR ON EDGE: SEVERAL DAYS
4. TROUBLE RELAXING: NEARLY EVERY DAY
IF YOU CHECKED OFF ANY PROBLEMS ON THIS QUESTIONNAIRE, HOW DIFFICULT HAVE THESE PROBLEMS MADE IT FOR YOU TO DO YOUR WORK, TAKE CARE OF THINGS AT HOME, OR GET ALONG WITH OTHER PEOPLE: VERY DIFFICULT
5. BEING SO RESTLESS THAT IT IS HARD TO SIT STILL: NOT AT ALL
6. BECOMING EASILY ANNOYED OR IRRITABLE: NEARLY EVERY DAY
2. NOT BEING ABLE TO STOP OR CONTROL WORRYING: NEARLY EVERY DAY
3. WORRYING TOO MUCH ABOUT DIFFERENT THINGS: NEARLY EVERY DAY
GAD7 TOTAL SCORE: 16
7. FEELING AFRAID AS IF SOMETHING AWFUL MIGHT HAPPEN: NEARLY EVERY DAY

## 2021-02-08 ASSESSMENT — PATIENT HEALTH QUESTIONNAIRE - PHQ9: SUM OF ALL RESPONSES TO PHQ QUESTIONS 1-9: 8
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
## 2021-02-09 ASSESSMENT — ANXIETY QUESTIONNAIRES: GAD7 TOTAL SCORE: 16

## 2021-02-16 DIAGNOSIS — G43.819 OTHER MIGRAINE WITHOUT STATUS MIGRAINOSUS, INTRACTABLE: ICD-10-CM

## 2021-02-17 RX ORDER — VERAPAMIL HYDROCHLORIDE 40 MG/1
TABLET ORAL
Qty: 180 TABLET | Refills: 0 | Status: SHIPPED | OUTPATIENT
Start: 2021-02-17 | End: 2021-05-13

## 2021-02-17 NOTE — TELEPHONE ENCOUNTER
Routing refill request to provider for review/approval because:  Labs not current:  ALT, Creatinine    Nandini Bean Rn

## 2021-02-19 NOTE — PROGRESS NOTES
"Sherie is a 50 year old who is being evaluated via a billable telephone visit.      What phone number would you like to be contacted at? [phone]  How would you like to obtain your AVS? [facility]     CHIEF COMPLAINT: Pain  -Fibromyalgia  -Neck pain  -Low back pain     INTERVAL HISTORY:  Last seen on 1/20/21 for a virtual visit.     Recommendations/plan at the last visit included:  Additional Workup:   Physical Therapy: continue previous PT exercises;   2. Clinical Health Psychologist:  YES, has a plan in place  3. Diagnostic Studies: none at this time  4. Medication Management:                1.  Continue Baclofen 10mg, 0.5-1 tab TID PRN              2.  Continue hydrocodone to 7.5/325 with instructions to take 2 tablets, 2 tablets and 1 tablet with dosing spread by 4.5 hours. pain. Max of 5 tablets/day              3.  Consider restarting Savella once her anxiety is under better control              4.  Consider low-dose naltrexone  5. Further procedures recommended: consider trigger point injections if needed        Follow up with this provider: 4 weeks     Since her last visit, Sherie Otero reports:    She states that she has been doing pretty good. She did have increased pain with the really cold days, but otherwise the scheduled medications is working well.     Anxiety has been \"so-so\".     Pain Information:              Pain today 4/10     UDS 1/27/2020   CSA 2/04/2019     CURRENT RELEVANT PAIN MEDICATIONS:  Clonazepam 0.5mg: taking 1 tab twice daily    Baclofen 10mg-1 at bedtime, occasionally 1/2 during the day  Hydrocodone 7.5mg -currently taking 2 tablets three times a day   Toradol 10mg-using sparingly     Patient is using the medication as prescribed:  YES  Is your medication helpful?     somewhat   Medication side effects? no side effect     Previous Medications: (H--helped; HI--Helped initially; SWH-- somewhat helpful, NH--No help; W--worse; SE--side " effects)   Opiates: Hydrocodone H, Oxycodone SE  NSAIDS: Ibuprofen H, Relafen SE stomach upset, Meloxicam NH  Muscle Relaxants: Tizanidine NH, Flexeril H, Robaxin NH SE stomach upset  Anti-migraine mediations: Verapamil H  Anti-depressants: Cymbalta H  Sleep aids: none  Anxiolytics: Xanax H, Clonazepam H, Valium H  Neuropathics: Gabapentin SE dizziness          Topicals: Lidocaine patches SWH  Other medications not covered above: Savella H at first, then seemed to stop working, Lyrica SE shortness of breath, felt 'weird' on them, throat felt weird. Prednisone H for arthritis flare     Past Pain Treatments:  Pain Clinic:   No   PT: Yes, years ago. Has not done pool therapy.   Psychologist: No  Relaxation techniques/biofeedback: No  Chiropractor: No, was told not to because of neck.   Acupuncture: Yes for headaches.   Pharmacotherapy:           Opioids: Yes            Non-opioids:    Yes   TENs Unit:Yes  Injections: cervical medial branch blocks 6/12/2014  Self-care:   Yes, ice/heat, hot baths, theracare  Surgeries related to pain: No     [place] Board of Pharmacy Data Base Reviewed:    YES; As expected, no concern for misuse/abuse of controlled medications based on this report. Checked on 1/20/21        THE 4 As OF OPIOID MAINTENANCE ANALGESIA    Analgesia: Is pain relief clinically significant? YES   Activity: Is patient functional and able to perform Activities of Daily Living? YES   Adverse effects: Is patient free from adverse side effects from opiates? YES   Adherence to Rx protocol: Is patient adhering to Controlled Substance Agreement and taking medications ONLY as ordered? No        Is Narcan prescribed for opiate use >50 MME daily? yes        Total Daily MME: 37.5    Medications:  Current Outpatient Prescriptions          Current Outpatient Medications   Medication Sig Dispense Refill     albuterol (VENTOLIN HFA) 108 (90 Base) MCG/ACT inhaler Inhale 2 puffs into the lungs every 6 hours as needed for  shortness of breath / dyspnea or wheezing 18 g 1     cetirizine (ZYRTEC) 10 MG tablet TAKE ONE TABLET BY MOUTH ONCE DAILY 90 tablet 2     clonazePAM (KLONOPIN) 0.5 MG tablet Take 1 tablet (0.5 mg) by mouth 2 times daily as needed for anxiety Must last 30 days 60 tablet 0     [START ON 2/24/2020] clonazePAM (KLONOPIN) 0.5 MG tablet Take 1 tablet (0.5 mg) by mouth 2 times daily as needed for anxiety 6 tablet 0     cyclobenzaprine (FLEXERIL) 10 MG tablet Take 2 tablets in the evening and 1 in the morning 90 tablet 2     DULoxetine (CYMBALTA) 20 MG capsule Take 2 capsules (40 mg) by mouth daily CYMBALTA-KADY 60 capsule 0     fluticasone (FLONASE) 50 MCG/ACT nasal spray SPRAY 2 SPRAYS INTO BOTH NOSTRILS DAILY. 16 g 11     gabapentin (NEURONTIN) 100 MG capsule Take 1 capsule at bedtime for 1 week, then take 1 capsule twice daily for 1 week, then take 1 capsule three times a day 90 capsule 0     HYDROcodone-acetaminophen (NORCO) 7.5-325 MG per tablet Take 1 tablet every 4-6 hours as needed for severe pain. Max of 5 tablets per day. Fill/start 12/30/2019. 150 tablet 0     naloxone (NARCAN) nasal spray Spray 1 spray (4 mg) into one nostril alternating nostrils as needed for opioid reversal every 2-3 minutes until assistance arrives 0.2 mL 0     OLANZapine (ZYPREXA) 5 MG tablet Take 0.5 tablets (2.5 mg) by mouth every morning And 1 tab(5mg) at 2pm and 1 tab(5mg) at bedtime. 75 tablet 1     verapamil (CALAN) 40 MG tablet Take 1 tablet (40 mg) by mouth 2 times daily 180 tablet 3     CYMBALTA 60 MG capsule TAKE ONE CAPSULE BY MOUTH EVERY EVENING (Patient not taking: Reported on 1/24/2020) 90 capsule 0              Assessment:  Sherie Otero is a 50 year old female who presents today for follow up regarding her:     1. Fibromyalgia  2. Chronic low back pain  3. Cervicalgia  4. Chronic pain syndrome  5. Chronic use of opioids    Pain is overall stable from her last visit. Discussed starting a slow taper. Will reduce the Norco to  5mg. Will have her take 2.5 tabs in AM, 2.5 tabs in afternoon and 1.5 tabs at bedtime. This will reduce her Norco dose by 5mg which is around a 10% decrease. Her MME will be 32.5.       Plan:  Diagnosis reviewed, treatment option addressed, and risk/benifits discussed.  Self-care instructions given.  I am recommending a multidisciplinary treatment plan to help this patient better manage pain.       1. Physical Therapy: continue previous PT exercises;   2. Clinical Health Psychologist:  YES, has a plan in place  3. Diagnostic Studies: none at this time  4. Medication Management:                1.  Continue Baclofen 10mg, 0.5-1 tab TID PRN              2.  Reduce hydrocodone to 5/325 with instructions to take 2.5 tablets, 2.5 tablets and 1.5 tablet with dosing spread by 4.5 hours. pain. Max of 6.5 tablets/day              3.  Consider restarting Savella once her anxiety is under better control              4.  Consider low-dose naltrexone   5. Continue Toradol 10mg as needed. Do not use daily.  5. Further procedures recommended: consider trigger point injections if needed        Follow up with this provider: 4 weeks for a virtual visit.    The total TIME spent on this patient on the day of the appointment was 18 minutes.    Time spent preparing to see the patient (reviewing records and tests) 1 minutes  Time spend face to face (or on the phone) with the patient 11 minutes  Time spent ordering tests, medications, procedures and referrals 2 minutes  Time spent Referring and communicating with other healthcare professionals 0 minutes  Time spent documenting clinical information in Epic 4 minutes        Celia Bettencourt [facility]

## 2021-02-23 ENCOUNTER — VIRTUAL VISIT (OUTPATIENT)
Dept: PALLIATIVE MEDICINE | Facility: CLINIC | Age: 51
End: 2021-02-23
Payer: MEDICARE

## 2021-02-23 DIAGNOSIS — G89.29 CHRONIC BILATERAL LOW BACK PAIN WITHOUT SCIATICA: ICD-10-CM

## 2021-02-23 DIAGNOSIS — M54.2 CERVICALGIA: ICD-10-CM

## 2021-02-23 DIAGNOSIS — M79.7 FIBROMYALGIA: ICD-10-CM

## 2021-02-23 DIAGNOSIS — G89.4 CHRONIC PAIN SYNDROME: ICD-10-CM

## 2021-02-23 DIAGNOSIS — M54.50 CHRONIC BILATERAL LOW BACK PAIN WITHOUT SCIATICA: ICD-10-CM

## 2021-02-23 PROCEDURE — 99442 PR PHYSICIAN TELEPHONE EVALUATION 11-20 MIN: CPT | Mod: 95 | Performed by: PHYSICIAN ASSISTANT

## 2021-02-23 RX ORDER — KETOROLAC TROMETHAMINE 10 MG/1
TABLET, FILM COATED ORAL
Qty: 10 TABLET | Refills: 1 | Status: SHIPPED | OUTPATIENT
Start: 2021-02-23 | End: 2021-05-26

## 2021-02-23 RX ORDER — HYDROCODONE BITARTRATE AND ACETAMINOPHEN 5; 325 MG/1; MG/1
TABLET ORAL
Qty: 195 TABLET | Refills: 0 | Status: SHIPPED | OUTPATIENT
Start: 2021-02-23 | End: 2021-03-24

## 2021-02-23 NOTE — PATIENT INSTRUCTIONS
After Visit Instructions:     Thank you for coming to [facility] for your care. It is my goal to partner with you to help you reach your optimal state of health.     I am recommending multidisciplinary care at this time.  The focus of care will be to continue gradual rehabilitation and pain management with medication adjustments as needed.    Continue daily self-care, identifying contributing factors, and monitoring variations in pain level. Continue to integrate self-care into your life.        Schedule follow-up with Celia Bettencourt PA-C in 4 weeks for a virtual visit. You will need to make this appointment.     Procedures recommended: trigger point injections as needed     Medication recommendations:    1.  Continue Baclofen 10mg, 0.5-1 tab three times daily as needed              2.  Reduce hydrocodone to 5/325 with instructions to take 2.5 tablets, 2.5 tablets and 1.5 tablet with  dosing spread by 4.5 hours. pain. Max of 6.5 tablets/day              3.  Consider restarting Savella once anxiety is under better control              4.  Consider low-dose naltrexone once off Norco   5. Continue Toradol 10mg as needed. Do not use daily.      Celia Bettencourt PA-C  [facility]  [place]    Contact information: [facility] [place]  Clinic Number:  [phone]     Call with any questions about your care and for scheduling assistance.     Calls are returned Monday through Friday between 8 AM and 4:30 PM. We usually get back to you within 2 business days depending on the issue/request.    If we are prescribing your medications:    For opioid medication refills, call the clinic or send a [employer] message 7 days in advance.  Please include:    Name of requested medication    Name of the pharmacy.    For non-opioid medications, call your pharmacy directly to request a refill. Please allow 3-4 days to be processed.     Per [place] State Law:    All  controlled substance prescriptions must be filled within 30 days of being written.      For those controlled substances allowing refills, pickup must occur within 30 days of last fill.      We believe regular attendance is key to your success in our program!      Any time you are unable to keep your appointment we ask that you call us at least 24 hours in advance to cancel.This will allow us to offer the appointment time to another patient.   Multiple missed appointments may lead to dismissal from the clinic.

## 2021-03-01 ENCOUNTER — VIRTUAL VISIT (OUTPATIENT)
Dept: PSYCHOLOGY | Facility: CLINIC | Age: 51
End: 2021-03-01
Payer: COMMERCIAL

## 2021-03-01 DIAGNOSIS — F33.1 MAJOR DEPRESSIVE DISORDER, RECURRENT EPISODE, MODERATE WITH ANXIOUS DISTRESS (H): ICD-10-CM

## 2021-03-01 DIAGNOSIS — F90.2 ADHD (ATTENTION DEFICIT HYPERACTIVITY DISORDER), COMBINED TYPE: Primary | ICD-10-CM

## 2021-03-01 DIAGNOSIS — F41.1 GENERALIZED ANXIETY DISORDER: ICD-10-CM

## 2021-03-01 PROCEDURE — 90832 PSYTX W PT 30 MINUTES: CPT | Mod: 95 | Performed by: SOCIAL WORKER

## 2021-03-01 ASSESSMENT — ANXIETY QUESTIONNAIRES
5. BEING SO RESTLESS THAT IT IS HARD TO SIT STILL: NOT AT ALL
3. WORRYING TOO MUCH ABOUT DIFFERENT THINGS: NEARLY EVERY DAY
4. TROUBLE RELAXING: NEARLY EVERY DAY
2. NOT BEING ABLE TO STOP OR CONTROL WORRYING: NEARLY EVERY DAY
7. FEELING AFRAID AS IF SOMETHING AWFUL MIGHT HAPPEN: NEARLY EVERY DAY
GAD7 TOTAL SCORE: 15
IF YOU CHECKED OFF ANY PROBLEMS ON THIS QUESTIONNAIRE, HOW DIFFICULT HAVE THESE PROBLEMS MADE IT FOR YOU TO DO YOUR WORK, TAKE CARE OF THINGS AT HOME, OR GET ALONG WITH OTHER PEOPLE: VERY DIFFICULT
6. BECOMING EASILY ANNOYED OR IRRITABLE: NOT AT ALL
1. FEELING NERVOUS, ANXIOUS, OR ON EDGE: NEARLY EVERY DAY

## 2021-03-01 ASSESSMENT — PATIENT HEALTH QUESTIONNAIRE - PHQ9: SUM OF ALL RESPONSES TO PHQ QUESTIONS 1-9: 8
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
## 2021-03-02 ASSESSMENT — ANXIETY QUESTIONNAIRES: GAD7 TOTAL SCORE: 15

## 2021-03-22 NOTE — PROGRESS NOTES
"Sherie is a 50 year old who is being evaluated via a billable telephone visit.      What phone number would you like to be contacted at? [phone]  How would you like to obtain your AVS? [facility]     CHIEF COMPLAINT: Pain  -Fibromyalgia  -Neck pain  -Low back pain     INTERVAL HISTORY:  Last seen on 1/20/21 for a virtual visit.     Recommendations/plan at the last visit included:  1. Physical Therapy: continue previous PT exercises;   2. Clinical Health Psychologist:  YES, has a plan in place  3. Diagnostic Studies: none at this time  4. Medication Management:                1.  Continue Baclofen 10mg, 0.5-1 tab TID PRN              2.  Continue hydrocodone to 7.5/325 with instructions to take 2 tablets, 2 tablets and 1 tablet with dosing spread by 4.5 hours. pain. Max of 5 tablets/day              3.  Consider restarting Savella once her anxiety is under better control              4.  Consider low-dose naltrexone  5. Further procedures recommended: consider trigger point injections if needed        Follow up with this provider: 4 weeks      Since her last visit, Sherie Otero reports:    She is sore today with the weather. She states that the decrease in the pain medications has been hard, especially at night. She states that at night she is having more pain in her muscles and fibromyalgia spots. She states that she notices a little bit in her spine as well. She has rarely used the Baclofen. She states that it has made her feel dizzy and not \"feel right\".     She states that her anxiety has been better. She states that she did have a stint of migraines, but states that she is doing better now.      Pain Information:              Pain today 6/10     UDS 1/27/2020   CSA 1/27/2020     CURRENT RELEVANT PAIN MEDICATIONS:  Clonazepam 0.5mg: taking 1 tab twice daily    Hydrocodone 5-325: 2.5 tablets in AM, 2.5 tablets in afternoon and 1.5 tablets at bedtime  Toradol " 10mg-using sparingly     Patient is using the medication as prescribed:  YES  Is your medication helpful?     somewhat   Medication side effects? no side effect     Previous Medications: (H--helped; HI--Helped initially; SWH-- somewhat helpful, NH--No help; W--worse; SE--side effects)   Opiates: Hydrocodone H, Oxycodone SE  NSAIDS: Ibuprofen H, Relafen SE stomach upset, Meloxicam NH  Muscle Relaxants: Tizanidine NH, Flexeril HI, Robaxin NH SE stomach upset, Baclofen SE dizzy   Anti-migraine mediations: Verapamil H  Anti-depressants: Cymbalta H  Sleep aids: none  Anxiolytics: Xanax H, Clonazepam H, Valium H  Neuropathics: Gabapentin SE dizziness          Topicals: Lidocaine patches SW  Other medications not covered above: Savella H at first, then seemed to stop working, Lyrica SE shortness of breath, felt 'weird' on them, throat felt weird. Prednisone H for arthritis flare     Past Pain Treatments:  Pain Clinic:   No   PT: Yes, years ago. Has not done pool therapy.   Psychologist: No  Relaxation techniques/biofeedback: No  Chiropractor: No, was told not to because of neck.   Acupuncture: Yes for headaches.   Pharmacotherapy:           Opioids: Yes            Non-opioids:    Yes   TENs Unit:Yes  Injections: cervical medial branch blocks 6/12/2014  Self-care:   Yes, ice/heat, hot baths, theracare  Surgeries related to pain: No     [place] Board of Pharmacy Data Base Reviewed:    YES; As expected, no concern for misuse/abuse of controlled medications based on this report. Checked on 3/24/21        THE 4 As OF OPIOID MAINTENANCE ANALGESIA    Analgesia: Is pain relief clinically significant? YES   Activity: Is patient functional and able to perform Activities of Daily Living? YES   Adverse effects: Is patient free from adverse side effects from opiates? YES   Adherence to Rx protocol: Is patient adhering to Controlled Substance Agreement and taking medications ONLY as ordered? No        Is Narcan prescribed for opiate  use >50 MME daily? yes        Total Daily MME: 32.5    Medications:  Current Outpatient Prescriptions          Current Outpatient Medications   Medication Sig Dispense Refill     albuterol (VENTOLIN HFA) 108 (90 Base) MCG/ACT inhaler Inhale 2 puffs into the lungs every 6 hours as needed for shortness of breath / dyspnea or wheezing 18 g 1     cetirizine (ZYRTEC) 10 MG tablet TAKE ONE TABLET BY MOUTH ONCE DAILY 90 tablet 2     clonazePAM (KLONOPIN) 0.5 MG tablet Take 1 tablet (0.5 mg) by mouth 2 times daily as needed for anxiety Must last 30 days 60 tablet 0     [START ON 2/24/2020] clonazePAM (KLONOPIN) 0.5 MG tablet Take 1 tablet (0.5 mg) by mouth 2 times daily as needed for anxiety 6 tablet 0     cyclobenzaprine (FLEXERIL) 10 MG tablet Take 2 tablets in the evening and 1 in the morning 90 tablet 2     DULoxetine (CYMBALTA) 20 MG capsule Take 2 capsules (40 mg) by mouth daily CYMBALTA-KADY 60 capsule 0     fluticasone (FLONASE) 50 MCG/ACT nasal spray SPRAY 2 SPRAYS INTO BOTH NOSTRILS DAILY. 16 g 11     gabapentin (NEURONTIN) 100 MG capsule Take 1 capsule at bedtime for 1 week, then take 1 capsule twice daily for 1 week, then take 1 capsule three times a day 90 capsule 0     HYDROcodone-acetaminophen (NORCO) 7.5-325 MG per tablet Take 1 tablet every 4-6 hours as needed for severe pain. Max of 5 tablets per day. Fill/start 12/30/2019. 150 tablet 0     naloxone (NARCAN) nasal spray Spray 1 spray (4 mg) into one nostril alternating nostrils as needed for opioid reversal every 2-3 minutes until assistance arrives 0.2 mL 0     OLANZapine (ZYPREXA) 5 MG tablet Take 0.5 tablets (2.5 mg) by mouth every morning And 1 tab(5mg) at 2pm and 1 tab(5mg) at bedtime. 75 tablet 1     verapamil (CALAN) 40 MG tablet Take 1 tablet (40 mg) by mouth 2 times daily 180 tablet 3     CYMBALTA 60 MG capsule TAKE ONE CAPSULE BY MOUTH EVERY EVENING (Patient not taking: Reported on 1/24/2020) 90 capsule 0              Assessment:  Sherie Otero  is a 50 year old female who presents today for follow up regarding her:     1. Fibromyalgia  2. Chronic low back pain  3. Cervicalgia  4. Chronic pain syndrome  5. Chronic use of opioids    Worsening muscle/fibromyalgia with reduction of Norco. Discussed the importance of focusing on the cause of the increased pain and treating that. For example, her muscle pain/fibromyalgia pain has increased, therefore we'll try a different muscle relaxant and treat the muscle pain versus increasing the opiate. We did discuss medical cannabis as well.            Plan:  Diagnosis reviewed, treatment option addressed, and risk/benifits discussed.  Self-care instructions given.  I am recommending a multidisciplinary treatment plan to help this patient better manage pain.       1. Physical Therapy: continue previous PT exercises;   2. Clinical Health Psychologist:  YES, has a plan in place  3. Diagnostic Studies: none at this time  4. Medication Management:    1. Stop Baclofen  2. Start Flexeril 10mg TID PRN  3. Continue hydrocodone to 5/325: 2.5 tablets, 2.5 tablets and 1.5 tablet with dosing spread by 4.5 hours. pain. Max of 6.5 tablets/day  4. Consider restarting Savella once her anxiety is under better control  5. Consider low-dose naltrexone  6. Continue Toradol 10mg as needed. Do not use daily.  5. Further procedures recommended: consider trigger point injections if needed        Follow up with this provider: 8  weeks for an in clinic visit.    The total TIME spent on this patient on the day of the appointment was 27  minutes.    Time spent preparing to see the patient (reviewing records and tests) 4 minutes  Time spend face to face (or on the phone) with the patient 16 minutes  Time spent ordering tests, medications, procedures and referrals 2 minutes  Time spent Referring and communicating with other healthcare professionals 0  minutes  Time spent documenting clinical information in Epic 5  minutes        Celia Bettencourt  IMER  [facility]

## 2021-03-24 ENCOUNTER — VIRTUAL VISIT (OUTPATIENT)
Dept: PALLIATIVE MEDICINE | Facility: CLINIC | Age: 51
End: 2021-03-24
Payer: COMMERCIAL

## 2021-03-24 DIAGNOSIS — M54.50 CHRONIC BILATERAL LOW BACK PAIN WITHOUT SCIATICA: ICD-10-CM

## 2021-03-24 DIAGNOSIS — M54.2 CERVICALGIA: ICD-10-CM

## 2021-03-24 DIAGNOSIS — F11.90 CHRONIC, CONTINUOUS USE OF OPIOIDS: ICD-10-CM

## 2021-03-24 DIAGNOSIS — M79.7 FIBROMYALGIA: Primary | ICD-10-CM

## 2021-03-24 DIAGNOSIS — G89.29 CHRONIC BILATERAL LOW BACK PAIN WITHOUT SCIATICA: ICD-10-CM

## 2021-03-24 DIAGNOSIS — G89.4 CHRONIC PAIN SYNDROME: ICD-10-CM

## 2021-03-24 PROCEDURE — 99214 OFFICE O/P EST MOD 30 MIN: CPT | Mod: 95 | Performed by: PHYSICIAN ASSISTANT

## 2021-03-24 RX ORDER — HYDROCODONE BITARTRATE AND ACETAMINOPHEN 5; 325 MG/1; MG/1
TABLET ORAL
Qty: 195 TABLET | Refills: 0 | Status: SHIPPED | OUTPATIENT
Start: 2021-03-24 | End: 2021-03-29

## 2021-03-24 RX ORDER — CYCLOBENZAPRINE HCL 10 MG
10 TABLET ORAL 3 TIMES DAILY PRN
Qty: 90 TABLET | Refills: 0 | Status: SHIPPED | OUTPATIENT
Start: 2021-03-24 | End: 2021-05-17

## 2021-03-24 NOTE — PATIENT INSTRUCTIONS
After Visit Instructions:     Thank you for coming to [facility] for your care. It is my goal to partner with you to help you reach your optimal state of health.     I am recommending multidisciplinary care at this time.  The focus of care will be to continue gradual rehabilitation and pain management with medication adjustments as needed.    Continue daily self-care, identifying contributing factors, and monitoring variations in pain level. Continue to integrate self-care into your life.        Schedule pain psychology assessment/visit: continue current plan    Schedule follow-up with Celia Bettencourt PA-C in 8 weeks for an in-clinic visit. You will need to make this appointment.     Medication recommendations:     Continue Norco 5-325: 2.5 tablets, 2.5 tablets and 1.5 tablet with dosing spread by 4.5 hours. pain. Max of 6.5 tablets/day    Stop Baclofen    Start Flexeril 10mg three times daily as needed    Continue Toradol 10mg as needed. Do not use more than 10 tablets in 30 days.    Consider medical cannabis      Celia Bettencourt PA-C  [facility]    Contact information: [facility]  Clinic Number:  [phone]     Call with any questions about your care and for scheduling assistance.     Calls are returned Monday through Friday between 8 AM and 4:30 PM. We usually get back to you within 2 business days depending on the issue/request.    If we are prescribing your medications:    For opioid medication refills, call the clinic or send a [employer] message 7 days in advance.  Please include:    Name of requested medication    Name of the pharmacy.    For non-opioid medications, call your pharmacy directly to request a refill. Please allow 3-4 days to be processed.     Per [place] State Law:    All controlled substance prescriptions must be filled within 30 days of being written.      For those controlled substances allowing  refills, pickup must occur within 30 days of last fill.      We believe regular attendance is key to your success in our program!      Any time you are unable to keep your appointment we ask that you call us at least 24 hours in advance to cancel.This will allow us to offer the appointment time to another patient.   Multiple missed appointments may lead to dismissal from the clinic.

## 2021-03-29 ENCOUNTER — MYC MEDICAL ADVICE (OUTPATIENT)
Dept: PALLIATIVE MEDICINE | Facility: CLINIC | Age: 51
End: 2021-03-29

## 2021-03-29 DIAGNOSIS — M54.50 CHRONIC BILATERAL LOW BACK PAIN WITHOUT SCIATICA: ICD-10-CM

## 2021-03-29 DIAGNOSIS — M54.2 CERVICALGIA: ICD-10-CM

## 2021-03-29 DIAGNOSIS — G89.4 CHRONIC PAIN SYNDROME: ICD-10-CM

## 2021-03-29 DIAGNOSIS — G89.29 CHRONIC BILATERAL LOW BACK PAIN WITHOUT SCIATICA: ICD-10-CM

## 2021-03-29 DIAGNOSIS — M79.7 FIBROMYALGIA: ICD-10-CM

## 2021-03-29 RX ORDER — HYDROCODONE BITARTRATE AND ACETAMINOPHEN 5; 325 MG/1; MG/1
TABLET ORAL
Qty: 195 TABLET | Refills: 0 | Status: SHIPPED | OUTPATIENT
Start: 2021-03-29 | End: 2021-04-26

## 2021-03-29 NOTE — TELEPHONE ENCOUNTER
Medication refill information reviewed. Due for UDS /CSA  Due date for  hydrocodone-acetaminophen 5-325mg  is 04/02/21     Prescriptions prepped for review.     Will route to provider.

## 2021-03-29 NOTE — TELEPHONE ENCOUNTER
Refill appropriate. Sent to requested pharmacy.    Patient knows to schedule in clinic visit with her next appointment. Will review yearly requirements at that visit.     Celia Bettencourt, PAC

## 2021-03-29 NOTE — TELEPHONE ENCOUNTER
Received call from patient requesting refill(s) of hydrocodone-acetaminophen 5-325mg     Last dispensed from pharmacy on 3/24/21    Patient's last office/virtual visit by prescribing provider on 3/24/21  Next office/virtual appointment scheduled for none listed    Last urine drug screen date 1/27/20  Current opioid agreement on file (completed within the last year) No Date of opioid agreement: 1/27/20    Patient only got 45 tablets, new insurance. 7 day supply. The other tabs on RX are void. Please send a new rx. Thank you.    E-prescribe to [facility] pharmacy    Will route to nursing [place] for review and preparation of prescription(s).

## 2021-03-30 NOTE — TELEPHONE ENCOUNTER
This is a duplicate encounter.    SAMARA NolandN, RN  Care Coordinator  [facility] [place]

## 2021-04-05 ENCOUNTER — VIRTUAL VISIT (OUTPATIENT)
Dept: PSYCHOLOGY | Facility: CLINIC | Age: 51
End: 2021-04-05
Payer: COMMERCIAL

## 2021-04-05 DIAGNOSIS — F90.2 ADHD (ATTENTION DEFICIT HYPERACTIVITY DISORDER), COMBINED TYPE: Primary | ICD-10-CM

## 2021-04-05 DIAGNOSIS — F33.1 MAJOR DEPRESSIVE DISORDER, RECURRENT EPISODE, MODERATE WITH ANXIOUS DISTRESS (H): ICD-10-CM

## 2021-04-05 DIAGNOSIS — F41.1 GENERALIZED ANXIETY DISORDER: ICD-10-CM

## 2021-04-05 PROCEDURE — 90834 PSYTX W PT 45 MINUTES: CPT | Mod: 95 | Performed by: SOCIAL WORKER

## 2021-04-05 ASSESSMENT — ANXIETY QUESTIONNAIRES
6. BECOMING EASILY ANNOYED OR IRRITABLE: SEVERAL DAYS
1. FEELING NERVOUS, ANXIOUS, OR ON EDGE: MORE THAN HALF THE DAYS
GAD7 TOTAL SCORE: 13
3. WORRYING TOO MUCH ABOUT DIFFERENT THINGS: NEARLY EVERY DAY
7. FEELING AFRAID AS IF SOMETHING AWFUL MIGHT HAPPEN: SEVERAL DAYS
4. TROUBLE RELAXING: NEARLY EVERY DAY
5. BEING SO RESTLESS THAT IT IS HARD TO SIT STILL: NOT AT ALL
2. NOT BEING ABLE TO STOP OR CONTROL WORRYING: NEARLY EVERY DAY
IF YOU CHECKED OFF ANY PROBLEMS ON THIS QUESTIONNAIRE, HOW DIFFICULT HAVE THESE PROBLEMS MADE IT FOR YOU TO DO YOUR WORK, TAKE CARE OF THINGS AT HOME, OR GET ALONG WITH OTHER PEOPLE: VERY DIFFICULT

## 2021-04-05 ASSESSMENT — PATIENT HEALTH QUESTIONNAIRE - PHQ9: SUM OF ALL RESPONSES TO PHQ QUESTIONS 1-9: 8
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
## 2021-04-06 ASSESSMENT — ANXIETY QUESTIONNAIRES: GAD7 TOTAL SCORE: 13

## 2021-04-19 ENCOUNTER — VIRTUAL VISIT (OUTPATIENT)
Dept: PSYCHOLOGY | Facility: CLINIC | Age: 51
End: 2021-04-19
Payer: COMMERCIAL

## 2021-04-19 DIAGNOSIS — F33.1 MAJOR DEPRESSIVE DISORDER, RECURRENT EPISODE, MODERATE WITH ANXIOUS DISTRESS (H): ICD-10-CM

## 2021-04-19 DIAGNOSIS — F41.1 GENERALIZED ANXIETY DISORDER: ICD-10-CM

## 2021-04-19 DIAGNOSIS — F90.2 ADHD (ATTENTION DEFICIT HYPERACTIVITY DISORDER), COMBINED TYPE: Primary | ICD-10-CM

## 2021-04-19 PROCEDURE — 90834 PSYTX W PT 45 MINUTES: CPT | Mod: 95 | Performed by: SOCIAL WORKER

## 2021-04-19 ASSESSMENT — ANXIETY QUESTIONNAIRES
2. NOT BEING ABLE TO STOP OR CONTROL WORRYING: NEARLY EVERY DAY
IF YOU CHECKED OFF ANY PROBLEMS ON THIS QUESTIONNAIRE, HOW DIFFICULT HAVE THESE PROBLEMS MADE IT FOR YOU TO DO YOUR WORK, TAKE CARE OF THINGS AT HOME, OR GET ALONG WITH OTHER PEOPLE: VERY DIFFICULT
6. BECOMING EASILY ANNOYED OR IRRITABLE: SEVERAL DAYS
5. BEING SO RESTLESS THAT IT IS HARD TO SIT STILL: NOT AT ALL
7. FEELING AFRAID AS IF SOMETHING AWFUL MIGHT HAPPEN: NEARLY EVERY DAY
GAD7 TOTAL SCORE: 15
1. FEELING NERVOUS, ANXIOUS, OR ON EDGE: NEARLY EVERY DAY
4. TROUBLE RELAXING: MORE THAN HALF THE DAYS
3. WORRYING TOO MUCH ABOUT DIFFERENT THINGS: NEARLY EVERY DAY

## 2021-04-19 ASSESSMENT — PATIENT HEALTH QUESTIONNAIRE - PHQ9: SUM OF ALL RESPONSES TO PHQ QUESTIONS 1-9: 9
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
## 2021-04-20 ASSESSMENT — ANXIETY QUESTIONNAIRES: GAD7 TOTAL SCORE: 15

## 2021-04-21 ENCOUNTER — MYC MEDICAL ADVICE (OUTPATIENT)
Dept: FAMILY MEDICINE | Facility: CLINIC | Age: 51
End: 2021-04-21

## 2021-04-26 DIAGNOSIS — G89.29 CHRONIC BILATERAL LOW BACK PAIN WITHOUT SCIATICA: ICD-10-CM

## 2021-04-26 DIAGNOSIS — M54.50 CHRONIC BILATERAL LOW BACK PAIN WITHOUT SCIATICA: ICD-10-CM

## 2021-04-26 DIAGNOSIS — M54.2 CERVICALGIA: ICD-10-CM

## 2021-04-26 DIAGNOSIS — M79.7 FIBROMYALGIA: ICD-10-CM

## 2021-04-26 DIAGNOSIS — G89.4 CHRONIC PAIN SYNDROME: ICD-10-CM

## 2021-04-26 RX ORDER — HYDROCODONE BITARTRATE AND ACETAMINOPHEN 5; 325 MG/1; MG/1
TABLET ORAL
Qty: 195 TABLET | Refills: 0 | Status: SHIPPED | OUTPATIENT
Start: 2021-04-26 | End: 2021-05-26

## 2021-04-26 NOTE — TELEPHONE ENCOUNTER
Medication refill information reviewed. This was meant to be a 5-325 MG refill request. Verified with pharmacy. She is due for a UDS and CSA    Due date for  hydrocodone-acetaminophen 5-325mg is 05/02/21     Prescriptions prepped for review.     Will route to provider.

## 2021-04-26 NOTE — TELEPHONE ENCOUNTER
Refill appropriate. Sent to requested pharmacy.    [place] Prescription Monitoring Program checked on 3/24/21.    Patient is scheduled for an in-clinic visit with me in May. We will review annuals at that visit.    Celia Bettencourt, PAC

## 2021-04-26 NOTE — TELEPHONE ENCOUNTER
Received call from patient requesting refill(s) of hydrocodone-acetaminophen 7.5-325mg     Last dispensed from pharmacy on 3/31/21    Patient's last office/virtual visit by prescribing provider on 3/24/21  Next office/virtual appointment scheduled for 5/19/21    Last urine drug screen date 1/27/20  Current opioid agreement on file (completed within the last year) No Date of opioid agreement: 1/27/20    E-prescribe to [facility]    Refill is requesting 7.5-325mg    Will route to nursing pool for review and preparation of prescription(s).          Yes

## 2021-04-26 NOTE — TELEPHONE ENCOUNTER
[employer] message sent with Rx approval from provider.  Jake  [facility] Management Team

## 2021-04-29 ENCOUNTER — HOSPITAL ENCOUNTER (EMERGENCY)
Facility: CLINIC | Age: 51
Discharge: HOME | End: 2021-04-29
Attending: NURSE PRACTITIONER | Admitting: NURSE PRACTITIONER
Payer: COMMERCIAL

## 2021-04-29 ENCOUNTER — APPOINTMENT (OUTPATIENT)
Dept: CT IMAGING | Facility: CLINIC | Age: 51
End: 2021-04-29
Attending: NURSE PRACTITIONER
Payer: COMMERCIAL

## 2021-04-29 ENCOUNTER — APPOINTMENT (OUTPATIENT)
Dept: ULTRASOUND IMAGING | Facility: CLINIC | Age: 51
End: 2021-04-29
Attending: NURSE PRACTITIONER
Payer: COMMERCIAL

## 2021-04-29 VITALS
DIASTOLIC BLOOD PRESSURE: 81 MMHG | RESPIRATION RATE: 16 BRPM | WEIGHT: 177.4 LBS | OXYGEN SATURATION: 97 % | SYSTOLIC BLOOD PRESSURE: 135 MMHG | TEMPERATURE: 98.7 F | BODY MASS INDEX: 29.07 KG/M2 | HEART RATE: 97 BPM

## 2021-04-29 DIAGNOSIS — K63.9 BOWEL WALL THICKENING: ICD-10-CM

## 2021-04-29 DIAGNOSIS — R14.0 ABDOMINAL DISTENSION: ICD-10-CM

## 2021-04-29 LAB
ALBUMIN SERPL-MCNC: 3.2 G/DL (ref 3.4–5)
ALBUMIN UR-MCNC: NEGATIVE MG/DL
ALP SERPL-CCNC: 161 U/L (ref 40–150)
ALT SERPL W P-5'-P-CCNC: 31 U/L (ref 0–50)
ANION GAP SERPL CALCULATED.3IONS-SCNC: 7 MMOL/L (ref 3–14)
APPEARANCE UR: ABNORMAL
AST SERPL W P-5'-P-CCNC: 14 U/L (ref 0–45)
BASOPHILS # BLD AUTO: 0.2 10E9/L (ref 0–0.2)
BASOPHILS NFR BLD AUTO: 2 %
BILIRUB SERPL-MCNC: <0.1 MG/DL (ref 0.2–1.3)
BILIRUB UR QL STRIP: NEGATIVE
BUN SERPL-MCNC: 10 MG/DL (ref 7–30)
CALCIUM SERPL-MCNC: 8.5 MG/DL (ref 8.5–10.1)
CHLORIDE SERPL-SCNC: 109 MMOL/L (ref 94–109)
CO2 SERPL-SCNC: 27 MMOL/L (ref 20–32)
COLOR UR AUTO: YELLOW
CREAT SERPL-MCNC: 0.91 MG/DL (ref 0.52–1.04)
DIFFERENTIAL METHOD BLD: ABNORMAL
EOSINOPHIL # BLD AUTO: 0.2 10E9/L (ref 0–0.7)
EOSINOPHIL NFR BLD AUTO: 2 %
ERYTHROCYTE [DISTWIDTH] IN BLOOD BY AUTOMATED COUNT: 12.6 % (ref 10–15)
GFR SERPL CREATININE-BSD FRML MDRD: 72 ML/MIN/{1.73_M2}
GLUCOSE SERPL-MCNC: 115 MG/DL (ref 70–99)
GLUCOSE UR STRIP-MCNC: NEGATIVE MG/DL
HCG UR QL: NEGATIVE
HCT VFR BLD AUTO: 38.5 % (ref 35–47)
HGB BLD-MCNC: 12.9 G/DL (ref 11.7–15.7)
HGB UR QL STRIP: NEGATIVE
KETONES UR STRIP-MCNC: NEGATIVE MG/DL
LEUKOCYTE ESTERASE UR QL STRIP: NEGATIVE
LIPASE SERPL-CCNC: 92 U/L (ref 73–393)
LYMPHOCYTES # BLD AUTO: 4.9 10E9/L (ref 0.8–5.3)
LYMPHOCYTES NFR BLD AUTO: 42 %
MCH RBC QN AUTO: 32.1 PG (ref 26.5–33)
MCHC RBC AUTO-ENTMCNC: 33.5 G/DL (ref 31.5–36.5)
MCV RBC AUTO: 96 FL (ref 78–100)
MONOCYTES # BLD AUTO: 0.2 10E9/L (ref 0–1.3)
MONOCYTES NFR BLD AUTO: 2 %
MUCOUS THREADS #/AREA URNS LPF: PRESENT /LPF
NEUTROPHILS # BLD AUTO: 6 10E9/L (ref 1.6–8.3)
NEUTROPHILS NFR BLD AUTO: 52 %
NITRATE UR QL: NEGATIVE
PH UR STRIP: 5 PH (ref 5–7)
PLATELET # BLD AUTO: 285 10E9/L (ref 150–450)
POTASSIUM SERPL-SCNC: 3.3 MMOL/L (ref 3.4–5.3)
PROT SERPL-MCNC: 6.9 G/DL (ref 6.8–8.8)
RBC # BLD AUTO: 4.02 10E12/L (ref 3.8–5.2)
RBC #/AREA URNS AUTO: 1 /HPF (ref 0–2)
SODIUM SERPL-SCNC: 143 MMOL/L (ref 133–144)
SOURCE: ABNORMAL
SP GR UR STRIP: 1.02 (ref 1–1.03)
SQUAMOUS #/AREA URNS AUTO: 9 /HPF (ref 0–1)
UROBILINOGEN UR STRIP-MCNC: 0 MG/DL (ref 0–2)
WBC # BLD AUTO: 11.6 10E9/L (ref 4–11)
WBC #/AREA URNS AUTO: 1 /HPF (ref 0–5)

## 2021-04-29 PROCEDURE — 99285 EMERGENCY DEPT VISIT HI MDM: CPT | Mod: 25 | Performed by: NURSE PRACTITIONER

## 2021-04-29 PROCEDURE — 81025 URINE PREGNANCY TEST: CPT | Performed by: NURSE PRACTITIONER

## 2021-04-29 PROCEDURE — 81001 URINALYSIS AUTO W/SCOPE: CPT | Performed by: NURSE PRACTITIONER

## 2021-04-29 PROCEDURE — 85025 COMPLETE CBC W/AUTO DIFF WBC: CPT | Performed by: NURSE PRACTITIONER

## 2021-04-29 PROCEDURE — 76830 TRANSVAGINAL US NON-OB: CPT

## 2021-04-29 PROCEDURE — 80053 COMPREHEN METABOLIC PANEL: CPT | Performed by: NURSE PRACTITIONER

## 2021-04-29 PROCEDURE — 74177 CT ABD & PELVIS W/CONTRAST: CPT

## 2021-04-29 PROCEDURE — 250N000011 HC RX IP 250 OP 636: Performed by: NURSE PRACTITIONER

## 2021-04-29 PROCEDURE — 83690 ASSAY OF LIPASE: CPT | Performed by: NURSE PRACTITIONER

## 2021-04-29 PROCEDURE — 250N000009 HC RX 250: Performed by: NURSE PRACTITIONER

## 2021-04-29 PROCEDURE — 99285 EMERGENCY DEPT VISIT HI MDM: CPT | Performed by: NURSE PRACTITIONER

## 2021-04-29 RX ORDER — ATOMOXETINE 80 MG/1
80 CAPSULE ORAL DAILY
COMMUNITY
Start: 2021-04-20 | End: 2021-07-06

## 2021-04-29 RX ORDER — BUPROPION HYDROCHLORIDE 150 MG/1
450 TABLET, EXTENDED RELEASE ORAL EVERY MORNING
COMMUNITY
Start: 2021-04-03 | End: 2021-09-21

## 2021-04-29 RX ORDER — NAPROXEN SODIUM 220 MG
220 TABLET ORAL 2 TIMES DAILY WITH MEALS
COMMUNITY
End: 2021-05-26

## 2021-04-29 RX ORDER — OLANZAPINE 2.5 MG/1
2.5 TABLET, FILM COATED ORAL 2 TIMES DAILY
COMMUNITY
Start: 2021-04-09 | End: 2021-05-26 | Stop reason: DRUGHIGH

## 2021-04-29 RX ORDER — HYDROCODONE BITARTRATE AND ACETAMINOPHEN 5; 325 MG/1; MG/1
1.5 TABLET ORAL EVERY EVENING
COMMUNITY
End: 2021-05-26

## 2021-04-29 RX ORDER — CLONAZEPAM 1 MG/1
0.5 TABLET ORAL 2 TIMES DAILY PRN
COMMUNITY
Start: 2021-04-22 | End: 2023-02-06

## 2021-04-29 RX ORDER — IOPAMIDOL 755 MG/ML
500 INJECTION, SOLUTION INTRAVASCULAR ONCE
Status: COMPLETED | OUTPATIENT
Start: 2021-04-29 | End: 2021-04-29

## 2021-04-29 RX ORDER — OLANZAPINE 5 MG/1
5 TABLET ORAL AT BEDTIME
COMMUNITY
End: 2021-07-06

## 2021-04-29 RX ADMIN — IOPAMIDOL 90 ML: 755 INJECTION, SOLUTION INTRAVENOUS at 18:47

## 2021-04-29 RX ADMIN — SODIUM CHLORIDE 60 ML: 9 INJECTION, SOLUTION INTRAVENOUS at 18:47

## 2021-04-29 ASSESSMENT — ENCOUNTER SYMPTOMS
ANAL BLEEDING: 0
NEUROLOGICAL NEGATIVE: 1
PALPITATIONS: 0
VOMITING: 0
CONSTIPATION: 0
FATIGUE: 0
NAUSEA: 0
ABDOMINAL DISTENTION: 1
ABDOMINAL PAIN: 0
APPETITE CHANGE: 0
BLOOD IN STOOL: 0
SHORTNESS OF BREATH: 1
DIARRHEA: 1
CHILLS: 0
MUSCULOSKELETAL NEGATIVE: 1
COUGH: 0

## 2021-04-29 NOTE — ED PROVIDER NOTES
"  History     Chief Complaint   Patient presents with     Abdominal Swelling     HPI  Sherie Otero is a 50 year old female with history of chronic fatigue, fibromyalgia, migraines, anxiety, RA, asthma, chronic pain,  who presents to the emergency department for evaluation of abdominal distention/bloating. Symptoms started last October (6 months ago) and has been gradually increasing. Denies abdominal or pelvic pain. Describes feeling pressure and bloating. \"feels uncomfortable\".  Feel short of breath due to the increased abdominal pressure. Patient reports gaining weight from ~130# to now 177#. Her records here shows patient weighed 138# in 2019, and 164# 10/8/2020, .  Denies nausea or vomiting. Loose stools for the last week. No black or bloody stools. No urinary symptoms. She had a Abdominal/pelvis CT in December to evaluate abdominal distention and CT findings showed no acute findings. Patient states swelling is getting worse. Patient has an appt with OB/Gyn next week for further evaluation.     Prior abdominal surgical hx: , D&C, appendectomy, uterine ablation (), tubal      Abdominal/Pelvis CT 2020:  IMPRESSION:   1.  Bilateral Essure devices in the uterine cornua regions are again  Noted.  2.  Multiple bilateral pulmonary nodules appear slightly less  prominent than on the prior CT/PET dated 2017 and could represent  sequela inflammatory or infectious process. Neoplastic nodularity is  considered unlikely given their decreased size since 2017.  3.  No other significant abnormalities.     BRIAN CAGLE MD  Allergies:  Allergies   Allergen Reactions     Gabapentin Shortness Of Breath     Lyrica [Pregabalin] Shortness Of Breath     Felt pressure on the throat area. jmp     Droperidol      Uncontrollable shaking       Penicillins        Problem List:    Patient Active Problem List    Diagnosis Date Noted     Balance problems 2019     Priority: Medium     Chronic, continuous " use of opioids 11/03/2018     Priority: Medium     Chronically on benzodiazepine therapy 11/03/2018     Priority: Medium     Cervical cancer screening      Priority: Medium     2011 ablation 2015 NIL pap. Plan: pap in 3 years.  8/1/18 NIL pap, neg HR HPV. cotest in 5 years.       Pelvic pain in female 08/01/2018     Priority: Medium     Chronic rhinitis 05/14/2018     Priority: Medium     Other migraine without status migrainosus, intractable 03/21/2017     Priority: Medium     Pulmonary nodules 01/19/2017     Priority: Medium     Abnormal CT of the chest 01/19/2017     Priority: Medium     Hilar lymphadenopathy 01/19/2017     Priority: Medium     Degenerative joint disease of cervical spine      Priority: Medium     multi level worst at C5-6       Osteoarthritis of cervical spine, unspecified spinal osteoarthritis complication status 03/21/2016     Priority: Medium     Panic attacks 03/01/2016     Priority: Medium     Elevated white blood cell count 01/14/2016     Priority: Medium     Rheumatoid arthritis involving multiple sites with positive rheumatoid factor (H) 01/12/2016     Priority: Medium     Intermittent asthma, uncomplicated 11/29/2015     Priority: Medium     Other chronic pain 11/18/2015     Priority: Medium     Major depressive disorder, recurrent episode, mild (H) 10/08/2015     Priority: Medium     NSAID induced gastritis 09/23/2015     Priority: Medium     Stenosis, cervical spine      Priority: Medium     C5-C7 (MRI)       Spondylitis, cervical (H)      Priority: Medium     C5-C7 (MRI)       Cervicalgia 01/09/2014     Priority: Medium     Disturbance in sleep behavior 11/05/2013     Priority: Medium     Problem list name updated by automated process. Provider to review       SHANTANU (obstructive sleep apnea) 10/25/2013     Priority: Medium     Chronic fatigue syndrome 07/02/2013     Priority: Medium              Fibromyalgia 07/02/2013     Priority: Medium     Generalized anxiety disorder 07/02/2013      Priority: Medium     Diagnosis updated by automated process. Provider to review and confirm.       Tobacco abuse 2013     Priority: Medium     CARDIOVASCULAR SCREENING; LDL GOAL LESS THAN 160 10/31/2010     Priority: Medium        Past Medical History:    Past Medical History:   Diagnosis Date     Allergic rhinitis due to other allergen      Degenerative joint disease of cervical spine      Generalized anxiety disorder      Hearing loss      Intrinsic asthma, unspecified      Irritable bowel syndrome      Lump or mass in breast      Other malaise and fatigue      Other specified gastritis without mention of hemorrhage      Pain in joint, lower leg      Rheumatoid arthritis(714.0)      Spindle cell carcinoma (H) 2013     Spondylitis, cervical (H)      Stenosis, cervical spine      Tension headache        Past Surgical History:    Past Surgical History:   Procedure Laterality Date     C APPENDECTOMY      Ruptured at age 7 years     C  DELIVERY ONLY      , Low Cervical     DILATION AND CURETTAGE, HYSTEROSCOPY, ABLATE ENDOMETRIUM NOVASURE, COMBINED  2011    Procedure:COMBINED DILATION AND CURETTAGE, HYSTEROSCOPY, ABLATE ENDOMETRIUM NOVASURE; hysteroscopy, dilation and curettage, novasure; Surgeon:JEREMY ANNA; Location:[facility] OR     EXCISE LESION TRUNK  2013    Procedure: EXCISE LESION TRUNK;  interlaminar epidural Steroid Injection Cervical -thoracic Levels (C7-T1)    Re-Excision of chest wall mass;  Surgeon: Jeremy Bolaños MD;  Location: [facility] OR     HC HYSTEROS W PERMANENT FALLOPAIN IMPLANT      Essure done in office [facility]     INJECT BLOCK MEDIAL BRANCH CERVICAL/THORACIC/LUMBAR  2014    Procedure: INJECT BLOCK MEDIAL BRANCH CERVICAL / THORACIC / LUMBAR;  Surgeon: Josué Munson MD;  Location: [facility]     INJECT FACET JOINT  2014    Procedure: INJECT FACET JOINT;  diagnostic medial branch facet nerve block cervical levels 5-6, 6-7;  Surgeon:  Josué Munson MD;  Location: [facility]         Family History:    Family History   Problem Relation Age of Onset     Arthritis Mother         Rheumatoid     Respiratory Mother         COPD     Hypertension Sister         1/2 sister     Neurologic Disorder Sister         1/2 sisiter  Very mild form of CP     Dementia Father      Cardiovascular Maternal Grandmother      Heart Disease Maternal Aunt         Bypass at age 50's       Social History:  Marital Status:   [5]  Social History     Tobacco Use     Smoking status: Current Every Day Smoker     Packs/day: 0.50     Years: 29.00     Pack years: 14.50     Types: Cigarettes     Smokeless tobacco: Never Used   Substance Use Topics     Alcohol use: Not Currently     Alcohol/week: 1.7 standard drinks     Comment: rare     Drug use: No        Medications:    atomoxetine (STRATTERA) 80 MG capsule  buPROPion (WELLBUTRIN SR) 150 MG 12 hr tablet  cetirizine (ZYRTEC) 10 MG tablet  clonazePAM (KLONOPIN) 1 MG tablet  cyclobenzaprine (FLEXERIL) 10 MG tablet  escitalopram (LEXAPRO) 20 MG tablet  HYDROcodone-acetaminophen (NORCO) 5-325 MG tablet  HYDROcodone-acetaminophen (NORCO) 5-325 MG tablet  hydrOXYzine (ATARAX) 25 MG tablet  ketorolac (TORADOL) 10 MG tablet  naproxen sodium (ANAPROX) 220 MG tablet  OLANZapine (ZYPREXA) 2.5 MG tablet  OLANZapine (ZYPREXA) 5 MG tablet  VENTOLIN  (90 Base) MCG/ACT inhaler  verapamil (CALAN) 40 MG tablet  fluticasone (FLONASE) 50 MCG/ACT nasal spray  naloxone (NARCAN) nasal spray          Review of Systems   Constitutional: Negative for appetite change, chills and fatigue.   HENT: Negative for congestion.    Respiratory: Positive for shortness of breath (Increased abdominal bloating makes her feel short of breath.). Negative for cough.    Cardiovascular: Negative for chest pain, palpitations and leg swelling.   Gastrointestinal: Positive for abdominal distention and diarrhea (for last week). Negative for  abdominal pain, anal bleeding, blood in stool, constipation, nausea and vomiting.   Genitourinary: Negative.    Musculoskeletal: Negative.    Skin: Negative.    Neurological: Negative.    All other systems reviewed and are negative.      Physical Exam   BP: 135/81  Pulse: 98  Temp: 98.7  F (37.1  C)  Resp: 16  Weight: 80.5 kg (177 lb 6.4 oz)  SpO2: 97 %      Physical Exam  Constitutional:       General: She is not in acute distress.     Appearance: Normal appearance. She is not ill-appearing.   HENT:      Head: Normocephalic and atraumatic.   Eyes:      General: No scleral icterus.     Conjunctiva/sclera: Conjunctivae normal.   Cardiovascular:      Rate and Rhythm: Regular rhythm.   Pulmonary:      Effort: Pulmonary effort is normal.      Breath sounds: Normal breath sounds.   Abdominal:      General: Bowel sounds are normal.      Palpations: Abdomen is soft.      Tenderness: There is no abdominal tenderness.   Musculoskeletal: Normal range of motion.   Skin:     General: Skin is warm and dry.   Neurological:      General: No focal deficit present.      Mental Status: She is alert and oriented to person, place, and time.         ED Course        Procedures               Results for orders placed or performed during the hospital encounter of 04/29/21 (from the past 24 hour(s))   CBC with platelets differential   Result Value Ref Range    WBC 11.6 (H) 4.0 - 11.0 10e9/L    RBC Count 4.02 3.8 - 5.2 10e12/L    Hemoglobin 12.9 11.7 - 15.7 g/dL    Hematocrit 38.5 35.0 - 47.0 %    MCV 96 78 - 100 fl    MCH 32.1 26.5 - 33.0 pg    MCHC 33.5 31.5 - 36.5 g/dL    RDW 12.6 10.0 - 15.0 %    Platelet Count 285 150 - 450 10e9/L    Diff Method Manual Differential     % Neutrophils 52.0 %    % Lymphocytes 42.0 %    % Monocytes 2.0 %    % Eosinophils 2.0 %    % Basophils 2.0 %    Absolute Neutrophil 6.0 1.6 - 8.3 10e9/L    Absolute Lymphocytes 4.9 0.8 - 5.3 10e9/L    Absolute Monocytes 0.2 0.0 - 1.3 10e9/L    Absolute Eosinophils 0.2  0.0 - 0.7 10e9/L    Absolute Basophils 0.2 0.0 - 0.2 10e9/L   Comprehensive metabolic panel   Result Value Ref Range    Sodium 143 133 - 144 mmol/L    Potassium 3.3 (L) 3.4 - 5.3 mmol/L    Chloride 109 94 - 109 mmol/L    Carbon Dioxide 27 20 - 32 mmol/L    Anion Gap 7 3 - 14 mmol/L    Glucose 115 (H) 70 - 99 mg/dL    Urea Nitrogen 10 7 - 30 mg/dL    Creatinine 0.91 0.52 - 1.04 mg/dL    GFR Estimate 72 >60 mL/min/[1.73_m2]    GFR Estimate If Black 83 >60 mL/min/[1.73_m2]    Calcium 8.5 8.5 - 10.1 mg/dL    Bilirubin Total <0.1 (L) 0.2 - 1.3 mg/dL    Albumin 3.2 (L) 3.4 - 5.0 g/dL    Protein Total 6.9 6.8 - 8.8 g/dL    Alkaline Phosphatase 161 (H) 40 - 150 U/L    ALT 31 0 - 50 U/L    AST 14 0 - 45 U/L   Lipase   Result Value Ref Range    Lipase 92 73 - 393 U/L   UA with Microscopic   Result Value Ref Range    Color Urine Yellow     Appearance Urine Slightly Cloudy     Glucose Urine Negative NEG^Negative mg/dL    Bilirubin Urine Negative NEG^Negative    Ketones Urine Negative NEG^Negative mg/dL    Specific Gravity Urine 1.020 1.003 - 1.035    Blood Urine Negative NEG^Negative    pH Urine 5.0 5.0 - 7.0 pH    Protein Albumin Urine Negative NEG^Negative mg/dL    Urobilinogen mg/dL 0.0 0.0 - 2.0 mg/dL    Nitrite Urine Negative NEG^Negative    Leukocyte Esterase Urine Negative NEG^Negative    Source Midstream Urine     WBC Urine 1 0 - 5 /HPF    RBC Urine 1 0 - 2 /HPF    Squamous Epithelial /HPF Urine 9 (H) 0 - 1 /HPF    Mucous Urine Present (A) NEG^Negative /LPF   HCG qualitative urine (UPT)   Result Value Ref Range    HCG Qual Urine Negative NEG^Negative   US Pelvic Complete w Transvaginal    Narrative    US PELVIC COMPLETE WITH TRANSVAGINAL 4/29/2021 6:13 PM    CLINICAL HISTORY: abdominal bloating and pelvic pressure.  TECHNIQUE: Transabdominal scans were performed. Endovaginal ultrasound  was performed to better visualize the adnexa.    COMPARISON: 12/16/2020    FINDINGS:    UTERUS: 6.1 x 3.0 x 4.3 cm. Normal in size and  position with no  masses.    ENDOMETRIUM: 3 mm. Normal smooth endometrium.    RIGHT OVARY: Not visualized.    LEFT OVARY: Not visualized.    No significant free fluid.      Impression    IMPRESSION:  1.  The ovaries are not visualized, possibly due to positioning or  bowel gas.  2.  Normal endometrial thickness.      LESTER J FAHRNER, MD   CT ABDOMEN PELVIS W CONTRAST    Narrative    CT ABDOMEN PELVIS W CONTRAST 4/29/2021 6:55 PM    CLINICAL HISTORY: Abdominal distension; complains of abdominal  distention and bloating. no pain or tenderness.    TECHNIQUE: CT scan of the abdomen and pelvis was performed following  injection of IV contrast. Multiplanar reformats were obtained. Dose  reduction techniques were used.  CONTRAST: 90mLs Isovue 370    COMPARISON: 12/16/20    FINDINGS:   LOWER CHEST: Normal.    HEPATOBILIARY: Normal.    PANCREAS: Normal.    SPLEEN: Normal.    ADRENAL GLANDS: Normal.    KIDNEYS/BLADDER: There is a 14 mm low-attenuation right renal lesion  (23 Hounsfield units).    BOWEL: Normal stomach. Normal caliber of the small bowel. There is  wall thickening of the ascending, transverse, descending, and sigmoid  colon and rectum.    PELVIC ORGANS: Essure devices are seen. Normal ovaries.    ADDITIONAL FINDINGS: No lymphadenopathy. There is atherosclerotic  disease.    MUSCULOSKELETAL: Normal.      Impression    IMPRESSION:   1.  Wall thickening of the colon and rectum likely represents an  infectious or inflammatory colitis and proctitis.  2.  Low-attenuation right renal lesion, incompletely assessed on this  study. Although this is probably a simple cyst, a solid lesion should  be excluded with nonemergent ultrasound.    LESTER J FAHRNER, MD       Medications   iopamidol (ISOVUE-370) solution 500 mL (90 mLs Intravenous Given 4/29/21 1847)   Sodium Chloride 0.9 % bag 100mL for CT scan (60 mLs Intravenous Given 4/29/21 1847)   sodium chloride (PF) 0.9% PF flush 3 mL (10 mLs Intracatheter Given 4/29/21  1846)       Assessments & Plan (with Medical Decision Making)  50 year old female with complaints of weight gain and abdominal distension/bloating for the last 6 months. Weight has been steadily increasing over the last 2 years. Normal Abd/Pelvis CT 12/2020. Does not drink alcohol.  On exam patient is afebrile. Normotensive. No tachycardia. Abdomen is soft and nontender. No ascites. No palpable mass. Labs are unremarkable. Abd/pelvic US reveals no acute findings, but ovaries not visualized. Proceeded with abd/pelvis CT and this reveals bowel wall thickening of the colon and rectum- likely infectious vs inflammatory colitis and proctitis. There is a low-attenuation right renal lesion- likely a simple cyst but should have follow-up outpatient non-emergent ultrasound. Ovaries were visualized on CT and appear normal.  I discussed the lab and imaging findings with patient. I have low suspicion for infectious colitis as her history and exam does not suggest that. Patient has no record of colonoscopy and patient does not know if she has ever had one. I placed referral for general surgery for her to make appt for colonoscopy for further evaluation.   Plan:  No worrisome findings on labs, or pelvic ultrasound.  Abdominal/Pelvis CT scan shows bowel wall thickening and right renal lesion/cyst.  I recommend you make appointment for follow-up with surgery clinic for colonoscopy. Referral sent. [phone].  The right renal lesion/cyst can be followed up with your primary care provider in clinic for outpatient ultrasound.   You can also follow-up with OB/Gyn as scheduled next week to discuss the Essure devices if there is any concerns regarding these.     I have reviewed the nursing notes.    I have reviewed the findings, diagnosis, plan and need for follow up with the patient.    Discharge Medication List as of 4/29/2021  7:37 PM          Final diagnoses:   Abdominal distension   Bowel wall thickening       4/29/2021   [facility]  [facility] EMERGENCY DEPT     Jhon, CYN Quintana CNP  04/29/21 1955

## 2021-04-29 NOTE — ED TRIAGE NOTES
Pt presents with increased abdominal swelling. Pt states she had a CT in October. Pt now scheduled to have a consult with OBGYN soon to look into swelling that has increased.

## 2021-04-30 NOTE — DISCHARGE INSTRUCTIONS
No worrisome findings on labs, or pelvic ultrasound.  Abdominal/Pelvis CT scan shows bowel wall thickening and right renal lesion/cyst.  I recommend you make appointment for follow-up with surgery clinic for colonoscopy. Referral sent. [phone].  The right renal lesion/cyst can be followed up with your primary care provider in clinic for outpatient ultrasound.   You can also follow-up with OB/Gyn as scheduled next week to discuss the Essure devices if there is any concerns regarding these.

## 2021-05-03 ENCOUNTER — VIRTUAL VISIT (OUTPATIENT)
Dept: PSYCHOLOGY | Facility: CLINIC | Age: 51
End: 2021-05-03
Payer: COMMERCIAL

## 2021-05-03 DIAGNOSIS — F90.2 ADHD (ATTENTION DEFICIT HYPERACTIVITY DISORDER), COMBINED TYPE: Primary | ICD-10-CM

## 2021-05-03 DIAGNOSIS — F33.1 MAJOR DEPRESSIVE DISORDER, RECURRENT EPISODE, MODERATE WITH ANXIOUS DISTRESS (H): ICD-10-CM

## 2021-05-03 DIAGNOSIS — F41.1 GENERALIZED ANXIETY DISORDER: ICD-10-CM

## 2021-05-03 PROCEDURE — 90834 PSYTX W PT 45 MINUTES: CPT | Mod: 95 | Performed by: SOCIAL WORKER

## 2021-05-03 ASSESSMENT — ANXIETY QUESTIONNAIRES
GAD7 TOTAL SCORE: 14
5. BEING SO RESTLESS THAT IT IS HARD TO SIT STILL: SEVERAL DAYS
4. TROUBLE RELAXING: MORE THAN HALF THE DAYS
2. NOT BEING ABLE TO STOP OR CONTROL WORRYING: NEARLY EVERY DAY
7. FEELING AFRAID AS IF SOMETHING AWFUL MIGHT HAPPEN: NEARLY EVERY DAY
6. BECOMING EASILY ANNOYED OR IRRITABLE: SEVERAL DAYS
1. FEELING NERVOUS, ANXIOUS, OR ON EDGE: SEVERAL DAYS
3. WORRYING TOO MUCH ABOUT DIFFERENT THINGS: NEARLY EVERY DAY
IF YOU CHECKED OFF ANY PROBLEMS ON THIS QUESTIONNAIRE, HOW DIFFICULT HAVE THESE PROBLEMS MADE IT FOR YOU TO DO YOUR WORK, TAKE CARE OF THINGS AT HOME, OR GET ALONG WITH OTHER PEOPLE: VERY DIFFICULT

## 2021-05-03 ASSESSMENT — PATIENT HEALTH QUESTIONNAIRE - PHQ9: SUM OF ALL RESPONSES TO PHQ QUESTIONS 1-9: 11
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
## 2021-05-04 ASSESSMENT — ANXIETY QUESTIONNAIRES: GAD7 TOTAL SCORE: 14

## 2021-05-06 ENCOUNTER — OFFICE VISIT (OUTPATIENT)
Dept: SURGERY | Facility: CLINIC | Age: 51
End: 2021-05-06
Payer: COMMERCIAL

## 2021-05-06 VITALS — SYSTOLIC BLOOD PRESSURE: 108 MMHG | DIASTOLIC BLOOD PRESSURE: 60 MMHG | BODY MASS INDEX: 29.37 KG/M2 | WEIGHT: 179.2 LBS

## 2021-05-06 DIAGNOSIS — N28.1 RENAL CYST: Primary | ICD-10-CM

## 2021-05-06 DIAGNOSIS — R14.0 ABDOMINAL DISTENSION: ICD-10-CM

## 2021-05-06 DIAGNOSIS — K63.9 BOWEL WALL THICKENING: ICD-10-CM

## 2021-05-06 PROCEDURE — 99204 OFFICE O/P NEW MOD 45 MIN: CPT | Performed by: SURGERY

## 2021-05-06 NOTE — PROGRESS NOTES
"Patient seen in consultation for abdominal distention and abnormal CT of GI tract    HPI:  Patient is a 50 year old female with several month history of increasing abdominal distension and distress. She denies any changes to her bowel habits but does have clinical diagnosis of IBS. She does not take anything for it. She has gained 40+ lbs in last year. She isnt sure why as she states she \"doesn't eat much\". She was seen in the ED recently and had CT scan which showed some mild colon wall thickening, non-specific and incidental right renal cyst. She has not had a colonoscopy. She denies family history of colon cancer. No blood in stool.    Review Of Systems    Skin: negative  Ears/Nose/Throat: negative  Respiratory: No shortness of breath, dyspnea on exertion, cough, or hemoptysis  Cardiovascular: negative  Gastrointestinal: as above  Genitourinary: negative  Musculoskeletal: negative  Neurologic: negative  Hematologic/Lymphatic/Immunologic: negative  Endocrine: negative      Past Medical History:   Diagnosis Date     Allergic rhinitis due to other allergen      Degenerative joint disease of cervical spine 2016    multi level worst at C5-6     Generalized anxiety disorder      Hearing loss      Intrinsic asthma, unspecified      Irritable bowel syndrome      Lump or mass in breast     Left breast lump -- aspiration benign.     Other malaise and fatigue     fibromyalgia     Other specified gastritis without mention of hemorrhage      Pain in joint, lower leg     patello-femoral syndrome     Rheumatoid arthritis(714.0)      Spindle cell carcinoma (H) 2013    Im[employer] Update utility     Spondylitis, cervical (H)     C5-C7 (MRI)     Stenosis, cervical spine     C5-C7 (MRI)     Tension headache        Past Surgical History:   Procedure Laterality Date     C APPENDECTOMY      Ruptured at age 7 years     C  DELIVERY ONLY      , Low Cervical     DILATION AND CURETTAGE, HYSTEROSCOPY, ABLATE " ENDOMETRIUM NOVASURE, COMBINED  9/2/2011    Procedure:COMBINED DILATION AND CURETTAGE, HYSTEROSCOPY, ABLATE ENDOMETRIUM NOVASURE; hysteroscopy, dilation and curettage, novasure; Surgeon:JEREMY ANNA; Location:[facility] OR     EXCISE LESION TRUNK  9/12/2013    Procedure: EXCISE LESION TRUNK;  interlaminar epidural Steroid Injection Cervical -thoracic Levels (C7-T1)    Re-Excision of chest wall mass;  Surgeon: Jeremy Bolaños MD;  Location: [facility] OR     HC HYSTEROS W PERMANENT FALLOPAIN IMPLANT  2012    Essure done in office [facility]     INJECT BLOCK MEDIAL BRANCH CERVICAL/THORACIC/LUMBAR  6/12/2014    Procedure: INJECT BLOCK MEDIAL BRANCH CERVICAL / THORACIC / LUMBAR;  Surgeon: Josué Munson MD;  Location: [facility] OR     INJECT FACET JOINT  2/13/2014    Procedure: INJECT FACET JOINT;  diagnostic medial branch facet nerve block cervical levels 5-6, 6-7;  Surgeon: Josué Munson MD;  [facility]     [facility]         Family History   Problem Relation Age of Onset     Arthritis Mother         Rheumatoid     Respiratory Mother         COPD     Hypertension Sister         1/2 sister     Neurologic Disorder Sister         1/2 sisiter  Very mild form of CP     Dementia Father      Cardiovascular Maternal Grandmother      Heart Disease Maternal Aunt         Bypass at age 50's       Social History     Socioeconomic History     Marital status:      Spouse name: Not on file     Number of children: Not on file     Years of education: Not on file     Highest education level: Not on file   Occupational History     Employer: [employer]     Comment: bookkeeping   Social Needs     Financial resource strain: Not on file     Food insecurity     Worry: Not on file     Inability: Not on file     Transportation needs     Medical: Not on file     Non-medical: Not on file   Tobacco Use     Smoking status: Current Every Day Smoker     Packs/day: 0.50     Years: 29.00     Pack years: 14.50     Types: Cigarettes      Smokeless tobacco: Never Used   Substance and Sexual Activity     Alcohol use: Not Currently     Alcohol/week: 1.7 standard drinks     Comment: rare     Drug use: No     Sexual activity: Not Currently     Partners: Male     Birth control/protection: Surgical     Comment: Essure   Lifestyle     Physical activity     Days per week: Not on file     Minutes per session: Not on file     Stress: Not on file   Relationships     Social connections     Talks on phone: Not on file     Gets together: Not on file     Attends Christian service: Not on file     Active member of club or organization: Not on file     Attends meetings of clubs or organizations: Not on file     Relationship status: Not on file     Intimate partner violence     Fear of current or ex partner: Not on file     Emotionally abused: Not on file     Physically abused: Not on file     Forced sexual activity: Not on file   Other Topics Concern     Parent/sibling w/ CABG, MI or angioplasty before 65F 55M? Not Asked      Service Not Asked     Blood Transfusions Not Asked     Caffeine Concern No     Comment: up to 6pm at night up to a pot of coffee a day      Occupational Exposure Not Asked     Hobby Hazards Not Asked     Sleep Concern Yes     Stress Concern Not Asked     Weight Concern Not Asked     Special Diet Not Asked     Back Care Not Asked     Exercise No     Bike Helmet Not Asked     Seat Belt Not Asked     Self-Exams Not Asked   Social History Narrative     Not on file       Current Outpatient Medications   Medication Sig Dispense Refill     escitalopram (LEXAPRO) 20 MG tablet Take 1 tablet (20 mg) by mouth daily 30 tablet 1     HYDROcodone-acetaminophen (NORCO) 5-325 MG tablet Take 1.5 tablets by mouth every evening At 5pm       atomoxetine (STRATTERA) 80 MG capsule Take 80 mg by mouth daily       buPROPion (WELLBUTRIN SR) 150 MG 12 hr tablet Take 450 mg by mouth every morning       cetirizine (ZYRTEC) 10 MG tablet TAKE ONE TABLET BY MOUTH ONCE  DAILY (Patient taking differently: Take 10 mg by mouth as needed for allergies ) 90 tablet 2     clonazePAM (KLONOPIN) 1 MG tablet Take 1 mg by mouth 2 times daily as needed for anxiety       cyclobenzaprine (FLEXERIL) 10 MG tablet Take 1 tablet (10 mg) by mouth 3 times daily as needed for muscle spasms 90 tablet 0     fluticasone (FLONASE) 50 MCG/ACT nasal spray SPRAY 2 SPRAYS INTO BOTH NOSTRILS DAILY. (Patient taking differently: Spray 2 sprays into both nostrils daily as needed for allergies ) 16 g 11     HYDROcodone-acetaminophen (NORCO) 5-325 MG tablet Take 2.5 tablets in the AM, 2.5 tablets in the afternoon and 1.5 tablets at bedtime. Fill 04/30/21 and start 05/02/21 (Patient taking differently: Take 2.5 tablets by mouth 2 times daily Take 2.5 tablets in the 9AM, 2.5 tablets 1pm and 1.5 tablets 5pm. Fill 04/30/21 and start 05/02/21) 195 tablet 0     hydrOXYzine (ATARAX) 25 MG tablet Take 1-2 tablets (25-50 mg) by mouth 3 times daily as needed for itching (Patient not taking: Reported on 5/6/2021) 90 tablet 0     ketorolac (TORADOL) 10 MG tablet TAKE ONE TABLET BY MOUTH EVERY 6 HOURS AS NEEDED FOR MODERATE PAIN. Do not take on same day you take ibuprofen or other NSAIDs (Patient taking differently: Take 10 mg by mouth every 6 hours as needed for pain ) 10 tablet 1     naloxone (NARCAN) nasal spray Spray 1 spray (4 mg) into one nostril alternating nostrils as needed for opioid reversal every 2-3 minutes until assistance arrives (Patient not taking: Reported on 3/24/2021) 0.2 mL 0     naproxen sodium (ANAPROX) 220 MG tablet Take 220 mg by mouth 2 times daily (with meals)       OLANZapine (ZYPREXA) 2.5 MG tablet Take 2.5 mg by mouth 2 times daily Morning and afternoon, (5mg in the evening)       OLANZapine (ZYPREXA) 5 MG tablet Take 5 mg by mouth At Bedtime (2.5 mg twice daily in the morning and afternoon)       VENTOLIN  (90 Base) MCG/ACT inhaler INHALE 2 PUFFS INTO THE LUNGS EVERY 6 HOURS AS NEEDED FOR  SHORTNESS OF BREATH, DIFFICULTY BREATHING OR WHEEZING. 18 g 0     verapamil (CALAN) 40 MG tablet TAKE ONE TABLET BY MOUTH TWICE A DAY (Patient taking differently: Take 40 mg by mouth 2 times daily ) 180 tablet 0       Medications and history reviewed    Physical exam:  Vitals: /60   Wt 81.3 kg (179 lb 3.2 oz)   BMI 29.37 kg/m    BMI= Body mass index is 29.37 kg/m .    Constitutional: Healthy, alert, non-distressed   Head: Normo-cephalic, atraumatic, no lesions, masses or tenderness   Cardiovascular: RRR, no new murmurs, +S1, +S2 heart sounds, no clicks, rubs or gallops   Respiratory: CTAB, no rales, rhonchi or wheezing, equal chest rise, good respiratory effort   Gastrointestinal: Soft, non-tender, non distended, no rebound rigidity or guarding, no masses or hernias palpated   : Deferred  Musculoskeletal: Moves all extremities, normal  strength, no deformities noted   Skin: No suspicious lesions or rashes   Psychiatric: Mentation appears normal, affect appropriate   Hematologic/Lymphatic/Immunologic: Normal cervical and supraclavicular lymph nodes   Patient able to get up on table without difficulty.    Labs show:  No results found for this or any previous visit (from the past 24 hour(s)).    Imaging shows:  Recent Results (from the past 744 hour(s))   US Pelvic Complete w Transvaginal    Narrative    US PELVIC COMPLETE WITH TRANSVAGINAL 4/29/2021 6:13 PM    CLINICAL HISTORY: abdominal bloating and pelvic pressure.  TECHNIQUE: Transabdominal scans were performed. Endovaginal ultrasound  was performed to better visualize the adnexa.    COMPARISON: 12/16/2020    FINDINGS:    UTERUS: 6.1 x 3.0 x 4.3 cm. Normal in size and position with no  masses.    ENDOMETRIUM: 3 mm. Normal smooth endometrium.    RIGHT OVARY: Not visualized.    LEFT OVARY: Not visualized.    No significant free fluid.      Impression    IMPRESSION:  1.  The ovaries are not visualized, possibly due to positioning or  bowel gas.  2.   Normal endometrial thickness.      LESTER J FAHRNER, MD   CT ABDOMEN PELVIS W CONTRAST    Narrative    CT ABDOMEN PELVIS W CONTRAST 4/29/2021 6:55 PM    CLINICAL HISTORY: Abdominal distension; complains of abdominal  distention and bloating. no pain or tenderness.    TECHNIQUE: CT scan of the abdomen and pelvis was performed following  injection of IV contrast. Multiplanar reformats were obtained. Dose  reduction techniques were used.  CONTRAST: 90mLs Isovue 370    COMPARISON: 12/16/20    FINDINGS:   LOWER CHEST: Normal.    HEPATOBILIARY: Normal.    PANCREAS: Normal.    SPLEEN: Normal.    ADRENAL GLANDS: Normal.    KIDNEYS/BLADDER: There is a 14 mm low-attenuation right renal lesion  (23 Hounsfield units).    BOWEL: Normal stomach. Normal caliber of the small bowel. There is  wall thickening of the ascending, transverse, descending, and sigmoid  colon and rectum.    PELVIC ORGANS: Essure devices are seen. Normal ovaries.    ADDITIONAL FINDINGS: No lymphadenopathy. There is atherosclerotic  disease.    MUSCULOSKELETAL: Normal.      Impression    IMPRESSION:   1.  Wall thickening of the colon and rectum likely represents an  infectious or inflammatory colitis and proctitis.  2.  Low-attenuation right renal lesion, incompletely assessed on this  study. Although this is probably a simple cyst, a solid lesion should  be excluded with nonemergent ultrasound.    LESTER J FAHRNER, MD         Assessment:     ICD-10-CM    1. Renal cyst  N28.1 US Renal Complete   2. Abdominal distension  R14.0 GENERAL SURG ADULT REFERRAL   3. Bowel wall thickening  K63.9 GENERAL SURG ADULT REFERRAL     Plan: I recommend colonoscopy for screening purposes as she has never had one. I suspect the bloating sensation could be due to the weight gain, but unclear if this is a weight gain due to excessive calories. SHe can follow up with her PCP for this should the colonoscopy not shed any light in this issue. She has no contraindications to  anesthesia and is cleared for colonoscopy.    I have ordered renal US per radiology's recommendation for the suspected renal cyst.    Ariel Low, DO

## 2021-05-06 NOTE — LETTER
"    5/6/2021         RE: Sherie Otero  [address] [id number]        Dear Colleague,    Thank you for referring your patient, Sherie Otero, to the [facility]. Please see a copy of my visit note below.    Patient seen in consultation for abdominal distention and abnormal CT of GI tract    HPI:  Patient is a 50 year old female with several month history of increasing abdominal distension and distress. She denies any changes to her bowel habits but does have clinical diagnosis of IBS. She does not take anything for it. She has gained 40+ lbs in last year. She isnt sure why as she states she \"doesn't eat much\". She was seen in the ED recently and had CT scan which showed some mild colon wall thickening, non-specific and incidental right renal cyst. She has not had a colonoscopy. She denies family history of colon cancer. No blood in stool.    Review Of Systems    Skin: negative  Ears/Nose/Throat: negative  Respiratory: No shortness of breath, dyspnea on exertion, cough, or hemoptysis  Cardiovascular: negative  Gastrointestinal: as above  Genitourinary: negative  Musculoskeletal: negative  Neurologic: negative  Hematologic/Lymphatic/Immunologic: negative  Endocrine: negative      Past Medical History:   Diagnosis Date     Allergic rhinitis due to other allergen      Degenerative joint disease of cervical spine 2016    multi level worst at C5-6     Generalized anxiety disorder      Hearing loss      Intrinsic asthma, unspecified      Irritable bowel syndrome      Lump or mass in breast 1996    Left breast lump 1996-- aspiration benign.     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    Imo Update utility     Spondylitis, cervical (H)     C5-C7 (MRI)     Stenosis, cervical spine  "    C5-C7 (MRI)     Tension headache        Past Surgical History:   Procedure Laterality Date     C APPENDECTOMY      Ruptured at age 7 years     C  DELIVERY ONLY      , Low Cervical     DILATION AND CURETTAGE, HYSTEROSCOPY, ABLATE ENDOMETRIUM NOVASURE, COMBINED  2011    Procedure:COMBINED DILATION AND CURETTAGE, HYSTEROSCOPY, ABLATE ENDOMETRIUM NOVASURE; hysteroscopy, dilation and curettage, novasure; Surgeon:JEREMY ANNA; Location:[facility] OR     EXCISE LESION TRUNK  2013    Procedure: EXCISE LESION TRUNK;  interlaminar epidural Steroid Injection Cervical -thoracic Levels (C7-T1)    Re-Excision of chest wall mass;  Surgeon: Jeremy Bolaños MD;  Location: [facility] OR     HC HYSTEROS W PERMANENT FALLOPAIN IMPLANT      Essure done in office [facility]     INJECT BLOCK MEDIAL BRANCH CERVICAL/THORACIC/LUMBAR  2014    Procedure: INJECT BLOCK MEDIAL BRANCH CERVICAL / THORACIC / LUMBAR;  Surgeon: Josué Munson MD;  Location: [facility] OR     INJECT FACET JOINT  2014    Procedure: INJECT FACET JOINT;  diagnostic medial branch facet nerve block cervical levels 5-6, 6-7;  Surgeon: Josué Munson MD;  Location: [facility] OR     WISDOM ST GUIDEWI         Family History   Problem Relation Age of Onset     Arthritis Mother         Rheumatoid     Respiratory Mother         COPD     Hypertension Sister         1/2 sister     Neurologic Disorder Sister         1/2 sisiter  Very mild form of CP     Dementia Father      Cardiovascular Maternal Grandmother      Heart Disease Maternal Aunt         Bypass at age 50's       Social History     Socioeconomic History     Marital status:      Spouse name: Not on file     Number of children: Not on file     Years of education: Not on file     Highest education level: Not on file   Occupational History     Employer: [employer]     Comment: bookkeeping   Social Needs     Financial resource strain: Not on file     Food insecurity     Worry: Not on file      Inability: Not on file     Transportation needs     Medical: Not on file     Non-medical: Not on file   Tobacco Use     Smoking status: Current Every Day Smoker     Packs/day: 0.50     Years: 29.00     Pack years: 14.50     Types: Cigarettes     Smokeless tobacco: Never Used   Substance and Sexual Activity     Alcohol use: Not Currently     Alcohol/week: 1.7 standard drinks     Comment: rare     Drug use: No     Sexual activity: Not Currently     Partners: Male     Birth control/protection: Surgical     Comment: Essure   Lifestyle     Physical activity     Days per week: Not on file     Minutes per session: Not on file     Stress: Not on file   Relationships     Social connections     Talks on phone: Not on file     Gets together: Not on file     Attends Religion service: Not on file     Active member of club or organization: Not on file     Attends meetings of clubs or organizations: Not on file     Relationship status: Not on file     Intimate partner violence     Fear of current or ex partner: Not on file     Emotionally abused: Not on file     Physically abused: Not on file     Forced sexual activity: Not on file   Other Topics Concern     Parent/sibling w/ CABG, MI or angioplasty before 65F 55M? Not Asked      Service Not Asked     Blood Transfusions Not Asked     Caffeine Concern No     Comment: up to 6pm at night up to a pot of coffee a day      Occupational Exposure Not Asked     Hobby Hazards Not Asked     Sleep Concern Yes     Stress Concern Not Asked     Weight Concern Not Asked     Special Diet Not Asked     Back Care Not Asked     Exercise No     Bike Helmet Not Asked     Seat Belt Not Asked     Self-Exams Not Asked   Social History Narrative     Not on file       Current Outpatient Medications   Medication Sig Dispense Refill     escitalopram (LEXAPRO) 20 MG tablet Take 1 tablet (20 mg) by mouth daily 30 tablet 1     HYDROcodone-acetaminophen (NORCO) 5-325 MG tablet Take 1.5 tablets by  mouth every evening At 5pm       atomoxetine (STRATTERA) 80 MG capsule Take 80 mg by mouth daily       buPROPion (WELLBUTRIN SR) 150 MG 12 hr tablet Take 450 mg by mouth every morning       cetirizine (ZYRTEC) 10 MG tablet TAKE ONE TABLET BY MOUTH ONCE DAILY (Patient taking differently: Take 10 mg by mouth as needed for allergies ) 90 tablet 2     clonazePAM (KLONOPIN) 1 MG tablet Take 1 mg by mouth 2 times daily as needed for anxiety       cyclobenzaprine (FLEXERIL) 10 MG tablet Take 1 tablet (10 mg) by mouth 3 times daily as needed for muscle spasms 90 tablet 0     fluticasone (FLONASE) 50 MCG/ACT nasal spray SPRAY 2 SPRAYS INTO BOTH NOSTRILS DAILY. (Patient taking differently: Spray 2 sprays into both nostrils daily as needed for allergies ) 16 g 11     HYDROcodone-acetaminophen (NORCO) 5-325 MG tablet Take 2.5 tablets in the AM, 2.5 tablets in the afternoon and 1.5 tablets at bedtime. Fill 04/30/21 and start 05/02/21 (Patient taking differently: Take 2.5 tablets by mouth 2 times daily Take 2.5 tablets in the 9AM, 2.5 tablets 1pm and 1.5 tablets 5pm. Fill 04/30/21 and start 05/02/21) 195 tablet 0     hydrOXYzine (ATARAX) 25 MG tablet Take 1-2 tablets (25-50 mg) by mouth 3 times daily as needed for itching (Patient not taking: Reported on 5/6/2021) 90 tablet 0     ketorolac (TORADOL) 10 MG tablet TAKE ONE TABLET BY MOUTH EVERY 6 HOURS AS NEEDED FOR MODERATE PAIN. Do not take on same day you take ibuprofen or other NSAIDs (Patient taking differently: Take 10 mg by mouth every 6 hours as needed for pain ) 10 tablet 1     naloxone (NARCAN) nasal spray Spray 1 spray (4 mg) into one nostril alternating nostrils as needed for opioid reversal every 2-3 minutes until assistance arrives (Patient not taking: Reported on 3/24/2021) 0.2 mL 0     naproxen sodium (ANAPROX) 220 MG tablet Take 220 mg by mouth 2 times daily (with meals)       OLANZapine (ZYPREXA) 2.5 MG tablet Take 2.5 mg by mouth 2 times daily Morning and  afternoon, (5mg in the evening)       OLANZapine (ZYPREXA) 5 MG tablet Take 5 mg by mouth At Bedtime (2.5 mg twice daily in the morning and afternoon)       VENTOLIN  (90 Base) MCG/ACT inhaler INHALE 2 PUFFS INTO THE LUNGS EVERY 6 HOURS AS NEEDED FOR SHORTNESS OF BREATH, DIFFICULTY BREATHING OR WHEEZING. 18 g 0     verapamil (CALAN) 40 MG tablet TAKE ONE TABLET BY MOUTH TWICE A DAY (Patient taking differently: Take 40 mg by mouth 2 times daily ) 180 tablet 0       Medications and history reviewed    Physical exam:  Vitals: /60   Wt 81.3 kg (179 lb 3.2 oz)   BMI 29.37 kg/m    BMI= Body mass index is 29.37 kg/m .    Constitutional: Healthy, alert, non-distressed   Head: Normo-cephalic, atraumatic, no lesions, masses or tenderness   Cardiovascular: RRR, no new murmurs, +S1, +S2 heart sounds, no clicks, rubs or gallops   Respiratory: CTAB, no rales, rhonchi or wheezing, equal chest rise, good respiratory effort   Gastrointestinal: Soft, non-tender, non distended, no rebound rigidity or guarding, no masses or hernias palpated   : Deferred  Musculoskeletal: Moves all extremities, normal  strength, no deformities noted   Skin: No suspicious lesions or rashes   Psychiatric: Mentation appears normal, affect appropriate   Hematologic/Lymphatic/Immunologic: Normal cervical and supraclavicular lymph nodes   Patient able to get up on table without difficulty.    Labs show:  No results found for this or any previous visit (from the past 24 hour(s)).    Imaging shows:  Recent Results (from the past 744 hour(s))   US Pelvic Complete w Transvaginal    Narrative    US PELVIC COMPLETE WITH TRANSVAGINAL 4/29/2021 6:13 PM    CLINICAL HISTORY: abdominal bloating and pelvic pressure.  TECHNIQUE: Transabdominal scans were performed. Endovaginal ultrasound  was performed to better visualize the adnexa.    COMPARISON: 12/16/2020    FINDINGS:    UTERUS: 6.1 x 3.0 x 4.3 cm. Normal in size and position with  no  masses.    ENDOMETRIUM: 3 mm. Normal smooth endometrium.    RIGHT OVARY: Not visualized.    LEFT OVARY: Not visualized.    No significant free fluid.      Impression    IMPRESSION:  1.  The ovaries are not visualized, possibly due to positioning or  bowel gas.  2.  Normal endometrial thickness.      LESTER J FAHRNER, MD   CT ABDOMEN PELVIS W CONTRAST    Narrative    CT ABDOMEN PELVIS W CONTRAST 4/29/2021 6:55 PM    CLINICAL HISTORY: Abdominal distension; complains of abdominal  distention and bloating. no pain or tenderness.    TECHNIQUE: CT scan of the abdomen and pelvis was performed following  injection of IV contrast. Multiplanar reformats were obtained. Dose  reduction techniques were used.  CONTRAST: 90mLs Isovue 370    COMPARISON: 12/16/20    FINDINGS:   LOWER CHEST: Normal.    HEPATOBILIARY: Normal.    PANCREAS: Normal.    SPLEEN: Normal.    ADRENAL GLANDS: Normal.    KIDNEYS/BLADDER: There is a 14 mm low-attenuation right renal lesion  (23 Hounsfield units).    BOWEL: Normal stomach. Normal caliber of the small bowel. There is  wall thickening of the ascending, transverse, descending, and sigmoid  colon and rectum.    PELVIC ORGANS: Essure devices are seen. Normal ovaries.    ADDITIONAL FINDINGS: No lymphadenopathy. There is atherosclerotic  disease.    MUSCULOSKELETAL: Normal.      Impression    IMPRESSION:   1.  Wall thickening of the colon and rectum likely represents an  infectious or inflammatory colitis and proctitis.  2.  Low-attenuation right renal lesion, incompletely assessed on this  study. Although this is probably a simple cyst, a solid lesion should  be excluded with nonemergent ultrasound.    LESTER J FAHRNER, MD         Assessment:     ICD-10-CM    1. Renal cyst  N28.1 US Renal Complete   2. Abdominal distension  R14.0 GENERAL SURG ADULT REFERRAL   3. Bowel wall thickening  K63.9 GENERAL SURG ADULT REFERRAL     Plan: I recommend colonoscopy for screening purposes as she has never had  one. I suspect the bloating sensation could be due to the weight gain, but unclear if this is a weight gain due to excessive calories. SHe can follow up with her PCP for this should the colonoscopy not shed any light in this issue. She has no contraindications to anesthesia and is cleared for colonoscopy.    I have ordered renal US per radiology's recommendation for the suspected renal cyst.    Ariel Low, DO        Again, thank you for allowing me to participate in the care of your patient.        Sincerely,        Ariel Low, DO

## 2021-05-17 ENCOUNTER — VIRTUAL VISIT (OUTPATIENT)
Dept: PSYCHOLOGY | Facility: CLINIC | Age: 51
End: 2021-05-17
Payer: COMMERCIAL

## 2021-05-17 DIAGNOSIS — M79.7 FIBROMYALGIA: ICD-10-CM

## 2021-05-17 DIAGNOSIS — F41.1 GENERALIZED ANXIETY DISORDER: ICD-10-CM

## 2021-05-17 DIAGNOSIS — F90.2 ADHD (ATTENTION DEFICIT HYPERACTIVITY DISORDER), COMBINED TYPE: Primary | ICD-10-CM

## 2021-05-17 DIAGNOSIS — F33.1 MAJOR DEPRESSIVE DISORDER, RECURRENT EPISODE, MODERATE WITH ANXIOUS DISTRESS (H): ICD-10-CM

## 2021-05-17 PROCEDURE — 90834 PSYTX W PT 45 MINUTES: CPT | Mod: 95 | Performed by: SOCIAL WORKER

## 2021-05-17 RX ORDER — CYCLOBENZAPRINE HCL 10 MG
10 TABLET ORAL 3 TIMES DAILY PRN
Qty: 90 TABLET | Refills: 0 | Status: SHIPPED | OUTPATIENT
Start: 2021-05-17 | End: 2021-09-21

## 2021-05-17 ASSESSMENT — ANXIETY QUESTIONNAIRES
3. WORRYING TOO MUCH ABOUT DIFFERENT THINGS: NEARLY EVERY DAY
IF YOU CHECKED OFF ANY PROBLEMS ON THIS QUESTIONNAIRE, HOW DIFFICULT HAVE THESE PROBLEMS MADE IT FOR YOU TO DO YOUR WORK, TAKE CARE OF THINGS AT HOME, OR GET ALONG WITH OTHER PEOPLE: EXTREMELY DIFFICULT
GAD7 TOTAL SCORE: 17
5. BEING SO RESTLESS THAT IT IS HARD TO SIT STILL: MORE THAN HALF THE DAYS
2. NOT BEING ABLE TO STOP OR CONTROL WORRYING: NEARLY EVERY DAY
7. FEELING AFRAID AS IF SOMETHING AWFUL MIGHT HAPPEN: NEARLY EVERY DAY
4. TROUBLE RELAXING: MORE THAN HALF THE DAYS
6. BECOMING EASILY ANNOYED OR IRRITABLE: SEVERAL DAYS
1. FEELING NERVOUS, ANXIOUS, OR ON EDGE: NEARLY EVERY DAY

## 2021-05-17 ASSESSMENT — PATIENT HEALTH QUESTIONNAIRE - PHQ9: SUM OF ALL RESPONSES TO PHQ QUESTIONS 1-9: 10

## 2021-05-17 NOTE — TELEPHONE ENCOUNTER
Received call from patient requesting refill(s) of cyclobenzaprine     Last dispensed from pharmacy on 3/25/21    Patient's last office/virtual visit by prescribing provider on 3/2421  Next office/virtual appointment scheduled for 5/19/21    Last urine drug screen date 1/27/20  Current opioid agreement on file (completed within the last year) No Date of opioid agreement: 1/27/21    E-prescribe to [facility] pharmacy    Will route to nursing [place] for review and preparation of prescription(s).

## 2021-05-17 NOTE — PROGRESS NOTES
[facility]     CHIEF COMPLAINT: Pain  -Fibromyalgia  -Neck pain  -Low back pain     INTERVAL HISTORY:  Last seen on 3/24/21 for a virtual visit.     Recommendations/plan at the last visit included:  1. Physical Therapy: continue previous PT exercises;   2. Clinical Health Psychologist:  YES, has a plan in place  3. Diagnostic Studies: none at this time  4. Medication Management:    1. Stop Baclofen  2. Start Flexeril 10mg TID PRN  3. Continue hydrocodone to 5/325: 2.5 tablets, 2.5 tablets and 1.5 tablet with dosing spread by 4.5 hours. pain. Max of 6.5 tablets/day  4. Consider restarting Savella once her anxiety is under better control  5. Consider low-dose naltrexone  6. Continue Toradol 10mg as needed. Do not use daily.  5. Further procedures recommended: consider trigger point injections if needed        Follow up with this provider: 8  weeks for an in clinic visit.     Since her last visit, Sherie Otero reports:      She feels her pain is not under control with the medication decrease. She states that she is low on her pain medications because she fell out of a van and that took her a while to get over.     She states that she is scared of medical cannabis as she is unsure how she will feel on it. 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      Pain Information:              Pain today 5/10     UDS 1/27/2020   CSA 1/27/2020     CURRENT RELEVANT PAIN MEDICATIONS:  Clonazepam 0.5mg: taking 1 tab twice daily    Hydrocodone 5-325: 2.5 tablets in AM, 2.5 tablets in afternoon and 1.5 tablets at bedtime  Toradol 10mg-using sparingly     Patient is using the medication as prescribed:  YES  Is your medication helpful?     somewhat   Medication side effects? no side effect     Previous Medications: (H--helped; HI--Helped initially; SWH-- somewhat helpful, NH--No help; W--worse; SE--side effects)   Opiates: Hydrocodone H,  Oxycodone SE  NSAIDS: Ibuprofen H, Relafen SE stomach upset, Meloxicam NH  Muscle Relaxants: Tizanidine NH, Flexeril HI, Robaxin NH SE stomach upset, Baclofen SE dizzy   Anti-migraine mediations: Verapamil H  Anti-depressants: Cymbalta H  Sleep aids: none  Anxiolytics: Xanax H, Clonazepam H, Valium H  Neuropathics: Gabapentin SE dizziness          Topicals: Lidocaine patches SW  Other medications not covered above: Savella H at first, then seemed to stop working, Lyrica SE shortness of breath, felt 'weird' on them, throat felt weird. Prednisone H for arthritis flare     Past Pain Treatments:  Pain Clinic:   No   PT: Yes, years ago. Has not done pool therapy.   Psychologist: No  Relaxation techniques/biofeedback: No  Chiropractor: No, was told not to because of neck.   Acupuncture: Yes for headaches.   Pharmacotherapy:           Opioids: Yes            Non-opioids:    Yes   TENs Unit:Yes  Injections: cervical medial branch blocks 6/12/2014  Self-care:   Yes, ice/heat, hot baths, theracare  Surgeries related to pain: No     [place] Board of Pharmacy Data Base Reviewed:    YES; As expected, no concern for misuse/abuse of controlled medications based on this report. Checked on 3/24/21        THE 4 As OF OPIOID MAINTENANCE ANALGESIA    Analgesia: Is pain relief clinically significant? YES   Activity: Is patient functional and able to perform Activities of Daily Living? YES   Adverse effects: Is patient free from adverse side effects from opiates? YES   Adherence to Rx protocol: Is patient adhering to Controlled Substance Agreement and taking medications ONLY as ordered? No  -5/26/21-took more Norco-3 days short        Is Narcan prescribed for opiate use >50 MME daily? yes        Total Daily MME: 32.5    Medications:  Current Outpatient Prescriptions          Current Outpatient Medications   Medication Sig Dispense Refill     albuterol (VENTOLIN HFA) 108 (90 Base) MCG/ACT inhaler Inhale 2 puffs into the lungs every 6  hours as needed for shortness of breath / dyspnea or wheezing 18 g 1     cetirizine (ZYRTEC) 10 MG tablet TAKE ONE TABLET BY MOUTH ONCE DAILY 90 tablet 2     clonazePAM (KLONOPIN) 0.5 MG tablet Take 1 tablet (0.5 mg) by mouth 2 times daily as needed for anxiety Must last 30 days 60 tablet 0     [START ON 2/24/2020] clonazePAM (KLONOPIN) 0.5 MG tablet Take 1 tablet (0.5 mg) by mouth 2 times daily as needed for anxiety 6 tablet 0     cyclobenzaprine (FLEXERIL) 10 MG tablet Take 2 tablets in the evening and 1 in the morning 90 tablet 2     DULoxetine (CYMBALTA) 20 MG capsule Take 2 capsules (40 mg) by mouth daily CYMBALTA-KADY 60 capsule 0     fluticasone (FLONASE) 50 MCG/ACT nasal spray SPRAY 2 SPRAYS INTO BOTH NOSTRILS DAILY. 16 g 11     gabapentin (NEURONTIN) 100 MG capsule Take 1 capsule at bedtime for 1 week, then take 1 capsule twice daily for 1 week, then take 1 capsule three times a day 90 capsule 0     HYDROcodone-acetaminophen (NORCO) 7.5-325 MG per tablet Take 1 tablet every 4-6 hours as needed for severe pain. Max of 5 tablets per day. Fill/start 12/30/2019. 150 tablet 0     naloxone (NARCAN) nasal spray Spray 1 spray (4 mg) into one nostril alternating nostrils as needed for opioid reversal every 2-3 minutes until assistance arrives 0.2 mL 0     OLANZapine (ZYPREXA) 5 MG tablet Take 0.5 tablets (2.5 mg) by mouth every morning And 1 tab(5mg) at 2pm and 1 tab(5mg) at bedtime. 75 tablet 1     verapamil (CALAN) 40 MG tablet Take 1 tablet (40 mg) by mouth 2 times daily 180 tablet 3     CYMBALTA 60 MG capsule TAKE ONE CAPSULE BY MOUTH EVERY EVENING (Patient not taking: Reported on 1/24/2020) 90 capsule 0              Assessment:  Sherie Otero is a 50 year old female who presents today for follow up regarding her:     6. Fibromyalgia  7. Chronic low back pain  8. Cervicalgia  9. Chronic pain syndrome  10. Chronic use of opioids    Patient will be 3 days short on her Norco as she states that she fell out of a  van and increased her Norco use.  Discussed with the patient that this is a break in her contract.  Discussed that she needs to call me before increasing her Norco.  I will agree to a one-time early refill however no further early refills.  If she does run out early again we will taper her off of the Norco.  She verbalized her understanding.    Reviewed medical cannabis and she would like to try it.        Plan:  Diagnosis reviewed, treatment option addressed, and risk/benifits discussed.  Self-care instructions given.  I am recommending a multidisciplinary treatment plan to help this patient better manage pain.       1. Physical Therapy: continue previous PT exercises;   2. Clinical Health Psychologist:  YES, has a plan in place  3. Diagnostic Studies: none at this time  4. Lab: UDS today  5. CSA reviewed and updated today  6. Medication Management:    1. Stop Baclofen  2. Start Flexeril 10mg TID PRN  3. Continue hydrocodone to 5/325: 2.5 tablets, 2.5 tablets and 1.5 tablet with dosing spread by 4.5 hours. pain. Max of 6.5 tablets/day  4. Consider restarting Savella once her anxiety is under better control  5. Consider low-dose naltrexone  6. Continue Toradol 10mg as needed. Do not use daily.  7. certified today for medical cannabis  7. Further procedures recommended: consider trigger point injections if needed        Follow up with this provider 4-8 weeks for a virtual visit    The total TIME spent on this patient on the day of the appointment was 32  minutes.    Time spent preparing to see the patient (reviewing records and tests) 5 minutes  Time spend face to face (or on the phone) with the patient 24 minutes  Time spent ordering tests, medications, procedures and referrals 0 minutes  Time spent Referring and communicating with other healthcare professionals 0 minutes  Time spent documenting clinical information in Epic 3 minutes        Celia Bettencourt [facility]
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
## 2021-05-18 ENCOUNTER — HOSPITAL ENCOUNTER (OUTPATIENT)
Dept: ULTRASOUND IMAGING | Facility: CLINIC | Age: 51
Discharge: HOME | End: 2021-05-18
Attending: SURGERY | Admitting: SURGERY
Payer: COMMERCIAL

## 2021-05-18 DIAGNOSIS — N28.1 RENAL CYST: ICD-10-CM

## 2021-05-18 PROCEDURE — 76770 US EXAM ABDO BACK WALL COMP: CPT

## 2021-05-18 ASSESSMENT — ANXIETY QUESTIONNAIRES: GAD7 TOTAL SCORE: 17

## 2021-05-26 ENCOUNTER — OFFICE VISIT (OUTPATIENT)
Dept: PALLIATIVE MEDICINE | Facility: CLINIC | Age: 51
End: 2021-05-26
Payer: COMMERCIAL

## 2021-05-26 VITALS
HEIGHT: 66 IN | TEMPERATURE: 97.8 F | SYSTOLIC BLOOD PRESSURE: 128 MMHG | WEIGHT: 179 LBS | BODY MASS INDEX: 28.77 KG/M2 | DIASTOLIC BLOOD PRESSURE: 80 MMHG

## 2021-05-26 DIAGNOSIS — G89.4 CHRONIC PAIN SYNDROME: ICD-10-CM

## 2021-05-26 DIAGNOSIS — G89.29 CHRONIC BILATERAL LOW BACK PAIN WITHOUT SCIATICA: ICD-10-CM

## 2021-05-26 DIAGNOSIS — F11.90 CHRONIC, CONTINUOUS USE OF OPIOIDS: ICD-10-CM

## 2021-05-26 DIAGNOSIS — M79.7 FIBROMYALGIA: Primary | ICD-10-CM

## 2021-05-26 DIAGNOSIS — M54.2 CERVICALGIA: ICD-10-CM

## 2021-05-26 DIAGNOSIS — M54.50 CHRONIC BILATERAL LOW BACK PAIN WITHOUT SCIATICA: ICD-10-CM

## 2021-05-26 PROCEDURE — 80307 DRUG TEST PRSMV CHEM ANLYZR: CPT | Performed by: PHYSICIAN ASSISTANT

## 2021-05-26 PROCEDURE — 99214 OFFICE O/P EST MOD 30 MIN: CPT | Mod: 95 | Performed by: PHYSICIAN ASSISTANT

## 2021-05-26 RX ORDER — HYDROCODONE BITARTRATE AND ACETAMINOPHEN 5; 325 MG/1; MG/1
TABLET ORAL
Qty: 195 TABLET | Refills: 0 | Status: SHIPPED | OUTPATIENT
Start: 2021-05-26 | End: 2021-06-21

## 2021-05-26 RX ORDER — KETOROLAC TROMETHAMINE 10 MG/1
TABLET, FILM COATED ORAL
Qty: 10 TABLET | Refills: 1 | Status: SHIPPED | OUTPATIENT
Start: 2021-05-26 | End: 2021-09-21

## 2021-05-26 ASSESSMENT — PAIN SCALES - GENERAL: PAINLEVEL: MODERATE PAIN (5)

## 2021-05-26 ASSESSMENT — MIFFLIN-ST. JEOR: SCORE: 1430.75

## 2021-05-26 NOTE — LETTER
Opioid / Opioid Plus Controlled Substance Agreement    This is an agreement between you and your provider about the safe and appropriate use of controlled substance/opioids prescribed by your care team. Controlled substances are medicines that can cause physical and mental dependence (abuse).    There are strict laws about having and using these medicines. We here at [facility] are committing to working with you in your efforts to get better. To support you in this work, we ll help you schedule regular office appointments for medicine refills. If we must cancel or change your appointment for any reason, we ll make sure you have enough medicine to last until your next appointment.     As a Provider, I will:    Listen carefully to your concerns and treat you with respect.     Recommend a treatment plan that I believe is in your best interest. This plan may involve therapies other than opioid pain medication.     Talk with you often about the possible benefits, and the risk of harm of any medicine that we prescribe for you.     Provide a plan on how to taper (discontinue or go off) using this medicine if the decision is made to stop its use.    As a Patient, I understand that opioid(s):     Are a controlled substance prescribed by my care team to help me function or work and manage my condition(s).     Are strong medicines and can cause serious side effects such as:    Drowsiness, which can seriously affect my driving ability    A lower breathing rate, enough to cause death    Harm to my thinking ability     Depression     Abuse of and addiction to this medicine    Need to be taken exactly as prescribed. Combining opioids with certain medicines or chemicals (such as illegal drugs, sedatives, sleeping pills, and benzodiazepines) can be dangerous or even fatal. If I stop opioids suddenly, I may have severe withdrawal symptoms.    Do not work for all types of pain nor for all patients. If they re not helpful, I may  be asked to stop them.        The risks, benefits and side effects of these medicine(s) were explained to me. I agree that:  1. I will take part in other treatments as advised by my care team. This may be psychiatry or counseling, physical therapy, behavioral therapy, group treatment or a referral to a specialist.     2. I will keep all my appointments. I understand that this is part of the monitoring of opioids. My care team may require an office visit for EVERY opioid/controlled substance refill. If I miss appointments or don t follow instructions, my care team may stop my medicine.    3. I will take my medicines as prescribed. I will not change the dose or schedule unless my care team tells me to. There will be no refills if I run out early.     4. I may be asked to come to the clinic and complete a urine drug test or complete a pill count at any time. If I don t give a urine sample or participate in a pill count, the care team may stop my medicine.    5. I will only receive prescriptions from this clinic for chronic pain. If I am treated by another provider for acute pain issues, I will tell them that I am taking opioid pain medication for chronic pain and that I have a treatment agreement with this provider. I will inform my [facility] care team within one business day if I am given a prescription for any pain medication by another healthcare provider. My [facility] care team can contact other providers and pharmacists about my use of any medicines.    6. It is up to me to make sure that I don t run out of my medicines on weekends or holidays. If my care team is willing to refill my opioid prescription without a visit, I must request refills only during office hours. Refills may take up to 3 business days to process. I will use one pharmacy to fill all my opioid and other controlled substance prescriptions. I will notify the clinic about any changes to my insurance or medication  availability.    7. I am responsible for my prescriptions. If the medicine/prescription is lost, stolen or destroyed, it will not be replaced. I also agree not to share controlled substance medicines with anyone.    8. I am aware I should not use any illegal or recreational drugs. I agree not to drink alcohol unless my care team says I can.       9. If I enroll in the [place] Medical Cannabis program, I will tell my care team prior to my next refill.     10. I will tell my care team right away if I become pregnant, have a new medical problem treated outside of my regular clinic, or have a change in my medications.    11. I understand that this medicine can affect my thinking, judgment and reaction time. Alcohol and drugs affect the brain and body, which can affect the safety of my driving. Being under the influence of alcohol or drugs can affect my decision-making, behaviors, personal safety, and the safety of others. Driving while impaired (DWI) can occur if a person is driving, operating, or in physical control of a car, motorcycle, boat, snowmobile, ATV, motorbike, off-road vehicle, or any other motor vehicle ([place] Statute 169A.20). I understand the risk if I choose to drive or operate any vehicle or machinery.    I understand that if I do not follow any of the conditions above, my prescriptions or treatment may be stopped or changed.          Opioids  What You Need to Know    What are opioids?   Opioids are pain medicines that must be prescribed by a doctor. They are also known as narcotics.     Examples are:   1. morphine (MS Contin, Catrachita)  2. oxycodone (Oxycontin)  3. oxycodone and acetaminophen (Percocet)  4. hydrocodone and acetaminophen (Vicodin, Norco)   5. fentanyl patch (Duragesic)   6. hydromorphone (Dilaudid)   7. methadone  8. codeine (Tylenol #3)     What do opioids do well?   Opioids are best for severe short-term pain such as after a surgery or injury. They may work well for cancer pain. They may  help some people with long-lasting (chronic) pain.     What do opioids NOT do well?   Opioids never get rid of pain entirely, and they don t work well for most patients with chronic pain. Opioids don t reduce swelling, one of the causes of pain.                                    Other ways to manage chronic pain and improve function include:       Treat the health problem that may be causing pain    Anti-inflammation medicines, which reduce swelling and tenderness, such as ibuprofen (Advil, Motrin) or naproxen (Aleve)    Acetaminophen (Tylenol)    Antidepressants and anti-seizure medicines, especially for nerve pain    Topical treatments such as patches or creams    Injections or nerve blocks    Chiropractic or osteopathic treatment    Acupuncture, massage, deep breathing, meditation, visual imagery, aromatherapy    Use heat or ice at the pain site    Physical therapy     Exercise    Stop smoking    Take part in therapy       Risks and side effects     Talk to your doctor before you start or decide to keep taking opioids. Possible side effects include:      Lowering your breathing rate enough to cause death    Overdose, including death, especially if taking higher than prescribed doses    Worse depression symptoms; less pleasure in things you usually enjoy    Feeling tired or sluggish    Slower thoughts or cloudy thinking    Being more sensitive to pain over time; pain is harder to control    Trouble sleeping or restless sleep    Changes in hormone levels (for example, less testosterone)    Changes in sex drive or ability to have sex    Constipation    Unsafe driving    Itching and sweating    Dizziness    Nausea, throwing up and dry mouth    What else should I know about opioids?    Opioids may lead to dependence, tolerance, or addiction.      Dependence means that if you stop or reduce the medicine too quickly, you will have withdrawal symptoms. These include loose poop (diarrhea), jitters, flu-like symptoms,  nervousness and tremors. Dependence is not the same as addiction.                       Tolerance means needing higher doses over time to get the same effect. This may increase the chance of serious side effects.      Addiction is when people improperly use a substance that harms their body, their mind or their relations with others. Use of opiates can cause a relapse of addiction if you have a history of drug or alcohol abuse.      People who have used opioids for a long time may have a lower quality of life, worse depression, higher levels of pain and more visits to doctors.    You can overdose on opioids. Take these steps to lower your risk of overdose:    1. Recognize the signs:  Signs of overdose include decrease or loss of consciousness (blackout), slowed breathing, trouble waking up and blue lips. If someone is worried about overdose, they should call 911.    2. Talk to your doctor about Narcan (naloxone).   If you are at risk for overdose, you may be given a prescription for Narcan. This medicine very quickly reverses the effects of opioids.   If you overdose, a friend or family member can give you Narcan while waiting for the ambulance. They need to know the signs of overdose and how to give Narcan.     3. Don't use alcohol or street drugs.   Taking them with opioids can cause death.    4. Do not take any of these medicines unless your doctor says it s OK. Taking these with opioids can cause death:    Benzodiazepines, such as lorazepam (Ativan), alprazolam (Xanax) or diazepam (Valium)    Muscle relaxers, such as cyclobenzaprine (Flexeril)    Sleeping pills like zolpidem (Ambien)     Other opioids      How to keep you and other people safe while taking opioids:    1. Never share your opioids with others.  Opioid medicines are regulated by the Drug Enforcement Agency (BJORN). Selling or sharing medications is a criminal act.    2. Be sure to store opioids in a secure place, locked up if possible. Young children  can easily swallow them and overdose.    3. When you are traveling with your medicines, keep them in the original bottles. If you use a pill box, be sure you also carry a copy of your medicine list from your clinic or pharmacy.    4. Safe disposal of opioids    Most pharmacies have places to get rid of medicine, called disposal kiosks. Medicine disposal options are also available in every [facility]. Search your county and  medication disposal  to find more options. You can find more details at:  [email or web address]-[email or web address]-medications     I agree that my provider, clinic care team, and pharmacy may work with any city, state or federal law enforcement agency that investigates the misuse, sale, or other diversion of my controlled medicine. I will allow my provider to discuss my care with, or share a copy of, this agreement with any other treating provider, pharmacy or emergency room where I receive care.    I have read this agreement and have asked questions about anything I did not understand.    _______________________________________________________  Patient Signature - Sherie Otero _____________________                   Date     _______________________________________________________  Provider Signature - Celia Bettencourt PA-C   _____________________                   Date     _______________________________________________________  Witness Signature (required if provider not present while patient signing)   _____________________                   Date

## 2021-05-26 NOTE — PATIENT INSTRUCTIONS
After Visit Instructions:     Thank you for coming to [facility] for your care. It is my goal to partner with you to help you reach your optimal state of health.     I am recommending multidisciplinary care at this time.  The focus of care will be to continue gradual rehabilitation and pain management with medication adjustments as needed.    Continue daily self-care, identifying contributing factors, and monitoring variations in pain level. Continue to integrate self-care into your life.          Schedule follow-up with Celia Bettencourt PA-C in 4-8 weeks for a virtual visit. You will need to make this appointment.     Labs: urine drug screen completed today    Controlled substance agreement signed today    Medication recommendations:   1. Continue Flexeril 10mg TID PRN  2. Continue hydrocodone to 5/325: 2.5 tablets, 2.5 tablets and 1.5 tablet with dosing spread by 4.5 hours. pain. Max of 6.5 tablets/day.   3. Continue Toradol 10mg as needed. Do not use daily.  4. You certified today for medical cannabis      Celia Bettencourt PA-C  [facility]    Contact information: [facility]  Clinic Number:  [phone]     Call with any questions about your care and for scheduling assistance.     Calls are returned Monday through Friday between 8 AM and 4:30 PM. We usually get back to you within 2 business days depending on the issue/request.    If we are prescribing your medications:    For opioid medication refills, call the clinic or send a [employer] message 7 days in advance.  Please include:    Name of requested medication    Name of the pharmacy.    For non-opioid medications, call your pharmacy directly to request a refill. Please allow 3-4 days to be processed.     Per [place] State Law:    All controlled substance prescriptions must be filled within 30 days of being written.      For those controlled substances allowing refills,  pickup must occur within 30 days of last fill.      We believe regular attendance is key to your success in our program!      Any time you are unable to keep your appointment we ask that you call us at least 24 hours in advance to cancel.This will allow us to offer the appointment time to another patient.   Multiple missed appointments may lead to dismissal from the clinic.

## 2021-05-28 LAB
7AMINOCLONAZEPAM UR QL CFM: PRESENT
CREAT UR-MCNC: 34 MG/DL
DHC UR CFM-MCNC: 299 NG/ML
DHC/CREAT UR: 879 NG/MG{CREAT}
HYDROCODONE UR CFM-MCNC: 1460 NG/ML
HYDROCODONE/CREAT UR: 4294 NG/MG{CREAT}
RPT COMMENT: ABNORMAL

## 2021-06-01 ENCOUNTER — VIRTUAL VISIT (OUTPATIENT)
Dept: PSYCHOLOGY | Facility: CLINIC | Age: 51
End: 2021-06-01
Payer: COMMERCIAL

## 2021-06-01 DIAGNOSIS — F33.1 MAJOR DEPRESSIVE DISORDER, RECURRENT EPISODE, MODERATE WITH ANXIOUS DISTRESS (H): ICD-10-CM

## 2021-06-01 DIAGNOSIS — F90.2 ADHD (ATTENTION DEFICIT HYPERACTIVITY DISORDER), COMBINED TYPE: Primary | ICD-10-CM

## 2021-06-01 DIAGNOSIS — F41.1 GENERALIZED ANXIETY DISORDER: ICD-10-CM

## 2021-06-01 PROCEDURE — 90834 PSYTX W PT 45 MINUTES: CPT | Mod: 95 | Performed by: SOCIAL WORKER

## 2021-06-01 ASSESSMENT — ANXIETY QUESTIONNAIRES
GAD7 TOTAL SCORE: 9
7. FEELING AFRAID AS IF SOMETHING AWFUL MIGHT HAPPEN: SEVERAL DAYS
3. WORRYING TOO MUCH ABOUT DIFFERENT THINGS: MORE THAN HALF THE DAYS
5. BEING SO RESTLESS THAT IT IS HARD TO SIT STILL: NOT AT ALL
1. FEELING NERVOUS, ANXIOUS, OR ON EDGE: NEARLY EVERY DAY
2. NOT BEING ABLE TO STOP OR CONTROL WORRYING: MORE THAN HALF THE DAYS
6. BECOMING EASILY ANNOYED OR IRRITABLE: SEVERAL DAYS
IF YOU CHECKED OFF ANY PROBLEMS ON THIS QUESTIONNAIRE, HOW DIFFICULT HAVE THESE PROBLEMS MADE IT FOR YOU TO DO YOUR WORK, TAKE CARE OF THINGS AT HOME, OR GET ALONG WITH OTHER PEOPLE: VERY DIFFICULT
4. TROUBLE RELAXING: NOT AT ALL

## 2021-06-01 ASSESSMENT — PATIENT HEALTH QUESTIONNAIRE - PHQ9: SUM OF ALL RESPONSES TO PHQ QUESTIONS 1-9: 9
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
## 2021-06-02 ASSESSMENT — ANXIETY QUESTIONNAIRES: GAD7 TOTAL SCORE: 9

## 2021-06-15 ENCOUNTER — VIRTUAL VISIT (OUTPATIENT)
Dept: PSYCHOLOGY | Facility: CLINIC | Age: 51
End: 2021-06-15
Payer: COMMERCIAL

## 2021-06-15 DIAGNOSIS — F33.1 MAJOR DEPRESSIVE DISORDER, RECURRENT EPISODE, MODERATE WITH ANXIOUS DISTRESS (H): ICD-10-CM

## 2021-06-15 DIAGNOSIS — F90.2 ADHD (ATTENTION DEFICIT HYPERACTIVITY DISORDER), COMBINED TYPE: Primary | ICD-10-CM

## 2021-06-15 DIAGNOSIS — F41.1 GENERALIZED ANXIETY DISORDER: ICD-10-CM

## 2021-06-15 PROCEDURE — 90832 PSYTX W PT 30 MINUTES: CPT | Mod: 95 | Performed by: SOCIAL WORKER

## 2021-06-15 ASSESSMENT — ANXIETY QUESTIONNAIRES
1. FEELING NERVOUS, ANXIOUS, OR ON EDGE: MORE THAN HALF THE DAYS
3. WORRYING TOO MUCH ABOUT DIFFERENT THINGS: NEARLY EVERY DAY
GAD7 TOTAL SCORE: 15
7. FEELING AFRAID AS IF SOMETHING AWFUL MIGHT HAPPEN: MORE THAN HALF THE DAYS
6. BECOMING EASILY ANNOYED OR IRRITABLE: MORE THAN HALF THE DAYS
2. NOT BEING ABLE TO STOP OR CONTROL WORRYING: NEARLY EVERY DAY
5. BEING SO RESTLESS THAT IT IS HARD TO SIT STILL: SEVERAL DAYS
7. FEELING AFRAID AS IF SOMETHING AWFUL MIGHT HAPPEN: MORE THAN HALF THE DAYS
GAD7 TOTAL SCORE: 15
4. TROUBLE RELAXING: MORE THAN HALF THE DAYS
GAD7 TOTAL SCORE: 15

## 2021-06-15 ASSESSMENT — PATIENT HEALTH QUESTIONNAIRE - PHQ9
SUM OF ALL RESPONSES TO PHQ QUESTIONS 1-9: 10
SUM OF ALL RESPONSES TO PHQ QUESTIONS 1-9: 10
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
## 2021-06-16 ASSESSMENT — ANXIETY QUESTIONNAIRES: GAD7 TOTAL SCORE: 15

## 2021-06-16 ASSESSMENT — PATIENT HEALTH QUESTIONNAIRE - PHQ9: SUM OF ALL RESPONSES TO PHQ QUESTIONS 1-9: 10

## 2021-06-21 DIAGNOSIS — M54.2 CERVICALGIA: ICD-10-CM

## 2021-06-21 DIAGNOSIS — M54.50 CHRONIC BILATERAL LOW BACK PAIN WITHOUT SCIATICA: ICD-10-CM

## 2021-06-21 DIAGNOSIS — G89.4 CHRONIC PAIN SYNDROME: ICD-10-CM

## 2021-06-21 DIAGNOSIS — G89.29 CHRONIC BILATERAL LOW BACK PAIN WITHOUT SCIATICA: ICD-10-CM

## 2021-06-21 DIAGNOSIS — M79.7 FIBROMYALGIA: ICD-10-CM

## 2021-06-21 RX ORDER — HYDROCODONE BITARTRATE AND ACETAMINOPHEN 5; 325 MG/1; MG/1
TABLET ORAL
Qty: 195 TABLET | Refills: 0 | Status: SHIPPED | OUTPATIENT
Start: 2021-06-21 | End: 2021-07-22

## 2021-06-21 NOTE — TELEPHONE ENCOUNTER
Medication refill information reviewed.     Due date for Hydrocodone-acetaminophen is 06/28/21     Prescriptions prepped for review.     Will route to provider.

## 2021-06-21 NOTE — TELEPHONE ENCOUNTER
[employer] message sent with Rx approval from provider.  Jake  [facility] Clinic Management Team

## 2021-06-21 NOTE — TELEPHONE ENCOUNTER
Refill appropriate. Sent to requested pharmacy.    [place] Prescription Monitoring Program checked on 3/24/21.    Celia Bettencourt, PAC

## 2021-06-21 NOTE — TELEPHONE ENCOUNTER
Received call from patient requesting refill(s) of Hydrocodone-acetaminophen     Last dispensed from pharmacy on 5/26/21    Patient's last office/virtual visit by prescribing provider on 5/26/21  Next office/virtual appointment scheduled for 7/6/21    Last urine drug screen date 5/26/21  Current opioid agreement on file (completed within the last year) Yes Date of opioid agreement: 5/26/21    E-prescribe to [facility]  pharmacy    Will route to nursing [place] for review and preparation of prescription(s).

## 2021-06-25 ENCOUNTER — TELEPHONE (OUTPATIENT)
Dept: FAMILY MEDICINE | Facility: CLINIC | Age: 51
End: 2021-06-25

## 2021-06-25 NOTE — TELEPHONE ENCOUNTER
Patient called and requested to cancel her appointment with  for today. Number given to patient that she can call to rescheduled. Yazmin Mcdaniels LPN

## 2021-06-28 NOTE — PROGRESS NOTES
Sherie is a 50 year old who is being evaluated via a billable video visit.    Is Pt currently in [place]? Yes        How would you like to obtain your AVS? MyChart  If the video visit is dropped, the invitation should be resent by: Text to cell phone: [phone]  Will anyone else be joining your video visit? No      Video Start Time: 12:54pm  Video-Visit Details    Type of service:  Video Visit    Video End Time:1:07pm    Originating Location (pt. Location): Home    Distant Location (provider location):  [facility]     Platform used for Video Visit: [employer]        [facility]     CHIEF COMPLAINT: Pain  -Fibromyalgia  -Neck pain  -Low back pain     INTERVAL HISTORY:  Last seen on 5/26/21      Recommendations/plan at the last visit included:    1.  Physical Therapy: continue previous PT exercises;   2. Clinical Health Psychologist:  YES, has a plan in place  3. Diagnostic Studies: none at this time  4. Lab: UDS today  5. CSA reviewed and updated today  6. Medication Management:    1. Stop Baclofen  2. Start Flexeril 10mg TID PRN  3. Continue hydrocodone to 5/325: 2.5 tablets, 2.5 tablets and 1.5 tablet with dosing spread by 4.5 hours. pain. Max of 6.5 tablets/day  4. Consider restarting Savella once her anxiety is under better control  5. Consider low-dose naltrexone  6. Continue Toradol 10mg as needed. Do not use daily.  7. certified today for medical cannabis  7. Further procedures recommended: consider trigger point injections if needed        Follow up with this provider 4-8 weeks for a virtual visit    Since her last visit, Sherie SANTIAGO Shanna reports:    She is doing okay. She states that Flexeril does not seem like it is doing anything. She states that she still gets off balance even with stopping the Baclofen, but this has been going on for awhile.    She has been trying different dosing of medical cannabis. She states that one of the forms make her dizzy and  "tired and the other one, she feels like she is slightly out of it. The newest one has more CBD in it.     In general, she feels that her pain is the same \"as always\". She tries to do what she can to function. She states that her anxiety has been \"off the charts\". She states that she has a lot going on. She sees counseling every other week and psychiatry once a month.     Pain Information:              Pain today 4/10     UDS 5/26/21   CSA 5/26/21     CURRENT RELEVANT PAIN MEDICATIONS:  Clonazepam 0.5mg: taking 1 tab twice daily    Hydrocodone 5-325: 2.5 tablets in AM, 2.5 tablets in afternoon and 1.5 tablets at bedtime  Toradol 10mg-using sparingly  Medical cannabis: spray under tongue and vaporizer      Patient is using the medication as prescribed:  YES  Is your medication helpful?     somewhat   Medication side effects? no side effect     Previous Medications: (H--helped; HI--Helped initially; SWH-- somewhat helpful, NH--No help; W--worse; SE--side effects)   Opiates: Hydrocodone H, Oxycodone SE  NSAIDS: Ibuprofen H, Relafen SE stomach upset, Meloxicam NH  Muscle Relaxants: Tizanidine NH, Flexeril HI, Robaxin NH SE stomach upset, Baclofen SE dizzy   Anti-migraine mediations: Verapamil H  Anti-depressants: Cymbalta H  Sleep aids: none  Anxiolytics: Xanax H, Clonazepam H, Valium H  Neuropathics: Gabapentin SE dizziness          Topicals: Lidocaine patches SW  Other medications not covered above: Savella H at first, then seemed to stop working, Lyrica SE shortness of breath, felt 'weird' on them, throat felt weird. Prednisone H for arthritis flare     Past Pain Treatments:  Pain Clinic:   No   PT: Yes, years ago. Has not done pool therapy.   Psychologist: No  Relaxation techniques/biofeedback: No  Chiropractor: No, was told not to because of neck.   Acupuncture: Yes for headaches.   Pharmacotherapy:           Opioids: Yes            Non-opioids:    Yes   TENs Unit:Yes  Injections: cervical medial branch blocks " 6/12/2014  Self-care:   Yes, ice/heat, hot baths, theracare  Surgeries related to pain: No     [place] Board of Pharmacy Data Base Reviewed:    YES; As expected, no concern for misuse/abuse of controlled medications based on this report. Checked on 7/6/21        THE 4 As OF OPIOID MAINTENANCE ANALGESIA    Analgesia: Is pain relief clinically significant? YES   Activity: Is patient functional and able to perform Activities of Daily Living? YES   Adverse effects: Is patient free from adverse side effects from opiates? YES   Adherence to Rx protocol: Is patient adhering to Controlled Substance Agreement and taking medications ONLY as ordered? No  -5/26/21-took more Norco-3 days short        Is Narcan prescribed for opiate use >50 MME daily? yes        Total Daily MME: 32.5    Medications:  Current Outpatient Prescriptions          Current Outpatient Medications   Medication Sig Dispense Refill     albuterol (VENTOLIN HFA) 108 (90 Base) MCG/ACT inhaler Inhale 2 puffs into the lungs every 6 hours as needed for shortness of breath / dyspnea or wheezing 18 g 1     cetirizine (ZYRTEC) 10 MG tablet TAKE ONE TABLET BY MOUTH ONCE DAILY 90 tablet 2     clonazePAM (KLONOPIN) 0.5 MG tablet Take 1 tablet (0.5 mg) by mouth 2 times daily as needed for anxiety Must last 30 days 60 tablet 0     [START ON 2/24/2020] clonazePAM (KLONOPIN) 0.5 MG tablet Take 1 tablet (0.5 mg) by mouth 2 times daily as needed for anxiety 6 tablet 0     cyclobenzaprine (FLEXERIL) 10 MG tablet Take 2 tablets in the evening and 1 in the morning 90 tablet 2     DULoxetine (CYMBALTA) 20 MG capsule Take 2 capsules (40 mg) by mouth daily CYMBALTA-KADY 60 capsule 0     fluticasone (FLONASE) 50 MCG/ACT nasal spray SPRAY 2 SPRAYS INTO BOTH NOSTRILS DAILY. 16 g 11     gabapentin (NEURONTIN) 100 MG capsule Take 1 capsule at bedtime for 1 week, then take 1 capsule twice daily for 1 week, then take 1 capsule three times a day 90 capsule 0     HYDROcodone-acetaminophen  (NORCO) 7.5-325 MG per tablet Take 1 tablet every 4-6 hours as needed for severe pain. Max of 5 tablets per day. Fill/start 2019. 150 tablet 0     naloxone (NARCAN) nasal spray Spray 1 spray (4 mg) into one nostril alternating nostrils as needed for opioid reversal every 2-3 minutes until assistance arrives 0.2 mL 0     OLANZapine (ZYPREXA) 5 MG tablet Take 0.5 tablets (2.5 mg) by mouth every morning And 1 tab(5mg) at 2pm and 1 tab(5mg) at bedtime. 75 tablet 1     verapamil (CALAN) 40 MG tablet Take 1 tablet (40 mg) by mouth 2 times daily 180 tablet 3     CYMBALTA 60 MG capsule TAKE ONE CAPSULE BY MOUTH EVERY EVENING (Patient not taking: Reported on 2020) 90 capsule 0              Assessment:  Sherie Otero is a 50 year old female who presents today for follow up regarding her:     1. Fibromyalgia  2. Chronic low back pain  3. Cervicalgia  4. Chronic pain syndrome  5. Chronic use of opioids    Pain is overall the same. She has not noticed a change with the Flexeril. She is also working with her medical cannabis dosing to find one that helps and she is able to tolerate. She would like to try a different muscle relaxant. Will try Skelaxin. No other changes today.          Plan:  Diagnosis reviewed, treatment option addressed, and risk/benifits discussed.  Self-care instructions given.  I am recommending a multidisciplinary treatment plan to help this patient better manage pain.       1. Physical Therapy: continue home exercise program;   2. Clinical Health Psychologist:  YES, has a plan in place  3. Diagnostic Studies: none at this time  4. CSA reviewed and updated today  5. Medication Management:    1. Stop Flexeril   2. Start Metaxalone 800m tab TID PRN  3. Continue hydrocodone 5/325: 2.5 tablets, 2.5 tablets and 1.5 tablet with dosing spread by 4.5 hours. pain. Max of 6.5 tablets/day  4. Consider restarting Savella once her anxiety is under better control  5. Consider low-dose  naltrexone  6. Continue Toradol 10mg as needed. Do not use daily.  7. Continue medical cannabis  6. Further procedures recommended: none at this time        Follow up with this provider 3-8 weeks depending on how she feels she is doing with her pain    The total TIME spent on this patient on the day of the appointment was  18 minutes.    Time spent preparing to see the patient (reviewing records and tests) 1 minutes  Time spend face to face (or on the phone) with the patient 13 minutes  Time spent ordering tests, medications, procedures and referrals 0 minutes  Time spent Referring and communicating with other healthcare professionals 0 minutes  Time spent documenting clinical information in Epic 4 minutes        Celia Bettencourt [facility]

## 2021-06-29 ENCOUNTER — VIRTUAL VISIT (OUTPATIENT)
Dept: PSYCHOLOGY | Facility: CLINIC | Age: 51
End: 2021-06-29
Payer: COMMERCIAL

## 2021-06-29 DIAGNOSIS — F90.2 ADHD (ATTENTION DEFICIT HYPERACTIVITY DISORDER), COMBINED TYPE: Primary | ICD-10-CM

## 2021-06-29 DIAGNOSIS — F41.1 GENERALIZED ANXIETY DISORDER: ICD-10-CM

## 2021-06-29 DIAGNOSIS — F33.1 MAJOR DEPRESSIVE DISORDER, RECURRENT EPISODE, MODERATE WITH ANXIOUS DISTRESS (H): ICD-10-CM

## 2021-06-29 PROCEDURE — 90834 PSYTX W PT 45 MINUTES: CPT | Mod: 95 | Performed by: SOCIAL WORKER

## 2021-06-29 ASSESSMENT — ANXIETY QUESTIONNAIRES
5. BEING SO RESTLESS THAT IT IS HARD TO SIT STILL: NOT AT ALL
2. NOT BEING ABLE TO STOP OR CONTROL WORRYING: NEARLY EVERY DAY
1. FEELING NERVOUS, ANXIOUS, OR ON EDGE: NEARLY EVERY DAY
GAD7 TOTAL SCORE: 16
3. WORRYING TOO MUCH ABOUT DIFFERENT THINGS: NEARLY EVERY DAY
IF YOU CHECKED OFF ANY PROBLEMS ON THIS QUESTIONNAIRE, HOW DIFFICULT HAVE THESE PROBLEMS MADE IT FOR YOU TO DO YOUR WORK, TAKE CARE OF THINGS AT HOME, OR GET ALONG WITH OTHER PEOPLE: EXTREMELY DIFFICULT
6. BECOMING EASILY ANNOYED OR IRRITABLE: SEVERAL DAYS
7. FEELING AFRAID AS IF SOMETHING AWFUL MIGHT HAPPEN: NEARLY EVERY DAY
4. TROUBLE RELAXING: NEARLY EVERY DAY

## 2021-06-29 ASSESSMENT — PATIENT HEALTH QUESTIONNAIRE - PHQ9: SUM OF ALL RESPONSES TO PHQ QUESTIONS 1-9: 9

## 2021-06-30 ASSESSMENT — ANXIETY QUESTIONNAIRES: GAD7 TOTAL SCORE: 16

## 2021-07-06 ENCOUNTER — VIRTUAL VISIT (OUTPATIENT)
Dept: PALLIATIVE MEDICINE | Facility: CLINIC | Age: 51
End: 2021-07-06
Payer: COMMERCIAL

## 2021-07-06 ENCOUNTER — TELEPHONE (OUTPATIENT)
Dept: FAMILY MEDICINE | Facility: OTHER | Age: 51
End: 2021-07-06

## 2021-07-06 DIAGNOSIS — M54.50 CHRONIC BILATERAL LOW BACK PAIN WITHOUT SCIATICA: ICD-10-CM

## 2021-07-06 DIAGNOSIS — M79.7 FIBROMYALGIA: Primary | ICD-10-CM

## 2021-07-06 DIAGNOSIS — G89.29 CHRONIC BILATERAL LOW BACK PAIN WITHOUT SCIATICA: ICD-10-CM

## 2021-07-06 DIAGNOSIS — G89.4 CHRONIC PAIN SYNDROME: ICD-10-CM

## 2021-07-06 DIAGNOSIS — F11.90 CHRONIC, CONTINUOUS USE OF OPIOIDS: ICD-10-CM

## 2021-07-06 DIAGNOSIS — M54.2 CERVICALGIA: ICD-10-CM

## 2021-07-06 PROCEDURE — 99214 OFFICE O/P EST MOD 30 MIN: CPT | Mod: 95 | Performed by: PHYSICIAN ASSISTANT

## 2021-07-06 RX ORDER — METAXALONE 800 MG/1
800 TABLET ORAL 3 TIMES DAILY PRN
Qty: 60 TABLET | Refills: 0 | Status: SHIPPED | OUTPATIENT
Start: 2021-07-06 | End: 2021-12-21

## 2021-07-06 NOTE — TELEPHONE ENCOUNTER
Prior Authorization Specialty Medication Request    Medication/Dose: Metaxalone 800mg  ICD code (if different than what is on RX):    Previously Tried and Failed:      Important Lab Values:   Rationale:     Insurance Name:   Insurance ID:   Insurance Phone Number:     Pharmacy Information (if different than what is on RX)  Name:    Phone:

## 2021-07-06 NOTE — PATIENT INSTRUCTIONS
After Visit Instructions:     Thank you for coming to [facility] for your care. It is my goal to partner with you to help you reach your optimal state of health.     I am recommending multidisciplinary care at this time.  The focus of care will be to continue gradual rehabilitation and pain management with medication adjustments as needed.    Continue daily self-care, identifying contributing factors, and monitoring variations in pain level. Continue to integrate self-care into your life.        Schedule follow-up with Celia Bettencourt PA-C in 3-8 weeks. You will need to make this appointment.     Medication recommendations:     Stop Flexeril    Start Metaxalone 800mg: take 1 tablet three times daily as needed    Continue Norco  5/325: 2.5 tablets, 2.5 tablets and 1.5 tablet with dosing spread by 4.5 hours. pain. Max of 6.5 tablets/day    Continue Medical Cannabis      Celia Bettencourt PA-C  [facility]  [place]    Contact information: [facility]  Clinic Number:  [phone]     Call with any questions about your care and for scheduling assistance.     Calls are returned Monday through Friday between 8 AM and 4:30 PM. We usually get back to you within 2 business days depending on the issue/request.    If we are prescribing your medications:    For opioid medication refills, call the clinic or send a Friendfer message 7 days in advance.  Please include:    Name of requested medication    Name of the pharmacy.    For non-opioid medications, call your pharmacy directly to request a refill. Please allow 3-4 days to be processed.     Per [place] State Law:    All controlled substance prescriptions must be filled within 30 days of being written.      For those controlled substances allowing refills, pickup must occur within 30 days of last fill.      We believe regular attendance is key to your success in our program!       Any time you are unable to keep your appointment we ask that you call us at least 24 hours in advance to cancel.This will allow us to offer the appointment time to another patient.   Multiple missed appointments may lead to dismissal from the clinic.

## 2021-07-07 NOTE — TELEPHONE ENCOUNTER
PA Initiation    Medication: Metaxalone 800mg  Insurance Company: [employer] - Phone [phone] Fax [phone]  Pharmacy Filling the Rx: [facility] - [place], [place] - [address]  Filling Pharmacy Phone: [phone]  Filling Pharmacy Fax: [phone]  Start Date: 7/7/2021    Sherie Otero ([place]: [id number])

## 2021-07-07 NOTE — TELEPHONE ENCOUNTER
Prior Authorization Approval    Authorization Effective Date: 6/7/2021  Authorization Expiration Date: 7/7/2022  Medication: Metaxalone 800mg  Approved Dose/Quantity:   Reference #: [id number]   Insurance Company: [employer] - Phone [phone] Fax [phone]  Expected CoPay:       CoPay Card Available:      Foundation Assistance Needed:    Which Pharmacy is filling the prescription (Not needed for infusion/clinic administered): [facility] - [place], [place] - [facility] [address]  Pharmacy Notified: Yes  Patient Notified: Yes  **Instructed pharmacy to notify patient when script is ready to /ship.**

## 2021-07-13 ENCOUNTER — TELEPHONE (OUTPATIENT)
Dept: PSYCHOLOGY | Facility: OTHER | Age: 51
End: 2021-07-13

## 2021-07-13 NOTE — TELEPHONE ENCOUNTER
Patient was scheduled for 1:30 pm video therapy session.  Patient did not respond to video invite to cell.  Attempted 2x to reach patient on phone for session.  Left patient vmail regarding missed session and provided dates times of upcoming appointments.  Also provided contact information for scheduling and this writer.
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
## 2021-07-13 NOTE — TELEPHONE ENCOUNTER
Patient called this writer approximately 2:10 pm, reported she missed her appointment due to being asleep, reported she has a sinus infection. Scheduled next session for 7/14/2021 at 3:30 pm.

## 2021-07-14 ENCOUNTER — VIRTUAL VISIT (OUTPATIENT)
Dept: PSYCHOLOGY | Facility: CLINIC | Age: 51
End: 2021-07-14
Payer: COMMERCIAL

## 2021-07-14 DIAGNOSIS — F33.1 MAJOR DEPRESSIVE DISORDER, RECURRENT EPISODE, MODERATE WITH ANXIOUS DISTRESS (H): ICD-10-CM

## 2021-07-14 DIAGNOSIS — F41.1 GENERALIZED ANXIETY DISORDER: ICD-10-CM

## 2021-07-14 DIAGNOSIS — F90.2 ADHD (ATTENTION DEFICIT HYPERACTIVITY DISORDER), COMBINED TYPE: Primary | ICD-10-CM

## 2021-07-14 PROCEDURE — 90834 PSYTX W PT 45 MINUTES: CPT | Mod: 95 | Performed by: SOCIAL WORKER

## 2021-07-14 ASSESSMENT — ANXIETY QUESTIONNAIRES
6. BECOMING EASILY ANNOYED OR IRRITABLE: NEARLY EVERY DAY
1. FEELING NERVOUS, ANXIOUS, OR ON EDGE: NEARLY EVERY DAY
4. TROUBLE RELAXING: NEARLY EVERY DAY
5. BEING SO RESTLESS THAT IT IS HARD TO SIT STILL: NOT AT ALL
IF YOU CHECKED OFF ANY PROBLEMS ON THIS QUESTIONNAIRE, HOW DIFFICULT HAVE THESE PROBLEMS MADE IT FOR YOU TO DO YOUR WORK, TAKE CARE OF THINGS AT HOME, OR GET ALONG WITH OTHER PEOPLE: EXTREMELY DIFFICULT
2. NOT BEING ABLE TO STOP OR CONTROL WORRYING: NEARLY EVERY DAY
GAD7 TOTAL SCORE: 18
7. FEELING AFRAID AS IF SOMETHING AWFUL MIGHT HAPPEN: NEARLY EVERY DAY
3. WORRYING TOO MUCH ABOUT DIFFERENT THINGS: NEARLY EVERY DAY

## 2021-07-14 ASSESSMENT — PATIENT HEALTH QUESTIONNAIRE - PHQ9: SUM OF ALL RESPONSES TO PHQ QUESTIONS 1-9: 16

## 2021-07-15 ASSESSMENT — ANXIETY QUESTIONNAIRES: GAD7 TOTAL SCORE: 18

## 2021-07-22 DIAGNOSIS — M79.7 FIBROMYALGIA: ICD-10-CM

## 2021-07-22 DIAGNOSIS — G89.4 CHRONIC PAIN SYNDROME: ICD-10-CM

## 2021-07-22 DIAGNOSIS — G89.29 CHRONIC BILATERAL LOW BACK PAIN WITHOUT SCIATICA: ICD-10-CM

## 2021-07-22 DIAGNOSIS — M54.50 CHRONIC BILATERAL LOW BACK PAIN WITHOUT SCIATICA: ICD-10-CM

## 2021-07-22 DIAGNOSIS — M54.2 CERVICALGIA: ICD-10-CM

## 2021-07-22 NOTE — TELEPHONE ENCOUNTER
Received call from patient requesting refill(s) of  HYDROcodone-acetaminophen (NORCO) 5-325 MG tablet    Last dispensed from pharmacy on 6/26/21    Patient's last office/virtual visit by prescribing provider on 7/6/21  Next office/virtual appointment scheduled for none    Last urine drug screen date 5/26/21  Current opioid agreement on file (completed within the last year) Yes Date of opioid agreement: 5/26/21    E-prescribe to [address]  pharmacy    Will route to [facility] for review and preparation of prescription(s).

## 2021-07-22 NOTE — TELEPHONE ENCOUNTER
Medication refill information reviewed.     Due date for  HYDROcodone-acetaminophen (NORCO) 5-325 MG tablet is 07/28/21     Prescriptions prepped for review.     Will route to provider.

## 2021-07-23 RX ORDER — HYDROCODONE BITARTRATE AND ACETAMINOPHEN 5; 325 MG/1; MG/1
TABLET ORAL
Qty: 195 TABLET | Refills: 0 | Status: SHIPPED | OUTPATIENT
Start: 2021-07-23 | End: 2021-08-23

## 2021-07-23 NOTE — TELEPHONE ENCOUNTER
Refill appropriate. Sent to requested pharmacy.    [place] Prescription Monitoring Program checked on 7/6/21.    Celia Bettencourt, PAC

## 2021-07-27 ENCOUNTER — VIRTUAL VISIT (OUTPATIENT)
Dept: PSYCHOLOGY | Facility: CLINIC | Age: 51
End: 2021-07-27
Payer: COMMERCIAL

## 2021-07-27 DIAGNOSIS — F41.1 GENERALIZED ANXIETY DISORDER: ICD-10-CM

## 2021-07-27 DIAGNOSIS — F33.1 MAJOR DEPRESSIVE DISORDER, RECURRENT EPISODE, MODERATE WITH ANXIOUS DISTRESS (H): ICD-10-CM

## 2021-07-27 DIAGNOSIS — F90.2 ADHD (ATTENTION DEFICIT HYPERACTIVITY DISORDER), COMBINED TYPE: Primary | ICD-10-CM

## 2021-07-27 PROCEDURE — 90834 PSYTX W PT 45 MINUTES: CPT | Mod: 95 | Performed by: SOCIAL WORKER

## 2021-07-27 ASSESSMENT — ANXIETY QUESTIONNAIRES
3. WORRYING TOO MUCH ABOUT DIFFERENT THINGS: NEARLY EVERY DAY
5. BEING SO RESTLESS THAT IT IS HARD TO SIT STILL: NOT AT ALL
1. FEELING NERVOUS, ANXIOUS, OR ON EDGE: MORE THAN HALF THE DAYS
6. BECOMING EASILY ANNOYED OR IRRITABLE: SEVERAL DAYS
GAD7 TOTAL SCORE: 15
4. TROUBLE RELAXING: NEARLY EVERY DAY
2. NOT BEING ABLE TO STOP OR CONTROL WORRYING: NEARLY EVERY DAY
7. FEELING AFRAID AS IF SOMETHING AWFUL MIGHT HAPPEN: NEARLY EVERY DAY
IF YOU CHECKED OFF ANY PROBLEMS ON THIS QUESTIONNAIRE, HOW DIFFICULT HAVE THESE PROBLEMS MADE IT FOR YOU TO DO YOUR WORK, TAKE CARE OF THINGS AT HOME, OR GET ALONG WITH OTHER PEOPLE: VERY DIFFICULT

## 2021-07-27 ASSESSMENT — PATIENT HEALTH QUESTIONNAIRE - PHQ9: SUM OF ALL RESPONSES TO PHQ QUESTIONS 1-9: 10

## 2021-07-28 ASSESSMENT — ANXIETY QUESTIONNAIRES: GAD7 TOTAL SCORE: 15
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
## 2021-08-17 ENCOUNTER — VIRTUAL VISIT (OUTPATIENT)
Dept: PSYCHOLOGY | Facility: CLINIC | Age: 51
End: 2021-08-17
Payer: COMMERCIAL

## 2021-08-17 DIAGNOSIS — F90.2 ADHD (ATTENTION DEFICIT HYPERACTIVITY DISORDER), COMBINED TYPE: Primary | ICD-10-CM

## 2021-08-17 DIAGNOSIS — F41.1 GENERALIZED ANXIETY DISORDER: ICD-10-CM

## 2021-08-17 DIAGNOSIS — F33.1 MAJOR DEPRESSIVE DISORDER, RECURRENT EPISODE, MODERATE WITH ANXIOUS DISTRESS (H): ICD-10-CM

## 2021-08-17 PROCEDURE — 90834 PSYTX W PT 45 MINUTES: CPT | Mod: 95 | Performed by: SOCIAL WORKER

## 2021-08-17 ASSESSMENT — ANXIETY QUESTIONNAIRES
GAD7 TOTAL SCORE: 16
7. FEELING AFRAID AS IF SOMETHING AWFUL MIGHT HAPPEN: NEARLY EVERY DAY
5. BEING SO RESTLESS THAT IT IS HARD TO SIT STILL: NOT AT ALL
2. NOT BEING ABLE TO STOP OR CONTROL WORRYING: NEARLY EVERY DAY
GAD7 TOTAL SCORE: 16
8. IF YOU CHECKED OFF ANY PROBLEMS, HOW DIFFICULT HAVE THESE MADE IT FOR YOU TO DO YOUR WORK, TAKE CARE OF THINGS AT HOME, OR GET ALONG WITH OTHER PEOPLE?: VERY DIFFICULT
3. WORRYING TOO MUCH ABOUT DIFFERENT THINGS: NEARLY EVERY DAY
7. FEELING AFRAID AS IF SOMETHING AWFUL MIGHT HAPPEN: NEARLY EVERY DAY
6. BECOMING EASILY ANNOYED OR IRRITABLE: SEVERAL DAYS
4. TROUBLE RELAXING: NEARLY EVERY DAY
1. FEELING NERVOUS, ANXIOUS, OR ON EDGE: NEARLY EVERY DAY
GAD7 TOTAL SCORE: 16

## 2021-08-17 ASSESSMENT — PATIENT HEALTH QUESTIONNAIRE - PHQ9
10. IF YOU CHECKED OFF ANY PROBLEMS, HOW DIFFICULT HAVE THESE PROBLEMS MADE IT FOR YOU TO DO YOUR WORK, TAKE CARE OF THINGS AT HOME, OR GET ALONG WITH OTHER PEOPLE: VERY DIFFICULT
SUM OF ALL RESPONSES TO PHQ QUESTIONS 1-9: 11
SUM OF ALL RESPONSES TO PHQ QUESTIONS 1-9: 11

## 2021-08-18 ASSESSMENT — PATIENT HEALTH QUESTIONNAIRE - PHQ9: SUM OF ALL RESPONSES TO PHQ QUESTIONS 1-9: 11

## 2021-08-18 ASSESSMENT — ANXIETY QUESTIONNAIRES: GAD7 TOTAL SCORE: 16

## 2021-08-23 ENCOUNTER — MYC REFILL (OUTPATIENT)
Dept: FAMILY MEDICINE | Facility: OTHER | Age: 51
End: 2021-08-23

## 2021-08-23 DIAGNOSIS — G89.4 CHRONIC PAIN SYNDROME: ICD-10-CM

## 2021-08-23 DIAGNOSIS — M54.50 CHRONIC BILATERAL LOW BACK PAIN WITHOUT SCIATICA: ICD-10-CM

## 2021-08-23 DIAGNOSIS — M54.2 CERVICALGIA: ICD-10-CM

## 2021-08-23 DIAGNOSIS — M79.7 FIBROMYALGIA: ICD-10-CM

## 2021-08-23 DIAGNOSIS — G89.29 CHRONIC BILATERAL LOW BACK PAIN WITHOUT SCIATICA: ICD-10-CM

## 2021-08-23 NOTE — TELEPHONE ENCOUNTER
Refills have been requested for the following medications:         HYDROcodone-acetaminophen (NORCO) 5-325 MG tablet [Celia Bettencourt PA-C]     Preferred pharmacy: [id number] - [place], [place] - [facility] [address]

## 2021-08-24 RX ORDER — HYDROCODONE BITARTRATE AND ACETAMINOPHEN 5; 325 MG/1; MG/1
TABLET ORAL
Qty: 195 TABLET | Refills: 0 | Status: SHIPPED | OUTPATIENT
Start: 2021-08-24 | End: 2021-09-21

## 2021-08-24 NOTE — TELEPHONE ENCOUNTER
[employer]missySign[employer]d message sent with Rx approval from provider.  Jake  [facility] Management Team

## 2021-08-24 NOTE — TELEPHONE ENCOUNTER
Received call from patient requesting refill(s) of HYDROcodone-acetaminophen (NORCO) 5-325 MG tablet     Last dispensed from pharmacy on 7/26/21    Patient's last office/virtual visit by prescribing provider on 7/6/21  Next office/virtual appointment scheduled for none    Last urine drug screen date 5/26/21  Current opioid agreement on file (completed within the last year) Yes Date of opioid agreement: 526/21    E-prescribe to [address]  pharmacy    Will route to [facility] for review and preparation of prescription(s).
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
## 2021-08-31 ENCOUNTER — VIRTUAL VISIT (OUTPATIENT)
Dept: PSYCHOLOGY | Facility: CLINIC | Age: 51
End: 2021-08-31
Payer: COMMERCIAL

## 2021-08-31 DIAGNOSIS — F90.2 ADHD (ATTENTION DEFICIT HYPERACTIVITY DISORDER), COMBINED TYPE: Primary | ICD-10-CM

## 2021-08-31 DIAGNOSIS — F41.1 GENERALIZED ANXIETY DISORDER: ICD-10-CM

## 2021-08-31 DIAGNOSIS — F33.1 MAJOR DEPRESSIVE DISORDER, RECURRENT EPISODE, MODERATE WITH ANXIOUS DISTRESS (H): ICD-10-CM

## 2021-08-31 PROCEDURE — 90834 PSYTX W PT 45 MINUTES: CPT | Mod: 95 | Performed by: SOCIAL WORKER

## 2021-08-31 ASSESSMENT — ANXIETY QUESTIONNAIRES
GAD7 TOTAL SCORE: 17
2. NOT BEING ABLE TO STOP OR CONTROL WORRYING: NEARLY EVERY DAY
6. BECOMING EASILY ANNOYED OR IRRITABLE: MORE THAN HALF THE DAYS
5. BEING SO RESTLESS THAT IT IS HARD TO SIT STILL: SEVERAL DAYS
3. WORRYING TOO MUCH ABOUT DIFFERENT THINGS: NEARLY EVERY DAY
8. IF YOU CHECKED OFF ANY PROBLEMS, HOW DIFFICULT HAVE THESE MADE IT FOR YOU TO DO YOUR WORK, TAKE CARE OF THINGS AT HOME, OR GET ALONG WITH OTHER PEOPLE?: VERY DIFFICULT
7. FEELING AFRAID AS IF SOMETHING AWFUL MIGHT HAPPEN: NEARLY EVERY DAY
GAD7 TOTAL SCORE: 17
GAD7 TOTAL SCORE: 17
4. TROUBLE RELAXING: NEARLY EVERY DAY
7. FEELING AFRAID AS IF SOMETHING AWFUL MIGHT HAPPEN: NEARLY EVERY DAY
1. FEELING NERVOUS, ANXIOUS, OR ON EDGE: MORE THAN HALF THE DAYS

## 2021-08-31 ASSESSMENT — PATIENT HEALTH QUESTIONNAIRE - PHQ9
SUM OF ALL RESPONSES TO PHQ QUESTIONS 1-9: 16
10. IF YOU CHECKED OFF ANY PROBLEMS, HOW DIFFICULT HAVE THESE PROBLEMS MADE IT FOR YOU TO DO YOUR WORK, TAKE CARE OF THINGS AT HOME, OR GET ALONG WITH OTHER PEOPLE: EXTREMELY DIFFICULT
SUM OF ALL RESPONSES TO PHQ QUESTIONS 1-9: 16
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
## 2021-09-01 ASSESSMENT — PATIENT HEALTH QUESTIONNAIRE - PHQ9: SUM OF ALL RESPONSES TO PHQ QUESTIONS 1-9: 16

## 2021-09-01 ASSESSMENT — ANXIETY QUESTIONNAIRES: GAD7 TOTAL SCORE: 17

## 2021-09-11 ENCOUNTER — HEALTH MAINTENANCE LETTER (OUTPATIENT)
Age: 51
End: 2021-09-11

## 2021-09-15 ENCOUNTER — VIRTUAL VISIT (OUTPATIENT)
Dept: PSYCHOLOGY | Facility: CLINIC | Age: 51
End: 2021-09-15
Payer: COMMERCIAL

## 2021-09-15 DIAGNOSIS — F41.1 GENERALIZED ANXIETY DISORDER: ICD-10-CM

## 2021-09-15 DIAGNOSIS — F33.1 MAJOR DEPRESSIVE DISORDER, RECURRENT EPISODE, MODERATE WITH ANXIOUS DISTRESS (H): ICD-10-CM

## 2021-09-15 DIAGNOSIS — F90.2 ADHD (ATTENTION DEFICIT HYPERACTIVITY DISORDER), COMBINED TYPE: Primary | ICD-10-CM

## 2021-09-15 PROCEDURE — 90834 PSYTX W PT 45 MINUTES: CPT | Mod: 95 | Performed by: SOCIAL WORKER

## 2021-09-15 ASSESSMENT — ANXIETY QUESTIONNAIRES
4. TROUBLE RELAXING: NEARLY EVERY DAY
8. IF YOU CHECKED OFF ANY PROBLEMS, HOW DIFFICULT HAVE THESE MADE IT FOR YOU TO DO YOUR WORK, TAKE CARE OF THINGS AT HOME, OR GET ALONG WITH OTHER PEOPLE?: EXTREMELY DIFFICULT
GAD7 TOTAL SCORE: 17
GAD7 TOTAL SCORE: 17
7. FEELING AFRAID AS IF SOMETHING AWFUL MIGHT HAPPEN: NEARLY EVERY DAY
GAD7 TOTAL SCORE: 17
3. WORRYING TOO MUCH ABOUT DIFFERENT THINGS: NEARLY EVERY DAY
7. FEELING AFRAID AS IF SOMETHING AWFUL MIGHT HAPPEN: NEARLY EVERY DAY
5. BEING SO RESTLESS THAT IT IS HARD TO SIT STILL: SEVERAL DAYS
2. NOT BEING ABLE TO STOP OR CONTROL WORRYING: NEARLY EVERY DAY
6. BECOMING EASILY ANNOYED OR IRRITABLE: SEVERAL DAYS
1. FEELING NERVOUS, ANXIOUS, OR ON EDGE: NEARLY EVERY DAY

## 2021-09-15 ASSESSMENT — PATIENT HEALTH QUESTIONNAIRE - PHQ9
SUM OF ALL RESPONSES TO PHQ QUESTIONS 1-9: 13
10. IF YOU CHECKED OFF ANY PROBLEMS, HOW DIFFICULT HAVE THESE PROBLEMS MADE IT FOR YOU TO DO YOUR WORK, TAKE CARE OF THINGS AT HOME, OR GET ALONG WITH OTHER PEOPLE: EXTREMELY DIFFICULT
SUM OF ALL RESPONSES TO PHQ QUESTIONS 1-9: 13
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
## 2021-09-16 DIAGNOSIS — G43.819 OTHER MIGRAINE WITHOUT STATUS MIGRAINOSUS, INTRACTABLE: ICD-10-CM

## 2021-09-16 ASSESSMENT — PATIENT HEALTH QUESTIONNAIRE - PHQ9: SUM OF ALL RESPONSES TO PHQ QUESTIONS 1-9: 13

## 2021-09-16 ASSESSMENT — ANXIETY QUESTIONNAIRES: GAD7 TOTAL SCORE: 17

## 2021-09-20 RX ORDER — VERAPAMIL HYDROCHLORIDE 40 MG/1
TABLET ORAL
Qty: 180 TABLET | Refills: 0 | Status: SHIPPED | OUTPATIENT
Start: 2021-09-20 | End: 2022-03-17

## 2021-09-20 NOTE — TELEPHONE ENCOUNTER
Prescription approved per [facility] Refill Protocol.    SAMARA GaleN, RN  [facility]
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
## 2021-09-21 ENCOUNTER — VIRTUAL VISIT (OUTPATIENT)
Dept: PALLIATIVE MEDICINE | Facility: CLINIC | Age: 51
End: 2021-09-21
Payer: COMMERCIAL

## 2021-09-21 DIAGNOSIS — M79.7 FIBROMYALGIA: Primary | ICD-10-CM

## 2021-09-21 DIAGNOSIS — G89.29 CHRONIC BILATERAL LOW BACK PAIN WITHOUT SCIATICA: ICD-10-CM

## 2021-09-21 DIAGNOSIS — G89.4 CHRONIC PAIN SYNDROME: ICD-10-CM

## 2021-09-21 DIAGNOSIS — M54.50 CHRONIC BILATERAL LOW BACK PAIN WITHOUT SCIATICA: ICD-10-CM

## 2021-09-21 DIAGNOSIS — M54.2 CERVICALGIA: ICD-10-CM

## 2021-09-21 DIAGNOSIS — F11.90 CHRONIC, CONTINUOUS USE OF OPIOIDS: ICD-10-CM

## 2021-09-21 PROCEDURE — 99213 OFFICE O/P EST LOW 20 MIN: CPT | Mod: 95 | Performed by: PHYSICIAN ASSISTANT

## 2021-09-21 RX ORDER — HYDROCODONE BITARTRATE AND ACETAMINOPHEN 5; 325 MG/1; MG/1
TABLET ORAL
Qty: 195 TABLET | Refills: 0 | Status: SHIPPED | OUTPATIENT
Start: 2021-09-21 | End: 2021-10-19

## 2021-09-21 RX ORDER — KETOROLAC TROMETHAMINE 10 MG/1
TABLET, FILM COATED ORAL
Qty: 10 TABLET | Refills: 0 | Status: SHIPPED | OUTPATIENT
Start: 2021-09-21 | End: 2022-08-03

## 2021-09-21 NOTE — PATIENT INSTRUCTIONS
After Visit Instructions:     Thank you for coming to [facility] for your care. It is my goal to partner with you to help you reach your optimal state of health.     I am recommending multidisciplinary care at this time.  The focus of care will be to continue gradual rehabilitation and pain management with medication adjustments as needed.    Continue daily self-care, identifying contributing factors, and monitoring variations in pain level. Continue to integrate self-care into your life.        Schedule follow-up with Celia Bettencourt PA-C in 8 weeks. You will need to make this appointment.     Medication recommendations:   1. Continue Metaxalone 800m tab three times daily as needed   2. Continue hydrocodone 5/325: 2.5 tablets, 2.5 tablets and 1.5 tablet with dosing spread by 4.5 hours. pain. Max of 6.5 tablets/day. Okay to increase by 1 tablet/day for the next 2 days as needed  3. Continue Toradol 10mg as needed. Do not use daily.  4. Continue medical cannabis      Celia Bettencourt PA-C  [facility]  [facility]    Contact information: [facility]  Clinic Number:  [phone]     Call with any questions about your care and for scheduling assistance.     Calls are returned Monday through Friday between 8 AM and 4:30 PM. We usually get back to you within 2 business days depending on the issue/request.    If we are prescribing your medications:    For opioid medication refills, call the clinic or send a [employer] message 7 days in advance.  Please include:    Name of requested medication    Name of the pharmacy.    For non-opioid medications, call your pharmacy directly to request a refill. Please allow 3-4 days to be processed.     Per [place] State Law:    All controlled substance prescriptions must be filled within 30 days of being written.      For those controlled substances allowing refills, pickup must occur  within 30 days of last fill.      We believe regular attendance is key to your success in our program!      Any time you are unable to keep your appointment we ask that you call us at least 24 hours in advance to cancel.This will allow us to offer the appointment time to another patient.   Multiple missed appointments may lead to dismissal from the clinic.

## 2021-09-29 ENCOUNTER — VIRTUAL VISIT (OUTPATIENT)
Dept: PSYCHOLOGY | Facility: CLINIC | Age: 51
End: 2021-09-29
Payer: COMMERCIAL

## 2021-09-29 DIAGNOSIS — F41.1 GENERALIZED ANXIETY DISORDER: ICD-10-CM

## 2021-09-29 DIAGNOSIS — F90.2 ADHD (ATTENTION DEFICIT HYPERACTIVITY DISORDER), COMBINED TYPE: Primary | ICD-10-CM

## 2021-09-29 DIAGNOSIS — F33.1 MAJOR DEPRESSIVE DISORDER, RECURRENT EPISODE, MODERATE WITH ANXIOUS DISTRESS (H): ICD-10-CM

## 2021-09-29 PROCEDURE — 90834 PSYTX W PT 45 MINUTES: CPT | Mod: 95 | Performed by: SOCIAL WORKER

## 2021-09-29 ASSESSMENT — ANXIETY QUESTIONNAIRES
GAD7 TOTAL SCORE: 15
4. TROUBLE RELAXING: MORE THAN HALF THE DAYS
GAD7 TOTAL SCORE: 15
8. IF YOU CHECKED OFF ANY PROBLEMS, HOW DIFFICULT HAVE THESE MADE IT FOR YOU TO DO YOUR WORK, TAKE CARE OF THINGS AT HOME, OR GET ALONG WITH OTHER PEOPLE?: EXTREMELY DIFFICULT
GAD7 TOTAL SCORE: 15
2. NOT BEING ABLE TO STOP OR CONTROL WORRYING: MORE THAN HALF THE DAYS
1. FEELING NERVOUS, ANXIOUS, OR ON EDGE: MORE THAN HALF THE DAYS
6. BECOMING EASILY ANNOYED OR IRRITABLE: MORE THAN HALF THE DAYS
7. FEELING AFRAID AS IF SOMETHING AWFUL MIGHT HAPPEN: NEARLY EVERY DAY
7. FEELING AFRAID AS IF SOMETHING AWFUL MIGHT HAPPEN: NEARLY EVERY DAY
5. BEING SO RESTLESS THAT IT IS HARD TO SIT STILL: SEVERAL DAYS
3. WORRYING TOO MUCH ABOUT DIFFERENT THINGS: NEARLY EVERY DAY

## 2021-09-29 ASSESSMENT — PATIENT HEALTH QUESTIONNAIRE - PHQ9
SUM OF ALL RESPONSES TO PHQ QUESTIONS 1-9: 12
10. IF YOU CHECKED OFF ANY PROBLEMS, HOW DIFFICULT HAVE THESE PROBLEMS MADE IT FOR YOU TO DO YOUR WORK, TAKE CARE OF THINGS AT HOME, OR GET ALONG WITH OTHER PEOPLE: EXTREMELY DIFFICULT
SUM OF ALL RESPONSES TO PHQ QUESTIONS 1-9: 12
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
## 2021-09-30 ASSESSMENT — ANXIETY QUESTIONNAIRES: GAD7 TOTAL SCORE: 15

## 2021-09-30 ASSESSMENT — PATIENT HEALTH QUESTIONNAIRE - PHQ9: SUM OF ALL RESPONSES TO PHQ QUESTIONS 1-9: 12

## 2021-10-19 ENCOUNTER — MYC REFILL (OUTPATIENT)
Dept: PALLIATIVE MEDICINE | Facility: CLINIC | Age: 51
End: 2021-10-19

## 2021-10-19 DIAGNOSIS — M54.2 CERVICALGIA: ICD-10-CM

## 2021-10-19 DIAGNOSIS — G89.29 CHRONIC BILATERAL LOW BACK PAIN WITHOUT SCIATICA: ICD-10-CM

## 2021-10-19 DIAGNOSIS — G89.4 CHRONIC PAIN SYNDROME: ICD-10-CM

## 2021-10-19 DIAGNOSIS — M79.7 FIBROMYALGIA: ICD-10-CM

## 2021-10-19 DIAGNOSIS — M54.50 CHRONIC BILATERAL LOW BACK PAIN WITHOUT SCIATICA: ICD-10-CM

## 2021-10-20 RX ORDER — HYDROCODONE BITARTRATE AND ACETAMINOPHEN 5; 325 MG/1; MG/1
TABLET ORAL
Qty: 195 TABLET | Refills: 0 | Status: SHIPPED | OUTPATIENT
Start: 2021-10-20 | End: 2021-11-22

## 2021-10-20 NOTE — TELEPHONE ENCOUNTER
Received call from patient requesting refill(s) of HYDROcodone-acetaminophen (NORCO) 5-325 MG tablet     Last dispensed from pharmacy on 9/23/21    Patient's last office/virtual visit by prescribing provider on 9/21/21  Next office/virtual appointment scheduled for none    Last urine drug screen date 5/26/21  Current opioid agreement on file (completed within the last year) Yes Date of opioid agreement: 5/526/21    E-prescribe to [address]    Will route to nursing [place] for review and preparation of prescription(s).

## 2021-10-20 NOTE — TELEPHONE ENCOUNTER
Refills have been requested for the following medications:         HYDROcodone-acetaminophen (NORCO) 5-325 MG tablet [Celia Bettencourt PA-C]     Preferred pharmacy: [facility] [id number] - [place], [place] - [facility] [address]

## 2021-10-20 NOTE — TELEPHONE ENCOUNTER
Medication refill information reviewed.     Due date for  HYDROcodone-acetaminophen (NORCO) 5-325 MG tablet  is 10/25/21     Prescriptions prepped for review.     Will route to provider.

## 2021-10-20 NOTE — TELEPHONE ENCOUNTER
Refill appropriate. Sent to requested pharmacy.    [place] Prescription Monitoring Program checked on 10/20/2021.    Celia Bettencourt, PAC

## 2021-10-27 ENCOUNTER — VIRTUAL VISIT (OUTPATIENT)
Dept: PSYCHOLOGY | Facility: CLINIC | Age: 51
End: 2021-10-27
Payer: COMMERCIAL

## 2021-10-27 DIAGNOSIS — F41.1 GENERALIZED ANXIETY DISORDER: ICD-10-CM

## 2021-10-27 DIAGNOSIS — F90.2 ADHD (ATTENTION DEFICIT HYPERACTIVITY DISORDER), COMBINED TYPE: Primary | ICD-10-CM

## 2021-10-27 DIAGNOSIS — F33.1 MAJOR DEPRESSIVE DISORDER, RECURRENT EPISODE, MODERATE WITH ANXIOUS DISTRESS (H): ICD-10-CM

## 2021-10-27 PROCEDURE — 90834 PSYTX W PT 45 MINUTES: CPT | Mod: 95 | Performed by: SOCIAL WORKER

## 2021-10-27 ASSESSMENT — ANXIETY QUESTIONNAIRES
5. BEING SO RESTLESS THAT IT IS HARD TO SIT STILL: SEVERAL DAYS
6. BECOMING EASILY ANNOYED OR IRRITABLE: SEVERAL DAYS
GAD7 TOTAL SCORE: 17
GAD7 TOTAL SCORE: 17
7. FEELING AFRAID AS IF SOMETHING AWFUL MIGHT HAPPEN: NEARLY EVERY DAY
1. FEELING NERVOUS, ANXIOUS, OR ON EDGE: NEARLY EVERY DAY
7. FEELING AFRAID AS IF SOMETHING AWFUL MIGHT HAPPEN: NEARLY EVERY DAY
GAD7 TOTAL SCORE: 17
3. WORRYING TOO MUCH ABOUT DIFFERENT THINGS: NEARLY EVERY DAY
4. TROUBLE RELAXING: NEARLY EVERY DAY
8. IF YOU CHECKED OFF ANY PROBLEMS, HOW DIFFICULT HAVE THESE MADE IT FOR YOU TO DO YOUR WORK, TAKE CARE OF THINGS AT HOME, OR GET ALONG WITH OTHER PEOPLE?: EXTREMELY DIFFICULT
2. NOT BEING ABLE TO STOP OR CONTROL WORRYING: NEARLY EVERY DAY

## 2021-10-27 ASSESSMENT — PATIENT HEALTH QUESTIONNAIRE - PHQ9
SUM OF ALL RESPONSES TO PHQ QUESTIONS 1-9: 12
SUM OF ALL RESPONSES TO PHQ QUESTIONS 1-9: 12
10. IF YOU CHECKED OFF ANY PROBLEMS, HOW DIFFICULT HAVE THESE PROBLEMS MADE IT FOR YOU TO DO YOUR WORK, TAKE CARE OF THINGS AT HOME, OR GET ALONG WITH OTHER PEOPLE: EXTREMELY DIFFICULT
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
## 2021-10-28 ASSESSMENT — ANXIETY QUESTIONNAIRES: GAD7 TOTAL SCORE: 17

## 2021-10-28 ASSESSMENT — PATIENT HEALTH QUESTIONNAIRE - PHQ9: SUM OF ALL RESPONSES TO PHQ QUESTIONS 1-9: 12

## 2021-11-10 ENCOUNTER — VIRTUAL VISIT (OUTPATIENT)
Dept: PSYCHOLOGY | Facility: CLINIC | Age: 51
End: 2021-11-10
Payer: COMMERCIAL

## 2021-11-10 DIAGNOSIS — F41.1 GENERALIZED ANXIETY DISORDER: ICD-10-CM

## 2021-11-10 DIAGNOSIS — F90.2 ADHD (ATTENTION DEFICIT HYPERACTIVITY DISORDER), COMBINED TYPE: Primary | ICD-10-CM

## 2021-11-10 PROCEDURE — 90834 PSYTX W PT 45 MINUTES: CPT | Mod: 95 | Performed by: SOCIAL WORKER

## 2021-11-10 ASSESSMENT — ANXIETY QUESTIONNAIRES
5. BEING SO RESTLESS THAT IT IS HARD TO SIT STILL: SEVERAL DAYS
4. TROUBLE RELAXING: NEARLY EVERY DAY
1. FEELING NERVOUS, ANXIOUS, OR ON EDGE: NEARLY EVERY DAY
2. NOT BEING ABLE TO STOP OR CONTROL WORRYING: NEARLY EVERY DAY
7. FEELING AFRAID AS IF SOMETHING AWFUL MIGHT HAPPEN: NEARLY EVERY DAY
7. FEELING AFRAID AS IF SOMETHING AWFUL MIGHT HAPPEN: NEARLY EVERY DAY
6. BECOMING EASILY ANNOYED OR IRRITABLE: SEVERAL DAYS
GAD7 TOTAL SCORE: 17
3. WORRYING TOO MUCH ABOUT DIFFERENT THINGS: NEARLY EVERY DAY
8. IF YOU CHECKED OFF ANY PROBLEMS, HOW DIFFICULT HAVE THESE MADE IT FOR YOU TO DO YOUR WORK, TAKE CARE OF THINGS AT HOME, OR GET ALONG WITH OTHER PEOPLE?: EXTREMELY DIFFICULT
GAD7 TOTAL SCORE: 17
GAD7 TOTAL SCORE: 17

## 2021-11-10 ASSESSMENT — PATIENT HEALTH QUESTIONNAIRE - PHQ9
SUM OF ALL RESPONSES TO PHQ QUESTIONS 1-9: 13
SUM OF ALL RESPONSES TO PHQ QUESTIONS 1-9: 13
10. IF YOU CHECKED OFF ANY PROBLEMS, HOW DIFFICULT HAVE THESE PROBLEMS MADE IT FOR YOU TO DO YOUR WORK, TAKE CARE OF THINGS AT HOME, OR GET ALONG WITH OTHER PEOPLE: EXTREMELY DIFFICULT
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
## 2021-11-11 ASSESSMENT — ANXIETY QUESTIONNAIRES: GAD7 TOTAL SCORE: 17

## 2021-11-11 ASSESSMENT — PATIENT HEALTH QUESTIONNAIRE - PHQ9: SUM OF ALL RESPONSES TO PHQ QUESTIONS 1-9: 13

## 2021-11-18 ENCOUNTER — MYC MEDICAL ADVICE (OUTPATIENT)
Dept: FAMILY MEDICINE | Facility: CLINIC | Age: 51
End: 2021-11-18
Payer: COMMERCIAL

## 2021-11-18 DIAGNOSIS — R19.7 DIARRHEA: Primary | ICD-10-CM

## 2021-11-18 DIAGNOSIS — R19.7 DIARRHEA, UNSPECIFIED TYPE: ICD-10-CM

## 2021-11-22 ENCOUNTER — MYC REFILL (OUTPATIENT)
Dept: PALLIATIVE MEDICINE | Facility: CLINIC | Age: 51
End: 2021-11-22
Payer: COMMERCIAL

## 2021-11-22 DIAGNOSIS — M54.2 CERVICALGIA: ICD-10-CM

## 2021-11-22 DIAGNOSIS — G89.4 CHRONIC PAIN SYNDROME: ICD-10-CM

## 2021-11-22 DIAGNOSIS — G89.29 CHRONIC BILATERAL LOW BACK PAIN WITHOUT SCIATICA: ICD-10-CM

## 2021-11-22 DIAGNOSIS — M54.50 CHRONIC BILATERAL LOW BACK PAIN WITHOUT SCIATICA: ICD-10-CM

## 2021-11-22 DIAGNOSIS — M79.7 FIBROMYALGIA: ICD-10-CM

## 2021-11-22 RX ORDER — HYDROCODONE BITARTRATE AND ACETAMINOPHEN 5; 325 MG/1; MG/1
TABLET ORAL
Qty: 195 TABLET | Refills: 0 | Status: SHIPPED | OUTPATIENT
Start: 2021-11-22 | End: 2021-12-21

## 2021-11-22 NOTE — TELEPHONE ENCOUNTER
Received call from patient requesting refill(s) of HYDROcodone-acetaminophen (NORCO) 5-325 MG tablet     Last dispensed from pharmacy on 10/23/21    Patient's last office/virtual visit by prescribing provider on 9/21/21  Next office/virtual appointment scheduled for 12/1/21    Last urine drug screen date 5/26/21  Current opioid agreement on file (completed within the last year) Yes Date of opioid agreement: 5/26/21    E-prescribe to [address],  pharmacy    Will route to [facility] for review and preparation of prescription(s).

## 2021-11-22 NOTE — TELEPHONE ENCOUNTER
Refills have been requested for the following medications:         HYDROcodone-acetaminophen (NORCO) 5-325 MG tablet [Celia Bettencourt PA-C]     Preferred pharmacy: [facility] [id number] - [place], [place] - [facility] [address]

## 2021-11-22 NOTE — TELEPHONE ENCOUNTER
Medication refill information reviewed.     Due date for HYDROcodone-acetaminophen (NORCO) 5-325 MG tablet  is 11/24/21     Prescriptions prepped for review.     Will route to provider.

## 2021-11-22 NOTE — TELEPHONE ENCOUNTER
[employer] message sent with Rx approval from provider.  Jake  [facility] Management Team

## 2021-11-22 NOTE — TELEPHONE ENCOUNTER
Refill appropriate. Sent to requested pharmacy.    [place] Prescription Monitoring Program checked on 10/20/21.    Celia Bettencourt, PAC

## 2021-11-24 ENCOUNTER — VIRTUAL VISIT (OUTPATIENT)
Dept: PSYCHOLOGY | Facility: CLINIC | Age: 51
End: 2021-11-24
Payer: COMMERCIAL

## 2021-11-24 DIAGNOSIS — F41.1 GENERALIZED ANXIETY DISORDER: ICD-10-CM

## 2021-11-24 DIAGNOSIS — F90.2 ADHD (ATTENTION DEFICIT HYPERACTIVITY DISORDER), COMBINED TYPE: Primary | ICD-10-CM

## 2021-11-24 DIAGNOSIS — F33.1 MAJOR DEPRESSIVE DISORDER, RECURRENT EPISODE, MODERATE WITH ANXIOUS DISTRESS (H): ICD-10-CM

## 2021-11-24 PROCEDURE — 90834 PSYTX W PT 45 MINUTES: CPT | Mod: 95 | Performed by: SOCIAL WORKER

## 2021-11-24 ASSESSMENT — ANXIETY QUESTIONNAIRES
GAD7 TOTAL SCORE: 17
7. FEELING AFRAID AS IF SOMETHING AWFUL MIGHT HAPPEN: MORE THAN HALF THE DAYS
1. FEELING NERVOUS, ANXIOUS, OR ON EDGE: NEARLY EVERY DAY
5. BEING SO RESTLESS THAT IT IS HARD TO SIT STILL: SEVERAL DAYS
3. WORRYING TOO MUCH ABOUT DIFFERENT THINGS: NEARLY EVERY DAY
GAD7 TOTAL SCORE: 17
2. NOT BEING ABLE TO STOP OR CONTROL WORRYING: NEARLY EVERY DAY
4. TROUBLE RELAXING: NEARLY EVERY DAY
7. FEELING AFRAID AS IF SOMETHING AWFUL MIGHT HAPPEN: MORE THAN HALF THE DAYS
GAD7 TOTAL SCORE: 17
6. BECOMING EASILY ANNOYED OR IRRITABLE: MORE THAN HALF THE DAYS

## 2021-11-24 ASSESSMENT — PATIENT HEALTH QUESTIONNAIRE - PHQ9
SUM OF ALL RESPONSES TO PHQ QUESTIONS 1-9: 12
SUM OF ALL RESPONSES TO PHQ QUESTIONS 1-9: 12
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
## 2021-11-25 ASSESSMENT — PATIENT HEALTH QUESTIONNAIRE - PHQ9: SUM OF ALL RESPONSES TO PHQ QUESTIONS 1-9: 12

## 2021-11-25 ASSESSMENT — ANXIETY QUESTIONNAIRES: GAD7 TOTAL SCORE: 17

## 2021-12-06 ENCOUNTER — VIRTUAL VISIT (OUTPATIENT)
Dept: PSYCHOLOGY | Facility: CLINIC | Age: 51
End: 2021-12-06
Payer: COMMERCIAL

## 2021-12-06 DIAGNOSIS — F33.1 MAJOR DEPRESSIVE DISORDER, RECURRENT EPISODE, MODERATE WITH ANXIOUS DISTRESS (H): ICD-10-CM

## 2021-12-06 DIAGNOSIS — F41.1 GENERALIZED ANXIETY DISORDER: ICD-10-CM

## 2021-12-06 DIAGNOSIS — F90.2 ADHD (ATTENTION DEFICIT HYPERACTIVITY DISORDER), COMBINED TYPE: Primary | ICD-10-CM

## 2021-12-06 PROCEDURE — 90834 PSYTX W PT 45 MINUTES: CPT | Mod: 95 | Performed by: SOCIAL WORKER

## 2021-12-06 ASSESSMENT — ANXIETY QUESTIONNAIRES
6. BECOMING EASILY ANNOYED OR IRRITABLE: NEARLY EVERY DAY
7. FEELING AFRAID AS IF SOMETHING AWFUL MIGHT HAPPEN: NEARLY EVERY DAY
1. FEELING NERVOUS, ANXIOUS, OR ON EDGE: NEARLY EVERY DAY
2. NOT BEING ABLE TO STOP OR CONTROL WORRYING: NEARLY EVERY DAY
3. WORRYING TOO MUCH ABOUT DIFFERENT THINGS: NEARLY EVERY DAY
GAD7 TOTAL SCORE: 19
5. BEING SO RESTLESS THAT IT IS HARD TO SIT STILL: SEVERAL DAYS
4. TROUBLE RELAXING: NEARLY EVERY DAY

## 2021-12-07 ASSESSMENT — ANXIETY QUESTIONNAIRES: GAD7 TOTAL SCORE: 19

## 2021-12-07 ASSESSMENT — PATIENT HEALTH QUESTIONNAIRE - PHQ9: SUM OF ALL RESPONSES TO PHQ QUESTIONS 1-9: 13

## 2021-12-08 NOTE — PROGRESS NOTES
Sherie is a 51 year old who is being evaluated via a billable video visit.    Is Pt currently in [place]? Yes    NOTE:  If Pt is not in [place], Appointment needs to be canceled and rescheduled.      How would you like to obtain your AVS? MyChart  If the video visit is dropped, the invitation should be resent by: Text to cell phone: [phone]   Will anyone else be joining your video visit? No      Video Start Time: 12:54pm  Video-Visit Details    Type of service:  Video Visit    Video End Time:1:02pm    Originating Location (pt. Location): Home    Distant Location (provider location):  [facility]     Platform used for Video Visit: Locately    [facility]     CHIEF COMPLAINT: Pain  -Fibromyalgia  -Neck pain  -Low back pain     INTERVAL HISTORY:  Last seen on 21      Recommendations/plan at the last visit included:    1. Physical Therapy: continue home exercise program;   2. Clinical Health Psychologist:  YES, has a plan in place  3. Diagnostic Studies: none at this time  4. Medication Management:    1. Continue Metaxalone 800m tab three times daily as needed  2. Continue hydrocodone 5/325: 2.5 tablets, 2.5 tablets and 1.5 tablet with dosing spread by 4.5 hours. pain. Max of 6.5 tablets/day  3. Continue Toradol 10mg as needed. Do not use daily.  4. Continue medical cannabis  5. Further procedures recommended: none at this time        Follow up with this provider 4-8 weeks     Since her last visit, Sherie Otero reports:    She has been having problems with her anxiety. She was recently started on Fluvoxamine for it. She states that she is still using the medical cannabis. She states that she has continued the medical cannabis but is unable to tolerate it during the day. She is no longer on the Toradol as her insurance no longer covers it.        Pain Information:              Pain today 5/10     UDS 21   CSA 21     CURRENT RELEVANT PAIN  MEDICATIONS:  Clonazepam 0.5mg: taking 1 tab twice daily    Hydrocodone 5-325: 2.5 tablets in AM, 2.5 tablets in afternoon and 1.5 tablets at bedtime  Toradol 10mg-using sparingly  Medical cannabis: spray under tongue and vaporizer      Patient is using the medication as prescribed:  YES  Is your medication helpful?     somewhat   Medication side effects? no side effect     Previous Medications: (H--helped; HI--Helped initially; SWH-- somewhat helpful, NH--No help; W--worse; SE--side effects)   Opiates: Hydrocodone H, Oxycodone SE  NSAIDS: Ibuprofen H, Relafen SE stomach upset, Meloxicam NH  Muscle Relaxants: Tizanidine NH, Flexeril HI, Robaxin NH SE stomach upset, Baclofen SE dizzy   Anti-migraine mediations: Verapamil H  Anti-depressants: Cymbalta H  Sleep aids: none  Anxiolytics: Xanax H, Clonazepam H, Valium H  Neuropathics: Gabapentin SE dizziness          Topicals: Lidocaine patches [facility]  Other medications not covered above: Savella H at first, then seemed to stop working, Lyrica SE shortness of breath, felt 'weird' on them, throat felt weird. Prednisone H for arthritis flare     Past Pain Treatments:  Pain Clinic:   No   PT: Yes, years ago. Has not done pool therapy.   Psychologist: No  Relaxation techniques/biofeedback: No  Chiropractor: No, was told not to because of neck.   Acupuncture: Yes for headaches.   Pharmacotherapy:           Opioids: Yes            Non-opioids:    Yes   TENs Unit:Yes  Injections: cervical medial branch blocks 6/12/2014  Self-care:   Yes, ice/heat, hot baths, theracare  Surgeries related to pain: No     [place] Board of Pharmacy Data Base Reviewed:    YES; As expected, no concern for misuse/abuse of controlled medications based on this report. Checked on 10/20/21        THE 4 As OF OPIOID MAINTENANCE ANALGESIA    Analgesia: Is pain relief clinically significant? YES   Activity: Is patient functional and able to perform Activities of Daily Living? YES   Adverse effects: Is patient  free from adverse side effects from opiates? YES   Adherence to Rx protocol: Is patient adhering to Controlled Substance Agreement and taking medications ONLY as ordered? No  -5/26/21-took more Norco-3 days short        Is Narcan prescribed for opiate use >50 MME daily? yes        Total Daily MME: 32.5    Medications:  Current Outpatient Prescriptions          Current Outpatient Medications   Medication Sig Dispense Refill     albuterol (VENTOLIN HFA) 108 (90 Base) MCG/ACT inhaler Inhale 2 puffs into the lungs every 6 hours as needed for shortness of breath / dyspnea or wheezing 18 g 1     cetirizine (ZYRTEC) 10 MG tablet TAKE ONE TABLET BY MOUTH ONCE DAILY 90 tablet 2     clonazePAM (KLONOPIN) 0.5 MG tablet Take 1 tablet (0.5 mg) by mouth 2 times daily as needed for anxiety Must last 30 days 60 tablet 0     [START ON 2/24/2020] clonazePAM (KLONOPIN) 0.5 MG tablet Take 1 tablet (0.5 mg) by mouth 2 times daily as needed for anxiety 6 tablet 0     cyclobenzaprine (FLEXERIL) 10 MG tablet Take 2 tablets in the evening and 1 in the morning 90 tablet 2     DULoxetine (CYMBALTA) 20 MG capsule Take 2 capsules (40 mg) by mouth daily CYMBALTA-KADY 60 capsule 0     fluticasone (FLONASE) 50 MCG/ACT nasal spray SPRAY 2 SPRAYS INTO BOTH NOSTRILS DAILY. 16 g 11     gabapentin (NEURONTIN) 100 MG capsule Take 1 capsule at bedtime for 1 week, then take 1 capsule twice daily for 1 week, then take 1 capsule three times a day 90 capsule 0     HYDROcodone-acetaminophen (NORCO) 7.5-325 MG per tablet Take 1 tablet every 4-6 hours as needed for severe pain. Max of 5 tablets per day. Fill/start 12/30/2019. 150 tablet 0     naloxone (NARCAN) nasal spray Spray 1 spray (4 mg) into one nostril alternating nostrils as needed for opioid reversal every 2-3 minutes until assistance arrives 0.2 mL 0     OLANZapine (ZYPREXA) 5 MG tablet Take 0.5 tablets (2.5 mg) by mouth every morning And 1 tab(5mg) at 2pm and 1 tab(5mg) at bedtime. 75 tablet 1      verapamil (CALAN) 40 MG tablet Take 1 tablet (40 mg) by mouth 2 times daily 180 tablet 3     CYMBALTA 60 MG capsule TAKE ONE CAPSULE BY MOUTH EVERY EVENING (Patient not taking: Reported on 2020) 90 capsule 0              Assessment:  Sherie Otero is a 51 year old female who presents today for follow up regarding her:     6. Fibromyalgia  7. Chronic low back pain  8. Cervicalgia  9. Chronic pain syndrome  10. Chronic use of opioids    Pain is overall the same. She continues to struggle with anxiety and is working with psychiatry on this. She was recently started on a new medication. Will continue current regimen for chronic low back pain, neck pain, and fibromyalgia. Her pain has been stable. She does not feel that she needs any changes at this time.        Plan:  Diagnosis reviewed, treatment option addressed, and risk/benifits discussed.  Self-care instructions given.  I am recommending a multidisciplinary treatment plan to help this patient better manage pain.       1. Physical Therapy: continue home exercise program;   2. Clinical Health Psychologist:  YES, has a plan in place  3. Diagnostic Studies: none at this time  4. Medication Management:    1. Continue Metaxalone 800m tab three times daily as needed  2. Continue hydrocodone 5/325: 2.5 tablets, 2.5 tablets and 1.5 tablet with dosing spread by 4.5 hours. pain. Max of 6.5 tablets/day  3. Continue Toradol 10mg as needed. Do not use daily.  4. Continue medical cannabis  5. Further procedures recommended: none at this time        Follow up with this provider PRN. I did discuss with the patient that I will be leaving the pain clinic at the end of December. We discussed the options of following up with one of my colleagues (Josh Parada or [place]), another pain clinic (there are 2 in [place] and 2 others in [place]) or discharging back to primary care. Patient would like to follow up with her PCP.       Celia Bettencourt [facility]  [facility]
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
## 2021-12-21 ENCOUNTER — VIRTUAL VISIT (OUTPATIENT)
Dept: PALLIATIVE MEDICINE | Facility: CLINIC | Age: 51
End: 2021-12-21
Payer: COMMERCIAL

## 2021-12-21 DIAGNOSIS — M54.50 CHRONIC BILATERAL LOW BACK PAIN WITHOUT SCIATICA: ICD-10-CM

## 2021-12-21 DIAGNOSIS — M54.2 CERVICALGIA: ICD-10-CM

## 2021-12-21 DIAGNOSIS — G89.29 CHRONIC BILATERAL LOW BACK PAIN WITHOUT SCIATICA: ICD-10-CM

## 2021-12-21 DIAGNOSIS — M79.7 FIBROMYALGIA: ICD-10-CM

## 2021-12-21 DIAGNOSIS — G89.4 CHRONIC PAIN SYNDROME: ICD-10-CM

## 2021-12-21 PROCEDURE — 99214 OFFICE O/P EST MOD 30 MIN: CPT | Mod: 95 | Performed by: PHYSICIAN ASSISTANT

## 2021-12-21 RX ORDER — METAXALONE 800 MG/1
800 TABLET ORAL 3 TIMES DAILY PRN
Qty: 60 TABLET | Refills: 0 | Status: SHIPPED | OUTPATIENT
Start: 2021-12-21 | End: 2022-08-03

## 2021-12-21 RX ORDER — FLUVOXAMINE MALEATE 25 MG
TABLET ORAL
COMMUNITY
Start: 2021-12-09 | End: 2022-08-03

## 2021-12-21 RX ORDER — HYDROCODONE BITARTRATE AND ACETAMINOPHEN 5; 325 MG/1; MG/1
TABLET ORAL
Qty: 195 TABLET | Refills: 0 | Status: SHIPPED | OUTPATIENT
Start: 2021-12-21 | End: 2022-01-20

## 2021-12-21 NOTE — PATIENT INSTRUCTIONS
After Visit Instructions:     Thank you for coming to [facility] for your care. It is my goal to partner with you to help you reach your optimal state of health.     I am recommending multidisciplinary care at this time.  The focus of care will be to continue gradual rehabilitation and pain management with medication adjustments as needed.    Continue daily self-care, identifying contributing factors, and monitoring variations in pain level. Continue to integrate self-care into your life.          Schedule follow-up with pain management as needed. You will need to make this appointment. As I am leaving in a week, you should schedule a follow up with Dr. Castro prior to your January refill.     Medication recommendations: No changes      Celia Bettencourt PA-C  [facility]    Contact information: [facility] [place] [facility]  Clinic Number:  [phone]     Call with any questions about your care and for scheduling assistance.     Calls are returned Monday through Friday between 8 AM and 4:30 PM. We usually get back to you within 2 business days depending on the issue/request.    If we are prescribing your medications:    For opioid medication refills, call the clinic or send a [employer] message 7 days in advance.  Please include:    Name of requested medication    Name of the pharmacy.    For non-opioid medications, call your pharmacy directly to request a refill. Please allow 3-4 days to be processed.     Per [place] State Law:    All controlled substance prescriptions must be filled within 30 days of being written.      For those controlled substances allowing refills, pickup must occur within 30 days of last fill.      We believe regular attendance is key to your success in our program!      Any time you are unable to keep your appointment we ask that you call us at least 24 hours in advance to cancel.This will allow us to  offer the appointment time to another patient.   Multiple missed appointments may lead to dismissal from the clinic.

## 2021-12-21 NOTE — Clinical Note
Hi Dr. Castro,     I am discharging Sherie back to Primary care. She has a MME of 32.5. If her pain changes and needs to see pain management again, we discussed her location options as I am leaving in a week. I did refill her December medications and recommended that she schedule a follow up with you prior to her January refill. Please let me know if you have any questions.     Celia Bettencourt, PAC

## 2021-12-22 ENCOUNTER — VIRTUAL VISIT (OUTPATIENT)
Dept: PSYCHOLOGY | Facility: CLINIC | Age: 51
End: 2021-12-22
Payer: COMMERCIAL

## 2021-12-22 DIAGNOSIS — F33.1 MAJOR DEPRESSIVE DISORDER, RECURRENT EPISODE, MODERATE WITH ANXIOUS DISTRESS (H): ICD-10-CM

## 2021-12-22 DIAGNOSIS — F90.2 ADHD (ATTENTION DEFICIT HYPERACTIVITY DISORDER), COMBINED TYPE: Primary | ICD-10-CM

## 2021-12-22 DIAGNOSIS — F41.1 GENERALIZED ANXIETY DISORDER: ICD-10-CM

## 2021-12-22 DIAGNOSIS — F43.10 POST TRAUMATIC STRESS DISORDER (PTSD): ICD-10-CM

## 2021-12-22 PROCEDURE — 90834 PSYTX W PT 45 MINUTES: CPT | Mod: 95 | Performed by: SOCIAL WORKER

## 2021-12-22 ASSESSMENT — PATIENT HEALTH QUESTIONNAIRE - PHQ9: SUM OF ALL RESPONSES TO PHQ QUESTIONS 1-9: 12

## 2021-12-22 ASSESSMENT — ANXIETY QUESTIONNAIRES
5. BEING SO RESTLESS THAT IT IS HARD TO SIT STILL: SEVERAL DAYS
6. BECOMING EASILY ANNOYED OR IRRITABLE: SEVERAL DAYS
1. FEELING NERVOUS, ANXIOUS, OR ON EDGE: NEARLY EVERY DAY
GAD7 TOTAL SCORE: 16
4. TROUBLE RELAXING: MORE THAN HALF THE DAYS
3. WORRYING TOO MUCH ABOUT DIFFERENT THINGS: NEARLY EVERY DAY
IF YOU CHECKED OFF ANY PROBLEMS ON THIS QUESTIONNAIRE, HOW DIFFICULT HAVE THESE PROBLEMS MADE IT FOR YOU TO DO YOUR WORK, TAKE CARE OF THINGS AT HOME, OR GET ALONG WITH OTHER PEOPLE: EXTREMELY DIFFICULT
7. FEELING AFRAID AS IF SOMETHING AWFUL MIGHT HAPPEN: NEARLY EVERY DAY
2. NOT BEING ABLE TO STOP OR CONTROL WORRYING: NEARLY EVERY DAY

## 2021-12-23 ASSESSMENT — ANXIETY QUESTIONNAIRES: GAD7 TOTAL SCORE: 16
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
## 2022-01-01 ENCOUNTER — HEALTH MAINTENANCE LETTER (OUTPATIENT)
Age: 52
End: 2022-01-01

## 2022-01-05 ENCOUNTER — VIRTUAL VISIT (OUTPATIENT)
Dept: PSYCHOLOGY | Facility: CLINIC | Age: 52
End: 2022-01-05
Payer: COMMERCIAL

## 2022-01-05 DIAGNOSIS — F90.2 ADHD (ATTENTION DEFICIT HYPERACTIVITY DISORDER), COMBINED TYPE: Primary | ICD-10-CM

## 2022-01-05 DIAGNOSIS — F41.1 GENERALIZED ANXIETY DISORDER: ICD-10-CM

## 2022-01-05 DIAGNOSIS — F33.1 MAJOR DEPRESSIVE DISORDER, RECURRENT EPISODE, MODERATE WITH ANXIOUS DISTRESS (H): ICD-10-CM

## 2022-01-05 PROCEDURE — 90834 PSYTX W PT 45 MINUTES: CPT | Mod: 95 | Performed by: SOCIAL WORKER

## 2022-01-05 ASSESSMENT — ANXIETY QUESTIONNAIRES
2. NOT BEING ABLE TO STOP OR CONTROL WORRYING: NEARLY EVERY DAY
5. BEING SO RESTLESS THAT IT IS HARD TO SIT STILL: SEVERAL DAYS
GAD7 TOTAL SCORE: 18
4. TROUBLE RELAXING: NEARLY EVERY DAY
GAD7 TOTAL SCORE: 18
6. BECOMING EASILY ANNOYED OR IRRITABLE: MORE THAN HALF THE DAYS
3. WORRYING TOO MUCH ABOUT DIFFERENT THINGS: NEARLY EVERY DAY
7. FEELING AFRAID AS IF SOMETHING AWFUL MIGHT HAPPEN: NEARLY EVERY DAY
GAD7 TOTAL SCORE: 18
7. FEELING AFRAID AS IF SOMETHING AWFUL MIGHT HAPPEN: NEARLY EVERY DAY
1. FEELING NERVOUS, ANXIOUS, OR ON EDGE: NEARLY EVERY DAY

## 2022-01-06 ASSESSMENT — PATIENT HEALTH QUESTIONNAIRE - PHQ9: SUM OF ALL RESPONSES TO PHQ QUESTIONS 1-9: 11

## 2022-01-06 ASSESSMENT — ANXIETY QUESTIONNAIRES: GAD7 TOTAL SCORE: 18
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
## 2022-01-19 ENCOUNTER — VIRTUAL VISIT (OUTPATIENT)
Dept: PSYCHOLOGY | Facility: CLINIC | Age: 52
End: 2022-01-19
Payer: COMMERCIAL

## 2022-01-19 DIAGNOSIS — F90.2 ADHD (ATTENTION DEFICIT HYPERACTIVITY DISORDER), COMBINED TYPE: Primary | ICD-10-CM

## 2022-01-19 DIAGNOSIS — F41.1 GENERALIZED ANXIETY DISORDER: ICD-10-CM

## 2022-01-19 DIAGNOSIS — F33.1 MAJOR DEPRESSIVE DISORDER, RECURRENT EPISODE, MODERATE WITH ANXIOUS DISTRESS (H): ICD-10-CM

## 2022-01-19 PROCEDURE — 90834 PSYTX W PT 45 MINUTES: CPT | Mod: 95 | Performed by: SOCIAL WORKER

## 2022-01-19 ASSESSMENT — PATIENT HEALTH QUESTIONNAIRE - PHQ9
SUM OF ALL RESPONSES TO PHQ QUESTIONS 1-9: 13
SUM OF ALL RESPONSES TO PHQ QUESTIONS 1-9: 13
10. IF YOU CHECKED OFF ANY PROBLEMS, HOW DIFFICULT HAVE THESE PROBLEMS MADE IT FOR YOU TO DO YOUR WORK, TAKE CARE OF THINGS AT HOME, OR GET ALONG WITH OTHER PEOPLE: VERY DIFFICULT

## 2022-01-19 ASSESSMENT — ANXIETY QUESTIONNAIRES
2. NOT BEING ABLE TO STOP OR CONTROL WORRYING: NEARLY EVERY DAY
GAD7 TOTAL SCORE: 18
4. TROUBLE RELAXING: NEARLY EVERY DAY
3. WORRYING TOO MUCH ABOUT DIFFERENT THINGS: NEARLY EVERY DAY
1. FEELING NERVOUS, ANXIOUS, OR ON EDGE: NEARLY EVERY DAY
IF YOU CHECKED OFF ANY PROBLEMS ON THIS QUESTIONNAIRE, HOW DIFFICULT HAVE THESE PROBLEMS MADE IT FOR YOU TO DO YOUR WORK, TAKE CARE OF THINGS AT HOME, OR GET ALONG WITH OTHER PEOPLE: VERY DIFFICULT
7. FEELING AFRAID AS IF SOMETHING AWFUL MIGHT HAPPEN: NEARLY EVERY DAY
5. BEING SO RESTLESS THAT IT IS HARD TO SIT STILL: SEVERAL DAYS
6. BECOMING EASILY ANNOYED OR IRRITABLE: MORE THAN HALF THE DAYS
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
## 2022-01-20 ENCOUNTER — VIRTUAL VISIT (OUTPATIENT)
Dept: FAMILY MEDICINE | Facility: CLINIC | Age: 52
End: 2022-01-20
Payer: COMMERCIAL

## 2022-01-20 DIAGNOSIS — M79.7 FIBROMYALGIA: ICD-10-CM

## 2022-01-20 DIAGNOSIS — G89.29 CHRONIC BILATERAL LOW BACK PAIN WITHOUT SCIATICA: ICD-10-CM

## 2022-01-20 DIAGNOSIS — M54.50 CHRONIC BILATERAL LOW BACK PAIN WITHOUT SCIATICA: ICD-10-CM

## 2022-01-20 DIAGNOSIS — M54.2 CERVICALGIA: ICD-10-CM

## 2022-01-20 DIAGNOSIS — J45.20 INTERMITTENT ASTHMA, UNCOMPLICATED: ICD-10-CM

## 2022-01-20 DIAGNOSIS — J31.0 CHRONIC RHINITIS: ICD-10-CM

## 2022-01-20 DIAGNOSIS — G89.4 CHRONIC PAIN SYNDROME: ICD-10-CM

## 2022-01-20 PROCEDURE — 99442 PR PHYSICIAN TELEPHONE EVALUATION 11-20 MIN: CPT | Mod: 95 | Performed by: PHYSICIAN ASSISTANT

## 2022-01-20 RX ORDER — CETIRIZINE HYDROCHLORIDE 10 MG/1
10 TABLET ORAL DAILY
Qty: 90 TABLET | Refills: 3 | Status: SHIPPED | OUTPATIENT
Start: 2022-01-20 | End: 2022-06-13

## 2022-01-20 RX ORDER — HYDROCODONE BITARTRATE AND ACETAMINOPHEN 5; 325 MG/1; MG/1
TABLET ORAL
Qty: 195 TABLET | Refills: 0 | Status: SHIPPED | OUTPATIENT
Start: 2022-01-20 | End: 2022-02-17

## 2022-01-20 RX ORDER — FLUTICASONE PROPIONATE 50 MCG
1-2 SPRAY, SUSPENSION (ML) NASAL DAILY
Qty: 15.8 ML | Refills: 5 | Status: SHIPPED | OUTPATIENT
Start: 2022-01-20 | End: 2022-06-13

## 2022-01-20 RX ORDER — ALBUTEROL SULFATE 90 UG/1
1-2 AEROSOL, METERED RESPIRATORY (INHALATION) EVERY 6 HOURS PRN
Qty: 18 G | Refills: 0 | Status: SHIPPED | OUTPATIENT
Start: 2022-01-20 | End: 2022-06-13

## 2022-01-20 ASSESSMENT — ANXIETY QUESTIONNAIRES
5. BEING SO RESTLESS THAT IT IS HARD TO SIT STILL: NOT AT ALL
GAD7 TOTAL SCORE: 18
2. NOT BEING ABLE TO STOP OR CONTROL WORRYING: NEARLY EVERY DAY
3. WORRYING TOO MUCH ABOUT DIFFERENT THINGS: NEARLY EVERY DAY
GAD7 TOTAL SCORE: 16
4. TROUBLE RELAXING: NEARLY EVERY DAY
IF YOU CHECKED OFF ANY PROBLEMS ON THIS QUESTIONNAIRE, HOW DIFFICULT HAVE THESE PROBLEMS MADE IT FOR YOU TO DO YOUR WORK, TAKE CARE OF THINGS AT HOME, OR GET ALONG WITH OTHER PEOPLE: VERY DIFFICULT
6. BECOMING EASILY ANNOYED OR IRRITABLE: SEVERAL DAYS
1. FEELING NERVOUS, ANXIOUS, OR ON EDGE: NEARLY EVERY DAY
7. FEELING AFRAID AS IF SOMETHING AWFUL MIGHT HAPPEN: NEARLY EVERY DAY

## 2022-01-20 ASSESSMENT — PATIENT HEALTH QUESTIONNAIRE - PHQ9: SUM OF ALL RESPONSES TO PHQ QUESTIONS 1-9: 13

## 2022-01-20 NOTE — NURSING NOTE
Health Maintenance Due   Topic Date Due     ANNUAL REVIEW OF HM ORDERS  Never done     COLORECTAL CANCER SCREENING  Never done     ZOSTER IMMUNIZATION (1 of 2) Never done     LUNG CANCER SCREENING  08/26/2018     Pneumococcal Vaccine: Pediatrics (0 to 5 Years) and At-Risk Patients (6 to 64 Years) (1 of 2 - PPSV23) 02/05/2019     ASTHMA ACTION PLAN  10/01/2019     MEDICARE ANNUAL WELLNESS VISIT  11/29/2020     ASTHMA CONTROL TEST  04/08/2021     HPV TEST  08/01/2021     PAP  08/01/2021     INFLUENZA VACCINE (1) 09/01/2021     MAMMO SCREENING  12/16/2021     Lynn INTERIANO LPN

## 2022-01-20 NOTE — PROGRESS NOTES
Sherie is a 51 year old who is being evaluated via a billable video visit.      How would you like to obtain your AVS? MyChart  If the video visit is dropped, the invitation should be resent by: Text to cell phone: [phone]  Will anyone else be joining your video visit? No    Assessment & Plan     Chronic rhinitis  Patient was provided education on the environmental controls she can perform to help reduce allergies. Educational information attached to AVS for her to review.   - cetirizine (ZYRTEC) 10 MG tablet; Take 1 tablet (10 mg) by mouth daily  - fluticasone (FLONASE) 50 MCG/ACT nasal spray; Spray 1-2 sprays into both nostrils daily SPRAY 2 SPRAYS INTO BOTH NOSTRILS DAILY.    Intermittent asthma, uncomplicated  Reviewed technique and directions for use. If the patient finds that she is having to use this medication everyday or more she will reach out to clinic to discuss inhaled corticosteroid such as advair.   - albuterol (VENTOLIN HFA) 108 (90 Base) MCG/ACT inhaler; Inhale 1-2 puffs into the lungs every 6 hours as needed for shortness of breath / dyspnea or wheezing    Fibromyalgia  Patient has follow up scheduled with PCP in Feb 2022. She said that she is not certain about what her future pain management will look like. We did discuss talking with PCP on whether he would like her to establish with a new pain clinic or try alternative medications. I did inform her that if she were to continue with me that would be our goal. She does have RA listed, but is not on DMARDs. This could be contributing to her ongoing pains and may require rheum workup/treatment. Cautioned patient about the risks involved with use of controlled pain medication including CNS and respiratory depression, constipation and dependence/tolerance.   - HYDROcodone-acetaminophen (NORCO) 5-325 MG tablet; Take 2.5 tablets by mouth every morning AND 2.5 tablets daily (with lunch) AND 1.5 tablets At Bedtime. Max of 6.5 tablets/day. Fill  01/21/22 and start 01/23/22    Chronic bilateral low back pain without sciatica  See above.   - HYDROcodone-acetaminophen (NORCO) 5-325 MG tablet; Take 2.5 tablets by mouth every morning AND 2.5 tablets daily (with lunch) AND 1.5 tablets At Bedtime. Max of 6.5 tablets/day. Fill 01/21/22 and start 01/23/22    Cervicalgia  See above.   - HYDROcodone-acetaminophen (NORCO) 5-325 MG tablet; Take 2.5 tablets by mouth every morning AND 2.5 tablets daily (with lunch) AND 1.5 tablets At Bedtime. Max of 6.5 tablets/day. Fill 01/21/22 and start 01/23/22    Chronic pain syndrome  See above. Patient may be a candidate for medical cannabis.   - HYDROcodone-acetaminophen (NORCO) 5-325 MG tablet; Take 2.5 tablets by mouth every morning AND 2.5 tablets daily (with lunch) AND 1.5 tablets At Bedtime. Max of 6.5 tablets/day. Fill 01/21/22 and start 01/23/22    Hamilton York PA-C  [facility] HAYDER Rehman is a 51 year old who presents for the following health issues     HPI     Medication Followup of norco    Taking Medication as prescribed: yes    Side Effects:  None    Medication Helping Symptoms:  yes     Review of Systems   Constitutional, HEENT, cardiovascular, pulmonary, gi and gu systems are negative, except as otherwise noted.      Objective           Vitals:  No vitals were obtained today due to virtual visit.    Physical Exam   GENERAL: Healthy, alert and no distress  EYES: Eyes grossly normal to inspection.  No discharge or erythema, or obvious scleral/conjunctival abnormalities.  RESP: No audible wheeze, cough, or visible cyanosis.  No visible retractions or increased work of breathing.    SKIN: Visible skin clear. No significant rash, abnormal pigmentation or lesions.  NEURO: Cranial nerves grossly intact.  Mentation and speech appropriate for age.  PSYCH: Mentation appears normal, affect normal/bright, judgement and insight intact, normal speech and appearance  well-groomed.    Telephone: 13 minutes

## 2022-01-21 ASSESSMENT — ANXIETY QUESTIONNAIRES: GAD7 TOTAL SCORE: 16

## 2022-02-02 ENCOUNTER — VIRTUAL VISIT (OUTPATIENT)
Dept: FAMILY MEDICINE | Facility: CLINIC | Age: 52
End: 2022-02-02
Payer: COMMERCIAL

## 2022-02-02 DIAGNOSIS — M79.7 FIBROMYALGIA: Primary | ICD-10-CM

## 2022-02-02 DIAGNOSIS — F41.1 GENERALIZED ANXIETY DISORDER: ICD-10-CM

## 2022-02-02 DIAGNOSIS — F33.0 MAJOR DEPRESSIVE DISORDER, RECURRENT EPISODE, MILD (H): ICD-10-CM

## 2022-02-02 DIAGNOSIS — F11.90 CHRONIC, CONTINUOUS USE OF OPIOIDS: ICD-10-CM

## 2022-02-02 PROCEDURE — 99214 OFFICE O/P EST MOD 30 MIN: CPT | Mod: 95 | Performed by: FAMILY MEDICINE

## 2022-02-03 ENCOUNTER — VIRTUAL VISIT (OUTPATIENT)
Dept: PSYCHOLOGY | Facility: CLINIC | Age: 52
End: 2022-02-03
Payer: COMMERCIAL

## 2022-02-03 DIAGNOSIS — F41.1 GENERALIZED ANXIETY DISORDER: ICD-10-CM

## 2022-02-03 DIAGNOSIS — F43.10 POST TRAUMATIC STRESS DISORDER (PTSD): ICD-10-CM

## 2022-02-03 DIAGNOSIS — F33.1 MAJOR DEPRESSIVE DISORDER, RECURRENT EPISODE, MODERATE WITH ANXIOUS DISTRESS (H): ICD-10-CM

## 2022-02-03 DIAGNOSIS — F90.2 ADHD (ATTENTION DEFICIT HYPERACTIVITY DISORDER), COMBINED TYPE: Primary | ICD-10-CM

## 2022-02-03 PROCEDURE — 90834 PSYTX W PT 45 MINUTES: CPT | Mod: 95 | Performed by: SOCIAL WORKER

## 2022-02-03 ASSESSMENT — PATIENT HEALTH QUESTIONNAIRE - PHQ9
SUM OF ALL RESPONSES TO PHQ QUESTIONS 1-9: 11
10. IF YOU CHECKED OFF ANY PROBLEMS, HOW DIFFICULT HAVE THESE PROBLEMS MADE IT FOR YOU TO DO YOUR WORK, TAKE CARE OF THINGS AT HOME, OR GET ALONG WITH OTHER PEOPLE: EXTREMELY DIFFICULT
SUM OF ALL RESPONSES TO PHQ QUESTIONS 1-9: 11

## 2022-02-03 ASSESSMENT — ANXIETY QUESTIONNAIRES
GAD7 TOTAL SCORE: 16
GAD7 TOTAL SCORE: 16
7. FEELING AFRAID AS IF SOMETHING AWFUL MIGHT HAPPEN: NEARLY EVERY DAY
2. NOT BEING ABLE TO STOP OR CONTROL WORRYING: NEARLY EVERY DAY
3. WORRYING TOO MUCH ABOUT DIFFERENT THINGS: NEARLY EVERY DAY
7. FEELING AFRAID AS IF SOMETHING AWFUL MIGHT HAPPEN: NEARLY EVERY DAY
5. BEING SO RESTLESS THAT IT IS HARD TO SIT STILL: SEVERAL DAYS
6. BECOMING EASILY ANNOYED OR IRRITABLE: SEVERAL DAYS
1. FEELING NERVOUS, ANXIOUS, OR ON EDGE: NEARLY EVERY DAY
GAD7 TOTAL SCORE: 16
4. TROUBLE RELAXING: MORE THAN HALF THE DAYS
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
## 2022-02-04 ASSESSMENT — PATIENT HEALTH QUESTIONNAIRE - PHQ9: SUM OF ALL RESPONSES TO PHQ QUESTIONS 1-9: 11

## 2022-02-04 ASSESSMENT — ANXIETY QUESTIONNAIRES: GAD7 TOTAL SCORE: 16

## 2022-02-16 ENCOUNTER — VIRTUAL VISIT (OUTPATIENT)
Dept: PSYCHOLOGY | Facility: CLINIC | Age: 52
End: 2022-02-16
Payer: COMMERCIAL

## 2022-02-16 DIAGNOSIS — F90.2 ADHD (ATTENTION DEFICIT HYPERACTIVITY DISORDER), COMBINED TYPE: Primary | ICD-10-CM

## 2022-02-16 DIAGNOSIS — F41.1 GENERALIZED ANXIETY DISORDER: ICD-10-CM

## 2022-02-16 DIAGNOSIS — F33.1 MAJOR DEPRESSIVE DISORDER, RECURRENT EPISODE, MODERATE WITH ANXIOUS DISTRESS (H): ICD-10-CM

## 2022-02-16 PROCEDURE — 90834 PSYTX W PT 45 MINUTES: CPT | Mod: 95 | Performed by: SOCIAL WORKER

## 2022-02-16 ASSESSMENT — ANXIETY QUESTIONNAIRES
7. FEELING AFRAID AS IF SOMETHING AWFUL MIGHT HAPPEN: NEARLY EVERY DAY
6. BECOMING EASILY ANNOYED OR IRRITABLE: SEVERAL DAYS
1. FEELING NERVOUS, ANXIOUS, OR ON EDGE: NEARLY EVERY DAY
4. TROUBLE RELAXING: NEARLY EVERY DAY
IF YOU CHECKED OFF ANY PROBLEMS ON THIS QUESTIONNAIRE, HOW DIFFICULT HAVE THESE PROBLEMS MADE IT FOR YOU TO DO YOUR WORK, TAKE CARE OF THINGS AT HOME, OR GET ALONG WITH OTHER PEOPLE: EXTREMELY DIFFICULT
3. WORRYING TOO MUCH ABOUT DIFFERENT THINGS: NEARLY EVERY DAY
GAD7 TOTAL SCORE: 16
2. NOT BEING ABLE TO STOP OR CONTROL WORRYING: NEARLY EVERY DAY
5. BEING SO RESTLESS THAT IT IS HARD TO SIT STILL: NOT AT ALL

## 2022-02-16 ASSESSMENT — PATIENT HEALTH QUESTIONNAIRE - PHQ9: SUM OF ALL RESPONSES TO PHQ QUESTIONS 1-9: 11
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
## 2022-02-17 ENCOUNTER — MYC REFILL (OUTPATIENT)
Dept: FAMILY MEDICINE | Facility: CLINIC | Age: 52
End: 2022-02-17
Payer: COMMERCIAL

## 2022-02-17 DIAGNOSIS — M54.2 CERVICALGIA: ICD-10-CM

## 2022-02-17 DIAGNOSIS — G89.4 CHRONIC PAIN SYNDROME: ICD-10-CM

## 2022-02-17 DIAGNOSIS — M54.50 CHRONIC BILATERAL LOW BACK PAIN WITHOUT SCIATICA: ICD-10-CM

## 2022-02-17 DIAGNOSIS — G89.29 CHRONIC BILATERAL LOW BACK PAIN WITHOUT SCIATICA: ICD-10-CM

## 2022-02-17 DIAGNOSIS — M79.7 FIBROMYALGIA: ICD-10-CM

## 2022-02-17 ASSESSMENT — ANXIETY QUESTIONNAIRES: GAD7 TOTAL SCORE: 16

## 2022-02-17 NOTE — TELEPHONE ENCOUNTER
Requested Prescriptions   Pending Prescriptions Disp Refills     HYDROcodone-acetaminophen (NORCO) 5-325 MG tablet 195 tablet 0     Sig: Take 2.5 tablets by mouth every morning AND 2.5 tablets daily (with lunch) AND 1.5 tablets At Bedtime. Max of 6.5 tablets/day. Fill 01/21/22 and start 01/23/22     Last Written Prescription Date:  01/20/2022  Last Fill Quantity: 195,   # refills: 0  Last Office Visit: 02/02/2022  Future Office visit:       Routing refill request to provider for review/approval because:  Drug not on the [facility], [facility] or [facility] refill protocol or controlled substance

## 2022-02-21 RX ORDER — HYDROCODONE BITARTRATE AND ACETAMINOPHEN 5; 325 MG/1; MG/1
TABLET ORAL
Qty: 195 TABLET | Refills: 0 | Status: SHIPPED | OUTPATIENT
Start: 2022-02-21 | End: 2022-03-17

## 2022-02-26 ENCOUNTER — HEALTH MAINTENANCE LETTER (OUTPATIENT)
Age: 52
End: 2022-02-26

## 2022-03-02 ENCOUNTER — VIRTUAL VISIT (OUTPATIENT)
Dept: PSYCHOLOGY | Facility: CLINIC | Age: 52
End: 2022-03-02
Payer: COMMERCIAL

## 2022-03-02 DIAGNOSIS — F41.1 GENERALIZED ANXIETY DISORDER: ICD-10-CM

## 2022-03-02 DIAGNOSIS — F33.1 MAJOR DEPRESSIVE DISORDER, RECURRENT EPISODE, MODERATE WITH ANXIOUS DISTRESS (H): ICD-10-CM

## 2022-03-02 DIAGNOSIS — F90.2 ADHD (ATTENTION DEFICIT HYPERACTIVITY DISORDER), COMBINED TYPE: Primary | ICD-10-CM

## 2022-03-02 PROCEDURE — 90834 PSYTX W PT 45 MINUTES: CPT | Mod: 95 | Performed by: SOCIAL WORKER

## 2022-03-02 ASSESSMENT — PATIENT HEALTH QUESTIONNAIRE - PHQ9
10. IF YOU CHECKED OFF ANY PROBLEMS, HOW DIFFICULT HAVE THESE PROBLEMS MADE IT FOR YOU TO DO YOUR WORK, TAKE CARE OF THINGS AT HOME, OR GET ALONG WITH OTHER PEOPLE: EXTREMELY DIFFICULT
SUM OF ALL RESPONSES TO PHQ QUESTIONS 1-9: 12
SUM OF ALL RESPONSES TO PHQ QUESTIONS 1-9: 12

## 2022-03-02 ASSESSMENT — ANXIETY QUESTIONNAIRES
1. FEELING NERVOUS, ANXIOUS, OR ON EDGE: NEARLY EVERY DAY
GAD7 TOTAL SCORE: 15
6. BECOMING EASILY ANNOYED OR IRRITABLE: SEVERAL DAYS
7. FEELING AFRAID AS IF SOMETHING AWFUL MIGHT HAPPEN: NEARLY EVERY DAY
3. WORRYING TOO MUCH ABOUT DIFFERENT THINGS: NEARLY EVERY DAY
7. FEELING AFRAID AS IF SOMETHING AWFUL MIGHT HAPPEN: NEARLY EVERY DAY
GAD7 TOTAL SCORE: 15
GAD7 TOTAL SCORE: 15
4. TROUBLE RELAXING: MORE THAN HALF THE DAYS
2. NOT BEING ABLE TO STOP OR CONTROL WORRYING: NEARLY EVERY DAY
5. BEING SO RESTLESS THAT IT IS HARD TO SIT STILL: NOT AT ALL
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
## 2022-03-03 ASSESSMENT — PATIENT HEALTH QUESTIONNAIRE - PHQ9: SUM OF ALL RESPONSES TO PHQ QUESTIONS 1-9: 12

## 2022-03-03 ASSESSMENT — ANXIETY QUESTIONNAIRES: GAD7 TOTAL SCORE: 15

## 2022-03-09 ENCOUNTER — VIRTUAL VISIT (OUTPATIENT)
Dept: PSYCHOLOGY | Facility: CLINIC | Age: 52
End: 2022-03-09
Payer: COMMERCIAL

## 2022-03-09 DIAGNOSIS — F90.2 ADHD (ATTENTION DEFICIT HYPERACTIVITY DISORDER), COMBINED TYPE: Primary | ICD-10-CM

## 2022-03-09 DIAGNOSIS — F33.1 MAJOR DEPRESSIVE DISORDER, RECURRENT EPISODE, MODERATE WITH ANXIOUS DISTRESS (H): ICD-10-CM

## 2022-03-09 DIAGNOSIS — F41.1 GENERALIZED ANXIETY DISORDER: ICD-10-CM

## 2022-03-09 PROCEDURE — 90834 PSYTX W PT 45 MINUTES: CPT | Mod: 95 | Performed by: SOCIAL WORKER
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
## 2022-03-17 ENCOUNTER — MYC REFILL (OUTPATIENT)
Dept: FAMILY MEDICINE | Facility: CLINIC | Age: 52
End: 2022-03-17
Payer: COMMERCIAL

## 2022-03-17 DIAGNOSIS — G43.819 OTHER MIGRAINE WITHOUT STATUS MIGRAINOSUS, INTRACTABLE: ICD-10-CM

## 2022-03-19 DIAGNOSIS — G43.819 OTHER MIGRAINE WITHOUT STATUS MIGRAINOSUS, INTRACTABLE: ICD-10-CM

## 2022-03-21 RX ORDER — VERAPAMIL HYDROCHLORIDE 40 MG/1
40 TABLET ORAL 2 TIMES DAILY
Qty: 180 TABLET | Refills: 1 | Status: SHIPPED | OUTPATIENT
Start: 2022-03-21 | End: 2022-11-25

## 2022-03-22 RX ORDER — VERAPAMIL HYDROCHLORIDE 40 MG/1
TABLET ORAL
Qty: 180 TABLET | Refills: 0 | OUTPATIENT
Start: 2022-03-22

## 2022-03-22 NOTE — TELEPHONE ENCOUNTER
Prescription was sent 3/21/22 for #180 with 1 refills.  Pharmacy notified via E-Prescribe refusal.     SAMARA GaleN, RN  [facility]

## 2022-03-24 ENCOUNTER — VIRTUAL VISIT (OUTPATIENT)
Dept: PSYCHOLOGY | Facility: CLINIC | Age: 52
End: 2022-03-24
Payer: COMMERCIAL

## 2022-03-24 DIAGNOSIS — F90.2 ADHD (ATTENTION DEFICIT HYPERACTIVITY DISORDER), COMBINED TYPE: Primary | ICD-10-CM

## 2022-03-24 DIAGNOSIS — F41.1 GENERALIZED ANXIETY DISORDER: ICD-10-CM

## 2022-03-24 DIAGNOSIS — F33.1 MAJOR DEPRESSIVE DISORDER, RECURRENT EPISODE, MODERATE WITH ANXIOUS DISTRESS (H): ICD-10-CM

## 2022-03-24 PROCEDURE — 90834 PSYTX W PT 45 MINUTES: CPT | Mod: 95 | Performed by: SOCIAL WORKER

## 2022-03-24 ASSESSMENT — ANXIETY QUESTIONNAIRES
1. FEELING NERVOUS, ANXIOUS, OR ON EDGE: NEARLY EVERY DAY
GAD7 TOTAL SCORE: 15
GAD7 TOTAL SCORE: 15
7. FEELING AFRAID AS IF SOMETHING AWFUL MIGHT HAPPEN: NEARLY EVERY DAY
6. BECOMING EASILY ANNOYED OR IRRITABLE: SEVERAL DAYS
2. NOT BEING ABLE TO STOP OR CONTROL WORRYING: NEARLY EVERY DAY
3. WORRYING TOO MUCH ABOUT DIFFERENT THINGS: NEARLY EVERY DAY
GAD7 TOTAL SCORE: 15
4. TROUBLE RELAXING: MORE THAN HALF THE DAYS
7. FEELING AFRAID AS IF SOMETHING AWFUL MIGHT HAPPEN: NEARLY EVERY DAY
5. BEING SO RESTLESS THAT IT IS HARD TO SIT STILL: NOT AT ALL

## 2022-03-24 ASSESSMENT — PATIENT HEALTH QUESTIONNAIRE - PHQ9
10. IF YOU CHECKED OFF ANY PROBLEMS, HOW DIFFICULT HAVE THESE PROBLEMS MADE IT FOR YOU TO DO YOUR WORK, TAKE CARE OF THINGS AT HOME, OR GET ALONG WITH OTHER PEOPLE: EXTREMELY DIFFICULT
SUM OF ALL RESPONSES TO PHQ QUESTIONS 1-9: 15
SUM OF ALL RESPONSES TO PHQ QUESTIONS 1-9: 15
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
## 2022-03-25 ASSESSMENT — ANXIETY QUESTIONNAIRES: GAD7 TOTAL SCORE: 15

## 2022-03-25 ASSESSMENT — PATIENT HEALTH QUESTIONNAIRE - PHQ9: SUM OF ALL RESPONSES TO PHQ QUESTIONS 1-9: 15

## 2022-03-31 ENCOUNTER — VIRTUAL VISIT (OUTPATIENT)
Dept: PSYCHOLOGY | Facility: CLINIC | Age: 52
End: 2022-03-31
Payer: COMMERCIAL

## 2022-03-31 DIAGNOSIS — F33.1 MAJOR DEPRESSIVE DISORDER, RECURRENT EPISODE, MODERATE WITH ANXIOUS DISTRESS (H): ICD-10-CM

## 2022-03-31 DIAGNOSIS — F90.2 ADHD (ATTENTION DEFICIT HYPERACTIVITY DISORDER), COMBINED TYPE: Primary | ICD-10-CM

## 2022-03-31 DIAGNOSIS — F41.1 GENERALIZED ANXIETY DISORDER: ICD-10-CM

## 2022-03-31 PROCEDURE — 90834 PSYTX W PT 45 MINUTES: CPT | Mod: 95 | Performed by: SOCIAL WORKER

## 2022-03-31 ASSESSMENT — PATIENT HEALTH QUESTIONNAIRE - PHQ9
SUM OF ALL RESPONSES TO PHQ QUESTIONS 1-9: 15
SUM OF ALL RESPONSES TO PHQ QUESTIONS 1-9: 15
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
## 2022-04-01 ASSESSMENT — PATIENT HEALTH QUESTIONNAIRE - PHQ9: SUM OF ALL RESPONSES TO PHQ QUESTIONS 1-9: 15

## 2022-04-06 NOTE — PATIENT INSTRUCTIONS
Can use the codeine cough syrup sparingly- do not take at same time as hydrocodone.  Drink plenty of fluids.    For symptoms can try the following:   For body aches, headache, pain:   Tylenol (acetaminophen) up to 1000mg 3x/day.   Ibuprofen up to 800mg 3x/day     For sore throat  1. Throat lozenges  2. Cold beverages, ice pops, OR warm liquids (teas, etc)  3. Warm salt water gargles   4. Chloroseptic spray  5. Tylenol -or- ibuprofen for pain    For cough:  1. Sleep upright  2. Humidifier- warm showers  3. Honey  4.Vicks Vapor Rub  5. Mucinex tablets (guaifenesin)  6. Robitussin (guaifenesin) or Delsym (dextromethorphan) cough syrup as directed   5. Can use albuterol every 4-6 hours for cough, shortness of breath    For nasal congestion:  Can try neti pot or simply saline  Nasal spray containing oxymetazoline (Afrin) - 1 sprays each nostril twice a day for 3 days only (prolonged use can worsen congestion symptoms once discontinued)      Should be re-evaluated for new fevers, shortness of breath or difficulty breathing, new or worsening symptoms.    
Weight loss.../Inadequate energy intake.../Loss of subcutaneous fat.../Loss of muscle...

## 2022-04-12 ENCOUNTER — VIRTUAL VISIT (OUTPATIENT)
Dept: PSYCHOLOGY | Facility: CLINIC | Age: 52
End: 2022-04-12
Payer: COMMERCIAL

## 2022-04-12 DIAGNOSIS — F90.2 ADHD (ATTENTION DEFICIT HYPERACTIVITY DISORDER), COMBINED TYPE: Primary | ICD-10-CM

## 2022-04-12 DIAGNOSIS — F43.10 POST TRAUMATIC STRESS DISORDER (PTSD): ICD-10-CM

## 2022-04-12 DIAGNOSIS — F41.1 GENERALIZED ANXIETY DISORDER: ICD-10-CM

## 2022-04-12 DIAGNOSIS — F33.1 MAJOR DEPRESSIVE DISORDER, RECURRENT EPISODE, MODERATE WITH ANXIOUS DISTRESS (H): ICD-10-CM

## 2022-04-12 PROCEDURE — 90834 PSYTX W PT 45 MINUTES: CPT | Mod: 95 | Performed by: SOCIAL WORKER

## 2022-04-12 ASSESSMENT — ANXIETY QUESTIONNAIRES
7. FEELING AFRAID AS IF SOMETHING AWFUL MIGHT HAPPEN: NEARLY EVERY DAY
3. WORRYING TOO MUCH ABOUT DIFFERENT THINGS: NEARLY EVERY DAY
7. FEELING AFRAID AS IF SOMETHING AWFUL MIGHT HAPPEN: NEARLY EVERY DAY
2. NOT BEING ABLE TO STOP OR CONTROL WORRYING: NEARLY EVERY DAY
GAD7 TOTAL SCORE: 15
6. BECOMING EASILY ANNOYED OR IRRITABLE: SEVERAL DAYS
GAD7 TOTAL SCORE: 15
5. BEING SO RESTLESS THAT IT IS HARD TO SIT STILL: NOT AT ALL
1. FEELING NERVOUS, ANXIOUS, OR ON EDGE: NEARLY EVERY DAY
4. TROUBLE RELAXING: MORE THAN HALF THE DAYS
GAD7 TOTAL SCORE: 15

## 2022-04-12 ASSESSMENT — PATIENT HEALTH QUESTIONNAIRE - PHQ9
10. IF YOU CHECKED OFF ANY PROBLEMS, HOW DIFFICULT HAVE THESE PROBLEMS MADE IT FOR YOU TO DO YOUR WORK, TAKE CARE OF THINGS AT HOME, OR GET ALONG WITH OTHER PEOPLE: EXTREMELY DIFFICULT
SUM OF ALL RESPONSES TO PHQ QUESTIONS 1-9: 16
SUM OF ALL RESPONSES TO PHQ QUESTIONS 1-9: 16

## 2022-04-13 ASSESSMENT — ANXIETY QUESTIONNAIRES: GAD7 TOTAL SCORE: 15

## 2022-04-13 ASSESSMENT — PATIENT HEALTH QUESTIONNAIRE - PHQ9: SUM OF ALL RESPONSES TO PHQ QUESTIONS 1-9: 16

## 2022-04-15 ENCOUNTER — MYC REFILL (OUTPATIENT)
Dept: FAMILY MEDICINE | Facility: CLINIC | Age: 52
End: 2022-04-15
Payer: COMMERCIAL

## 2022-04-15 DIAGNOSIS — G89.4 CHRONIC PAIN SYNDROME: ICD-10-CM

## 2022-04-15 DIAGNOSIS — M54.50 CHRONIC BILATERAL LOW BACK PAIN WITHOUT SCIATICA: ICD-10-CM

## 2022-04-15 DIAGNOSIS — M79.7 FIBROMYALGIA: ICD-10-CM

## 2022-04-15 DIAGNOSIS — G89.29 CHRONIC BILATERAL LOW BACK PAIN WITHOUT SCIATICA: ICD-10-CM

## 2022-04-15 DIAGNOSIS — M54.2 CERVICALGIA: ICD-10-CM

## 2022-04-15 RX ORDER — HYDROCODONE BITARTRATE AND ACETAMINOPHEN 5; 325 MG/1; MG/1
TABLET ORAL
Qty: 195 TABLET | Refills: 0 | Status: SHIPPED | OUTPATIENT
Start: 2022-04-15 | End: 2022-05-12

## 2022-04-15 NOTE — TELEPHONE ENCOUNTER
Requested Prescriptions   Pending Prescriptions Disp Refills     HYDROcodone-acetaminophen (NORCO) 5-325 MG tablet 195 tablet 0     Sig: Take 2.5 tablets by mouth every morning AND 2.5 tablets daily (with lunch) AND 1.5 tablets At Bedtime. Max of 6.5 tablets/day. Fill 01/21/22 and start 01/23/22        Last Written Prescription Date:  3/20/2022  Last Fill Quantity: 195,   # refills: 0  Last Office Visit: 10/08/2020  Future Office visit:       Routing refill request to provider for review/approval because:  Drug not on the [facility], [facility] or [facility] refill protocol or controlled substance

## 2022-04-20 ENCOUNTER — VIRTUAL VISIT (OUTPATIENT)
Dept: PSYCHOLOGY | Facility: CLINIC | Age: 52
End: 2022-04-20
Payer: COMMERCIAL

## 2022-04-20 DIAGNOSIS — F41.1 GENERALIZED ANXIETY DISORDER: ICD-10-CM

## 2022-04-20 DIAGNOSIS — F90.2 ADHD (ATTENTION DEFICIT HYPERACTIVITY DISORDER), COMBINED TYPE: Primary | ICD-10-CM

## 2022-04-20 DIAGNOSIS — F33.1 MAJOR DEPRESSIVE DISORDER, RECURRENT EPISODE, MODERATE WITH ANXIOUS DISTRESS (H): ICD-10-CM

## 2022-04-20 PROCEDURE — 90834 PSYTX W PT 45 MINUTES: CPT | Mod: 95 | Performed by: SOCIAL WORKER

## 2022-04-21 ASSESSMENT — PATIENT HEALTH QUESTIONNAIRE - PHQ9: SUM OF ALL RESPONSES TO PHQ QUESTIONS 1-9: 16
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
## 2022-04-27 ENCOUNTER — VIRTUAL VISIT (OUTPATIENT)
Dept: PSYCHOLOGY | Facility: CLINIC | Age: 52
End: 2022-04-27
Payer: COMMERCIAL

## 2022-04-27 DIAGNOSIS — F90.2 ADHD (ATTENTION DEFICIT HYPERACTIVITY DISORDER), COMBINED TYPE: Primary | ICD-10-CM

## 2022-04-27 DIAGNOSIS — F43.10 POST TRAUMATIC STRESS DISORDER (PTSD): ICD-10-CM

## 2022-04-27 DIAGNOSIS — F41.1 GENERALIZED ANXIETY DISORDER: ICD-10-CM

## 2022-04-27 DIAGNOSIS — F33.1 MAJOR DEPRESSIVE DISORDER, RECURRENT EPISODE, MODERATE WITH ANXIOUS DISTRESS (H): ICD-10-CM

## 2022-04-27 PROCEDURE — 90834 PSYTX W PT 45 MINUTES: CPT | Mod: 95 | Performed by: SOCIAL WORKER

## 2022-04-27 ASSESSMENT — ANXIETY QUESTIONNAIRES
1. FEELING NERVOUS, ANXIOUS, OR ON EDGE: NEARLY EVERY DAY
GAD7 TOTAL SCORE: 19
4. TROUBLE RELAXING: NEARLY EVERY DAY
7. FEELING AFRAID AS IF SOMETHING AWFUL MIGHT HAPPEN: NEARLY EVERY DAY
5. BEING SO RESTLESS THAT IT IS HARD TO SIT STILL: MORE THAN HALF THE DAYS
3. WORRYING TOO MUCH ABOUT DIFFERENT THINGS: NEARLY EVERY DAY
7. FEELING AFRAID AS IF SOMETHING AWFUL MIGHT HAPPEN: NEARLY EVERY DAY
2. NOT BEING ABLE TO STOP OR CONTROL WORRYING: NEARLY EVERY DAY
GAD7 TOTAL SCORE: 19
6. BECOMING EASILY ANNOYED OR IRRITABLE: MORE THAN HALF THE DAYS
GAD7 TOTAL SCORE: 19

## 2022-04-27 ASSESSMENT — PATIENT HEALTH QUESTIONNAIRE - PHQ9
SUM OF ALL RESPONSES TO PHQ QUESTIONS 1-9: 19
10. IF YOU CHECKED OFF ANY PROBLEMS, HOW DIFFICULT HAVE THESE PROBLEMS MADE IT FOR YOU TO DO YOUR WORK, TAKE CARE OF THINGS AT HOME, OR GET ALONG WITH OTHER PEOPLE: EXTREMELY DIFFICULT
SUM OF ALL RESPONSES TO PHQ QUESTIONS 1-9: 19

## 2022-04-28 ASSESSMENT — PATIENT HEALTH QUESTIONNAIRE - PHQ9: SUM OF ALL RESPONSES TO PHQ QUESTIONS 1-9: 19

## 2022-04-28 ASSESSMENT — ANXIETY QUESTIONNAIRES: GAD7 TOTAL SCORE: 19
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
## 2022-05-04 ENCOUNTER — VIRTUAL VISIT (OUTPATIENT)
Dept: PSYCHOLOGY | Facility: CLINIC | Age: 52
End: 2022-05-04
Payer: COMMERCIAL

## 2022-05-04 DIAGNOSIS — F41.1 GENERALIZED ANXIETY DISORDER: ICD-10-CM

## 2022-05-04 DIAGNOSIS — F33.1 MAJOR DEPRESSIVE DISORDER, RECURRENT EPISODE, MODERATE WITH ANXIOUS DISTRESS (H): ICD-10-CM

## 2022-05-04 DIAGNOSIS — F90.2 ADHD (ATTENTION DEFICIT HYPERACTIVITY DISORDER), COMBINED TYPE: Primary | ICD-10-CM

## 2022-05-04 PROCEDURE — 90834 PSYTX W PT 45 MINUTES: CPT | Mod: 95 | Performed by: SOCIAL WORKER

## 2022-05-04 ASSESSMENT — ANXIETY QUESTIONNAIRES
5. BEING SO RESTLESS THAT IT IS HARD TO SIT STILL: SEVERAL DAYS
7. FEELING AFRAID AS IF SOMETHING AWFUL MIGHT HAPPEN: NEARLY EVERY DAY
3. WORRYING TOO MUCH ABOUT DIFFERENT THINGS: NEARLY EVERY DAY
GAD7 TOTAL SCORE: 16
7. FEELING AFRAID AS IF SOMETHING AWFUL MIGHT HAPPEN: NEARLY EVERY DAY
4. TROUBLE RELAXING: MORE THAN HALF THE DAYS
GAD7 TOTAL SCORE: 16
GAD7 TOTAL SCORE: 16
6. BECOMING EASILY ANNOYED OR IRRITABLE: MORE THAN HALF THE DAYS
1. FEELING NERVOUS, ANXIOUS, OR ON EDGE: MORE THAN HALF THE DAYS
2. NOT BEING ABLE TO STOP OR CONTROL WORRYING: NEARLY EVERY DAY
8. IF YOU CHECKED OFF ANY PROBLEMS, HOW DIFFICULT HAVE THESE MADE IT FOR YOU TO DO YOUR WORK, TAKE CARE OF THINGS AT HOME, OR GET ALONG WITH OTHER PEOPLE?: EXTREMELY DIFFICULT
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
## 2022-05-05 ASSESSMENT — PATIENT HEALTH QUESTIONNAIRE - PHQ9: SUM OF ALL RESPONSES TO PHQ QUESTIONS 1-9: 16

## 2022-05-05 ASSESSMENT — ANXIETY QUESTIONNAIRES: GAD7 TOTAL SCORE: 16

## 2022-05-06 ENCOUNTER — TELEPHONE (OUTPATIENT)
Dept: BEHAVIORAL HEALTH | Facility: CLINIC | Age: 52
End: 2022-05-06
Payer: COMMERCIAL

## 2022-05-06 NOTE — TELEPHONE ENCOUNTER
Writer left a vm for patient reminding appt on Tuesday May 10th and to call intake back to complete  VERBAL  General Consent or log into [employer] to complete Consent with appointment at pre-check in.
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
## 2022-05-12 ENCOUNTER — MYC REFILL (OUTPATIENT)
Dept: FAMILY MEDICINE | Facility: CLINIC | Age: 52
End: 2022-05-12
Payer: COMMERCIAL

## 2022-05-12 DIAGNOSIS — G89.4 CHRONIC PAIN SYNDROME: ICD-10-CM

## 2022-05-12 DIAGNOSIS — M79.7 FIBROMYALGIA: ICD-10-CM

## 2022-05-12 DIAGNOSIS — G89.29 CHRONIC BILATERAL LOW BACK PAIN WITHOUT SCIATICA: ICD-10-CM

## 2022-05-12 DIAGNOSIS — M54.2 CERVICALGIA: ICD-10-CM

## 2022-05-12 DIAGNOSIS — M54.50 CHRONIC BILATERAL LOW BACK PAIN WITHOUT SCIATICA: ICD-10-CM

## 2022-05-12 RX ORDER — HYDROCODONE BITARTRATE AND ACETAMINOPHEN 5; 325 MG/1; MG/1
TABLET ORAL
Qty: 195 TABLET | Refills: 0 | Status: SHIPPED | OUTPATIENT
Start: 2022-05-12 | End: 2022-06-13

## 2022-05-12 NOTE — TELEPHONE ENCOUNTER
Requested Prescriptions   Pending Prescriptions Disp Refills     HYDROcodone-acetaminophen (NORCO) 5-325 MG tablet 195 tablet 0     Sig: Take 2.5 tablets by mouth every morning AND 2.5 tablets daily (with lunch) AND 1.5 tablets At Bedtime. Max of 6.5 tablets/day. Fill 01/21/22 and start 01/23/22     Last Written Prescription Date:  04/15/2022  Last Fill Quantity: 195,   # refills: 0  Last Office Visit: 02/02/2022  Future Office visit:    Next 5 appointments (look out 90 days)    May 19, 2022  3:00 PM  (Arrive by 2:40 PM)  Provider Visit with Twin Castro MD  [facility] ([facility] ) [address] [place] [id number] [phone]           Routing refill request to provider for review/approval because:  Drug not on the [facility], [facility] or [facility] refill protocol or controlled substance

## 2022-05-18 ASSESSMENT — ANXIETY QUESTIONNAIRES
3. WORRYING TOO MUCH ABOUT DIFFERENT THINGS: NEARLY EVERY DAY
2. NOT BEING ABLE TO STOP OR CONTROL WORRYING: NEARLY EVERY DAY
8. IF YOU CHECKED OFF ANY PROBLEMS, HOW DIFFICULT HAVE THESE MADE IT FOR YOU TO DO YOUR WORK, TAKE CARE OF THINGS AT HOME, OR GET ALONG WITH OTHER PEOPLE?: VERY DIFFICULT
6. BECOMING EASILY ANNOYED OR IRRITABLE: MORE THAN HALF THE DAYS
7. FEELING AFRAID AS IF SOMETHING AWFUL MIGHT HAPPEN: NEARLY EVERY DAY
4. TROUBLE RELAXING: NEARLY EVERY DAY
GAD7 TOTAL SCORE: 16
7. FEELING AFRAID AS IF SOMETHING AWFUL MIGHT HAPPEN: NEARLY EVERY DAY
GAD7 TOTAL SCORE: 16
1. FEELING NERVOUS, ANXIOUS, OR ON EDGE: MORE THAN HALF THE DAYS
5. BEING SO RESTLESS THAT IT IS HARD TO SIT STILL: NOT AT ALL
GAD7 TOTAL SCORE: 16

## 2022-05-19 ENCOUNTER — VIRTUAL VISIT (OUTPATIENT)
Dept: PSYCHOLOGY | Facility: CLINIC | Age: 52
End: 2022-05-19
Payer: COMMERCIAL

## 2022-05-19 DIAGNOSIS — F41.1 GENERALIZED ANXIETY DISORDER: ICD-10-CM

## 2022-05-19 DIAGNOSIS — F43.10 POST TRAUMATIC STRESS DISORDER (PTSD): ICD-10-CM

## 2022-05-19 DIAGNOSIS — F90.2 ADHD (ATTENTION DEFICIT HYPERACTIVITY DISORDER), COMBINED TYPE: Primary | ICD-10-CM

## 2022-05-19 DIAGNOSIS — F33.1 MAJOR DEPRESSIVE DISORDER, RECURRENT EPISODE, MODERATE WITH ANXIOUS DISTRESS (H): ICD-10-CM

## 2022-05-19 PROCEDURE — 90834 PSYTX W PT 45 MINUTES: CPT | Mod: 95 | Performed by: SOCIAL WORKER

## 2022-05-25 ENCOUNTER — VIRTUAL VISIT (OUTPATIENT)
Dept: PSYCHOLOGY | Facility: CLINIC | Age: 52
End: 2022-05-25
Payer: COMMERCIAL

## 2022-05-25 DIAGNOSIS — F43.10 POST TRAUMATIC STRESS DISORDER (PTSD): ICD-10-CM

## 2022-05-25 DIAGNOSIS — F33.1 MAJOR DEPRESSIVE DISORDER, RECURRENT EPISODE, MODERATE WITH ANXIOUS DISTRESS (H): ICD-10-CM

## 2022-05-25 DIAGNOSIS — F90.2 ADHD (ATTENTION DEFICIT HYPERACTIVITY DISORDER), COMBINED TYPE: Primary | ICD-10-CM

## 2022-05-25 DIAGNOSIS — F41.1 GENERALIZED ANXIETY DISORDER: ICD-10-CM

## 2022-05-25 PROCEDURE — 90834 PSYTX W PT 45 MINUTES: CPT | Mod: 95 | Performed by: SOCIAL WORKER

## 2022-05-25 ASSESSMENT — PATIENT HEALTH QUESTIONNAIRE - PHQ9
SUM OF ALL RESPONSES TO PHQ QUESTIONS 1-9: 12
SUM OF ALL RESPONSES TO PHQ QUESTIONS 1-9: 12

## 2022-05-31 ENCOUNTER — VIRTUAL VISIT (OUTPATIENT)
Dept: PSYCHOLOGY | Facility: CLINIC | Age: 52
End: 2022-05-31
Payer: COMMERCIAL

## 2022-05-31 DIAGNOSIS — F33.1 MAJOR DEPRESSIVE DISORDER, RECURRENT EPISODE, MODERATE WITH ANXIOUS DISTRESS (H): ICD-10-CM

## 2022-05-31 DIAGNOSIS — F90.2 ADHD (ATTENTION DEFICIT HYPERACTIVITY DISORDER), COMBINED TYPE: Primary | ICD-10-CM

## 2022-05-31 DIAGNOSIS — F41.1 GENERALIZED ANXIETY DISORDER: ICD-10-CM

## 2022-05-31 PROCEDURE — 90834 PSYTX W PT 45 MINUTES: CPT | Mod: 95 | Performed by: SOCIAL WORKER

## 2022-05-31 ASSESSMENT — PATIENT HEALTH QUESTIONNAIRE - PHQ9
SUM OF ALL RESPONSES TO PHQ QUESTIONS 1-9: 14
SUM OF ALL RESPONSES TO PHQ QUESTIONS 1-9: 14
10. IF YOU CHECKED OFF ANY PROBLEMS, HOW DIFFICULT HAVE THESE PROBLEMS MADE IT FOR YOU TO DO YOUR WORK, TAKE CARE OF THINGS AT HOME, OR GET ALONG WITH OTHER PEOPLE: EXTREMELY DIFFICULT

## 2022-05-31 ASSESSMENT — ANXIETY QUESTIONNAIRES
GAD7 TOTAL SCORE: 16
1. FEELING NERVOUS, ANXIOUS, OR ON EDGE: MORE THAN HALF THE DAYS
4. TROUBLE RELAXING: NEARLY EVERY DAY
5. BEING SO RESTLESS THAT IT IS HARD TO SIT STILL: NOT AT ALL
2. NOT BEING ABLE TO STOP OR CONTROL WORRYING: NEARLY EVERY DAY
7. FEELING AFRAID AS IF SOMETHING AWFUL MIGHT HAPPEN: NEARLY EVERY DAY
6. BECOMING EASILY ANNOYED OR IRRITABLE: MORE THAN HALF THE DAYS
3. WORRYING TOO MUCH ABOUT DIFFERENT THINGS: NEARLY EVERY DAY

## 2022-06-01 ENCOUNTER — MYC MEDICAL ADVICE (OUTPATIENT)
Dept: FAMILY MEDICINE | Facility: CLINIC | Age: 52
End: 2022-06-01
Payer: COMMERCIAL
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
## 2022-06-08 ENCOUNTER — VIRTUAL VISIT (OUTPATIENT)
Dept: PSYCHOLOGY | Facility: CLINIC | Age: 52
End: 2022-06-08
Payer: COMMERCIAL

## 2022-06-08 DIAGNOSIS — F41.1 GENERALIZED ANXIETY DISORDER: ICD-10-CM

## 2022-06-08 DIAGNOSIS — F90.2 ADHD (ATTENTION DEFICIT HYPERACTIVITY DISORDER), COMBINED TYPE: Primary | ICD-10-CM

## 2022-06-08 DIAGNOSIS — F33.1 MAJOR DEPRESSIVE DISORDER, RECURRENT EPISODE, MODERATE WITH ANXIOUS DISTRESS (H): ICD-10-CM

## 2022-06-08 DIAGNOSIS — F43.10 POST TRAUMATIC STRESS DISORDER (PTSD): ICD-10-CM

## 2022-06-08 PROCEDURE — 90834 PSYTX W PT 45 MINUTES: CPT | Mod: 95 | Performed by: SOCIAL WORKER

## 2022-06-08 ASSESSMENT — ANXIETY QUESTIONNAIRES
6. BECOMING EASILY ANNOYED OR IRRITABLE: MORE THAN HALF THE DAYS
GAD7 TOTAL SCORE: 16
GAD7 TOTAL SCORE: 16
7. FEELING AFRAID AS IF SOMETHING AWFUL MIGHT HAPPEN: NEARLY EVERY DAY
5. BEING SO RESTLESS THAT IT IS HARD TO SIT STILL: NOT AT ALL
3. WORRYING TOO MUCH ABOUT DIFFERENT THINGS: NEARLY EVERY DAY
1. FEELING NERVOUS, ANXIOUS, OR ON EDGE: MORE THAN HALF THE DAYS
7. FEELING AFRAID AS IF SOMETHING AWFUL MIGHT HAPPEN: NEARLY EVERY DAY
GAD7 TOTAL SCORE: 16
8. IF YOU CHECKED OFF ANY PROBLEMS, HOW DIFFICULT HAVE THESE MADE IT FOR YOU TO DO YOUR WORK, TAKE CARE OF THINGS AT HOME, OR GET ALONG WITH OTHER PEOPLE?: VERY DIFFICULT
4. TROUBLE RELAXING: NEARLY EVERY DAY
2. NOT BEING ABLE TO STOP OR CONTROL WORRYING: NEARLY EVERY DAY

## 2022-06-08 ASSESSMENT — PATIENT HEALTH QUESTIONNAIRE - PHQ9
SUM OF ALL RESPONSES TO PHQ QUESTIONS 1-9: 15
SUM OF ALL RESPONSES TO PHQ QUESTIONS 1-9: 15
10. IF YOU CHECKED OFF ANY PROBLEMS, HOW DIFFICULT HAVE THESE PROBLEMS MADE IT FOR YOU TO DO YOUR WORK, TAKE CARE OF THINGS AT HOME, OR GET ALONG WITH OTHER PEOPLE: EXTREMELY DIFFICULT
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
## 2022-06-13 ENCOUNTER — OFFICE VISIT (OUTPATIENT)
Dept: FAMILY MEDICINE | Facility: CLINIC | Age: 52
End: 2022-06-13
Payer: COMMERCIAL

## 2022-06-13 ENCOUNTER — TELEPHONE (OUTPATIENT)
Dept: FAMILY MEDICINE | Facility: CLINIC | Age: 52
End: 2022-06-13

## 2022-06-13 VITALS
DIASTOLIC BLOOD PRESSURE: 60 MMHG | RESPIRATION RATE: 10 BRPM | TEMPERATURE: 98 F | WEIGHT: 123 LBS | OXYGEN SATURATION: 97 % | HEART RATE: 105 BPM | SYSTOLIC BLOOD PRESSURE: 108 MMHG | BODY MASS INDEX: 20.16 KG/M2

## 2022-06-13 DIAGNOSIS — L02.92 BOIL: Primary | ICD-10-CM

## 2022-06-13 DIAGNOSIS — G89.4 CHRONIC PAIN SYNDROME: ICD-10-CM

## 2022-06-13 DIAGNOSIS — M54.50 CHRONIC BILATERAL LOW BACK PAIN WITHOUT SCIATICA: ICD-10-CM

## 2022-06-13 DIAGNOSIS — Z79.899 ENCOUNTER FOR LONG-TERM (CURRENT) USE OF MEDICATIONS: ICD-10-CM

## 2022-06-13 DIAGNOSIS — J31.0 CHRONIC RHINITIS: ICD-10-CM

## 2022-06-13 DIAGNOSIS — M79.7 FIBROMYALGIA: ICD-10-CM

## 2022-06-13 DIAGNOSIS — M54.2 CERVICALGIA: ICD-10-CM

## 2022-06-13 DIAGNOSIS — G89.29 CHRONIC BILATERAL LOW BACK PAIN WITHOUT SCIATICA: ICD-10-CM

## 2022-06-13 DIAGNOSIS — J45.20 INTERMITTENT ASTHMA, UNCOMPLICATED: ICD-10-CM

## 2022-06-13 DIAGNOSIS — Z53.9 ERRONEOUS ENCOUNTER--DISREGARD: ICD-10-CM

## 2022-06-13 DIAGNOSIS — R53.83 FATIGUE, UNSPECIFIED TYPE: ICD-10-CM

## 2022-06-13 LAB
ALBUMIN SERPL-MCNC: 3.5 G/DL (ref 3.4–5)
ALP SERPL-CCNC: 157 U/L (ref 40–150)
ALT SERPL W P-5'-P-CCNC: 19 U/L (ref 0–50)
ANION GAP SERPL CALCULATED.3IONS-SCNC: 4 MMOL/L (ref 3–14)
AST SERPL W P-5'-P-CCNC: 11 U/L (ref 0–45)
BASOPHILS # BLD MANUAL: 0 10E3/UL (ref 0–0.2)
BASOPHILS NFR BLD MANUAL: 0 %
BILIRUB SERPL-MCNC: 0.2 MG/DL (ref 0.2–1.3)
BUN SERPL-MCNC: 15 MG/DL (ref 7–30)
CALCIUM SERPL-MCNC: 8.4 MG/DL (ref 8.5–10.1)
CHLORIDE BLD-SCNC: 107 MMOL/L (ref 94–109)
CO2 SERPL-SCNC: 31 MMOL/L (ref 20–32)
CREAT SERPL-MCNC: 1 MG/DL (ref 0.52–1.04)
CREAT UR-MCNC: 466 MG/DL
EOSINOPHIL # BLD MANUAL: 0 10E3/UL (ref 0–0.7)
EOSINOPHIL NFR BLD MANUAL: 0 %
ERYTHROCYTE [DISTWIDTH] IN BLOOD BY AUTOMATED COUNT: 12.2 % (ref 10–15)
GFR SERPL CREATININE-BSD FRML MDRD: 67 ML/MIN/1.73M2
GLUCOSE BLD-MCNC: 91 MG/DL (ref 70–99)
HCT VFR BLD AUTO: 41 % (ref 35–47)
HGB BLD-MCNC: 14 G/DL (ref 11.7–15.7)
LYMPHOCYTES # BLD MANUAL: 3.1 10E3/UL (ref 0.8–5.3)
LYMPHOCYTES NFR BLD MANUAL: 31 %
MCH RBC QN AUTO: 33.5 PG (ref 26.5–33)
MCHC RBC AUTO-ENTMCNC: 34.1 G/DL (ref 31.5–36.5)
MCV RBC AUTO: 98 FL (ref 78–100)
MONOCYTES # BLD MANUAL: 0 10E3/UL (ref 0–1.3)
MONOCYTES NFR BLD MANUAL: 0 %
NEUTROPHILS # BLD MANUAL: 6.9 10E3/UL (ref 1.6–8.3)
NEUTROPHILS NFR BLD MANUAL: 69 %
PLAT MORPH BLD: NORMAL
PLATELET # BLD AUTO: 161 10E3/UL (ref 150–450)
POTASSIUM BLD-SCNC: 3.7 MMOL/L (ref 3.4–5.3)
PROT SERPL-MCNC: 6.9 G/DL (ref 6.8–8.8)
RBC # BLD AUTO: 4.18 10E6/UL (ref 3.8–5.2)
RBC MORPH BLD: NORMAL
SODIUM SERPL-SCNC: 142 MMOL/L (ref 133–144)
TSH SERPL DL<=0.005 MIU/L-ACNC: 1.77 MU/L (ref 0.4–4)
WBC # BLD AUTO: 10 10E3/UL (ref 4–11)

## 2022-06-13 PROCEDURE — 36415 COLL VENOUS BLD VENIPUNCTURE: CPT | Performed by: FAMILY MEDICINE

## 2022-06-13 PROCEDURE — 99214 OFFICE O/P EST MOD 30 MIN: CPT | Performed by: FAMILY MEDICINE

## 2022-06-13 PROCEDURE — 80050 GENERAL HEALTH PANEL: CPT | Performed by: FAMILY MEDICINE

## 2022-06-13 PROCEDURE — 80307 DRUG TEST PRSMV CHEM ANLYZR: CPT | Performed by: FAMILY MEDICINE

## 2022-06-13 RX ORDER — FLUTICASONE PROPIONATE 50 MCG
1-2 SPRAY, SUSPENSION (ML) NASAL DAILY
Qty: 15.8 ML | Refills: 5 | Status: SHIPPED | OUTPATIENT
Start: 2022-06-13 | End: 2023-06-05

## 2022-06-13 RX ORDER — HYDROCODONE BITARTRATE AND ACETAMINOPHEN 5; 325 MG/1; MG/1
TABLET ORAL
Qty: 195 TABLET | Refills: 0 | Status: SHIPPED | OUTPATIENT
Start: 2022-06-13 | End: 2022-07-11

## 2022-06-13 RX ORDER — CETIRIZINE HYDROCHLORIDE 10 MG/1
10 TABLET ORAL DAILY
Qty: 90 TABLET | Refills: 3 | Status: SHIPPED | OUTPATIENT
Start: 2022-06-13 | End: 2023-02-24

## 2022-06-13 RX ORDER — ALBUTEROL SULFATE 90 UG/1
1-2 AEROSOL, METERED RESPIRATORY (INHALATION) EVERY 6 HOURS PRN
Qty: 18 G | Refills: 0 | Status: SHIPPED | OUTPATIENT
Start: 2022-06-13 | End: 2023-06-15

## 2022-06-13 RX ORDER — FLUOXETINE 40 MG/1
CAPSULE ORAL
COMMUNITY
Start: 2021-08-22 | End: 2023-02-06

## 2022-06-13 RX ORDER — FLUVOXAMINE MALEATE 50 MG
50 TABLET ORAL AT BEDTIME
COMMUNITY

## 2022-06-13 RX ORDER — FLUVOXAMINE MALEATE 100 MG
200 TABLET ORAL AT BEDTIME
COMMUNITY
Start: 2022-05-28

## 2022-06-13 RX ORDER — MUPIROCIN 20 MG/G
OINTMENT TOPICAL 3 TIMES DAILY
Qty: 22 G | Refills: 1 | Status: SHIPPED | OUTPATIENT
Start: 2022-06-13 | End: 2023-07-31

## 2022-06-13 RX ORDER — CLONAZEPAM 0.5 MG/1
0.5 TABLET ORAL 3 TIMES DAILY PRN
COMMUNITY
Start: 2022-05-29

## 2022-06-13 RX ORDER — DOXYCYCLINE HYCLATE 100 MG
100 TABLET ORAL 2 TIMES DAILY
Qty: 28 TABLET | Refills: 0 | Status: SHIPPED | OUTPATIENT
Start: 2022-06-13 | End: 2022-08-03

## 2022-06-13 ASSESSMENT — ANXIETY QUESTIONNAIRES: GAD7 TOTAL SCORE: 16

## 2022-06-13 NOTE — PROGRESS NOTES
Assessment & Plan     Encounter for long-term (current) use of medications  Due for urine drug screen and sign narcotics agreement.  See also below.  - [id number] - Urine Drug Confirmation Panel (Comprehensive); Future  - [id number] - Urine Drug Confirmation Panel (Comprehensive)    Fibromyalgia  She has been tapered down to 6.5 tablets a day but does not feel she can go any lower she states this is not even covering her pain.  - HYDROcodone-acetaminophen (NORCO) 5-325 MG tablet; Take 2.5 tablets by mouth every morning AND 2.5 tablets daily (with lunch) AND 1.5 tablets At Bedtime. Max of 6.5 tablets/day. Fill 01/21/22 and start 01/23/22    Chronic bilateral low back pain without sciatica  See discussion above we will continue her on her same dosing.  - HYDROcodone-acetaminophen (NORCO) 5-325 MG tablet; Take 2.5 tablets by mouth every morning AND 2.5 tablets daily (with lunch) AND 1.5 tablets At Bedtime. Max of 6.5 tablets/day. Fill 01/21/22 and start 01/23/22    Cervicalgia  Continue on the same dosing.  - HYDROcodone-acetaminophen (NORCO) 5-325 MG tablet; Take 2.5 tablets by mouth every morning AND 2.5 tablets daily (with lunch) AND 1.5 tablets At Bedtime. Max of 6.5 tablets/day. Fill 01/21/22 and start 01/23/22    Chronic pain syndrome  She was seeing pain clinic care but they have left.  She cannot travel has no way of transportation to outside the area.  She says she rarely leaves her home.  - HYDROcodone-acetaminophen (NORCO) 5-325 MG tablet; Take 2.5 tablets by mouth every morning AND 2.5 tablets daily (with lunch) AND 1.5 tablets At Bedtime. Max of 6.5 tablets/day. Fill 01/21/22 and start 01/23/22    Chronic rhinitis  Refilled for her.  - fluticasone (FLONASE) 50 MCG/ACT nasal spray; Spray 1-2 sprays into both nostrils daily SPRAY 2 SPRAYS INTO BOTH NOSTRILS DAILY.  - cetirizine (ZYRTEC) 10 MG tablet; Take 1 tablet (10 mg) by mouth daily    Intermittent asthma, uncomplicated  Stable this was refilled.  -  albuterol (VENTOLIN HFA) 108 (90 Base) MCG/ACT inhaler; Inhale 1-2 puffs into the lungs every 6 hours as needed for shortness of breath / dyspnea or wheezing    Boil  Some areas on her face.  We will give her some doxycycline and some external Bactroban.  - mupirocin (BACTROBAN) 2 % external ointment; Apply topically 3 times daily  - doxycycline hyclate (VIBRA-TABS) 100 MG tablet; Take 1 tablet (100 mg) by mouth 2 times daily    Fatigue, unspecified type  She thinks this may be just depression but wants to rule out with some blood test we will do these and contact her.  Also states she has been told she suffers from agoraphobia.  This prevents her from getting out and about traveling especially transportation with others.  - CBC with platelets and differential; Future  - Comprehensive metabolic panel (BMP + Alb, Alk Phos, ALT, AST, Total. Bili, TP); Future  - TSH with free T4 reflex; Future  - CBC with platelets and differential  - Comprehensive metabolic panel (BMP + Alb, Alk Phos, ALT, AST, Total. Bili, TP)  - TSH with free T4 reflex               Tobacco Cessation:   reports that she has been smoking cigarettes. She has a 14.50 pack-year smoking history. She has never used smokeless tobacco.          No follow-ups on file.    Twin Castro MD  [facility]    Franky Rehman is a 51 year old who presents for the following health issues     History of Present Illness       Reason for visit:  Medication contract and new health issuesShe consumes 1 sweetened beverage(s) daily.   Today's CELIA-7 Score: 16             Review of Systems   Constitutional, HEENT, cardiovascular, pulmonary, gi and gu systems are negative, except as otherwise noted.      Objective    /60   Pulse 105   Temp 98  F (36.7  C)   Resp 10   Wt 55.8 kg (123 lb)   SpO2 97%   BMI 20.16 kg/m    There is no height or weight on file to calculate BMI.  Physical Exam   GENERAL: healthy, alert and no distress  NECK:  no adenopathy, no asymmetry, masses, or scars and thyroid normal to palpation  RESP: lungs clear to auscultation - no rales, rhonchi or wheezes  CV: regular rate and rhythm, normal S1 S2, no S3 or S4, no murmur, click or rub, no peripheral edema and peripheral pulses strong  MS: no gross musculoskeletal defects noted, no edema  PSYCH: mentation appears normal, affect normal/bright    Results for orders placed or performed in visit on 06/13/22   Comprehensive metabolic panel (BMP + Alb, Alk Phos, ALT, AST, Total. Bili, TP)     Status: Abnormal   Result Value Ref Range    Sodium 142 133 - 144 mmol/L    Potassium 3.7 3.4 - 5.3 mmol/L    Chloride 107 94 - 109 mmol/L    Carbon Dioxide (CO2) 31 20 - 32 mmol/L    Anion Gap 4 3 - 14 mmol/L    Urea Nitrogen 15 7 - 30 mg/dL    Creatinine 1.00 0.52 - 1.04 mg/dL    Calcium 8.4 (L) 8.5 - 10.1 mg/dL    Glucose 91 70 - 99 mg/dL    Alkaline Phosphatase 157 (H) 40 - 150 U/L    AST 11 0 - 45 U/L    ALT 19 0 - 50 U/L    Protein Total 6.9 6.8 - 8.8 g/dL    Albumin 3.5 3.4 - 5.0 g/dL    Bilirubin Total 0.2 0.2 - 1.3 mg/dL    GFR Estimate 67 >60 mL/min/1.73m2   TSH with free T4 reflex     Status: Normal   Result Value Ref Range    TSH 1.77 0.40 - 4.00 mU/L   Urine Creatinine for Drug Screen Panel     Status: None   Result Value Ref Range    Creatinine Urine for Drug Screen 466 mg/dL   CBC with platelets and differential     Status: Abnormal   Result Value Ref Range    WBC Count 10.0 4.0 - 11.0 10e3/uL    RBC Count 4.18 3.80 - 5.20 10e6/uL    Hemoglobin 14.0 11.7 - 15.7 g/dL    Hematocrit 41.0 35.0 - 47.0 %    MCV 98 78 - 100 fL    MCH 33.5 (H) 26.5 - 33.0 pg    MCHC 34.1 31.5 - 36.5 g/dL    RDW 12.2 10.0 - 15.0 %    Platelet Count 161 150 - 450 10e3/uL   Manual Differential     Status: None   Result Value Ref Range    % Neutrophils 69 %    % Lymphocytes 31 %    % Monocytes 0 %    % Eosinophils 0 %    % Basophils 0 %    Absolute Neutrophils 6.9 1.6 - 8.3 10e3/uL    Absolute Lymphocytes  3.1 0.8 - 5.3 10e3/uL    Absolute Monocytes 0.0 0.0 - 1.3 10e3/uL    Absolute Eosinophils 0.0 0.0 - 0.7 10e3/uL    Absolute Basophils 0.0 0.0 - 0.2 10e3/uL    RBC Morphology Confirmed RBC Indices     Platelet Assessment  Automated Count Confirmed. Platelet morphology is normal.     Automated Count Confirmed. Platelet morphology is normal.   [id number] - Urine Drug Confirmation Panel (Comprehensive)     Status: None (In process)    Narrative    The following orders were created for panel order [id number] - Urine Drug Confirmation Panel (Comprehensive).  Procedure                               Abnormality         Status                     ---------                               -----------         ------                     Urine Drug Confirmation ...[[id number]]                      In process                 Urine Creatinine for [place]...[[id number]]                      Final result                 Please view results for these tests on the individual orders.   CBC with platelets and differential     Status: Abnormal    Narrative    The following orders were created for panel order CBC with platelets and differential.  Procedure                               Abnormality         Status                     ---------                               -----------         ------                     CBC with platelets and d...[[id number]]  Abnormal            Final result               Manual Differential[[id number]]                              Final result                 Please view results for these tests on the individual orders.

## 2022-06-13 NOTE — LETTER
Opioid / Opioid Plus Controlled Substance Agreement    This is an agreement between you and your provider about the safe and appropriate use of controlled substance/opioids prescribed by your care team. Controlled substances are medicines that can cause physical and mental dependence (abuse).    There are strict laws about having and using these medicines. We here at [facility] are committing to working with you in your efforts to get better. To support you in this work, we ll help you schedule regular office appointments for medicine refills. If we must cancel or change your appointment for any reason, we ll make sure you have enough medicine to last until your next appointment.     As a Provider, I will:    Listen carefully to your concerns and treat you with respect.     Recommend a treatment plan that I believe is in your best interest. This plan may involve therapies other than opioid pain medication.     Talk with you often about the possible benefits, and the risk of harm of any medicine that we prescribe for you.     Provide a plan on how to taper (discontinue or go off) using this medicine if the decision is made to stop its use.    As a Patient, I understand that opioid(s):     Are a controlled substance prescribed by my care team to help me function or work and manage my condition(s).     Are strong medicines and can cause serious side effects such as:    Drowsiness, which can seriously affect my driving ability    A lower breathing rate, enough to cause death    Harm to my thinking ability     Depression     Abuse of and addiction to this medicine    Need to be taken exactly as prescribed. Combining opioids with certain medicines or chemicals (such as illegal drugs, sedatives, sleeping pills, and benzodiazepines) can be dangerous or even fatal. If I stop opioids suddenly, I may have severe withdrawal symptoms.    Do not work for all types of pain nor for all patients. If they re not helpful, I may  be asked to stop them.        The risks, benefits and side effects of these medicine(s) were explained to me. I agree that:  1. I will take part in other treatments as advised by my care team. This may be psychiatry or counseling, physical therapy, behavioral therapy, group treatment or a referral to a specialist.     2. I will keep all my appointments. I understand that this is part of the monitoring of opioids. My care team may require an office visit for EVERY opioid/controlled substance refill. If I miss appointments or don t follow instructions, my care team may stop my medicine.    3. I will take my medicines as prescribed. I will not change the dose or schedule unless my care team tells me to. There will be no refills if I run out early.     4. I may be asked to come to the clinic and complete a urine drug test or complete a pill count at any time. If I don t give a urine sample or participate in a pill count, the care team may stop my medicine.    5. I will only receive prescriptions from this clinic for chronic pain. If I am treated by another provider for acute pain issues, I will tell them that I am taking opioid pain medication for chronic pain and that I have a treatment agreement with this provider. I will inform my [facility] care team within one business day if I am given a prescription for any pain medication by another healthcare provider. My [facility] care team can contact other providers and pharmacists about my use of any medicines.    6. It is up to me to make sure that I don t run out of my medicines on weekends or holidays. If my care team is willing to refill my opioid prescription without a visit, I must request refills only during office hours. Refills may take up to 3 business days to process. I will use one pharmacy to fill all my opioid and other controlled substance prescriptions. I will notify the clinic about any changes to my insurance or medication  availability.    7. I am responsible for my prescriptions. If the medicine/prescription is lost, stolen or destroyed, it will not be replaced. I also agree not to share controlled substance medicines with anyone.    8. I am aware I should not use any illegal or recreational drugs. I agree not to drink alcohol unless my care team says I can.       9. If I enroll in the [place] Medical Cannabis program, I will tell my care team prior to my next refill.     10. I will tell my care team right away if I become pregnant, have a new medical problem treated outside of my regular clinic, or have a change in my medications.    11. I understand that this medicine can affect my thinking, judgment and reaction time. Alcohol and drugs affect the brain and body, which can affect the safety of my driving. Being under the influence of alcohol or drugs can affect my decision-making, behaviors, personal safety, and the safety of others. Driving while impaired (DWI) can occur if a person is driving, operating, or in physical control of a car, motorcycle, boat, snowmobile, ATV, motorbike, off-road vehicle, or any other motor vehicle ([place] Statute 169A.20). I understand the risk if I choose to drive or operate any vehicle or machinery.    I understand that if I do not follow any of the conditions above, my prescriptions or treatment may be stopped or changed.          Opioids  What You Need to Know    What are opioids?   Opioids are pain medicines that must be prescribed by a doctor. They are also known as narcotics.     Examples are:   1. morphine (MS Contin, Catrachita)  2. oxycodone (Oxycontin)  3. oxycodone and acetaminophen (Percocet)  4. hydrocodone and acetaminophen (Vicodin, Norco)   5. fentanyl patch (Duragesic)   6. hydromorphone (Dilaudid)   7. methadone  8. codeine (Tylenol #3)     What do opioids do well?   Opioids are best for severe short-term pain such as after a surgery or injury. They may work well for cancer pain. They may  help some people with long-lasting (chronic) pain.     What do opioids NOT do well?   Opioids never get rid of pain entirely, and they don t work well for most patients with chronic pain. Opioids don t reduce swelling, one of the causes of pain.                                    Other ways to manage chronic pain and improve function include:       Treat the health problem that may be causing pain    Anti-inflammation medicines, which reduce swelling and tenderness, such as ibuprofen (Advil, Motrin) or naproxen (Aleve)    Acetaminophen (Tylenol)    Antidepressants and anti-seizure medicines, especially for nerve pain    Topical treatments such as patches or creams    Injections or nerve blocks    Chiropractic or osteopathic treatment    Acupuncture, massage, deep breathing, meditation, visual imagery, aromatherapy    Use heat or ice at the pain site    Physical therapy     Exercise    Stop smoking    Take part in therapy       Risks and side effects     Talk to your doctor before you start or decide to keep taking opioids. Possible side effects include:      Lowering your breathing rate enough to cause death    Overdose, including death, especially if taking higher than prescribed doses    Worse depression symptoms; less pleasure in things you usually enjoy    Feeling tired or sluggish    Slower thoughts or cloudy thinking    Being more sensitive to pain over time; pain is harder to control    Trouble sleeping or restless sleep    Changes in hormone levels (for example, less testosterone)    Changes in sex drive or ability to have sex    Constipation    Unsafe driving    Itching and sweating    Dizziness    Nausea, throwing up and dry mouth    What else should I know about opioids?    Opioids may lead to dependence, tolerance, or addiction.      Dependence means that if you stop or reduce the medicine too quickly, you will have withdrawal symptoms. These include loose poop (diarrhea), jitters, flu-like symptoms,  nervousness and tremors. Dependence is not the same as addiction.                       Tolerance means needing higher doses over time to get the same effect. This may increase the chance of serious side effects.      Addiction is when people improperly use a substance that harms their body, their mind or their relations with others. Use of opiates can cause a relapse of addiction if you have a history of drug or alcohol abuse.      People who have used opioids for a long time may have a lower quality of life, worse depression, higher levels of pain and more visits to doctors.    You can overdose on opioids. Take these steps to lower your risk of overdose:    1. Recognize the signs:  Signs of overdose include decrease or loss of consciousness (blackout), slowed breathing, trouble waking up and blue lips. If someone is worried about overdose, they should call 911.    2. Talk to your doctor about Narcan (naloxone).   If you are at risk for overdose, you may be given a prescription for Narcan. This medicine very quickly reverses the effects of opioids.   If you overdose, a friend or family member can give you Narcan while waiting for the ambulance. They need to know the signs of overdose and how to give Narcan.     3. Don't use alcohol or street drugs.   Taking them with opioids can cause death.    4. Do not take any of these medicines unless your doctor says it s OK. Taking these with opioids can cause death:    Benzodiazepines, such as lorazepam (Ativan), alprazolam (Xanax) or diazepam (Valium)    Muscle relaxers, such as cyclobenzaprine (Flexeril)    Sleeping pills like zolpidem (Ambien)     Other opioids      How to keep you and other people safe while taking opioids:    1. Never share your opioids with others.  Opioid medicines are regulated by the Drug Enforcement Agency (BJ[employer]). Selling or sharing medications is a criminal act.    2. Be sure to store opioids in a secure place, locked up if possible. Young children  can easily swallow them and overdose.    3. When you are traveling with your medicines, keep them in the original bottles. If you use a pill box, be sure you also carry a copy of your medicine list from your clinic or pharmacy.    4. Safe disposal of opioids    Most pharmacies have places to get rid of medicine, called disposal kiosks. Medicine disposal options are also available in every [facility]. Search your county and  medication disposal  to find more options. You can find more details at:  [email or web address]     I agree that my provider, clinic care team, and pharmacy may work with any city, state or federal law enforcement agency that investigates the misuse, sale, or other diversion of my controlled medicine. I will allow my provider to discuss my care with, or share a copy of, this agreement with any other treating provider, pharmacy or emergency room where I receive care.    I have read this agreement and have asked questions about anything I did not understand.    _______________________________________________________  Patient Signature - Sherie Otero _____________________                   Date     _______________________________________________________  Provider Signature - Twin Castro MD   _____________________                   Date     _______________________________________________________  Witness Signature (required if provider not present while patient signing)   _____________________                   Date

## 2022-06-15 ENCOUNTER — VIRTUAL VISIT (OUTPATIENT)
Dept: PSYCHOLOGY | Facility: CLINIC | Age: 52
End: 2022-06-15
Payer: COMMERCIAL

## 2022-06-15 DIAGNOSIS — F90.2 ADHD (ATTENTION DEFICIT HYPERACTIVITY DISORDER), COMBINED TYPE: Primary | ICD-10-CM

## 2022-06-15 DIAGNOSIS — F41.1 GENERALIZED ANXIETY DISORDER: ICD-10-CM

## 2022-06-15 DIAGNOSIS — F33.1 MAJOR DEPRESSIVE DISORDER, RECURRENT EPISODE, MODERATE WITH ANXIOUS DISTRESS (H): ICD-10-CM

## 2022-06-15 DIAGNOSIS — F43.10 POST TRAUMATIC STRESS DISORDER (PTSD): ICD-10-CM

## 2022-06-15 LAB
7AMINOCLONAZEPAM UR QL CFM: PRESENT
DHC UR CFM-MCNC: >5000 NG/ML
DHC/CREAT UR: ABNORMAL NG/MG{CREAT}
HYDROCODONE UR CFM-MCNC: >7636 NG/ML
METHAMPHET UR CFM-MCNC: 87 NG/ML
METHAMPHET/CREAT UR: 19 NG/MG {CREAT}

## 2022-06-15 PROCEDURE — 90834 PSYTX W PT 45 MINUTES: CPT | Mod: 95 | Performed by: SOCIAL WORKER

## 2022-06-17 ENCOUNTER — VIRTUAL VISIT (OUTPATIENT)
Dept: FAMILY MEDICINE | Facility: CLINIC | Age: 52
End: 2022-06-17
Payer: COMMERCIAL

## 2022-06-17 DIAGNOSIS — M79.7 FIBROMYALGIA: Primary | ICD-10-CM

## 2022-06-17 DIAGNOSIS — G89.29 OTHER CHRONIC PAIN: ICD-10-CM

## 2022-06-17 DIAGNOSIS — F11.90 CHRONIC, CONTINUOUS USE OF OPIOIDS: ICD-10-CM

## 2022-06-17 DIAGNOSIS — Z79.899 MEDICAL CANNABIS USE: ICD-10-CM

## 2022-06-17 PROCEDURE — 99213 OFFICE O/P EST LOW 20 MIN: CPT | Mod: 95 | Performed by: FAMILY MEDICINE

## 2022-06-17 NOTE — PROGRESS NOTES
Sherie is a 51 year old who is being evaluated via a billable video visit.      How would you like to obtain your AVS? MyChart  If the video visit is dropped, the invitation should be resent by: Text to cell phone: [phone]  Will anyone else be joining your video visit? No          Assessment & Plan     Fibromyalgia  Chronic, previously followed by Pain Clinic. She was certified for Medical Cannabis. She needs re certification. Her prior Pain provider is no longer available. Twin Castro is supportive of ongoing use and benefit from medical cannabis.    Other chronic pain  Chronic, previously followed by Pain Clinic. She was certified for Medical Cannabis. She needs re certification. Her prior Pain provider is no longer available. Twin Castro is supportive of ongoing use and benefit from medical cannabis.    Chronic, continuous use of opioids  Chronic, previously followed by Pain Clinic. She was certified for Medical Cannabis. She needs re certification. Her prior Pain provider is no longer available. Twin Castro is supportive of ongoing use and benefit from medical cannabis.    Medical cannabis use  Chronic, previously followed by Pain Clinic. She was certified for Medical Cannabis. She needs re certification. Her prior Pain provider is no longer available. Twin Castro is supportive of ongoing use and benefit from medical cannabis.Patient chart reviewed. [place] and [place]. UDS up to date. CSA up to date. Patient re certified for medical cannabis for Twin Castro.               Tobacco Cessation:   reports that she has been smoking cigarettes. She has a 14.50 pack-year smoking history. She has never used smokeless tobacco.  Tobacco Cessation Action Plan: Follow up with PCP    Regular exercise    No follow-ups on file.    Jim Edwards MD  [facility]    Franky Rehman is a 51 year old, presenting for the following health issues:  Medical  Marijuana      HPI     Medical Marijuana Recertification      Patient Active Problem List   Diagnosis     CARDIOVASCULAR SCREENING; LDL GOAL LESS THAN 160     Chronic fatigue syndrome     Fibromyalgia     Generalized anxiety disorder     Tobacco abuse     SHANTANU (obstructive sleep apnea)     Disturbance in sleep behavior     Cervicalgia     Stenosis, cervical spine     Spondylitis, cervical (H)     NSAID induced gastritis     Major depressive disorder, recurrent episode, mild (H)     Other chronic pain     Intermittent asthma, uncomplicated     Rheumatoid arthritis involving multiple sites with positive rheumatoid factor (H)     Elevated white blood cell count     Panic attacks     Osteoarthritis of cervical spine, unspecified spinal osteoarthritis complication status     Degenerative joint disease of cervical spine     Pulmonary nodules     Abnormal CT of the chest     Hilar lymphadenopathy     Other migraine without status migrainosus, intractable     Chronic rhinitis     Pelvic pain in female     Cervical cancer screening     Chronic, continuous use of opioids     Chronically on benzodiazepine therapy     Balance problems     Current Outpatient Medications   Medication Sig Dispense Refill     albuterol (VENTOLIN HFA) 108 (90 Base) MCG/ACT inhaler Inhale 1-2 puffs into the lungs every 6 hours as needed for shortness of breath / dyspnea or wheezing 18 g 0     cetirizine (ZYRTEC) 10 MG tablet Take 1 tablet (10 mg) by mouth daily 90 tablet 3     clonazePAM (KLONOPIN) 1 MG tablet Take 0.5 mg by mouth 2 times daily as needed for anxiety 1 tablet by mouth 2-3 times a day as needed       doxycycline hyclate (VIBRA-TABS) 100 MG tablet Take 1 tablet (100 mg) by mouth 2 times daily 28 tablet 0     fluticasone (FLONASE) 50 MCG/ACT nasal spray Spray 1-2 sprays into both nostrils daily SPRAY 2 SPRAYS INTO BOTH NOSTRILS DAILY. 15.8 mL 5     fluvoxaMINE (LUVOX) 100 MG tablet TAKE ONE TABLET BY MOUTH AT BEDTIME TAKE WITH 50 MG  TABLET FOR TOTAL DOSE  MG       fluvoxaMINE (LUVOX) 50 MG tablet Take 50 mg by mouth At Bedtime       HYDROcodone-acetaminophen (NORCO) 5-325 MG tablet Take 2.5 tablets by mouth every morning AND 2.5 tablets daily (with lunch) AND 1.5 tablets At Bedtime. Max of 6.5 tablets/day. Fill 01/21/22 and start 01/23/22 195 tablet 0     hydrOXYzine (ATARAX) 25 MG tablet Take 1-2 tablets (25-50 mg) by mouth 3 times daily as needed for itching (Patient taking differently: Take 25-50 mg by mouth 3 times daily as needed for itching 1 tablet 3-4 times daily prn) 90 tablet 0     mupirocin (BACTROBAN) 2 % external ointment Apply topically 3 times daily 22 g 1     verapamil (CALAN) 40 MG tablet Take 1 tablet (40 mg) by mouth 2 times daily 180 tablet 1     clonazePAM (KLONOPIN) 0.5 MG tablet TAKE 1 TABLET BY MOUTH 2 - 3 TIMES DAILY AS NEEDED FOR ANXIETY (Patient not taking: No sig reported)       FLUoxetine (PROZAC) 40 MG capsule TAKE ONE CAPSULE BY MOUTH ONCE DAILY (Patient not taking: No sig reported)       fluvoxaMINE (LUVOX) 25 MG tablet TAKE ONE TABLET BY MOUTH AT BEDTIME FOR 14 DAYS THEN TAKE 50MG TABLET AT BEDTIME (Patient not taking: No sig reported)       ketorolac (TORADOL) 10 MG tablet TAKE ONE TABLET BY MOUTH EVERY 6 HOURS AS NEEDED FOR MODERATE PAIN. Do not take on same day you take ibuprofen or other NSAIDs (Patient not taking: No sig reported) 10 tablet 0     metaxalone (SKELAXIN) 800 MG tablet Take 1 tablet (800 mg) by mouth 3 times daily as needed for muscle soreness or moderate pain (Patient not taking: No sig reported) 60 tablet 0     naloxone (NARCAN) nasal spray Spray 1 spray (4 mg) into one nostril alternating nostrils as needed for opioid reversal every 2-3 minutes until assistance arrives (Patient not taking: No sig reported) 0.2 mL 0         Review of Systems   Constitutional, HEENT, cardiovascular, pulmonary, gi and gu systems are negative, except as otherwise noted.      Objective           Vitals:  No  vitals were obtained today due to virtual visit.    Physical Exam   GENERAL: Healthy, alert and no distress  EYES: Eyes grossly normal to inspection.  No discharge or erythema, or obvious scleral/conjunctival abnormalities.  RESP: No audible wheeze, cough, or visible cyanosis.  No visible retractions or increased work of breathing.    SKIN: Visible skin clear. No significant rash, abnormal pigmentation or lesions.  NEURO: Cranial nerves grossly intact.  Mentation and speech appropriate for age.  PSYCH: Mentation appears normal, affect normal/bright, judgement and insight intact, normal speech and appearance well-groomed.                Video-Visit Details    Video Start Time: 2:22 PM    Type of service:  Video Visit    Video End Time:2:22 PM    Originating Location (pt. Location): Home    Distant Location (provider location):  [facility]     Platform used for Video Visit: [employer]    Sofía Stoner
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
## 2022-06-26 ENCOUNTER — HOSPITAL ENCOUNTER (EMERGENCY)
Facility: CLINIC | Age: 52
Discharge: HOME | End: 2022-06-26
Attending: PHYSICIAN ASSISTANT | Admitting: PHYSICIAN ASSISTANT
Payer: COMMERCIAL

## 2022-06-26 VITALS
WEIGHT: 118 LBS | TEMPERATURE: 97.8 F | DIASTOLIC BLOOD PRESSURE: 71 MMHG | HEART RATE: 66 BPM | SYSTOLIC BLOOD PRESSURE: 111 MMHG | HEIGHT: 67 IN | RESPIRATION RATE: 18 BRPM | OXYGEN SATURATION: 98 % | BODY MASS INDEX: 18.52 KG/M2

## 2022-06-26 DIAGNOSIS — U07.1 INFECTION DUE TO 2019 NOVEL CORONAVIRUS: ICD-10-CM

## 2022-06-26 DIAGNOSIS — R11.0 NAUSEA: ICD-10-CM

## 2022-06-26 LAB
ALBUMIN SERPL-MCNC: 3.4 G/DL (ref 3.4–5)
ALBUMIN UR-MCNC: NEGATIVE MG/DL
ALP SERPL-CCNC: 129 U/L (ref 40–150)
ALT SERPL W P-5'-P-CCNC: 34 U/L (ref 0–50)
ANION GAP SERPL CALCULATED.3IONS-SCNC: 5 MMOL/L (ref 3–14)
APPEARANCE UR: CLEAR
AST SERPL W P-5'-P-CCNC: 31 U/L (ref 0–45)
BASOPHILS # BLD MANUAL: 0 10E3/UL (ref 0–0.2)
BASOPHILS NFR BLD MANUAL: 0 %
BILIRUB SERPL-MCNC: 0.2 MG/DL (ref 0.2–1.3)
BILIRUB UR QL STRIP: NEGATIVE
BUN SERPL-MCNC: 12 MG/DL (ref 7–30)
CALCIUM SERPL-MCNC: 8.2 MG/DL (ref 8.5–10.1)
CHLORIDE BLD-SCNC: 108 MMOL/L (ref 94–109)
CO2 SERPL-SCNC: 30 MMOL/L (ref 20–32)
COLOR UR AUTO: YELLOW
CREAT SERPL-MCNC: 0.84 MG/DL (ref 0.52–1.04)
CRP SERPL-MCNC: <2.9 MG/L (ref 0–8)
DEPRECATED S PYO AG THROAT QL EIA: NEGATIVE
EOSINOPHIL # BLD MANUAL: 0 10E3/UL (ref 0–0.7)
EOSINOPHIL NFR BLD MANUAL: 0 %
ERYTHROCYTE [DISTWIDTH] IN BLOOD BY AUTOMATED COUNT: 12.1 % (ref 10–15)
FLUAV RNA SPEC QL NAA+PROBE: NEGATIVE
FLUBV RNA RESP QL NAA+PROBE: NEGATIVE
GFR SERPL CREATININE-BSD FRML MDRD: 83 ML/MIN/1.73M2
GLUCOSE BLD-MCNC: 92 MG/DL (ref 70–99)
GLUCOSE UR STRIP-MCNC: NEGATIVE MG/DL
GROUP A STREP BY PCR: NOT DETECTED
HCT VFR BLD AUTO: 45 % (ref 35–47)
HGB BLD-MCNC: 15.6 G/DL (ref 11.7–15.7)
HGB UR QL STRIP: ABNORMAL
HOLD SPECIMEN: NORMAL
KETONES UR STRIP-MCNC: 5 MG/DL
LACTATE SERPL-SCNC: 1 MMOL/L (ref 0.7–2)
LEUKOCYTE ESTERASE UR QL STRIP: NEGATIVE
LIPASE SERPL-CCNC: 79 U/L (ref 73–393)
LYMPHOCYTES # BLD MANUAL: 1.5 10E3/UL (ref 0.8–5.3)
LYMPHOCYTES NFR BLD MANUAL: 47 %
MAGNESIUM SERPL-MCNC: 2 MG/DL (ref 1.6–2.3)
MCH RBC QN AUTO: 33.4 PG (ref 26.5–33)
MCHC RBC AUTO-ENTMCNC: 34.7 G/DL (ref 31.5–36.5)
MCV RBC AUTO: 96 FL (ref 78–100)
MONOCYTES # BLD MANUAL: 0.2 10E3/UL (ref 0–1.3)
MONOCYTES NFR BLD MANUAL: 8 %
MUCOUS THREADS #/AREA URNS LPF: PRESENT /LPF
NEUTROPHILS # BLD MANUAL: 1.4 10E3/UL (ref 1.6–8.3)
NEUTROPHILS NFR BLD MANUAL: 45 %
NITRATE UR QL: NEGATIVE
PH UR STRIP: 6 [PH] (ref 5–7)
PLAT MORPH BLD: ABNORMAL
PLATELET # BLD AUTO: 75 10E3/UL (ref 150–450)
POTASSIUM BLD-SCNC: 3.7 MMOL/L (ref 3.4–5.3)
PROT SERPL-MCNC: 6.9 G/DL (ref 6.8–8.8)
RBC # BLD AUTO: 4.67 10E6/UL (ref 3.8–5.2)
RBC MORPH BLD: ABNORMAL
RBC URINE: 1 /HPF
SARS-COV-2 RNA RESP QL NAA+PROBE: POSITIVE
SODIUM SERPL-SCNC: 143 MMOL/L (ref 133–144)
SP GR UR STRIP: 1.01 (ref 1–1.03)
SQUAMOUS EPITHELIAL: 2 /HPF
TROPONIN I SERPL HS-MCNC: 5 NG/L
UROBILINOGEN UR STRIP-MCNC: NORMAL MG/DL
VARIANT LYMPHS BLD QL SMEAR: PRESENT
WBC # BLD AUTO: 3.1 10E3/UL (ref 4–11)
WBC URINE: 1 /HPF

## 2022-06-26 PROCEDURE — 258N000003 HC RX IP 258 OP 636: Performed by: PHYSICIAN ASSISTANT

## 2022-06-26 PROCEDURE — 86618 LYME DISEASE ANTIBODY: CPT | Performed by: PHYSICIAN ASSISTANT

## 2022-06-26 PROCEDURE — 86140 C-REACTIVE PROTEIN: CPT | Performed by: PHYSICIAN ASSISTANT

## 2022-06-26 PROCEDURE — 87651 STREP A DNA AMP PROBE: CPT | Performed by: PHYSICIAN ASSISTANT

## 2022-06-26 PROCEDURE — 99284 EMERGENCY DEPT VISIT MOD MDM: CPT | Mod: CS,25 | Performed by: PHYSICIAN ASSISTANT

## 2022-06-26 PROCEDURE — 250N000011 HC RX IP 250 OP 636: Performed by: PHYSICIAN ASSISTANT

## 2022-06-26 PROCEDURE — 96374 THER/PROPH/DIAG INJ IV PUSH: CPT | Performed by: PHYSICIAN ASSISTANT

## 2022-06-26 PROCEDURE — 83735 ASSAY OF MAGNESIUM: CPT | Performed by: PHYSICIAN ASSISTANT

## 2022-06-26 PROCEDURE — 96375 TX/PRO/DX INJ NEW DRUG ADDON: CPT | Performed by: PHYSICIAN ASSISTANT

## 2022-06-26 PROCEDURE — 99284 EMERGENCY DEPT VISIT MOD MDM: CPT | Performed by: PHYSICIAN ASSISTANT

## 2022-06-26 PROCEDURE — 36415 COLL VENOUS BLD VENIPUNCTURE: CPT | Performed by: PHYSICIAN ASSISTANT

## 2022-06-26 PROCEDURE — 83690 ASSAY OF LIPASE: CPT | Performed by: PHYSICIAN ASSISTANT

## 2022-06-26 PROCEDURE — 85027 COMPLETE CBC AUTOMATED: CPT | Performed by: PHYSICIAN ASSISTANT

## 2022-06-26 PROCEDURE — 80053 COMPREHEN METABOLIC PANEL: CPT | Performed by: PHYSICIAN ASSISTANT

## 2022-06-26 PROCEDURE — 83605 ASSAY OF LACTIC ACID: CPT | Performed by: PHYSICIAN ASSISTANT

## 2022-06-26 PROCEDURE — 96361 HYDRATE IV INFUSION ADD-ON: CPT | Performed by: PHYSICIAN ASSISTANT

## 2022-06-26 PROCEDURE — C9803 HOPD COVID-19 SPEC COLLECT: HCPCS | Performed by: PHYSICIAN ASSISTANT

## 2022-06-26 PROCEDURE — 81001 URINALYSIS AUTO W/SCOPE: CPT | Performed by: PHYSICIAN ASSISTANT

## 2022-06-26 PROCEDURE — 84484 ASSAY OF TROPONIN QUANT: CPT | Performed by: PHYSICIAN ASSISTANT

## 2022-06-26 PROCEDURE — 85007 BL SMEAR W/DIFF WBC COUNT: CPT | Performed by: PHYSICIAN ASSISTANT

## 2022-06-26 PROCEDURE — 87636 SARSCOV2 & INF A&B AMP PRB: CPT | Performed by: PHYSICIAN ASSISTANT

## 2022-06-26 RX ORDER — KETOROLAC TROMETHAMINE 15 MG/ML
15 INJECTION, SOLUTION INTRAMUSCULAR; INTRAVENOUS ONCE
Status: COMPLETED | OUTPATIENT
Start: 2022-06-26 | End: 2022-06-26

## 2022-06-26 RX ORDER — ONDANSETRON 4 MG/1
4 TABLET, ORALLY DISINTEGRATING ORAL EVERY 8 HOURS PRN
Qty: 10 TABLET | Refills: 0 | Status: SHIPPED | OUTPATIENT
Start: 2022-06-26 | End: 2022-12-28

## 2022-06-26 RX ORDER — SODIUM CHLORIDE 9 MG/ML
INJECTION, SOLUTION INTRAVENOUS CONTINUOUS
Status: DISCONTINUED | OUTPATIENT
Start: 2022-06-26 | End: 2022-06-26 | Stop reason: HOSPADM

## 2022-06-26 RX ORDER — ONDANSETRON 2 MG/ML
4 INJECTION INTRAMUSCULAR; INTRAVENOUS EVERY 30 MIN PRN
Status: DISCONTINUED | OUTPATIENT
Start: 2022-06-26 | End: 2022-06-26 | Stop reason: HOSPADM

## 2022-06-26 RX ADMIN — KETOROLAC TROMETHAMINE 15 MG: 15 INJECTION, SOLUTION INTRAMUSCULAR; INTRAVENOUS at 13:28

## 2022-06-26 RX ADMIN — SODIUM CHLORIDE 1000 ML: 9 INJECTION, SOLUTION INTRAVENOUS at 14:23

## 2022-06-26 RX ADMIN — SODIUM CHLORIDE 1000 ML: 9 INJECTION, SOLUTION INTRAVENOUS at 13:26

## 2022-06-26 RX ADMIN — ONDANSETRON 4 MG: 2 INJECTION INTRAMUSCULAR; INTRAVENOUS at 13:27

## 2022-06-26 NOTE — DISCHARGE INSTRUCTIONS
"It was a pleasure working with you today!  I hope your condition improves rapidly!     Your testing showed that you were positive for COVID-19.  Thankfully, your vital signs are looking great.  Please return to the emergency department if your oxygen level is consistently dipping below 90% with the home oximeter or if you are having troubles breathing.  It is okay to use Zofran for nausea.  Use Tylenol up to 1000 mg every 6 hours as needed for discomfort and/or ibuprofen 600 mg every 6 hours as needed.  I placed some more information below regarding COVID-19 for your reference if you are interested.        Discharge Instructions for COVID-19 Patients  You have--or may have--COVID-19. Please follow the instructions listed below.   If you have a weakened immune system, discuss with your doctor any other actions you need to take.  How can I protect others?  If you have symptoms (fever, cough, body aches or trouble breathing):  Stay home and away from others (self-isolate) until:  Your other symptoms have resolved (gotten better). And   You've had no fever--and no medicine that reduces fever--for 1 full day (24 hours). And   At least 10 days have passed since your symptoms started. (You may need to wait 20 days. Follow the advice of your care team.)  If you don't show symptoms, but testing showed that you have COVID-19:  Stay home and away from others (self-isolate) until at least 10 days have passed since the date of your first positive COVID-19 test.  During this time  Stay in your own room, even for meals. Use your own bathroom if you can.  Stay away from others in your home. No hugging, kissing or shaking hands. No visitors.  Don't go to work, school or anywhere else.  Clean \"high touch\" surfaces often (doorknobs, counters, handles). Use household cleaning spray or wipes.  You'll find a full list of  on the EPA website: www.epa.gov/pesticide-registration/list-n-disinfectants-use-against-sars-cov-2.  Cover " your mouth and nose with a mask or other face covering to avoid spreading germs.  Wash your hands and face often. Use soap and water.  Caregivers in these groups are at risk for severe illness due to COVID-19:  People 65 years and older  People who live in a nursing home or long-term care facility  People with chronic disease (lung, heart, cancer, diabetes, kidney, liver, immunologic)  People who have a weakened immune system, including those who:  Are in cancer treatment  Take medicine that weakens the immune system, such as corticosteroids  Had a bone marrow or organ transplant  Have an immune deficiency  Have poorly controlled HIV or AIDS  Are obese (body mass index of 40 or higher)  Smoke regularly  Caregivers should wear gloves while washing dishes, handling laundry and cleaning bedrooms and bathrooms.  Use caution when washing and drying laundry: Don't shake dirty laundry and use the warmest water setting that you can.  For more tips on managing your health at home, go to www.cdc.gov/coronavirus/2019-ncov/downloads/10Things.pdf.  How can I take care of myself at home?  Get lots of rest. Drink extra fluids (unless a doctor has told you not to).  Take Tylenol (acetaminophen) for fever or pain. If you have liver or kidney problems, ask your family doctor if it's okay to take Tylenol.   Adults can take either:   650 mg (two 325 mg pills) every 4 to 6 hours, or   1,000 mg (two 500 mg pills) every 8 hours as needed.  Note: Don't take more than 3,000 mg in one day. Acetaminophen is found in many medicines (both prescribed and over-the-counter medicines). Read all labels to be sure you don't take too much.   For children, check the Tylenol bottle for the right dose. The dose is based on the child's age or weight.  If you have other health problems (like cancer, heart failure, an organ transplant or severe kidney disease): Call your specialty clinic if you don't feel better in the next 2 days.  Know when to call 911.  Emergency warning signs include:  Trouble breathing or shortness of breath  Pain or pressure in the chest that doesn't go away  Feeling confused like you haven't felt before, or not being able to wake up  Bluish-colored lips or face  Your doctor may have prescribed a blood thinner medicine. Follow their instructions.  Where can I get more information?  [facility] - About COVID-19:   [email or web address]  CDC - What to Do If You're Sick: www.cdc.gov/coronavirus/2019-ncov/about/steps-when-sick.html  CDC - Ending Home Isolation: www.cdc.gov/coronavirus/2019-ncov/hcp/disposition-in-home-patients.html  CDC - Caring for Someone: www.cdc.gov/coronavirus/2019-ncov/if-you-are-sick/care-for-someone.html  [facility] - Interim Guidance for Hospital Discharge to Home: www.health.[facility].mn.us/diseases/coronavirus/hcp/hospdischarge.pdf  Below are the COVID-19 hotlines at the [facility] ([facility]). Interpreters are available.  For health questions: Call 428-627-6134 or 1-454.948.7996 (7 a.m. to 7 p.m.)  For questions about schools and childcare: Call 627-445-2974 or 1-515.200.8789 (7 a.m. to 7 p.m.)    For informational purposes only. Not to replace the advice of your health care provider. Clinically reviewed by Dr. Rasta Williamson.   Copyright   2020 [facility]. All rights reserved. [employer] [id number] - REV 01/05/21.

## 2022-06-26 NOTE — ED TRIAGE NOTES
Pt presents with multiple concerns.  Pt stated that starting Tuesday she has had nausea, body aches, chills, and sweats.  Pt states that she has not eaten much and feels dehydrated.  Pt is concerned also as she has handled baby robins recently and a squirrel.       Triage Assessment     Row Name 06/26/22 1217       Triage Assessment (Adult)    Airway WDL WDL       Respiratory WDL    Respiratory WDL WDL       Skin Circulation/Temperature WDL    Skin Circulation/Temperature WDL WDL       Cardiac WDL    Cardiac WDL WDL       Peripheral/Neurovascular WDL    Peripheral Neurovascular WDL WDL

## 2022-06-26 NOTE — ED PROVIDER NOTES
History     Chief Complaint   Patient presents with     Generalized Body Aches     HPI  Sherie Otero is a 51 year old female who presents for evaluation of symptoms starting 6 days ago.  Initially started with nausea.  The following morning she noted sweats, nausea, vomiting, and significant arthralgias/myalgias.  The symptoms have persisted.  Now she has a generalized headache throughout her whole head described as aching in nature.  Throbbing at times.  Decreased appetite and decreased p.o. intake.  She has been trying to drink Gatorade, but does not feel that she has been able to keep up with her fluid intake.  She notes generalized weakness, chills, and a T-max of 100.4.  Occasional rhinorrhea and an occasional cough.  No chest pain or dyspnea.  Denies any lower extremity edema or calf pain.  She is concerned that she had a tick bite 2 weeks ago on her right posterior scalp.  Her PCP prescribed her doxycycline, but she is quit taking this 6 days ago as she states that she could not take the medication and then laid down due to GI side effects.  She did not develop any type of rash reaction around the tick or elsewhere on her body.  She has not tried any medication to treat her symptoms.            Allergies:  Allergies   Allergen Reactions     Gabapentin Shortness Of Breath     Lyrica [Pregabalin] Shortness Of Breath     Felt pressure on the throat area. jmp     Droperidol      Uncontrollable shaking       Penicillins        Problem List:    Patient Active Problem List    Diagnosis Date Noted     Balance problems 07/25/2019     Priority: Medium     Chronic, continuous use of opioids 11/03/2018     Priority: Medium     Medical cannabis use 11/03/2018     Priority: Medium     Cervical cancer screening      Priority: Medium     2011 ablation  2015 NIL pap. Plan: pap in 3 years.  8/1/18 NIL pap, neg HR HPV. cotest in 5 years.       Pelvic pain in female 08/01/2018     Priority: Medium     Chronic rhinitis  05/14/2018     Priority: Medium     Other migraine without status migrainosus, intractable 03/21/2017     Priority: Medium     Pulmonary nodules 01/19/2017     Priority: Medium     Abnormal CT of the chest 01/19/2017     Priority: Medium     Hilar lymphadenopathy 01/19/2017     Priority: Medium     Degenerative joint disease of cervical spine      Priority: Medium     multi level worst at C5-6       Osteoarthritis of cervical spine, unspecified spinal osteoarthritis complication status 03/21/2016     Priority: Medium     Panic attacks 03/01/2016     Priority: Medium     Elevated white blood cell count 01/14/2016     Priority: Medium     Rheumatoid arthritis involving multiple sites with positive rheumatoid factor (H) 01/12/2016     Priority: Medium     Intermittent asthma, uncomplicated 11/29/2015     Priority: Medium     Other chronic pain 11/18/2015     Priority: Medium     Major depressive disorder, recurrent episode, mild (H) 10/08/2015     Priority: Medium     NSAID induced gastritis 09/23/2015     Priority: Medium     Stenosis, cervical spine      Priority: Medium     C5-C7 (MRI)       Spondylitis, cervical (H)      Priority: Medium     C5-C7 (MRI)       Cervicalgia 01/09/2014     Priority: Medium     Disturbance in sleep behavior 11/05/2013     Priority: Medium     Problem list name updated by automated process. Provider to review       SHANTANU (obstructive sleep apnea) 10/25/2013     Priority: Medium     Chronic fatigue syndrome 07/02/2013     Priority: Medium              Fibromyalgia 07/02/2013     Priority: Medium     Generalized anxiety disorder 07/02/2013     Priority: Medium     Diagnosis updated by automated process. Provider to review and confirm.       Tobacco abuse 07/02/2013     Priority: Medium     CARDIOVASCULAR SCREENING; LDL GOAL LESS THAN 160 10/31/2010     Priority: Medium        Past Medical History:    Past Medical History:   Diagnosis Date     Allergic rhinitis due to other allergen       Degenerative joint disease of cervical spine      Generalized anxiety disorder      Hearing loss      Intrinsic asthma, unspecified      Irritable bowel syndrome      Lump or mass in breast      Other malaise and fatigue      Other specified gastritis without mention of hemorrhage      Pain in joint, lower leg      Rheumatoid arthritis(714.0)      Spindle cell carcinoma (H) 2013     Spondylitis, cervical (H)      Stenosis, cervical spine      Tension headache        Past Surgical History:    Past Surgical History:   Procedure Laterality Date     DILATION AND CURETTAGE, HYSTEROSCOPY, ABLATE ENDOMETRIUM NOVASURE, COMBINED  2011    Procedure:COMBINED DILATION AND CURETTAGE, HYSTEROSCOPY, ABLATE ENDOMETRIUM NOVASURE; hysteroscopy, dilation and curettage, novasure; Surgeon:JEREMY ANNA; Location:[facility] OR     EXCISE LESION TRUNK  2013    Procedure: EXCISE LESION TRUNK;  interlaminar epidural Steroid Injection Cervical -thoracic Levels (C7-T1)    Re-Excision of chest wall mass;  Surgeon: Jeremy Bolaños MD;  Location: [facility] OR     HC HYSTEROS W PERMANENT FALLOPAIN IMPLANT      Essure done in office [facility]     INJECT BLOCK MEDIAL BRANCH CERVICAL/THORACIC/LUMBAR  2014    Procedure: INJECT BLOCK MEDIAL BRANCH CERVICAL / THORACIC / LUMBAR;  Surgeon: Josué Munson MD;  Location: [facility] OR     INJECT FACET JOINT  2014    Procedure: INJECT FACET JOINT;  diagnostic medial branch facet nerve block cervical levels 5-6, 6-7;  Surgeon: Josué Munson MD;  Location: [facility] OR     WISDOM ST GUIDEWIRE       [facility] APPENDECTOMY      Ruptured at age 7 years     [facility]  DELIVERY ONLY      , Low Cervical       Family History:    Family History   Problem Relation Age of Onset     Cancer Mother      Arthritis Mother         Rheumatoid     Respiratory Mother         COPD     Dementia Father      Cardiovascular Maternal Grandmother      Hypertension Sister         1/2 sister     Neurologic  "Disorder Sister         1/2 sisiter  Very mild form of CP     Heart Disease Maternal Aunt         Bypass at age 50's       Social History:  Marital Status:   [5]  Social History     Tobacco Use     Smoking status: Current Every Day Smoker     Packs/day: 0.50     Years: 29.00     Pack years: 14.50     Types: Cigarettes     Smokeless tobacco: Never Used   Vaping Use     Vaping Use: Every day     Substances: THC   Substance Use Topics     Alcohol use: Not Currently     Alcohol/week: 1.7 standard drinks     Comment: rare     Drug use: No        Medications:    albuterol (VENTOLIN HFA) 108 (90 Base) MCG/ACT inhaler  cetirizine (ZYRTEC) 10 MG tablet  clonazePAM (KLONOPIN) 0.5 MG tablet  clonazePAM (KLONOPIN) 1 MG tablet  doxycycline hyclate (VIBRA-TABS) 100 MG tablet  FLUoxetine (PROZAC) 40 MG capsule  fluticasone (FLONASE) 50 MCG/ACT nasal spray  fluvoxaMINE (LUVOX) 100 MG tablet  fluvoxaMINE (LUVOX) 25 MG tablet  fluvoxaMINE (LUVOX) 50 MG tablet  HYDROcodone-acetaminophen (NORCO) 5-325 MG tablet  hydrOXYzine (ATARAX) 25 MG tablet  ketorolac (TORADOL) 10 MG tablet  metaxalone (SKELAXIN) 800 MG tablet  mupirocin (BACTROBAN) 2 % external ointment  naloxone (NARCAN) nasal spray  verapamil (CALAN) 40 MG tablet          Review of Systems   All other systems reviewed and are negative.      Physical Exam   BP: 127/83  Pulse: 87  Temp: 97.8  F (36.6  C)  Resp: 18  Height: 170.2 cm (5' 7\")  Weight: 53.5 kg (118 lb)  SpO2: 99 %      Physical Exam  Vitals and nursing note reviewed.   Constitutional:       General: She is not in acute distress.     Appearance: She is not diaphoretic.   HENT:      Head: Normocephalic and atraumatic.      Right Ear: Tympanic membrane, ear canal and external ear normal.      Left Ear: Tympanic membrane, ear canal and external ear normal.      Nose: Nose normal. No congestion or rhinorrhea.      Mouth/Throat:      Mouth: Mucous membranes are dry.      Pharynx: No oropharyngeal exudate.   Eyes:    "   General: No scleral icterus.        Right eye: No discharge.         Left eye: No discharge.      Conjunctiva/sclera: Conjunctivae normal.      Pupils: Pupils are equal, round, and reactive to light.   Neck:      Thyroid: No thyromegaly.   Cardiovascular:      Rate and Rhythm: Normal rate and regular rhythm.      Heart sounds: Normal heart sounds. No murmur heard.  Pulmonary:      Effort: Pulmonary effort is normal. No respiratory distress.      Breath sounds: Normal breath sounds. No wheezing or rales.   Chest:      Chest wall: No tenderness.   Abdominal:      General: Bowel sounds are normal. There is no distension.      Palpations: Abdomen is soft. There is no mass.      Tenderness: There is no abdominal tenderness. There is no right CVA tenderness, left CVA tenderness, guarding or rebound.   Musculoskeletal:         General: No tenderness or deformity. Normal range of motion.      Cervical back: Normal range of motion and neck supple. No rigidity or tenderness.      Right lower leg: No edema.      Left lower leg: No edema.      Comments: Negative Homans' sign.  No calf tenderness.   Lymphadenopathy:      Cervical: No cervical adenopathy.   Skin:     General: Skin is warm and dry.      Capillary Refill: Capillary refill takes less than 2 seconds.      Coloration: Skin is not jaundiced or pale.      Findings: No bruising, erythema, lesion or rash.   Neurological:      Mental Status: She is alert and oriented to person, place, and time.      Cranial Nerves: No cranial nerve deficit.   Psychiatric:         Behavior: Behavior normal.         Thought Content: Thought content normal.         ED Course                 Procedures              Critical Care time:  none               Results for orders placed or performed during the hospital encounter of 06/26/22 (from the past 24 hour(s))   CBC with platelets differential    Narrative    The following orders were created for panel order CBC with platelets  differential.  Procedure                               Abnormality         Status                     ---------                               -----------         ------                     CBC with platelets and d...[[id number]]  Abnormal            Final result               Manual Differential[[id number]]          Abnormal            Final result                 Please view results for these tests on the individual orders.   Comprehensive metabolic panel   Result Value Ref Range    Sodium 143 133 - 144 mmol/L    Potassium 3.7 3.4 - 5.3 mmol/L    Chloride 108 94 - 109 mmol/L    Carbon Dioxide (CO2) 30 20 - 32 mmol/L    Anion Gap 5 3 - 14 mmol/L    Urea Nitrogen 12 7 - 30 mg/dL    Creatinine 0.84 0.52 - 1.04 mg/dL    Calcium 8.2 (L) 8.5 - 10.1 mg/dL    Glucose 92 70 - 99 mg/dL    Alkaline Phosphatase 129 40 - 150 U/L    AST 31 0 - 45 U/L    ALT 34 0 - 50 U/L    Protein Total 6.9 6.8 - 8.8 g/dL    Albumin 3.4 3.4 - 5.0 g/dL    Bilirubin Total 0.2 0.2 - 1.3 mg/dL    GFR Estimate 83 >60 mL/min/1.73m2   Lactic acid whole blood   Result Value Ref Range    Lactic Acid 1.0 0.7 - 2.0 mmol/L   Lipase   Result Value Ref Range    Lipase 79 73 - 393 U/L   Troponin I   Result Value Ref Range    Troponin I High Sensitivity 5 <54 ng/L   Magnesium   Result Value Ref Range    Magnesium 2.0 1.6 - 2.3 mg/dL   CRP inflammation   Result Value Ref Range    CRP Inflammation <2.9 0.0 - 8.0 mg/L   CBC with platelets and differential   Result Value Ref Range    WBC Count 3.1 (L) 4.0 - 11.0 10e3/uL    RBC Count 4.67 3.80 - 5.20 10e6/uL    Hemoglobin 15.6 11.7 - 15.7 g/dL    Hematocrit 45.0 35.0 - 47.0 %    MCV 96 78 - 100 fL    MCH 33.4 (H) 26.5 - 33.0 pg    MCHC 34.7 31.5 - 36.5 g/dL    RDW 12.1 10.0 - 15.0 %    Platelet Count 75 (L) 150 - 450 10e3/uL   Manual Differential   Result Value Ref Range    % Neutrophils 45 %    % Lymphocytes 47 %    % Monocytes 8 %    % Eosinophils 0 %    % Basophils 0 %    Absolute Neutrophils 1.4 (L) 1.6 - 8.3  10e3/uL    Absolute Lymphocytes 1.5 0.8 - 5.3 10e3/uL    Absolute Monocytes 0.2 0.0 - 1.3 10e3/uL    Absolute Eosinophils 0.0 0.0 - 0.7 10e3/uL    Absolute Basophils 0.0 0.0 - 0.2 10e3/uL    RBC Morphology Confirmed RBC Indices     Platelet Assessment  Automated Count Confirmed. Platelet morphology is normal.     Automated Count Confirmed. Platelet morphology is normal.    Reactive Lymphocytes Present (A) None Seen   Symptomatic; Unknown Influenza A/B & SARS-CoV2 (COVID-19) Virus PCR Multiplex Nasopharyngeal    Specimen: Nasopharyngeal; Swab   Result Value Ref Range    Influenza A PCR Negative Negative    Influenza B PCR Negative Negative    SARS CoV2 PCR Positive (A) Negative    Narrative    Testing was performed using the emily SARS-CoV-2 & Influenza A/B Assay on the emily Celia System. This test should be ordered for the detection of SARS-CoV-2 and influenza viruses in individuals who meet clinical and/or epidemiological criteria. Test performance is unknown in asymptomatic patients. This test is for in vitro diagnostic use under the FDA EUA for laboratories certified under CLIA to perform moderate and/or high complexity testing. This test has not been FDA cleared or approved. A negative result does not rule out the presence of PCR inhibitors in the specimen or target RNA in concentration below the limit of detection for the assay. If only one viral target is positive but coinfection with multiple targets is suspected, the sample should be re-tested with another FDA cleared, approved or authorized test, if coinfection would change clinical management. [facility] are certified under the Clinical Laboratory Improvement Amendments of 1988 (CLIA-88) as  qualified to perform moderate and/or high complexity laboratory testing.   Streptococcus A Rapid Screen w/Reflex to PCR    Specimen: Throat; Swab   Result Value Ref Range    Group A Strep antigen Negative Negative   [place] Draw    Narrative    The  following orders were created for panel order [place] Draw.  Procedure                               Abnormality         Status                     ---------                               -----------         ------                     Extra Blood Culture Bottle[[id number]]                       Final result                 Please view results for these tests on the individual orders.   Extra Blood Culture Bottle   Result Value Ref Range    Hold Specimen JIC    UA with Microscopic reflex to Culture    Specimen: Urine, Clean Catch   Result Value Ref Range    Color Urine Yellow Colorless, Straw, Light Yellow, Yellow    Appearance Urine Clear Clear    Glucose Urine Negative Negative mg/dL    Bilirubin Urine Negative Negative    Ketones Urine 5  (A) Negative mg/dL    Specific Gravity Urine 1.012 1.003 - 1.035    Blood Urine Small (A) Negative    pH Urine 6.0 5.0 - 7.0    Protein Albumin Urine Negative Negative mg/dL    Urobilinogen Urine Normal Normal, 2.0 mg/dL    Nitrite Urine Negative Negative    Leukocyte Esterase Urine Negative Negative    Mucus Urine Present (A) None Seen /LPF    RBC Urine 1 <=2 /HPF    WBC Urine 1 <=5 /HPF    Squamous Epithelials Urine 2 (H) <=1 /HPF    Narrative    Urine Culture not indicated     *Note: Due to a large number of results and/or encounters for the requested time period, some results have not been displayed. A complete set of results can be found in Results Review.       Medications   0.9% sodium chloride BOLUS (0 mLs Intravenous Stopped 6/26/22 1422)     Followed by   0.9% sodium chloride BOLUS (1,000 mLs Intravenous New Bag 6/26/22 1423)     Followed by   sodium chloride 0.9% infusion (has no administration in time range)   ondansetron (ZOFRAN) injection 4 mg (4 mg Intravenous Given 6/26/22 1327)   ketorolac (TORADOL) injection 15 mg (15 mg Intravenous Given 6/26/22 1328)       Assessments & Plan (with Medical Decision Making)     Infection due to 2019 novel coronavirus  Nausea      51 year old female presents for evaluation of nausea, vomiting, myalgias, arthralgias, sweats, decreased appetite, chills, fever up to 100.4, occasional rhinorrhea, and occasional cough.  Ongoing for 6 days now.  See HPI for details.  On exam blood pressure 110/70, temperature 97.8, pulse 67, respiration 18, oxygen saturation 96% on room air.  Patient is in no acute distress.  Dry oral mucous membranes noted.  Remainder of the exam is otherwise negative.  No lower extremity edema or calf tenderness.  Negative Homans' sign.  Cardiovascular exam is completely normal.  IV was established.  Given 2 L of IV normal saline, IV Toradol, and IV Zofran for symptom management with good success.  She did feel much better after this treatment regimen.  Comprehensive metabolic panel, lactic acid, lipase, troponin, magnesium, CRP, and CBC all within normal limits.  Influenza negative.  COVID positive.  Rapid strep negative.  Urinalysis without signs of infection.  Discussed the COVID-19 diagnosis with her.  Quarantine recommended.  Conservative care reviewed symptomatic care measures reviewed with the patient.  Tylenol and/or ibuprofen as needed for discomfort or fever.  Push clear fluids.  Increase rest.  She does not qualify for antiviral medication given that she is already on day 6 of her symptoms.  She was given a home oximeter given that she is a daily smoker and likely has some underlying COPD.  She does not have any airway reactivity or significant cough or dyspnea, therefore I did not provide her with any Decadron therapy.  Indications for return reviewed with the patient in detail.  She was in agreement with this plan was suitable for discharge.     I have reviewed the nursing notes.    I have reviewed the findings, diagnosis, plan and need for follow up with the patient.       New Prescriptions    No medications on file       Final diagnoses:   Infection due to 2019 novel coronavirus   Nausea     Disclaimer: This  note consists of symbols derived from keyboarding, dictation and/or voice recognition software. As a result, there may be errors in the script that have gone undetected. Please consider this when interpreting information found in this chart.      6/26/2022   [facility] EMERGENCY DEPT     Lionel Brennan PA-C  06/26/22 [id number]
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
## 2022-06-27 LAB — B BURGDOR IGG+IGM SER QL: 0.16

## 2022-06-27 NOTE — RESULT ENCOUNTER NOTE
Final result for Lyme Disease Total Abs Bld with Reflex to Confirm CLIA is NEGATIVE.    No change in treatment per [facility] Lab Result Lyme Disease protocol.

## 2022-06-27 NOTE — RESULT ENCOUNTER NOTE
Group A Streptococcus PCR is NEGATIVE  No treatment or change in treatment [facility] ED lab result Strep Group A protocol.

## 2022-06-29 ENCOUNTER — VIRTUAL VISIT (OUTPATIENT)
Dept: PSYCHOLOGY | Facility: CLINIC | Age: 52
End: 2022-06-29
Payer: COMMERCIAL

## 2022-06-29 ENCOUNTER — MYC MEDICAL ADVICE (OUTPATIENT)
Dept: FAMILY MEDICINE | Facility: CLINIC | Age: 52
End: 2022-06-29

## 2022-06-29 DIAGNOSIS — F43.10 POST TRAUMATIC STRESS DISORDER (PTSD): ICD-10-CM

## 2022-06-29 DIAGNOSIS — F41.1 GENERALIZED ANXIETY DISORDER: ICD-10-CM

## 2022-06-29 DIAGNOSIS — F90.2 ADHD (ATTENTION DEFICIT HYPERACTIVITY DISORDER), COMBINED TYPE: Primary | ICD-10-CM

## 2022-06-29 DIAGNOSIS — F33.1 MAJOR DEPRESSIVE DISORDER, RECURRENT EPISODE, MODERATE WITH ANXIOUS DISTRESS (H): ICD-10-CM

## 2022-06-29 PROCEDURE — 90834 PSYTX W PT 45 MINUTES: CPT | Mod: 95 | Performed by: SOCIAL WORKER

## 2022-06-29 ASSESSMENT — ANXIETY QUESTIONNAIRES
7. FEELING AFRAID AS IF SOMETHING AWFUL MIGHT HAPPEN: NEARLY EVERY DAY
2. NOT BEING ABLE TO STOP OR CONTROL WORRYING: NEARLY EVERY DAY
7. FEELING AFRAID AS IF SOMETHING AWFUL MIGHT HAPPEN: NEARLY EVERY DAY
GAD7 TOTAL SCORE: 15
GAD7 TOTAL SCORE: 15
5. BEING SO RESTLESS THAT IT IS HARD TO SIT STILL: NOT AT ALL
8. IF YOU CHECKED OFF ANY PROBLEMS, HOW DIFFICULT HAVE THESE MADE IT FOR YOU TO DO YOUR WORK, TAKE CARE OF THINGS AT HOME, OR GET ALONG WITH OTHER PEOPLE?: EXTREMELY DIFFICULT
4. TROUBLE RELAXING: MORE THAN HALF THE DAYS
6. BECOMING EASILY ANNOYED OR IRRITABLE: SEVERAL DAYS
GAD7 TOTAL SCORE: 15
3. WORRYING TOO MUCH ABOUT DIFFERENT THINGS: NEARLY EVERY DAY
1. FEELING NERVOUS, ANXIOUS, OR ON EDGE: NEARLY EVERY DAY
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
## 2022-06-30 ENCOUNTER — MYC MEDICAL ADVICE (OUTPATIENT)
Dept: FAMILY MEDICINE | Facility: CLINIC | Age: 52
End: 2022-06-30

## 2022-06-30 NOTE — TELEPHONE ENCOUNTER
Informed she can call the COVID line for more information and that PCP does not get results that were ordered by an ED provider.  Closing this encounter.  Addie Kuhn, SAMARAN, RN

## 2022-07-01 NOTE — TELEPHONE ENCOUNTER
Let her know I have looked into the situation with her urine and have contacted lab people and people that know more about this but have not heard back yet I will get back with her when I return to clinic mid month.

## 2022-07-07 ENCOUNTER — VIRTUAL VISIT (OUTPATIENT)
Dept: PSYCHOLOGY | Facility: CLINIC | Age: 52
End: 2022-07-07
Payer: COMMERCIAL

## 2022-07-07 DIAGNOSIS — F41.1 GENERALIZED ANXIETY DISORDER: ICD-10-CM

## 2022-07-07 DIAGNOSIS — F33.1 MAJOR DEPRESSIVE DISORDER, RECURRENT EPISODE, MODERATE WITH ANXIOUS DISTRESS (H): ICD-10-CM

## 2022-07-07 DIAGNOSIS — F90.2 ADHD (ATTENTION DEFICIT HYPERACTIVITY DISORDER), COMBINED TYPE: Primary | ICD-10-CM

## 2022-07-07 PROCEDURE — 90834 PSYTX W PT 45 MINUTES: CPT | Mod: 95 | Performed by: SOCIAL WORKER

## 2022-07-11 ENCOUNTER — MYC REFILL (OUTPATIENT)
Dept: FAMILY MEDICINE | Facility: CLINIC | Age: 52
End: 2022-07-11

## 2022-07-11 DIAGNOSIS — M79.7 FIBROMYALGIA: ICD-10-CM

## 2022-07-11 DIAGNOSIS — M54.50 CHRONIC BILATERAL LOW BACK PAIN WITHOUT SCIATICA: ICD-10-CM

## 2022-07-11 DIAGNOSIS — M54.2 CERVICALGIA: ICD-10-CM

## 2022-07-11 DIAGNOSIS — G89.4 CHRONIC PAIN SYNDROME: ICD-10-CM

## 2022-07-11 DIAGNOSIS — G89.29 CHRONIC BILATERAL LOW BACK PAIN WITHOUT SCIATICA: ICD-10-CM

## 2022-07-11 RX ORDER — HYDROCODONE BITARTRATE AND ACETAMINOPHEN 5; 325 MG/1; MG/1
TABLET ORAL
Qty: 60 TABLET | Refills: 0 | Status: SHIPPED | OUTPATIENT
Start: 2022-07-11 | End: 2022-07-19

## 2022-07-11 NOTE — TELEPHONE ENCOUNTER
Requested Prescriptions   Pending Prescriptions Disp Refills     HYDROcodone-acetaminophen (NORCO) 5-325 MG tablet 195 tablet 0     Sig: Take 2.5 tablets by mouth every morning AND 2.5 tablets daily (with lunch) AND 1.5 tablets At Bedtime. Max of 6.5 tablets/day. Fill 01/21/22 and start 01/23/22       There is no refill protocol information for this order           Last Written Prescription Date:  06/13/2022  Last Fill Quantity: 195,   # refills: 0  Last Office Visit: 06/17/2022  Future Office visit:    Next 5 appointments (look out 90 days)    Aug 03, 2022  3:30 PM  (Arrive by 3:10 PM)  Provider Visit with Twin Castro MD  [facility] ([facility] ) [address] [place] [id number] [phone]           Routing refill request to provider for review/approval because:  Drug not on the G, P or [facility] refill protocol or controlled substance

## 2022-07-14 ENCOUNTER — TELEPHONE (OUTPATIENT)
Dept: PSYCHOLOGY | Facility: CLINIC | Age: 52
End: 2022-07-14

## 2022-07-14 ENCOUNTER — VIRTUAL VISIT (OUTPATIENT)
Dept: PSYCHOLOGY | Facility: CLINIC | Age: 52
End: 2022-07-14
Payer: COMMERCIAL

## 2022-07-14 DIAGNOSIS — F33.1 MAJOR DEPRESSIVE DISORDER, RECURRENT EPISODE, MODERATE WITH ANXIOUS DISTRESS (H): ICD-10-CM

## 2022-07-14 DIAGNOSIS — F41.1 GENERALIZED ANXIETY DISORDER: ICD-10-CM

## 2022-07-14 DIAGNOSIS — F90.2 ADHD (ATTENTION DEFICIT HYPERACTIVITY DISORDER), COMBINED TYPE: Primary | ICD-10-CM

## 2022-07-14 PROCEDURE — 90834 PSYTX W PT 45 MINUTES: CPT | Mod: 95 | Performed by: SOCIAL WORKER

## 2022-07-14 ASSESSMENT — ANXIETY QUESTIONNAIRES
GAD7 TOTAL SCORE: 15
GAD7 TOTAL SCORE: 15
6. BECOMING EASILY ANNOYED OR IRRITABLE: MORE THAN HALF THE DAYS
8. IF YOU CHECKED OFF ANY PROBLEMS, HOW DIFFICULT HAVE THESE MADE IT FOR YOU TO DO YOUR WORK, TAKE CARE OF THINGS AT HOME, OR GET ALONG WITH OTHER PEOPLE?: EXTREMELY DIFFICULT
4. TROUBLE RELAXING: MORE THAN HALF THE DAYS
GAD7 TOTAL SCORE: 15
1. FEELING NERVOUS, ANXIOUS, OR ON EDGE: MORE THAN HALF THE DAYS
5. BEING SO RESTLESS THAT IT IS HARD TO SIT STILL: NOT AT ALL
3. WORRYING TOO MUCH ABOUT DIFFERENT THINGS: NEARLY EVERY DAY
7. FEELING AFRAID AS IF SOMETHING AWFUL MIGHT HAPPEN: NEARLY EVERY DAY
7. FEELING AFRAID AS IF SOMETHING AWFUL MIGHT HAPPEN: NEARLY EVERY DAY
2. NOT BEING ABLE TO STOP OR CONTROL WORRYING: NEARLY EVERY DAY

## 2022-07-14 ASSESSMENT — PATIENT HEALTH QUESTIONNAIRE - PHQ9
SUM OF ALL RESPONSES TO PHQ QUESTIONS 1-9: 14
10. IF YOU CHECKED OFF ANY PROBLEMS, HOW DIFFICULT HAVE THESE PROBLEMS MADE IT FOR YOU TO DO YOUR WORK, TAKE CARE OF THINGS AT HOME, OR GET ALONG WITH OTHER PEOPLE: EXTREMELY DIFFICULT
SUM OF ALL RESPONSES TO PHQ QUESTIONS 1-9: 14
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
## 2022-07-21 ENCOUNTER — VIRTUAL VISIT (OUTPATIENT)
Dept: PSYCHOLOGY | Facility: CLINIC | Age: 52
End: 2022-07-21
Payer: COMMERCIAL

## 2022-07-21 DIAGNOSIS — F90.2 ADHD (ATTENTION DEFICIT HYPERACTIVITY DISORDER), COMBINED TYPE: Primary | ICD-10-CM

## 2022-07-21 DIAGNOSIS — F43.10 POST TRAUMATIC STRESS DISORDER (PTSD): ICD-10-CM

## 2022-07-21 DIAGNOSIS — F41.1 GENERALIZED ANXIETY DISORDER: ICD-10-CM

## 2022-07-21 DIAGNOSIS — F33.1 MAJOR DEPRESSIVE DISORDER, RECURRENT EPISODE, MODERATE WITH ANXIOUS DISTRESS (H): ICD-10-CM

## 2022-07-21 PROCEDURE — 90834 PSYTX W PT 45 MINUTES: CPT | Mod: 95 | Performed by: SOCIAL WORKER

## 2022-07-28 ENCOUNTER — VIRTUAL VISIT (OUTPATIENT)
Dept: PSYCHOLOGY | Facility: CLINIC | Age: 52
End: 2022-07-28
Payer: COMMERCIAL

## 2022-07-28 DIAGNOSIS — F33.1 MAJOR DEPRESSIVE DISORDER, RECURRENT EPISODE, MODERATE WITH ANXIOUS DISTRESS (H): ICD-10-CM

## 2022-07-28 DIAGNOSIS — F90.2 ADHD (ATTENTION DEFICIT HYPERACTIVITY DISORDER), COMBINED TYPE: Primary | ICD-10-CM

## 2022-07-28 DIAGNOSIS — F43.10 POST TRAUMATIC STRESS DISORDER (PTSD): ICD-10-CM

## 2022-07-28 DIAGNOSIS — F41.1 GENERALIZED ANXIETY DISORDER: ICD-10-CM

## 2022-07-28 PROCEDURE — 90834 PSYTX W PT 45 MINUTES: CPT | Mod: 95 | Performed by: SOCIAL WORKER

## 2022-07-28 ASSESSMENT — ANXIETY QUESTIONNAIRES
3. WORRYING TOO MUCH ABOUT DIFFERENT THINGS: NEARLY EVERY DAY
7. FEELING AFRAID AS IF SOMETHING AWFUL MIGHT HAPPEN: NEARLY EVERY DAY
8. IF YOU CHECKED OFF ANY PROBLEMS, HOW DIFFICULT HAVE THESE MADE IT FOR YOU TO DO YOUR WORK, TAKE CARE OF THINGS AT HOME, OR GET ALONG WITH OTHER PEOPLE?: EXTREMELY DIFFICULT
1. FEELING NERVOUS, ANXIOUS, OR ON EDGE: NEARLY EVERY DAY
GAD7 TOTAL SCORE: 15
2. NOT BEING ABLE TO STOP OR CONTROL WORRYING: NEARLY EVERY DAY
GAD7 TOTAL SCORE: 15
7. FEELING AFRAID AS IF SOMETHING AWFUL MIGHT HAPPEN: NEARLY EVERY DAY
5. BEING SO RESTLESS THAT IT IS HARD TO SIT STILL: SEVERAL DAYS
IF YOU CHECKED OFF ANY PROBLEMS ON THIS QUESTIONNAIRE, HOW DIFFICULT HAVE THESE PROBLEMS MADE IT FOR YOU TO DO YOUR WORK, TAKE CARE OF THINGS AT HOME, OR GET ALONG WITH OTHER PEOPLE: EXTREMELY DIFFICULT
4. TROUBLE RELAXING: SEVERAL DAYS
GAD7 TOTAL SCORE: 15
6. BECOMING EASILY ANNOYED OR IRRITABLE: SEVERAL DAYS

## 2022-07-28 ASSESSMENT — PATIENT HEALTH QUESTIONNAIRE - PHQ9
10. IF YOU CHECKED OFF ANY PROBLEMS, HOW DIFFICULT HAVE THESE PROBLEMS MADE IT FOR YOU TO DO YOUR WORK, TAKE CARE OF THINGS AT HOME, OR GET ALONG WITH OTHER PEOPLE: EXTREMELY DIFFICULT
SUM OF ALL RESPONSES TO PHQ QUESTIONS 1-9: 11
SUM OF ALL RESPONSES TO PHQ QUESTIONS 1-9: 11
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
## 2022-08-03 ENCOUNTER — OFFICE VISIT (OUTPATIENT)
Dept: FAMILY MEDICINE | Facility: CLINIC | Age: 52
End: 2022-08-03
Payer: COMMERCIAL

## 2022-08-03 VITALS
HEART RATE: 80 BPM | SYSTOLIC BLOOD PRESSURE: 118 MMHG | TEMPERATURE: 97.7 F | WEIGHT: 117.56 LBS | BODY MASS INDEX: 18.41 KG/M2 | OXYGEN SATURATION: 99 % | RESPIRATION RATE: 20 BRPM | DIASTOLIC BLOOD PRESSURE: 70 MMHG

## 2022-08-03 DIAGNOSIS — G89.29 CHRONIC BILATERAL LOW BACK PAIN WITHOUT SCIATICA: ICD-10-CM

## 2022-08-03 DIAGNOSIS — M54.2 CERVICALGIA: ICD-10-CM

## 2022-08-03 DIAGNOSIS — J45.20 INTERMITTENT ASTHMA, UNCOMPLICATED: ICD-10-CM

## 2022-08-03 DIAGNOSIS — F11.90 CHRONIC, CONTINUOUS USE OF OPIOIDS: Primary | ICD-10-CM

## 2022-08-03 DIAGNOSIS — M79.7 FIBROMYALGIA: ICD-10-CM

## 2022-08-03 DIAGNOSIS — G89.4 CHRONIC PAIN SYNDROME: ICD-10-CM

## 2022-08-03 DIAGNOSIS — M54.50 CHRONIC BILATERAL LOW BACK PAIN WITHOUT SCIATICA: ICD-10-CM

## 2022-08-03 LAB
CANNABINOIDS UR QL SCN: ABNORMAL
CREAT UR-MCNC: 368 MG/DL

## 2022-08-03 PROCEDURE — 80307 DRUG TEST PRSMV CHEM ANLYZR: CPT | Performed by: FAMILY MEDICINE

## 2022-08-03 PROCEDURE — 99214 OFFICE O/P EST MOD 30 MIN: CPT | Performed by: FAMILY MEDICINE

## 2022-08-03 RX ORDER — HYDROCODONE BITARTRATE AND ACETAMINOPHEN 5; 325 MG/1; MG/1
TABLET ORAL
Qty: 195 TABLET | Refills: 0 | Status: SHIPPED | OUTPATIENT
Start: 2022-08-04 | End: 2022-08-30

## 2022-08-03 ASSESSMENT — PAIN SCALES - GENERAL: PAINLEVEL: EXTREME PAIN (8)

## 2022-08-03 ASSESSMENT — ASTHMA QUESTIONNAIRES
ACT_TOTALSCORE: 21
ACT_TOTALSCORE: 21
QUESTION_2 LAST FOUR WEEKS HOW OFTEN HAVE YOU HAD SHORTNESS OF BREATH: THREE TO SIX TIMES A WEEK
QUESTION_3 LAST FOUR WEEKS HOW OFTEN DID YOUR ASTHMA SYMPTOMS (WHEEZING, COUGHING, SHORTNESS OF BREATH, CHEST TIGHTNESS OR PAIN) WAKE YOU UP AT NIGHT OR EARLIER THAN USUAL IN THE MORNING: NOT AT ALL
QUESTION_4 LAST FOUR WEEKS HOW OFTEN HAVE YOU USED YOUR RESCUE INHALER OR NEBULIZER MEDICATION (SUCH AS ALBUTEROL): ONCE A WEEK OR LESS
QUESTION_1 LAST FOUR WEEKS HOW MUCH OF THE TIME DID YOUR ASTHMA KEEP YOU FROM GETTING AS MUCH DONE AT WORK, SCHOOL OR AT HOME: A LITTLE OF THE TIME
QUESTION_5 LAST FOUR WEEKS HOW WOULD YOU RATE YOUR ASTHMA CONTROL: COMPLETELY CONTROLLED

## 2022-08-03 ASSESSMENT — ANXIETY QUESTIONNAIRES
7. FEELING AFRAID AS IF SOMETHING AWFUL MIGHT HAPPEN: NEARLY EVERY DAY
GAD7 TOTAL SCORE: 16
GAD7 TOTAL SCORE: 16
3. WORRYING TOO MUCH ABOUT DIFFERENT THINGS: NEARLY EVERY DAY
5. BEING SO RESTLESS THAT IT IS HARD TO SIT STILL: NOT AT ALL
IF YOU CHECKED OFF ANY PROBLEMS ON THIS QUESTIONNAIRE, HOW DIFFICULT HAVE THESE PROBLEMS MADE IT FOR YOU TO DO YOUR WORK, TAKE CARE OF THINGS AT HOME, OR GET ALONG WITH OTHER PEOPLE: EXTREMELY DIFFICULT
6. BECOMING EASILY ANNOYED OR IRRITABLE: SEVERAL DAYS
2. NOT BEING ABLE TO STOP OR CONTROL WORRYING: NEARLY EVERY DAY
4. TROUBLE RELAXING: NEARLY EVERY DAY
1. FEELING NERVOUS, ANXIOUS, OR ON EDGE: NEARLY EVERY DAY

## 2022-08-03 NOTE — PROGRESS NOTES
Assessment & Plan     Chronic, continuous use of opioids  She was seen pain clinic and then they left.  They had started tapering her down on her Vicodin.  I have kept her at the same level.  She is due for a repeat urine test the last 1 stated some amphetamines in it and she had been taking cold medicine very confusing but we will get another 1.  - Drug Confirmation Panel Urine with Creat - lab collect; Future  - Drug Confirmation Panel Urine with Creat - lab collect    Intermittent asthma, uncomplicated  Needs refill on her medication.    Fibromyalgia  Continuous use of Vicodin.  - HYDROcodone-acetaminophen (NORCO) 5-325 MG tablet; Take 2.5 tablets by mouth every morning AND 2.5 tablets daily (with lunch) AND 1.5 tablets At Bedtime. Max of 6.5 tablets/d    Chronic bilateral low back pain without sciatica  See above.  - HYDROcodone-acetaminophen (NORCO) 5-325 MG tablet; Take 2.5 tablets by mouth every morning AND 2.5 tablets daily (with lunch) AND 1.5 tablets At Bedtime. Max of 6.5 tablets/d    Cervicalgia  See above.  - HYDROcodone-acetaminophen (NORCO) 5-325 MG tablet; Take 2.5 tablets by mouth every morning AND 2.5 tablets daily (with lunch) AND 1.5 tablets At Bedtime. Max of 6.5 tablets/d    Chronic pain syndrome  Recommended reestablishing with a pain clinic she has many relationship and social issues going on with her family right now.  - HYDROcodone-acetaminophen (NORCO) 5-325 MG tablet; Take 2.5 tablets by mouth every morning AND 2.5 tablets daily (with lunch) AND 1.5 tablets At Bedtime. Max of 6.5 tablets/d  - Drug Confirmation Panel Urine with Creat - lab collect; Future  - Drug Confirmation Panel Urine with Creat - lab collect                 No follow-ups on file.    Twin Castro MD  [facility] HAYDER Rehman is a 51 year old, presenting for the following health issues:  Recheck Medication  Recheck of use of her opioids mainly.  Also uses clonazepam.  Also her  needs some refills on her albuterol and Zyrtec.    History of Present Illness       Reason for visit:  Medication recheck    She eats 2-3 servings of fruits and vegetables daily.She consumes 1 sweetened beverage(s) daily.She exercises with enough effort to increase her heart rate 10 to 19 minutes per day.  She exercises with enough effort to increase her heart rate 7 days per week.   She is taking medications regularly.       Anxiety Follow-Up    How are you doing with your anxiety since your last visit? Worsened     Are you having other symptoms that might be associated with anxiety? No    Have you had a significant life event? Financial Concerns, Grief or Loss and Health Concerns     Are you feeling depressed? Yes:  On going    Do you have any concerns with your use of alcohol or other drugs? No    Social History     Tobacco Use     Smoking status: Current Every Day Smoker     Packs/day: 0.50     Years: 29.00     Pack years: 14.50     Types: Cigarettes     Smokeless tobacco: Never Used   Vaping Use     Vaping Use: Every day     Substances: THC   Substance Use Topics     Alcohol use: Not Currently     Alcohol/week: 1.7 standard drinks     Comment: rare     Drug use: No     CELIA-7 SCORE 7/14/2022 7/28/2022 8/3/2022   Total Score 15 (severe anxiety) 15 (severe anxiety) -   Total Score 15 15 16     PHQ 7/14/2022 7/21/2022 7/28/2022   PHQ-9 Total Score 14 12 11   Q9: Thoughts of better off dead/self-harm past 2 weeks Not at all Not at all Not at all       Asthma Follow-Up    Was ACT completed today?    Yes    ACT Total Scores 8/3/2022   ACT TOTAL SCORE -   ASTHMA ER VISITS -   ASTHMA HOSPITALIZATIONS -   ACT TOTAL SCORE (Goal Greater than or Equal to 20) 21   In the past 12 months, how many times did you visit the emergency room for your asthma without being admitted to the hospital? 0   In the past 12 months, how many times were you hospitalized overnight because of your asthma? 0          How many days per week do you  miss taking your asthma controller medication?  I do not have an asthma controller medication    Please describe any recent triggers for your asthma: upper respiratory infections, humidity and exercise or sports    Have you had any Emergency Room Visits, Urgent Care Visits, or Hospital Admissions since your last office visit?  No    Pain History:  When did you first notice your pain? - Chronic Pain   Have you seen this provider for your pain in the past?   Yes   Where in your body do you have pain? Muscle and joints  Are you seeing anyone else for your pain? Yes -    PHQ-9 SCORE 7/14/2022 7/21/2022 7/28/2022   PHQ-9 Total Score - - -   PHQ-9 Total Score MyChart 14 (Moderate depression) 12 (Moderate depression) 11 (Moderate depression)   PHQ-9 Total Score 14 12 11       CELIA-7 SCORE 7/14/2022 7/28/2022 8/3/2022   Total Score 15 (severe anxiety) 15 (severe anxiety) -   Total Score 15 15 16       PEG Score 2/2/2022 8/3/2022   PEG Total Score 4.67 8.67           Chronic Pain Follow Up:      PDMP Review       Value Time User    State PDMP site checked  Yes 2/21/2022 10:03 AM Twin Castro MD        Last CSA Agreement:   CSA -- Patient Level:    Controlled Substance Agreement - Opioid - Scan on 6/13/2022  4:08 PM  Controlled Substance Agreement - Opioid - Scan on 5/26/2021  4:37 PM  Controlled Substance Agreement - Opioid - Scan on 1/27/2020  2:58 PM: controlled substance agreement  Controlled Substance Agreement - Opioid - Scan on 1/24/2019  1:31 PM: signed 1/11/2019       Last UDS: 6/15/2022            Review of Systems   Constitutional, HEENT, cardiovascular, pulmonary, gi and gu systems are negative, except as otherwise noted.      Objective    /70   Pulse 80   Temp 97.7  F (36.5  C) (Temporal)   Resp 20   Wt 53.3 kg (117 lb 9 oz)   SpO2 99%   Breastfeeding No   BMI 18.41 kg/m    Body mass index is 18.41 kg/m .  Physical Exam   GENERAL: healthy, alert and no distress  EYES: Eyes grossly normal to  inspection, PERRL and conjunctivae and sclerae normal  NEURO: Normal strength and tone, mentation intact and speech normal  PSYCH: mentation appears normal, anxious and appearance well groomed                    .  ..
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
## 2022-08-05 LAB
7AMINOCLONAZEPAM UR QL CFM: PRESENT
DHC UR CFM-MCNC: >5000 NG/ML
DHC/CREAT UR: ABNORMAL NG/MG{CREAT}
HYDROCODONE UR CFM-MCNC: 7636 NG/ML
HYDROCODONE/CREAT UR: 2075 NG/MG {CREAT}
HYDROMORPHONE UR CFM-MCNC: 1440 NG/ML
HYDROMORPHONE/CREAT UR: 391 NG/MG {CREAT}
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
## 2022-08-11 ENCOUNTER — VIRTUAL VISIT (OUTPATIENT)
Dept: PSYCHOLOGY | Facility: CLINIC | Age: 52
End: 2022-08-11
Payer: COMMERCIAL

## 2022-08-11 DIAGNOSIS — F33.1 MAJOR DEPRESSIVE DISORDER, RECURRENT EPISODE, MODERATE WITH ANXIOUS DISTRESS (H): ICD-10-CM

## 2022-08-11 DIAGNOSIS — F90.2 ADHD (ATTENTION DEFICIT HYPERACTIVITY DISORDER), COMBINED TYPE: Primary | ICD-10-CM

## 2022-08-11 DIAGNOSIS — F43.10 POST TRAUMATIC STRESS DISORDER (PTSD): ICD-10-CM

## 2022-08-11 DIAGNOSIS — F41.1 GENERALIZED ANXIETY DISORDER: ICD-10-CM

## 2022-08-11 PROCEDURE — 90834 PSYTX W PT 45 MINUTES: CPT | Mod: 95 | Performed by: SOCIAL WORKER

## 2022-08-11 ASSESSMENT — ANXIETY QUESTIONNAIRES
IF YOU CHECKED OFF ANY PROBLEMS ON THIS QUESTIONNAIRE, HOW DIFFICULT HAVE THESE PROBLEMS MADE IT FOR YOU TO DO YOUR WORK, TAKE CARE OF THINGS AT HOME, OR GET ALONG WITH OTHER PEOPLE: EXTREMELY DIFFICULT
GAD7 TOTAL SCORE: 16
1. FEELING NERVOUS, ANXIOUS, OR ON EDGE: NEARLY EVERY DAY
7. FEELING AFRAID AS IF SOMETHING AWFUL MIGHT HAPPEN: NEARLY EVERY DAY
GAD7 TOTAL SCORE: 16
GAD7 TOTAL SCORE: 16
3. WORRYING TOO MUCH ABOUT DIFFERENT THINGS: NEARLY EVERY DAY
8. IF YOU CHECKED OFF ANY PROBLEMS, HOW DIFFICULT HAVE THESE MADE IT FOR YOU TO DO YOUR WORK, TAKE CARE OF THINGS AT HOME, OR GET ALONG WITH OTHER PEOPLE?: EXTREMELY DIFFICULT
4. TROUBLE RELAXING: MORE THAN HALF THE DAYS
7. FEELING AFRAID AS IF SOMETHING AWFUL MIGHT HAPPEN: NEARLY EVERY DAY
6. BECOMING EASILY ANNOYED OR IRRITABLE: MORE THAN HALF THE DAYS
2. NOT BEING ABLE TO STOP OR CONTROL WORRYING: NEARLY EVERY DAY
5. BEING SO RESTLESS THAT IT IS HARD TO SIT STILL: NOT AT ALL
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
## 2022-08-18 ENCOUNTER — VIRTUAL VISIT (OUTPATIENT)
Dept: PSYCHOLOGY | Facility: CLINIC | Age: 52
End: 2022-08-18
Payer: COMMERCIAL

## 2022-08-18 DIAGNOSIS — F33.1 MAJOR DEPRESSIVE DISORDER, RECURRENT EPISODE, MODERATE WITH ANXIOUS DISTRESS (H): ICD-10-CM

## 2022-08-18 DIAGNOSIS — F43.10 POST TRAUMATIC STRESS DISORDER (PTSD): ICD-10-CM

## 2022-08-18 DIAGNOSIS — F41.1 GENERALIZED ANXIETY DISORDER: ICD-10-CM

## 2022-08-18 DIAGNOSIS — F90.2 ADHD (ATTENTION DEFICIT HYPERACTIVITY DISORDER), COMBINED TYPE: Primary | ICD-10-CM

## 2022-08-18 PROCEDURE — 90834 PSYTX W PT 45 MINUTES: CPT | Mod: 95 | Performed by: SOCIAL WORKER

## 2022-08-25 ENCOUNTER — VIRTUAL VISIT (OUTPATIENT)
Dept: PSYCHOLOGY | Facility: CLINIC | Age: 52
End: 2022-08-25
Payer: COMMERCIAL

## 2022-08-25 DIAGNOSIS — F90.2 ADHD (ATTENTION DEFICIT HYPERACTIVITY DISORDER), COMBINED TYPE: Primary | ICD-10-CM

## 2022-08-25 DIAGNOSIS — F43.10 POST TRAUMATIC STRESS DISORDER (PTSD): ICD-10-CM

## 2022-08-25 DIAGNOSIS — F33.1 MAJOR DEPRESSIVE DISORDER, RECURRENT EPISODE, MODERATE WITH ANXIOUS DISTRESS (H): ICD-10-CM

## 2022-08-25 DIAGNOSIS — F41.1 GENERALIZED ANXIETY DISORDER: ICD-10-CM

## 2022-08-25 PROCEDURE — 90834 PSYTX W PT 45 MINUTES: CPT | Mod: 95 | Performed by: SOCIAL WORKER

## 2022-08-25 ASSESSMENT — ANXIETY QUESTIONNAIRES
1. FEELING NERVOUS, ANXIOUS, OR ON EDGE: NEARLY EVERY DAY
7. FEELING AFRAID AS IF SOMETHING AWFUL MIGHT HAPPEN: NEARLY EVERY DAY
GAD7 TOTAL SCORE: 15
8. IF YOU CHECKED OFF ANY PROBLEMS, HOW DIFFICULT HAVE THESE MADE IT FOR YOU TO DO YOUR WORK, TAKE CARE OF THINGS AT HOME, OR GET ALONG WITH OTHER PEOPLE?: EXTREMELY DIFFICULT
5. BEING SO RESTLESS THAT IT IS HARD TO SIT STILL: SEVERAL DAYS
7. FEELING AFRAID AS IF SOMETHING AWFUL MIGHT HAPPEN: NEARLY EVERY DAY
IF YOU CHECKED OFF ANY PROBLEMS ON THIS QUESTIONNAIRE, HOW DIFFICULT HAVE THESE PROBLEMS MADE IT FOR YOU TO DO YOUR WORK, TAKE CARE OF THINGS AT HOME, OR GET ALONG WITH OTHER PEOPLE: EXTREMELY DIFFICULT
4. TROUBLE RELAXING: SEVERAL DAYS
2. NOT BEING ABLE TO STOP OR CONTROL WORRYING: NEARLY EVERY DAY
GAD7 TOTAL SCORE: 15
6. BECOMING EASILY ANNOYED OR IRRITABLE: SEVERAL DAYS
3. WORRYING TOO MUCH ABOUT DIFFERENT THINGS: NEARLY EVERY DAY
GAD7 TOTAL SCORE: 15

## 2022-08-30 ENCOUNTER — MYC REFILL (OUTPATIENT)
Dept: FAMILY MEDICINE | Facility: CLINIC | Age: 52
End: 2022-08-30

## 2022-08-30 DIAGNOSIS — G89.29 CHRONIC BILATERAL LOW BACK PAIN WITHOUT SCIATICA: ICD-10-CM

## 2022-08-30 DIAGNOSIS — M54.50 CHRONIC BILATERAL LOW BACK PAIN WITHOUT SCIATICA: ICD-10-CM

## 2022-08-30 DIAGNOSIS — M79.7 FIBROMYALGIA: ICD-10-CM

## 2022-08-30 DIAGNOSIS — M54.2 CERVICALGIA: ICD-10-CM

## 2022-08-30 DIAGNOSIS — G89.4 CHRONIC PAIN SYNDROME: ICD-10-CM

## 2022-08-30 NOTE — TELEPHONE ENCOUNTER
Pending Prescriptions:                       Disp   Refills    HYDROcodone-acetaminophen (NORCO) 5-325 MG*195 ta*0        Sig: Take 2.5 tablets by mouth every morning AND 2.5           tablets daily (with lunch) AND 1.5 tablets At           Bedtime. Max of 6.5 tablets/d    Routing refill request to provider for review/approval because:  Drug not on the [facility] refill protocol
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
## 2022-08-31 RX ORDER — HYDROCODONE BITARTRATE AND ACETAMINOPHEN 5; 325 MG/1; MG/1
TABLET ORAL
Qty: 195 TABLET | Refills: 0 | Status: SHIPPED | OUTPATIENT
Start: 2022-09-02 | End: 2022-09-29

## 2022-09-01 ENCOUNTER — VIRTUAL VISIT (OUTPATIENT)
Dept: PSYCHOLOGY | Facility: CLINIC | Age: 52
End: 2022-09-01
Payer: COMMERCIAL

## 2022-09-01 DIAGNOSIS — F43.10 POST TRAUMATIC STRESS DISORDER (PTSD): ICD-10-CM

## 2022-09-01 DIAGNOSIS — F41.1 GENERALIZED ANXIETY DISORDER: ICD-10-CM

## 2022-09-01 DIAGNOSIS — F33.1 MAJOR DEPRESSIVE DISORDER, RECURRENT EPISODE, MODERATE WITH ANXIOUS DISTRESS (H): ICD-10-CM

## 2022-09-01 DIAGNOSIS — F90.2 ADHD (ATTENTION DEFICIT HYPERACTIVITY DISORDER), COMBINED TYPE: Primary | ICD-10-CM

## 2022-09-01 PROCEDURE — 90834 PSYTX W PT 45 MINUTES: CPT | Mod: 95 | Performed by: SOCIAL WORKER
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
## 2022-09-07 NOTE — TELEPHONE ENCOUNTER
Impression: Age-related nuclear cataract, bilateral: H25.13. Plan: Advised patient of condition. Patient symptomatic, surgical intervention recommended. Advised patient of condition. Risks, benefits, and alternatives of cataract surgery discussed.  Patient referred for cat eval, consider I Stent Patient wants to cut her am Depakote 125 mg delayed-release tablet in half due to sedation.    Routing to provider for review.

## 2022-09-09 ENCOUNTER — VIRTUAL VISIT (OUTPATIENT)
Dept: PSYCHOLOGY | Facility: CLINIC | Age: 52
End: 2022-09-09
Payer: COMMERCIAL

## 2022-09-09 DIAGNOSIS — F33.1 MAJOR DEPRESSIVE DISORDER, RECURRENT EPISODE, MODERATE WITH ANXIOUS DISTRESS (H): ICD-10-CM

## 2022-09-09 DIAGNOSIS — F41.1 GENERALIZED ANXIETY DISORDER: ICD-10-CM

## 2022-09-09 DIAGNOSIS — F90.2 ADHD (ATTENTION DEFICIT HYPERACTIVITY DISORDER), COMBINED TYPE: Primary | ICD-10-CM

## 2022-09-09 DIAGNOSIS — F43.10 POST TRAUMATIC STRESS DISORDER (PTSD): ICD-10-CM

## 2022-09-09 PROCEDURE — 90834 PSYTX W PT 45 MINUTES: CPT | Mod: 95 | Performed by: SOCIAL WORKER

## 2022-09-09 ASSESSMENT — ANXIETY QUESTIONNAIRES
5. BEING SO RESTLESS THAT IT IS HARD TO SIT STILL: NOT AT ALL
7. FEELING AFRAID AS IF SOMETHING AWFUL MIGHT HAPPEN: NEARLY EVERY DAY
4. TROUBLE RELAXING: SEVERAL DAYS
GAD7 TOTAL SCORE: 15
6. BECOMING EASILY ANNOYED OR IRRITABLE: MORE THAN HALF THE DAYS
GAD7 TOTAL SCORE: 15
7. FEELING AFRAID AS IF SOMETHING AWFUL MIGHT HAPPEN: NEARLY EVERY DAY
3. WORRYING TOO MUCH ABOUT DIFFERENT THINGS: NEARLY EVERY DAY
GAD7 TOTAL SCORE: 15
8. IF YOU CHECKED OFF ANY PROBLEMS, HOW DIFFICULT HAVE THESE MADE IT FOR YOU TO DO YOUR WORK, TAKE CARE OF THINGS AT HOME, OR GET ALONG WITH OTHER PEOPLE?: EXTREMELY DIFFICULT
2. NOT BEING ABLE TO STOP OR CONTROL WORRYING: NEARLY EVERY DAY
1. FEELING NERVOUS, ANXIOUS, OR ON EDGE: NEARLY EVERY DAY
IF YOU CHECKED OFF ANY PROBLEMS ON THIS QUESTIONNAIRE, HOW DIFFICULT HAVE THESE PROBLEMS MADE IT FOR YOU TO DO YOUR WORK, TAKE CARE OF THINGS AT HOME, OR GET ALONG WITH OTHER PEOPLE: EXTREMELY DIFFICULT

## 2022-09-09 ASSESSMENT — PATIENT HEALTH QUESTIONNAIRE - PHQ9
SUM OF ALL RESPONSES TO PHQ QUESTIONS 1-9: 11
SUM OF ALL RESPONSES TO PHQ QUESTIONS 1-9: 11
10. IF YOU CHECKED OFF ANY PROBLEMS, HOW DIFFICULT HAVE THESE PROBLEMS MADE IT FOR YOU TO DO YOUR WORK, TAKE CARE OF THINGS AT HOME, OR GET ALONG WITH OTHER PEOPLE: EXTREMELY DIFFICULT

## 2022-09-15 ENCOUNTER — VIRTUAL VISIT (OUTPATIENT)
Dept: PSYCHOLOGY | Facility: OTHER | Age: 52
End: 2022-09-15
Payer: COMMERCIAL

## 2022-09-15 DIAGNOSIS — F43.10 POST TRAUMATIC STRESS DISORDER (PTSD): ICD-10-CM

## 2022-09-15 DIAGNOSIS — F90.2 ADHD (ATTENTION DEFICIT HYPERACTIVITY DISORDER), COMBINED TYPE: Primary | ICD-10-CM

## 2022-09-15 DIAGNOSIS — F33.1 MAJOR DEPRESSIVE DISORDER, RECURRENT EPISODE, MODERATE WITH ANXIOUS DISTRESS (H): ICD-10-CM

## 2022-09-15 DIAGNOSIS — F41.1 GENERALIZED ANXIETY DISORDER: ICD-10-CM

## 2022-09-15 PROCEDURE — 90834 PSYTX W PT 45 MINUTES: CPT | Mod: 95 | Performed by: SOCIAL WORKER
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
## 2022-09-22 ENCOUNTER — VIRTUAL VISIT (OUTPATIENT)
Dept: PSYCHOLOGY | Facility: OTHER | Age: 52
End: 2022-09-22
Payer: COMMERCIAL

## 2022-09-22 DIAGNOSIS — F90.2 ADHD (ATTENTION DEFICIT HYPERACTIVITY DISORDER), COMBINED TYPE: Primary | ICD-10-CM

## 2022-09-22 DIAGNOSIS — F41.1 GENERALIZED ANXIETY DISORDER: ICD-10-CM

## 2022-09-22 DIAGNOSIS — F33.1 MAJOR DEPRESSIVE DISORDER, RECURRENT EPISODE, MODERATE WITH ANXIOUS DISTRESS (H): ICD-10-CM

## 2022-09-22 PROCEDURE — 90834 PSYTX W PT 45 MINUTES: CPT | Mod: 95 | Performed by: SOCIAL WORKER

## 2022-09-29 ENCOUNTER — MYC REFILL (OUTPATIENT)
Dept: FAMILY MEDICINE | Facility: CLINIC | Age: 52
End: 2022-09-29

## 2022-09-29 ENCOUNTER — VIRTUAL VISIT (OUTPATIENT)
Dept: PSYCHOLOGY | Facility: OTHER | Age: 52
End: 2022-09-29
Payer: COMMERCIAL

## 2022-09-29 DIAGNOSIS — F41.1 GENERALIZED ANXIETY DISORDER: ICD-10-CM

## 2022-09-29 DIAGNOSIS — M79.7 FIBROMYALGIA: ICD-10-CM

## 2022-09-29 DIAGNOSIS — G89.29 CHRONIC BILATERAL LOW BACK PAIN WITHOUT SCIATICA: ICD-10-CM

## 2022-09-29 DIAGNOSIS — F43.10 POST TRAUMATIC STRESS DISORDER (PTSD): ICD-10-CM

## 2022-09-29 DIAGNOSIS — M54.2 CERVICALGIA: ICD-10-CM

## 2022-09-29 DIAGNOSIS — G89.4 CHRONIC PAIN SYNDROME: ICD-10-CM

## 2022-09-29 DIAGNOSIS — F90.2 ADHD (ATTENTION DEFICIT HYPERACTIVITY DISORDER), COMBINED TYPE: Primary | ICD-10-CM

## 2022-09-29 DIAGNOSIS — F33.1 MAJOR DEPRESSIVE DISORDER, RECURRENT EPISODE, MODERATE WITH ANXIOUS DISTRESS (H): ICD-10-CM

## 2022-09-29 DIAGNOSIS — M54.50 CHRONIC BILATERAL LOW BACK PAIN WITHOUT SCIATICA: ICD-10-CM

## 2022-09-29 PROCEDURE — 90834 PSYTX W PT 45 MINUTES: CPT | Mod: 95 | Performed by: SOCIAL WORKER

## 2022-09-29 ASSESSMENT — ANXIETY QUESTIONNAIRES
3. WORRYING TOO MUCH ABOUT DIFFERENT THINGS: NEARLY EVERY DAY
6. BECOMING EASILY ANNOYED OR IRRITABLE: MORE THAN HALF THE DAYS
GAD7 TOTAL SCORE: 15
GAD7 TOTAL SCORE: 15
7. FEELING AFRAID AS IF SOMETHING AWFUL MIGHT HAPPEN: NEARLY EVERY DAY
7. FEELING AFRAID AS IF SOMETHING AWFUL MIGHT HAPPEN: NEARLY EVERY DAY
8. IF YOU CHECKED OFF ANY PROBLEMS, HOW DIFFICULT HAVE THESE MADE IT FOR YOU TO DO YOUR WORK, TAKE CARE OF THINGS AT HOME, OR GET ALONG WITH OTHER PEOPLE?: EXTREMELY DIFFICULT
2. NOT BEING ABLE TO STOP OR CONTROL WORRYING: NEARLY EVERY DAY
5. BEING SO RESTLESS THAT IT IS HARD TO SIT STILL: NOT AT ALL
GAD7 TOTAL SCORE: 15
1. FEELING NERVOUS, ANXIOUS, OR ON EDGE: NEARLY EVERY DAY
4. TROUBLE RELAXING: SEVERAL DAYS
IF YOU CHECKED OFF ANY PROBLEMS ON THIS QUESTIONNAIRE, HOW DIFFICULT HAVE THESE PROBLEMS MADE IT FOR YOU TO DO YOUR WORK, TAKE CARE OF THINGS AT HOME, OR GET ALONG WITH OTHER PEOPLE: EXTREMELY DIFFICULT
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
## 2022-09-29 NOTE — TELEPHONE ENCOUNTER
Requested Prescriptions   Pending Prescriptions Disp Refills     HYDROcodone-acetaminophen (NORCO) 5-325 MG tablet 195 tablet 0     Sig: Take 2.5 tablets by mouth every morning AND 2.5 tablets daily (with lunch) AND 1.5 tablets At Bedtime. Max of 6.5 tablets/d         Routing refill request to provider for review/approval because:  Drug not on the [facility], [facility] or [facility] refill protocol or controlled substance

## 2022-09-30 RX ORDER — HYDROCODONE BITARTRATE AND ACETAMINOPHEN 5; 325 MG/1; MG/1
TABLET ORAL
Qty: 195 TABLET | Refills: 0 | Status: SHIPPED | OUTPATIENT
Start: 2022-09-30 | End: 2022-10-31
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
## 2022-10-06 ENCOUNTER — VIRTUAL VISIT (OUTPATIENT)
Dept: PSYCHOLOGY | Facility: OTHER | Age: 52
End: 2022-10-06
Payer: COMMERCIAL

## 2022-10-06 DIAGNOSIS — F41.1 GENERALIZED ANXIETY DISORDER: ICD-10-CM

## 2022-10-06 DIAGNOSIS — F43.10 POST TRAUMATIC STRESS DISORDER (PTSD): ICD-10-CM

## 2022-10-06 DIAGNOSIS — F33.1 MAJOR DEPRESSIVE DISORDER, RECURRENT EPISODE, MODERATE WITH ANXIOUS DISTRESS (H): ICD-10-CM

## 2022-10-06 DIAGNOSIS — F90.2 ADHD (ATTENTION DEFICIT HYPERACTIVITY DISORDER), COMBINED TYPE: Primary | ICD-10-CM

## 2022-10-06 PROCEDURE — 90834 PSYTX W PT 45 MINUTES: CPT | Mod: 95 | Performed by: SOCIAL WORKER

## 2022-10-06 ASSESSMENT — PATIENT HEALTH QUESTIONNAIRE - PHQ9
10. IF YOU CHECKED OFF ANY PROBLEMS, HOW DIFFICULT HAVE THESE PROBLEMS MADE IT FOR YOU TO DO YOUR WORK, TAKE CARE OF THINGS AT HOME, OR GET ALONG WITH OTHER PEOPLE: EXTREMELY DIFFICULT
SUM OF ALL RESPONSES TO PHQ QUESTIONS 1-9: 14
SUM OF ALL RESPONSES TO PHQ QUESTIONS 1-9: 14

## 2022-10-13 ENCOUNTER — VIRTUAL VISIT (OUTPATIENT)
Dept: PSYCHOLOGY | Facility: OTHER | Age: 52
End: 2022-10-13
Payer: COMMERCIAL

## 2022-10-13 DIAGNOSIS — F43.10 POST TRAUMATIC STRESS DISORDER (PTSD): ICD-10-CM

## 2022-10-13 DIAGNOSIS — F41.1 GENERALIZED ANXIETY DISORDER: ICD-10-CM

## 2022-10-13 DIAGNOSIS — F90.2 ADHD (ATTENTION DEFICIT HYPERACTIVITY DISORDER), COMBINED TYPE: Primary | ICD-10-CM

## 2022-10-13 DIAGNOSIS — F33.1 MAJOR DEPRESSIVE DISORDER, RECURRENT EPISODE, MODERATE WITH ANXIOUS DISTRESS (H): ICD-10-CM

## 2022-10-13 PROCEDURE — 90834 PSYTX W PT 45 MINUTES: CPT | Mod: 95 | Performed by: SOCIAL WORKER

## 2022-10-13 ASSESSMENT — ANXIETY QUESTIONNAIRES
4. TROUBLE RELAXING: NEARLY EVERY DAY
1. FEELING NERVOUS, ANXIOUS, OR ON EDGE: NEARLY EVERY DAY
5. BEING SO RESTLESS THAT IT IS HARD TO SIT STILL: MORE THAN HALF THE DAYS
7. FEELING AFRAID AS IF SOMETHING AWFUL MIGHT HAPPEN: NEARLY EVERY DAY
GAD7 TOTAL SCORE: 18
6. BECOMING EASILY ANNOYED OR IRRITABLE: SEVERAL DAYS
GAD7 TOTAL SCORE: 18
IF YOU CHECKED OFF ANY PROBLEMS ON THIS QUESTIONNAIRE, HOW DIFFICULT HAVE THESE PROBLEMS MADE IT FOR YOU TO DO YOUR WORK, TAKE CARE OF THINGS AT HOME, OR GET ALONG WITH OTHER PEOPLE: EXTREMELY DIFFICULT
3. WORRYING TOO MUCH ABOUT DIFFERENT THINGS: NEARLY EVERY DAY
7. FEELING AFRAID AS IF SOMETHING AWFUL MIGHT HAPPEN: NEARLY EVERY DAY
2. NOT BEING ABLE TO STOP OR CONTROL WORRYING: NEARLY EVERY DAY
8. IF YOU CHECKED OFF ANY PROBLEMS, HOW DIFFICULT HAVE THESE MADE IT FOR YOU TO DO YOUR WORK, TAKE CARE OF THINGS AT HOME, OR GET ALONG WITH OTHER PEOPLE?: EXTREMELY DIFFICULT
GAD7 TOTAL SCORE: 18
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
## 2022-10-27 ENCOUNTER — VIRTUAL VISIT (OUTPATIENT)
Dept: PSYCHOLOGY | Facility: OTHER | Age: 52
End: 2022-10-27
Payer: COMMERCIAL

## 2022-10-27 DIAGNOSIS — F41.1 GENERALIZED ANXIETY DISORDER: ICD-10-CM

## 2022-10-27 DIAGNOSIS — F33.1 MAJOR DEPRESSIVE DISORDER, RECURRENT EPISODE, MODERATE WITH ANXIOUS DISTRESS (H): ICD-10-CM

## 2022-10-27 DIAGNOSIS — F90.2 ADHD (ATTENTION DEFICIT HYPERACTIVITY DISORDER), COMBINED TYPE: Primary | ICD-10-CM

## 2022-10-27 DIAGNOSIS — F43.10 POST TRAUMATIC STRESS DISORDER (PTSD): ICD-10-CM

## 2022-10-27 PROCEDURE — 90834 PSYTX W PT 45 MINUTES: CPT | Mod: 95 | Performed by: SOCIAL WORKER

## 2022-10-27 ASSESSMENT — ANXIETY QUESTIONNAIRES
3. WORRYING TOO MUCH ABOUT DIFFERENT THINGS: NEARLY EVERY DAY
IF YOU CHECKED OFF ANY PROBLEMS ON THIS QUESTIONNAIRE, HOW DIFFICULT HAVE THESE PROBLEMS MADE IT FOR YOU TO DO YOUR WORK, TAKE CARE OF THINGS AT HOME, OR GET ALONG WITH OTHER PEOPLE: EXTREMELY DIFFICULT
2. NOT BEING ABLE TO STOP OR CONTROL WORRYING: NEARLY EVERY DAY
7. FEELING AFRAID AS IF SOMETHING AWFUL MIGHT HAPPEN: NEARLY EVERY DAY
GAD7 TOTAL SCORE: 16
8. IF YOU CHECKED OFF ANY PROBLEMS, HOW DIFFICULT HAVE THESE MADE IT FOR YOU TO DO YOUR WORK, TAKE CARE OF THINGS AT HOME, OR GET ALONG WITH OTHER PEOPLE?: EXTREMELY DIFFICULT
7. FEELING AFRAID AS IF SOMETHING AWFUL MIGHT HAPPEN: NEARLY EVERY DAY
GAD7 TOTAL SCORE: 16
GAD7 TOTAL SCORE: 16
6. BECOMING EASILY ANNOYED OR IRRITABLE: SEVERAL DAYS
4. TROUBLE RELAXING: NEARLY EVERY DAY
5. BEING SO RESTLESS THAT IT IS HARD TO SIT STILL: MORE THAN HALF THE DAYS
1. FEELING NERVOUS, ANXIOUS, OR ON EDGE: SEVERAL DAYS
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
## 2022-10-29 ENCOUNTER — HEALTH MAINTENANCE LETTER (OUTPATIENT)
Age: 52
End: 2022-10-29

## 2022-10-31 ENCOUNTER — MYC REFILL (OUTPATIENT)
Dept: FAMILY MEDICINE | Facility: CLINIC | Age: 52
End: 2022-10-31

## 2022-10-31 DIAGNOSIS — M54.2 CERVICALGIA: ICD-10-CM

## 2022-10-31 DIAGNOSIS — M54.50 CHRONIC BILATERAL LOW BACK PAIN WITHOUT SCIATICA: ICD-10-CM

## 2022-10-31 DIAGNOSIS — M79.7 FIBROMYALGIA: ICD-10-CM

## 2022-10-31 DIAGNOSIS — G89.29 CHRONIC BILATERAL LOW BACK PAIN WITHOUT SCIATICA: ICD-10-CM

## 2022-10-31 DIAGNOSIS — G89.4 CHRONIC PAIN SYNDROME: ICD-10-CM

## 2022-10-31 RX ORDER — HYDROCODONE BITARTRATE AND ACETAMINOPHEN 5; 325 MG/1; MG/1
TABLET ORAL
Qty: 195 TABLET | Refills: 0 | Status: SHIPPED | OUTPATIENT
Start: 2022-10-31 | End: 2022-11-30

## 2022-10-31 NOTE — TELEPHONE ENCOUNTER
Requested Prescriptions   Pending Prescriptions Disp Refills     HYDROcodone-acetaminophen (NORCO) 5-325 MG tablet 195 tablet 0     Sig: Take 2.5 tablets by mouth every morning AND 2.5 tablets daily (with lunch) AND 1.5 tablets At Bedtime. Max of 6.5 tablets/d     Next 5 appointments (look out 90 days)    Nov 18, 2022  1:00 PM  (Arrive by 12:40 PM)  Adult Preventative Visit with Twin Castro MD  [facility] ([facility] ) [address] [place] [address] [id number] [phone]           Routing refill request to provider for review/approval because:  Drug not on the G, P or [facility] refill protocol or controlled substance

## 2022-11-03 ENCOUNTER — VIRTUAL VISIT (OUTPATIENT)
Dept: PSYCHOLOGY | Facility: OTHER | Age: 52
End: 2022-11-03
Payer: COMMERCIAL

## 2022-11-03 DIAGNOSIS — F41.1 GENERALIZED ANXIETY DISORDER: ICD-10-CM

## 2022-11-03 DIAGNOSIS — F33.1 MAJOR DEPRESSIVE DISORDER, RECURRENT EPISODE, MODERATE WITH ANXIOUS DISTRESS (H): ICD-10-CM

## 2022-11-03 DIAGNOSIS — F90.2 ADHD (ATTENTION DEFICIT HYPERACTIVITY DISORDER), COMBINED TYPE: Primary | ICD-10-CM

## 2022-11-03 DIAGNOSIS — F43.10 POST TRAUMATIC STRESS DISORDER (PTSD): ICD-10-CM

## 2022-11-03 PROCEDURE — 90834 PSYTX W PT 45 MINUTES: CPT | Mod: 95 | Performed by: SOCIAL WORKER

## 2022-11-03 ASSESSMENT — PATIENT HEALTH QUESTIONNAIRE - PHQ9
SUM OF ALL RESPONSES TO PHQ QUESTIONS 1-9: 15
10. IF YOU CHECKED OFF ANY PROBLEMS, HOW DIFFICULT HAVE THESE PROBLEMS MADE IT FOR YOU TO DO YOUR WORK, TAKE CARE OF THINGS AT HOME, OR GET ALONG WITH OTHER PEOPLE: EXTREMELY DIFFICULT
SUM OF ALL RESPONSES TO PHQ QUESTIONS 1-9: 15

## 2022-11-10 ENCOUNTER — VIRTUAL VISIT (OUTPATIENT)
Dept: PSYCHOLOGY | Facility: OTHER | Age: 52
End: 2022-11-10
Payer: COMMERCIAL

## 2022-11-10 DIAGNOSIS — F43.10 POST TRAUMATIC STRESS DISORDER (PTSD): ICD-10-CM

## 2022-11-10 DIAGNOSIS — F41.1 GENERALIZED ANXIETY DISORDER: ICD-10-CM

## 2022-11-10 DIAGNOSIS — F33.1 MAJOR DEPRESSIVE DISORDER, RECURRENT EPISODE, MODERATE WITH ANXIOUS DISTRESS (H): ICD-10-CM

## 2022-11-10 DIAGNOSIS — F90.2 ADHD (ATTENTION DEFICIT HYPERACTIVITY DISORDER), COMBINED TYPE: Primary | ICD-10-CM

## 2022-11-10 PROCEDURE — 90834 PSYTX W PT 45 MINUTES: CPT | Mod: 95 | Performed by: SOCIAL WORKER

## 2022-11-10 ASSESSMENT — ANXIETY QUESTIONNAIRES
7. FEELING AFRAID AS IF SOMETHING AWFUL MIGHT HAPPEN: NEARLY EVERY DAY
4. TROUBLE RELAXING: MORE THAN HALF THE DAYS
7. FEELING AFRAID AS IF SOMETHING AWFUL MIGHT HAPPEN: NEARLY EVERY DAY
GAD7 TOTAL SCORE: 14
1. FEELING NERVOUS, ANXIOUS, OR ON EDGE: SEVERAL DAYS
GAD7 TOTAL SCORE: 14
3. WORRYING TOO MUCH ABOUT DIFFERENT THINGS: NEARLY EVERY DAY
6. BECOMING EASILY ANNOYED OR IRRITABLE: MORE THAN HALF THE DAYS
IF YOU CHECKED OFF ANY PROBLEMS ON THIS QUESTIONNAIRE, HOW DIFFICULT HAVE THESE PROBLEMS MADE IT FOR YOU TO DO YOUR WORK, TAKE CARE OF THINGS AT HOME, OR GET ALONG WITH OTHER PEOPLE: EXTREMELY DIFFICULT
2. NOT BEING ABLE TO STOP OR CONTROL WORRYING: NEARLY EVERY DAY
GAD7 TOTAL SCORE: 14
8. IF YOU CHECKED OFF ANY PROBLEMS, HOW DIFFICULT HAVE THESE MADE IT FOR YOU TO DO YOUR WORK, TAKE CARE OF THINGS AT HOME, OR GET ALONG WITH OTHER PEOPLE?: EXTREMELY DIFFICULT
5. BEING SO RESTLESS THAT IT IS HARD TO SIT STILL: NOT AT ALL
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
## 2022-11-17 ENCOUNTER — VIRTUAL VISIT (OUTPATIENT)
Dept: PSYCHOLOGY | Facility: OTHER | Age: 52
End: 2022-11-17
Payer: COMMERCIAL

## 2022-11-17 DIAGNOSIS — F90.2 ADHD (ATTENTION DEFICIT HYPERACTIVITY DISORDER), COMBINED TYPE: Primary | ICD-10-CM

## 2022-11-17 DIAGNOSIS — F43.10 POST TRAUMATIC STRESS DISORDER (PTSD): ICD-10-CM

## 2022-11-17 DIAGNOSIS — F33.1 MAJOR DEPRESSIVE DISORDER, RECURRENT EPISODE, MODERATE WITH ANXIOUS DISTRESS (H): ICD-10-CM

## 2022-11-17 DIAGNOSIS — F41.1 GENERALIZED ANXIETY DISORDER: ICD-10-CM

## 2022-11-17 PROCEDURE — 90834 PSYTX W PT 45 MINUTES: CPT | Mod: 95 | Performed by: SOCIAL WORKER
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
## 2022-11-22 ENCOUNTER — VIRTUAL VISIT (OUTPATIENT)
Dept: PSYCHOLOGY | Facility: CLINIC | Age: 52
End: 2022-11-22
Payer: COMMERCIAL

## 2022-11-22 DIAGNOSIS — F43.10 POST TRAUMATIC STRESS DISORDER (PTSD): ICD-10-CM

## 2022-11-22 DIAGNOSIS — F90.2 ADHD (ATTENTION DEFICIT HYPERACTIVITY DISORDER), COMBINED TYPE: Primary | ICD-10-CM

## 2022-11-22 DIAGNOSIS — F41.1 GENERALIZED ANXIETY DISORDER: ICD-10-CM

## 2022-11-22 DIAGNOSIS — F33.1 MAJOR DEPRESSIVE DISORDER, RECURRENT EPISODE, MODERATE WITH ANXIOUS DISTRESS (H): ICD-10-CM

## 2022-11-22 PROCEDURE — 90834 PSYTX W PT 45 MINUTES: CPT | Mod: 95 | Performed by: SOCIAL WORKER

## 2022-11-22 ASSESSMENT — PATIENT HEALTH QUESTIONNAIRE - PHQ9
SUM OF ALL RESPONSES TO PHQ QUESTIONS 1-9: 20
SUM OF ALL RESPONSES TO PHQ QUESTIONS 1-9: 20
10. IF YOU CHECKED OFF ANY PROBLEMS, HOW DIFFICULT HAVE THESE PROBLEMS MADE IT FOR YOU TO DO YOUR WORK, TAKE CARE OF THINGS AT HOME, OR GET ALONG WITH OTHER PEOPLE: EXTREMELY DIFFICULT

## 2022-11-23 DIAGNOSIS — G43.819 OTHER MIGRAINE WITHOUT STATUS MIGRAINOSUS, INTRACTABLE: ICD-10-CM

## 2022-11-25 RX ORDER — VERAPAMIL HYDROCHLORIDE 40 MG/1
TABLET ORAL
Qty: 180 TABLET | Refills: 0 | Status: SHIPPED | OUTPATIENT
Start: 2022-11-25 | End: 2023-02-06

## 2022-11-30 ENCOUNTER — MYC REFILL (OUTPATIENT)
Dept: FAMILY MEDICINE | Facility: CLINIC | Age: 52
End: 2022-11-30

## 2022-11-30 DIAGNOSIS — G89.29 CHRONIC BILATERAL LOW BACK PAIN WITHOUT SCIATICA: ICD-10-CM

## 2022-11-30 DIAGNOSIS — M54.50 CHRONIC BILATERAL LOW BACK PAIN WITHOUT SCIATICA: ICD-10-CM

## 2022-11-30 DIAGNOSIS — M54.2 CERVICALGIA: ICD-10-CM

## 2022-11-30 DIAGNOSIS — M79.7 FIBROMYALGIA: ICD-10-CM

## 2022-11-30 DIAGNOSIS — G89.4 CHRONIC PAIN SYNDROME: ICD-10-CM
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
## 2022-12-01 ENCOUNTER — VIRTUAL VISIT (OUTPATIENT)
Dept: PSYCHOLOGY | Facility: OTHER | Age: 52
End: 2022-12-01
Payer: COMMERCIAL

## 2022-12-01 DIAGNOSIS — F90.2 ADHD (ATTENTION DEFICIT HYPERACTIVITY DISORDER), COMBINED TYPE: Primary | ICD-10-CM

## 2022-12-01 DIAGNOSIS — F33.1 MAJOR DEPRESSIVE DISORDER, RECURRENT EPISODE, MODERATE WITH ANXIOUS DISTRESS (H): ICD-10-CM

## 2022-12-01 DIAGNOSIS — F43.10 POST TRAUMATIC STRESS DISORDER (PTSD): ICD-10-CM

## 2022-12-01 DIAGNOSIS — F41.1 GENERALIZED ANXIETY DISORDER: ICD-10-CM

## 2022-12-01 PROCEDURE — 90834 PSYTX W PT 45 MINUTES: CPT | Mod: 95 | Performed by: SOCIAL WORKER

## 2022-12-01 RX ORDER — HYDROCODONE BITARTRATE AND ACETAMINOPHEN 5; 325 MG/1; MG/1
TABLET ORAL
Qty: 195 TABLET | Refills: 0 | Status: SHIPPED | OUTPATIENT
Start: 2022-12-01 | End: 2022-12-28

## 2022-12-01 ASSESSMENT — ANXIETY QUESTIONNAIRES
7. FEELING AFRAID AS IF SOMETHING AWFUL MIGHT HAPPEN: NEARLY EVERY DAY
GAD7 TOTAL SCORE: 18
7. FEELING AFRAID AS IF SOMETHING AWFUL MIGHT HAPPEN: NEARLY EVERY DAY
5. BEING SO RESTLESS THAT IT IS HARD TO SIT STILL: MORE THAN HALF THE DAYS
6. BECOMING EASILY ANNOYED OR IRRITABLE: SEVERAL DAYS
4. TROUBLE RELAXING: NEARLY EVERY DAY
8. IF YOU CHECKED OFF ANY PROBLEMS, HOW DIFFICULT HAVE THESE MADE IT FOR YOU TO DO YOUR WORK, TAKE CARE OF THINGS AT HOME, OR GET ALONG WITH OTHER PEOPLE?: EXTREMELY DIFFICULT
IF YOU CHECKED OFF ANY PROBLEMS ON THIS QUESTIONNAIRE, HOW DIFFICULT HAVE THESE PROBLEMS MADE IT FOR YOU TO DO YOUR WORK, TAKE CARE OF THINGS AT HOME, OR GET ALONG WITH OTHER PEOPLE: EXTREMELY DIFFICULT
GAD7 TOTAL SCORE: 18
1. FEELING NERVOUS, ANXIOUS, OR ON EDGE: NEARLY EVERY DAY
GAD7 TOTAL SCORE: 18
2. NOT BEING ABLE TO STOP OR CONTROL WORRYING: NEARLY EVERY DAY
3. WORRYING TOO MUCH ABOUT DIFFERENT THINGS: NEARLY EVERY DAY

## 2022-12-01 ASSESSMENT — PATIENT HEALTH QUESTIONNAIRE - PHQ9
10. IF YOU CHECKED OFF ANY PROBLEMS, HOW DIFFICULT HAVE THESE PROBLEMS MADE IT FOR YOU TO DO YOUR WORK, TAKE CARE OF THINGS AT HOME, OR GET ALONG WITH OTHER PEOPLE: EXTREMELY DIFFICULT
SUM OF ALL RESPONSES TO PHQ QUESTIONS 1-9: 18
SUM OF ALL RESPONSES TO PHQ QUESTIONS 1-9: 18

## 2022-12-01 NOTE — TELEPHONE ENCOUNTER
Requested Prescriptions   Pending Prescriptions Disp Refills     HYDROcodone-acetaminophen (NORCO) 5-325 MG tablet 195 tablet 0     Sig: Take 2.5 tablets by mouth every morning AND 2.5 tablets daily (with lunch) AND 1.5 tablets At Bedtime. Max of 6.5 tablets/d       Next 5 appointments (look out 90 days)    Dec 19, 2022  1:30 PM  (Arrive by 1:10 PM)  Adult Preventative Visit with Twin Castro MD  [facility] ([facility] ) [address] [place] [id number] [phone]           Routing refill request to provider for review/approval because:  Drug not on the G, P or [facility] refill protocol or controlled substance
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
## 2022-12-03 NOTE — PATIENT INSTRUCTIONS
Treatment Plan:     Will change Cymbalta to 40mg-KADY daily 60mg at HS, name brand only.     Stop Buspar today.       Start depakote 250mg at bedtime.    Continue klonopin 0.5mg twice daily as we discussed, I did not refill this today as we discussed.    Continue all other medications as reviewed per electronic medical record today.     Safety plan reviewed. To the Emergency Department as needed or call after hours crisis line at [phone] or [phone]. [place] Crisis Text Line: Text [place] to 438635  or  Suicide LifeLine Chat: suicideMedRunner.org/chat/    Continue individual therapy as planned with Lynnette Guaman at [facility].    Schedule an appointment with me in 2 weeks or sooner as needed.  Call [facility] at [phone] to schedule.    Follow up with primary care provider as planned or for acute medical concerns.    Call the psychiatric nurse line with medication questions or concerns at [phone].    Acesishart may be used to communicate with your provider, but this is not intended to be used for emergencies.    Crisis Resources:    National Suicide Prevention Lifeline: 620.141.8434 (TTY: 123.181.3880). Call anytime for help.  (www.suicidepreventionlifeline.org)  National Gates on Mental Illness (www.dolly.org): 754.909.6406 or 178-758-4380.   Mental Health Association (www.mentalhealth.org): 993.260.2518 or 454-548-7946.  [place] Crisis Text Line: Text [place] to 315347  Suicide LifeLine Chat: suicideTeraViewline.org/chat   Admission

## 2022-12-08 ENCOUNTER — VIRTUAL VISIT (OUTPATIENT)
Dept: PSYCHOLOGY | Facility: OTHER | Age: 52
End: 2022-12-08
Payer: COMMERCIAL

## 2022-12-08 DIAGNOSIS — F33.1 MAJOR DEPRESSIVE DISORDER, RECURRENT EPISODE, MODERATE WITH ANXIOUS DISTRESS (H): ICD-10-CM

## 2022-12-08 DIAGNOSIS — F43.10 POST TRAUMATIC STRESS DISORDER (PTSD): ICD-10-CM

## 2022-12-08 DIAGNOSIS — F90.2 ADHD (ATTENTION DEFICIT HYPERACTIVITY DISORDER), COMBINED TYPE: Primary | ICD-10-CM

## 2022-12-08 DIAGNOSIS — F41.1 GENERALIZED ANXIETY DISORDER: ICD-10-CM

## 2022-12-08 PROCEDURE — 90834 PSYTX W PT 45 MINUTES: CPT | Mod: 95 | Performed by: SOCIAL WORKER

## 2022-12-08 ASSESSMENT — PATIENT HEALTH QUESTIONNAIRE - PHQ9
SUM OF ALL RESPONSES TO PHQ QUESTIONS 1-9: 18
SUM OF ALL RESPONSES TO PHQ QUESTIONS 1-9: 18
10. IF YOU CHECKED OFF ANY PROBLEMS, HOW DIFFICULT HAVE THESE PROBLEMS MADE IT FOR YOU TO DO YOUR WORK, TAKE CARE OF THINGS AT HOME, OR GET ALONG WITH OTHER PEOPLE: EXTREMELY DIFFICULT
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
## 2022-12-15 ENCOUNTER — VIRTUAL VISIT (OUTPATIENT)
Dept: PSYCHOLOGY | Facility: OTHER | Age: 52
End: 2022-12-15
Payer: COMMERCIAL

## 2022-12-15 DIAGNOSIS — F33.1 MAJOR DEPRESSIVE DISORDER, RECURRENT EPISODE, MODERATE WITH ANXIOUS DISTRESS (H): ICD-10-CM

## 2022-12-15 DIAGNOSIS — F43.10 POST TRAUMATIC STRESS DISORDER (PTSD): ICD-10-CM

## 2022-12-15 DIAGNOSIS — F41.1 GENERALIZED ANXIETY DISORDER: ICD-10-CM

## 2022-12-15 DIAGNOSIS — F90.2 ADHD (ATTENTION DEFICIT HYPERACTIVITY DISORDER), COMBINED TYPE: Primary | ICD-10-CM

## 2022-12-15 PROCEDURE — 90834 PSYTX W PT 45 MINUTES: CPT | Mod: 93 | Performed by: SOCIAL WORKER

## 2022-12-15 ASSESSMENT — ANXIETY QUESTIONNAIRES
3. WORRYING TOO MUCH ABOUT DIFFERENT THINGS: NEARLY EVERY DAY
GAD7 TOTAL SCORE: 15
GAD7 TOTAL SCORE: 15
2. NOT BEING ABLE TO STOP OR CONTROL WORRYING: NEARLY EVERY DAY
IF YOU CHECKED OFF ANY PROBLEMS ON THIS QUESTIONNAIRE, HOW DIFFICULT HAVE THESE PROBLEMS MADE IT FOR YOU TO DO YOUR WORK, TAKE CARE OF THINGS AT HOME, OR GET ALONG WITH OTHER PEOPLE: EXTREMELY DIFFICULT
8. IF YOU CHECKED OFF ANY PROBLEMS, HOW DIFFICULT HAVE THESE MADE IT FOR YOU TO DO YOUR WORK, TAKE CARE OF THINGS AT HOME, OR GET ALONG WITH OTHER PEOPLE?: EXTREMELY DIFFICULT
7. FEELING AFRAID AS IF SOMETHING AWFUL MIGHT HAPPEN: NEARLY EVERY DAY
4. TROUBLE RELAXING: MORE THAN HALF THE DAYS
1. FEELING NERVOUS, ANXIOUS, OR ON EDGE: NEARLY EVERY DAY
5. BEING SO RESTLESS THAT IT IS HARD TO SIT STILL: NOT AT ALL
7. FEELING AFRAID AS IF SOMETHING AWFUL MIGHT HAPPEN: NEARLY EVERY DAY
6. BECOMING EASILY ANNOYED OR IRRITABLE: SEVERAL DAYS
GAD7 TOTAL SCORE: 15

## 2022-12-19 ENCOUNTER — OFFICE VISIT (OUTPATIENT)
Dept: FAMILY MEDICINE | Facility: CLINIC | Age: 52
End: 2022-12-19
Payer: MEDICARE

## 2022-12-19 VITALS
SYSTOLIC BLOOD PRESSURE: 106 MMHG | OXYGEN SATURATION: 96 % | WEIGHT: 115.4 LBS | DIASTOLIC BLOOD PRESSURE: 58 MMHG | HEART RATE: 104 BPM | TEMPERATURE: 97.4 F | HEIGHT: 67 IN | RESPIRATION RATE: 14 BRPM | BODY MASS INDEX: 18.11 KG/M2

## 2022-12-19 DIAGNOSIS — R63.4 WEIGHT LOSS: ICD-10-CM

## 2022-12-19 DIAGNOSIS — Z72.0 TOBACCO ABUSE: ICD-10-CM

## 2022-12-19 DIAGNOSIS — Z11.3 SCREEN FOR STD (SEXUALLY TRANSMITTED DISEASE): ICD-10-CM

## 2022-12-19 DIAGNOSIS — Z12.2 SCREENING FOR LUNG CANCER: ICD-10-CM

## 2022-12-19 DIAGNOSIS — F33.0 MAJOR DEPRESSIVE DISORDER, RECURRENT EPISODE, MILD (H): ICD-10-CM

## 2022-12-19 DIAGNOSIS — Z12.4 CERVICAL CANCER SCREENING: ICD-10-CM

## 2022-12-19 DIAGNOSIS — G93.32 CHRONIC FATIGUE SYNDROME: ICD-10-CM

## 2022-12-19 DIAGNOSIS — Z12.11 SCREEN FOR COLON CANCER: ICD-10-CM

## 2022-12-19 DIAGNOSIS — Z00.01 ENCOUNTER FOR GENERAL ADULT MEDICAL EXAMINATION WITH ABNORMAL FINDINGS: Primary | ICD-10-CM

## 2022-12-19 DIAGNOSIS — E55.9 VITAMIN D DEFICIENCY: ICD-10-CM

## 2022-12-19 DIAGNOSIS — R61 NIGHT SWEATS: ICD-10-CM

## 2022-12-19 LAB
BASOPHILS # BLD AUTO: 0 10E3/UL (ref 0–0.2)
BASOPHILS NFR BLD AUTO: 0 %
DEPRECATED CALCIDIOL+CALCIFEROL SERPL-MC: 25 UG/L (ref 20–75)
EOSINOPHIL # BLD AUTO: 0 10E3/UL (ref 0–0.7)
EOSINOPHIL NFR BLD AUTO: 0 %
ERYTHROCYTE [DISTWIDTH] IN BLOOD BY AUTOMATED COUNT: 12.3 % (ref 10–15)
ESTRADIOL SERPL-MCNC: <5 PG/ML
FLUAV AG SPEC QL IA: NEGATIVE
FLUBV AG SPEC QL IA: NEGATIVE
FSH SERPL IRP2-ACNC: 117 MIU/ML
HCT VFR BLD AUTO: 43.5 % (ref 35–47)
HCV AB SERPL QL IA: NONREACTIVE
HGB BLD-MCNC: 14.7 G/DL (ref 11.7–15.7)
HIV 1+2 AB+HIV1 P24 AG SERPL QL IA: NONREACTIVE
IMM GRANULOCYTES # BLD: 0.1 10E3/UL
IMM GRANULOCYTES NFR BLD: 1 %
LYMPHOCYTES # BLD AUTO: 2.5 10E3/UL (ref 0.8–5.3)
LYMPHOCYTES NFR BLD AUTO: 25 %
MCH RBC QN AUTO: 33.3 PG (ref 26.5–33)
MCHC RBC AUTO-ENTMCNC: 33.8 G/DL (ref 31.5–36.5)
MCV RBC AUTO: 99 FL (ref 78–100)
MONOCYTES # BLD AUTO: 0.4 10E3/UL (ref 0–1.3)
MONOCYTES NFR BLD AUTO: 4 %
NEUTROPHILS # BLD AUTO: 7.1 10E3/UL (ref 1.6–8.3)
NEUTROPHILS NFR BLD AUTO: 70 %
NRBC # BLD AUTO: 0 10E3/UL
NRBC BLD AUTO-RTO: 0 /100
PLATELET # BLD AUTO: 179 10E3/UL (ref 150–450)
RBC # BLD AUTO: 4.41 10E6/UL (ref 3.8–5.2)
TSH SERPL DL<=0.005 MIU/L-ACNC: 0.83 MU/L (ref 0.4–4)
WBC # BLD AUTO: 10.2 10E3/UL (ref 4–11)

## 2022-12-19 PROCEDURE — 87591 N.GONORRHOEAE DNA AMP PROB: CPT | Performed by: FAMILY MEDICINE

## 2022-12-19 PROCEDURE — 87389 HIV-1 AG W/HIV-1&-2 AB AG IA: CPT | Performed by: FAMILY MEDICINE

## 2022-12-19 PROCEDURE — 82670 ASSAY OF TOTAL ESTRADIOL: CPT | Performed by: FAMILY MEDICINE

## 2022-12-19 PROCEDURE — 86618 LYME DISEASE ANTIBODY: CPT | Performed by: FAMILY MEDICINE

## 2022-12-19 PROCEDURE — 86803 HEPATITIS C AB TEST: CPT | Performed by: FAMILY MEDICINE

## 2022-12-19 PROCEDURE — 87624 HPV HI-RISK TYP POOLED RSLT: CPT | Performed by: FAMILY MEDICINE

## 2022-12-19 PROCEDURE — 86780 TREPONEMA PALLIDUM: CPT | Performed by: FAMILY MEDICINE

## 2022-12-19 PROCEDURE — 99214 OFFICE O/P EST MOD 30 MIN: CPT | Mod: 25 | Performed by: FAMILY MEDICINE

## 2022-12-19 PROCEDURE — U0003 INFECTIOUS AGENT DETECTION BY NUCLEIC ACID (DNA OR RNA); SEVERE ACUTE RESPIRATORY SYNDROME CORONAVIRUS 2 (SARS-COV-2) (CORONAVIRUS DISEASE [COVID-19]), AMPLIFIED PROBE TECHNIQUE, MAKING USE OF HIGH THROUGHPUT TECHNOLOGIES AS DESCRIBED BY CMS-2020-01-R: HCPCS | Performed by: FAMILY MEDICINE

## 2022-12-19 PROCEDURE — 87491 CHLMYD TRACH DNA AMP PROBE: CPT | Performed by: FAMILY MEDICINE

## 2022-12-19 PROCEDURE — 87804 INFLUENZA ASSAY W/OPTIC: CPT | Performed by: FAMILY MEDICINE

## 2022-12-19 PROCEDURE — G0145 SCR C/V CYTO,THINLAYER,RESCR: HCPCS | Performed by: FAMILY MEDICINE

## 2022-12-19 PROCEDURE — 82306 VITAMIN D 25 HYDROXY: CPT | Performed by: FAMILY MEDICINE

## 2022-12-19 PROCEDURE — 36415 COLL VENOUS BLD VENIPUNCTURE: CPT | Performed by: FAMILY MEDICINE

## 2022-12-19 PROCEDURE — U0005 INFEC AGEN DETEC AMPLI PROBE: HCPCS | Performed by: FAMILY MEDICINE

## 2022-12-19 PROCEDURE — 99396 PREV VISIT EST AGE 40-64: CPT | Performed by: FAMILY MEDICINE

## 2022-12-19 PROCEDURE — 85025 COMPLETE CBC W/AUTO DIFF WBC: CPT | Performed by: FAMILY MEDICINE

## 2022-12-19 PROCEDURE — 83001 ASSAY OF GONADOTROPIN (FSH): CPT | Performed by: FAMILY MEDICINE

## 2022-12-19 PROCEDURE — 84443 ASSAY THYROID STIM HORMONE: CPT | Performed by: FAMILY MEDICINE

## 2022-12-19 ASSESSMENT — ENCOUNTER SYMPTOMS
BREAST MASS: 1
SORE THROAT: 0
MYALGIAS: 1
WEAKNESS: 1
SHORTNESS OF BREATH: 1
JOINT SWELLING: 1
FEVER: 0
HEMATURIA: 0
NAUSEA: 1
FREQUENCY: 0
ABDOMINAL PAIN: 1
EYE PAIN: 0
DIARRHEA: 0
PALPITATIONS: 0
ARTHRALGIAS: 1
COUGH: 0
CONSTIPATION: 0
PARESTHESIAS: 0
NERVOUS/ANXIOUS: 1
DIZZINESS: 1
HEMATOCHEZIA: 0
CHILLS: 1
HEADACHES: 1
DYSURIA: 0
HEARTBURN: 1

## 2022-12-19 ASSESSMENT — ACTIVITIES OF DAILY LIVING (ADL)
CURRENT_FUNCTION: LAUNDRY REQUIRES ASSISTANCE
CURRENT_FUNCTION: HOUSEWORK REQUIRES ASSISTANCE
CURRENT_FUNCTION: SHOPPING REQUIRES ASSISTANCE
CURRENT_FUNCTION: TRANSPORTATION REQUIRES ASSISTANCE

## 2022-12-19 ASSESSMENT — ANXIETY QUESTIONNAIRES
7. FEELING AFRAID AS IF SOMETHING AWFUL MIGHT HAPPEN: NEARLY EVERY DAY
IF YOU CHECKED OFF ANY PROBLEMS ON THIS QUESTIONNAIRE, HOW DIFFICULT HAVE THESE PROBLEMS MADE IT FOR YOU TO DO YOUR WORK, TAKE CARE OF THINGS AT HOME, OR GET ALONG WITH OTHER PEOPLE: EXTREMELY DIFFICULT
3. WORRYING TOO MUCH ABOUT DIFFERENT THINGS: NEARLY EVERY DAY
1. FEELING NERVOUS, ANXIOUS, OR ON EDGE: NEARLY EVERY DAY
GAD7 TOTAL SCORE: 16
GAD7 TOTAL SCORE: 16
5. BEING SO RESTLESS THAT IT IS HARD TO SIT STILL: SEVERAL DAYS
6. BECOMING EASILY ANNOYED OR IRRITABLE: SEVERAL DAYS
2. NOT BEING ABLE TO STOP OR CONTROL WORRYING: NEARLY EVERY DAY
4. TROUBLE RELAXING: MORE THAN HALF THE DAYS

## 2022-12-19 ASSESSMENT — PAIN SCALES - GENERAL: PAINLEVEL: EXTREME PAIN (8)

## 2022-12-19 NOTE — PROGRESS NOTES
"   SUBJECTIVE:   CC: Sherie is an 52 year old who presents for preventive health visit.   Patient has been advised of split billing requirements and indicates understanding: Yes  Healthy Habits:     In general, how would you rate your overall health?  Fair    Frequency of exercise:  1 day/week    Duration of exercise:  15-30 minutes    Do you usually eat at least 4 servings of fruit and vegetables a day, include whole grains    & fiber and avoid regularly eating high fat or \"junk\" foods?  No    Taking medications regularly:  Yes    Medication side effects:  Other    Ability to successfully perform activities of daily living:  Transportation requires assistance, shopping requires assistance, housework requires assistance and laundry requires assistance    Home Safety:  No safety concerns identified    Hearing Impairment:  Difficulty following a conversation in a noisy restaurant or crowded room, feel that people are mumbling or not speaking clearly, need to ask people to speak up or repeat themselves and difficulty understanding soft or whispered speech    In the past 6 months, have you been bothered by leaking of urine?  No    In general, how would you rate your overall mental or emotional health?  Fair      PHQ-2 Total Score: 4    Additional concerns today:  Yes    In for annual physical with multiple complaints.  She complains of chronic fatigue  wonders if she is Lyme's going on.  She complains of weight loss.  Night sweats no energy tired all the time she had COVID apparently and feels worse ever since then.  She wants to be tracked for every STD.  On chronic benzodiazepine and hydrocodone.  Not tolerate NSAIDs.  Chronic migraines.  Cervicalgia, fibromyalgia,    Today's PHQ-2 Score:   PHQ-2 ( 1999 Pfizer) 12/19/2022   Q1: Little interest or pleasure in doing things 1   Q2: Feeling down, depressed or hopeless 3   PHQ-2 Score 4   PHQ-2 Total Score (12-17 Years)- Positive if 3 or more points; Administer PHQ-A if " positive -   Q1: Little interest or pleasure in doing things Several days   Q2: Feeling down, depressed or hopeless Nearly every day   PHQ-2 Score 4           Social History     Tobacco Use     Smoking status: Every Day     Packs/day: 0.50     Years: 29.00     Pack years: 14.50     Types: Cigarettes     Smokeless tobacco: Never   Substance Use Topics     Alcohol use: Not Currently     Alcohol/week: 1.7 standard drinks     Comment: rare         Alcohol Use 12/19/2022   Prescreen: >3 drinks/day or >7 drinks/week? Not Applicable   Prescreen: >3 drinks/day or >7 drinks/week? -       Reviewed orders with patient.  Reviewed health maintenance and updated orders accordingly - Yes  Labs reviewed in EPIC    Breast Cancer Screening:    FHS-7:   Breast CA Risk Assessment (FHS-7) 12/19/2022   Did any of your first-degree relatives have breast or ovarian cancer? No   Did any of your relatives have bilateral breast cancer? Unknown   Did any man in your family have breast cancer? No   Did any woman in your family have breast and ovarian cancer? No   Did any woman in your family have breast cancer before age 50 y? No   Do you have 2 or more relatives with breast and/or ovarian cancer? No   Do you have 2 or more relatives with breast and/or bowel cancer? No         Pertinent mammograms are reviewed under the imaging tab.    History of abnormal Pap smear: NO - age 30-65 PAP every 5 years with negative HPV co-testing recommended  PAP / HPV Latest Ref Rng & Units 8/1/2018 11/23/2015 8/23/2011   PAP (Historical) - NIL NIL NIL   HPV16 NEG:Negative Negative - -   HPV18 NEG:Negative Negative - -   HRHPV NEG:Negative Negative - -     Reviewed and updated as needed this visit by clinical staff    Allergies  Meds              Reviewed and updated as needed this visit by Provider                 Past Medical History:   Diagnosis Date     Allergic rhinitis due to other allergen      Degenerative joint disease of cervical spine 2016    multi  level worst at C5-6     Generalized anxiety disorder      Hearing loss      Intrinsic asthma, unspecified      Irritable bowel syndrome      Lump or mass in breast     Left breast lump -- aspiration benign.     Other malaise and fatigue     fibromyalgia     Other specified gastritis without mention of hemorrhage      Pain in joint, lower leg     patello-femoral syndrome     Rheumatoid arthritis(714.0)      Spindle cell carcinoma (H) 2013    Imo Update utility     Spondylitis, cervical (H)     C5-C7 (MRI)     Stenosis, cervical spine     C5-C7 (MRI)     Tension headache       Past Surgical History:   Procedure Laterality Date     DILATION AND CURETTAGE, HYSTEROSCOPY, ABLATE ENDOMETRIUM NOVASURE, COMBINED  2011    Procedure:COMBINED DILATION AND CURETTAGE, HYSTEROSCOPY, ABLATE ENDOMETRIUM NOVASURE; hysteroscopy, dilation and curettage, novasure; Surgeon:JEREMY ANNA; Location:[facility]     EXCISE LESION TRUNK  2013    Procedure: EXCISE LESION TRUNK;  interlaminar epidural Steroid Injection Cervical -thoracic Levels (C7-T1)    Re-Excision of chest wall mass;  Surgeon: Jeremy Bolaños MD;  Location: [facility] OR     HC HYSTEROS W PERMANENT FALLOPAIN IMPLANT      Essure done in office Marcel[facility]     INJECT BLOCK MEDIAL BRANCH CERVICAL/THORACIC/LUMBAR  2014    Procedure: INJECT BLOCK MEDIAL BRANCH CERVICAL / THORACIC / LUMBAR;  Surgeon: Josué Munson MD;  Location: [facility] OR     INJECT FACET JOINT  2014    Procedure: INJECT FACET JOINT;  diagnostic medial branch facet nerve block cervical levels 5-6, 6-7;  Surgeon: Josué Munson MD;  Location: [facility] OR     WISDOM ST GUIDEWIRE       [facility] APPENDECTOMY      Ruptured at age 7 years     [facility]  DELIVERY ONLY      , Low Cervical       Review of Systems   Constitutional: Positive for chills. Negative for fever.   HENT: Positive for congestion, ear pain and hearing loss. Negative for sore throat.    Eyes: Positive for visual  "disturbance. Negative for pain.   Respiratory: Positive for shortness of breath. Negative for cough.    Cardiovascular: Negative for chest pain, palpitations and peripheral edema.   Gastrointestinal: Positive for abdominal pain, heartburn and nausea. Negative for constipation, diarrhea and hematochezia.   Breasts:  Positive for breast mass. Negative for tenderness and discharge.   Genitourinary: Positive for vaginal discharge. Negative for dysuria, frequency, genital sores, hematuria, pelvic pain, urgency and vaginal bleeding.   Musculoskeletal: Positive for arthralgias, joint swelling and myalgias.   Skin: Negative for rash.   Neurological: Positive for dizziness, weakness and headaches. Negative for paresthesias.   Psychiatric/Behavioral: Positive for mood changes. The patient is nervous/anxious.           OBJECTIVE:   /58 (BP Location: Left arm, Patient Position: Sitting, Cuff Size: Adult Regular)   Pulse 104   Temp 97.4  F (36.3  C) (Temporal)   Resp 14   Ht 1.702 m (5' 7\")   Wt 52.3 kg (115 lb 6.4 oz)   SpO2 96%   BMI 18.07 kg/m    Physical Exam  GENERAL: healthy, alert and no distress  EYES: Eyes grossly normal to inspection, PERRL and conjunctivae and sclerae normal  HENT: ear canals and TM's normal, nose and mouth without ulcers or lesions  NECK: no adenopathy, no asymmetry, masses, or scars and thyroid normal to palpation  RESP: lungs clear to auscultation - no rales, rhonchi or wheezes  CV: regular rate and rhythm, normal S1 S2, no S3 or S4, no murmur, click or rub, no peripheral edema and peripheral pulses strong  ABDOMEN: soft, nontender, no hepatosplenomegaly, no masses and bowel sounds normal  : Vaginal vault appears normal.  Cervix appears okay Pap smear is done.  MS: no gross musculoskeletal defects noted, no edema  SKIN: no suspicious lesions or rashes  NEURO: Normal strength and tone, mentation intact and speech normal  PSYCH: mentation appears normal, affect flat and " anxious    Diagnostic Test Results:  Labs reviewed in Epic  Results for orders placed or performed in visit on 12/19/22   Symptomatic COVID-19 Virus (Coronavirus) by PCR Nose     Status: Normal    Specimen: Nose; Swab   Result Value Ref Range    SARS CoV2 PCR Negative Negative    Narrative    Testing was performed using the emily SARS-CoV-2 assay on the emily  [employer] System. This test should be ordered for the detection of  SARS-CoV-2 in individuals who meet SARS-CoV-2 clinical and/or  epidemiological criteria. Test performance is unknown in asymptomatic  patients. This test is for in vitro diagnostic use under the FDA EUA  for laboratories certified under CLIA to perform high and/or moderate  complexity testing. This test has not been FDA cleared or approved. A  negative result does not rule out the presence of PCR inhibitors in  the specimen or target RNA in concentration below the limit of  detection for the assay. The possibility of a false negative should  be considered if the patient's recent exposure or clinical  presentation suggests COVID-19. This test was validated by the [facility]. This  laboratory is certified under the Clinical Laboratory Improvement  Amendments of 1988 (CLIA-88) as qualified to perform high and/or  moderate complexity laboratory testing.   Lyme Disease Total Abs Bld with Reflex to Confirm CLIA     Status: Normal   Result Value Ref Range    Lyme Disease Antibodies Total 0.15 <0.90   TSH with free T4 reflex     Status: Normal   Result Value Ref Range    TSH 0.83 0.40 - 4.00 mU/L   Vitamin D Deficiency     Status: Normal   Result Value Ref Range    Vitamin D, Total (25-Hydroxy) 25 20 - 75 ug/L    Narrative    Season, race, dietary intake, and treatment affect the concentration of 25-hydroxy-Vitamin D. Values may decrease during winter months and increase during summer months. Values 20-29 ug/L may indicate Vitamin D insufficiency and values  <20 ug/L may indicate Vitamin D deficiency.    Vitamin D determination is routinely performed by an immunoassay specific for 25 hydroxyvitamin D3.  If an individual is on vitamin D2(ergocalciferol) supplementation, please specify 25 OH vitamin D2 and D3 level determination by LCMSMS test VITD23.     HIV Antigen Antibody Combo     Status: Normal   Result Value Ref Range    HIV Antigen Antibody Combo Nonreactive Nonreactive   Hepatitis C Screen Reflex to HCV RNA Quant and Genotype     Status: Normal   Result Value Ref Range    Hepatitis C Antibody Nonreactive Nonreactive    Narrative    Assay performance characteristics have not been established for newborns, infants, and children.   Treponema Abs w Reflex to RPR and Titer     Status: Normal   Result Value Ref Range    Treponema Antibody Total Nonreactive Nonreactive   Estradiol     Status: None   Result Value Ref Range    Estradiol <5 pg/mL   Follicle stimulating hormone     Status: None   Result Value Ref Range    .0 mIU/mL   CBC with platelets and differential     Status: Abnormal   Result Value Ref Range    WBC Count 10.2 4.0 - 11.0 10e3/uL    RBC Count 4.41 3.80 - 5.20 10e6/uL    Hemoglobin 14.7 11.7 - 15.7 g/dL    Hematocrit 43.5 35.0 - 47.0 %    MCV 99 78 - 100 fL    MCH 33.3 (H) 26.5 - 33.0 pg    MCHC 33.8 31.5 - 36.5 g/dL    RDW 12.3 10.0 - 15.0 %    Platelet Count 179 150 - 450 10e3/uL    % Neutrophils 70 %    % Lymphocytes 25 %    % Monocytes 4 %    % Eosinophils 0 %    % Basophils 0 %    % Immature Granulocytes 1 %    NRBCs per 100 WBC 0 <1 /100    Absolute Neutrophils 7.1 1.6 - 8.3 10e3/uL    Absolute Lymphocytes 2.5 0.8 - 5.3 10e3/uL    Absolute Monocytes 0.4 0.0 - 1.3 10e3/uL    Absolute Eosinophils 0.0 0.0 - 0.7 10e3/uL    Absolute Basophils 0.0 0.0 - 0.2 10e3/uL    Absolute Immature Granulocytes 0.1 <=0.4 10e3/uL    Absolute NRBCs 0.0 10e3/uL   HPV High Risk Types DNA Cervical     Status: None   Result Value Ref Range    Other HR HPV Negative  Negative    HPV16 DNA Negative Negative    HPV18 DNA Negative Negative    FINAL DIAGNOSIS       This patient's sample is negative for HPV DNA.        This test was developed and its performance characteristics determined by the [facility], [facility]. It has not been cleared or approved by the FDA. The laboratory is regulated under CLIA as qualified to perform high-complexity testing. This test is used for clinical purposes. It should not be regarded as investigational or for research.    METHODOLOGY: The Roche Yvon 4800 system uses automated extraction, simultaneous amplification of HPV (L1 region) and beta-globin, followed by real time detection of fluorescent labeled HPV and beta globin using specific oligonucleotide probes. The test specifically identifies types HPV 16 DNA and HPV 18 DNA while concurrently detecting the rest of the high risk types (31, 33, 35, 39, 45, 51, 52, 56, 58, 59, 66 or 68).    COMMENTS: This test is not intended for use as a screening device for woman under age 30 with normal cervical cytology. Results should be correlated with cytologic and histologic findings. Close clinical followup is recommended.       Pap Screen with HPV - recommended age 30 - 65 years     Status: None   Result Value Ref Range    Interpretation        Negative for Intraepithelial Lesion or Malignancy (NILM)    Comment         Papanicolaou Test Limitations:  Cervical cytology is a screening test with limited sensitivity, and regular screening is critical for cancer prevention.  Pap tests are primarily effective for the diagnosis/prevention of squamous cell carcinoma, not adenocarcinoma or other cancers.        Specimen Adequacy       Satisfactory for evaluation, endocervical/transformation zone component present    Clinical Information       none      Reflex Testing Yes regardless of result     Previous Abnormal?       No      Performing Labs       The technical  component of this testing was completed at [facility]     Influenza A & B Antigen - Clinic Collect     Status: Normal    Specimen: Nasopharyngeal; Swab   Result Value Ref Range    Influenza A antigen Negative Negative    Influenza B antigen Negative Negative    Narrative    Test results must be correlated with clinical data. If necessary, results should be confirmed by a molecular assay or viral culture.   NEISSERIA GONORRHOEA PCR     Status: Normal    Specimen: Cervix; Swab   Result Value Ref Range    Neisseria gonorrhoeae Negative Negative   CHLAMYDIA TRACHOMATIS PCR     Status: Normal    Specimen: Cervix; Swab   Result Value Ref Range    Chlamydia trachomatis Negative Negative   CBC with platelets and differential     Status: Abnormal    Narrative    The following orders were created for panel order CBC with platelets and differential.  Procedure                               Abnormality         Status                     ---------                               -----------         ------                     CBC with platelets and d...[[id number]]  Abnormal            Final result                 Please view results for these tests on the individual orders.       ASSESSMENT/PLAN:   (Z00.01) Encounter for general adult medical examination with abnormal findings  (primary encounter diagnosis)  Comment: See below.  Plan:     (Z12.11) Screen for colon cancer  Comment:   Plan: Colonoscopy Screening  Referral            (Z12.4) Cervical cancer screening  Comment: This was done.  Plan: Pap Screen with HPV - recommended age 30 - 65         years, HPV Hold (Lab Only), HPV High Risk Types        DNA Cervical            (Z12.2) Screening for lung cancer  Comment:   Plan: SMOKING CESSATION COUNSELING 3-10 MIN, CT Chest        Lung Cancer Scrn Low Dose wo            (G93.32) Chronic fatigue syndrome  Comment: New host of labs get back with her with  results.  Plan: CBC with platelets and differential, Lyme         Disease Total Abs Bld with Reflex to Confirm         CLIA, TSH with free T4 reflex, Vitamin D         Deficiency, Follicle stimulating hormone            (R63.4) Weight loss  Comment: Unclear here recommended some dietary supplements like boost or Ensure.  Plan: Lyme Disease Total Abs Bld with Reflex to         Confirm CLIA, TSH with free T4 reflex            (F33.0) Major depressive disorder, recurrent episode, mild (H)  Comment: Continue her medications.  Plan:     (Z11.3) Screen for STD (sexually transmitted disease)  Comment: She wants to be screened for everything no particular symptoms.  Plan: HIV Antigen Antibody Combo, Hepatitis C Screen         Reflex to HCV RNA Quant and Genotype, Treponema        Abs w Reflex to RPR and Titer, NEISSERIA         GONORRHOEA PCR, CHLAMYDIA TRACHOMATIS PCR,         Estradiol, Chlamydia trachomatis PCR - Clinic         Collect            (E55.9) Vitamin D deficiency  Comment: We will do this because of her fatigue.  Plan:     (Z72.0) Tobacco abuse  Comment:   Plan: SMOKING CESSATION COUNSELING 3-10 MIN, CT Chest        Lung Cancer Scrn Low Dose wo            (R61) Night sweats  Comment: Plus all the labs above we will notify her with results.  May be due to menopause.  Plan: Influenza A & B Antigen - Clinic Collect,         Symptomatic COVID-19 Virus (Coronavirus) by PCR        Nose              Patient has been advised of split billing requirements and indicates understanding: Yes      COUNSELING:  Reviewed preventive health counseling, as reflected in patient instructions       Regular exercise       Healthy diet/nutrition       Vision screening       Hearing screening        She reports that she has been smoking cigarettes. She has a 14.50 pack-year smoking history. She has never used smokeless tobacco.  Nicotine/Tobacco Cessation Plan:   Self help information given to patient      Twin Castro MD  [facility]  [facility]  Answers for HPI/ROS submitted by the patient on 12/19/2022  If you checked off any problems, how difficult have these problems made it for you to do your work, take care of things at home, or get along with other people?: Extremely difficult  PHQ9 TOTAL SCORE: 14

## 2022-12-20 LAB
B BURGDOR IGG+IGM SER QL: 0.15
C TRACH DNA SPEC QL NAA+PROBE: NEGATIVE
N GONORRHOEA DNA SPEC QL NAA+PROBE: NEGATIVE
SARS-COV-2 RNA RESP QL NAA+PROBE: NEGATIVE
T PALLIDUM AB SER QL: NONREACTIVE

## 2022-12-21 LAB
BKR LAB AP GYN ADEQUACY: NORMAL
BKR LAB AP GYN INTERPRETATION: NORMAL
BKR LAB AP HPV REFLEX: NORMAL
BKR LAB AP PREVIOUS ABNORMAL: NORMAL
PATH REPORT.COMMENTS IMP SPEC: NORMAL
PATH REPORT.COMMENTS IMP SPEC: NORMAL
PATH REPORT.RELEVANT HX SPEC: NORMAL

## 2022-12-22 ENCOUNTER — VIRTUAL VISIT (OUTPATIENT)
Dept: PSYCHOLOGY | Facility: OTHER | Age: 52
End: 2022-12-22
Payer: COMMERCIAL

## 2022-12-22 DIAGNOSIS — F43.10 POST TRAUMATIC STRESS DISORDER (PTSD): ICD-10-CM

## 2022-12-22 DIAGNOSIS — F33.1 MAJOR DEPRESSIVE DISORDER, RECURRENT EPISODE, MODERATE WITH ANXIOUS DISTRESS (H): ICD-10-CM

## 2022-12-22 DIAGNOSIS — F41.1 GENERALIZED ANXIETY DISORDER: ICD-10-CM

## 2022-12-22 DIAGNOSIS — F90.2 ADHD (ATTENTION DEFICIT HYPERACTIVITY DISORDER), COMBINED TYPE: Primary | ICD-10-CM

## 2022-12-22 PROCEDURE — 90834 PSYTX W PT 45 MINUTES: CPT | Mod: 93 | Performed by: SOCIAL WORKER
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
## 2022-12-23 LAB
HUMAN PAPILLOMA VIRUS 16 DNA: NEGATIVE
HUMAN PAPILLOMA VIRUS 18 DNA: NEGATIVE
HUMAN PAPILLOMA VIRUS FINAL DIAGNOSIS: NORMAL
HUMAN PAPILLOMA VIRUS OTHER HR: NEGATIVE

## 2022-12-23 NOTE — TELEPHONE ENCOUNTER
RNCM attempted to meet with patient in room 219 to discuss discharge planning. Patient too lethargic to assess. CM will follow for supportive needs. Script faxed to [facility] [phone] and put in black box up front.  Addie García MA

## 2022-12-26 ENCOUNTER — MYC MEDICAL ADVICE (OUTPATIENT)
Dept: FAMILY MEDICINE | Facility: CLINIC | Age: 52
End: 2022-12-26

## 2022-12-28 ENCOUNTER — MYC REFILL (OUTPATIENT)
Dept: FAMILY MEDICINE | Facility: CLINIC | Age: 52
End: 2022-12-28

## 2022-12-28 ENCOUNTER — MYC REFILL (OUTPATIENT)
Dept: PSYCHIATRY | Facility: CLINIC | Age: 52
End: 2022-12-28

## 2022-12-28 DIAGNOSIS — M54.2 CERVICALGIA: ICD-10-CM

## 2022-12-28 DIAGNOSIS — R11.0 NAUSEA: Primary | ICD-10-CM

## 2022-12-28 DIAGNOSIS — M54.50 CHRONIC BILATERAL LOW BACK PAIN WITHOUT SCIATICA: ICD-10-CM

## 2022-12-28 DIAGNOSIS — M79.7 FIBROMYALGIA: ICD-10-CM

## 2022-12-28 DIAGNOSIS — F41.1 GENERALIZED ANXIETY DISORDER: ICD-10-CM

## 2022-12-28 DIAGNOSIS — G89.4 CHRONIC PAIN SYNDROME: ICD-10-CM

## 2022-12-28 DIAGNOSIS — G89.29 CHRONIC BILATERAL LOW BACK PAIN WITHOUT SCIATICA: ICD-10-CM

## 2022-12-28 RX ORDER — HYDROCODONE BITARTRATE AND ACETAMINOPHEN 5; 325 MG/1; MG/1
TABLET ORAL
Qty: 195 TABLET | Refills: 0 | Status: SHIPPED | OUTPATIENT
Start: 2022-12-28 | End: 2023-01-26

## 2022-12-28 RX ORDER — HYDROXYZINE HYDROCHLORIDE 25 MG/1
25-50 TABLET, FILM COATED ORAL 3 TIMES DAILY PRN
Qty: 90 TABLET | Refills: 0 | OUTPATIENT
Start: 2022-12-28

## 2022-12-28 NOTE — TELEPHONE ENCOUNTER
Requested Prescriptions   Pending Prescriptions Disp Refills     HYDROcodone-acetaminophen (NORCO) 5-325 MG tablet 195 tablet 0     Sig: Take 2.5 tablets by mouth every morning AND 2.5 tablets daily (with lunch) AND 1.5 tablets At Bedtime. Max of 6.5 tablets/d       Routing refill request to provider for review/approval because:  Drug not on the [facility], [facility] or [facility] refill protocol or controlled substance

## 2022-12-28 NOTE — TELEPHONE ENCOUNTER
Patient Status:  Pt reports not being called for long-term care appt and did not look at her AVS summary in [place] [place] for number to call. Pt again instructed to call the number to schedule long-term psychiatric care.   From previous visit, 6/1/20:  The patient is being referred to long term community psychiatry care and provider will provide bridging until patient is established with new community provider.
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
## 2022-12-29 NOTE — TELEPHONE ENCOUNTER
Twin Castro MD  [facility] [place] 3 hours ago (7:57 AM)     SJ  None of her lab results show any reason for the fatigue.  Her hormone levels show she is in menopause.  Perhaps try taking a multivitamin.
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
## 2023-01-02 RX ORDER — ONDANSETRON 4 MG/1
4 TABLET, ORALLY DISINTEGRATING ORAL EVERY 8 HOURS PRN
Qty: 10 TABLET | Refills: 0 | Status: SHIPPED | OUTPATIENT
Start: 2023-01-02 | End: 2023-07-31

## 2023-01-02 NOTE — TELEPHONE ENCOUNTER
"Pending Prescriptions:                       Disp   Refills    ondansetron (ZOFRAN ODT) 4 MG ODT tab      10 tab*0        Sig: Take 1 tablet (4 mg) by mouth every 8 hours as needed           for nausea or vomiting    Routing refill request to provider for review/approval because:  A break in medication: was given in ED    Requested Prescriptions   Pending Prescriptions Disp Refills    ondansetron (ZOFRAN ODT) 4 MG ODT tab 10 tablet 0     Sig: Take 1 tablet (4 mg) by mouth every 8 hours as needed for nausea or vomiting        Antivertigo/Antiemetic Agents Passed - 12/28/2022  1:17 PM        Passed - Recent (12 mo) or future (30 days) visit within the authorizing provider's specialty     Patient has had an office visit with the authorizing provider or a provider within the authorizing providers department within the previous 12 mos or has a future within next 30 days. See \"Patient Info\" tab in inbasket, or \"Choose Columns\" in Meds & Orders section of the refill encounter.              Passed - Medication is active on med list        Passed - Patient is 18 years of age or older                   "

## 2023-01-03 ENCOUNTER — VIRTUAL VISIT (OUTPATIENT)
Dept: PSYCHOLOGY | Facility: CLINIC | Age: 53
End: 2023-01-03
Payer: COMMERCIAL

## 2023-01-03 DIAGNOSIS — F43.10 POST TRAUMATIC STRESS DISORDER (PTSD): ICD-10-CM

## 2023-01-03 DIAGNOSIS — F41.1 GENERALIZED ANXIETY DISORDER: ICD-10-CM

## 2023-01-03 DIAGNOSIS — F33.1 MAJOR DEPRESSIVE DISORDER, RECURRENT EPISODE, MODERATE WITH ANXIOUS DISTRESS (H): ICD-10-CM

## 2023-01-03 DIAGNOSIS — F90.2 ADHD (ATTENTION DEFICIT HYPERACTIVITY DISORDER), COMBINED TYPE: Primary | ICD-10-CM

## 2023-01-03 PROCEDURE — 90834 PSYTX W PT 45 MINUTES: CPT | Mod: 95 | Performed by: SOCIAL WORKER

## 2023-01-03 ASSESSMENT — ANXIETY QUESTIONNAIRES
IF YOU CHECKED OFF ANY PROBLEMS ON THIS QUESTIONNAIRE, HOW DIFFICULT HAVE THESE PROBLEMS MADE IT FOR YOU TO DO YOUR WORK, TAKE CARE OF THINGS AT HOME, OR GET ALONG WITH OTHER PEOPLE: EXTREMELY DIFFICULT
GAD7 TOTAL SCORE: 14
GAD7 TOTAL SCORE: 14
3. WORRYING TOO MUCH ABOUT DIFFERENT THINGS: NEARLY EVERY DAY
6. BECOMING EASILY ANNOYED OR IRRITABLE: MORE THAN HALF THE DAYS
7. FEELING AFRAID AS IF SOMETHING AWFUL MIGHT HAPPEN: NEARLY EVERY DAY
7. FEELING AFRAID AS IF SOMETHING AWFUL MIGHT HAPPEN: NEARLY EVERY DAY
GAD7 TOTAL SCORE: 14
2. NOT BEING ABLE TO STOP OR CONTROL WORRYING: NEARLY EVERY DAY
1. FEELING NERVOUS, ANXIOUS, OR ON EDGE: SEVERAL DAYS
8. IF YOU CHECKED OFF ANY PROBLEMS, HOW DIFFICULT HAVE THESE MADE IT FOR YOU TO DO YOUR WORK, TAKE CARE OF THINGS AT HOME, OR GET ALONG WITH OTHER PEOPLE?: EXTREMELY DIFFICULT
4. TROUBLE RELAXING: MORE THAN HALF THE DAYS
5. BEING SO RESTLESS THAT IT IS HARD TO SIT STILL: NOT AT ALL

## 2023-01-03 ASSESSMENT — PATIENT HEALTH QUESTIONNAIRE - PHQ9
SUM OF ALL RESPONSES TO PHQ QUESTIONS 1-9: 18
10. IF YOU CHECKED OFF ANY PROBLEMS, HOW DIFFICULT HAVE THESE PROBLEMS MADE IT FOR YOU TO DO YOUR WORK, TAKE CARE OF THINGS AT HOME, OR GET ALONG WITH OTHER PEOPLE: EXTREMELY DIFFICULT
SUM OF ALL RESPONSES TO PHQ QUESTIONS 1-9: 18
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
## 2023-01-12 NOTE — TELEPHONE ENCOUNTER
[email or web address] message sent with Rx approval from provider.  Jake  [facility] Management Team     done

## 2023-01-13 ENCOUNTER — VIRTUAL VISIT (OUTPATIENT)
Dept: PSYCHOLOGY | Facility: CLINIC | Age: 53
End: 2023-01-13
Payer: COMMERCIAL

## 2023-01-13 DIAGNOSIS — F43.10 POST TRAUMATIC STRESS DISORDER (PTSD): ICD-10-CM

## 2023-01-13 DIAGNOSIS — F41.1 GENERALIZED ANXIETY DISORDER: ICD-10-CM

## 2023-01-13 DIAGNOSIS — F33.1 MAJOR DEPRESSIVE DISORDER, RECURRENT EPISODE, MODERATE WITH ANXIOUS DISTRESS (H): ICD-10-CM

## 2023-01-13 DIAGNOSIS — F90.2 ADHD (ATTENTION DEFICIT HYPERACTIVITY DISORDER), COMBINED TYPE: Primary | ICD-10-CM

## 2023-01-13 PROCEDURE — 90834 PSYTX W PT 45 MINUTES: CPT | Mod: 93 | Performed by: SOCIAL WORKER

## 2023-01-17 ENCOUNTER — VIRTUAL VISIT (OUTPATIENT)
Dept: PSYCHOLOGY | Facility: CLINIC | Age: 53
End: 2023-01-17
Payer: COMMERCIAL

## 2023-01-17 DIAGNOSIS — F90.2 ADHD (ATTENTION DEFICIT HYPERACTIVITY DISORDER), COMBINED TYPE: Primary | ICD-10-CM

## 2023-01-17 DIAGNOSIS — F43.10 POST TRAUMATIC STRESS DISORDER (PTSD): ICD-10-CM

## 2023-01-17 DIAGNOSIS — F33.1 MAJOR DEPRESSIVE DISORDER, RECURRENT EPISODE, MODERATE WITH ANXIOUS DISTRESS (H): ICD-10-CM

## 2023-01-17 DIAGNOSIS — F41.1 GENERALIZED ANXIETY DISORDER: ICD-10-CM

## 2023-01-17 PROCEDURE — 90834 PSYTX W PT 45 MINUTES: CPT | Mod: 95 | Performed by: SOCIAL WORKER

## 2023-01-20 ENCOUNTER — TELEPHONE (OUTPATIENT)
Dept: FAMILY MEDICINE | Facility: CLINIC | Age: 53
End: 2023-01-20
Payer: MEDICARE

## 2023-01-20 NOTE — TELEPHONE ENCOUNTER
Reason for Call:  Appointment Request    Patient requesting this type of appt:  Establish care, and weight loss and medication    Requested provider: Dr. Edwards    Reason patient unable to be scheduled: Not within requested timeframe    When does patient want to be seen/preferred time: 3-7 days    Comments: patient would like to get worked in sooner.     Could we [facility] this [facility] to you [email or web address] or would you prefer to receive a phone call?:   Patient would prefer a phone call   Okay to leave a detailed message?: Yes at Home number on file [phone] (home)    Call taken on 1/20/2023 at 12:32 PM by Larua Caba

## 2023-01-20 NOTE — TELEPHONE ENCOUNTER
Patient scheduled to see JOAQUIN Edwards on Monday, January 23rd.      Appointment was NOT scheduled correctly, provider would like more time with patient.     If patient needs medication prior to rescheduled appointment Dr Edwards will provide the patient with an INTERIM fill.    Patient needs to be rescheduled for next available   IN PERSON 40 minute time slot with Dr Edwards    Left message for patient to call and reschedule.    Anna Coleman XRO/

## 2023-01-23 ENCOUNTER — VIRTUAL VISIT (OUTPATIENT)
Dept: PSYCHOLOGY | Facility: CLINIC | Age: 53
End: 2023-01-23
Payer: COMMERCIAL

## 2023-01-23 DIAGNOSIS — F33.1 MAJOR DEPRESSIVE DISORDER, RECURRENT EPISODE, MODERATE WITH ANXIOUS DISTRESS (H): ICD-10-CM

## 2023-01-23 DIAGNOSIS — F43.10 POST TRAUMATIC STRESS DISORDER (PTSD): ICD-10-CM

## 2023-01-23 DIAGNOSIS — F41.1 GENERALIZED ANXIETY DISORDER: ICD-10-CM

## 2023-01-23 DIAGNOSIS — F90.2 ADHD (ATTENTION DEFICIT HYPERACTIVITY DISORDER), COMBINED TYPE: Primary | ICD-10-CM

## 2023-01-23 PROCEDURE — 90834 PSYTX W PT 45 MINUTES: CPT | Mod: 93 | Performed by: SOCIAL WORKER

## 2023-01-23 ASSESSMENT — PATIENT HEALTH QUESTIONNAIRE - PHQ9
SUM OF ALL RESPONSES TO PHQ QUESTIONS 1-9: 17
SUM OF ALL RESPONSES TO PHQ QUESTIONS 1-9: 17
10. IF YOU CHECKED OFF ANY PROBLEMS, HOW DIFFICULT HAVE THESE PROBLEMS MADE IT FOR YOU TO DO YOUR WORK, TAKE CARE OF THINGS AT HOME, OR GET ALONG WITH OTHER PEOPLE: EXTREMELY DIFFICULT

## 2023-01-23 ASSESSMENT — ANXIETY QUESTIONNAIRES
3. WORRYING TOO MUCH ABOUT DIFFERENT THINGS: NEARLY EVERY DAY
8. IF YOU CHECKED OFF ANY PROBLEMS, HOW DIFFICULT HAVE THESE MADE IT FOR YOU TO DO YOUR WORK, TAKE CARE OF THINGS AT HOME, OR GET ALONG WITH OTHER PEOPLE?: EXTREMELY DIFFICULT
6. BECOMING EASILY ANNOYED OR IRRITABLE: SEVERAL DAYS
GAD7 TOTAL SCORE: 14
GAD7 TOTAL SCORE: 14
IF YOU CHECKED OFF ANY PROBLEMS ON THIS QUESTIONNAIRE, HOW DIFFICULT HAVE THESE PROBLEMS MADE IT FOR YOU TO DO YOUR WORK, TAKE CARE OF THINGS AT HOME, OR GET ALONG WITH OTHER PEOPLE: EXTREMELY DIFFICULT
7. FEELING AFRAID AS IF SOMETHING AWFUL MIGHT HAPPEN: NEARLY EVERY DAY
4. TROUBLE RELAXING: SEVERAL DAYS
1. FEELING NERVOUS, ANXIOUS, OR ON EDGE: NEARLY EVERY DAY
7. FEELING AFRAID AS IF SOMETHING AWFUL MIGHT HAPPEN: NEARLY EVERY DAY
5. BEING SO RESTLESS THAT IT IS HARD TO SIT STILL: NOT AT ALL
GAD7 TOTAL SCORE: 14
2. NOT BEING ABLE TO STOP OR CONTROL WORRYING: NEARLY EVERY DAY
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
## 2023-01-26 ENCOUNTER — MYC REFILL (OUTPATIENT)
Dept: FAMILY MEDICINE | Facility: CLINIC | Age: 53
End: 2023-01-26
Payer: MEDICARE

## 2023-01-26 DIAGNOSIS — M54.2 CERVICALGIA: ICD-10-CM

## 2023-01-26 DIAGNOSIS — G89.4 CHRONIC PAIN SYNDROME: ICD-10-CM

## 2023-01-26 DIAGNOSIS — M79.7 FIBROMYALGIA: ICD-10-CM

## 2023-01-26 DIAGNOSIS — M54.50 CHRONIC BILATERAL LOW BACK PAIN WITHOUT SCIATICA: ICD-10-CM

## 2023-01-26 DIAGNOSIS — G89.29 CHRONIC BILATERAL LOW BACK PAIN WITHOUT SCIATICA: ICD-10-CM

## 2023-01-26 NOTE — TELEPHONE ENCOUNTER
Requested Prescriptions   Pending Prescriptions Disp Refills     HYDROcodone-acetaminophen (NORCO) 5-325 MG tablet 195 tablet 0     Sig: Take 2.5 tablets by mouth every morning AND 2.5 tablets daily (with lunch) AND 1.5 tablets At Bedtime. Max of 6.5 tablets/d       There is no refill protocol information for this order        Future Office visit:    Next 5 appointments (look out 90 days)    Feb 06, 2023  2:00 PM  (Arrive by 1:40 PM)  Provider Visit with Jim Edwards MD  [facility] ([facility] ) [address] [place] [id number] [phone]           Routing refill request to provider for review/approval because:  Drug not on the G, P or [facility] refill protocol or controlled substance

## 2023-01-27 RX ORDER — HYDROCODONE BITARTRATE AND ACETAMINOPHEN 5; 325 MG/1; MG/1
TABLET ORAL
Qty: 195 TABLET | Refills: 0 | Status: SHIPPED | OUTPATIENT
Start: 2023-01-27 | End: 2023-02-23

## 2023-01-30 ENCOUNTER — VIRTUAL VISIT (OUTPATIENT)
Dept: PSYCHOLOGY | Facility: CLINIC | Age: 53
End: 2023-01-30
Payer: COMMERCIAL

## 2023-01-30 DIAGNOSIS — F90.2 ADHD (ATTENTION DEFICIT HYPERACTIVITY DISORDER), COMBINED TYPE: Primary | ICD-10-CM

## 2023-01-30 DIAGNOSIS — F43.10 POST TRAUMATIC STRESS DISORDER (PTSD): ICD-10-CM

## 2023-01-30 DIAGNOSIS — F41.1 GENERALIZED ANXIETY DISORDER: ICD-10-CM

## 2023-01-30 DIAGNOSIS — F33.1 MAJOR DEPRESSIVE DISORDER, RECURRENT EPISODE, MODERATE WITH ANXIOUS DISTRESS (H): ICD-10-CM

## 2023-01-30 PROCEDURE — 90834 PSYTX W PT 45 MINUTES: CPT | Mod: 93 | Performed by: SOCIAL WORKER

## 2023-01-30 ASSESSMENT — PATIENT HEALTH QUESTIONNAIRE - PHQ9
SUM OF ALL RESPONSES TO PHQ QUESTIONS 1-9: 19
SUM OF ALL RESPONSES TO PHQ QUESTIONS 1-9: 19
10. IF YOU CHECKED OFF ANY PROBLEMS, HOW DIFFICULT HAVE THESE PROBLEMS MADE IT FOR YOU TO DO YOUR WORK, TAKE CARE OF THINGS AT HOME, OR GET ALONG WITH OTHER PEOPLE: EXTREMELY DIFFICULT
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
## 2023-02-06 ENCOUNTER — OFFICE VISIT (OUTPATIENT)
Dept: FAMILY MEDICINE | Facility: CLINIC | Age: 53
End: 2023-02-06
Payer: MEDICARE

## 2023-02-06 VITALS
TEMPERATURE: 98.5 F | HEART RATE: 93 BPM | HEIGHT: 65 IN | DIASTOLIC BLOOD PRESSURE: 61 MMHG | SYSTOLIC BLOOD PRESSURE: 95 MMHG | RESPIRATION RATE: 15 BRPM | BODY MASS INDEX: 18.76 KG/M2 | OXYGEN SATURATION: 95 % | WEIGHT: 112.6 LBS

## 2023-02-06 DIAGNOSIS — F11.90 CHRONIC, CONTINUOUS USE OF OPIOIDS: ICD-10-CM

## 2023-02-06 DIAGNOSIS — R63.6 UNDERWEIGHT: ICD-10-CM

## 2023-02-06 DIAGNOSIS — R30.0 DYSURIA: ICD-10-CM

## 2023-02-06 DIAGNOSIS — M48.02 STENOSIS, CERVICAL SPINE: ICD-10-CM

## 2023-02-06 DIAGNOSIS — M05.79 RHEUMATOID ARTHRITIS INVOLVING MULTIPLE SITES WITH POSITIVE RHEUMATOID FACTOR (H): ICD-10-CM

## 2023-02-06 DIAGNOSIS — G43.819 OTHER MIGRAINE WITHOUT STATUS MIGRAINOSUS, INTRACTABLE: ICD-10-CM

## 2023-02-06 DIAGNOSIS — Z76.89 ESTABLISHING CARE WITH NEW DOCTOR, ENCOUNTER FOR: Primary | ICD-10-CM

## 2023-02-06 LAB
ALBUMIN UR-MCNC: ABNORMAL MG/DL
APPEARANCE UR: CLEAR
BILIRUB UR QL STRIP: NEGATIVE
CAOX CRY #/AREA URNS HPF: ABNORMAL /HPF
COLOR UR AUTO: YELLOW
GLUCOSE UR STRIP-MCNC: NEGATIVE MG/DL
HGB UR QL STRIP: NEGATIVE
KETONES UR STRIP-MCNC: NEGATIVE MG/DL
LEUKOCYTE ESTERASE UR QL STRIP: NEGATIVE
MUCOUS THREADS #/AREA URNS LPF: PRESENT /LPF
NITRATE UR QL: NEGATIVE
PH UR STRIP: 5 [PH] (ref 5–7)
RBC URINE: 1 /HPF
SP GR UR STRIP: >=1.03 (ref 1–1.03)
UROBILINOGEN UR STRIP-MCNC: NORMAL MG/DL
WBC URINE: 2 /HPF

## 2023-02-06 PROCEDURE — 99214 OFFICE O/P EST MOD 30 MIN: CPT | Performed by: FAMILY MEDICINE

## 2023-02-06 PROCEDURE — 81001 URINALYSIS AUTO W/SCOPE: CPT | Performed by: FAMILY MEDICINE

## 2023-02-06 RX ORDER — LACTOSE-REDUCED FOOD
1 LIQUID (ML) ORAL 3 TIMES DAILY
Qty: 237 ML | Refills: 3 | Status: SHIPPED | OUTPATIENT
Start: 2023-02-06 | End: 2023-02-08

## 2023-02-06 RX ORDER — VERAPAMIL HYDROCHLORIDE 40 MG/1
TABLET ORAL
Qty: 180 TABLET | Refills: 0 | Status: SHIPPED | OUTPATIENT
Start: 2023-02-06 | End: 2023-07-31

## 2023-02-06 ASSESSMENT — ASTHMA QUESTIONNAIRES
QUESTION_5 LAST FOUR WEEKS HOW WOULD YOU RATE YOUR ASTHMA CONTROL: COMPLETELY CONTROLLED
ACT_TOTALSCORE: 23
QUESTION_1 LAST FOUR WEEKS HOW MUCH OF THE TIME DID YOUR ASTHMA KEEP YOU FROM GETTING AS MUCH DONE AT WORK, SCHOOL OR AT HOME: NONE OF THE TIME
QUESTION_2 LAST FOUR WEEKS HOW OFTEN HAVE YOU HAD SHORTNESS OF BREATH: ONCE OR TWICE A WEEK
ACT_TOTALSCORE: 23
QUESTION_3 LAST FOUR WEEKS HOW OFTEN DID YOUR ASTHMA SYMPTOMS (WHEEZING, COUGHING, SHORTNESS OF BREATH, CHEST TIGHTNESS OR PAIN) WAKE YOU UP AT NIGHT OR EARLIER THAN USUAL IN THE MORNING: NOT AT ALL
QUESTION_4 LAST FOUR WEEKS HOW OFTEN HAVE YOU USED YOUR RESCUE INHALER OR NEBULIZER MEDICATION (SUCH AS ALBUTEROL): ONCE A WEEK OR LESS

## 2023-02-06 ASSESSMENT — ANXIETY QUESTIONNAIRES
3. WORRYING TOO MUCH ABOUT DIFFERENT THINGS: NEARLY EVERY DAY
6. BECOMING EASILY ANNOYED OR IRRITABLE: SEVERAL DAYS
2. NOT BEING ABLE TO STOP OR CONTROL WORRYING: NEARLY EVERY DAY
1. FEELING NERVOUS, ANXIOUS, OR ON EDGE: NEARLY EVERY DAY
IF YOU CHECKED OFF ANY PROBLEMS ON THIS QUESTIONNAIRE, HOW DIFFICULT HAVE THESE PROBLEMS MADE IT FOR YOU TO DO YOUR WORK, TAKE CARE OF THINGS AT HOME, OR GET ALONG WITH OTHER PEOPLE: EXTREMELY DIFFICULT
GAD7 TOTAL SCORE: 14
GAD7 TOTAL SCORE: 14
5. BEING SO RESTLESS THAT IT IS HARD TO SIT STILL: NOT AT ALL
7. FEELING AFRAID AS IF SOMETHING AWFUL MIGHT HAPPEN: NEARLY EVERY DAY
7. FEELING AFRAID AS IF SOMETHING AWFUL MIGHT HAPPEN: NEARLY EVERY DAY
4. TROUBLE RELAXING: SEVERAL DAYS
8. IF YOU CHECKED OFF ANY PROBLEMS, HOW DIFFICULT HAVE THESE MADE IT FOR YOU TO DO YOUR WORK, TAKE CARE OF THINGS AT HOME, OR GET ALONG WITH OTHER PEOPLE?: EXTREMELY DIFFICULT

## 2023-02-06 NOTE — PROGRESS NOTES
Assessment & Plan     Establishing care with new doctor, encounter for  Patient has been under the care of Dr. Castro who retired. Patient requests transfer care to me.   Patient medications reconciled, PMH and Problem list reviewed and HM updated.    Past Surgical History:   Procedure Laterality Date     DILATION AND CURETTAGE, HYSTEROSCOPY, ABLATE ENDOMETRIUM NOVASURE, COMBINED  2011    Procedure:COMBINED DILATION AND CURETTAGE, HYSTEROSCOPY, ABLATE ENDOMETRIUM NOVASURE; hysteroscopy, dilation and curettage, novasure; Surgeon:JEREMY ANNA; Location:[facility] OR     EXCISE LESION TRUNK  2013    Procedure: EXCISE LESION TRUNK;  interlaminar epidural Steroid Injection Cervical -thoracic Levels (C7-T1)    Re-Excision of chest wall mass;  Surgeon: Jeremy Bolaños MD;  Location: [facility] OR     HC HYSTEROS W PERMANENT FALLOPAIN IMPLANT      Essure done in office Marcelo     INJECT BLOCK MEDIAL BRANCH CERVICAL/THORACIC/LUMBAR  2014    Procedure: INJECT BLOCK MEDIAL BRANCH CERVICAL / THORACIC / LUMBAR;  Surgeon: Josué Munson MD;  Location: [facility]     INJECT FACET JOINT  2014    Procedure: INJECT FACET JOINT;  diagnostic medial branch facet nerve block cervical levels 5-6, 6-7;  Surgeon: Josué Munson MD;  Location: [facility] OR     WISDOM ST GUIDEWIRE       [facility] APPENDECTOMY      Ruptured at age 7 years     [facility]  DELIVERY ONLY      , Low Cervical     Patient Active Problem List   Diagnosis     CARDIOVASCULAR SCREENING; LDL GOAL LESS THAN 160     Chronic fatigue syndrome     Fibromyalgia     Generalized anxiety disorder     Tobacco abuse     Disturbance in sleep behavior     Cervicalgia     Stenosis, cervical spine     Spondylitis, cervical (H)     NSAID induced gastritis     Major depressive disorder, recurrent episode, mild (H)     Other chronic pain     Intermittent asthma, uncomplicated     Rheumatoid arthritis involving multiple sites with positive rheumatoid factor (H)      Keyon Alexander is a 32 year old male presenting with sinus congestion and drainage into throat with difficulty breathing causing chest pain and feels possibly feverish. Work excuse for today.   Elevated white blood cell count     Panic attacks     Osteoarthritis of cervical spine, unspecified spinal osteoarthritis complication status     Degenerative joint disease of cervical spine     Pulmonary nodules     Abnormal CT of the chest     Hilar lymphadenopathy     Other migraine without status migrainosus, intractable     Chronic rhinitis     Pelvic pain in female     Cervical cancer screening     Chronic, continuous use of opioids     Medical cannabis use     Balance problems     Health Maintenance Due   Topic Date Due     HEPATITIS B IMMUNIZATION (1 of 3 - 3-dose series) Never done     COLORECTAL CANCER SCREENING  Never done     ZOSTER IMMUNIZATION (1 of 2) Never done     LUNG CANCER SCREENING  08/26/2018     ASTHMA ACTION PLAN  10/01/2019     Pneumococcal Vaccine: Pediatrics (0 to 5 Years) and At-Risk Patients (6 to 64 Years) (2 - PPSV23 if available, else PCV20) 12/11/2019     COVID-19 Vaccine (3 - Booster for Pfizer series) 11/16/2021     MAMMO SCREENING  12/16/2021     INFLUENZA VACCINE (1) 09/01/2022         Chronic, continuous use of opioids  Sherie Otero is a 52 year old female who presents with chronic pain with use of chronic narcotics for many years under the supervision and care of Dr. Castro who recently retired. Her chronic pain is due to RA and cervical spondylosis with stenosis. She was managed initially through Pain Clinic and then referred back to PCP for ongoing CCS management. She is currently using Norco for her chronic pain and has been on stable dose for many years. She has had previous SHANE without benefit. She had previously done PT but none recently. She is not on any other non narcotic pain medication or anti rheumatics. She has prior NSAID induced gastritis. She has general anxiety/Panic and is followed by psychiatry and prescribed Klonopin. She is also certified for medical cannabis by me with the support of Dr. Castro.   She currently has CSA signed and UDS last completed 6  months ago. [facility] reviewed with patient. Her daily MME is 32.5 and her ORS is 200 giving her a 10X risk of unintentional overdose death. We reviewed the plan for ongoing management. She does not need refills today. We will follow up in person every 3 months and will re sign CSA at next in person visit. Anticipate tapering Norco and consider adding Cymbalta. She has listed allergies to gabapentin and Lyrica.     Rheumatoid arthritis involving multiple sites with positive rheumatoid factor (H)  Chronic, was followed by Rheumatology for many years but has not been seen in follow up for 2 years. Prednisone has helped with flare in the past. Recommend in person visit with Rheumatology after last visit.     Stenosis, cervical spine  Chronic, No recent follow up evaluation. Last imaging many years ago and pain is stable.     Other migraine without status migrainosus, intractable  Chronic, currently on Verapamil for prophylaxis. She notes BP is low so will decrease dose and continue to follow.   - verapamil (CALAN) 40 MG tablet; 1/2 tablet twice daily    Underweight  Chronic, prior recommendation was dietary improvement and supplements. Recommend colonoscopy as part of work up.  - Nutritional Supplements (ENSURE) LIQD; Take 1 Bottle by mouth 3 times daily    Dysuria  Noted symptoms over the last several days.  - UA Macro with Reflex to Micro and Culture - lab collect; Future  - UA Macro with Reflex to Micro and Culture - lab collect    Ordering of each unique test  Prescription drug management  No LOS data to display   Time spent doing chart review, history and exam, documentation and further activities per the note       Regular exercise    Return in about 3 months (around 5/6/2023) for Follow up, Routine preventive, with me, in person.    Jim Edwards MD  [facility] HAYDER Rehman is a 52 year old, presenting for the following health issues:  No chief complaint on file.      History of  Present Illness       Back Pain:  She presents for follow up of back pain. Patient's back pain is a chronic problem.  Location of back pain:  Right lower back, right upper back, left upper back, right shoulder and left shoulder  Description of back pain: burning, gnawing and stabbing  Back pain spreads: left shoulder, right side of neck and left side of neck    Since patient first noticed back pain, pain is: always present, but gets better and worse  Does back pain interfere with her job:  Not applicable      Mental Health Follow-up:  Patient presents to follow-up on Depression & Anxiety.Patient's depression since last visit has been:  No change  The patient is having other symptoms associated with depression.  Patient's anxiety since last visit has been:  Worse  The patient is having other symptoms associated with anxiety.  Any significant life events: financial concerns, grief or loss and health concerns  Patient is feeling anxious or having panic attacks.  Patient has no concerns about alcohol or drug use.    Reason for visit:  Establish new care    She eats 2-3 servings of fruits and vegetables daily.She consumes 2 sweetened beverage(s) daily.She exercises with enough effort to increase her heart rate 10 to 19 minutes per day.  She exercises with enough effort to increase her heart rate 3 or less days per week.   She is taking medications regularly.    Today's PHQ-9         PHQ-9 Total Score: 18    PHQ-9 Q9 Thoughts of better off dead/self-harm past 2 weeks :   Not at all    How difficult have these problems made it for you to do your work, take care of things at home, or get along with other people: Extremely difficult  Today's CELIA-7 Score: 14         Patient Active Problem List   Diagnosis     CARDIOVASCULAR SCREENING; LDL GOAL LESS THAN 160     Chronic fatigue syndrome     Fibromyalgia     Generalized anxiety disorder     Tobacco abuse     SHANTANU (obstructive sleep apnea)     Disturbance in sleep behavior      Cervicalgia     Stenosis, cervical spine     Spondylitis, cervical (H)     NSAID induced gastritis     Major depressive disorder, recurrent episode, mild (H)     Other chronic pain     Intermittent asthma, uncomplicated     Rheumatoid arthritis involving multiple sites with positive rheumatoid factor (H)     Elevated white blood cell count     Panic attacks     Osteoarthritis of cervical spine, unspecified spinal osteoarthritis complication status     Degenerative joint disease of cervical spine     Pulmonary nodules     Abnormal CT of the chest     Hilar lymphadenopathy     Other migraine without status migrainosus, intractable     Chronic rhinitis     Pelvic pain in female     Cervical cancer screening     Chronic, continuous use of opioids     Medical cannabis use     Balance problems     Current Outpatient Medications   Medication Sig Dispense Refill     albuterol (VENTOLIN HFA) 108 (90 Base) MCG/ACT inhaler Inhale 1-2 puffs into the lungs every 6 hours as needed for shortness of breath / dyspnea or wheezing 18 g 0     cetirizine (ZYRTEC) 10 MG tablet Take 1 tablet (10 mg) by mouth daily 90 tablet 3     clonazePAM (KLONOPIN) 0.5 MG tablet TAKE 1 TABLET BY MOUTH 2 - 3 TIMES DAILY AS NEEDED FOR ANXIETY       fluticasone (FLONASE) 50 MCG/ACT nasal spray Spray 1-2 sprays into both nostrils daily SPRAY 2 SPRAYS INTO BOTH NOSTRILS DAILY. 15.8 mL 5     fluvoxaMINE (LUVOX) 100 MG tablet 200mg takes different       fluvoxaMINE (LUVOX) 50 MG tablet Take 50 mg by mouth At Bedtime       HYDROcodone-acetaminophen (NORCO) 5-325 MG tablet Take 2.5 tablets by mouth every morning AND 2.5 tablets daily (with lunch) AND 1.5 tablets At Bedtime. Max of 6.5 tablets/dTake 2.5 tablets by mouth every morning AND 2.5 tablets daily (with lunch) AND 1.5 tablets At Bedtime. Max of 6.5 tablets/d Strength: 5-325 mg 195 tablet 0     hydrOXYzine (ATARAX) 25 MG tablet Take 1-2 tablets (25-50 mg) by mouth 3 times daily as needed for itching  "(Patient taking differently: Take 25-50 mg by mouth 3 times daily as needed for itching 1 tablet 3-4 times daily prn) 90 tablet 0     mupirocin (BACTROBAN) 2 % external ointment Apply topically 3 times daily 22 g 1     naloxone (NARCAN) nasal spray Spray 1 spray (4 mg) into one nostril alternating nostrils as needed for opioid reversal every 2-3 minutes until assistance arrives 0.2 mL 0     ondansetron (ZOFRAN ODT) 4 MG ODT tab Take 1 tablet (4 mg) by mouth every 8 hours as needed for nausea or vomiting 10 tablet 0     verapamil (CALAN) 40 MG tablet TAKE ONE TABLET BY MOUTH TWICE A  tablet 0     Health Maintenance Due   Topic Date Due     HEPATITIS B IMMUNIZATION (1 of 3 - 3-dose series) Never done     COLORECTAL CANCER SCREENING  Never done     ZOSTER IMMUNIZATION (1 of 2) Never done     LUNG CANCER SCREENING  08/26/2018     ASTHMA ACTION PLAN  10/01/2019     Pneumococcal Vaccine: Pediatrics (0 to 5 Years) and At-Risk Patients (6 to 64 Years) (2 - PPSV23 if available, else PCV20) 12/11/2019     COVID-19 Vaccine (3 - Booster for Pfizer series) 11/16/2021     MAMMO SCREENING  12/16/2021     INFLUENZA VACCINE (1) 09/01/2022         Review of Systems   CONSTITUTIONAL:POSITIVE  for arthralgias, fatigue and weight loss and NEGATIVE  for fever   ENT/MOUTH: NEGATIVE for ear, mouth and throat problems  RESP: NEGATIVE for significant cough or SOB  CV: NEGATIVE for chest pain, palpitations or peripheral edema  : dysuria  MUSCULOSKELETAL: POSITIVE  for arthralgias and back pain chronic  ROS otherwise negative      Objective    BP 95/61 (BP Location: Left arm, Patient Position: Sitting, Cuff Size: Adult Regular)   Pulse 93   Temp 98.5  F (36.9  C)   Resp 15   Ht 1.661 m (5' 5.4\")   Wt 51.1 kg (112 lb 9.6 oz)   SpO2 95%   BMI 18.51 kg/m    Body mass index is 18.51 kg/m .  Physical Exam   GENERAL: healthy, alert and no distress  NECK: no adenopathy, no asymmetry, masses, or scars and thyroid normal to " palpation  RESP: lungs clear to auscultation - no rales, rhonchi or wheezes  CV: regular rate and rhythm, normal S1 S2, no S3 or S4, no murmur, click or rub, no peripheral edema and peripheral pulses strong  ABDOMEN: soft, nontender, no hepatosplenomegaly, no masses and bowel sounds normal  MS: no gross musculoskeletal defects noted, no edema    Office Visit on 12/19/2022   Component Date Value Ref Range Status     Interpretation 12/19/2022 Negative for Intraepithelial Lesion or Malignancy (NILM)    Final     Comment 12/19/2022    Final                    Value:This result contains rich text formatting which cannot be displayed here.     Specimen Adequacy 12/19/2022 Satisfactory for evaluation, endocervical/transformation zone component present   Final     Clinical Information 12/19/2022    Final                    Value:This result contains rich text formatting which cannot be displayed here.     Reflex Testing 12/19/2022 Yes regardless of result   Final     Previous Abnormal? 12/19/2022    Final                    Value:This result contains rich text formatting which cannot be displayed here.     Performing Labs 12/19/2022    Final                    Value:This result contains rich text formatting which cannot be displayed here.     Influenza A antigen 12/19/2022 Negative  Negative Final     Influenza B antigen 12/19/2022 Negative  Negative Final     SARS CoV2 PCR 12/19/2022 Negative  Negative Final    NEGATIVE: SARS-CoV-2 (COVID-19) RNA not detected, presumed negative.     Lyme Disease Antibodies Total 12/19/2022 0.15  <0.90 Final    Non-reactive, Absence of detectable Borrelia burgdorferi antibodies. A non-reactive result does not exclude the possibility of Borrelia burgdorferi infection. If early Lyme disease is suspected, a second sample should be collected and tested 2 to 4 weeks later.     TSH 12/19/2022 0.83  0.40 - 4.00 mU/L Final     Vitamin D, Total (25-Hydroxy) 12/19/2022 25  20 - 75 ug/L Final     HIV  Antigen Antibody Combo 12/19/2022 Nonreactive  Nonreactive Final    HIV-1 p24 Ag & HIV-1/HIV-2 Ab Not Detected     Hepatitis C Antibody 12/19/2022 Nonreactive  Nonreactive Final     Treponema Antibody Total 12/19/2022 Nonreactive  Nonreactive Final     Neisseria gonorrhoeae 12/19/2022 Negative  Negative Final    Negative for N. gonorrhoeae rRNA by transcription mediated amplification. A negative result by transcription mediated amplification does not preclude the presence of C. trachomatis infection because results are dependent on proper and adequate collection, absence of inhibitors and sufficient rRNA to be detected.     Chlamydia trachomatis 12/19/2022 Negative  Negative Final    A negative result by transcription mediated amplification does not preclude the presence of C. trachomatis infection because results are dependent on proper and adequate collection, absence of inhibitors and sufficient rRNA to be detected.     Estradiol 12/19/2022 <5  pg/mL Final    Healthy Men:   11.3-43.2 pg/mL    Healthy Postmenopausal Women:  Postmenopause: <5-138 pg/mL    Healthy Pregnant Women:  1st trimester: 154-3243 pg/mL  2nd trimester: 1561-23657 pg/mL  3rd trimester: 8525->37119 pg/mL    Healthy Women Cycle Phase:  Follicular: 30.9-90.4 pg/mL  Ovulation: 60.4-533 pg/mL  Luteal: 60.4-232 pg/mL    Healthy Women Cycle Sub-Phase:  Early Follicular: 20.5-62.8 pg/mL  Intermediate Follicular: 26-79.8 pg/mL  Late Follicular: 49.5-233 pg/mL  Ovulation: 60.4-602 pg/mL  Early Luteal: 51.1-179 pg/mL  Intermediate Luteal: 66.5-305 pg/mL  Late Luteal: 30.2-222 pg/mL     FSH 12/19/2022 117.0  mIU/mL Final    19 years and older:   Follicular phase: 3.5-12.5 mIU/mL   Ovulation phase: 4.7-21.5 mIU/mL   Luteal phase: 1.7-7.7 mIU/mL   Postmenopause: 25.8-134.8 mIU/mL        WBC Count 12/19/2022 10.2  4.0 - 11.0 10e3/uL Final     RBC Count 12/19/2022 4.41  3.80 - 5.20 10e6/uL Final     Hemoglobin 12/19/2022 14.7  11.7 - 15.7 g/dL Final      Hematocrit 12/19/2022 43.5  35.0 - 47.0 % Final     MCV 12/19/2022 99  78 - 100 fL Final     MCH 12/19/2022 33.3 (H)  26.5 - 33.0 pg Final     MCHC 12/19/2022 33.8  31.5 - 36.5 g/dL Final     RDW 12/19/2022 12.3  10.0 - 15.0 % Final     Platelet Count 12/19/2022 179  150 - 450 10e3/uL Final     % Neutrophils 12/19/2022 70  % Final     % Lymphocytes 12/19/2022 25  % Final     % Monocytes 12/19/2022 4  % Final     % Eosinophils 12/19/2022 0  % Final     % Basophils 12/19/2022 0  % Final     % Immature Granulocytes 12/19/2022 1  % Final     NRBCs per 100 WBC 12/19/2022 0  <1 /100 Final     Absolute Neutrophils 12/19/2022 7.1  1.6 - 8.3 10e3/uL Final     Absolute Lymphocytes 12/19/2022 2.5  0.8 - 5.3 10e3/uL Final     Absolute Monocytes 12/19/2022 0.4  0.0 - 1.3 10e3/uL Final     Absolute Eosinophils 12/19/2022 0.0  0.0 - 0.7 10e3/uL Final     Absolute Basophils 12/19/2022 0.0  0.0 - 0.2 10e3/uL Final     Absolute Immature Granulocytes 12/19/2022 0.1  <=0.4 10e3/uL Final     Absolute NRBCs 12/19/2022 0.0  10e3/uL Final     Other HR HPV 12/19/2022 Negative  Negative Final     HPV16 DNA 12/19/2022 Negative  Negative Final     HPV18 DNA 12/19/2022 Negative  Negative Final     FINAL DIAGNOSIS 12/19/2022    Final                    Value:This result contains rich text formatting which cannot be displayed here.     No results found. However, due to the size of the patient record, not all encounters were searched. Please check Results Review for a complete set of results.

## 2023-02-07 ENCOUNTER — VIRTUAL VISIT (OUTPATIENT)
Dept: PSYCHOLOGY | Facility: CLINIC | Age: 53
End: 2023-02-07
Payer: COMMERCIAL

## 2023-02-07 DIAGNOSIS — F43.10 POST TRAUMATIC STRESS DISORDER (PTSD): ICD-10-CM

## 2023-02-07 DIAGNOSIS — F33.1 MAJOR DEPRESSIVE DISORDER, RECURRENT EPISODE, MODERATE WITH ANXIOUS DISTRESS (H): ICD-10-CM

## 2023-02-07 DIAGNOSIS — F41.1 GENERALIZED ANXIETY DISORDER: ICD-10-CM

## 2023-02-07 DIAGNOSIS — F90.2 ADHD (ATTENTION DEFICIT HYPERACTIVITY DISORDER), COMBINED TYPE: Primary | ICD-10-CM

## 2023-02-07 PROCEDURE — 90834 PSYTX W PT 45 MINUTES: CPT | Mod: 93 | Performed by: SOCIAL WORKER

## 2023-02-07 ASSESSMENT — PATIENT HEALTH QUESTIONNAIRE - PHQ9
SUM OF ALL RESPONSES TO PHQ QUESTIONS 1-9: 17
10. IF YOU CHECKED OFF ANY PROBLEMS, HOW DIFFICULT HAVE THESE PROBLEMS MADE IT FOR YOU TO DO YOUR WORK, TAKE CARE OF THINGS AT HOME, OR GET ALONG WITH OTHER PEOPLE: EXTREMELY DIFFICULT
SUM OF ALL RESPONSES TO PHQ QUESTIONS 1-9: 17
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
## 2023-02-08 DIAGNOSIS — R63.6 UNDERWEIGHT: ICD-10-CM

## 2023-02-08 RX ORDER — LACTOSE-REDUCED FOOD
1 LIQUID (ML) ORAL 3 TIMES DAILY
Qty: 5688 ML | Refills: 3 | Status: SHIPPED | OUTPATIENT
Start: 2023-02-08 | End: 2023-07-31

## 2023-02-08 NOTE — TELEPHONE ENCOUNTER
Qty for Ensure needs to be for 1 case vs 1 bottle as current script states.    New prescription updated to have qty for 1 case (5688ml)  If appropriate, please complete pended prescription.

## 2023-02-13 ENCOUNTER — VIRTUAL VISIT (OUTPATIENT)
Dept: PSYCHOLOGY | Facility: CLINIC | Age: 53
End: 2023-02-13
Payer: COMMERCIAL

## 2023-02-13 DIAGNOSIS — F43.10 POST TRAUMATIC STRESS DISORDER (PTSD): ICD-10-CM

## 2023-02-13 DIAGNOSIS — F33.1 MAJOR DEPRESSIVE DISORDER, RECURRENT EPISODE, MODERATE WITH ANXIOUS DISTRESS (H): ICD-10-CM

## 2023-02-13 DIAGNOSIS — F90.2 ADHD (ATTENTION DEFICIT HYPERACTIVITY DISORDER), COMBINED TYPE: Primary | ICD-10-CM

## 2023-02-13 DIAGNOSIS — F41.1 GENERALIZED ANXIETY DISORDER: ICD-10-CM

## 2023-02-13 PROCEDURE — 90834 PSYTX W PT 45 MINUTES: CPT | Mod: 95 | Performed by: SOCIAL WORKER
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
## 2023-02-21 ENCOUNTER — VIRTUAL VISIT (OUTPATIENT)
Dept: PSYCHOLOGY | Facility: CLINIC | Age: 53
End: 2023-02-21
Payer: COMMERCIAL

## 2023-02-21 DIAGNOSIS — F43.10 POST TRAUMATIC STRESS DISORDER (PTSD): ICD-10-CM

## 2023-02-21 DIAGNOSIS — F33.1 MAJOR DEPRESSIVE DISORDER, RECURRENT EPISODE, MODERATE WITH ANXIOUS DISTRESS (H): ICD-10-CM

## 2023-02-21 DIAGNOSIS — F90.2 ADHD (ATTENTION DEFICIT HYPERACTIVITY DISORDER), COMBINED TYPE: Primary | ICD-10-CM

## 2023-02-21 DIAGNOSIS — F41.1 GENERALIZED ANXIETY DISORDER: ICD-10-CM

## 2023-02-21 PROCEDURE — 90834 PSYTX W PT 45 MINUTES: CPT | Mod: 93 | Performed by: SOCIAL WORKER

## 2023-02-21 ASSESSMENT — ANXIETY QUESTIONNAIRES
1. FEELING NERVOUS, ANXIOUS, OR ON EDGE: NEARLY EVERY DAY
1. FEELING NERVOUS, ANXIOUS, OR ON EDGE: NEARLY EVERY DAY
8. IF YOU CHECKED OFF ANY PROBLEMS, HOW DIFFICULT HAVE THESE MADE IT FOR YOU TO DO YOUR WORK, TAKE CARE OF THINGS AT HOME, OR GET ALONG WITH OTHER PEOPLE?: EXTREMELY DIFFICULT
IF YOU CHECKED OFF ANY PROBLEMS ON THIS QUESTIONNAIRE, HOW DIFFICULT HAVE THESE PROBLEMS MADE IT FOR YOU TO DO YOUR WORK, TAKE CARE OF THINGS AT HOME, OR GET ALONG WITH OTHER PEOPLE: EXTREMELY DIFFICULT
5. BEING SO RESTLESS THAT IT IS HARD TO SIT STILL: SEVERAL DAYS
5. BEING SO RESTLESS THAT IT IS HARD TO SIT STILL: SEVERAL DAYS
7. FEELING AFRAID AS IF SOMETHING AWFUL MIGHT HAPPEN: NEARLY EVERY DAY
4. TROUBLE RELAXING: MORE THAN HALF THE DAYS
3. WORRYING TOO MUCH ABOUT DIFFERENT THINGS: NEARLY EVERY DAY
7. FEELING AFRAID AS IF SOMETHING AWFUL MIGHT HAPPEN: NEARLY EVERY DAY
GAD7 TOTAL SCORE: 16
GAD7 TOTAL SCORE: 16
2. NOT BEING ABLE TO STOP OR CONTROL WORRYING: NEARLY EVERY DAY
6. BECOMING EASILY ANNOYED OR IRRITABLE: SEVERAL DAYS
2. NOT BEING ABLE TO STOP OR CONTROL WORRYING: NEARLY EVERY DAY
IF YOU CHECKED OFF ANY PROBLEMS ON THIS QUESTIONNAIRE, HOW DIFFICULT HAVE THESE PROBLEMS MADE IT FOR YOU TO DO YOUR WORK, TAKE CARE OF THINGS AT HOME, OR GET ALONG WITH OTHER PEOPLE: EXTREMELY DIFFICULT
6. BECOMING EASILY ANNOYED OR IRRITABLE: SEVERAL DAYS
4. TROUBLE RELAXING: MORE THAN HALF THE DAYS
GAD7 TOTAL SCORE: 16
7. FEELING AFRAID AS IF SOMETHING AWFUL MIGHT HAPPEN: NEARLY EVERY DAY
8. IF YOU CHECKED OFF ANY PROBLEMS, HOW DIFFICULT HAVE THESE MADE IT FOR YOU TO DO YOUR WORK, TAKE CARE OF THINGS AT HOME, OR GET ALONG WITH OTHER PEOPLE?: EXTREMELY DIFFICULT
7. FEELING AFRAID AS IF SOMETHING AWFUL MIGHT HAPPEN: NEARLY EVERY DAY
3. WORRYING TOO MUCH ABOUT DIFFERENT THINGS: NEARLY EVERY DAY
GAD7 TOTAL SCORE: 16

## 2023-02-21 ASSESSMENT — PATIENT HEALTH QUESTIONNAIRE - PHQ9
10. IF YOU CHECKED OFF ANY PROBLEMS, HOW DIFFICULT HAVE THESE PROBLEMS MADE IT FOR YOU TO DO YOUR WORK, TAKE CARE OF THINGS AT HOME, OR GET ALONG WITH OTHER PEOPLE: EXTREMELY DIFFICULT
SUM OF ALL RESPONSES TO PHQ QUESTIONS 1-9: 18
10. IF YOU CHECKED OFF ANY PROBLEMS, HOW DIFFICULT HAVE THESE PROBLEMS MADE IT FOR YOU TO DO YOUR WORK, TAKE CARE OF THINGS AT HOME, OR GET ALONG WITH OTHER PEOPLE: EXTREMELY DIFFICULT
SUM OF ALL RESPONSES TO PHQ QUESTIONS 1-9: 18
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
## 2023-02-23 ENCOUNTER — MYC REFILL (OUTPATIENT)
Dept: FAMILY MEDICINE | Facility: CLINIC | Age: 53
End: 2023-02-23
Payer: MEDICARE

## 2023-02-23 DIAGNOSIS — M54.50 CHRONIC BILATERAL LOW BACK PAIN WITHOUT SCIATICA: ICD-10-CM

## 2023-02-23 DIAGNOSIS — G89.29 CHRONIC BILATERAL LOW BACK PAIN WITHOUT SCIATICA: ICD-10-CM

## 2023-02-23 DIAGNOSIS — M54.2 CERVICALGIA: ICD-10-CM

## 2023-02-23 DIAGNOSIS — M79.7 FIBROMYALGIA: ICD-10-CM

## 2023-02-23 DIAGNOSIS — J31.0 CHRONIC RHINITIS: ICD-10-CM

## 2023-02-23 DIAGNOSIS — G89.4 CHRONIC PAIN SYNDROME: ICD-10-CM

## 2023-02-24 RX ORDER — CETIRIZINE HYDROCHLORIDE 10 MG/1
TABLET ORAL
Qty: 90 TABLET | Refills: 0 | Status: SHIPPED | OUTPATIENT
Start: 2023-02-24 | End: 2023-03-27

## 2023-02-24 RX ORDER — HYDROCODONE BITARTRATE AND ACETAMINOPHEN 5; 325 MG/1; MG/1
TABLET ORAL
Qty: 195 TABLET | Refills: 0 | Status: SHIPPED | OUTPATIENT
Start: 2023-02-26 | End: 2023-03-27

## 2023-02-24 NOTE — TELEPHONE ENCOUNTER
Requested Prescriptions   Pending Prescriptions Disp Refills     HYDROcodone-acetaminophen (NORCO) 5-325 MG tablet 195 tablet 0     Sig: Take 2.5 tablets by mouth every morning AND 2.5 tablets daily (with lunch) AND 1.5 tablets At Bedtime. Max of 6.5 tablets/dTake 2.5 tablets by mouth every morning AND 2.5 tablets daily (with lunch) AND 1.5 tablets At Bedtime. Max of 6.5 tablets/d Strength: 5-325 mg       There is no refill protocol information for this order          Routing refill request to provider for review/approval because:  Drug not on the [facility] or [facility] refill protocol or controlled substance

## 2023-02-27 ENCOUNTER — VIRTUAL VISIT (OUTPATIENT)
Dept: PSYCHOLOGY | Facility: CLINIC | Age: 53
End: 2023-02-27
Payer: COMMERCIAL

## 2023-02-27 DIAGNOSIS — F43.10 POST TRAUMATIC STRESS DISORDER (PTSD): ICD-10-CM

## 2023-02-27 DIAGNOSIS — F90.2 ADHD (ATTENTION DEFICIT HYPERACTIVITY DISORDER), COMBINED TYPE: Primary | ICD-10-CM

## 2023-02-27 DIAGNOSIS — F41.1 GENERALIZED ANXIETY DISORDER: ICD-10-CM

## 2023-02-27 DIAGNOSIS — F33.1 MAJOR DEPRESSIVE DISORDER, RECURRENT EPISODE, MODERATE WITH ANXIOUS DISTRESS (H): ICD-10-CM

## 2023-02-27 PROCEDURE — 90785 PSYTX COMPLEX INTERACTIVE: CPT | Mod: 93 | Performed by: SOCIAL WORKER

## 2023-02-27 PROCEDURE — 90834 PSYTX W PT 45 MINUTES: CPT | Mod: 93 | Performed by: SOCIAL WORKER
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
## 2023-02-28 ENCOUNTER — TELEPHONE (OUTPATIENT)
Dept: FAMILY MEDICINE | Facility: CLINIC | Age: 53
End: 2023-02-28
Payer: MEDICARE

## 2023-03-01 NOTE — TELEPHONE ENCOUNTER
Prior Authorization Retail Medication Request    Medication/Dose: Nutritional Supplements (ENSURE) LIQD  ICD code (if different than what is on RX): Underweight [R63.6]    Previously Tried and Failed:    Rationale:      Insurance Name:  [employer] [facility]  Insurance ID:  [id number]      Pharmacy Information (if different than what is on RX)  Name[facility]  [facility]  Phone:  [phone]

## 2023-03-03 NOTE — TELEPHONE ENCOUNTER
Central Prior Authorization Team  Phone: [phone]        PRIOR AUTHORIZATION DENIED    Medication: Nutritional Supplements (ENSURE) LIQD    Denial Date: 3/3/2023    Denial Rational: benefit exclusion    Appeal Information: n/a

## 2023-03-07 ENCOUNTER — VIRTUAL VISIT (OUTPATIENT)
Dept: PSYCHOLOGY | Facility: CLINIC | Age: 53
End: 2023-03-07
Payer: COMMERCIAL

## 2023-03-07 DIAGNOSIS — F33.1 MAJOR DEPRESSIVE DISORDER, RECURRENT EPISODE, MODERATE WITH ANXIOUS DISTRESS (H): ICD-10-CM

## 2023-03-07 DIAGNOSIS — F90.2 ADHD (ATTENTION DEFICIT HYPERACTIVITY DISORDER), COMBINED TYPE: Primary | ICD-10-CM

## 2023-03-07 DIAGNOSIS — F43.10 POST TRAUMATIC STRESS DISORDER (PTSD): ICD-10-CM

## 2023-03-07 DIAGNOSIS — F41.1 GENERALIZED ANXIETY DISORDER: ICD-10-CM

## 2023-03-07 PROCEDURE — 90834 PSYTX W PT 45 MINUTES: CPT | Mod: 95 | Performed by: SOCIAL WORKER

## 2023-03-07 ASSESSMENT — ANXIETY QUESTIONNAIRES
GAD7 TOTAL SCORE: 15
4. TROUBLE RELAXING: SEVERAL DAYS
7. FEELING AFRAID AS IF SOMETHING AWFUL MIGHT HAPPEN: NEARLY EVERY DAY
1. FEELING NERVOUS, ANXIOUS, OR ON EDGE: NEARLY EVERY DAY
GAD7 TOTAL SCORE: 15
5. BEING SO RESTLESS THAT IT IS HARD TO SIT STILL: SEVERAL DAYS
IF YOU CHECKED OFF ANY PROBLEMS ON THIS QUESTIONNAIRE, HOW DIFFICULT HAVE THESE PROBLEMS MADE IT FOR YOU TO DO YOUR WORK, TAKE CARE OF THINGS AT HOME, OR GET ALONG WITH OTHER PEOPLE: EXTREMELY DIFFICULT
6. BECOMING EASILY ANNOYED OR IRRITABLE: SEVERAL DAYS
3. WORRYING TOO MUCH ABOUT DIFFERENT THINGS: NEARLY EVERY DAY
7. FEELING AFRAID AS IF SOMETHING AWFUL MIGHT HAPPEN: NEARLY EVERY DAY
8. IF YOU CHECKED OFF ANY PROBLEMS, HOW DIFFICULT HAVE THESE MADE IT FOR YOU TO DO YOUR WORK, TAKE CARE OF THINGS AT HOME, OR GET ALONG WITH OTHER PEOPLE?: EXTREMELY DIFFICULT
GAD7 TOTAL SCORE: 15
2. NOT BEING ABLE TO STOP OR CONTROL WORRYING: NEARLY EVERY DAY
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
## 2023-03-13 ENCOUNTER — VIRTUAL VISIT (OUTPATIENT)
Dept: PSYCHOLOGY | Facility: CLINIC | Age: 53
End: 2023-03-13
Payer: COMMERCIAL

## 2023-03-13 DIAGNOSIS — F90.2 ADHD (ATTENTION DEFICIT HYPERACTIVITY DISORDER), COMBINED TYPE: Primary | ICD-10-CM

## 2023-03-13 DIAGNOSIS — F33.1 MAJOR DEPRESSIVE DISORDER, RECURRENT EPISODE, MODERATE WITH ANXIOUS DISTRESS (H): ICD-10-CM

## 2023-03-13 DIAGNOSIS — F41.1 GENERALIZED ANXIETY DISORDER: ICD-10-CM

## 2023-03-13 DIAGNOSIS — F43.10 POST TRAUMATIC STRESS DISORDER (PTSD): ICD-10-CM

## 2023-03-13 PROCEDURE — 90834 PSYTX W PT 45 MINUTES: CPT | Mod: 93 | Performed by: SOCIAL WORKER

## 2023-03-20 ENCOUNTER — VIRTUAL VISIT (OUTPATIENT)
Dept: PSYCHOLOGY | Facility: CLINIC | Age: 53
End: 2023-03-20
Payer: COMMERCIAL

## 2023-03-20 DIAGNOSIS — F41.1 GENERALIZED ANXIETY DISORDER: ICD-10-CM

## 2023-03-20 DIAGNOSIS — F33.1 MAJOR DEPRESSIVE DISORDER, RECURRENT EPISODE, MODERATE WITH ANXIOUS DISTRESS (H): ICD-10-CM

## 2023-03-20 DIAGNOSIS — F43.10 POST TRAUMATIC STRESS DISORDER (PTSD): ICD-10-CM

## 2023-03-20 DIAGNOSIS — F90.2 ADHD (ATTENTION DEFICIT HYPERACTIVITY DISORDER), COMBINED TYPE: Primary | ICD-10-CM

## 2023-03-20 PROCEDURE — 90834 PSYTX W PT 45 MINUTES: CPT | Mod: 93 | Performed by: SOCIAL WORKER

## 2023-03-20 ASSESSMENT — PATIENT HEALTH QUESTIONNAIRE - PHQ9
SUM OF ALL RESPONSES TO PHQ QUESTIONS 1-9: 9
10. IF YOU CHECKED OFF ANY PROBLEMS, HOW DIFFICULT HAVE THESE PROBLEMS MADE IT FOR YOU TO DO YOUR WORK, TAKE CARE OF THINGS AT HOME, OR GET ALONG WITH OTHER PEOPLE: VERY DIFFICULT
SUM OF ALL RESPONSES TO PHQ QUESTIONS 1-9: 9
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
## 2023-03-27 ENCOUNTER — VIRTUAL VISIT (OUTPATIENT)
Dept: PSYCHOLOGY | Facility: CLINIC | Age: 53
End: 2023-03-27
Payer: COMMERCIAL

## 2023-03-27 ENCOUNTER — MYC REFILL (OUTPATIENT)
Dept: FAMILY MEDICINE | Facility: CLINIC | Age: 53
End: 2023-03-27
Payer: MEDICARE

## 2023-03-27 DIAGNOSIS — F41.1 GENERALIZED ANXIETY DISORDER: ICD-10-CM

## 2023-03-27 DIAGNOSIS — F33.1 MAJOR DEPRESSIVE DISORDER, RECURRENT EPISODE, MODERATE WITH ANXIOUS DISTRESS (H): ICD-10-CM

## 2023-03-27 DIAGNOSIS — G89.29 CHRONIC BILATERAL LOW BACK PAIN WITHOUT SCIATICA: ICD-10-CM

## 2023-03-27 DIAGNOSIS — M79.7 FIBROMYALGIA: ICD-10-CM

## 2023-03-27 DIAGNOSIS — F43.10 POST TRAUMATIC STRESS DISORDER (PTSD): ICD-10-CM

## 2023-03-27 DIAGNOSIS — M54.2 CERVICALGIA: ICD-10-CM

## 2023-03-27 DIAGNOSIS — G89.4 CHRONIC PAIN SYNDROME: ICD-10-CM

## 2023-03-27 DIAGNOSIS — F90.2 ADHD (ATTENTION DEFICIT HYPERACTIVITY DISORDER), COMBINED TYPE: Primary | ICD-10-CM

## 2023-03-27 DIAGNOSIS — M54.50 CHRONIC BILATERAL LOW BACK PAIN WITHOUT SCIATICA: ICD-10-CM

## 2023-03-27 DIAGNOSIS — J31.0 CHRONIC RHINITIS: ICD-10-CM

## 2023-03-27 PROCEDURE — 90834 PSYTX W PT 45 MINUTES: CPT | Mod: 93 | Performed by: SOCIAL WORKER

## 2023-03-27 ASSESSMENT — ANXIETY QUESTIONNAIRES
4. TROUBLE RELAXING: SEVERAL DAYS
8. IF YOU CHECKED OFF ANY PROBLEMS, HOW DIFFICULT HAVE THESE MADE IT FOR YOU TO DO YOUR WORK, TAKE CARE OF THINGS AT HOME, OR GET ALONG WITH OTHER PEOPLE?: VERY DIFFICULT
GAD7 TOTAL SCORE: 13
GAD7 TOTAL SCORE: 13
3. WORRYING TOO MUCH ABOUT DIFFERENT THINGS: NEARLY EVERY DAY
6. BECOMING EASILY ANNOYED OR IRRITABLE: SEVERAL DAYS
7. FEELING AFRAID AS IF SOMETHING AWFUL MIGHT HAPPEN: NEARLY EVERY DAY
1. FEELING NERVOUS, ANXIOUS, OR ON EDGE: MORE THAN HALF THE DAYS
IF YOU CHECKED OFF ANY PROBLEMS ON THIS QUESTIONNAIRE, HOW DIFFICULT HAVE THESE PROBLEMS MADE IT FOR YOU TO DO YOUR WORK, TAKE CARE OF THINGS AT HOME, OR GET ALONG WITH OTHER PEOPLE: VERY DIFFICULT
2. NOT BEING ABLE TO STOP OR CONTROL WORRYING: NEARLY EVERY DAY
7. FEELING AFRAID AS IF SOMETHING AWFUL MIGHT HAPPEN: NEARLY EVERY DAY
5. BEING SO RESTLESS THAT IT IS HARD TO SIT STILL: NOT AT ALL
GAD7 TOTAL SCORE: 13

## 2023-03-27 ASSESSMENT — PATIENT HEALTH QUESTIONNAIRE - PHQ9
SUM OF ALL RESPONSES TO PHQ QUESTIONS 1-9: 8
10. IF YOU CHECKED OFF ANY PROBLEMS, HOW DIFFICULT HAVE THESE PROBLEMS MADE IT FOR YOU TO DO YOUR WORK, TAKE CARE OF THINGS AT HOME, OR GET ALONG WITH OTHER PEOPLE: VERY DIFFICULT
SUM OF ALL RESPONSES TO PHQ QUESTIONS 1-9: 8

## 2023-03-27 NOTE — TELEPHONE ENCOUNTER
Requested Prescriptions   Pending Prescriptions Disp Refills     HYDROcodone-acetaminophen (NORCO) 5-325 MG tablet 195 tablet 0     Sig: Take 2.5 tablets by mouth every morning AND 2.5 tablets daily (with lunch) AND 1.5 tablets At Bedtime. Max of 6.5 tablets/d Strength: 5-325 mg       There is no refill protocol information for this order        Routing refill request to provider for review/approval because:  Drug not on the [facility], [facility] or [facility] refill protocol or controlled substance
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
## 2023-03-28 RX ORDER — HYDROCODONE BITARTRATE AND ACETAMINOPHEN 5; 325 MG/1; MG/1
TABLET ORAL
Qty: 195 TABLET | Refills: 0 | Status: SHIPPED | OUTPATIENT
Start: 2023-03-28 | End: 2023-04-25

## 2023-03-30 RX ORDER — CETIRIZINE HYDROCHLORIDE 10 MG/1
10 TABLET ORAL DAILY
Qty: 90 TABLET | Refills: 0 | Status: SHIPPED | OUTPATIENT
Start: 2023-03-30 | End: 2023-06-23

## 2023-04-02 ENCOUNTER — HEALTH MAINTENANCE LETTER (OUTPATIENT)
Age: 53
End: 2023-04-02

## 2023-04-12 ENCOUNTER — VIRTUAL VISIT (OUTPATIENT)
Dept: PSYCHOLOGY | Facility: CLINIC | Age: 53
End: 2023-04-12
Payer: COMMERCIAL

## 2023-04-12 DIAGNOSIS — F41.1 GENERALIZED ANXIETY DISORDER: ICD-10-CM

## 2023-04-12 DIAGNOSIS — F43.10 POST TRAUMATIC STRESS DISORDER (PTSD): ICD-10-CM

## 2023-04-12 DIAGNOSIS — F33.1 MAJOR DEPRESSIVE DISORDER, RECURRENT EPISODE, MODERATE WITH ANXIOUS DISTRESS (H): ICD-10-CM

## 2023-04-12 DIAGNOSIS — F90.2 ADHD (ATTENTION DEFICIT HYPERACTIVITY DISORDER), COMBINED TYPE: Primary | ICD-10-CM

## 2023-04-12 PROCEDURE — 90834 PSYTX W PT 45 MINUTES: CPT | Mod: 93 | Performed by: SOCIAL WORKER

## 2023-04-12 ASSESSMENT — ANXIETY QUESTIONNAIRES
GAD7 TOTAL SCORE: 14
5. BEING SO RESTLESS THAT IT IS HARD TO SIT STILL: SEVERAL DAYS
4. TROUBLE RELAXING: SEVERAL DAYS
7. FEELING AFRAID AS IF SOMETHING AWFUL MIGHT HAPPEN: NEARLY EVERY DAY
6. BECOMING EASILY ANNOYED OR IRRITABLE: SEVERAL DAYS
3. WORRYING TOO MUCH ABOUT DIFFERENT THINGS: NEARLY EVERY DAY
IF YOU CHECKED OFF ANY PROBLEMS ON THIS QUESTIONNAIRE, HOW DIFFICULT HAVE THESE PROBLEMS MADE IT FOR YOU TO DO YOUR WORK, TAKE CARE OF THINGS AT HOME, OR GET ALONG WITH OTHER PEOPLE: VERY DIFFICULT
GAD7 TOTAL SCORE: 14
2. NOT BEING ABLE TO STOP OR CONTROL WORRYING: NEARLY EVERY DAY
1. FEELING NERVOUS, ANXIOUS, OR ON EDGE: MORE THAN HALF THE DAYS

## 2023-04-12 ASSESSMENT — PATIENT HEALTH QUESTIONNAIRE - PHQ9: SUM OF ALL RESPONSES TO PHQ QUESTIONS 1-9: 16
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
## 2023-04-17 ENCOUNTER — VIRTUAL VISIT (OUTPATIENT)
Dept: PSYCHOLOGY | Facility: CLINIC | Age: 53
End: 2023-04-17
Payer: COMMERCIAL

## 2023-04-17 DIAGNOSIS — F33.1 MAJOR DEPRESSIVE DISORDER, RECURRENT EPISODE, MODERATE WITH ANXIOUS DISTRESS (H): ICD-10-CM

## 2023-04-17 DIAGNOSIS — F43.10 POST TRAUMATIC STRESS DISORDER (PTSD): ICD-10-CM

## 2023-04-17 DIAGNOSIS — F90.2 ADHD (ATTENTION DEFICIT HYPERACTIVITY DISORDER), COMBINED TYPE: Primary | ICD-10-CM

## 2023-04-17 DIAGNOSIS — F41.1 GENERALIZED ANXIETY DISORDER: ICD-10-CM

## 2023-04-17 PROCEDURE — 90834 PSYTX W PT 45 MINUTES: CPT | Mod: 93 | Performed by: SOCIAL WORKER

## 2023-04-17 ASSESSMENT — ANXIETY QUESTIONNAIRES
IF YOU CHECKED OFF ANY PROBLEMS ON THIS QUESTIONNAIRE, HOW DIFFICULT HAVE THESE PROBLEMS MADE IT FOR YOU TO DO YOUR WORK, TAKE CARE OF THINGS AT HOME, OR GET ALONG WITH OTHER PEOPLE: EXTREMELY DIFFICULT
3. WORRYING TOO MUCH ABOUT DIFFERENT THINGS: NEARLY EVERY DAY
7. FEELING AFRAID AS IF SOMETHING AWFUL MIGHT HAPPEN: NEARLY EVERY DAY
GAD7 TOTAL SCORE: 12
2. NOT BEING ABLE TO STOP OR CONTROL WORRYING: NEARLY EVERY DAY
1. FEELING NERVOUS, ANXIOUS, OR ON EDGE: MORE THAN HALF THE DAYS
7. FEELING AFRAID AS IF SOMETHING AWFUL MIGHT HAPPEN: NEARLY EVERY DAY
GAD7 TOTAL SCORE: 12
8. IF YOU CHECKED OFF ANY PROBLEMS, HOW DIFFICULT HAVE THESE MADE IT FOR YOU TO DO YOUR WORK, TAKE CARE OF THINGS AT HOME, OR GET ALONG WITH OTHER PEOPLE?: EXTREMELY DIFFICULT
5. BEING SO RESTLESS THAT IT IS HARD TO SIT STILL: NOT AT ALL
4. TROUBLE RELAXING: NOT AT ALL
GAD7 TOTAL SCORE: 12
6. BECOMING EASILY ANNOYED OR IRRITABLE: SEVERAL DAYS
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
## 2023-04-25 ENCOUNTER — MYC REFILL (OUTPATIENT)
Dept: FAMILY MEDICINE | Facility: CLINIC | Age: 53
End: 2023-04-25
Payer: MEDICARE

## 2023-04-25 ENCOUNTER — VIRTUAL VISIT (OUTPATIENT)
Dept: PSYCHOLOGY | Facility: CLINIC | Age: 53
End: 2023-04-25
Payer: MEDICARE

## 2023-04-25 DIAGNOSIS — G89.29 CHRONIC BILATERAL LOW BACK PAIN WITHOUT SCIATICA: ICD-10-CM

## 2023-04-25 DIAGNOSIS — F33.1 MAJOR DEPRESSIVE DISORDER, RECURRENT EPISODE, MODERATE WITH ANXIOUS DISTRESS (H): ICD-10-CM

## 2023-04-25 DIAGNOSIS — M54.2 CERVICALGIA: ICD-10-CM

## 2023-04-25 DIAGNOSIS — F90.2 ADHD (ATTENTION DEFICIT HYPERACTIVITY DISORDER), COMBINED TYPE: Primary | ICD-10-CM

## 2023-04-25 DIAGNOSIS — G89.4 CHRONIC PAIN SYNDROME: ICD-10-CM

## 2023-04-25 DIAGNOSIS — F43.10 POST TRAUMATIC STRESS DISORDER (PTSD): ICD-10-CM

## 2023-04-25 DIAGNOSIS — F41.1 GENERALIZED ANXIETY DISORDER: ICD-10-CM

## 2023-04-25 DIAGNOSIS — M79.7 FIBROMYALGIA: ICD-10-CM

## 2023-04-25 DIAGNOSIS — M54.50 CHRONIC BILATERAL LOW BACK PAIN WITHOUT SCIATICA: ICD-10-CM

## 2023-04-25 PROCEDURE — 90834 PSYTX W PT 45 MINUTES: CPT | Mod: 95 | Performed by: SOCIAL WORKER

## 2023-04-25 PROCEDURE — 90785 PSYTX COMPLEX INTERACTIVE: CPT | Mod: 95 | Performed by: SOCIAL WORKER

## 2023-04-25 NOTE — TELEPHONE ENCOUNTER
HYDROcodone-acetaminophen (NORCO) 5-325 MG tablet         Last Written Prescription Date:  3/28/23  Last Fill Quantity: 195,   # refills: 0  Last Office Visit: 2/6/23  Future Office visit:       Routing refill request to provider for review/approval because:  Drug not on the [facility], [facility] or [facility] refill protocol or controlled substance
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
## 2023-04-26 RX ORDER — HYDROCODONE BITARTRATE AND ACETAMINOPHEN 5; 325 MG/1; MG/1
TABLET ORAL
Qty: 195 TABLET | Refills: 0 | Status: SHIPPED | OUTPATIENT
Start: 2023-04-27 | End: 2023-05-24

## 2023-04-26 NOTE — TELEPHONE ENCOUNTER
Please call patient to inform of limited refills  Patient needs follow up appointment prior to additional refills.  Electronically signed by MD Jim Martinez MD

## 2023-04-27 ENCOUNTER — MYC MEDICAL ADVICE (OUTPATIENT)
Dept: FAMILY MEDICINE | Facility: CLINIC | Age: 53
End: 2023-04-27
Payer: MEDICARE

## 2023-04-27 NOTE — TELEPHONE ENCOUNTER
Patient informed via mychart to schedule. 5/2 reminder if not read to send letter.     Closing encounter.   Miranda Heaton MA    No

## 2023-04-28 ENCOUNTER — TELEPHONE (OUTPATIENT)
Dept: FAMILY MEDICINE | Facility: CLINIC | Age: 53
End: 2023-04-28
Payer: MEDICARE

## 2023-04-28 NOTE — TELEPHONE ENCOUNTER
Central Prior Authorization Team   Phone: [phone]    PA Initiation    Medication: HYDROcodone-acetaminophen (NORCO) 5-325 MG tablet  Insurance Company: [employer]   Pharmacy Filling the Rx: [facility] - [place], [place] - [address]  Filling Pharmacy Phone: [phone]  Filling Pharmacy Fax:    Start Date: 4/28/2023    Calling [employer] limited Net-  [phone]      Initiated request over the phone.

## 2023-04-28 NOTE — TELEPHONE ENCOUNTER
Prior Authorization Approval    Authorization Effective Date: 4/28/2023  Authorization Expiration Date: 12/31/23   Medication:   Approved Dose/Quantity:   Reference #:     Insurance Company: [employer] - Phone [phone] Fax [phone]  Expected CoPay:       CoPay Card Available:      Foundation Assistance Needed:    Which Pharmacy is filling the prescription (Not needed for infusion/clinic administered): [facility] - [place], [place] - [address]  Pharmacy Notified: Yes  Patient Notified: Yes    Per rep this has been approved. Pharmacy has paid claim and will contact patient when this is ready for .

## 2023-04-28 NOTE — TELEPHONE ENCOUNTER
Prior Authorization Retail Medication Request    Medication/Dose: HYDROcodone-acetaminophen (NORCO) 5-325 MG tablet  ICD code (if different than what is on RX):    Fibromyalgia [M79.7]       Chronic bilateral low back pain without sciatica [M54.50, G89.29]       Cervicalgia [M54.2]       Chronic pain syndrome [G89.4]           Previously Tried and Failed:  na  Rationale:  na    Insurance Name:  MEDICARE  Insurance ID:  [id number]  [facility]  [id number]    Pharmacy Information (if different than what is on RX)  Name:  [facility]  Phone:  [phone]

## 2023-05-01 ENCOUNTER — VIRTUAL VISIT (OUTPATIENT)
Dept: PSYCHOLOGY | Facility: CLINIC | Age: 53
End: 2023-05-01
Payer: MEDICARE

## 2023-05-01 DIAGNOSIS — F33.1 MAJOR DEPRESSIVE DISORDER, RECURRENT EPISODE, MODERATE WITH ANXIOUS DISTRESS (H): ICD-10-CM

## 2023-05-01 DIAGNOSIS — F43.10 POST TRAUMATIC STRESS DISORDER (PTSD): ICD-10-CM

## 2023-05-01 DIAGNOSIS — F90.2 ADHD (ATTENTION DEFICIT HYPERACTIVITY DISORDER), COMBINED TYPE: Primary | ICD-10-CM

## 2023-05-01 DIAGNOSIS — F41.1 GENERALIZED ANXIETY DISORDER: ICD-10-CM

## 2023-05-01 PROCEDURE — 90834 PSYTX W PT 45 MINUTES: CPT | Mod: 95 | Performed by: SOCIAL WORKER

## 2023-05-01 PROCEDURE — 90785 PSYTX COMPLEX INTERACTIVE: CPT | Mod: 95 | Performed by: SOCIAL WORKER
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
## 2023-05-08 ENCOUNTER — VIRTUAL VISIT (OUTPATIENT)
Dept: PSYCHOLOGY | Facility: CLINIC | Age: 53
End: 2023-05-08
Payer: MEDICARE

## 2023-05-08 DIAGNOSIS — F43.10 POST TRAUMATIC STRESS DISORDER (PTSD): ICD-10-CM

## 2023-05-08 DIAGNOSIS — F41.1 GENERALIZED ANXIETY DISORDER: ICD-10-CM

## 2023-05-08 DIAGNOSIS — F33.1 MAJOR DEPRESSIVE DISORDER, RECURRENT EPISODE, MODERATE WITH ANXIOUS DISTRESS (H): ICD-10-CM

## 2023-05-08 DIAGNOSIS — F90.2 ADHD (ATTENTION DEFICIT HYPERACTIVITY DISORDER), COMBINED TYPE: Primary | ICD-10-CM

## 2023-05-08 PROCEDURE — 90834 PSYTX W PT 45 MINUTES: CPT | Mod: 95 | Performed by: SOCIAL WORKER

## 2023-05-08 ASSESSMENT — ANXIETY QUESTIONNAIRES
GAD7 TOTAL SCORE: 14
1. FEELING NERVOUS, ANXIOUS, OR ON EDGE: SEVERAL DAYS
2. NOT BEING ABLE TO STOP OR CONTROL WORRYING: NEARLY EVERY DAY
7. FEELING AFRAID AS IF SOMETHING AWFUL MIGHT HAPPEN: NEARLY EVERY DAY
GAD7 TOTAL SCORE: 14
4. TROUBLE RELAXING: SEVERAL DAYS
6. BECOMING EASILY ANNOYED OR IRRITABLE: NEARLY EVERY DAY
3. WORRYING TOO MUCH ABOUT DIFFERENT THINGS: NEARLY EVERY DAY
IF YOU CHECKED OFF ANY PROBLEMS ON THIS QUESTIONNAIRE, HOW DIFFICULT HAVE THESE PROBLEMS MADE IT FOR YOU TO DO YOUR WORK, TAKE CARE OF THINGS AT HOME, OR GET ALONG WITH OTHER PEOPLE: EXTREMELY DIFFICULT
5. BEING SO RESTLESS THAT IT IS HARD TO SIT STILL: NOT AT ALL

## 2023-05-08 ASSESSMENT — PATIENT HEALTH QUESTIONNAIRE - PHQ9: SUM OF ALL RESPONSES TO PHQ QUESTIONS 1-9: 16
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
## 2023-05-15 NOTE — TELEPHONE ENCOUNTER
Reason for Call:  Appointment Request    Patient requesting this type of appt: Chronic Diease Management/Medication/Follow-Up    Requested provider: Jim Edwards    Reason patient unable to be scheduled: Needs to be scheduled by clinic    When does patient want to be seen/preferred time: Same day    Comments: pcp  sent message requesting appt follow up .    Could we send this information to you in [facility] or would you prefer to receive a phone call?:   Patient would prefer a phone call   Okay to leave a detailed message?: Yes at Home number on file [phone] (home)    Call taken on 5/15/2023 at 1:48 PM by Sloane Ventura

## 2023-05-16 ENCOUNTER — VIRTUAL VISIT (OUTPATIENT)
Dept: PSYCHOLOGY | Facility: CLINIC | Age: 53
End: 2023-05-16
Payer: MEDICARE

## 2023-05-16 DIAGNOSIS — F41.1 GENERALIZED ANXIETY DISORDER: ICD-10-CM

## 2023-05-16 DIAGNOSIS — F43.10 POST TRAUMATIC STRESS DISORDER (PTSD): ICD-10-CM

## 2023-05-16 DIAGNOSIS — F33.1 MAJOR DEPRESSIVE DISORDER, RECURRENT EPISODE, MODERATE WITH ANXIOUS DISTRESS (H): ICD-10-CM

## 2023-05-16 DIAGNOSIS — F90.2 ADHD (ATTENTION DEFICIT HYPERACTIVITY DISORDER), COMBINED TYPE: Primary | ICD-10-CM

## 2023-05-16 PROCEDURE — 90834 PSYTX W PT 45 MINUTES: CPT | Mod: 95 | Performed by: SOCIAL WORKER

## 2023-05-16 ASSESSMENT — ANXIETY QUESTIONNAIRES
GAD7 TOTAL SCORE: 18
5. BEING SO RESTLESS THAT IT IS HARD TO SIT STILL: SEVERAL DAYS
6. BECOMING EASILY ANNOYED OR IRRITABLE: NEARLY EVERY DAY
4. TROUBLE RELAXING: MORE THAN HALF THE DAYS
2. NOT BEING ABLE TO STOP OR CONTROL WORRYING: NEARLY EVERY DAY
7. FEELING AFRAID AS IF SOMETHING AWFUL MIGHT HAPPEN: NEARLY EVERY DAY
8. IF YOU CHECKED OFF ANY PROBLEMS, HOW DIFFICULT HAVE THESE MADE IT FOR YOU TO DO YOUR WORK, TAKE CARE OF THINGS AT HOME, OR GET ALONG WITH OTHER PEOPLE?: EXTREMELY DIFFICULT
7. FEELING AFRAID AS IF SOMETHING AWFUL MIGHT HAPPEN: NEARLY EVERY DAY
1. FEELING NERVOUS, ANXIOUS, OR ON EDGE: NEARLY EVERY DAY
1. FEELING NERVOUS, ANXIOUS, OR ON EDGE: NEARLY EVERY DAY
IF YOU CHECKED OFF ANY PROBLEMS ON THIS QUESTIONNAIRE, HOW DIFFICULT HAVE THESE PROBLEMS MADE IT FOR YOU TO DO YOUR WORK, TAKE CARE OF THINGS AT HOME, OR GET ALONG WITH OTHER PEOPLE: EXTREMELY DIFFICULT
5. BEING SO RESTLESS THAT IT IS HARD TO SIT STILL: SEVERAL DAYS
2. NOT BEING ABLE TO STOP OR CONTROL WORRYING: NEARLY EVERY DAY
GAD7 TOTAL SCORE: 18
3. WORRYING TOO MUCH ABOUT DIFFERENT THINGS: NEARLY EVERY DAY
8. IF YOU CHECKED OFF ANY PROBLEMS, HOW DIFFICULT HAVE THESE MADE IT FOR YOU TO DO YOUR WORK, TAKE CARE OF THINGS AT HOME, OR GET ALONG WITH OTHER PEOPLE?: EXTREMELY DIFFICULT
IF YOU CHECKED OFF ANY PROBLEMS ON THIS QUESTIONNAIRE, HOW DIFFICULT HAVE THESE PROBLEMS MADE IT FOR YOU TO DO YOUR WORK, TAKE CARE OF THINGS AT HOME, OR GET ALONG WITH OTHER PEOPLE: EXTREMELY DIFFICULT
GAD7 TOTAL SCORE: 18
3. WORRYING TOO MUCH ABOUT DIFFERENT THINGS: NEARLY EVERY DAY
4. TROUBLE RELAXING: MORE THAN HALF THE DAYS
6. BECOMING EASILY ANNOYED OR IRRITABLE: NEARLY EVERY DAY
GAD7 TOTAL SCORE: 18

## 2023-05-16 ASSESSMENT — PATIENT HEALTH QUESTIONNAIRE - PHQ9
10. IF YOU CHECKED OFF ANY PROBLEMS, HOW DIFFICULT HAVE THESE PROBLEMS MADE IT FOR YOU TO DO YOUR WORK, TAKE CARE OF THINGS AT HOME, OR GET ALONG WITH OTHER PEOPLE: EXTREMELY DIFFICULT
SUM OF ALL RESPONSES TO PHQ QUESTIONS 1-9: 13
10. IF YOU CHECKED OFF ANY PROBLEMS, HOW DIFFICULT HAVE THESE PROBLEMS MADE IT FOR YOU TO DO YOUR WORK, TAKE CARE OF THINGS AT HOME, OR GET ALONG WITH OTHER PEOPLE: EXTREMELY DIFFICULT
SUM OF ALL RESPONSES TO PHQ QUESTIONS 1-9: 13
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
## 2023-05-22 ENCOUNTER — VIRTUAL VISIT (OUTPATIENT)
Dept: PSYCHOLOGY | Facility: CLINIC | Age: 53
End: 2023-05-22
Payer: MEDICARE

## 2023-05-22 DIAGNOSIS — F90.2 ADHD (ATTENTION DEFICIT HYPERACTIVITY DISORDER), COMBINED TYPE: Primary | ICD-10-CM

## 2023-05-22 DIAGNOSIS — F41.1 GENERALIZED ANXIETY DISORDER: ICD-10-CM

## 2023-05-22 DIAGNOSIS — F33.1 MAJOR DEPRESSIVE DISORDER, RECURRENT EPISODE, MODERATE WITH ANXIOUS DISTRESS (H): ICD-10-CM

## 2023-05-22 PROCEDURE — 90834 PSYTX W PT 45 MINUTES: CPT | Mod: 95 | Performed by: SOCIAL WORKER
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
## 2023-05-24 DIAGNOSIS — G89.4 CHRONIC PAIN SYNDROME: ICD-10-CM

## 2023-05-24 DIAGNOSIS — M79.7 FIBROMYALGIA: ICD-10-CM

## 2023-05-24 DIAGNOSIS — M54.50 CHRONIC BILATERAL LOW BACK PAIN WITHOUT SCIATICA: ICD-10-CM

## 2023-05-24 DIAGNOSIS — G89.29 CHRONIC BILATERAL LOW BACK PAIN WITHOUT SCIATICA: ICD-10-CM

## 2023-05-24 DIAGNOSIS — M54.2 CERVICALGIA: ICD-10-CM

## 2023-05-24 RX ORDER — HYDROCODONE BITARTRATE AND ACETAMINOPHEN 5; 325 MG/1; MG/1
TABLET ORAL
Qty: 195 TABLET | Refills: 0 | Status: SHIPPED | OUTPATIENT
Start: 2023-05-28 | End: 2023-06-05

## 2023-05-30 ENCOUNTER — VIRTUAL VISIT (OUTPATIENT)
Dept: PSYCHOLOGY | Facility: CLINIC | Age: 53
End: 2023-05-30
Payer: MEDICARE

## 2023-05-30 DIAGNOSIS — F41.1 GENERALIZED ANXIETY DISORDER: ICD-10-CM

## 2023-05-30 DIAGNOSIS — F33.1 MAJOR DEPRESSIVE DISORDER, RECURRENT EPISODE, MODERATE WITH ANXIOUS DISTRESS (H): ICD-10-CM

## 2023-05-30 DIAGNOSIS — F43.10 POST TRAUMATIC STRESS DISORDER (PTSD): ICD-10-CM

## 2023-05-30 DIAGNOSIS — F90.2 ADHD (ATTENTION DEFICIT HYPERACTIVITY DISORDER), COMBINED TYPE: Primary | ICD-10-CM

## 2023-05-30 PROCEDURE — 90834 PSYTX W PT 45 MINUTES: CPT | Mod: 95 | Performed by: SOCIAL WORKER

## 2023-05-30 ASSESSMENT — PATIENT HEALTH QUESTIONNAIRE - PHQ9
SUM OF ALL RESPONSES TO PHQ QUESTIONS 1-9: 9
SUM OF ALL RESPONSES TO PHQ QUESTIONS 1-9: 9
10. IF YOU CHECKED OFF ANY PROBLEMS, HOW DIFFICULT HAVE THESE PROBLEMS MADE IT FOR YOU TO DO YOUR WORK, TAKE CARE OF THINGS AT HOME, OR GET ALONG WITH OTHER PEOPLE: VERY DIFFICULT

## 2023-05-30 ASSESSMENT — ANXIETY QUESTIONNAIRES
3. WORRYING TOO MUCH ABOUT DIFFERENT THINGS: NEARLY EVERY DAY
2. NOT BEING ABLE TO STOP OR CONTROL WORRYING: NEARLY EVERY DAY
4. TROUBLE RELAXING: SEVERAL DAYS
GAD7 TOTAL SCORE: 13
7. FEELING AFRAID AS IF SOMETHING AWFUL MIGHT HAPPEN: NEARLY EVERY DAY
7. FEELING AFRAID AS IF SOMETHING AWFUL MIGHT HAPPEN: NEARLY EVERY DAY
5. BEING SO RESTLESS THAT IT IS HARD TO SIT STILL: SEVERAL DAYS
GAD7 TOTAL SCORE: 13
6. BECOMING EASILY ANNOYED OR IRRITABLE: SEVERAL DAYS
8. IF YOU CHECKED OFF ANY PROBLEMS, HOW DIFFICULT HAVE THESE MADE IT FOR YOU TO DO YOUR WORK, TAKE CARE OF THINGS AT HOME, OR GET ALONG WITH OTHER PEOPLE?: VERY DIFFICULT
IF YOU CHECKED OFF ANY PROBLEMS ON THIS QUESTIONNAIRE, HOW DIFFICULT HAVE THESE PROBLEMS MADE IT FOR YOU TO DO YOUR WORK, TAKE CARE OF THINGS AT HOME, OR GET ALONG WITH OTHER PEOPLE: VERY DIFFICULT
GAD7 TOTAL SCORE: 13
1. FEELING NERVOUS, ANXIOUS, OR ON EDGE: SEVERAL DAYS

## 2023-06-05 ENCOUNTER — VIRTUAL VISIT (OUTPATIENT)
Dept: FAMILY MEDICINE | Facility: CLINIC | Age: 53
End: 2023-06-05
Payer: MEDICARE

## 2023-06-05 DIAGNOSIS — G89.29 CHRONIC BILATERAL LOW BACK PAIN WITHOUT SCIATICA: ICD-10-CM

## 2023-06-05 DIAGNOSIS — F11.90 CHRONIC, CONTINUOUS USE OF OPIOIDS: ICD-10-CM

## 2023-06-05 DIAGNOSIS — M54.50 CHRONIC BILATERAL LOW BACK PAIN WITHOUT SCIATICA: ICD-10-CM

## 2023-06-05 DIAGNOSIS — J31.0 CHRONIC RHINITIS: ICD-10-CM

## 2023-06-05 DIAGNOSIS — G89.4 CHRONIC PAIN SYNDROME: ICD-10-CM

## 2023-06-05 DIAGNOSIS — M79.7 FIBROMYALGIA: ICD-10-CM

## 2023-06-05 DIAGNOSIS — M54.2 CERVICALGIA: Primary | ICD-10-CM

## 2023-06-05 PROCEDURE — 99214 OFFICE O/P EST MOD 30 MIN: CPT | Mod: VID | Performed by: FAMILY MEDICINE

## 2023-06-05 RX ORDER — FLUTICASONE PROPIONATE 50 MCG
1-2 SPRAY, SUSPENSION (ML) NASAL DAILY
Qty: 15.8 ML | Refills: 5 | Status: SHIPPED | OUTPATIENT
Start: 2023-06-05 | End: 2024-04-17

## 2023-06-05 RX ORDER — HYDROCODONE BITARTRATE AND ACETAMINOPHEN 5; 325 MG/1; MG/1
TABLET ORAL
Qty: 195 TABLET | Refills: 0 | Status: SHIPPED | OUTPATIENT
Start: 2023-06-27 | End: 2023-06-27

## 2023-06-05 NOTE — PROGRESS NOTES
Sherie is a 52 year old who is being evaluated via a billable video visit.      How would you like to obtain your AVS? MyChart  If the video visit is dropped, the invitation should be resent by: Text to cell phone: [phone]  Will anyone else be joining your video visit? No    Patient completed E-check in. Questionnaires were blown in and Patient checked in without being called. Any additional information will need to be completed by the provider.    Assessment & Plan     Cervicalgia  Sherie Otero is a 52 year old female who presents for follow up of chronic pain with use of chronic narcotics for many years under the supervision and care of Dr. Castro who recently retired. Her chronic pain is due to RA and cervical spondylosis with stenosis. She was managed initially through [facility] and then referred back to PCP for ongoing CCS management. She is currently using Norco for her chronic pain and has been on stable dose for many years. She has had previous SHANE without benefit. She had previously done PT but none recently. She is not on any other non narcotic pain medication or anti rheumatics. She has prior NSAID induced gastritis. She has general anxiety/Panic and is followed by psychiatry and prescribed Klonopin. She was recently started on Wellbutrin and Rexulty by psychiatry. She is also certified for medical cannabis by me with the support of Dr. Castro.   She currently has CSA signed and UDS last completed 6 months ago. MNPMP reviewed with patient. Her daily MME is 32.5 and her ORS is 200 giving her a 10X risk of unintentional overdose death. We reviewed the plan for ongoing management. She does not need refills today. We will follow up in person every 3 months and will re sign CSA at next in person visit. Anticipate tapering Norco and consider adding Cymbalta. She has listed allergies to gabapentin and Lyrica.  Follow up in person in 1 month.   - HYDROcodone-acetaminophen (NORCO) 5-325 MG tablet; TAKE  2 AND 1/2 TABLETS BY MOUTH EVERY MORNING AND 2 AND 1/2 TABLETS WITH LUNCH AND 1 AND 1/2 TABLETS AT BEDTIME MAX OF 6 AND 1/2 TABLETS DAILY *CAUTION: OPIOID. RISK OF OVERDOSE AND ADDICTION.    Chronic, continuous use of opioids  Sherie Otero is a 52 year old female who presents for follow up of chronic pain with use of chronic narcotics for many years under the supervision and care of Dr. Castro who recently retired. Her chronic pain is due to RA and cervical spondylosis with stenosis. She was managed initially through Pain Clinic and then referred back to PCP for ongoing CCS management. She is currently using Norco for her chronic pain and has been on stable dose for many years. She has had previous SHANE without benefit. She had previously done PT but none recently. She is not on any other non narcotic pain medication or anti rheumatics. She has prior NSAID induced gastritis. She has general anxiety/Panic and is followed by psychiatry and prescribed Klonopin. She was recently started on Wellbutrin and Rexulty by psychiatry. She is also certified for medical cannabis by me with the support of Dr. Castro.   She currently has CSA signed and UDS last completed 6 months ago. MNPMP reviewed with patient. Her daily MME is 32.5 and her ORS is 200 giving her a 10X risk of unintentional overdose death. We reviewed the plan for ongoing management. She does not need refills today. We will follow up in person every 3 months and will re sign CSA at next in person visit. Anticipate tapering Norco and consider adding Cymbalta. She has listed allergies to gabapentin and Lyrica.  Follow up in person in 1 month. She will need UDS and CSA at that time.  - PRIMARY CARE FOLLOW-UP SCHEDULING; Future    Fibromyalgia  Sherie Otero is a 52 year old female who presents for follow up of chronic pain with use of chronic narcotics for many years under the supervision and care of Dr. Castro who recently retired. Her chronic pain is due to  RA and cervical spondylosis with stenosis. She was managed initially through Pain Clinic and then referred back to PCP for ongoing CCS management. She is currently using Norco for her chronic pain and has been on stable dose for many years. She has had previous SHANE without benefit. She had previously done PT but none recently. She is not on any other non narcotic pain medication or anti rheumatics. She has prior NSAID induced gastritis. She has general anxiety/Panic and is followed by psychiatry and prescribed Klonopin. She was recently started on Wellbutrin and Rexulty by psychiatry. She is also certified for medical cannabis by me with the support of Dr. Castro.   She currently has CSA signed and UDS last completed 6 months ago. MNPMP reviewed with patient. Her daily MME is 32.5 and her ORS is 200 giving her a 10X risk of unintentional overdose death. We reviewed the plan for ongoing management. She does not need refills today. We will follow up in person every 3 months and will re sign CSA at next in person visit. Anticipate tapering Norco and consider adding Cymbalta. She has listed allergies to gabapentin and Lyrica.  Follow up in person in 1 month  - HYDROcodone-acetaminophen (NORCO) 5-325 MG tablet; TAKE 2 AND 1/2 TABLETS BY MOUTH EVERY MORNING AND 2 AND 1/2 TABLETS WITH LUNCH AND 1 AND 1/2 TABLETS AT BEDTIME MAX OF 6 AND 1/2 TABLETS DAILY *CAUTION: OPIOID. RISK OF OVERDOSE AND ADDICTION.    Chronic bilateral low back pain without sciatica  Sherie Otero is a 52 year old female who presents for follow up of chronic pain with use of chronic narcotics for many years under the supervision and care of Dr. Castro who recently retired. Her chronic pain is due to RA and cervical spondylosis with stenosis. She was managed initially through Pain Clinic and then referred back to PCP for ongoing CCS management. She is currently using Norco for her chronic pain and has been on stable dose for many years. She has had  previous SHANE without benefit. She had previously done PT but none recently. She is not on any other non narcotic pain medication or anti rheumatics. She has prior NSAID induced gastritis. She has general anxiety/Panic and is followed by psychiatry and prescribed Klonopin. She was recently started on Wellbutrin and Rexulty by psychiatry. She is also certified for medical cannabis by me with the support of Dr. Castro.   She currently has CSA signed and UDS last completed 6 months ago. MNPMP reviewed with patient. Her daily MME is 32.5 and her ORS is 200 giving her a 10X risk of unintentional overdose death. We reviewed the plan for ongoing management. She does not need refills today. We will follow up in person every 3 months and will re sign CSA at next in person visit. Anticipate tapering Norco and consider adding Cymbalta. She has listed allergies to gabapentin and Lyrica.  Follow up in person in 1 month  - HYDROcodone-acetaminophen (NORCO) 5-325 MG tablet; TAKE 2 AND 1/2 TABLETS BY MOUTH EVERY MORNING AND 2 AND 1/2 TABLETS WITH LUNCH AND 1 AND 1/2 TABLETS AT BEDTIME MAX OF 6 AND 1/2 TABLETS DAILY *CAUTION: OPIOID. RISK OF OVERDOSE AND ADDICTION.    Chronic pain syndrome  Sherie Otero is a 52 year old female who presents for follow up of chronic pain with use of chronic narcotics for many years under the supervision and care of Dr. Castro who recently retired. Her chronic pain is due to RA and cervical spondylosis with stenosis. She was managed initially through [facility] Clinic and then referred back to PCP for ongoing CCS management. She is currently using Norco for her chronic pain and has been on stable dose for many years. She has had previous SHANE without benefit. She had previously done PT but none recently. She is not on any other non narcotic pain medication or anti rheumatics. She has prior NSAID induced gastritis. She has general anxiety/Panic and is followed by psychiatry and prescribed Klonopin. She was  recently started on Wellbutrin and Rexulty by psychiatry. She is also certified for medical cannabis by me with the support of Dr. Castro.   She currently has CSA signed and UDS last [facility] 6 months ago[facility]. Her daily MME is 32.5 and her ORS is 200 giving her a 10X risk of unintentional overdose death. We reviewed the plan for ongoing management. She does not need refills today. We will follow up in person every 3 months and will re sign CSA at next in person visit. Anticipate tapering Norco and consider adding Cymbalta. She has listed allergies to gabapentin and Lyrica.  Follow up in person in 1 month  - HYDROcodone-acetaminophen (NORCO) 5-325 MG tablet; TAKE 2 AND 1/2 TABLETS BY MOUTH EVERY MORNING AND 2 AND 1/2 TABLETS WITH LUNCH AND 1 AND 1/2 TABLETS AT BEDTIME MAX OF 6 AND 1/2 TABLETS DAILY *CAUTION: OPIOID. RISK OF OVERDOSE AND ADDICTION.  - PRIMARY CARE FOLLOW-UP SCHEDULING; Future    Chronic rhinitis  Chronic seasonal  - fluticasone (FLONASE) 50 MCG/ACT nasal spray; Spray 1-2 sprays into both nostrils daily SPRAY 2 SPRAYS INTO BOTH NOSTRILS DAILY.    Prescription drug management         Regular exercise   Follow-up Visit   Expected date:  Jul 05, 2023 (Approximate)      Follow Up Appointment Details:     Follow-up with whom?: Me    Follow-Up for what?: Chronic Disease f/u    Chronic Disease f/u: General (Other) Comment - Chronic pain    How?: In Person    Is this an as-needed follow-up?: No                      Jim Edwards MD  [facility] HAYDER Rehman is a 52 year old, presenting for the following health issues:  Recheck Medication,  Follow Up, and Weight Loss        6/5/2023     9:11 AM   Additional Questions   Roomed by Clint INTERIANO   Accompanied by Self         6/5/2023     9:11 AM   Patient Reported Additional Medications   Patient reports taking the following new medications None     History of Present Illness       Reason for visit:   Medication, weight loss    She eats 2-3 servings of fruits and vegetables daily.She consumes 1 sweetened beverage(s) daily.She exercises with enough effort to increase her heart rate 20 to 29 minutes per day.  She exercises with enough effort to increase her heart rate 4 days per week. She is missing 1 dose(s) of medications per week.  She is not taking prescribed medications regularly due to remembering to take.       Pain History:  When did you first notice your pain? Many years   Have you seen this provider for your pain in the past?   Yes   Where in your body do you have pain? neck  Are you seeing anyone else for your pain? Yes - psychiatry          5/8/2023     1:00 PM 5/16/2023     2:18 PM 5/30/2023    11:46 AM   PHQ-9 SCORE   PHQ-9 Total Score MyChart  13 (Moderate depression) 9 (Mild depression)   PHQ-9 Total Score 16 13    13 9           5/8/2023     1:00 PM 5/16/2023     2:19 PM 5/30/2023    11:47 AM   CELIA-7 SCORE   Total Score  18 (severe anxiety) 13 (moderate anxiety)   Total Score 14 18    18 13               Chronic Pain Follow Up:    Location of pain: Neck  Analgesia/pain control:    - Recent changes:  No    - Overall control: Tolerable with discomfort    - Current treatments: Norco   Adherence:     - Do you ever take more pain medicine than prescribed? No    - When did you take your last dose of pain medicine?  today   Adverse effects: No   PDMP Review       Value Time User    State PDMP site checked  Yes 6/5/2023  1:03 PM Jim Edwards MD        Last CSA Agreement:   CSA -- Patient Level:     [Media Unavailable] Controlled Substance Agreement - Opioid - Scan on 6/13/2022  4:08 PM   [Media Unavailable] Controlled Substance Agreement - Opioid - Scan on 5/26/2021  4:37 PM   [Media Unavailable] Controlled Substance Agreement - Opioid - Scan on 1/27/2020  2:58 PM: controlled substance agreement   [Media Unavailable] Controlled Substance Agreement - Opioid - Scan on 1/24/2019  1:31 PM: signed  1/11/2019       Last UDS: 8/5/2022          Patient Active Problem List   Diagnosis     CARDIOVASCULAR SCREENING; LDL GOAL LESS THAN 160     Chronic fatigue syndrome     Fibromyalgia     Generalized anxiety disorder     Tobacco abuse     Disturbance in sleep behavior     Cervicalgia     Stenosis, cervical spine     Spondylitis, cervical (H)     NSAID induced gastritis     Major depressive disorder, recurrent episode, mild (H)     Other chronic pain     Intermittent asthma, uncomplicated     Rheumatoid arthritis involving multiple sites with positive rheumatoid factor (H)     Elevated white blood cell count     Panic attacks     Osteoarthritis of cervical spine, unspecified spinal osteoarthritis complication status     Degenerative joint disease of cervical spine     Pulmonary nodules     Abnormal CT of the chest     Hilar lymphadenopathy     Other migraine without status migrainosus, intractable     Chronic rhinitis     Pelvic pain in female     Cervical cancer screening     Chronic, continuous use of opioids     Medical cannabis use     Balance problems     Current Outpatient Medications   Medication Sig Dispense Refill     albuterol (VENTOLIN HFA) 108 (90 Base) MCG/ACT inhaler Inhale 1-2 puffs into the lungs every 6 hours as needed for shortness of breath / dyspnea or wheezing 18 g 0     cetirizine (ZYRTEC) 10 MG tablet Take 1 tablet (10 mg) by mouth daily 90 tablet 0     clonazePAM (KLONOPIN) 0.5 MG tablet TAKE 1 TABLET BY MOUTH 2 - 3 TIMES DAILY AS NEEDED FOR ANXIETY       fluticasone (FLONASE) 50 MCG/ACT nasal spray Spray 1-2 sprays into both nostrils daily SPRAY 2 SPRAYS INTO BOTH NOSTRILS DAILY. 15.8 mL 5     fluvoxaMINE (LUVOX) 100 MG tablet 200mg takes different       fluvoxaMINE (LUVOX) 50 MG tablet Take 50 mg by mouth At Bedtime       HYDROcodone-acetaminophen (NORCO) 5-325 MG tablet TAKE 2 AND 1/2 TABLETS BY MOUTH EVERY MORNING AND 2 AND 1/2 TABLETS WITH LUNCH AND 1 AND 1/2 TABLETS AT BEDTIME MAX OF 6  AND 1/2 TABLETS DAILY *CAUTION: OPIOID. RISK OF OVERDOSE AND ADDICTION. 195 tablet 0     hydrOXYzine (ATARAX) 25 MG tablet Take 1-2 tablets (25-50 mg) by mouth 3 times daily as needed for itching (Patient taking differently: Take 25-50 mg by mouth 3 times daily as needed for itching 1 tablet 3-4 times daily prn) 90 tablet 0     mupirocin (BACTROBAN) 2 % external ointment Apply topically 3 times daily 22 g 1     Nutritional Supplements (ENSURE) LIQD Take 1 Bottle by mouth 3 times daily 5688 mL 3     ondansetron (ZOFRAN ODT) 4 MG ODT tab Take 1 tablet (4 mg) by mouth every 8 hours as needed for nausea or vomiting 10 tablet 0     verapamil (CALAN) 40 MG tablet 1/2 tablet twice daily 180 tablet 0     naloxone (NARCAN) nasal spray Spray 1 spray (4 mg) into one nostril alternating nostrils as needed for opioid reversal every 2-3 minutes until assistance arrives (Patient not taking: Reported on 6/5/2023) 0.2 mL 0     Health Maintenance Due   Topic Date Due     HEPATITIS B IMMUNIZATION (1 of 3 - 3-dose series) Never done     COLORECTAL CANCER SCREENING  Never done     ZOSTER IMMUNIZATION (1 of 2) Never done     LUNG CANCER SCREENING  08/26/2018     Pneumococcal Vaccine: Pediatrics (0 to 5 Years) and At-Risk Patients (6 to 64 Years) (2 - PPSV23 if available, else PCV20) 02/05/2019     ASTHMA ACTION PLAN  10/01/2019     COVID-19 Vaccine (3 - Pfizer series) 11/16/2021     MAMMO SCREENING  12/16/2021     TREATMENT AGREEMENT FOR CHRONIC PAIN MANAGEMENT  06/13/2023           Review of Systems   Constitutional, HEENT, cardiovascular, pulmonary, gi and gu systems are negative, except as otherwise noted.      Objective           Vitals:  No vitals were obtained today due to virtual visit.    Physical Exam   GENERAL: Healthy, alert and no distress  EYES: Eyes grossly normal to inspection.  No discharge or erythema, or obvious scleral/conjunctival abnormalities.  RESP: No audible wheeze, cough, or visible cyanosis.  No visible  retractions or increased work of breathing.    SKIN: Visible skin clear. No significant rash, abnormal pigmentation or lesions.  NEURO: Cranial nerves grossly intact.  Mentation and speech appropriate for age.  PSYCH: Mentation appears normal, affect normal/bright, judgement and insight intact, normal speech and appearance well-groomed.    Office Visit on 02/06/2023   Component Date Value Ref Range Status     Color Urine 02/06/2023 Yellow  Colorless, Straw, Light Yellow, Yellow Final     Appearance Urine 02/06/2023 Clear  Clear Final     Glucose Urine 02/06/2023 Negative  Negative mg/dL Final     Bilirubin Urine 02/06/2023 Negative  Negative Final     Ketones Urine 02/06/2023 Negative  Negative mg/dL Final     Specific Gravity Urine 02/06/2023 >=1.030  1.003 - 1.035 Final     Blood Urine 02/06/2023 Negative  Negative Final     pH Urine 02/06/2023 5.0  5.0 - 7.0 Final     Protein Albumin Urine 02/06/2023 Trace (A)  Negative mg/dL Final     Urobilinogen Urine 02/06/2023 Normal  Normal, 2.0 mg/dL Final     Nitrite Urine 02/06/2023 Negative  Negative Final     Leukocyte Esterase Urine 02/06/2023 Negative  Negative Final     Mucus Urine 02/06/2023 Present (A)  None Seen /LPF Final     Calcium Oxalate Crystals Urine 02/06/2023 Few (A)  None Seen /HPF Final     RBC Urine 02/06/2023 1  <=2 /HPF Final     WBC Urine 02/06/2023 2  <=5 /HPF Final               Video-Visit Details    Type of service:  Video Visit   Video Start Time: 1:16 PM  Video End Time:1:16 PM    Originating Location (pt. Location): Home  Distant Location (provider location):  On-site  Platform used for Video Visit: [employer]
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
## 2023-06-07 ENCOUNTER — VIRTUAL VISIT (OUTPATIENT)
Dept: PSYCHOLOGY | Facility: CLINIC | Age: 53
End: 2023-06-07
Payer: MEDICARE

## 2023-06-07 DIAGNOSIS — F90.2 ADHD (ATTENTION DEFICIT HYPERACTIVITY DISORDER), COMBINED TYPE: Primary | ICD-10-CM

## 2023-06-07 DIAGNOSIS — F33.1 MAJOR DEPRESSIVE DISORDER, RECURRENT EPISODE, MODERATE WITH ANXIOUS DISTRESS (H): ICD-10-CM

## 2023-06-07 DIAGNOSIS — F43.10 POST TRAUMATIC STRESS DISORDER (PTSD): ICD-10-CM

## 2023-06-07 DIAGNOSIS — F41.1 GENERALIZED ANXIETY DISORDER: ICD-10-CM

## 2023-06-07 PROCEDURE — 90834 PSYTX W PT 45 MINUTES: CPT | Mod: 95 | Performed by: SOCIAL WORKER

## 2023-06-07 ASSESSMENT — PATIENT HEALTH QUESTIONNAIRE - PHQ9
10. IF YOU CHECKED OFF ANY PROBLEMS, HOW DIFFICULT HAVE THESE PROBLEMS MADE IT FOR YOU TO DO YOUR WORK, TAKE CARE OF THINGS AT HOME, OR GET ALONG WITH OTHER PEOPLE: VERY DIFFICULT
SUM OF ALL RESPONSES TO PHQ QUESTIONS 1-9: 7
SUM OF ALL RESPONSES TO PHQ QUESTIONS 1-9: 7

## 2023-06-14 ENCOUNTER — VIRTUAL VISIT (OUTPATIENT)
Dept: PSYCHOLOGY | Facility: CLINIC | Age: 53
End: 2023-06-14
Payer: MEDICARE

## 2023-06-14 DIAGNOSIS — F90.2 ADHD (ATTENTION DEFICIT HYPERACTIVITY DISORDER), COMBINED TYPE: Primary | ICD-10-CM

## 2023-06-14 DIAGNOSIS — J45.20 INTERMITTENT ASTHMA, UNCOMPLICATED: ICD-10-CM

## 2023-06-14 DIAGNOSIS — F43.10 POST TRAUMATIC STRESS DISORDER (PTSD): ICD-10-CM

## 2023-06-14 DIAGNOSIS — F41.1 GENERALIZED ANXIETY DISORDER: ICD-10-CM

## 2023-06-14 DIAGNOSIS — F33.1 MAJOR DEPRESSIVE DISORDER, RECURRENT EPISODE, MODERATE WITH ANXIOUS DISTRESS (H): ICD-10-CM

## 2023-06-14 PROCEDURE — 90834 PSYTX W PT 45 MINUTES: CPT | Mod: 95 | Performed by: SOCIAL WORKER

## 2023-06-14 ASSESSMENT — PATIENT HEALTH QUESTIONNAIRE - PHQ9
SUM OF ALL RESPONSES TO PHQ QUESTIONS 1-9: 4
10. IF YOU CHECKED OFF ANY PROBLEMS, HOW DIFFICULT HAVE THESE PROBLEMS MADE IT FOR YOU TO DO YOUR WORK, TAKE CARE OF THINGS AT HOME, OR GET ALONG WITH OTHER PEOPLE: SOMEWHAT DIFFICULT
SUM OF ALL RESPONSES TO PHQ QUESTIONS 1-9: 4

## 2023-06-14 ASSESSMENT — ANXIETY QUESTIONNAIRES
5. BEING SO RESTLESS THAT IT IS HARD TO SIT STILL: SEVERAL DAYS
IF YOU CHECKED OFF ANY PROBLEMS ON THIS QUESTIONNAIRE, HOW DIFFICULT HAVE THESE PROBLEMS MADE IT FOR YOU TO DO YOUR WORK, TAKE CARE OF THINGS AT HOME, OR GET ALONG WITH OTHER PEOPLE: SOMEWHAT DIFFICULT
GAD7 TOTAL SCORE: 9
GAD7 TOTAL SCORE: 9
7. FEELING AFRAID AS IF SOMETHING AWFUL MIGHT HAPPEN: NEARLY EVERY DAY
8. IF YOU CHECKED OFF ANY PROBLEMS, HOW DIFFICULT HAVE THESE MADE IT FOR YOU TO DO YOUR WORK, TAKE CARE OF THINGS AT HOME, OR GET ALONG WITH OTHER PEOPLE?: SOMEWHAT DIFFICULT
3. WORRYING TOO MUCH ABOUT DIFFERENT THINGS: SEVERAL DAYS
GAD7 TOTAL SCORE: 9
4. TROUBLE RELAXING: SEVERAL DAYS
7. FEELING AFRAID AS IF SOMETHING AWFUL MIGHT HAPPEN: NEARLY EVERY DAY
1. FEELING NERVOUS, ANXIOUS, OR ON EDGE: SEVERAL DAYS
2. NOT BEING ABLE TO STOP OR CONTROL WORRYING: SEVERAL DAYS
6. BECOMING EASILY ANNOYED OR IRRITABLE: SEVERAL DAYS
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
## 2023-06-15 RX ORDER — ALBUTEROL SULFATE 90 UG/1
1-2 AEROSOL, METERED RESPIRATORY (INHALATION) EVERY 6 HOURS PRN
Qty: 18 G | Refills: 5 | Status: SHIPPED | OUTPATIENT
Start: 2023-06-15 | End: 2024-04-17

## 2023-06-15 NOTE — TELEPHONE ENCOUNTER
Prescription approved per [facility] Refill Protocol.  Vinita Parikh RN on 6/15/2023 at 11:09 AM

## 2023-06-19 ENCOUNTER — VIRTUAL VISIT (OUTPATIENT)
Dept: PSYCHOLOGY | Facility: CLINIC | Age: 53
End: 2023-06-19
Payer: MEDICARE

## 2023-06-19 DIAGNOSIS — F43.10 POST TRAUMATIC STRESS DISORDER (PTSD): ICD-10-CM

## 2023-06-19 DIAGNOSIS — F90.2 ADHD (ATTENTION DEFICIT HYPERACTIVITY DISORDER), COMBINED TYPE: Primary | ICD-10-CM

## 2023-06-19 DIAGNOSIS — F41.1 GENERALIZED ANXIETY DISORDER: ICD-10-CM

## 2023-06-19 DIAGNOSIS — F33.1 MAJOR DEPRESSIVE DISORDER, RECURRENT EPISODE, MODERATE WITH ANXIOUS DISTRESS (H): ICD-10-CM

## 2023-06-19 PROCEDURE — 90834 PSYTX W PT 45 MINUTES: CPT | Mod: 95 | Performed by: SOCIAL WORKER

## 2023-06-19 ASSESSMENT — PATIENT HEALTH QUESTIONNAIRE - PHQ9
SUM OF ALL RESPONSES TO PHQ QUESTIONS 1-9: 5
SUM OF ALL RESPONSES TO PHQ QUESTIONS 1-9: 5
10. IF YOU CHECKED OFF ANY PROBLEMS, HOW DIFFICULT HAVE THESE PROBLEMS MADE IT FOR YOU TO DO YOUR WORK, TAKE CARE OF THINGS AT HOME, OR GET ALONG WITH OTHER PEOPLE: SOMEWHAT DIFFICULT
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
## 2023-06-20 ENCOUNTER — HOSPITAL ENCOUNTER (OUTPATIENT)
Dept: MAMMOGRAPHY | Facility: CLINIC | Age: 53
Discharge: HOME | End: 2023-06-20
Payer: MEDICARE

## 2023-06-20 ENCOUNTER — HOSPITAL ENCOUNTER (OUTPATIENT)
Dept: CT IMAGING | Facility: CLINIC | Age: 53
Discharge: HOME | End: 2023-06-20
Attending: FAMILY MEDICINE
Payer: MEDICARE

## 2023-06-20 ENCOUNTER — MYC MEDICAL ADVICE (OUTPATIENT)
Dept: FAMILY MEDICINE | Facility: CLINIC | Age: 53
End: 2023-06-20
Payer: MEDICARE

## 2023-06-20 DIAGNOSIS — Z72.0 TOBACCO ABUSE: ICD-10-CM

## 2023-06-20 DIAGNOSIS — Z12.31 VISIT FOR SCREENING MAMMOGRAM: ICD-10-CM

## 2023-06-20 DIAGNOSIS — Z12.2 SCREENING FOR LUNG CANCER: ICD-10-CM

## 2023-06-20 PROCEDURE — 77067 SCR MAMMO BI INCL CAD: CPT

## 2023-06-20 PROCEDURE — 71271 CT THORAX LUNG CANCER SCR C-: CPT

## 2023-06-23 ENCOUNTER — MYC MEDICAL ADVICE (OUTPATIENT)
Dept: FAMILY MEDICINE | Facility: CLINIC | Age: 53
End: 2023-06-23
Payer: MEDICARE

## 2023-06-23 ENCOUNTER — MYC REFILL (OUTPATIENT)
Dept: FAMILY MEDICINE | Facility: CLINIC | Age: 53
End: 2023-06-23
Payer: MEDICARE

## 2023-06-23 DIAGNOSIS — J31.0 CHRONIC RHINITIS: ICD-10-CM

## 2023-06-23 RX ORDER — CETIRIZINE HYDROCHLORIDE 10 MG/1
10 TABLET ORAL DAILY
Qty: 90 TABLET | Refills: 0 | Status: SHIPPED | OUTPATIENT
Start: 2023-06-23 | End: 2023-08-26

## 2023-06-27 ENCOUNTER — MYC REFILL (OUTPATIENT)
Dept: FAMILY MEDICINE | Facility: CLINIC | Age: 53
End: 2023-06-27
Payer: MEDICARE

## 2023-06-27 ENCOUNTER — TELEPHONE (OUTPATIENT)
Dept: FAMILY MEDICINE | Facility: CLINIC | Age: 53
End: 2023-06-27

## 2023-06-27 DIAGNOSIS — G89.29 CHRONIC BILATERAL LOW BACK PAIN WITHOUT SCIATICA: ICD-10-CM

## 2023-06-27 DIAGNOSIS — M54.2 CERVICALGIA: ICD-10-CM

## 2023-06-27 DIAGNOSIS — M79.7 FIBROMYALGIA: ICD-10-CM

## 2023-06-27 DIAGNOSIS — G89.4 CHRONIC PAIN SYNDROME: ICD-10-CM

## 2023-06-27 DIAGNOSIS — M54.50 CHRONIC BILATERAL LOW BACK PAIN WITHOUT SCIATICA: ICD-10-CM

## 2023-06-27 NOTE — TELEPHONE ENCOUNTER
Prior Authorization Retail Medication Request    Medication/Dose: HYDROcodone-acetaminophen (NORCO) 5-325 MG tablet  ICD code (if different than what is on RX):    Cervicalgia [M54.2]  - Primary       Fibromyalgia [M79.7]       Chronic bilateral low back pain without sciatica [M54.50, G89.29]       Chronic pain syndrome [G89.4]           Previously Tried and Failed:  na  Rationale:  na    Insurance Name:  MEDICARE  Insurance ID:  [id number]  [facility]  [id number]    Pharmacy Information (if different than what is on RX)  Name[facility]  [facility]  Phone: [phone]

## 2023-06-28 ENCOUNTER — VIRTUAL VISIT (OUTPATIENT)
Dept: PSYCHOLOGY | Facility: CLINIC | Age: 53
End: 2023-06-28
Payer: MEDICARE

## 2023-06-28 DIAGNOSIS — F43.10 POST TRAUMATIC STRESS DISORDER (PTSD): ICD-10-CM

## 2023-06-28 DIAGNOSIS — F33.1 MAJOR DEPRESSIVE DISORDER, RECURRENT EPISODE, MODERATE WITH ANXIOUS DISTRESS (H): ICD-10-CM

## 2023-06-28 DIAGNOSIS — F90.2 ADHD (ATTENTION DEFICIT HYPERACTIVITY DISORDER), COMBINED TYPE: Primary | ICD-10-CM

## 2023-06-28 DIAGNOSIS — F41.1 GENERALIZED ANXIETY DISORDER: ICD-10-CM

## 2023-06-28 PROCEDURE — 90834 PSYTX W PT 45 MINUTES: CPT | Mod: 95 | Performed by: SOCIAL WORKER

## 2023-06-28 RX ORDER — HYDROCODONE BITARTRATE AND ACETAMINOPHEN 5; 325 MG/1; MG/1
TABLET ORAL
Qty: 195 TABLET | Refills: 0 | Status: SHIPPED | OUTPATIENT
Start: 2023-07-03 | End: 2023-07-31

## 2023-06-28 NOTE — TELEPHONE ENCOUNTER
Requested Prescriptions   Pending Prescriptions Disp Refills     HYDROcodone-acetaminophen (NORCO) 5-325 MG tablet 195 tablet 0     Sig: TAKE 2 AND 1/2 TABLETS BY MOUTH EVERY MORNING AND 2 AND 1/2 TABLETS WITH LUNCH AND 1 AND 1/2 TABLETS AT BEDTIME MAX OF 6 AND 1/2 TABLETS DAILY *CAUTION: OPIOID. RISK OF OVERDOSE AND ADDICTION.       There is no refill protocol information for this order        Future Office visit:    Next 5 appointments (look out 90 days)    Jul 06, 2023  3:00 PM  (Arrive by 2:40 PM)  Provider Visit with Jim Edwards MD  [facility] ([facility] ) [address] [place] [id number] [phone]           Routing refill request to provider for review/approval because:  Drug not on the [facility], P or [facility] refill protocol or controlled substance

## 2023-06-30 NOTE — TELEPHONE ENCOUNTER
Prior Authorization Not Needed per Insurance and Pharmacy    Medication: HYDROCODONE-ACETAMINOPHEN 5-325 MG PO TABS  Insurance Company: [employer] - Phone [phone] Fax [phone]  Expected CoPay:      Pharmacy Filling the Rx: [facility] - [place] - [address]  Pharmacy Notified: Yes  Patient Notified:
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
## 2023-07-07 ENCOUNTER — VIRTUAL VISIT (OUTPATIENT)
Dept: PSYCHOLOGY | Facility: CLINIC | Age: 53
End: 2023-07-07
Payer: MEDICARE

## 2023-07-07 DIAGNOSIS — F43.10 POST TRAUMATIC STRESS DISORDER (PTSD): ICD-10-CM

## 2023-07-07 DIAGNOSIS — F41.1 GENERALIZED ANXIETY DISORDER: ICD-10-CM

## 2023-07-07 DIAGNOSIS — F90.2 ADHD (ATTENTION DEFICIT HYPERACTIVITY DISORDER), COMBINED TYPE: Primary | ICD-10-CM

## 2023-07-07 DIAGNOSIS — F33.1 MAJOR DEPRESSIVE DISORDER, RECURRENT EPISODE, MODERATE WITH ANXIOUS DISTRESS (H): ICD-10-CM

## 2023-07-07 PROCEDURE — 90834 PSYTX W PT 45 MINUTES: CPT | Mod: 95 | Performed by: SOCIAL WORKER

## 2023-07-07 ASSESSMENT — PATIENT HEALTH QUESTIONNAIRE - PHQ9: SUM OF ALL RESPONSES TO PHQ QUESTIONS 1-9: 8
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
## 2023-07-31 ENCOUNTER — OFFICE VISIT (OUTPATIENT)
Dept: FAMILY MEDICINE | Facility: CLINIC | Age: 53
End: 2023-07-31
Payer: MEDICARE

## 2023-07-31 VITALS
OXYGEN SATURATION: 99 % | BODY MASS INDEX: 19.46 KG/M2 | RESPIRATION RATE: 12 BRPM | TEMPERATURE: 98.4 F | DIASTOLIC BLOOD PRESSURE: 66 MMHG | WEIGHT: 118.4 LBS | SYSTOLIC BLOOD PRESSURE: 124 MMHG

## 2023-07-31 DIAGNOSIS — M54.50 CHRONIC BILATERAL LOW BACK PAIN WITHOUT SCIATICA: ICD-10-CM
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
## 2023-07-31 DIAGNOSIS — G89.4 CHRONIC PAIN SYNDROME: ICD-10-CM

## 2023-07-31 DIAGNOSIS — M79.7 FIBROMYALGIA: ICD-10-CM

## 2023-07-31 DIAGNOSIS — G89.29 CHRONIC BILATERAL LOW BACK PAIN WITHOUT SCIATICA: ICD-10-CM

## 2023-07-31 DIAGNOSIS — F11.90 CHRONIC, CONTINUOUS USE OF OPIOIDS: Primary | ICD-10-CM

## 2023-07-31 DIAGNOSIS — Z79.899 MEDICAL CANNABIS USE: ICD-10-CM

## 2023-07-31 DIAGNOSIS — M54.2 CERVICALGIA: ICD-10-CM

## 2023-07-31 DIAGNOSIS — Z12.11 SCREEN FOR COLON CANCER: ICD-10-CM

## 2023-07-31 LAB — CREAT UR-MCNC: 233 MG/DL

## 2023-07-31 PROCEDURE — G0480 DRUG TEST DEF 1-7 CLASSES: HCPCS | Performed by: FAMILY MEDICINE

## 2023-07-31 PROCEDURE — 99214 OFFICE O/P EST MOD 30 MIN: CPT | Performed by: FAMILY MEDICINE

## 2023-07-31 RX ORDER — CEPHALEXIN 500 MG/1
500 CAPSULE ORAL 2 TIMES DAILY
Qty: 14 CAPSULE | Refills: 0 | Status: SHIPPED | OUTPATIENT
Start: 2023-07-31 | End: 2023-08-07

## 2023-07-31 RX ORDER — HYDROCODONE BITARTRATE AND ACETAMINOPHEN 5; 325 MG/1; MG/1
2 TABLET ORAL 3 TIMES DAILY
Qty: 180 TABLET | Refills: 0 | Status: SHIPPED | OUTPATIENT
Start: 2023-08-02 | End: 2023-08-26

## 2023-07-31 ASSESSMENT — ANXIETY QUESTIONNAIRES
2. NOT BEING ABLE TO STOP OR CONTROL WORRYING: NEARLY EVERY DAY
GAD7 TOTAL SCORE: 12
5. BEING SO RESTLESS THAT IT IS HARD TO SIT STILL: NOT AT ALL
IF YOU CHECKED OFF ANY PROBLEMS ON THIS QUESTIONNAIRE, HOW DIFFICULT HAVE THESE PROBLEMS MADE IT FOR YOU TO DO YOUR WORK, TAKE CARE OF THINGS AT HOME, OR GET ALONG WITH OTHER PEOPLE: EXTREMELY DIFFICULT
6. BECOMING EASILY ANNOYED OR IRRITABLE: SEVERAL DAYS
7. FEELING AFRAID AS IF SOMETHING AWFUL MIGHT HAPPEN: NEARLY EVERY DAY
1. FEELING NERVOUS, ANXIOUS, OR ON EDGE: SEVERAL DAYS
GAD7 TOTAL SCORE: 12
4. TROUBLE RELAXING: SEVERAL DAYS
3. WORRYING TOO MUCH ABOUT DIFFERENT THINGS: NEARLY EVERY DAY
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
## 2023-07-31 NOTE — LETTER
Opioid / Opioid Plus Controlled Substance Agreement    This is an agreement between you and your provider about the safe and appropriate use of controlled substance/opioids prescribed by your care team. Controlled substances are medicines that can cause physical and mental dependence (abuse).    There are strict laws about having and using these medicines. We here at [facility] are committing to working with you in your efforts to get better. To support you in this work, we ll help you schedule regular office appointments for medicine refills. If we must cancel or change your appointment for any reason, we ll make sure you have enough medicine to last until your next appointment.     As a Provider, I will:  Listen carefully to your concerns and treat you with respect.   Recommend a treatment plan that I believe is in your best interest. This plan may involve therapies other than opioid pain medication.   Talk with you often about the possible benefits, and the risk of harm of any medicine that we prescribe for you.   Provide a plan on how to taper (discontinue or go off) using this medicine if the decision is made to stop its use.    As a Patient, I understand that opioid(s):   Are a controlled substance prescribed by my care team to help me function or work and manage my condition(s).   Are strong medicines and can cause serious side effects such as:  Drowsiness, which can seriously affect my driving ability  A lower breathing rate, enough to cause death  Harm to my thinking ability   Depression   Abuse of and addiction to this medicine  Need to be taken exactly as prescribed. Combining opioids with certain medicines or chemicals (such as illegal drugs, sedatives, sleeping pills, and benzodiazepines) can be dangerous or even fatal. If I stop opioids suddenly, I may have severe withdrawal symptoms.  Do not work for all types of pain nor for all patients. If they re not helpful, I may be asked to stop  them.        The risks, benefits and side effects of these medicine(s) were explained to me. I agree that:  I will take part in other treatments as advised by my care team. This may be psychiatry or counseling, physical therapy, behavioral therapy, group treatment or a referral to a specialist.     I will keep all my appointments. I understand that this is part of the monitoring of opioids. My care team may require an office visit for EVERY opioid/controlled substance refill. If I miss appointments or don t follow instructions, my care team may stop my medicine.    I will take my medicines as prescribed. I will not change the dose or schedule unless my care team tells me to. There will be no refills if I run out early.     I may be asked to come to the clinic and complete a urine drug test or complete a pill count at any time. If I don t give a urine sample or participate in a pill count, the care team may stop my medicine.    I will only receive prescriptions from this clinic for chronic pain. If I am treated by another provider for acute pain issues, I will tell them that I am taking opioid pain medication for chronic pain and that I have a treatment agreement with this provider. I will inform my [facility] care team within one business day if I am given a prescription for any pain medication by another healthcare provider. My [facility] care team can contact other providers and pharmacists about my use of any medicines.    It is up to me to make sure that I don t run out of my medicines on weekends or holidays. If my care team is willing to refill my opioid prescription without a visit, I must request refills only during office hours. Refills may take up to 3 business days to process. I will use one pharmacy to fill all my opioid and other controlled substance prescriptions. I will notify the clinic about any changes to my insurance or medication availability.    I am responsible for my  prescriptions. If the medicine/prescription is lost, stolen or destroyed, it will not be replaced. I also agree not to share controlled substance medicines with anyone.    I am aware I should not use any illegal or recreational drugs. I agree not to drink alcohol unless my care team says I can.       If I enroll in the [place] [facility] program, I will tell my care team prior to my next refill.     I will tell my care team right away if I become pregnant, have a new medical problem treated outside of my regular clinic, or have a change in my medications.    I understand that this medicine can affect my thinking, judgment and reaction time. Alcohol and drugs affect the brain and body, which can affect the safety of my driving. Being under the influence of alcohol or drugs can affect my decision-making, behaviors, personal safety, and the safety of others. Driving while impaired (DWI) can occur if a person is driving, operating, or in physical control of a car, motorcycle, boat, snowmobile, ATV, motorbike, off-road vehicle, or any other motor vehicle ([place] Statute 169A.20). I understand the risk if I choose to drive or operate any vehicle or machinery.    I understand that if I do not follow any of the conditions above, my prescriptions or treatment may be stopped or changed.          Opioids  What You Need to Know    What are opioids?   Opioids are pain medicines that must be prescribed by a doctor. They are also known as narcotics.     Examples are:   morphine (MS Contin, Catrachita)  oxycodone (Oxycontin)  oxycodone and acetaminophen (Percocet)  hydrocodone and acetaminophen (Vicodin, Norco)   fentanyl patch (Duragesic)   hydromorphone (Dilaudid)   methadone  codeine (Tylenol #3)     What do opioids do well?   Opioids are best for severe short-term pain such as after a surgery or injury. They may work well for cancer pain. They may help some people with long-lasting (chronic) pain.     What do opioids NOT do  well?   Opioids never get rid of pain entirely, and they don t work well for most patients with chronic pain. Opioids don t reduce swelling, one of the causes of pain.                                    Other ways to manage chronic pain and improve function include:     Treat the health problem that may be causing pain  Anti-inflammation medicines, which reduce swelling and tenderness, such as ibuprofen (Advil, Motrin) or naproxen (Aleve)  Acetaminophen (Tylenol)  Antidepressants and anti-seizure medicines, especially for nerve pain  Topical treatments such as patches or creams  Injections or nerve blocks  Chiropractic or osteopathic treatment  Acupuncture, massage, deep breathing, meditation, visual imagery, aromatherapy  Use heat or ice at the pain site  Physical therapy   Exercise  Stop smoking  Take part in therapy       Risks and side effects     Talk to your doctor before you start or decide to keep taking opioids. Possible side effects include:    Lowering your breathing rate enough to cause death  Overdose, including death, especially if taking higher than prescribed doses  Worse depression symptoms; less pleasure in things you usually enjoy  Feeling tired or sluggish  Slower thoughts or cloudy thinking  Being more sensitive to pain over time; pain is harder to control  Trouble sleeping or restless sleep  Changes in hormone levels (for example, less testosterone)  Changes in sex drive or ability to have sex  Constipation  Unsafe driving  Itching and sweating  Dizziness  Nausea, throwing up and dry mouth    What else should I know about opioids?    Opioids may lead to dependence, tolerance, or addiction.    Dependence means that if you stop or reduce the medicine too quickly, you will have withdrawal symptoms. These include loose poop (diarrhea), jitters, flu-like symptoms, nervousness and tremors. Dependence is not the same as addiction.                     Tolerance means needing higher doses over time to  get the same effect. This may increase the chance of serious side effects.    Addiction is when people improperly use a substance that harms their body, their mind or their relations with others. Use of opiates can cause a relapse of addiction if you have a history of drug or alcohol abuse.    People who have used opioids for a long time may have a lower quality of life, worse depression, higher levels of pain and more visits to doctors.    You can overdose on opioids. Take these steps to lower your risk of overdose:    Recognize the signs:  Signs of overdose include decrease or loss of consciousness (blackout), slowed breathing, trouble waking up and blue lips. If someone is worried about overdose, they should call 911.    Talk to your doctor about Narcan (naloxone).   If you are at risk for overdose, you may be given a prescription for Narcan. This medicine very quickly reverses the effects of opioids.   If you overdose, a friend or family member can give you Narcan while waiting for the ambulance. They need to know the signs of overdose and how to give Narcan.     Don't use alcohol or street drugs.   Taking them with opioids can cause death.    Do not take any of these medicines unless your doctor says it s OK. Taking these with opioids can cause death:  Benzodiazepines, such as lorazepam (Ativan), alprazolam (Xanax) or diazepam (Valium)  Muscle relaxers, such as cyclobenzaprine (Flexeril)  Sleeping pills like zolpidem (Ambien)   Other opioids      How to keep you and other people safe while taking opioids:    Never share your opioids with others.  Opioid medicines are regulated by the Drug Enforcement Agency ([employer]). Selling or sharing medications is a criminal act.    2. Be sure to store opioids in a secure place, locked up if possible. Young children can easily swallow them and overdose.    3. When you are traveling with your medicines, keep them in the original bottles. If you use a pill box, be sure you also  carry a copy of your medicine list from your clinic or pharmacy.    4. Safe disposal of opioids    Most pharmacies have places to get rid of medicine, called disposal kiosks. Medicine disposal options are also available in every [facility]. Search your county and  medication disposal  to find more options. You can find more details at:  https://www.[facility].[place]./living-green/managing-unwanted-medications     I agree that my provider, clinic care team, and pharmacy may work with any city, state or federal law enforcement agency that investigates the misuse, sale, or other diversion of my controlled medicine. I will allow my provider to discuss my care with, or share a copy of, this agreement with any other treating provider, pharmacy or emergency room where I receive care.    I have read this agreement and have asked questions about anything I did not understand.    _______________________________________________________  Patient Signature - Sherie Otero _____________________                   Date     _______________________________________________________  Provider Signature - Jim Edwards MD   _____________________                   Date     _______________________________________________________  Witness Signature (required if provider not present while patient signing)   _____________________                   Date

## 2023-08-02 LAB
7AMINOCLONAZEPAM UR QL CFM: PRESENT
DHC UR CFM-MCNC: 953 NG/ML
DHC/CREAT UR: 409 NG/MG {CREAT}
HYDROCODONE UR CFM-MCNC: 7360 NG/ML
HYDROCODONE/CREAT UR: 3159 NG/MG {CREAT}

## 2023-08-26 ENCOUNTER — MYC REFILL (OUTPATIENT)
Dept: FAMILY MEDICINE | Facility: CLINIC | Age: 53
End: 2023-08-26
Payer: MEDICARE

## 2023-08-26 DIAGNOSIS — M54.2 CERVICALGIA: ICD-10-CM

## 2023-08-26 DIAGNOSIS — M54.50 CHRONIC BILATERAL LOW BACK PAIN WITHOUT SCIATICA: ICD-10-CM

## 2023-08-26 DIAGNOSIS — M79.7 FIBROMYALGIA: ICD-10-CM

## 2023-08-26 DIAGNOSIS — G89.4 CHRONIC PAIN SYNDROME: ICD-10-CM

## 2023-08-26 DIAGNOSIS — J31.0 CHRONIC RHINITIS: ICD-10-CM

## 2023-08-26 DIAGNOSIS — G89.29 CHRONIC BILATERAL LOW BACK PAIN WITHOUT SCIATICA: ICD-10-CM

## 2023-08-28 RX ORDER — CETIRIZINE HYDROCHLORIDE 10 MG/1
10 TABLET ORAL DAILY
Qty: 90 TABLET | Refills: 0 | Status: SHIPPED | OUTPATIENT
Start: 2023-08-28 | End: 2024-04-17

## 2023-08-28 RX ORDER — HYDROCODONE BITARTRATE AND ACETAMINOPHEN 5; 325 MG/1; MG/1
2 TABLET ORAL 3 TIMES DAILY
Qty: 180 TABLET | Refills: 0 | Status: SHIPPED | OUTPATIENT
Start: 2023-09-01 | End: 2023-09-25

## 2023-09-07 ASSESSMENT — PATIENT HEALTH QUESTIONNAIRE - PHQ9
SUM OF ALL RESPONSES TO PHQ QUESTIONS 1-9: 7
10. IF YOU CHECKED OFF ANY PROBLEMS, HOW DIFFICULT HAVE THESE PROBLEMS MADE IT FOR YOU TO DO YOUR WORK, TAKE CARE OF THINGS AT HOME, OR GET ALONG WITH OTHER PEOPLE: SOMEWHAT DIFFICULT
SUM OF ALL RESPONSES TO PHQ QUESTIONS 1-9: 7

## 2023-09-07 ASSESSMENT — ANXIETY QUESTIONNAIRES
7. FEELING AFRAID AS IF SOMETHING AWFUL MIGHT HAPPEN: NEARLY EVERY DAY
IF YOU CHECKED OFF ANY PROBLEMS ON THIS QUESTIONNAIRE, HOW DIFFICULT HAVE THESE PROBLEMS MADE IT FOR YOU TO DO YOUR WORK, TAKE CARE OF THINGS AT HOME, OR GET ALONG WITH OTHER PEOPLE: EXTREMELY DIFFICULT
GAD7 TOTAL SCORE: 11
4. TROUBLE RELAXING: NOT AT ALL
2. NOT BEING ABLE TO STOP OR CONTROL WORRYING: NEARLY EVERY DAY
5. BEING SO RESTLESS THAT IT IS HARD TO SIT STILL: NOT AT ALL
1. FEELING NERVOUS, ANXIOUS, OR ON EDGE: SEVERAL DAYS
3. WORRYING TOO MUCH ABOUT DIFFERENT THINGS: NEARLY EVERY DAY
6. BECOMING EASILY ANNOYED OR IRRITABLE: SEVERAL DAYS
GAD7 TOTAL SCORE: 11

## 2023-09-08 ENCOUNTER — VIRTUAL VISIT (OUTPATIENT)
Dept: PSYCHOLOGY | Facility: CLINIC | Age: 53
End: 2023-09-08
Payer: MEDICARE

## 2023-09-08 DIAGNOSIS — F43.10 POST TRAUMATIC STRESS DISORDER (PTSD): ICD-10-CM

## 2023-09-08 DIAGNOSIS — F41.1 GENERALIZED ANXIETY DISORDER: ICD-10-CM

## 2023-09-08 DIAGNOSIS — F33.1 MAJOR DEPRESSIVE DISORDER, RECURRENT EPISODE, MODERATE WITH ANXIOUS DISTRESS (H): ICD-10-CM

## 2023-09-08 DIAGNOSIS — F90.2 ADHD (ATTENTION DEFICIT HYPERACTIVITY DISORDER), COMBINED TYPE: Primary | ICD-10-CM

## 2023-09-08 PROCEDURE — 90837 PSYTX W PT 60 MINUTES: CPT | Mod: 95 | Performed by: SOCIAL WORKER
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
## 2023-09-15 ENCOUNTER — VIRTUAL VISIT (OUTPATIENT)
Dept: PSYCHOLOGY | Facility: CLINIC | Age: 53
End: 2023-09-15
Payer: MEDICARE

## 2023-09-15 DIAGNOSIS — F41.1 GENERALIZED ANXIETY DISORDER: ICD-10-CM

## 2023-09-15 DIAGNOSIS — F43.10 POST TRAUMATIC STRESS DISORDER (PTSD): ICD-10-CM

## 2023-09-15 DIAGNOSIS — F33.1 MAJOR DEPRESSIVE DISORDER, RECURRENT EPISODE, MODERATE WITH ANXIOUS DISTRESS (H): ICD-10-CM

## 2023-09-15 DIAGNOSIS — F90.2 ADHD (ATTENTION DEFICIT HYPERACTIVITY DISORDER), COMBINED TYPE: Primary | ICD-10-CM

## 2023-09-15 PROCEDURE — 90834 PSYTX W PT 45 MINUTES: CPT | Mod: 95 | Performed by: SOCIAL WORKER

## 2023-09-15 ASSESSMENT — PATIENT HEALTH QUESTIONNAIRE - PHQ9
SUM OF ALL RESPONSES TO PHQ QUESTIONS 1-9: 6
10. IF YOU CHECKED OFF ANY PROBLEMS, HOW DIFFICULT HAVE THESE PROBLEMS MADE IT FOR YOU TO DO YOUR WORK, TAKE CARE OF THINGS AT HOME, OR GET ALONG WITH OTHER PEOPLE: VERY DIFFICULT
SUM OF ALL RESPONSES TO PHQ QUESTIONS 1-9: 6
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
## 2023-09-22 ENCOUNTER — VIRTUAL VISIT (OUTPATIENT)
Dept: PSYCHOLOGY | Facility: CLINIC | Age: 53
End: 2023-09-22
Payer: MEDICARE

## 2023-09-22 DIAGNOSIS — F33.1 MAJOR DEPRESSIVE DISORDER, RECURRENT EPISODE, MODERATE WITH ANXIOUS DISTRESS (H): ICD-10-CM

## 2023-09-22 DIAGNOSIS — F90.2 ADHD (ATTENTION DEFICIT HYPERACTIVITY DISORDER), COMBINED TYPE: Primary | ICD-10-CM

## 2023-09-22 DIAGNOSIS — F41.1 GENERALIZED ANXIETY DISORDER: ICD-10-CM

## 2023-09-22 DIAGNOSIS — F43.10 POST TRAUMATIC STRESS DISORDER (PTSD): ICD-10-CM

## 2023-09-22 PROCEDURE — 90834 PSYTX W PT 45 MINUTES: CPT | Mod: 95 | Performed by: SOCIAL WORKER
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
## 2023-09-25 ENCOUNTER — MYC REFILL (OUTPATIENT)
Dept: FAMILY MEDICINE | Facility: CLINIC | Age: 53
End: 2023-09-25
Payer: MEDICARE

## 2023-09-25 DIAGNOSIS — G89.4 CHRONIC PAIN SYNDROME: ICD-10-CM

## 2023-09-25 DIAGNOSIS — G89.29 CHRONIC BILATERAL LOW BACK PAIN WITHOUT SCIATICA: ICD-10-CM

## 2023-09-25 DIAGNOSIS — M54.50 CHRONIC BILATERAL LOW BACK PAIN WITHOUT SCIATICA: ICD-10-CM

## 2023-09-25 DIAGNOSIS — M79.7 FIBROMYALGIA: ICD-10-CM

## 2023-09-25 DIAGNOSIS — M54.2 CERVICALGIA: ICD-10-CM

## 2023-09-25 RX ORDER — HYDROCODONE BITARTRATE AND ACETAMINOPHEN 5; 325 MG/1; MG/1
2 TABLET ORAL 3 TIMES DAILY
Qty: 180 TABLET | Refills: 0 | Status: SHIPPED | OUTPATIENT
Start: 2023-10-01 | End: 2023-10-30

## 2023-09-25 NOTE — TELEPHONE ENCOUNTER
Patient needs follow up appointment prior to additional refills. Please call patient to schedule.  Electronically signed by Jim Edwards MD

## 2023-09-29 ENCOUNTER — VIRTUAL VISIT (OUTPATIENT)
Dept: PSYCHOLOGY | Facility: CLINIC | Age: 53
End: 2023-09-29
Payer: MEDICARE

## 2023-09-29 DIAGNOSIS — F90.2 ADHD (ATTENTION DEFICIT HYPERACTIVITY DISORDER), COMBINED TYPE: Primary | ICD-10-CM

## 2023-09-29 DIAGNOSIS — F43.10 POST TRAUMATIC STRESS DISORDER (PTSD): ICD-10-CM

## 2023-09-29 DIAGNOSIS — F33.1 MAJOR DEPRESSIVE DISORDER, RECURRENT EPISODE, MODERATE WITH ANXIOUS DISTRESS (H): ICD-10-CM

## 2023-09-29 DIAGNOSIS — F41.1 GENERALIZED ANXIETY DISORDER: ICD-10-CM

## 2023-09-29 PROCEDURE — 90834 PSYTX W PT 45 MINUTES: CPT | Mod: 95 | Performed by: SOCIAL WORKER
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
## 2023-10-02 ENCOUNTER — VIRTUAL VISIT (OUTPATIENT)
Dept: PSYCHOLOGY | Facility: CLINIC | Age: 53
End: 2023-10-02
Payer: MEDICARE

## 2023-10-02 DIAGNOSIS — F43.10 POST TRAUMATIC STRESS DISORDER (PTSD): ICD-10-CM

## 2023-10-02 DIAGNOSIS — F90.2 ADHD (ATTENTION DEFICIT HYPERACTIVITY DISORDER), COMBINED TYPE: Primary | ICD-10-CM

## 2023-10-02 DIAGNOSIS — F33.1 MAJOR DEPRESSIVE DISORDER, RECURRENT EPISODE, MODERATE WITH ANXIOUS DISTRESS (H): ICD-10-CM

## 2023-10-02 DIAGNOSIS — F41.1 GENERALIZED ANXIETY DISORDER: ICD-10-CM

## 2023-10-02 PROCEDURE — 90834 PSYTX W PT 45 MINUTES: CPT | Mod: 95 | Performed by: SOCIAL WORKER

## 2023-10-02 ASSESSMENT — ANXIETY QUESTIONNAIRES
5. BEING SO RESTLESS THAT IT IS HARD TO SIT STILL: NOT AT ALL
GAD7 TOTAL SCORE: 14
IF YOU CHECKED OFF ANY PROBLEMS ON THIS QUESTIONNAIRE, HOW DIFFICULT HAVE THESE PROBLEMS MADE IT FOR YOU TO DO YOUR WORK, TAKE CARE OF THINGS AT HOME, OR GET ALONG WITH OTHER PEOPLE: VERY DIFFICULT
4. TROUBLE RELAXING: SEVERAL DAYS
3. WORRYING TOO MUCH ABOUT DIFFERENT THINGS: NEARLY EVERY DAY
7. FEELING AFRAID AS IF SOMETHING AWFUL MIGHT HAPPEN: NEARLY EVERY DAY
1. FEELING NERVOUS, ANXIOUS, OR ON EDGE: MORE THAN HALF THE DAYS
GAD7 TOTAL SCORE: 14
2. NOT BEING ABLE TO STOP OR CONTROL WORRYING: NEARLY EVERY DAY
6. BECOMING EASILY ANNOYED OR IRRITABLE: MORE THAN HALF THE DAYS
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
## 2023-10-09 ENCOUNTER — VIRTUAL VISIT (OUTPATIENT)
Dept: PSYCHOLOGY | Facility: CLINIC | Age: 53
End: 2023-10-09
Payer: MEDICARE

## 2023-10-09 DIAGNOSIS — F33.1 MAJOR DEPRESSIVE DISORDER, RECURRENT EPISODE, MODERATE WITH ANXIOUS DISTRESS (H): ICD-10-CM

## 2023-10-09 DIAGNOSIS — F90.2 ADHD (ATTENTION DEFICIT HYPERACTIVITY DISORDER), COMBINED TYPE: Primary | ICD-10-CM

## 2023-10-09 DIAGNOSIS — F41.1 GENERALIZED ANXIETY DISORDER: ICD-10-CM

## 2023-10-09 DIAGNOSIS — F43.10 POST TRAUMATIC STRESS DISORDER (PTSD): ICD-10-CM

## 2023-10-09 PROCEDURE — 90834 PSYTX W PT 45 MINUTES: CPT | Mod: FQ | Performed by: SOCIAL WORKER

## 2023-10-09 PROCEDURE — 90785 PSYTX COMPLEX INTERACTIVE: CPT | Mod: FQ | Performed by: SOCIAL WORKER

## 2023-10-09 ASSESSMENT — PATIENT HEALTH QUESTIONNAIRE - PHQ9
SUM OF ALL RESPONSES TO PHQ QUESTIONS 1-9: 11
SUM OF ALL RESPONSES TO PHQ QUESTIONS 1-9: 11
10. IF YOU CHECKED OFF ANY PROBLEMS, HOW DIFFICULT HAVE THESE PROBLEMS MADE IT FOR YOU TO DO YOUR WORK, TAKE CARE OF THINGS AT HOME, OR GET ALONG WITH OTHER PEOPLE: VERY DIFFICULT
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
## 2023-10-13 ENCOUNTER — TELEPHONE (OUTPATIENT)
Dept: FAMILY MEDICINE | Facility: CLINIC | Age: 53
End: 2023-10-13

## 2023-10-13 NOTE — TELEPHONE ENCOUNTER
Reason for Call:  Appointment Request    Patient requesting this type of appt: Chronic Diease Management/Medication/Follow-Up    Requested provider: Jim Edwards    Reason patient unable to be scheduled: Not within requested timeframe    When does patient want to be seen/preferred time: 3-7 days    Comments: patient cancelled her appt for 4p today. She is wondering if she can be rescheduled to a time early next week.     Could we send this information to you in [employer] or would you prefer to receive a phone call?:   Patient would prefer a phone call   Okay to leave a detailed message?: Yes at Cell number on file:    Telephone Information:   Mobile [phone]       Call taken on 10/13/2023 at 12:54 PM by Katerina Burgos

## 2023-10-13 NOTE — TELEPHONE ENCOUNTER
OK for workin CHRISTIE slot. Do not use Same Day. Please call patient  to schedule time.  Electronically signed by Jim Edwards MD

## 2023-10-18 ENCOUNTER — VIRTUAL VISIT (OUTPATIENT)
Dept: PSYCHOLOGY | Facility: CLINIC | Age: 53
End: 2023-10-18
Payer: MEDICARE

## 2023-10-18 DIAGNOSIS — F90.2 ADHD (ATTENTION DEFICIT HYPERACTIVITY DISORDER), COMBINED TYPE: Primary | ICD-10-CM

## 2023-10-18 DIAGNOSIS — F33.1 MAJOR DEPRESSIVE DISORDER, RECURRENT EPISODE, MODERATE WITH ANXIOUS DISTRESS (H): ICD-10-CM

## 2023-10-18 DIAGNOSIS — F43.10 POST TRAUMATIC STRESS DISORDER (PTSD): ICD-10-CM

## 2023-10-18 DIAGNOSIS — F41.1 GENERALIZED ANXIETY DISORDER: ICD-10-CM

## 2023-10-18 PROCEDURE — 90834 PSYTX W PT 45 MINUTES: CPT | Mod: 95 | Performed by: SOCIAL WORKER
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
## 2023-10-23 ENCOUNTER — TELEPHONE (OUTPATIENT)
Dept: FAMILY MEDICINE | Facility: CLINIC | Age: 53
End: 2023-10-23

## 2023-10-23 NOTE — TELEPHONE ENCOUNTER
OK for workin 10/30 CHRISTIE. Please call patient  to schedule time.  Electronically signed by Jim Edwards MD

## 2023-10-23 NOTE — TELEPHONE ENCOUNTER
Patient is calling and stated she had to cancel her appointment for today because her car would not start.  She stated she would need to seen by October 30, 2023 because of medication refills.  She stated she can come anytime except on Thursday 10/26/2023 between 3-3:30.  Patient stated clinic could call and leave a detailed message on her answering machine.    Will forward to PCP for review and scheduling approval.      Kendy Robles RN

## 2023-10-25 ENCOUNTER — VIRTUAL VISIT (OUTPATIENT)
Dept: PSYCHOLOGY | Facility: CLINIC | Age: 53
End: 2023-10-25
Payer: MEDICARE

## 2023-10-25 DIAGNOSIS — F43.10 POST TRAUMATIC STRESS DISORDER (PTSD): ICD-10-CM

## 2023-10-25 DIAGNOSIS — F33.1 MAJOR DEPRESSIVE DISORDER, RECURRENT EPISODE, MODERATE WITH ANXIOUS DISTRESS (H): ICD-10-CM

## 2023-10-25 DIAGNOSIS — F41.1 GENERALIZED ANXIETY DISORDER: ICD-10-CM

## 2023-10-25 DIAGNOSIS — F90.2 ADHD (ATTENTION DEFICIT HYPERACTIVITY DISORDER), COMBINED TYPE: Primary | ICD-10-CM

## 2023-10-25 PROCEDURE — 90834 PSYTX W PT 45 MINUTES: CPT | Mod: 95 | Performed by: SOCIAL WORKER

## 2023-10-25 ASSESSMENT — ANXIETY QUESTIONNAIRES
GAD7 TOTAL SCORE: 14
4. TROUBLE RELAXING: SEVERAL DAYS
3. WORRYING TOO MUCH ABOUT DIFFERENT THINGS: NEARLY EVERY DAY
6. BECOMING EASILY ANNOYED OR IRRITABLE: MORE THAN HALF THE DAYS
GAD7 TOTAL SCORE: 14
7. FEELING AFRAID AS IF SOMETHING AWFUL MIGHT HAPPEN: NEARLY EVERY DAY
2. NOT BEING ABLE TO STOP OR CONTROL WORRYING: NEARLY EVERY DAY
1. FEELING NERVOUS, ANXIOUS, OR ON EDGE: SEVERAL DAYS
5. BEING SO RESTLESS THAT IT IS HARD TO SIT STILL: SEVERAL DAYS
IF YOU CHECKED OFF ANY PROBLEMS ON THIS QUESTIONNAIRE, HOW DIFFICULT HAVE THESE PROBLEMS MADE IT FOR YOU TO DO YOUR WORK, TAKE CARE OF THINGS AT HOME, OR GET ALONG WITH OTHER PEOPLE: EXTREMELY DIFFICULT

## 2023-10-30 ENCOUNTER — OFFICE VISIT (OUTPATIENT)
Dept: FAMILY MEDICINE | Facility: CLINIC | Age: 53
End: 2023-10-30
Payer: MEDICARE

## 2023-10-30 VITALS
DIASTOLIC BLOOD PRESSURE: 74 MMHG | HEART RATE: 110 BPM | RESPIRATION RATE: 12 BRPM | BODY MASS INDEX: 22.87 KG/M2 | SYSTOLIC BLOOD PRESSURE: 128 MMHG | TEMPERATURE: 97.8 F | OXYGEN SATURATION: 98 % | WEIGHT: 139.1 LBS

## 2023-10-30 DIAGNOSIS — G89.4 CHRONIC PAIN SYNDROME: ICD-10-CM

## 2023-10-30 DIAGNOSIS — M54.50 CHRONIC BILATERAL LOW BACK PAIN WITHOUT SCIATICA: ICD-10-CM

## 2023-10-30 DIAGNOSIS — M79.7 FIBROMYALGIA: ICD-10-CM

## 2023-10-30 DIAGNOSIS — F11.90 CHRONIC, CONTINUOUS USE OF OPIOIDS: Primary | ICD-10-CM

## 2023-10-30 DIAGNOSIS — G89.29 CHRONIC BILATERAL LOW BACK PAIN WITHOUT SCIATICA: ICD-10-CM

## 2023-10-30 DIAGNOSIS — M54.2 CERVICALGIA: ICD-10-CM

## 2023-10-30 PROCEDURE — 99213 OFFICE O/P EST LOW 20 MIN: CPT | Performed by: FAMILY MEDICINE

## 2023-10-30 RX ORDER — BUPROPION HYDROCHLORIDE 300 MG/1
300 TABLET ORAL EVERY MORNING
COMMUNITY
Start: 2023-10-27

## 2023-10-30 RX ORDER — HYDROCODONE BITARTRATE AND ACETAMINOPHEN 5; 325 MG/1; MG/1
2 TABLET ORAL 3 TIMES DAILY
Qty: 180 TABLET | Refills: 0 | Status: SHIPPED | OUTPATIENT
Start: 2023-10-31 | End: 2023-11-28

## 2023-10-30 RX ORDER — BREXPIPRAZOLE 4 MG/1
4 TABLET ORAL DAILY
COMMUNITY
Start: 2023-09-28

## 2023-10-30 RX ORDER — BUPROPION HYDROCHLORIDE 150 MG/1
150 TABLET ORAL EVERY MORNING
COMMUNITY
Start: 2023-10-27
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
## 2023-10-30 NOTE — PROGRESS NOTES
Assessment & Plan     Chronic, continuous use of opioids  Sherie Otero is a 53 year old female who presents with chronic pain with use of chronic narcotics for many years under the supervision and care of Dr. Castro who recently retired. Her chronic pain is due to RA and cervical spondylosis with stenosis. She was managed initially through [facility] and then referred back to PCP for ongoing CCS management. She is currently using Norco for her chronic pain and has been on stable dose for many years. She has had previous SHANE without benefit. She had previously done PT but none recently. She is not on any other non narcotic pain medication or anti rheumatics. She has prior NSAID induced gastritis. She has general anxiety/Panic and is followed by psychiatry and prescribed Klonopin. She is also certified for medical cannabis by me with the support of Dr. Castro.   She currently has CSA signed and UDS last completed 3 months ago. MNPMP reviewed with patient. Her daily MME is 30 and her ORS is 190 giving her a 1X risk of unintentional overdose death. This was tapered through simplification of her prescription sig at our last meeting in July. We reviewed the plan for ongoing management. She does need refills today. We will follow up in person every 3 months and will re sign CSA in 9 months.. Anticipate tapering Norco and consider adding Cymbalta. She has listed allergies to gabapentin and Lyrica.     Cervicalgia  Sherie Otero is a 53 year old female who presents with chronic pain with use of chronic narcotics for many years under the supervision and care of Dr. Castro who recently retired. Her chronic pain is due to RA and cervical spondylosis with stenosis. She was managed initially through Pain Clinic and then referred back to PCP for ongoing CCS management. She is currently using Norco for her chronic pain and has been on stable dose for many years. She has had previous SHANE without benefit. She had  previously done PT but none recently. She is not on any other non narcotic pain medication or anti rheumatics. She has prior NSAID induced gastritis. She has general anxiety/Panic and is followed by psychiatry and prescribed Klonopin. She is also certified for medical cannabis by me with the support of Dr. Castro. She complains of migraine headaches but her pain seems more related to her chronic cervicalgia. Pain awakes her in morning, is one sided and most acute right above her eye. I recommend warm pack to neck and consider muscle relaxant.  She currently has CSA signed and UDS last completed 3 months ago. MNPMP reviewed with patient. Her daily MME is 30 and her ORS is 190 giving her a 1X risk of unintentional overdose death. This was tapered through simplification of her prescription sig at our last meeting in July. We reviewed the plan for ongoing management. She does need refills today. We will follow up in person every 3 months and will re sign CSA in 9 months.. Anticipate tapering Norco and consider adding Cymbalta. She has listed allergies to gabapentin and Lyrica.  - HYDROcodone-acetaminophen (NORCO) 5-325 MG tablet; Take 2 tablets by mouth 3 times daily    Chronic pain syndrome  Sherie Otero is a 53 year old female who presents with chronic pain with use of chronic narcotics for many years under the supervision and care of Dr. Castro who recently retired. Her chronic pain is due to RA and cervical spondylosis with stenosis. She was managed initially through Pain Clinic and then referred back to PCP for ongoing CCS management. She is currently using Norco for her chronic pain and has been on stable dose for many years. She has had previous SHANE without benefit. She had previously done PT but none recently. She is not on any other non narcotic pain medication or anti rheumatics. She has prior NSAID induced gastritis. She has general anxiety/Panic and is followed by psychiatry and prescribed Klonopin. She  is also certified for medical cannabis by me with the support of Dr. Castro.   She currently has CSA signed and UDS last completed 3 months ago. [facility] reviewed with patient. Her daily MME is 30 and her ORS is 190 giving her a 1X risk of unintentional overdose death. This was tapered through simplification of her prescription sig at our last meeting in July. We reviewed the plan for ongoing management. She does need refills today. We will follow up in person every 3 months and will re sign CSA in 9 months.. Anticipate tapering Norco and consider adding Cymbalta. She has listed allergies to gabapentin and Lyrica.  - HYDROcodone-acetaminophen (NORCO) 5-325 MG tablet; Take 2 tablets by mouth 3 times daily    Fibromyalgia  Sherie Otero is a 53 year old female who presents with chronic pain with use of chronic narcotics for many years under the supervision and care of Dr. Castro who recently retired. Her chronic pain is due to RA and cervical spondylosis with stenosis. She was managed initially through Pain Clinic and then referred back to PCP for ongoing CCS management. She is currently using Norco for her chronic pain and has been on stable dose for many years. She has had previous SHANE without benefit. She had previously done PT but none recently. She is not on any other non narcotic pain medication or anti rheumatics. She has prior NSAID induced gastritis. She has general anxiety/Panic and is followed by psychiatry and prescribed Klonopin. She is also certified for medical cannabis by me with the support of Dr. Castro.   She currently has CSA signed and UDS last completed 3 months ago. [facility] reviewed with patient. Her daily MME is 30 and her ORS is 190 giving her a 1X risk of unintentional overdose death. This was tapered through simplification of her prescription sig at our last meeting in July. We reviewed the plan for ongoing management. She does need refills today. We will follow up in person every 3 months  and will re sign CSA in 9 months.. Anticipate tapering Norco and consider adding Cymbalta. She has listed allergies to gabapentin and Lyrica.  - HYDROcodone-acetaminophen (NORCO) 5-325 MG tablet; Take 2 tablets by mouth 3 times daily    Chronic bilateral low back pain without sciatica  Sherie Otero is a 53 year old female who presents with chronic pain with use of chronic narcotics for many years under the supervision and care of Dr. Castro who recently retired. Her chronic pain is due to RA and cervical spondylosis with stenosis. She was managed initially through [facility] and then referred back to PCP for ongoing CCS management. She is currently using Norco for her chronic pain and has been on stable dose for many years. She has had previous SHANE without benefit. She had previously done PT but none recently. She is not on any other non narcotic pain medication or anti rheumatics. She has prior NSAID induced gastritis. She has general anxiety/Panic and is followed by psychiatry and prescribed Klonopin. She is also certified for medical cannabis by me with the support of Dr. Castro.   She currently has CSA signed and UDS last completed 3 months ago. MNPMP reviewed with patient. Her daily MME is 30 and her ORS is 190 giving her a 1X risk of unintentional overdose death. This was tapered through simplification of her prescription sig at our last meeting in July. We reviewed the plan for ongoing management. She does need refills today. We will follow up in person every 3 months and will re sign CSA in 9 months.. Anticipate tapering Norco and consider adding Cymbalta. She has listed allergies to gabapentin and Lyrica.  - HYDROcodone-acetaminophen (NORCO) 5-325 MG tablet; Take 2 tablets by mouth 3 times daily    Prescription drug management         Work on weight loss  Regular exercise        Jim Edwards MD  [facility]    Franky Rehman is a 53 year old, presenting for the following  health issues:  Recheck Medication      10/30/2023     4:36 PM   Additional Questions   Roomed by Kristal HORN       Pain History:  When did you first notice your pain? 20 years    Have you seen this provider for your pain in the past? Yes   Where in your body do you have pain? All over   Are you seeing anyone else for your pain? No        10/2/2023    10:39 AM 10/9/2023    12:22 PM 10/25/2023     8:09 AM   PHQ-9 SCORE   PHQ-9 Total Score MyChart 9 (Mild depression) 11 (Moderate depression) 11 (Moderate depression)   PHQ-9 Total Score 9 11 11           9/7/2023     4:10 PM 10/2/2023    10:39 AM 10/25/2023     8:10 AM   CELIA-7 SCORE   Total Score 11 (moderate anxiety) 14 (moderate anxiety) 14 (moderate anxiety)   Total Score 11 14 14               Chronic Pain Follow Up:    Location of pain: All over  Analgesia/pain control:    - Recent changes:  a little better    - Overall control: Tolerable with discomfort    - Current treatments: Medication   Adherence:     - Do you ever take more pain medicine than prescribed? No    - When did you take your last dose of pain medicine?  3 pm    Adverse effects: No   PDMP Review         Value Time User    State PDMP site checked  Yes 9/25/2023  2:01 PM Jim Edwards MD          Last CSA Agreement:   CSA -- Patient Level:     [Media Unavailable] Controlled Substance Agreement - Opioid - Scan on 6/13/2022  4:08 PM   [Media Unavailable] Controlled Substance Agreement - Opioid - Scan on 5/26/2021  4:37 PM   [Media Unavailable] Controlled Substance Agreement - Opioid - Scan on 1/27/2020  2:58 PM: controlled substance agreement   [Media Unavailable] Controlled Substance Agreement - Opioid - Scan on 1/24/2019  1:31 PM: signed 1/11/2019       Last UDS: 8/2/2023          Patient Active Problem List   Diagnosis    CARDIOVASCULAR SCREENING; LDL GOAL LESS THAN 160    Chronic fatigue syndrome    Fibromyalgia    Generalized anxiety disorder    Tobacco abuse    Disturbance in sleep  behavior    Cervicalgia    Stenosis, cervical spine    Spondylitis, cervical (H24)    NSAID induced gastritis    Major depressive disorder, recurrent episode, mild (H24)    Other chronic pain    Intermittent asthma, uncomplicated    Rheumatoid arthritis involving multiple sites with positive rheumatoid factor (H)    Elevated white blood cell count    Panic attacks    Osteoarthritis of cervical spine, unspecified spinal osteoarthritis complication status    Degenerative joint disease of cervical spine    Pulmonary nodules    Abnormal CT of the chest    Hilar lymphadenopathy    Other migraine without status migrainosus, intractable    Chronic rhinitis    Pelvic pain in female    Cervical cancer screening    Chronic, continuous use of opioids    Medical cannabis use    Balance problems       Current Outpatient Medications   Medication Sig Dispense Refill    albuterol (VENTOLIN HFA) 108 (90 Base) MCG/ACT inhaler Inhale 1-2 puffs into the lungs every 6 hours as needed for shortness of breath or wheezing 18 g 5    cetirizine (ZYRTEC) 10 MG tablet Take 1 tablet (10 mg) by mouth daily 90 tablet 0    clonazePAM (KLONOPIN) 0.5 MG tablet TAKE 1 TABLET BY MOUTH 2 - 3 TIMES DAILY AS NEEDED FOR ANXIETY      fluticasone (FLONASE) 50 MCG/ACT nasal spray Spray 1-2 sprays into both nostrils daily SPRAY 2 SPRAYS INTO BOTH NOSTRILS DAILY. 15.8 mL 5    fluvoxaMINE (LUVOX) 100 MG tablet 200mg takes different      fluvoxaMINE (LUVOX) 50 MG tablet Take 50 mg by mouth At Bedtime      HYDROcodone-acetaminophen (NORCO) 5-325 MG tablet Take 2 tablets by mouth 3 times daily 180 tablet 0    naloxone (NARCAN) nasal spray Spray 1 spray (4 mg) into one nostril alternating nostrils as needed for opioid reversal every 2-3 minutes until assistance arrives 0.2 mL 0           Review of Systems   CONSTITUTIONAL: NEGATIVE for fever, chills, change in weight  ENT/MOUTH: NEGATIVE for ear, mouth and throat problems  RESP: NEGATIVE for significant cough or  SOB  CV: NEGATIVE for chest pain, palpitations or peripheral edema  ROS otherwise negative      Objective    /74   Pulse 110   Temp 97.8  F (36.6  C)   Resp 12   Wt 63.1 kg (139 lb 1.6 oz)   SpO2 98%   BMI 22.87 kg/m    Body mass index is 22.87 kg/m .  Physical Exam   GENERAL: healthy, alert and no distress  NECK: no adenopathy, no asymmetry, masses, or scars and thyroid normal to palpation  RESP: lungs clear to auscultation - no rales, rhonchi or wheezes  CV: regular rate and rhythm, normal S1 S2, no S3 or S4, no murmur, click or rub, no peripheral edema and peripheral pulses strong  ABDOMEN: soft, nontender, no hepatosplenomegaly, no masses and bowel sounds normal  MS: no gross musculoskeletal defects noted, no edema    Office Visit on 07/31/2023   Component Date Value Ref Range Status    Dihydrocodeine ng/mL 07/31/2023 953 (H)  <50 ng/mL Final    Dihydrocodeine 07/31/2023 409  Absent ng/mg [creat] Final    Hydromorphone and dihydrocodeine are expected metabolites of hydrocodone. Hydromorphone and dihydrocodeine are also available as scheduled prescription medications.    Hydrocodone ng/mL 07/31/2023 7,360 (H)  <50 ng/mL Final    Hydrocodone 07/31/2023 3,159  Absent ng/mg [creat] Final    Sources of hydrocodone include scheduled prescription medications.  Hydromorphone and dihydrocodeine are expected metabolites of hydrocodone. Hydromorphone and dihydrocodeine are also available as scheduled prescription medications.    7-Aminoclonazepam 07/31/2023 Present (A)  Absent Final    7-aminoclonazepam is an expected metabolite of clonazepam. Sources of clonazepam are scheduled prescription medications.    Creatinine Urine for Drug Screen 07/31/2023 233  mg/dL Final    The reference range has not been established for creatinine in random urines. The results should be integrated into the clinical context for interpretation.

## 2023-10-30 NOTE — LETTER
Opioid / Opioid Plus Controlled Substance Agreement    This is an agreement between you and your provider about the safe and appropriate use of controlled substance/opioids prescribed by your care team. Controlled substances are medicines that can cause physical and mental dependence (abuse).    There are strict laws about having and using these medicines. We here at [facility] are committing to working with you in your efforts to get better. To support you in this work, we ll help you schedule regular office appointments for medicine refills. If we must cancel or change your appointment for any reason, we ll make sure you have enough medicine to last until your next appointment.     As a Provider, I will:  Listen carefully to your concerns and treat you with respect.   Recommend a treatment plan that I believe is in your best interest. This plan may involve therapies other than opioid pain medication.   Talk with you often about the possible benefits, and the risk of harm of any medicine that we prescribe for you.   Provide a plan on how to taper (discontinue or go off) using this medicine if the decision is made to stop its use.    As a Patient, I understand that opioid(s):   Are a controlled substance prescribed by my care team to help me function or work and manage my condition(s).   Are strong medicines and can cause serious side effects such as:  Drowsiness, which can seriously affect my driving ability  A lower breathing rate, enough to cause death  Harm to my thinking ability   Depression   Abuse of and addiction to this medicine  Need to be taken exactly as prescribed. Combining opioids with certain medicines or chemicals (such as illegal drugs, sedatives, sleeping pills, and benzodiazepines) can be dangerous or even fatal. If I stop opioids suddenly, I may have severe withdrawal symptoms.  Do not work for all types of pain nor for all patients. If they re not helpful, I may be asked to stop  them.        The risks, benefits and side effects of these medicine(s) were explained to me. I agree that:  I will take part in other treatments as advised by my care team. This may be psychiatry or counseling, physical therapy, behavioral therapy, group treatment or a referral to a specialist.     I will keep all my appointments. I understand that this is part of the monitoring of opioids. My care team may require an office visit for EVERY opioid/controlled substance refill. If I miss appointments or don t follow instructions, my care team may stop my medicine.    I will take my medicines as prescribed. I will not change the dose or schedule unless my care team tells me to. There will be no refills if I run out early.     I may be asked to come to the clinic and complete a urine drug test or complete a pill count at any time. If I don t give a urine sample or participate in a pill count, the care team may stop my medicine.    I will only receive prescriptions from this clinic for chronic pain. If I am treated by another provider for acute pain issues, I will tell them that I am taking opioid pain medication for chronic pain and that I have a treatment agreement with this provider. I will inform my [facility] care team within one business day if I am given a prescription for any pain medication by another healthcare provider. My [facility] care team can contact other providers and pharmacists about my use of any medicines.    It is up to me to make sure that I don t run out of my medicines on weekends or holidays. If my care team is willing to refill my opioid prescription without a visit, I must request refills only during office hours. Refills may take up to 3 business days to process. I will use one pharmacy to fill all my opioid and other controlled substance prescriptions. I will notify the clinic about any changes to my insurance or medication availability.    I am responsible for my  prescriptions. If the medicine/prescription is lost, stolen or destroyed, it will not be replaced. I also agree not to share controlled substance medicines with anyone.    I am aware I should not use any illegal or recreational drugs. I agree not to drink alcohol unless my care team says I can.       If I enroll in the [place] [facility] program, I will tell my care team prior to my next refill.     I will tell my care team right away if I become pregnant, have a new medical problem treated outside of my regular clinic, or have a change in my medications.    I understand that this medicine can affect my thinking, judgment and reaction time. Alcohol and drugs affect the brain and body, which can affect the safety of my driving. Being under the influence of alcohol or drugs can affect my decision-making, behaviors, personal safety, and the safety of others. Driving while impaired (DWI) can occur if a person is driving, operating, or in physical control of a car, motorcycle, boat, snowmobile, ATV, motorbike, off-road vehicle, or any other motor vehicle ([place] Statute 169A.20). I understand the risk if I choose to drive or operate any vehicle or machinery.    I understand that if I do not follow any of the conditions above, my prescriptions or treatment may be stopped or changed.          Opioids  What You Need to Know    What are opioids?   Opioids are pain medicines that must be prescribed by a doctor. They are also known as narcotics.     Examples are:   morphine (MS Contin, Catrachita)  oxycodone (Oxycontin)  oxycodone and acetaminophen (Percocet)  hydrocodone and acetaminophen (Vicodin, Norco)   fentanyl patch (Duragesic)   hydromorphone (Dilaudid)   methadone  codeine (Tylenol #3)     What do opioids do well?   Opioids are best for severe short-term pain such as after a surgery or injury. They may work well for cancer pain. They may help some people with long-lasting (chronic) pain.     What do opioids NOT do  well?   Opioids never get rid of pain entirely, and they don t work well for most patients with chronic pain. Opioids don t reduce swelling, one of the causes of pain.                                    Other ways to manage chronic pain and improve function include:     Treat the health problem that may be causing pain  Anti-inflammation medicines, which reduce swelling and tenderness, such as ibuprofen (Advil, Motrin) or naproxen (Aleve)  Acetaminophen (Tylenol)  Antidepressants and anti-seizure medicines, especially for nerve pain  Topical treatments such as patches or creams  Injections or nerve blocks  Chiropractic or osteopathic treatment  Acupuncture, massage, deep breathing, meditation, visual imagery, aromatherapy  Use heat or ice at the pain site  Physical therapy   Exercise  Stop smoking  Take part in therapy       Risks and side effects     Talk to your doctor before you start or decide to keep taking opioids. Possible side effects include:    Lowering your breathing rate enough to cause death  Overdose, including death, especially if taking higher than prescribed doses  Worse depression symptoms; less pleasure in things you usually enjoy  Feeling tired or sluggish  Slower thoughts or cloudy thinking  Being more sensitive to pain over time; pain is harder to control  Trouble sleeping or restless sleep  Changes in hormone levels (for example, less testosterone)  Changes in sex drive or ability to have sex  Constipation  Unsafe driving  Itching and sweating  Dizziness  Nausea, throwing up and dry mouth    What else should I know about opioids?    Opioids may lead to dependence, tolerance, or addiction.    Dependence means that if you stop or reduce the medicine too quickly, you will have withdrawal symptoms. These include loose poop (diarrhea), jitters, flu-like symptoms, nervousness and tremors. Dependence is not the same as addiction.                     Tolerance means needing higher doses over time to  get the same effect. This may increase the chance of serious side effects.    Addiction is when people improperly use a substance that harms their body, their mind or their relations with others. Use of opiates can cause a relapse of addiction if you have a history of drug or alcohol abuse.    People who have used opioids for a long time may have a lower quality of life, worse depression, higher levels of pain and more visits to doctors.    You can overdose on opioids. Take these steps to lower your risk of overdose:    Recognize the signs:  Signs of overdose include decrease or loss of consciousness (blackout), slowed breathing, trouble waking up and blue lips. If someone is worried about overdose, they should call 911.    Talk to your doctor about Narcan (naloxone).   If you are at risk for overdose, you may be given a prescription for Narcan. This medicine very quickly reverses the effects of opioids.   If you overdose, a friend or family member can give you Narcan while waiting for the ambulance. They need to know the signs of overdose and how to give Narcan.     Don't use alcohol or street drugs.   Taking them with opioids can cause death.    Do not take any of these medicines unless your doctor says it s OK. Taking these with opioids can cause death:  Benzodiazepines, such as lorazepam (Ativan), alprazolam (Xanax) or diazepam (Valium)  Muscle relaxers, such as cyclobenzaprine (Flexeril)  Sleeping pills like zolpidem (Ambien)   Other opioids      How to keep you and other people safe while taking opioids:    Never share your opioids with others.  Opioid medicines are regulated by the Drug Enforcement Agency ([employer]). Selling or sharing medications is a criminal act.    2. Be sure to store opioids in a secure place, locked up if possible. Young children can easily swallow them and overdose.    3. When you are traveling with your medicines, keep them in the original bottles. If you use a pill box, be sure you also  carry a copy of your medicine list from your clinic or pharmacy.    4. Safe disposal of opioids    Most pharmacies have places to get rid of medicine, called disposal kiosks. Medicine disposal options are also available in every [facility]. Search your county and  medication disposal  to find more options. You can find more details at:  [email or web address]://[email or web address]-medications     I agree that my provider, clinic care team, and pharmacy may work with any city, state or federal law enforcement agency that investigates the misuse, sale, or other diversion of my controlled medicine. I will allow my provider to discuss my care with, or share a copy of, this agreement with any other treating provider, pharmacy or emergency room where I receive care.    I have read this agreement and have asked questions about anything I did not understand.    _______________________________________________________  Patient Signature - Sherie Otero _____________________                   Date     _______________________________________________________  Provider Signature - Jim Edwards MD   _____________________                   Date     _______________________________________________________  Witness Signature (required if provider not present while patient signing)   _____________________                   Date

## 2023-11-01 ENCOUNTER — VIRTUAL VISIT (OUTPATIENT)
Dept: PSYCHOLOGY | Facility: CLINIC | Age: 53
End: 2023-11-01
Payer: MEDICARE

## 2023-11-01 DIAGNOSIS — F33.1 MAJOR DEPRESSIVE DISORDER, RECURRENT EPISODE, MODERATE WITH ANXIOUS DISTRESS (H): ICD-10-CM

## 2023-11-01 DIAGNOSIS — F43.10 POST TRAUMATIC STRESS DISORDER (PTSD): ICD-10-CM

## 2023-11-01 DIAGNOSIS — F41.1 GENERALIZED ANXIETY DISORDER: ICD-10-CM

## 2023-11-01 DIAGNOSIS — F90.2 ADHD (ATTENTION DEFICIT HYPERACTIVITY DISORDER), COMBINED TYPE: Primary | ICD-10-CM

## 2023-11-01 PROCEDURE — 90834 PSYTX W PT 45 MINUTES: CPT | Mod: 95 | Performed by: SOCIAL WORKER
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
## 2023-11-02 ENCOUNTER — LAB (OUTPATIENT)
Dept: LAB | Facility: CLINIC | Age: 53
End: 2023-11-02
Payer: MEDICARE

## 2023-11-02 DIAGNOSIS — Z79.899 HIGH RISK MEDICATION USE: ICD-10-CM

## 2023-11-02 LAB
CHOLEST SERPL-MCNC: 181 MG/DL
FASTING STATUS PATIENT QL REPORTED: YES
GLUCOSE SERPL-MCNC: 114 MG/DL (ref 70–99)
HDLC SERPL-MCNC: 62 MG/DL
LDLC SERPL CALC-MCNC: 90 MG/DL
NONHDLC SERPL-MCNC: 119 MG/DL
TRIGL SERPL-MCNC: 147 MG/DL

## 2023-11-02 PROCEDURE — 82947 ASSAY GLUCOSE BLOOD QUANT: CPT

## 2023-11-02 PROCEDURE — 80061 LIPID PANEL: CPT

## 2023-11-02 PROCEDURE — 36415 COLL VENOUS BLD VENIPUNCTURE: CPT

## 2023-11-08 ENCOUNTER — VIRTUAL VISIT (OUTPATIENT)
Dept: PSYCHOLOGY | Facility: CLINIC | Age: 53
End: 2023-11-08
Payer: MEDICARE

## 2023-11-08 DIAGNOSIS — F90.2 ADHD (ATTENTION DEFICIT HYPERACTIVITY DISORDER), COMBINED TYPE: Primary | ICD-10-CM

## 2023-11-08 DIAGNOSIS — F33.1 MAJOR DEPRESSIVE DISORDER, RECURRENT EPISODE, MODERATE WITH ANXIOUS DISTRESS (H): ICD-10-CM

## 2023-11-08 DIAGNOSIS — F41.1 GENERALIZED ANXIETY DISORDER: ICD-10-CM

## 2023-11-08 DIAGNOSIS — F43.10 POST TRAUMATIC STRESS DISORDER (PTSD): ICD-10-CM

## 2023-11-08 PROCEDURE — 90834 PSYTX W PT 45 MINUTES: CPT | Mod: FQ | Performed by: SOCIAL WORKER
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
## 2023-11-15 ENCOUNTER — VIRTUAL VISIT (OUTPATIENT)
Dept: PSYCHOLOGY | Facility: CLINIC | Age: 53
End: 2023-11-15
Payer: MEDICARE

## 2023-11-15 DIAGNOSIS — F90.2 ADHD (ATTENTION DEFICIT HYPERACTIVITY DISORDER), COMBINED TYPE: Primary | ICD-10-CM

## 2023-11-15 DIAGNOSIS — F43.10 POST TRAUMATIC STRESS DISORDER (PTSD): ICD-10-CM

## 2023-11-15 DIAGNOSIS — F41.1 GENERALIZED ANXIETY DISORDER: ICD-10-CM

## 2023-11-15 DIAGNOSIS — F33.1 MAJOR DEPRESSIVE DISORDER, RECURRENT EPISODE, MODERATE WITH ANXIOUS DISTRESS (H): ICD-10-CM

## 2023-11-15 PROCEDURE — 90834 PSYTX W PT 45 MINUTES: CPT | Mod: 95 | Performed by: SOCIAL WORKER

## 2023-11-15 ASSESSMENT — ANXIETY QUESTIONNAIRES
GAD7 TOTAL SCORE: 11
GAD7 TOTAL SCORE: 11
7. FEELING AFRAID AS IF SOMETHING AWFUL MIGHT HAPPEN: NEARLY EVERY DAY
4. TROUBLE RELAXING: NOT AT ALL
3. WORRYING TOO MUCH ABOUT DIFFERENT THINGS: NEARLY EVERY DAY
5. BEING SO RESTLESS THAT IT IS HARD TO SIT STILL: NOT AT ALL
1. FEELING NERVOUS, ANXIOUS, OR ON EDGE: SEVERAL DAYS
6. BECOMING EASILY ANNOYED OR IRRITABLE: SEVERAL DAYS
IF YOU CHECKED OFF ANY PROBLEMS ON THIS QUESTIONNAIRE, HOW DIFFICULT HAVE THESE PROBLEMS MADE IT FOR YOU TO DO YOUR WORK, TAKE CARE OF THINGS AT HOME, OR GET ALONG WITH OTHER PEOPLE: SOMEWHAT DIFFICULT
2. NOT BEING ABLE TO STOP OR CONTROL WORRYING: NEARLY EVERY DAY
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
## 2023-11-23 ENCOUNTER — APPOINTMENT (OUTPATIENT)
Dept: ULTRASOUND IMAGING | Facility: CLINIC | Age: 53
End: 2023-11-23
Attending: PHYSICIAN ASSISTANT
Payer: MEDICARE

## 2023-11-23 ENCOUNTER — HOSPITAL ENCOUNTER (EMERGENCY)
Facility: CLINIC | Age: 53
Discharge: HOME | End: 2023-11-23
Attending: PHYSICIAN ASSISTANT | Admitting: PHYSICIAN ASSISTANT
Payer: MEDICARE

## 2023-11-23 VITALS
HEIGHT: 66 IN | BODY MASS INDEX: 21.38 KG/M2 | TEMPERATURE: 97 F | SYSTOLIC BLOOD PRESSURE: 144 MMHG | WEIGHT: 133 LBS | DIASTOLIC BLOOD PRESSURE: 99 MMHG | OXYGEN SATURATION: 98 % | RESPIRATION RATE: 18 BRPM | HEART RATE: 94 BPM

## 2023-11-23 DIAGNOSIS — E86.0 DEHYDRATION: ICD-10-CM

## 2023-11-23 DIAGNOSIS — K52.9 ACUTE GASTROENTERITIS: ICD-10-CM

## 2023-11-23 LAB
ALBUMIN SERPL BCG-MCNC: 4.5 G/DL (ref 3.5–5.2)
ALP SERPL-CCNC: 158 U/L (ref 40–150)
ALT SERPL W P-5'-P-CCNC: 19 U/L (ref 0–50)
ANION GAP SERPL CALCULATED.3IONS-SCNC: 15 MMOL/L (ref 7–15)
AST SERPL W P-5'-P-CCNC: 27 U/L (ref 0–45)
BASOPHILS # BLD AUTO: 0 10E3/UL (ref 0–0.2)
BASOPHILS NFR BLD AUTO: 0 %
BILIRUB SERPL-MCNC: 0.3 MG/DL
BUN SERPL-MCNC: 24.8 MG/DL (ref 6–20)
CALCIUM SERPL-MCNC: 9.3 MG/DL (ref 8.6–10)
CHLORIDE SERPL-SCNC: 96 MMOL/L (ref 98–107)
CREAT SERPL-MCNC: 1.19 MG/DL (ref 0.51–0.95)
DEPRECATED HCO3 PLAS-SCNC: 31 MMOL/L (ref 22–29)
EGFRCR SERPLBLD CKD-EPI 2021: 54 ML/MIN/1.73M2
EOSINOPHIL # BLD AUTO: 0 10E3/UL (ref 0–0.7)
EOSINOPHIL NFR BLD AUTO: 0 %
ERYTHROCYTE [DISTWIDTH] IN BLOOD BY AUTOMATED COUNT: 12.2 % (ref 10–15)
GLUCOSE SERPL-MCNC: 154 MG/DL (ref 70–99)
HCT VFR BLD AUTO: 48.5 % (ref 35–47)
HGB BLD-MCNC: 16.8 G/DL (ref 11.7–15.7)
IMM GRANULOCYTES # BLD: 0.1 10E3/UL
IMM GRANULOCYTES NFR BLD: 1 %
LIPASE SERPL-CCNC: 16 U/L (ref 13–60)
LYMPHOCYTES # BLD AUTO: 2.1 10E3/UL (ref 0.8–5.3)
LYMPHOCYTES NFR BLD AUTO: 15 %
MCH RBC QN AUTO: 32.4 PG (ref 26.5–33)
MCHC RBC AUTO-ENTMCNC: 34.6 G/DL (ref 31.5–36.5)
MCV RBC AUTO: 94 FL (ref 78–100)
MONOCYTES # BLD AUTO: 1.2 10E3/UL (ref 0–1.3)
MONOCYTES NFR BLD AUTO: 8 %
NEUTROPHILS # BLD AUTO: 11.1 10E3/UL (ref 1.6–8.3)
NEUTROPHILS NFR BLD AUTO: 76 %
NRBC # BLD AUTO: 0 10E3/UL
NRBC BLD AUTO-RTO: 0 /100
PLATELET # BLD AUTO: 291 10E3/UL (ref 150–450)
POTASSIUM SERPL-SCNC: 3.4 MMOL/L (ref 3.4–5.3)
PROT SERPL-MCNC: 7.8 G/DL (ref 6.4–8.3)
RBC # BLD AUTO: 5.18 10E6/UL (ref 3.8–5.2)
SODIUM SERPL-SCNC: 142 MMOL/L (ref 135–145)
WBC # BLD AUTO: 14.5 10E3/UL (ref 4–11)

## 2023-11-23 PROCEDURE — 99284 EMERGENCY DEPT VISIT MOD MDM: CPT | Performed by: PHYSICIAN ASSISTANT

## 2023-11-23 PROCEDURE — 83690 ASSAY OF LIPASE: CPT | Performed by: PHYSICIAN ASSISTANT

## 2023-11-23 PROCEDURE — 85025 COMPLETE CBC W/AUTO DIFF WBC: CPT | Performed by: PHYSICIAN ASSISTANT

## 2023-11-23 PROCEDURE — 96375 TX/PRO/DX INJ NEW DRUG ADDON: CPT

## 2023-11-23 PROCEDURE — 250N000011 HC RX IP 250 OP 636: Mod: JZ | Performed by: PHYSICIAN ASSISTANT

## 2023-11-23 PROCEDURE — 96374 THER/PROPH/DIAG INJ IV PUSH: CPT

## 2023-11-23 PROCEDURE — 80053 COMPREHEN METABOLIC PANEL: CPT | Performed by: PHYSICIAN ASSISTANT

## 2023-11-23 PROCEDURE — 36415 COLL VENOUS BLD VENIPUNCTURE: CPT | Performed by: PHYSICIAN ASSISTANT

## 2023-11-23 PROCEDURE — 99285 EMERGENCY DEPT VISIT HI MDM: CPT | Mod: 25

## 2023-11-23 PROCEDURE — 96361 HYDRATE IV INFUSION ADD-ON: CPT

## 2023-11-23 PROCEDURE — 258N000003 HC RX IP 258 OP 636: Performed by: PHYSICIAN ASSISTANT

## 2023-11-23 PROCEDURE — 76705 ECHO EXAM OF ABDOMEN: CPT

## 2023-11-23 RX ORDER — ONDANSETRON 4 MG/1
4 TABLET, ORALLY DISINTEGRATING ORAL EVERY 8 HOURS PRN
Qty: 10 TABLET | Refills: 0 | Status: SHIPPED | OUTPATIENT
Start: 2023-11-23

## 2023-11-23 RX ORDER — KETOROLAC TROMETHAMINE 15 MG/ML
15 INJECTION, SOLUTION INTRAMUSCULAR; INTRAVENOUS ONCE
Status: COMPLETED | OUTPATIENT
Start: 2023-11-23 | End: 2023-11-23

## 2023-11-23 RX ORDER — ONDANSETRON 2 MG/ML
4 INJECTION INTRAMUSCULAR; INTRAVENOUS EVERY 30 MIN PRN
Status: DISCONTINUED | OUTPATIENT
Start: 2023-11-23 | End: 2023-11-23 | Stop reason: HOSPADM

## 2023-11-23 RX ADMIN — SODIUM CHLORIDE 1000 ML: 9 INJECTION, SOLUTION INTRAVENOUS at 14:27

## 2023-11-23 RX ADMIN — ONDANSETRON 4 MG: 2 INJECTION INTRAMUSCULAR; INTRAVENOUS at 14:31

## 2023-11-23 RX ADMIN — SODIUM CHLORIDE 1000 ML: 9 INJECTION, SOLUTION INTRAVENOUS at 16:04

## 2023-11-23 RX ADMIN — KETOROLAC TROMETHAMINE 15 MG: 15 INJECTION, SOLUTION INTRAMUSCULAR; INTRAVENOUS at 14:31

## 2023-11-23 ASSESSMENT — ACTIVITIES OF DAILY LIVING (ADL): ADLS_ACUITY_SCORE: 35

## 2023-11-23 NOTE — MEDICATION SCRIBE - ADMISSION MEDICATION HISTORY
Medication Scribe Admission Medication History    Admission medication history is complete. The information provided in this note is only as accurate as the sources available at the time of the update.    Information Source(s): Patient and CareEverywhere/SureScripts via in-person    Pertinent Information: n/a    Changes made to PTA medication list:  Added: None  Deleted: None  Changed: None    Medication Affordability:  Not including over the counter (OTC) medications, was there a time in the past 3 months when you did not take your medications as prescribed because of cost?: No    Allergies reviewed with patient and updates made in EHR: yes    Medication History Completed By: MIHAELA WARD 11/23/2023 3:19 PM    PTA Med List   Medication Sig Last Dose    albuterol (VENTOLIN HFA) 108 (90 Base) MCG/ACT inhaler Inhale 1-2 puffs into the lungs every 6 hours as needed for shortness of breath or wheezing More than a month at unkn    buPROPion (WELLBUTRIN XL) 150 MG 24 hr tablet Take 150 mg by mouth every morning With 300mg to = 450mg daily dose 11/20/2023 at am    buPROPion (WELLBUTRIN XL) 300 MG 24 hr tablet Take 300 mg by mouth every morning With 150mg to = 450mg daily dose 11/20/2023 at am    cetirizine (ZYRTEC) 10 MG tablet Take 1 tablet (10 mg) by mouth daily More than a month at unkn    clonazePAM (KLONOPIN) 0.5 MG tablet Take 0.5 mg by mouth 3 times daily as needed for anxiety 11/20/2023 at am    fluticasone (FLONASE) 50 MCG/ACT nasal spray Spray 1-2 sprays into both nostrils daily SPRAY 2 SPRAYS INTO BOTH NOSTRILS DAILY. Past Week at [facility]    fluvoxaMINE (LUVOX) 100 MG tablet Take 200 mg by mouth at bedtime With 50mg to = 250mg daily dose 11/22/2023 at hs    fluvoxaMINE (LUVOX) 50 MG tablet Take 50 mg by mouth at bedtime With 200mg to = 250mg daily dose 11/22/2023 at hs    HYDROcodone-acetaminophen (NORCO) 5-325 MG tablet Take 2 tablets by mouth 3 times daily 11/20/2023 at hs    REXULTI 4 MG tablet Take 4 mg by  mouth daily 11/20/2023 at am

## 2023-11-23 NOTE — ED PROVIDER NOTES
History     Chief Complaint   Patient presents with    Nausea, Vomiting, & Diarrhea       HPI  Sherie Otero is a 53 year old female who presents to the emergency department complaining of vomiting and diarrhea. The patient reports Tuesday evening, 2 nights ago, she started vomiting and having diarrhea.  Anytime she vomits she also has diarrhea.  There is no blood in the vomit or stool.  She has not had any documented fevers but feels hot and cold at times.  She feels pain in her epigastric region just prior to vomiting and reports it feels tender otherwise.  Anytime she tries to eat or drink it just comes right back up.  She last vomited an hour prior to arrival.  She denies any cough or cold symptoms.        Allergies:  Allergies   Allergen Reactions    Gabapentin Shortness Of Breath    Lyrica [Pregabalin] Shortness Of Breath     Felt pressure on the throat area. jmp    Droperidol      Uncontrollable shaking      Penicillins        Problem List:    Patient Active Problem List    Diagnosis Date Noted    Balance problems 07/25/2019     Priority: Medium    Chronic, continuous use of opioids 11/03/2018     Priority: Medium    Medical cannabis use 11/03/2018     Priority: Medium    Cervical cancer screening      Priority: Medium     2011 ablation  2015 NIL pap. Plan: pap in 3 years.  8/1/18 NIL pap, neg HR HPV. cotest in 5 years.  12/19/22 NIL Pap, Neg HR HPV. Plan: routine screening      Pelvic pain in female 08/01/2018     Priority: Medium    Chronic rhinitis 05/14/2018     Priority: Medium    Other migraine without status migrainosus, intractable 03/21/2017     Priority: Medium    Pulmonary nodules 01/19/2017     Priority: Medium    Abnormal CT of the chest 01/19/2017     Priority: Medium    Hilar lymphadenopathy 01/19/2017     Priority: Medium    Degenerative joint disease of cervical spine      Priority: Medium     multi level worst at C5-6      Osteoarthritis of cervical spine, unspecified spinal  osteoarthritis complication status 03/21/2016     Priority: Medium    Panic attacks 03/01/2016     Priority: Medium    Elevated white blood cell count 01/14/2016     Priority: Medium    Rheumatoid arthritis involving multiple sites with positive rheumatoid factor (H) 01/12/2016     Priority: Medium    Intermittent asthma, uncomplicated 11/29/2015     Priority: Medium    Other chronic pain 11/18/2015     Priority: Medium    Major depressive disorder, recurrent episode, mild (H24) 10/08/2015     Priority: Medium    NSAID induced gastritis 09/23/2015     Priority: Medium    Stenosis, cervical spine      Priority: Medium     C5-C7 (MRI)      Spondylitis, cervical (H24)      Priority: Medium     C5-C7 (MRI)      Cervicalgia 01/09/2014     Priority: Medium    Disturbance in sleep behavior 11/05/2013     Priority: Medium     Problem list name updated by automated process. Provider to review      Chronic fatigue syndrome 07/02/2013     Priority: Medium             Fibromyalgia 07/02/2013     Priority: Medium    Generalized anxiety disorder 07/02/2013     Priority: Medium     Diagnosis updated by automated process. Provider to review and confirm.      Tobacco abuse 07/02/2013     Priority: Medium    CARDIOVASCULAR SCREENING; LDL GOAL LESS THAN 160 10/31/2010     Priority: Medium        Past Medical History:    Past Medical History:   Diagnosis Date    Allergic rhinitis due to other allergen     Degenerative joint disease of cervical spine 2016    Generalized anxiety disorder     Hearing loss     Intrinsic asthma, unspecified     Irritable bowel syndrome     Lump or mass in breast 1996    Other malaise and fatigue     Other specified gastritis without mention of hemorrhage     Pain in joint, lower leg     Rheumatoid arthritis(714.0)     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    Spondylitis, cervical (H24)     Stenosis, cervical spine     Tension headache        Past Surgical History:    Past Surgical History:   Procedure  Laterality Date    DILATION AND CURETTAGE, HYSTEROSCOPY, ABLATE ENDOMETRIUM NOVASURE, COMBINED  2011    Procedure:COMBINED DILATION AND CURETTAGE, HYSTEROSCOPY, ABLATE ENDOMETRIUM NOVASURE; hysteroscopy, dilation and curettage, novasure; Surgeon:JEREMY ANNA; Location:[facility] OR    EXCISE LESION TRUNK  2013    Procedure: EXCISE LESION TRUNK;  interlaminar epidural Steroid Injection Cervical -thoracic Levels (C7-T1)    Re-Excision of chest wall mass;  Surgeon: Jeremy Bolaños MD;  Location: [facility] OR    HC HYSTEROS W PERMANENT FALLOPAIN IMPLANT      Essure done in office [facility]    INJECT BLOCK MEDIAL BRANCH CERVICAL/THORACIC/LUMBAR  2014    Procedure: INJECT BLOCK MEDIAL BRANCH CERVICAL / THORACIC / LUMBAR;  Surgeon: Josué Munson MD;  Location: [facility] OR    INJECT FACET JOINT  2014    Procedure: INJECT FACET JOINT;  diagnostic medial branch facet nerve block cervical levels 5-6, 6-7;  Surgeon: Josué Munson MD;  Location: [facility] OR    WISDOM ST GUIDEWIRE      ZC APPENDECTOMY      Ruptured at age 7 years    Z  DELIVERY ONLY      , Low Cervical       Family History:    Family History   Problem Relation Age of Onset    Cancer Mother     Arthritis Mother         Rheumatoid    Respiratory Mother         COPD    Dementia Father     Cardiovascular Maternal Grandmother     Hypertension Sister         1/2 sister    Neurologic Disorder Sister         1/2 sisiter  Very mild form of CP    Heart Disease Maternal Aunt         Bypass at age 50's       Social History:  Marital Status:   [5]  Social History     Tobacco Use    Smoking status: Every Day     Packs/day: 0.50     Years: 29.00     Additional pack years: 0.00     Total pack years: 14.50     Types: Cigarettes    Smokeless tobacco: Never   Vaping Use    Vaping Use: Every day    Substances: THC   Substance Use Topics    Alcohol use: Not Currently     Alcohol/week: 1.7 standard drinks of alcohol     Comment: rare    Drug  "use: No        Medications:    albuterol (VENTOLIN HFA) 108 (90 Base) MCG/ACT inhaler  buPROPion (WELLBUTRIN XL) 150 MG 24 hr tablet  buPROPion (WELLBUTRIN XL) 300 MG 24 hr tablet  cetirizine (ZYRTEC) 10 MG tablet  clonazePAM (KLONOPIN) 0.5 MG tablet  fluticasone (FLONASE) 50 MCG/ACT nasal spray  fluvoxaMINE (LUVOX) 100 MG tablet  fluvoxaMINE (LUVOX) 50 MG tablet  HYDROcodone-acetaminophen (NORCO) 5-325 MG tablet  ondansetron (ZOFRAN ODT) 4 MG ODT tab  REXULTI 4 MG tablet  naloxone (NARCAN) nasal spray          Review of Systems   All other systems reviewed and are negative.        Physical Exam   BP: (!) 144/99  Pulse: (!) 125  Temp: 97  F (36.1  C)  Resp: 16  Height: 167.6 cm (5' 6\")  Weight: 60.3 kg (133 lb)  SpO2: 97 %      Physical Exam  Vitals and nursing note reviewed.   Constitutional:       General: She is not in acute distress.     Appearance: She is well-developed. She is not diaphoretic.   HENT:      Head: Normocephalic and atraumatic.      Nose: Nose normal.      Mouth/Throat:      Mouth: Mucous membranes are dry.      Pharynx: Oropharynx is clear.   Eyes:      Conjunctiva/sclera: Conjunctivae normal.      Pupils: Pupils are equal, round, and reactive to light.   Cardiovascular:      Rate and Rhythm: Regular rhythm. Tachycardia present.      Heart sounds: Normal heart sounds.   Pulmonary:      Effort: Pulmonary effort is normal. No respiratory distress.      Breath sounds: Normal breath sounds.   Abdominal:      General: Bowel sounds are normal. There is no distension.      Palpations: Abdomen is soft.      Tenderness: There is abdominal tenderness (mild generalized, moderate tenderness in epigastric). There is no right CVA tenderness or left CVA tenderness. Negative signs include Ventura's sign and McBurney's sign.   Musculoskeletal:         General: No deformity.      Cervical back: Neck supple.   Skin:     General: Skin is warm and dry.   Neurological:      General: No focal deficit present.      " Mental Status: She is alert and oriented to person, place, and time.      Coordination: Coordination normal.   Psychiatric:         Mood and Affect: Mood normal.           ED Course          Procedures      Results for orders placed or performed during the hospital encounter of 11/23/23 (from the past 24 hour(s))   CBC with platelets differential    Narrative    The following orders were created for panel order CBC with platelets differential.  Procedure                               Abnormality         Status                     ---------                               -----------         ------                     CBC with platelets and d...[[id number]]  Abnormal            Final result                 Please view results for these tests on the individual orders.   Comprehensive metabolic panel   Result Value Ref Range    Sodium 142 135 - 145 mmol/L    Potassium 3.4 3.4 - 5.3 mmol/L    Carbon Dioxide (CO2) 31 (H) 22 - 29 mmol/L    Anion Gap 15 7 - 15 mmol/L    Urea Nitrogen 24.8 (H) 6.0 - 20.0 mg/dL    Creatinine 1.19 (H) 0.51 - 0.95 mg/dL    GFR Estimate 54 (L) >60 mL/min/1.73m2    Calcium 9.3 8.6 - 10.0 mg/dL    Chloride 96 (L) 98 - 107 mmol/L    Glucose 154 (H) 70 - 99 mg/dL    Alkaline Phosphatase 158 (H) 40 - 150 U/L    AST 27 0 - 45 U/L    ALT 19 0 - 50 U/L    Protein Total 7.8 6.4 - 8.3 g/dL    Albumin 4.5 3.5 - 5.2 g/dL    Bilirubin Total 0.3 <=1.2 mg/dL   Lipase   Result Value Ref Range    Lipase 16 13 - 60 U/L   CBC with platelets and differential   Result Value Ref Range    WBC Count 14.5 (H) 4.0 - 11.0 10e3/uL    RBC Count 5.18 3.80 - 5.20 10e6/uL    Hemoglobin 16.8 (H) 11.7 - 15.7 g/dL    Hematocrit 48.5 (H) 35.0 - 47.0 %    MCV 94 78 - 100 fL    MCH 32.4 26.5 - 33.0 pg    MCHC 34.6 31.5 - 36.5 g/dL    RDW 12.2 10.0 - 15.0 %    Platelet Count 291 150 - 450 10e3/uL    % Neutrophils 76 %    % Lymphocytes 15 %    % Monocytes 8 %    % Eosinophils 0 %    % Basophils 0 %    % Immature Granulocytes 1 %     NRBCs per 100 WBC 0 <1 /100    Absolute Neutrophils 11.1 (H) 1.6 - 8.3 10e3/uL    Absolute Lymphocytes 2.1 0.8 - 5.3 10e3/uL    Absolute Monocytes 1.2 0.0 - 1.3 10e3/uL    Absolute Eosinophils 0.0 0.0 - 0.7 10e3/uL    Absolute Basophils 0.0 0.0 - 0.2 10e3/uL    Absolute Immature Granulocytes 0.1 <=0.4 10e3/uL    Absolute NRBCs 0.0 10e3/uL   US Abdomen Limited (RUQ)    Narrative    EXAM: US ABDOMEN LIMITED  LOCATION: [facility]  DATE: 11/23/2023    INDICATION: epigastric pain and vomiting  COMPARISON: None.  TECHNIQUE: Limited abdominal ultrasound.    FINDINGS:    GALLBLADDER: Normal. No gallstones, wall thickening, or pericholecystic fluid. Negative sonographic Ventura's sign.    BILE DUCTS: No biliary dilatation. The common duct measures 3 mm.    LIVER: Normal parenchyma with smooth contour. No focal mass.    RIGHT KIDNEY: No hydronephrosis. 1.9 cm right renal cyst which is benign and needs no further follow-up.    PANCREAS: The visualized portions are normal.    No ascites.      Impression    IMPRESSION:  1.  Liver and gallbladder within normal limits.         *Note: Due to a large number of results and/or encounters for the requested time period, some results have not been displayed. A complete set of results can be found in Results Review.       Medications   ondansetron (ZOFRAN) injection 4 mg (4 mg Intravenous $Given 11/23/23 1431)   sodium chloride 0.9% BOLUS 1,000 mL (0 mLs Intravenous Stopped 11/23/23 1623)   sodium chloride 0.9% BOLUS 1,000 mL (0 mLs Intravenous Stopped 11/23/23 1623)   ketorolac (TORADOL) injection 15 mg (15 mg Intravenous $Given 11/23/23 1431)   sodium chloride 0.9% BOLUS 1,000 mL (1,000 mLs Intravenous $New Bag 11/23/23 1604)         Assessments & Plan (with Medical Decision Making)  Sherie Otero is a 53 year old female who presented to the ED for a 2-day history of vomiting and diarrhea.  On arrival she was tachycardic but also hypertensive.  Afebrile.   Mucous membranes were dry concerning for dehydration.  She also had mild generalized abdominal tenderness but more moderate tenderness in the epigastric region.  Based on her symptomology and exam findings I do suspect she probably has a gastroenteritis, likely viral.  She was concerned for gallbladder given her epigastric pain so we will get an ultrasound today to ensure no acute cholecystitis.  IV was established and labs drawn.  She was given a total of 3 L of IV fluids along with Zofran and Toradol with improvement of symptoms.  Tolerating clear liquids here.  She had a mild elevation white count of 14.5 along with a hemoglobin of 16.8, BUN of 24.8, and creatinine of 1.19, all concerning for dehydration which was treated here with fluids.  Liver enzymes notable for mild elevation in his alkaline phosphatase, otherwise within normal limits.  Lipase was normal.  Ultrasound today showed a normal gallbladder and liver.  I went over all these results with the patient and she was generally reassured.  Discussed with her that I do think she probably has gastroenteritis which will take 3 to 5 days to resolve.  She was comfortable with plan to manage symptoms at home going forward.  I advised to stick to a clear liquid diet for the next 12 to 24 hours, then advance diet slowly as tolerated.  Zofran prescribed for recurrent nausea.  She was provided instructions on when to return to the ED.  All questions answered and patient discharged home in suitable condition.     I have reviewed the nursing notes.    I have reviewed the findings, diagnosis, plan and need for follow up with the patient.      New Prescriptions    ONDANSETRON (ZOFRAN ODT) 4 MG ODT TAB    Take 1 tablet (4 mg) by mouth every 8 hours as needed for nausea       Final diagnoses:   Acute gastroenteritis   Dehydration     Note: Chart documentation done in part with Dragon Voice Recognition software. Although reviewed after completion, some word and  grammatical errors may remain.     11/23/2023   [facility] EMERGENCY DEPT       Oneida Monk PA-C  11/23/23 [id number]

## 2023-11-23 NOTE — DISCHARGE INSTRUCTIONS
I am glad you are feeling a little better.  I think you probably have a viral stomach flu which will take a couple days to run its course.  Please use the Zofran prescribed for nausea relief.  Stick to clear liquids through today then advance diet slowly as tolerated.  If you do have any worsening symptoms please return to the emergency department.    Thank you for choosing [facility]'s Emergency Department. It was a pleasure taking care of you today. If you have any questions, please call [phone].    Oneida Monk PA-C

## 2023-11-23 NOTE — ED TRIAGE NOTES
Here with abd pain -nausea, vomiting and diarrhea since Tuesday. Pain located mid upper epigastric region     Triage Assessment (Adult)       Row Name 11/23/23 0377          Triage Assessment    Airway WDL WDL        Respiratory WDL    Respiratory WDL WDL        Skin Circulation/Temperature WDL    Skin Circulation/Temperature WDL WDL        Cardiac WDL    Cardiac WDL WDL        Peripheral/Neurovascular WDL    Peripheral Neurovascular WDL WDL        Cognitive/Neuro/Behavioral WDL    Cognitive/Neuro/Behavioral WDL WDL

## 2023-11-28 ENCOUNTER — MYC REFILL (OUTPATIENT)
Dept: FAMILY MEDICINE | Facility: CLINIC | Age: 53
End: 2023-11-28
Payer: MEDICARE

## 2023-11-28 DIAGNOSIS — G89.4 CHRONIC PAIN SYNDROME: ICD-10-CM

## 2023-11-28 DIAGNOSIS — M79.7 FIBROMYALGIA: ICD-10-CM

## 2023-11-28 DIAGNOSIS — M54.50 CHRONIC BILATERAL LOW BACK PAIN WITHOUT SCIATICA: ICD-10-CM

## 2023-11-28 DIAGNOSIS — M54.2 CERVICALGIA: ICD-10-CM

## 2023-11-28 DIAGNOSIS — G89.29 CHRONIC BILATERAL LOW BACK PAIN WITHOUT SCIATICA: ICD-10-CM

## 2023-11-28 RX ORDER — HYDROCODONE BITARTRATE AND ACETAMINOPHEN 5; 325 MG/1; MG/1
2 TABLET ORAL 3 TIMES DAILY
Qty: 180 TABLET | Refills: 0 | Status: SHIPPED | OUTPATIENT
Start: 2023-11-30 | End: 2023-12-28

## 2023-11-28 ASSESSMENT — ANXIETY QUESTIONNAIRES
6. BECOMING EASILY ANNOYED OR IRRITABLE: SEVERAL DAYS
IF YOU CHECKED OFF ANY PROBLEMS ON THIS QUESTIONNAIRE, HOW DIFFICULT HAVE THESE PROBLEMS MADE IT FOR YOU TO DO YOUR WORK, TAKE CARE OF THINGS AT HOME, OR GET ALONG WITH OTHER PEOPLE: SOMEWHAT DIFFICULT
GAD7 TOTAL SCORE: 11
7. FEELING AFRAID AS IF SOMETHING AWFUL MIGHT HAPPEN: NEARLY EVERY DAY
2. NOT BEING ABLE TO STOP OR CONTROL WORRYING: NEARLY EVERY DAY
5. BEING SO RESTLESS THAT IT IS HARD TO SIT STILL: NOT AT ALL
GAD7 TOTAL SCORE: 11
3. WORRYING TOO MUCH ABOUT DIFFERENT THINGS: NEARLY EVERY DAY
4. TROUBLE RELAXING: NOT AT ALL
1. FEELING NERVOUS, ANXIOUS, OR ON EDGE: SEVERAL DAYS

## 2023-11-28 ASSESSMENT — PATIENT HEALTH QUESTIONNAIRE - PHQ9
10. IF YOU CHECKED OFF ANY PROBLEMS, HOW DIFFICULT HAVE THESE PROBLEMS MADE IT FOR YOU TO DO YOUR WORK, TAKE CARE OF THINGS AT HOME, OR GET ALONG WITH OTHER PEOPLE: EXTREMELY DIFFICULT
SUM OF ALL RESPONSES TO PHQ QUESTIONS 1-9: 7
SUM OF ALL RESPONSES TO PHQ QUESTIONS 1-9: 7

## 2023-11-29 ENCOUNTER — VIRTUAL VISIT (OUTPATIENT)
Dept: PSYCHOLOGY | Facility: CLINIC | Age: 53
End: 2023-11-29
Payer: MEDICARE

## 2023-11-29 DIAGNOSIS — F33.1 MAJOR DEPRESSIVE DISORDER, RECURRENT EPISODE, MODERATE WITH ANXIOUS DISTRESS (H): ICD-10-CM

## 2023-11-29 DIAGNOSIS — F41.1 GENERALIZED ANXIETY DISORDER: ICD-10-CM

## 2023-11-29 DIAGNOSIS — F43.10 POST TRAUMATIC STRESS DISORDER (PTSD): ICD-10-CM

## 2023-11-29 DIAGNOSIS — F90.2 ADHD (ATTENTION DEFICIT HYPERACTIVITY DISORDER), COMBINED TYPE: Primary | ICD-10-CM

## 2023-11-29 PROCEDURE — 90834 PSYTX W PT 45 MINUTES: CPT | Mod: 95 | Performed by: SOCIAL WORKER
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
## 2023-12-06 ENCOUNTER — VIRTUAL VISIT (OUTPATIENT)
Dept: PSYCHOLOGY | Facility: CLINIC | Age: 53
End: 2023-12-06
Payer: MEDICARE

## 2023-12-06 DIAGNOSIS — F90.2 ADHD (ATTENTION DEFICIT HYPERACTIVITY DISORDER), COMBINED TYPE: Primary | ICD-10-CM

## 2023-12-06 DIAGNOSIS — F43.10 POST TRAUMATIC STRESS DISORDER (PTSD): ICD-10-CM

## 2023-12-06 DIAGNOSIS — F33.1 MAJOR DEPRESSIVE DISORDER, RECURRENT EPISODE, MODERATE WITH ANXIOUS DISTRESS (H): ICD-10-CM

## 2023-12-06 DIAGNOSIS — F41.1 GENERALIZED ANXIETY DISORDER: ICD-10-CM

## 2023-12-06 PROCEDURE — 90834 PSYTX W PT 45 MINUTES: CPT | Mod: 95 | Performed by: SOCIAL WORKER

## 2023-12-06 ASSESSMENT — PATIENT HEALTH QUESTIONNAIRE - PHQ9
SUM OF ALL RESPONSES TO PHQ QUESTIONS 1-9: 9
10. IF YOU CHECKED OFF ANY PROBLEMS, HOW DIFFICULT HAVE THESE PROBLEMS MADE IT FOR YOU TO DO YOUR WORK, TAKE CARE OF THINGS AT HOME, OR GET ALONG WITH OTHER PEOPLE: EXTREMELY DIFFICULT
SUM OF ALL RESPONSES TO PHQ QUESTIONS 1-9: 9
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
## 2023-12-13 ENCOUNTER — VIRTUAL VISIT (OUTPATIENT)
Dept: PSYCHOLOGY | Facility: CLINIC | Age: 53
End: 2023-12-13
Payer: MEDICARE

## 2023-12-13 DIAGNOSIS — F33.1 MAJOR DEPRESSIVE DISORDER, RECURRENT EPISODE, MODERATE WITH ANXIOUS DISTRESS (H): ICD-10-CM

## 2023-12-13 DIAGNOSIS — F90.2 ADHD (ATTENTION DEFICIT HYPERACTIVITY DISORDER), COMBINED TYPE: Primary | ICD-10-CM

## 2023-12-13 DIAGNOSIS — F43.10 POST TRAUMATIC STRESS DISORDER (PTSD): ICD-10-CM

## 2023-12-13 DIAGNOSIS — F41.1 GENERALIZED ANXIETY DISORDER: ICD-10-CM

## 2023-12-13 PROCEDURE — 90837 PSYTX W PT 60 MINUTES: CPT | Mod: 95 | Performed by: SOCIAL WORKER

## 2023-12-13 ASSESSMENT — ANXIETY QUESTIONNAIRES
GAD7 TOTAL SCORE: 13
4. TROUBLE RELAXING: SEVERAL DAYS
2. NOT BEING ABLE TO STOP OR CONTROL WORRYING: NEARLY EVERY DAY
GAD7 TOTAL SCORE: 13
7. FEELING AFRAID AS IF SOMETHING AWFUL MIGHT HAPPEN: NEARLY EVERY DAY
5. BEING SO RESTLESS THAT IT IS HARD TO SIT STILL: SEVERAL DAYS
3. WORRYING TOO MUCH ABOUT DIFFERENT THINGS: NEARLY EVERY DAY
1. FEELING NERVOUS, ANXIOUS, OR ON EDGE: SEVERAL DAYS
IF YOU CHECKED OFF ANY PROBLEMS ON THIS QUESTIONNAIRE, HOW DIFFICULT HAVE THESE PROBLEMS MADE IT FOR YOU TO DO YOUR WORK, TAKE CARE OF THINGS AT HOME, OR GET ALONG WITH OTHER PEOPLE: EXTREMELY DIFFICULT
6. BECOMING EASILY ANNOYED OR IRRITABLE: SEVERAL DAYS
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
## 2023-12-20 ENCOUNTER — VIRTUAL VISIT (OUTPATIENT)
Dept: PSYCHOLOGY | Facility: CLINIC | Age: 53
End: 2023-12-20
Payer: MEDICARE

## 2023-12-20 DIAGNOSIS — F41.1 GENERALIZED ANXIETY DISORDER: ICD-10-CM

## 2023-12-20 DIAGNOSIS — F33.1 MAJOR DEPRESSIVE DISORDER, RECURRENT EPISODE, MODERATE WITH ANXIOUS DISTRESS (H): ICD-10-CM

## 2023-12-20 DIAGNOSIS — F90.2 ADHD (ATTENTION DEFICIT HYPERACTIVITY DISORDER), COMBINED TYPE: Primary | ICD-10-CM

## 2023-12-20 PROCEDURE — 90834 PSYTX W PT 45 MINUTES: CPT | Mod: 95 | Performed by: SOCIAL WORKER
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
## 2023-12-28 ENCOUNTER — MYC REFILL (OUTPATIENT)
Dept: FAMILY MEDICINE | Facility: CLINIC | Age: 53
End: 2023-12-28
Payer: MEDICARE

## 2023-12-28 DIAGNOSIS — G89.29 CHRONIC BILATERAL LOW BACK PAIN WITHOUT SCIATICA: ICD-10-CM

## 2023-12-28 DIAGNOSIS — M54.2 CERVICALGIA: ICD-10-CM

## 2023-12-28 DIAGNOSIS — M79.7 FIBROMYALGIA: ICD-10-CM

## 2023-12-28 DIAGNOSIS — G89.4 CHRONIC PAIN SYNDROME: ICD-10-CM

## 2023-12-28 DIAGNOSIS — M54.50 CHRONIC BILATERAL LOW BACK PAIN WITHOUT SCIATICA: ICD-10-CM

## 2023-12-28 RX ORDER — HYDROCODONE BITARTRATE AND ACETAMINOPHEN 5; 325 MG/1; MG/1
2 TABLET ORAL 3 TIMES DAILY
Qty: 60 TABLET | Refills: 0 | Status: SHIPPED | OUTPATIENT
Start: 2023-12-28 | End: 2024-01-08

## 2023-12-28 NOTE — TELEPHONE ENCOUNTER
Requested Prescriptions   Pending Prescriptions Disp Refills    HYDROcodone-acetaminophen (NORCO) 5-325 MG tablet 180 tablet 0     Sig: Take 2 tablets by mouth 3 times daily       Next 5 appointments (look out 90 days)      Jan 24, 2024  4:20 PM  (Arrive by 4:00 PM)  Annual Wellness Visit with Jim Edwards MD  [facility] ([facility] ) [address] [place] [id number] [phone]             Routing refill request to provider for review/approval because:  Drug not on the [facility], [facility] or [facility] refill protocol or controlled substance

## 2024-01-03 ENCOUNTER — VIRTUAL VISIT (OUTPATIENT)
Dept: PSYCHOLOGY | Facility: CLINIC | Age: 54
End: 2024-01-03
Payer: MEDICARE

## 2024-01-03 DIAGNOSIS — F33.1 MAJOR DEPRESSIVE DISORDER, RECURRENT EPISODE, MODERATE WITH ANXIOUS DISTRESS (H): ICD-10-CM

## 2024-01-03 DIAGNOSIS — F90.2 ADHD (ATTENTION DEFICIT HYPERACTIVITY DISORDER), COMBINED TYPE: Primary | ICD-10-CM

## 2024-01-03 DIAGNOSIS — F43.10 POST TRAUMATIC STRESS DISORDER (PTSD): ICD-10-CM

## 2024-01-03 DIAGNOSIS — F41.1 GENERALIZED ANXIETY DISORDER: ICD-10-CM

## 2024-01-03 PROCEDURE — 90834 PSYTX W PT 45 MINUTES: CPT | Mod: FQ | Performed by: SOCIAL WORKER

## 2024-01-03 ASSESSMENT — ANXIETY QUESTIONNAIRES
6. BECOMING EASILY ANNOYED OR IRRITABLE: SEVERAL DAYS
7. FEELING AFRAID AS IF SOMETHING AWFUL MIGHT HAPPEN: NEARLY EVERY DAY
GAD7 TOTAL SCORE: 15
5. BEING SO RESTLESS THAT IT IS HARD TO SIT STILL: SEVERAL DAYS
2. NOT BEING ABLE TO STOP OR CONTROL WORRYING: NEARLY EVERY DAY
GAD7 TOTAL SCORE: 15
IF YOU CHECKED OFF ANY PROBLEMS ON THIS QUESTIONNAIRE, HOW DIFFICULT HAVE THESE PROBLEMS MADE IT FOR YOU TO DO YOUR WORK, TAKE CARE OF THINGS AT HOME, OR GET ALONG WITH OTHER PEOPLE: SOMEWHAT DIFFICULT
GAD7 TOTAL SCORE: 15
7. FEELING AFRAID AS IF SOMETHING AWFUL MIGHT HAPPEN: NEARLY EVERY DAY
4. TROUBLE RELAXING: NEARLY EVERY DAY
1. FEELING NERVOUS, ANXIOUS, OR ON EDGE: SEVERAL DAYS
3. WORRYING TOO MUCH ABOUT DIFFERENT THINGS: NEARLY EVERY DAY
8. IF YOU CHECKED OFF ANY PROBLEMS, HOW DIFFICULT HAVE THESE MADE IT FOR YOU TO DO YOUR WORK, TAKE CARE OF THINGS AT HOME, OR GET ALONG WITH OTHER PEOPLE?: SOMEWHAT DIFFICULT

## 2024-01-03 ASSESSMENT — PATIENT HEALTH QUESTIONNAIRE - PHQ9
SUM OF ALL RESPONSES TO PHQ QUESTIONS 1-9: 8
SUM OF ALL RESPONSES TO PHQ QUESTIONS 1-9: 8
10. IF YOU CHECKED OFF ANY PROBLEMS, HOW DIFFICULT HAVE THESE PROBLEMS MADE IT FOR YOU TO DO YOUR WORK, TAKE CARE OF THINGS AT HOME, OR GET ALONG WITH OTHER PEOPLE: EXTREMELY DIFFICULT
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
## 2024-01-15 ENCOUNTER — MYC REFILL (OUTPATIENT)
Dept: FAMILY MEDICINE | Facility: CLINIC | Age: 54
End: 2024-01-15
Payer: MEDICARE

## 2024-01-15 DIAGNOSIS — M54.2 CERVICALGIA: ICD-10-CM

## 2024-01-15 DIAGNOSIS — M79.7 FIBROMYALGIA: ICD-10-CM

## 2024-01-15 DIAGNOSIS — G89.29 CHRONIC BILATERAL LOW BACK PAIN WITHOUT SCIATICA: ICD-10-CM

## 2024-01-15 DIAGNOSIS — G89.4 CHRONIC PAIN SYNDROME: ICD-10-CM

## 2024-01-15 DIAGNOSIS — M54.50 CHRONIC BILATERAL LOW BACK PAIN WITHOUT SCIATICA: ICD-10-CM

## 2024-01-16 RX ORDER — HYDROCODONE BITARTRATE AND ACETAMINOPHEN 5; 325 MG/1; MG/1
2 TABLET ORAL 3 TIMES DAILY
Qty: 180 TABLET | Refills: 0 | Status: SHIPPED | OUTPATIENT
Start: 2024-01-16 | End: 2024-02-15

## 2024-01-17 ENCOUNTER — VIRTUAL VISIT (OUTPATIENT)
Dept: PSYCHOLOGY | Facility: CLINIC | Age: 54
End: 2024-01-17
Payer: MEDICARE

## 2024-01-17 DIAGNOSIS — F90.2 ADHD (ATTENTION DEFICIT HYPERACTIVITY DISORDER), COMBINED TYPE: Primary | ICD-10-CM

## 2024-01-17 DIAGNOSIS — F41.1 GENERALIZED ANXIETY DISORDER: ICD-10-CM

## 2024-01-17 DIAGNOSIS — F33.1 MAJOR DEPRESSIVE DISORDER, RECURRENT EPISODE, MODERATE WITH ANXIOUS DISTRESS (H): ICD-10-CM

## 2024-01-17 DIAGNOSIS — F43.10 POST TRAUMATIC STRESS DISORDER (PTSD): ICD-10-CM

## 2024-01-17 PROCEDURE — 90834 PSYTX W PT 45 MINUTES: CPT | Mod: FQ | Performed by: SOCIAL WORKER
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
## 2024-01-20 ENCOUNTER — HEALTH MAINTENANCE LETTER (OUTPATIENT)
Age: 54
End: 2024-01-20

## 2024-01-24 ENCOUNTER — VIRTUAL VISIT (OUTPATIENT)
Dept: PSYCHOLOGY | Facility: CLINIC | Age: 54
End: 2024-01-24
Payer: MEDICARE

## 2024-01-24 DIAGNOSIS — F41.1 GENERALIZED ANXIETY DISORDER: ICD-10-CM

## 2024-01-24 DIAGNOSIS — F90.2 ADHD (ATTENTION DEFICIT HYPERACTIVITY DISORDER), COMBINED TYPE: Primary | ICD-10-CM

## 2024-01-24 DIAGNOSIS — F33.1 MAJOR DEPRESSIVE DISORDER, RECURRENT EPISODE, MODERATE WITH ANXIOUS DISTRESS (H): ICD-10-CM

## 2024-01-24 DIAGNOSIS — F43.10 POST TRAUMATIC STRESS DISORDER (PTSD): ICD-10-CM

## 2024-01-24 PROCEDURE — 90834 PSYTX W PT 45 MINUTES: CPT | Mod: FQ | Performed by: SOCIAL WORKER

## 2024-01-24 ASSESSMENT — ANXIETY QUESTIONNAIRES
6. BECOMING EASILY ANNOYED OR IRRITABLE: SEVERAL DAYS
1. FEELING NERVOUS, ANXIOUS, OR ON EDGE: SEVERAL DAYS
GAD7 TOTAL SCORE: 11
GAD7 TOTAL SCORE: 11
7. FEELING AFRAID AS IF SOMETHING AWFUL MIGHT HAPPEN: NEARLY EVERY DAY
4. TROUBLE RELAXING: SEVERAL DAYS
GAD7 TOTAL SCORE: 11
2. NOT BEING ABLE TO STOP OR CONTROL WORRYING: SEVERAL DAYS
7. FEELING AFRAID AS IF SOMETHING AWFUL MIGHT HAPPEN: NEARLY EVERY DAY
8. IF YOU CHECKED OFF ANY PROBLEMS, HOW DIFFICULT HAVE THESE MADE IT FOR YOU TO DO YOUR WORK, TAKE CARE OF THINGS AT HOME, OR GET ALONG WITH OTHER PEOPLE?: EXTREMELY DIFFICULT
IF YOU CHECKED OFF ANY PROBLEMS ON THIS QUESTIONNAIRE, HOW DIFFICULT HAVE THESE PROBLEMS MADE IT FOR YOU TO DO YOUR WORK, TAKE CARE OF THINGS AT HOME, OR GET ALONG WITH OTHER PEOPLE: EXTREMELY DIFFICULT
3. WORRYING TOO MUCH ABOUT DIFFERENT THINGS: NEARLY EVERY DAY
5. BEING SO RESTLESS THAT IT IS HARD TO SIT STILL: SEVERAL DAYS
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
## 2024-01-31 ENCOUNTER — VIRTUAL VISIT (OUTPATIENT)
Dept: PSYCHOLOGY | Facility: CLINIC | Age: 54
End: 2024-01-31
Payer: MEDICARE

## 2024-01-31 DIAGNOSIS — F41.1 GENERALIZED ANXIETY DISORDER: ICD-10-CM

## 2024-01-31 DIAGNOSIS — F33.1 MAJOR DEPRESSIVE DISORDER, RECURRENT EPISODE, MODERATE WITH ANXIOUS DISTRESS (H): ICD-10-CM

## 2024-01-31 DIAGNOSIS — F43.10 POST TRAUMATIC STRESS DISORDER (PTSD): ICD-10-CM

## 2024-01-31 DIAGNOSIS — F90.2 ADHD (ATTENTION DEFICIT HYPERACTIVITY DISORDER), COMBINED TYPE: Primary | ICD-10-CM

## 2024-01-31 PROCEDURE — 90834 PSYTX W PT 45 MINUTES: CPT | Mod: 93 | Performed by: SOCIAL WORKER
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
## 2024-02-07 ENCOUNTER — VIRTUAL VISIT (OUTPATIENT)
Dept: PSYCHOLOGY | Facility: CLINIC | Age: 54
End: 2024-02-07
Payer: MEDICARE

## 2024-02-07 DIAGNOSIS — F41.1 GENERALIZED ANXIETY DISORDER: ICD-10-CM

## 2024-02-07 DIAGNOSIS — F33.1 MAJOR DEPRESSIVE DISORDER, RECURRENT EPISODE, MODERATE WITH ANXIOUS DISTRESS (H): ICD-10-CM

## 2024-02-07 DIAGNOSIS — F90.2 ADHD (ATTENTION DEFICIT HYPERACTIVITY DISORDER), COMBINED TYPE: Primary | ICD-10-CM

## 2024-02-07 DIAGNOSIS — F43.10 POST TRAUMATIC STRESS DISORDER (PTSD): ICD-10-CM

## 2024-02-07 PROCEDURE — 90834 PSYTX W PT 45 MINUTES: CPT | Mod: FQ | Performed by: SOCIAL WORKER

## 2024-02-07 ASSESSMENT — ANXIETY QUESTIONNAIRES
IF YOU CHECKED OFF ANY PROBLEMS ON THIS QUESTIONNAIRE, HOW DIFFICULT HAVE THESE PROBLEMS MADE IT FOR YOU TO DO YOUR WORK, TAKE CARE OF THINGS AT HOME, OR GET ALONG WITH OTHER PEOPLE: SOMEWHAT DIFFICULT
GAD7 TOTAL SCORE: 8
4. TROUBLE RELAXING: SEVERAL DAYS
7. FEELING AFRAID AS IF SOMETHING AWFUL MIGHT HAPPEN: NEARLY EVERY DAY
1. FEELING NERVOUS, ANXIOUS, OR ON EDGE: SEVERAL DAYS
7. FEELING AFRAID AS IF SOMETHING AWFUL MIGHT HAPPEN: NEARLY EVERY DAY
2. NOT BEING ABLE TO STOP OR CONTROL WORRYING: SEVERAL DAYS
5. BEING SO RESTLESS THAT IT IS HARD TO SIT STILL: NOT AT ALL
6. BECOMING EASILY ANNOYED OR IRRITABLE: SEVERAL DAYS
8. IF YOU CHECKED OFF ANY PROBLEMS, HOW DIFFICULT HAVE THESE MADE IT FOR YOU TO DO YOUR WORK, TAKE CARE OF THINGS AT HOME, OR GET ALONG WITH OTHER PEOPLE?: SOMEWHAT DIFFICULT
3. WORRYING TOO MUCH ABOUT DIFFERENT THINGS: SEVERAL DAYS
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
## 2024-02-14 ENCOUNTER — VIRTUAL VISIT (OUTPATIENT)
Dept: PSYCHOLOGY | Facility: CLINIC | Age: 54
End: 2024-02-14
Payer: MEDICARE

## 2024-02-14 DIAGNOSIS — F33.1 MAJOR DEPRESSIVE DISORDER, RECURRENT EPISODE, MODERATE WITH ANXIOUS DISTRESS (H): ICD-10-CM

## 2024-02-14 DIAGNOSIS — F43.10 POST TRAUMATIC STRESS DISORDER (PTSD): ICD-10-CM

## 2024-02-14 DIAGNOSIS — F90.2 ADHD (ATTENTION DEFICIT HYPERACTIVITY DISORDER), COMBINED TYPE: Primary | ICD-10-CM

## 2024-02-14 DIAGNOSIS — F41.1 GENERALIZED ANXIETY DISORDER: ICD-10-CM

## 2024-02-14 PROCEDURE — 90834 PSYTX W PT 45 MINUTES: CPT | Mod: FQ | Performed by: SOCIAL WORKER
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
## 2024-02-15 ENCOUNTER — MYC REFILL (OUTPATIENT)
Dept: FAMILY MEDICINE | Facility: CLINIC | Age: 54
End: 2024-02-15
Payer: MEDICARE

## 2024-02-15 DIAGNOSIS — M54.2 CERVICALGIA: ICD-10-CM

## 2024-02-15 DIAGNOSIS — M79.7 FIBROMYALGIA: ICD-10-CM

## 2024-02-15 DIAGNOSIS — M54.50 CHRONIC BILATERAL LOW BACK PAIN WITHOUT SCIATICA: ICD-10-CM

## 2024-02-15 DIAGNOSIS — G89.4 CHRONIC PAIN SYNDROME: ICD-10-CM

## 2024-02-15 DIAGNOSIS — G89.29 CHRONIC BILATERAL LOW BACK PAIN WITHOUT SCIATICA: ICD-10-CM

## 2024-02-15 RX ORDER — HYDROCODONE BITARTRATE AND ACETAMINOPHEN 5; 325 MG/1; MG/1
2 TABLET ORAL 3 TIMES DAILY
Qty: 180 TABLET | Refills: 0 | Status: SHIPPED | OUTPATIENT
Start: 2024-02-15 | End: 2024-03-17

## 2024-02-19 ENCOUNTER — VIRTUAL VISIT (OUTPATIENT)
Dept: PSYCHOLOGY | Facility: CLINIC | Age: 54
End: 2024-02-19
Payer: MEDICARE

## 2024-02-19 DIAGNOSIS — F90.2 ADHD (ATTENTION DEFICIT HYPERACTIVITY DISORDER), COMBINED TYPE: Primary | ICD-10-CM

## 2024-02-19 DIAGNOSIS — F43.10 POST TRAUMATIC STRESS DISORDER (PTSD): ICD-10-CM

## 2024-02-19 DIAGNOSIS — F41.1 GENERALIZED ANXIETY DISORDER: ICD-10-CM

## 2024-02-19 DIAGNOSIS — F33.1 MAJOR DEPRESSIVE DISORDER, RECURRENT EPISODE, MODERATE WITH ANXIOUS DISTRESS (H): ICD-10-CM

## 2024-02-19 PROCEDURE — 90834 PSYTX W PT 45 MINUTES: CPT | Mod: FQ | Performed by: SOCIAL WORKER
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
## 2024-02-28 ENCOUNTER — VIRTUAL VISIT (OUTPATIENT)
Dept: PSYCHOLOGY | Facility: CLINIC | Age: 54
End: 2024-02-28
Payer: MEDICARE

## 2024-02-28 DIAGNOSIS — F90.2 ADHD (ATTENTION DEFICIT HYPERACTIVITY DISORDER), COMBINED TYPE: Primary | ICD-10-CM

## 2024-02-28 DIAGNOSIS — F43.10 POST TRAUMATIC STRESS DISORDER (PTSD): ICD-10-CM

## 2024-02-28 DIAGNOSIS — F33.1 MAJOR DEPRESSIVE DISORDER, RECURRENT EPISODE, MODERATE WITH ANXIOUS DISTRESS (H): ICD-10-CM

## 2024-02-28 DIAGNOSIS — F41.1 GENERALIZED ANXIETY DISORDER: ICD-10-CM

## 2024-02-28 PROCEDURE — 90832 PSYTX W PT 30 MINUTES: CPT | Mod: FQ | Performed by: SOCIAL WORKER

## 2024-02-28 ASSESSMENT — ANXIETY QUESTIONNAIRES
5. BEING SO RESTLESS THAT IT IS HARD TO SIT STILL: SEVERAL DAYS
GAD7 TOTAL SCORE: 13
6. BECOMING EASILY ANNOYED OR IRRITABLE: SEVERAL DAYS
2. NOT BEING ABLE TO STOP OR CONTROL WORRYING: NEARLY EVERY DAY
7. FEELING AFRAID AS IF SOMETHING AWFUL MIGHT HAPPEN: NEARLY EVERY DAY
8. IF YOU CHECKED OFF ANY PROBLEMS, HOW DIFFICULT HAVE THESE MADE IT FOR YOU TO DO YOUR WORK, TAKE CARE OF THINGS AT HOME, OR GET ALONG WITH OTHER PEOPLE?: EXTREMELY DIFFICULT
GAD7 TOTAL SCORE: 13
1. FEELING NERVOUS, ANXIOUS, OR ON EDGE: SEVERAL DAYS
5. BEING SO RESTLESS THAT IT IS HARD TO SIT STILL: SEVERAL DAYS
GAD7 TOTAL SCORE: 13
1. FEELING NERVOUS, ANXIOUS, OR ON EDGE: SEVERAL DAYS
7. FEELING AFRAID AS IF SOMETHING AWFUL MIGHT HAPPEN: NEARLY EVERY DAY
2. NOT BEING ABLE TO STOP OR CONTROL WORRYING: NEARLY EVERY DAY
8. IF YOU CHECKED OFF ANY PROBLEMS, HOW DIFFICULT HAVE THESE MADE IT FOR YOU TO DO YOUR WORK, TAKE CARE OF THINGS AT HOME, OR GET ALONG WITH OTHER PEOPLE?: EXTREMELY DIFFICULT
3. WORRYING TOO MUCH ABOUT DIFFERENT THINGS: NEARLY EVERY DAY
4. TROUBLE RELAXING: SEVERAL DAYS
7. FEELING AFRAID AS IF SOMETHING AWFUL MIGHT HAPPEN: NEARLY EVERY DAY
IF YOU CHECKED OFF ANY PROBLEMS ON THIS QUESTIONNAIRE, HOW DIFFICULT HAVE THESE PROBLEMS MADE IT FOR YOU TO DO YOUR WORK, TAKE CARE OF THINGS AT HOME, OR GET ALONG WITH OTHER PEOPLE: EXTREMELY DIFFICULT
6. BECOMING EASILY ANNOYED OR IRRITABLE: SEVERAL DAYS
3. WORRYING TOO MUCH ABOUT DIFFERENT THINGS: NEARLY EVERY DAY
GAD7 TOTAL SCORE: 13
7. FEELING AFRAID AS IF SOMETHING AWFUL MIGHT HAPPEN: NEARLY EVERY DAY
4. TROUBLE RELAXING: SEVERAL DAYS
IF YOU CHECKED OFF ANY PROBLEMS ON THIS QUESTIONNAIRE, HOW DIFFICULT HAVE THESE PROBLEMS MADE IT FOR YOU TO DO YOUR WORK, TAKE CARE OF THINGS AT HOME, OR GET ALONG WITH OTHER PEOPLE: EXTREMELY DIFFICULT
GAD7 TOTAL SCORE: 13
GAD7 TOTAL SCORE: 13

## 2024-02-28 ASSESSMENT — PATIENT HEALTH QUESTIONNAIRE - PHQ9
SUM OF ALL RESPONSES TO PHQ QUESTIONS 1-9: 7
SUM OF ALL RESPONSES TO PHQ QUESTIONS 1-9: 7
10. IF YOU CHECKED OFF ANY PROBLEMS, HOW DIFFICULT HAVE THESE PROBLEMS MADE IT FOR YOU TO DO YOUR WORK, TAKE CARE OF THINGS AT HOME, OR GET ALONG WITH OTHER PEOPLE: EXTREMELY DIFFICULT

## 2024-03-04 ENCOUNTER — VIRTUAL VISIT (OUTPATIENT)
Dept: PSYCHOLOGY | Facility: CLINIC | Age: 54
End: 2024-03-04
Payer: MEDICARE

## 2024-03-04 DIAGNOSIS — F43.10 POST TRAUMATIC STRESS DISORDER (PTSD): ICD-10-CM

## 2024-03-04 DIAGNOSIS — F90.2 ADHD (ATTENTION DEFICIT HYPERACTIVITY DISORDER), COMBINED TYPE: Primary | ICD-10-CM

## 2024-03-04 DIAGNOSIS — F33.1 MAJOR DEPRESSIVE DISORDER, RECURRENT EPISODE, MODERATE WITH ANXIOUS DISTRESS (H): ICD-10-CM

## 2024-03-04 DIAGNOSIS — F41.1 GENERALIZED ANXIETY DISORDER: ICD-10-CM

## 2024-03-04 PROCEDURE — 90834 PSYTX W PT 45 MINUTES: CPT | Mod: 93 | Performed by: SOCIAL WORKER
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
## 2024-03-12 ENCOUNTER — VIRTUAL VISIT (OUTPATIENT)
Dept: PSYCHOLOGY | Facility: CLINIC | Age: 54
End: 2024-03-12
Payer: MEDICARE

## 2024-03-12 DIAGNOSIS — F90.2 ADHD (ATTENTION DEFICIT HYPERACTIVITY DISORDER), COMBINED TYPE: Primary | ICD-10-CM

## 2024-03-12 DIAGNOSIS — F41.1 GENERALIZED ANXIETY DISORDER: ICD-10-CM

## 2024-03-12 DIAGNOSIS — F43.10 POST TRAUMATIC STRESS DISORDER (PTSD): ICD-10-CM

## 2024-03-12 DIAGNOSIS — F33.1 MAJOR DEPRESSIVE DISORDER, RECURRENT EPISODE, MODERATE WITH ANXIOUS DISTRESS (H): ICD-10-CM

## 2024-03-12 PROCEDURE — 90834 PSYTX W PT 45 MINUTES: CPT | Mod: 95 | Performed by: SOCIAL WORKER

## 2024-03-13 SDOH — HEALTH STABILITY: PHYSICAL HEALTH: ON AVERAGE, HOW MANY MINUTES DO YOU ENGAGE IN EXERCISE AT THIS LEVEL?: 10 MIN

## 2024-03-13 SDOH — HEALTH STABILITY: PHYSICAL HEALTH: ON AVERAGE, HOW MANY DAYS PER WEEK DO YOU ENGAGE IN MODERATE TO STRENUOUS EXERCISE (LIKE A BRISK WALK)?: 4 DAYS

## 2024-03-13 ASSESSMENT — SOCIAL DETERMINANTS OF HEALTH (SDOH): HOW OFTEN DO YOU GET TOGETHER WITH FRIENDS OR RELATIVES?: ONCE A WEEK

## 2024-03-13 ASSESSMENT — PATIENT HEALTH QUESTIONNAIRE - PHQ9: SUM OF ALL RESPONSES TO PHQ QUESTIONS 1-9: 11
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
## 2024-03-20 ENCOUNTER — VIRTUAL VISIT (OUTPATIENT)
Dept: PSYCHOLOGY | Facility: CLINIC | Age: 54
End: 2024-03-20
Payer: MEDICARE

## 2024-03-20 DIAGNOSIS — F90.2 ADHD (ATTENTION DEFICIT HYPERACTIVITY DISORDER), COMBINED TYPE: Primary | ICD-10-CM

## 2024-03-20 DIAGNOSIS — F33.1 MAJOR DEPRESSIVE DISORDER, RECURRENT EPISODE, MODERATE WITH ANXIOUS DISTRESS (H): ICD-10-CM

## 2024-03-20 DIAGNOSIS — F41.1 GENERALIZED ANXIETY DISORDER: ICD-10-CM

## 2024-03-20 DIAGNOSIS — F43.10 POST TRAUMATIC STRESS DISORDER (PTSD): ICD-10-CM

## 2024-03-20 PROCEDURE — 90834 PSYTX W PT 45 MINUTES: CPT | Mod: 93 | Performed by: SOCIAL WORKER

## 2024-03-20 ASSESSMENT — ANXIETY QUESTIONNAIRES
2. NOT BEING ABLE TO STOP OR CONTROL WORRYING: NEARLY EVERY DAY
4. TROUBLE RELAXING: SEVERAL DAYS
8. IF YOU CHECKED OFF ANY PROBLEMS, HOW DIFFICULT HAVE THESE MADE IT FOR YOU TO DO YOUR WORK, TAKE CARE OF THINGS AT HOME, OR GET ALONG WITH OTHER PEOPLE?: EXTREMELY DIFFICULT
GAD7 TOTAL SCORE: 15
IF YOU CHECKED OFF ANY PROBLEMS ON THIS QUESTIONNAIRE, HOW DIFFICULT HAVE THESE PROBLEMS MADE IT FOR YOU TO DO YOUR WORK, TAKE CARE OF THINGS AT HOME, OR GET ALONG WITH OTHER PEOPLE: EXTREMELY DIFFICULT
7. FEELING AFRAID AS IF SOMETHING AWFUL MIGHT HAPPEN: NEARLY EVERY DAY
GAD7 TOTAL SCORE: 15
6. BECOMING EASILY ANNOYED OR IRRITABLE: SEVERAL DAYS
7. FEELING AFRAID AS IF SOMETHING AWFUL MIGHT HAPPEN: NEARLY EVERY DAY
1. FEELING NERVOUS, ANXIOUS, OR ON EDGE: NEARLY EVERY DAY
3. WORRYING TOO MUCH ABOUT DIFFERENT THINGS: NEARLY EVERY DAY
GAD7 TOTAL SCORE: 15
5. BEING SO RESTLESS THAT IT IS HARD TO SIT STILL: SEVERAL DAYS

## 2024-03-20 ASSESSMENT — PATIENT HEALTH QUESTIONNAIRE - PHQ9
10. IF YOU CHECKED OFF ANY PROBLEMS, HOW DIFFICULT HAVE THESE PROBLEMS MADE IT FOR YOU TO DO YOUR WORK, TAKE CARE OF THINGS AT HOME, OR GET ALONG WITH OTHER PEOPLE: SOMEWHAT DIFFICULT
SUM OF ALL RESPONSES TO PHQ QUESTIONS 1-9: 11
SUM OF ALL RESPONSES TO PHQ QUESTIONS 1-9: 11
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
## 2024-04-03 ENCOUNTER — VIRTUAL VISIT (OUTPATIENT)
Dept: PSYCHOLOGY | Facility: CLINIC | Age: 54
End: 2024-04-03
Payer: MEDICARE

## 2024-04-03 DIAGNOSIS — F41.1 GENERALIZED ANXIETY DISORDER: ICD-10-CM

## 2024-04-03 DIAGNOSIS — F90.2 ADHD (ATTENTION DEFICIT HYPERACTIVITY DISORDER), COMBINED TYPE: Primary | ICD-10-CM

## 2024-04-03 DIAGNOSIS — F43.10 POST TRAUMATIC STRESS DISORDER (PTSD): ICD-10-CM

## 2024-04-03 DIAGNOSIS — F33.1 MAJOR DEPRESSIVE DISORDER, RECURRENT EPISODE, MODERATE WITH ANXIOUS DISTRESS (H): ICD-10-CM

## 2024-04-03 PROCEDURE — 90834 PSYTX W PT 45 MINUTES: CPT | Mod: 93 | Performed by: SOCIAL WORKER

## 2024-04-03 ASSESSMENT — ANXIETY QUESTIONNAIRES
8. IF YOU CHECKED OFF ANY PROBLEMS, HOW DIFFICULT HAVE THESE MADE IT FOR YOU TO DO YOUR WORK, TAKE CARE OF THINGS AT HOME, OR GET ALONG WITH OTHER PEOPLE?: EXTREMELY DIFFICULT
2. NOT BEING ABLE TO STOP OR CONTROL WORRYING: NEARLY EVERY DAY
7. FEELING AFRAID AS IF SOMETHING AWFUL MIGHT HAPPEN: NEARLY EVERY DAY
GAD7 TOTAL SCORE: 13
3. WORRYING TOO MUCH ABOUT DIFFERENT THINGS: NEARLY EVERY DAY
IF YOU CHECKED OFF ANY PROBLEMS ON THIS QUESTIONNAIRE, HOW DIFFICULT HAVE THESE PROBLEMS MADE IT FOR YOU TO DO YOUR WORK, TAKE CARE OF THINGS AT HOME, OR GET ALONG WITH OTHER PEOPLE: EXTREMELY DIFFICULT
1. FEELING NERVOUS, ANXIOUS, OR ON EDGE: SEVERAL DAYS
7. FEELING AFRAID AS IF SOMETHING AWFUL MIGHT HAPPEN: NEARLY EVERY DAY
4. TROUBLE RELAXING: SEVERAL DAYS
GAD7 TOTAL SCORE: 13
6. BECOMING EASILY ANNOYED OR IRRITABLE: SEVERAL DAYS
5. BEING SO RESTLESS THAT IT IS HARD TO SIT STILL: SEVERAL DAYS
GAD7 TOTAL SCORE: 13
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
## 2024-04-10 ENCOUNTER — VIRTUAL VISIT (OUTPATIENT)
Dept: PSYCHOLOGY | Facility: CLINIC | Age: 54
End: 2024-04-10
Payer: MEDICARE

## 2024-04-10 DIAGNOSIS — F43.10 POST TRAUMATIC STRESS DISORDER (PTSD): ICD-10-CM

## 2024-04-10 DIAGNOSIS — F33.1 MAJOR DEPRESSIVE DISORDER, RECURRENT EPISODE, MODERATE WITH ANXIOUS DISTRESS (H): ICD-10-CM

## 2024-04-10 DIAGNOSIS — F41.1 GENERALIZED ANXIETY DISORDER: ICD-10-CM

## 2024-04-10 DIAGNOSIS — F90.2 ADHD (ATTENTION DEFICIT HYPERACTIVITY DISORDER), COMBINED TYPE: Primary | ICD-10-CM

## 2024-04-10 PROCEDURE — 90834 PSYTX W PT 45 MINUTES: CPT | Mod: 93 | Performed by: SOCIAL WORKER
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
## 2024-04-16 ASSESSMENT — ASTHMA QUESTIONNAIRES
QUESTION_4 LAST FOUR WEEKS HOW OFTEN HAVE YOU USED YOUR RESCUE INHALER OR NEBULIZER MEDICATION (SUCH AS ALBUTEROL): ONCE A WEEK OR LESS
QUESTION_2 LAST FOUR WEEKS HOW OFTEN HAVE YOU HAD SHORTNESS OF BREATH: ONCE OR TWICE A WEEK
QUESTION_1 LAST FOUR WEEKS HOW MUCH OF THE TIME DID YOUR ASTHMA KEEP YOU FROM GETTING AS MUCH DONE AT WORK, SCHOOL OR AT HOME: A LITTLE OF THE TIME
ACT_TOTALSCORE: 22
ACT_TOTALSCORE: 22
QUESTION_5 LAST FOUR WEEKS HOW WOULD YOU RATE YOUR ASTHMA CONTROL: COMPLETELY CONTROLLED
QUESTION_3 LAST FOUR WEEKS HOW OFTEN DID YOUR ASTHMA SYMPTOMS (WHEEZING, COUGHING, SHORTNESS OF BREATH, CHEST TIGHTNESS OR PAIN) WAKE YOU UP AT NIGHT OR EARLIER THAN USUAL IN THE MORNING: NOT AT ALL

## 2024-04-17 ENCOUNTER — VIRTUAL VISIT (OUTPATIENT)
Dept: PSYCHOLOGY | Facility: CLINIC | Age: 54
End: 2024-04-17
Payer: MEDICARE

## 2024-04-17 ENCOUNTER — OFFICE VISIT (OUTPATIENT)
Dept: FAMILY MEDICINE | Facility: CLINIC | Age: 54
End: 2024-04-17
Payer: MEDICARE

## 2024-04-17 ENCOUNTER — ORDERS ONLY (AUTO-RELEASED) (OUTPATIENT)
Dept: FAMILY MEDICINE | Facility: CLINIC | Age: 54
End: 2024-04-17

## 2024-04-17 VITALS
DIASTOLIC BLOOD PRESSURE: 88 MMHG | WEIGHT: 159.3 LBS | SYSTOLIC BLOOD PRESSURE: 138 MMHG | HEART RATE: 80 BPM | TEMPERATURE: 96.7 F | HEIGHT: 65 IN | BODY MASS INDEX: 26.54 KG/M2 | RESPIRATION RATE: 16 BRPM | OXYGEN SATURATION: 98 %

## 2024-04-17 DIAGNOSIS — M54.2 CERVICALGIA: ICD-10-CM

## 2024-04-17 DIAGNOSIS — Z23 NEED FOR SHINGLES VACCINE: ICD-10-CM

## 2024-04-17 DIAGNOSIS — F33.1 MAJOR DEPRESSIVE DISORDER, RECURRENT EPISODE, MODERATE WITH ANXIOUS DISTRESS (H): ICD-10-CM

## 2024-04-17 DIAGNOSIS — F90.2 ADHD (ATTENTION DEFICIT HYPERACTIVITY DISORDER), COMBINED TYPE: Primary | ICD-10-CM

## 2024-04-17 DIAGNOSIS — F41.1 GENERALIZED ANXIETY DISORDER: ICD-10-CM

## 2024-04-17 DIAGNOSIS — J45.20 INTERMITTENT ASTHMA, UNCOMPLICATED: ICD-10-CM

## 2024-04-17 DIAGNOSIS — M54.50 CHRONIC BILATERAL LOW BACK PAIN WITHOUT SCIATICA: ICD-10-CM

## 2024-04-17 DIAGNOSIS — J31.0 CHRONIC RHINITIS: ICD-10-CM

## 2024-04-17 DIAGNOSIS — Z00.00 ENCOUNTER FOR MEDICARE ANNUAL WELLNESS EXAM: Primary | ICD-10-CM

## 2024-04-17 DIAGNOSIS — M79.7 FIBROMYALGIA: ICD-10-CM

## 2024-04-17 DIAGNOSIS — G89.4 CHRONIC PAIN SYNDROME: ICD-10-CM

## 2024-04-17 DIAGNOSIS — Z12.11 SCREEN FOR COLON CANCER: ICD-10-CM

## 2024-04-17 DIAGNOSIS — G89.29 CHRONIC BILATERAL LOW BACK PAIN WITHOUT SCIATICA: ICD-10-CM

## 2024-04-17 DIAGNOSIS — Z23 NEED FOR PROPHYLACTIC VACCINATION AGAINST HEPATITIS B VIRUS: ICD-10-CM

## 2024-04-17 DIAGNOSIS — Z23 NEED FOR TDAP VACCINATION: ICD-10-CM

## 2024-04-17 DIAGNOSIS — Z12.31 ENCOUNTER FOR SCREENING MAMMOGRAM FOR BREAST CANCER: ICD-10-CM

## 2024-04-17 DIAGNOSIS — Z87.891 PERSONAL HISTORY OF TOBACCO USE: ICD-10-CM

## 2024-04-17 DIAGNOSIS — F43.10 POST TRAUMATIC STRESS DISORDER (PTSD): ICD-10-CM

## 2024-04-17 LAB
ANION GAP SERPL CALCULATED.3IONS-SCNC: 9 MMOL/L (ref 7–15)
BUN SERPL-MCNC: 14.4 MG/DL (ref 6–20)
CALCIUM SERPL-MCNC: 9.7 MG/DL (ref 8.6–10)
CHLORIDE SERPL-SCNC: 103 MMOL/L (ref 98–107)
CREAT SERPL-MCNC: 1.19 MG/DL (ref 0.51–0.95)
DEPRECATED HCO3 PLAS-SCNC: 30 MMOL/L (ref 22–29)
EGFRCR SERPLBLD CKD-EPI 2021: 54 ML/MIN/1.73M2
GLUCOSE SERPL-MCNC: 77 MG/DL (ref 70–99)
POTASSIUM SERPL-SCNC: 4 MMOL/L (ref 3.4–5.3)
SODIUM SERPL-SCNC: 142 MMOL/L (ref 135–145)

## 2024-04-17 PROCEDURE — 36415 COLL VENOUS BLD VENIPUNCTURE: CPT | Performed by: FAMILY MEDICINE

## 2024-04-17 PROCEDURE — 80048 BASIC METABOLIC PNL TOTAL CA: CPT | Performed by: FAMILY MEDICINE

## 2024-04-17 PROCEDURE — G0296 VISIT TO DETERM LDCT ELIG: HCPCS | Performed by: FAMILY MEDICINE

## 2024-04-17 PROCEDURE — 99396 PREV VISIT EST AGE 40-64: CPT | Performed by: FAMILY MEDICINE

## 2024-04-17 PROCEDURE — 90834 PSYTX W PT 45 MINUTES: CPT | Mod: 93 | Performed by: SOCIAL WORKER

## 2024-04-17 PROCEDURE — 96127 BRIEF EMOTIONAL/BEHAV ASSMT: CPT | Performed by: FAMILY MEDICINE

## 2024-04-17 PROCEDURE — 99214 OFFICE O/P EST MOD 30 MIN: CPT | Mod: 25 | Performed by: FAMILY MEDICINE

## 2024-04-17 RX ORDER — ALBUTEROL SULFATE 90 UG/1
1-2 AEROSOL, METERED RESPIRATORY (INHALATION) EVERY 6 HOURS PRN
Qty: 18 G | Refills: 5 | Status: SHIPPED | OUTPATIENT
Start: 2024-04-17

## 2024-04-17 RX ORDER — FLUTICASONE PROPIONATE 50 MCG
1-2 SPRAY, SUSPENSION (ML) NASAL DAILY
Qty: 15.8 ML | Refills: 5 | Status: SHIPPED | OUTPATIENT
Start: 2024-04-17

## 2024-04-17 RX ORDER — HYDROCODONE BITARTRATE AND ACETAMINOPHEN 5; 325 MG/1; MG/1
1 TABLET ORAL EVERY 6 HOURS PRN
Qty: 120 TABLET | Refills: 0 | Status: SHIPPED | OUTPATIENT
Start: 2024-04-17 | End: 2024-05-14

## 2024-04-17 RX ORDER — CETIRIZINE HYDROCHLORIDE 10 MG/1
10 TABLET ORAL DAILY
Qty: 90 TABLET | Refills: 0 | Status: SHIPPED | OUTPATIENT
Start: 2024-04-17

## 2024-04-17 SDOH — HEALTH STABILITY: PHYSICAL HEALTH: ON AVERAGE, HOW MANY MINUTES DO YOU ENGAGE IN EXERCISE AT THIS LEVEL?: 10 MIN

## 2024-04-17 SDOH — HEALTH STABILITY: PHYSICAL HEALTH: ON AVERAGE, HOW MANY DAYS PER WEEK DO YOU ENGAGE IN MODERATE TO STRENUOUS EXERCISE (LIKE A BRISK WALK)?: 3 DAYS

## 2024-04-17 ASSESSMENT — ANXIETY QUESTIONNAIRES
GAD7 TOTAL SCORE: 13
7. FEELING AFRAID AS IF SOMETHING AWFUL MIGHT HAPPEN: NEARLY EVERY DAY
1. FEELING NERVOUS, ANXIOUS, OR ON EDGE: SEVERAL DAYS
GAD7 TOTAL SCORE: 13
7. FEELING AFRAID AS IF SOMETHING AWFUL MIGHT HAPPEN: NEARLY EVERY DAY
6. BECOMING EASILY ANNOYED OR IRRITABLE: SEVERAL DAYS
4. TROUBLE RELAXING: SEVERAL DAYS
3. WORRYING TOO MUCH ABOUT DIFFERENT THINGS: NEARLY EVERY DAY
2. NOT BEING ABLE TO STOP OR CONTROL WORRYING: NEARLY EVERY DAY
GAD7 TOTAL SCORE: 13
5. BEING SO RESTLESS THAT IT IS HARD TO SIT STILL: SEVERAL DAYS
8. IF YOU CHECKED OFF ANY PROBLEMS, HOW DIFFICULT HAVE THESE MADE IT FOR YOU TO DO YOUR WORK, TAKE CARE OF THINGS AT HOME, OR GET ALONG WITH OTHER PEOPLE?: EXTREMELY DIFFICULT
IF YOU CHECKED OFF ANY PROBLEMS ON THIS QUESTIONNAIRE, HOW DIFFICULT HAVE THESE PROBLEMS MADE IT FOR YOU TO DO YOUR WORK, TAKE CARE OF THINGS AT HOME, OR GET ALONG WITH OTHER PEOPLE: EXTREMELY DIFFICULT

## 2024-04-17 ASSESSMENT — PATIENT HEALTH QUESTIONNAIRE - PHQ9
SUM OF ALL RESPONSES TO PHQ QUESTIONS 1-9: 14
SUM OF ALL RESPONSES TO PHQ QUESTIONS 1-9: 14
SUM OF ALL RESPONSES TO PHQ QUESTIONS 1-9: 11
10. IF YOU CHECKED OFF ANY PROBLEMS, HOW DIFFICULT HAVE THESE PROBLEMS MADE IT FOR YOU TO DO YOUR WORK, TAKE CARE OF THINGS AT HOME, OR GET ALONG WITH OTHER PEOPLE: EXTREMELY DIFFICULT
SUM OF ALL RESPONSES TO PHQ QUESTIONS 1-9: 11
10. IF YOU CHECKED OFF ANY PROBLEMS, HOW DIFFICULT HAVE THESE PROBLEMS MADE IT FOR YOU TO DO YOUR WORK, TAKE CARE OF THINGS AT HOME, OR GET ALONG WITH OTHER PEOPLE: SOMEWHAT DIFFICULT

## 2024-04-17 ASSESSMENT — PAIN SCALES - GENERAL: PAINLEVEL: EXTREME PAIN (9)

## 2024-04-17 ASSESSMENT — SOCIAL DETERMINANTS OF HEALTH (SDOH): HOW OFTEN DO YOU GET TOGETHER WITH FRIENDS OR RELATIVES?: ONCE A WEEK

## 2024-04-17 NOTE — COMMUNITY RESOURCES LIST (ENGLISH)
April 17, 2024           YOUR PERSONALIZED LIST OF SERVICES & PROGRAMS           NAVIGATION    Eligibility Screening      [facility] - Veterans benefits application assistance  [address] [place] [address] (Distance: 17.8 miles)  Phone: (518) [phone]  Website: https://www.[place]./161/Veterans-Services  Language: English  Fee: Free  Accessibility: Ada accessible, Deaf or hard of hearing, Blind accommodation      [employer]  Phone: (168) 964-1097  Website: https://www.mnsure.org/about-us/assister-program/navigators/index.jsp  Language: English  Hours: Mon 8:00 AM - 4:00 PM Tue 8:00 AM - 4:00 PM Wed 8:00 AM - 4:00 PM Thu 8:00 AM - 4:00 PM        ASSISTANCE    Nutrition Benefits      [facility] [employer]  Phone: (972) [phone]  Website: https://www.[employer].org/programs/market-bucks/  Language: English  Hours: Mon 10:00 AM - 5:00 PM Tue 10:00 AM - 5:00 PM Wed 10:00 AM - 5:00 PM Thu 10:00 AM - 5:00 PM Fri 10:00 AM - 5:00 PM  Fee: Self pay    Pantry      [facility]  [address] [place] [address] (Distance: 16.4 miles)  Phone: [phone]) [phone]  Website: http://Prompt.ly.[employer]  Language: English  Fee: Free      [facility] - Food pantry  [address] [place] [address] (Distance: 13.2 miles)  Phone: (831) 895-1264  Website: https://www.Familiar.Gastrofy/[facility] Hospital[facility]/  Language: English  Fee: Free      [employer]  Phone: (278) 625-2763  Website: https://www.[place]ash.[place].org/empowerment-food-bank  Language: English  Hours: Mon 9:00 AM - 5:00 PM Tue 9:00 AM - 5:00 PM Wed 9:00 AM - 5:00 PM Thu 9:00 AM - 5:00 PM Fri 9:00 AM - 5:00 PM  Fee: Free            Medical Transportation, (NEMT)      [facility] In Action - Transportation to medical appointments - [facility] In Action  [address] [place] [address] (Distance: 19.6 miles)  Website: http://www.[employer]a.org  Language: English   Fee: Free  Accessibility: Blind accommodation      Social Service [facility] - Neighbor to Neighbor Program  Phone: (416) 247-8784  Email: albert@[facility]  Website: https://www.[facility].org/services/older-adults/-services/neighbor-to-neighbor  Language: English  Hours: Mon 8:00 AM - 5:00 PM Tue 8:00 AM - 5:00 PM Wed 8:00 AM - 5:00 PM Thu 8:00 AM - 5:00 PM Fri 8:00 AM - 5:00 PM  Fee: Insurance, Self pay  Accessibility: Deaf or hard of hearing, Blind accommodation, Translation services    Expense Assistance      - Dislocated Worker/Adult [employer] Employment Program  Phone: (774) 581-7250  Email: steph@[employer]  Website: https://Herokumn"[employer]/services/employment-services/dislocated-worker-program/  Language: English, Marshallese  Hours: Mon 8:00 AM - 4:30 PM Tue 8:00 AM - 4:30 PM Wed 8:00 AM - 4:30 PM Thu 8:00 AM - 4:30 PM Fri 8:00 AM - 4:30 PM  Fee: Free  Accessibility: Ada accessible    Coordination      [employer] - Ride coordination  [address] [place] [address] (Distance: 18.5 miles)  Language: English  Fee: Self pay, Insurance  Accessibility: Ada accessible, Translation services  Transportation Options: Free transportation      [employer] - Ride coordination  [address] [place] [address] (Distance: 18.7 miles)  Website: http://www.[employer]  Language: English  Fee: Self pay, Insurance      - [employer]  Phone: (388) 417-5723  Website: http://www.ParkTAG Social Parking               IMPORTANT NUMBERS & WEBSITES        Emergency Services  911  .   United Way  211 http://211unitedway.org  .   Poison Control  (464) 887-2803 http://[place]poison.org http://wis[place]poison.org  .     Suicide and Crisis Lifeline  988 http://988lifeline.org  .   Childhelp National Child Abuse Hotline  796.381.9521 http://Childhelphotline.org   .   National Sexual Assault Hotline  (273) 449-4060 (HOPE) http://Rainn.org   .     National Runaway Safeline  (381) 724-1285 (RUNAWAY)  http://1800runaway.org  .   Pregnancy & Postpartum Support  Call/text 239-417-2850  [place]: http://ppsupportmn.org  [place]: http://psichapters.com/wi  .   Substance Abuse National Helpline ([facility])  125-575-HELP (3769) http://Findtreatment.gov   .                DISCLAIMER: These resources have been generated via the [employer] Platform. [employer] does not endorse any service providers mentioned in this resource list. [employer] does not guarantee that the services mentioned in this resource list will be available to you or will improve your health or wellness.    [facility]

## 2024-04-17 NOTE — PATIENT INSTRUCTIONS
Preventive Care Advice   This is general advice given by our system to help you stay healthy. However, your care team may have specific advice just for you. Please talk to your care team about your preventive care needs.  Nutrition  Eat 5 or more servings of fruits and vegetables each day.  Try wheat bread, brown rice and whole grain pasta (instead of white bread, rice, and pasta).  Get enough calcium and vitamin D. Check the label on foods and aim for 100% of the RDA (recommended daily allowance).  Lifestyle  Exercise at least 150 minutes each week   (30 minutes a day, 5 days a week).  Do muscle strengthening activities 2 days a week. These help control your weight and prevent disease.  No smoking.  Wear sunscreen to prevent skin cancer.  Have a dental exam and cleaning every 6 months.  Yearly exams  See your health care team every year to talk about:  Any changes in your health.  Any medicines your care team has prescribed.  Preventive care, family planning, and ways to prevent chronic diseases.  Shots (vaccines)   HPV shots (up to age 26), if you've never had them before.  Hepatitis B shots (up to age 59), if you've never had them before.  COVID-19 shot: Get this shot when it's due.  Flu shot: Get a flu shot every year.  Tetanus shot: Get a tetanus shot every 10 years.  Pneumococcal, hepatitis A, and RSV shots: Ask your care team if you need these based on your risk.  Shingles shot (for age 50 and up).  General health tests  Diabetes screening:  Starting at age 35, Get screened for diabetes at least every 3 years.  If you are younger than age 35, ask your care team if you should be screened for diabetes.  Cholesterol test: At age 39, start having a cholesterol test every 5 years, or more often if advised.  Bone density scan (DEXA): At age 50, ask your care team if you should have this scan for osteoporosis (brittle bones).  Hepatitis C: Get tested at least once in your life.  STIs (sexually transmitted  infections)  Before age 24: Ask your care team if you should be screened for STIs.  After age 24: Get screened for STIs if you're at risk. You are at risk for STIs (including HIV) if:  You are sexually active with more than one person.  You don't use condoms every time.  You or a partner was diagnosed with a sexually transmitted infection.  If you are at risk for HIV, ask about PrEP medicine to prevent HIV.  Get tested for HIV at least once in your life, whether you are at risk for HIV or not.  Cancer screening tests  Cervical cancer screening: If you have a cervix, begin getting regular cervical cancer screening tests at age 21. Most people who have regular screenings with normal results can stop after age 65. Talk about this with your provider.  Breast cancer scan (mammogram): If you've ever had breasts, begin having regular mammograms starting at age 40. This is a scan to check for breast cancer.  Colon cancer screening: It is important to start screening for colon cancer at age 45.  Have a colonoscopy test every 10 years (or more often if you're at risk) Or, ask your provider about stool tests like a FIT test every year or Cologuard test every 3 years.  To learn more about your testing options, visit: [email or web address].pdf.  For help making a decision, visit: https://bit.ly/qk38589.  Prostate cancer screening test: If you have a prostate and are age 55 to 69, ask your provider if you would benefit from a yearly prostate cancer screening test.  Lung cancer screening: If you are a current or former smoker age 50 to 80, ask your care team if ongoing lung cancer screenings are right for you.  For informational purposes only. Not to replace the advice of your health care provider. Copyright   2023 [employer]. All rights reserved. Clinically reviewed by the [facility]. [employer] 265960 - REV 01/24.    Your Health Risk Assessment indicates you feel you are not in  good health    A healthy lifestyle helps keep the body fit and the mind alert. It helps protect you from disease, helps you fight disease, and helps prevent chronic disease (disease that doesn't go away) from getting worse. This is important as you get older and begin to notice twinges in muscles and joints and a decline in the strength and stamina you once took for granted. A healthy lifestyle includes good healthcare, good nutrition, weight control, recreation, and regular exercise. Avoid harmful substances and do what you can to keep safe. Another part of a healthy lifestyle is stay mentally active and socially involved.    Good healthcare   Have a wellness visit every year.   If you have new symptoms, let us know right away. Don't wait until the next checkup.   Take medicines exactly as prescribed and keep your medicines in a safe place. Tell us if your medicine causes problems.   Healthy diet and weight control   Eat 3 or 4 small, nutritious, low-fat, high-fiber meals a day. Include a variety of fruits, vegetables, and whole-grain foods.   Make sure you get enough calcium in your diet. Calcium, vitamin D, and exercise help prevent osteoporosis (bone thinning).   If you live alone, try eating with others when you can. That way you get a good meal and have company while you eat it.   Try to keep a healthy weight. If you eat more calories than your body uses for energy, it will be stored as fat and you will gain weight.     Recreation   Recreation is not limited to sports and team events. It includes any activity that provides relaxation, interest, enjoyment, and exercise. Recreation provides an outlet for physical, mental, and social energy. It can give a sense of worth and achievement. It can help you stay healthy.    Mental Exercise and Social Involvement  Mental and emotional health is as important as physical health. Keep in touch with friends and family. Stay as active as possible. Continue to learn and  "challenge yourself.   Things you can do to stay mentally active are:  Learn something new, like a foreign language or musical instrument.   Play SCRABBLE or do crossword puzzles. If you cannot find people to play these games with you at home, you can play them with others on your computer through the Internet.   Join a games club--anything from card games to chess or checkers or lawn bowling.   Start a new hobby.   Go back to school.   Volunteer.   Read.   Keep up with world events.  Learning About Being Physically Active  What is physical activity?     Being physically active means doing any kind of activity that gets your body moving.  The types of physical activity that can help you get fit and stay healthy include:  Aerobic or \"cardio\" activities. These make your heart beat faster and make you breathe harder, such as brisk walking, riding a bike, or running. They strengthen your heart and lungs and build up your endurance.  Strength training activities. These make your muscles work against, or \"resist,\" something. Examples include lifting weights or doing push-ups. These activities help tone and strengthen your muscles and bones.  Stretches. These let you move your joints and muscles through their full range of motion. Stretching helps you be more flexible.  Reaching a balance between these three types of physical activity is important because each one contributes to your overall fitness.  What are the benefits of being active?  Being active is one of the best things you can do for your health. It helps you to:  Feel stronger and have more energy to do all the things you like to do.  Focus better at school or work.  Feel, think, and sleep better.  Reach and stay at a healthy weight.  Lose fat and build lean muscle.  Lower your risk for serious health problems, including diabetes, heart attack, high blood pressure, and some cancers.  Keep your heart, lungs, bones, muscles, and joints strong and healthy.  How can " "you make being active part of your life?  Start slowly. Make it your long-term goal to get at least 30 minutes of exercise on most days of the week. Walking is a good choice. You also may want to do other activities, such as running, swimming, cycling, or playing tennis or team sports.  Pick activities that you like--ones that make your heart beat faster, your muscles stronger, and your muscles and joints more flexible. If you find more than one thing you like doing, do them all. You don't have to do the same thing every day.  Get your heart pumping every day. Any activity that makes your heart beat faster and keeps it at that rate for a while counts.  Here are some great ways to get your heart beating faster:  Go for a brisk walk, run, or hike.  Go for a swim or bike ride.  Take an online exercise class or dance.  Play a game of touch football, basketball, or soccer.  Play tennis, pickleball, or racquetball.  Climb stairs.  Even some household chores can be aerobic. Just do them at a faster pace. Raking or mowing the lawn, sweeping the garage, and vacuuming and cleaning your home all can help get your heart rate up.  Strengthen your muscles during the week. You don't have to lift heavy weights or grow big, bulky muscles to get stronger. Doing a few simple activities that make your muscles work against, or \"resist,\" something can help you get stronger. Aim for at least twice a week.  For example, you can:  Do push-ups or sit-ups, which use your own body weight as resistance.  Lift weights or dumbbells or use stretch bands at home or in a gym or community center.  Stretch your muscles often. Stretching will help you as you become more active. It can help you stay flexible and loosen tight muscles. It can also help improve your balance and posture and can be a great way to relax.  Be sure to stretch the muscles you'll be using when you work out. It's best to warm your muscles slightly before you stretch them. Walk or " "do some other light aerobic activity for a few minutes. Then start stretching.  When you stretch your muscles:  Do it slowly. Stretching is not about going fast or making sudden movements.  Don't push or bounce during a stretch.  Hold each stretch for at least 15 to 30 seconds, if you can. You should feel a stretch in the muscle, but not pain.  Breathe out as you do the stretch. Then breathe in as you hold the stretch. Don't hold your breath.  If you're worried about how more activity might affect your health, have a checkup before you start. Follow any special advice your doctor gives you for getting a smart start.  Where can you learn more?  Go to https://www.[employer].net/patiented  Enter W332 in the search box to learn more about \"Learning About Being Physically Active.\"  Current as of: June 5, 2023               Content Version: 14.0    9603-9222 [employer].   Care instructions adapted under license by your healthcare professional. If you have questions about a medical condition or this instruction, always ask your healthcare professional. [employer] disclaims any warranty or liability for your use of this information.      Eating Healthy Foods: Care Instructions  With every meal, you can make healthy food choices. Try to eat a variety of fruits, vegetables, whole grains, lean proteins, and low-fat dairy products. This can help you get the right balance of nutrients, including vitamins and minerals. Small changes add up over time. You can start by adding one healthy food to your meals each day.    Try to make half your plate fruits and vegetables, one-fourth whole grains, and one-fourth lean proteins. Try including dairy with your meals.   Eat more fruits and vegetables. Try to have them with most meals and snacks.   Foods for healthy eating    Fruits    These can be fresh, frozen, canned, or dried.  Try to choose whole fruit rather than fruit juice.  Eat a variety of colors.    " "Vegetables    These can be fresh, frozen, canned, or dried.  Beans, peas, and lentils count too.    Whole grains    Choose whole-grain breads, cereals, and noodles.  Try brown rice.    Lean proteins    These can include lean meat, poultry, fish, and eggs.  You can also have tofu, beans, peas, lentils, nuts, and seeds.    Dairy    Try milk, yogurt, and cheese.  Choose low-fat or fat-free when you can.  If you need to, use lactose-free milk or fortified plant-based milk products, such as soy milk.    Water    Drink water when you're thirsty.  Limit sugar-sweetened drinks, including soda, fruit drinks, and sports drinks.  Where can you learn more?  Go to https://www.[employer].net/patiented  Enter T756 in the search box to learn more about \"Eating Healthy Foods: Care Instructions.\"  Current as of: September 20, 2023               Content Version: 14.0    1396-5972 [employer].   Care instructions adapted under license by your healthcare professional. If you have questions about a medical condition or this instruction, always ask your healthcare professional. [employer] disclaims any warranty or liability for your use of this information.      Learning About Activities of Daily Living  What are activities of daily living?     Activities of daily living (ADLs) are the basic self-care tasks you do every day. These include eating, bathing, dressing, and moving around.  As you age, and if you have health problems, you may find that it's harder to do some of these tasks. If so, your doctor can suggest ideas that may help.  To measure what kind of help you may need, your doctor will ask how well you are able to do ADLs. Let your doctor know if there are any tasks that you are having trouble doing. This is an important first step to getting help. And when you have the help you need, you can stay as independent as possible.  How will a doctor assess your ADLs?  Asking about ADLs is part of a routine " health checkup your doctor will likely do as you age. Your health check might be done in a doctor's office, in your home, or at a hospital. The goal is to find out if you are having any problems that could make it hard to care for yourself or that make it unsafe for you to be on your own.  To measure your ADLs, your doctor will ask how hard it is for you to do routine tasks. Your doctor may also want to know if you have changed the way you do a task because of a health problem. Your doctor may watch how you:  Walk back and forth.  Keep your balance while you stand or walk.  Move from sitting to standing or from a bed to a chair.  Button or unbutton a shirt or sweater.  Remove and put on your shoes.  It's common to feel a little worried or anxious if you find you can't do all the things you used to be able to do. Talking with your doctor about ADLs is a way to make sure you're as safe as possible and able to care for yourself as well as you can. You may want to bring a caregiver, friend, or family member to your checkup. They can help you talk to your doctor.  Follow-up care is a key part of your treatment and safety. Be sure to make and go to all appointments, and call your doctor if you are having problems. It's also a good idea to know your test results and keep a list of the medicines you take.  Current as of: October 24, 2023               Content Version: 14.0    8684-0638 [employer].   Care instructions adapted under license by your healthcare professional. If you have questions about a medical condition or this instruction, always ask your healthcare professional. [employer] disclaims any warranty or liability for your use of this information.      Preventing Falls: Care Instructions  Injuries and health problems such as trouble walking or poor eyesight can increase your risk of falling. So can some medicines. But there are things you can do to help prevent falls. You can exercise  "to get stronger. You can also arrange your home to make it safer.    Talk to your doctor about the medicines you take. Ask if any of them increase the risk of falls and whether they can be changed or stopped.   Try to exercise regularly. It can help improve your strength and balance. This can help lower your risk of falling.     Practice fall safety and prevention.    Wear low-heeled shoes that fit well and give your feet good support. Talk to your doctor if you have foot problems that make this hard.  Carry a cellphone or wear a medical alert device that you can use to call for help.  Use stepladders instead of chairs to reach high objects. Don't climb if you're at risk for falls. Ask for help, if needed.  Wear the correct eyeglasses, if you need them.    Make your home safer.    Remove rugs, cords, clutter, and furniture from walkways.  Keep your house well lit. Use night-lights in hallways and bathrooms.  Install and use sturdy handrails on stairways.  Wear nonskid footwear, even inside. Don't walk barefoot or in socks without shoes.    Be safe outside.    Use handrails, curb cuts, and ramps whenever possible.  Keep your hands free by using a shoulder bag or backpack.  Try to walk in well-lit areas. Watch out for uneven ground, changes in pavement, and debris.  Be careful in the winter. Walk on the grass or gravel when sidewalks are slippery. Use de-icer on steps and walkways. Add non-slip devices to shoes.    Put grab bars and nonskid mats in your shower or tub and near the toilet. Try to use a shower chair or bath bench when bathing.   Get into a tub or shower by putting in your weaker leg first. Get out with your strong side first. Have a phone or medical alert device in the bathroom with you.   Where can you learn more?  Go to https://www.[employer].net/patiented  Enter G117 in the search box to learn more about \"Preventing Falls: Care Instructions.\"  Current as of: July 17, 2023               Content " Version: 14.0    [id number] [employer].   Care instructions adapted under license by your healthcare professional. If you have questions about a medical condition or this instruction, always ask your healthcare professional. [employer] disclaims any warranty or liability for your use of this information.      Hearing Loss: Care Instructions  Overview     Hearing loss is a sudden or slow decrease in how well you hear. It can range from slight to profound. Permanent hearing loss can occur with aging. It also can happen when you are exposed long-term to loud noise. Examples include listening to loud music, riding motorcycles, or being around other loud machines.  Hearing loss can affect your work and home life. It can make you feel lonely or depressed. You may feel that you have lost your independence. But hearing aids and other devices can help you hear better and feel connected to others.  Follow-up care is a key part of your treatment and safety. Be sure to make and go to all appointments, and call your doctor if you are having problems. It's also a good idea to know your test results and keep a list of the medicines you take.  How can you care for yourself at home?  Avoid loud noises whenever possible. This helps keep your hearing from getting worse.  Always wear hearing protection around loud noises.  Wear a hearing aid as directed.  A professional can help you pick a hearing aid that will work best for you.  You can also get hearing aids over the counter for mild to moderate hearing loss.  Have hearing tests as your doctor suggests. They can show whether your hearing has changed. Your hearing aid may need to be adjusted.  Use other devices as needed. These may include:  Telephone amplifiers and hearing aids that can connect to a television, stereo, radio, or microphone.  Devices that use lights or vibrations. These alert you to the doorbell, a ringing telephone, or a baby  "monitor.  Television closed-captioning. This shows the words at the bottom of the screen. Most new TVs can do this.  TTY (text telephone). This lets you type messages back and forth on the telephone instead of talking or listening. These devices are also called TDD. When messages are typed on the keyboard, they are sent over the phone line to a receiving TTY. The message is shown on a monitor.  Use text messaging, social media, and email if it is hard for you to communicate by telephone.  Try to learn a listening technique called speechreading. It is not lipreading. You pay attention to people's gestures, expressions, posture, and tone of voice. These clues can help you understand what a person is saying. Face the person you are talking to, and have them face you. Make sure the lighting is good. You need to see the other person's face clearly.  Think about counseling if you need help to adjust to your hearing loss.  When should you call for help?  Watch closely for changes in your health, and be sure to contact your doctor if:    You think your hearing is getting worse.     You have new symptoms, such as dizziness or nausea.   Where can you learn more?  Go to https://www.[employer].net/patiented  Enter R798 in the search box to learn more about \"Hearing Loss: Care Instructions.\"  Current as of: September 27, 2023               Content Version: 14.0    [id number] [employer].   Care instructions adapted under license by your healthcare professional. If you have questions about a medical condition or this instruction, always ask your healthcare professional. [employer] disclaims any warranty or liability for your use of this information.      Learning About Stress  What is stress?     Stress is your body's response to a hard situation. Your body can have a physical, emotional, or mental response. Stress is a fact of life for most people, and it affects everyone differently. What causes stress " for you may not be stressful for someone else.  A lot of things can cause stress. You may feel stress when you go on a job interview, take a test, or run a race. This kind of short-term stress is normal and even useful. It can help you if you need to work hard or react quickly. For example, stress can help you finish an important job on time.  Long-term stress is caused by ongoing stressful situations or events. Examples of long-term stress include long-term health problems, ongoing problems at work, or conflicts in your family. Long-term stress can harm your health.  How does stress affect your health?  When you are stressed, your body responds as though you are in danger. It makes hormones that speed up your heart, make you breathe faster, and give you a burst of energy. This is called the fight-or-flight stress response. If the stress is over quickly, your body goes back to normal and no harm is done.  But if stress happens too often or lasts too long, it can have bad effects. Long-term stress can make you more likely to get sick, and it can make symptoms of some diseases worse. If you tense up when you are stressed, you may develop neck, shoulder, or low back pain. Stress is linked to high blood pressure and heart disease.  Stress also harms your emotional health. It can make you heath, tense, or depressed. Your relationships may suffer, and you may not do well at work or school.  What can you do to manage stress?  You can try these things to help manage stress:   Do something active. Exercise or activity can help reduce stress. Walking is a great way to get started. Even everyday activities such as housecleaning or yard work can help.  Try yoga or dominick chi. These techniques combine exercise and meditation. You may need some training at first to learn them.  Do something you enjoy. For example, listen to music or go to a movie. Practice your hobby or do volunteer work.  Meditate. This can help you relax, because  "you are not worrying about what happened before or what may happen in the future.  Do guided imagery. Imagine yourself in any setting that helps you feel calm. You can use online videos, books, or a teacher to guide you.  Do breathing exercises. For example:  From a standing position, bend forward from the waist with your knees slightly bent. Let your arms dangle close to the floor.  Breathe in slowly and deeply as you return to a standing position. Roll up slowly and lift your head last.  Hold your breath for just a few seconds in the standing position.  Breathe out slowly and bend forward from the waist.  Let your feelings out. Talk, laugh, cry, and express anger when you need to. Talking with supportive friends or family, a counselor, or a zoila leader about your feelings is a healthy way to relieve stress. Avoid discussing your feelings with people who make you feel worse.  Write. It may help to write about things that are bothering you. This helps you find out how much stress you feel and what is causing it. When you know this, you can find better ways to cope.  What can you do to prevent stress?  You might try some of these things to help prevent stress:  Manage your time. This helps you find time to do the things you want and need to do.  Get enough sleep. Your body recovers from the stresses of the day while you are sleeping.  Get support. Your family, friends, and community can make a difference in how you experience stress.  Limit your news feed. Avoid or limit time on social media or news that may make you feel stressed.  Do something active. Exercise or activity can help reduce stress. Walking is a great way to get started.  Where can you learn more?  Go to https://www.[employer].net/patiented  Enter N032 in the search box to learn more about \"Learning About Stress.\"  Current as of: October 24, 2023               Content Version: 14.0    4040-9741 [employer].   Care instructions adapted " under license by your healthcare professional. If you have questions about a medical condition or this instruction, always ask your healthcare professional. [employer], [employer] disclaims any warranty or liability for your use of this information.      Learning About Depression Screening  What is depression screening?  Depression screening is a way to see if you have depression symptoms. It may be done by a doctor or counselor. It's often part of a routine checkup. That's because your mental health is just as important as your physical health.  Depression is a mental health condition that affects how you feel, think, and act. You may:  Have less energy.  Lose interest in your daily activities.  Feel sad and grouchy for a long time.  Depression is very common. It affects people of all ages.  Many things can lead to depression. Some people become depressed after they have a stroke or find out they have a major illness like cancer or heart disease. The death of a loved one or a breakup may lead to depression. It can run in families. Most experts believe that a combination of inherited genes and stressful life events can cause it.  What happens during screening?  You may be asked to fill out a form about your depression symptoms. You and the doctor will discuss your answers. The doctor may ask you more questions to learn more about how you think, act, and feel.  What happens after screening?  If you have symptoms of depression, your doctor will talk to you about your options.  Doctors usually treat depression with medicines or counseling. Often, combining the two works best. Many people don't get help because they think that they'll get over the depression on their own. But people with depression may not get better unless they get treatment.  The cause of depression is not well understood. There may be many factors involved. But if you have depression, it's not your fault.  A serious symptom of depression is  "thinking about death or suicide. If you or someone you care about talks about this or about feeling hopeless, get help right away.  It's important to know that depression can be treated. Medicine, counseling, and self-care may help.  Where can you learn more?  Go to https://www.[employer].net/patiented  Enter T185 in the search box to learn more about \"Learning About Depression Screening.\"  Current as of: June 24, 2023               Content Version: 14.0    8640-8513 "[employer].   Care instructions adapted under license by your healthcare professional. If you have questions about a medical condition or this instruction, always ask your healthcare professional. "[employer] disclaims any warranty or liability for your use of this information.      Chronic Pain: Care Instructions  Your Care Instructions     Chronic pain is pain that lasts a long time (months or even years) and may or may not have a clear cause. It is different from acute pain, which usually does have a clear cause--like an injury or illness--and gets better over time. Chronic pain:  Lasts over time but may vary from day to day.  Does not go away despite efforts to end it.  May disrupt your sleep and lead to fatigue.  May cause depression or anxiety.  May make your muscles tense, causing more pain.  Can disrupt your work, hobbies, home life, and relationships with friends and family.  Chronic pain is a very real condition. It is not just in your head. Treatment can help and usually includes several methods used together, such as medicines, physical therapy, exercise, and other treatments. Learning how to relax and changing negative thought patterns can also help you cope.  Chronic pain is complex. Taking an active role in your treatment will help you better manage your pain. Tell your doctor if you have trouble dealing with your pain. You may have to try several things before you find what works best for you.  Follow-up care " is a key part of your treatment and safety. Be sure to make and go to all appointments, and call your doctor if you are having problems. It's also a good idea to know your test results and keep a list of the medicines you take.  How can you care for yourself at home?  Pace yourself. Break up large jobs into smaller tasks. Save harder tasks for days when you have less pain, or go back and forth between hard tasks and easier ones. Take rest breaks.  Relax, and reduce stress. Relaxation techniques such as deep breathing or meditation can help.  Keep moving. Gentle, daily exercise can help reduce pain over the long run. Try low- or no-impact exercises such as walking, swimming, and stationary biking. Do stretches to stay flexible.  Try heat, cold packs, and massage.  Get enough sleep. Chronic pain can make you tired and drain your energy. Talk with your doctor if you have trouble sleeping because of pain.  Think positive. Your thoughts can affect your pain level. Do things that you enjoy to distract yourself when you have pain instead of focusing on the pain. See a movie, read a book, listen to music, or spend time with a friend.  If you think you are depressed, talk to your doctor about treatment.  Keep a daily pain diary. Record how your moods, thoughts, sleep patterns, activities, and medicine affect your pain. You may find that your pain is worse during or after certain activities or when you are feeling a certain emotion. Having a record of your pain can help you and your doctor find the best ways to treat your pain.  Take pain medicines exactly as directed.  If the doctor gave you a prescription medicine for pain, take it as prescribed.  If you are not taking a prescription pain medicine, ask your doctor if you can take an over-the-counter medicine.  Reducing constipation caused by pain medicine  Talk to your doctor about a laxative. If a laxative doesn't work, your doctor may suggest a prescription  "medicine.  Include fruits, vegetables, beans, and whole grains in your diet each day. These foods are high in fiber.  If your doctor recommends it, get more exercise. Walking is a good choice. Bit by bit, increase the amount you walk every day. Try for at least 30 minutes on most days of the week.  Schedule time each day for a bowel movement. A daily routine may help. Take your time and do not strain when having a bowel movement.  When should you call for help?   Call your doctor now or seek immediate medical care if:    Your pain gets worse or is out of control.     You feel down or blue, or you do not enjoy things like you once did. You may be depressed, which is common in people with chronic pain. Depression can be treated.     You have vomiting or cramps for more than 2 hours.   Watch closely for changes in your health, and be sure to contact your doctor if:    You cannot sleep because of pain.     You are very worried or anxious about your pain.     You have trouble taking your pain medicine.     You have any concerns about your pain medicine.     You have trouble with bowel movements, such as:  No bowel movement in 3 days.  Blood in the anal area, in your stool, or on the toilet paper.  Diarrhea for more than 24 hours.   Where can you learn more?  Go to https://www.[employer].net/patiented  Enter N004 in the search box to learn more about \"Chronic Pain: Care Instructions.\"  Current as of: July 10, 2023               Content Version: 14.0    1825-1284 [employer].   Care instructions adapted under license by your healthcare professional. If you have questions about a medical condition or this instruction, always ask your healthcare professional. [employer] disclaims any warranty or liability for your use of this information.      Lung Cancer Screening   Frequently Asked Questions  If you are at high-risk for lung cancer, getting screened with low-dose computed tomography (LDCT) every " year can help save your life. This handout offers answers to some of the most common questions about lung cancer screening. If you have other questions, please call [phone] ([phone]).     What is it?  Lung cancer screening uses special X-ray technology to create an image of your lung tissue. The exam is quick and easy and takes less than 10 seconds. We don t give you any medicine or use any needles. You can eat before and after the exam. You don t need to change your clothes as long as the clothing on your chest doesn t contain metal. But, you do need to be able to hold your breath for at least 6 seconds during the exam.    What is the goal of lung cancer screening?  The goal of lung cancer screening is to save lives. Many times, lung cancer is not found until a person starts having physical symptoms. Lung cancer screening can help detect lung cancer in the earliest stages when it may be easier to treat.    Who should be screened for lung cancer?  We suggest lung cancer screening for anyone who is at high-risk for lung cancer. You are in the high-risk group if you:      are between the ages of 55 and 79, and    have smoked at least 1 pack of cigarettes a day for 20 or more years, and    still smoke or have quit within the past 15 years.    However, if you have a new cough or shortness of breath, you should talk to your doctor before being screened.    Why does it matter if I have symptoms?  Certain symptoms can be a sign that you have a condition in your lungs that should be checked and treated by your doctor. These symptoms include fever, chest pain, a new or changing cough, shortness of breath that you have never felt before, coughing up blood or unexplained weight loss. Having any of these symptoms can greatly affect the results of lung cancer screening.       Should all smokers get an LDCT lung cancer screening exam?  It depends. Lung cancer screening is for a very specific group of men and  women who have a history of heavy smoking over a long period of time (see  Who should be screened for lung cancer  above).  I am in the high-risk group, but have been diagnosed with cancer in the past. Is LDCT lung cancer screening right for me?  In some cases, you should not have LDCT lung screening, such as when your doctor is already following your cancer with CT scan studies. Your doctor will help you decide if LDCT lung screening is right for you.  Do I need to have a screening exam every year?  Yes. If you are in the high-risk group described earlier, you should get an LDCT lung cancer screening exam every year until you are 79, or are no longer willing or able to undergo screening and possible procedures to diagnose and treat lung cancer.  How effective is LDCT at preventing death from lung cancer?  Studies have shown that LDCT lung cancer screening can lower the risk of death from lung cancer by 20 percent in people who are at high-risk.  What are the risks?  There are some risks and limitations of LDCT lung cancer screening. We want to make sure you understand the risks and benefits, so please let us know if you have any questions. Your doctor may want to talk with you more about these risks.    Radiation exposure: As with any exam that uses radiation, there is a very small increased risk of cancer. The amount of radiation in LDCT is small--about the same amount a person would get from a mammogram. Your doctor orders the exam when he or she feels the potential benefits outweigh the risks.    False negatives: No test is perfect, including LDCT. It is possible that you may have a medical condition, including lung cancer, that is not found during your exam. This is called a false negative result.    False positives and more testing: LDCT very often finds something in the lung that could be cancer, but in fact is not. This is called a false positive result. False positive tests often cause anxiety. To make sure  these findings are not cancer, you may need to have more tests. These tests will be done only if you give us permission. Sometimes patients need a treatment that can have side effects, such as a biopsy. For more information on false positives, see  What can I expect from the results?     Findings not related to lung cancer: Your LDCT exam also takes pictures of areas of your body next to your lungs. In a very small number of cases, the CT scan will show an abnormal finding in one of these areas, such as your kidneys, adrenal glands, liver or thyroid. This finding may not be serious, but you may need more tests. Your doctor can help you decide what other tests you may need, if any.  What can I expect from the results?  About 1 out of 4 LDCT exams will find something that may need more tests. Most of the time, these findings are lung nodules. Lung nodules are very small collections of tissue in the lung. These nodules are very common, and the vast majority--more than 97 percent--are not cancer (benign). Most are normal lymph nodes or small areas of scarring from past infections.  But, if a small lung nodule is found to be cancer, the cancer can be cured more than 90 percent of the time. To know if the nodule is cancer, we may need to get more images before your next yearly screening exam. If the nodule has suspicious features (for example, it is large, has an odd shape or grows over time), we will refer you to a specialist for further testing.  Will my doctor also get the results?  Yes. Your doctor will get a copy of your results.  Is it okay to keep smoking now that there s a cancer screening exam?  No. Tobacco is one of the strongest cancer-causing agents. It causes not only lung cancer, but other cancers and cardiovascular (heart) diseases as well. The damage caused by smoking builds over time. This means that the longer you smoke, the higher your risk of disease. While it is never too late to quit, the sooner you  quit, the better.  Where can I find help to quit smoking?  The best way to prevent lung cancer is to stop smoking. If you have already quit smoking, congratulations and keep it up! For help on quitting smoking, please call [facility] at 3-592-QUITNOW (1-550.934.8006) or the [employer] at 1-382.547.4500 to find local resources near you.  One-on-one health coaching:  If you d prefer to work individually with a health care provider on tobacco cessation, we offer:      Medication Therapy Management:  Our specially trained pharmacists work closely with you and your doctor to help you quit smoking.  Call [phone] or [phone] (toll free).

## 2024-04-17 NOTE — PROGRESS NOTES
Preventive Care Visit  [facility]  Jim Edwards MD, Family Medicine  Apr 17, 2024      Assessment & Plan     Encounter for Medicare annual wellness exam  Sherie is a 53-year-old female who presents to clinic today for her Medicare annual wellness visit.  She has a past medical history primarily for asthma/COPD and is a persistent smoker.  She has a history of chronic pain secondary to fibromyalgia and cervical and lumbar degenerative disc disease.  She also has history of seropositive rheumatoid arthritis.  She is on chronic continuous narcotic pain medication as well as medical cannabis.  She also has a history of major depression recurrent persistent and anxiety and is followed by psychiatry.    All age and gender specific screening recommendations were reviewed with her today.  There is no family history for colon cancer but she is past due for screening.  She has elected Cologuard screening option.  She underwent lung cancer screening 1 year ago with recommendation for ongoing annual screening.  She continues to smoke half a pack of cigarettes a day.  She is currently on Wellbutrin 450 mg daily.  Her last Pap smear was in 2022.  This was normal with a negative HPV.  Recommendation for 5-year follow-up.    Relative to her chronic medical problems, she is under a controlled substance agreement with me last signed in October 2023.  Her last urine drug screen was in July 2023.  We reviewed [place] prescription monitoring program today.  She has been on Norco 6 times daily but because of missed follow-up appointments since October I began to taper her Norco.  She is now taking 1 tablet 4 times daily.  She states that her pain is significantly worse with this.  She is using medical cannabis but finds that this helps with the pain but she is quite sedated.  I recommend that she discuss this with the medical cannabis pharmacist to decrease her THC and increase her CBD.  We discussed  alternative pain medication options.  At this time she is allergic to Lyrica and gabapentin.  She is currently on Wellbutrin, Luvox, Rexulti, and Klonopin.  I do not feel additional serotonin reuptake inhibitors are indicated.  She has a history of nonsteroidal induced gastritis.  I recommend only additional Tylenol for pain control.  If she has a flare of her rheumatoid arthritis would consider a brief dose of prednisone.  In reviewing her current dose of chronic narcotics she is currently at 22.3 morphine milliequivalents per day.  This is down from 40 morphine milliequivalents that she was on previously.  Will continue at the current dose with plans to titrate as tolerated.  - Basic metabolic panel  (Ca, Cl, CO2, Creat, Gluc, K, Na, BUN); Future    Intermittent asthma, uncomplicated  Chronic, persistent.  She continues to smoke.  Will refill her albuterol for use as needed.  - albuterol (VENTOLIN HFA) 108 (90 Base) MCG/ACT inhaler; Inhale 1-2 puffs into the lungs every 6 hours as needed for shortness of breath or wheezing  - OFFICE/OUTPT VISIT,EST,LEVL IV    Cervicalgia  Chronic, stable.  Continue current medication and plan.  - HYDROcodone-acetaminophen (NORCO) 5-325 MG tablet; Take 1 tablet by mouth every 6 hours as needed for severe pain    Chronic pain syndrome  Relative to her chronic pain, she is under a controlled substance agreement with me last signed in October 2023.  Her last urine drug screen was in July 2023.  We reviewed [place] prescription monitoring program today.  She has been on Norco 6 times daily but because of missed follow-up appointments since October I began to taper her Norco.  She is now taking 1 tablet 4 times daily.  She states that her pain is significantly worse with this.  She is using medical cannabis but finds that this helps with the pain but she is quite sedated.  I recommend that she discuss this with the medical cannabis pharmacist to decrease her THC and increase her CBD.   We discussed alternative pain medication options.  At this time she is allergic to Lyrica and gabapentin.  She is currently on Wellbutrin, Luvox, Rexulti, and Klonopin.  I do not feel additional serotonin reuptake inhibitors are indicated.  She has a history of nonsteroidal induced gastritis.  I recommend only additional Tylenol for pain control.  If she has a flare of her rheumatoid arthritis would consider a brief dose of prednisone.  In reviewing her current dose of chronic narcotics she is currently at 22.3 morphine milliequivalents per day.  This is down from 40 morphine milliequivalents that she was on previously.  Will continue at the current dose with plans to titrate as tolerated.  - HYDROcodone-acetaminophen (NORCO) 5-325 MG tablet; Take 1 tablet by mouth every 6 hours as needed for severe pain  - OFFICE/OUTPT VISIT,EST,LEVL IV    Fibromyalgia  Relative to her chronic pain, she is under a controlled substance agreement with me last signed in October 2023.  Her last urine drug screen was in July 2023.  We reviewed [place] prescription monitoring program today.  She has been on Norco 6 times daily but because of missed follow-up appointments since October I began to taper her Norco.  She is now taking 1 tablet 4 times daily.  She states that her pain is significantly worse with this.  She is using medical cannabis but finds that this helps with the pain but she is quite sedated.  I recommend that she discuss this with the medical cannabis pharmacist to decrease her THC and increase her CBD.  We discussed alternative pain medication options.  At this time she is allergic to Lyrica and gabapentin.  She is currently on Wellbutrin, Luvox, Rexulti, and Klonopin.  I do not feel additional serotonin reuptake inhibitors are indicated.  She has a history of nonsteroidal induced gastritis.  I recommend only additional Tylenol for pain control.  If she has a flare of her rheumatoid arthritis would consider a brief  dose of prednisone.  In reviewing her current dose of chronic narcotics she is currently at 22.3 morphine milliequivalents per day.  This is down from 40 morphine milliequivalents that she was on previously.  Will continue at the current dose with plans to titrate as tolerated.  - HYDROcodone-acetaminophen (NORCO) 5-325 MG tablet; Take 1 tablet by mouth every 6 hours as needed for severe pain  - OFFICE/OUTPT VISIT,EST,LEVL IV    Chronic rhinitis  Chronic, stable.  Continue current medication and plan  - cetirizine (ZYRTEC) 10 MG tablet; Take 1 tablet (10 mg) by mouth daily  - fluticasone (FLONASE) 50 MCG/ACT nasal spray; Spray 1-2 sprays into both nostrils daily SPRAY 2 SPRAYS INTO BOTH NOSTRILS DAILY.  - OFFICE/OUTPT VISIT,EST,LEVL IV    Personal history of tobacco use  Persistent.  Will obtain lung cancer screening.  Encouraged working with her psychiatrist to work on smoking cessation strategies.  She is currently on Wellbutrin 450 mg daily.  - Prof fee: Shared Decision Making for Lung Cancer Screening  - CT Chest Lung Cancer Scrn Low Dose wo; Future  - SMOKING CESSATION COUNSELING 3-10 MIN    Screen for colon cancer  Low risk.  She is elected Cologuard for noninvasive screening.  - COLOGUARD([employer]); Future    Need for shingles vaccine  Declines    Need for Tdap vaccination  Declines    Need for prophylactic vaccination against hepatitis B virus  Declines    Chronic bilateral low back pain without sciatica  Relative to her chronic pain, she is under a controlled substance agreement with me last signed in October 2023.  Her last urine drug screen was in July 2023.  We reviewed [place] prescription monitoring program today.  She has been on Norco 6 times daily but because of missed follow-up appointments since October I began to taper her Norco.  She is now taking 1 tablet 4 times daily.  She states that her pain is significantly worse with this.  She is using medical cannabis but finds that this helps  with the pain but she is quite sedated.  I recommend that she discuss this with the medical cannabis pharmacist to decrease her THC and increase her CBD.  We discussed alternative pain medication options.  At this time she is allergic to Lyrica and gabapentin.  She is currently on Wellbutrin, Luvox, Rexulti, and Klonopin.  I do not feel additional serotonin reuptake inhibitors are indicated.  She has a history of nonsteroidal induced gastritis.  I recommend only additional Tylenol for pain control.  If she has a flare of her rheumatoid arthritis would consider a brief dose of prednisone.  In reviewing her current dose of chronic narcotics she is currently at 22.3 morphine milliequivalents per day.  This is down from 40 morphine milliequivalents that she was on previously.  Will continue at the current dose with plans to titrate as tolerated.  - HYDROcodone-acetaminophen (NORCO) 5-325 MG tablet; Take 1 tablet by mouth every 6 hours as needed for severe pain    Encounter for screening mammogram for breast cancer    - *MA Screening Digital Bilateral; Future    Patient has been advised of split billing requirements and indicates understanding: Yes  Ordering of each unique test  Prescription drug management    Results for orders placed or performed in visit on 04/17/24   Basic metabolic panel  (Ca, Cl, CO2, Creat, Gluc, K, Na, BUN)     Status: Abnormal   Result Value Ref Range    Sodium 142 135 - 145 mmol/L    Potassium 4.0 3.4 - 5.3 mmol/L    Chloride 103 98 - 107 mmol/L    Carbon Dioxide (CO2) 30 (H) 22 - 29 mmol/L    Anion Gap 9 7 - 15 mmol/L    Urea Nitrogen 14.4 6.0 - 20.0 mg/dL    Creatinine 1.19 (H) 0.51 - 0.95 mg/dL    GFR Estimate 54 (L) >60 mL/min/1.73m2    Calcium 9.7 8.6 - 10.0 mg/dL    Glucose 77 70 - 99 mg/dL         Nicotine/Tobacco Cessation  She reports that she has been smoking cigarettes. She has a 29 pack-year smoking history. She has never used smokeless tobacco.  Nicotine/Tobacco Cessation  "Plan  Pharmacotherapies : bupropion (Zyban)      BMI  Estimated body mass index is 26.31 kg/m  as calculated from the following:    Height as of this encounter: 1.657 m (5' 5.25\").    Weight as of this encounter: 72.3 kg (159 lb 4.8 oz).   Weight management plan: Discussed healthy diet and exercise guidelines    Counseling  Appropriate preventive services were discussed with this patient, including applicable screening as appropriate for fall prevention, nutrition, physical activity, Tobacco-use cessation, weight loss and cognition.  Checklist reviewing preventive services available has been given to the patient.  Reviewed patient's diet, addressing concerns and/or questions.   She is at risk for lack of exercise and has been provided with information to increase physical activity for the benefit of her well-being.   Updated plan of care.  Patient reported difficulty with activities of daily living were addressed today.Patient reported safety concerns were addressed today.The patient was provided with written information regarding signs of hearing loss.   The patient's PHQ-9 score is consistent with moderate depression. She was provided with information regarding depression.   I have reviewed Opioid Use Disorder and Substance Use Disorder risk factors and made any needed referrals.       FURTHER TESTING:       - mammogram       - Lung CT  Work on weight loss  Regular exercise   Follow-up Visit   Expected date:  Jul 17, 2024 (Approximate)      Follow Up Appointment Details:     Follow-up with whom?: Me    Follow-Up for what?: Chronic Disease f/u    Chronic Disease f/u: General (Other)    Additional Details: Chronic pain    How?: In Person             Follow-up Visit   Expected date:  Apr 24, 2025 (Approximate)      Follow Up Appointment Details:     Follow-up with whom?: PCP    Follow-Up for what?: Medicare Wellness    Welcome or Annual?: Annual Wellness    How?: In Person                       Subjective   Sherie is " a 53 year old, presenting for the following:  Wellness Visit        4/17/2024     1:41 PM   Additional Questions   Roomed by Lynn MORALEZ         4/17/2024     1:41 PM   Patient Reported Additional Medications   Patient reports taking the following new medications medical cannibus         Health Care Directive  Patient does not have a Health Care Directive or Living Will: Discussed advance care planning with patient; information given to patient to review.    HPI      Depression and Anxiety   How are you doing with your depression since your last visit? No change  How are you doing with your anxiety since your last visit?  No change  Are you having other symptoms that might be associated with depression or anxiety? No  Have you had a significant life event? OTHER: Chronic pain    Do you have any concerns with your use of alcohol or other drugs? No    Social History     Tobacco Use    Smoking status: Every Day     Current packs/day: 0.50     Average packs/day: 0.7 packs/day for 43.5 years (29.0 ttl pk-yrs)     Types: Cigarettes    Smokeless tobacco: Never   Vaping Use    Vaping status: Every Day    Substances: THC   Substance Use Topics    Alcohol use: Not Currently     Alcohol/week: 1.7 standard drinks of alcohol     Comment: rare    Drug use: No         4/9/2024     9:02 AM 4/17/2024     9:04 AM 4/17/2024     1:05 PM   PHQ   PHQ-9 Total Score 12 14 11   Q9: Thoughts of better off dead/self-harm past 2 weeks Not at all Not at all Not at all         3/20/2024    12:32 PM 4/3/2024     8:56 AM 4/17/2024     9:05 AM   CELIA-7 SCORE   Total Score 15 (severe anxiety) 13 (moderate anxiety) 13 (moderate anxiety)   Total Score 15 13 13         4/17/2024     1:05 PM   Last PHQ-9   1.  Little interest or pleasure in doing things 1   2.  Feeling down, depressed, or hopeless 1   3.  Trouble falling or staying asleep, or sleeping too much 0   4.  Feeling tired or having little energy 3   5.  Poor appetite or overeating 0   6.   "Feeling bad about yourself 3   7.  Trouble concentrating 3   8.  Moving slowly or restless 0   Q9: Thoughts of better off dead/self-harm past 2 weeks 0   PHQ-9 Total Score 11         4/17/2024     9:05 AM   CELIA-7    1. Feeling nervous, anxious, or on edge 1   2. Not being able to stop or control worrying 3   3. Worrying too much about different things 3   4. Trouble relaxing 1   5. Being so restless that it is hard to sit still 1   6. Becoming easily annoyed or irritable 1   7. Feeling afraid, as if something awful might happen 3   CELIA-7 Total Score 13   If you checked any problems, how difficult have they made it for you to do your work, take care of things at home, or get along with other people? Extremely difficult       Suicide Assessment Five-step Evaluation and Treatment (SAFE-T)    COPD Follow-Up  Overall, how are your COPD symptoms since your last clinic visit?  No change  How much fatigue or shortness of breath do you have when you are walking?  Same as usual  How much shortness of breath do you have when you are resting?  Same as usual  How often do you cough? Sometimes  Have you noticed any change in your sputum/phlegm?  No  Have you experienced a recent fever? No  Please describe how far you can walk without stopping to rest:  2-5 blocks  How many flights of stairs are you able to walk up without stopping?  2  Have you had any Emergency Room Visits, Urgent Care Visits, or Hospital Admissions because of your COPD since your last office visit?  No    History   Smoking Status    Every Day    Types: Cigarettes   Smokeless Tobacco    Never     No results found for: \"FEV1\", \"[id number]\"      Pain History:  When did you first notice your pain?  More than 10 years  Have you seen this provider for your pain in the past? Yes   Where in your body do you have pain?  Cervical, lumbar, fibromyalgia  Are you seeing anyone else for your pain? Yes -psychiatry        4/9/2024     9:02 AM 4/17/2024     9:04 AM 4/17/2024     " 1:05 PM   PHQ-9 SCORE   PHQ-9 Total Score MyChart 12 (Moderate depression) 14 (Moderate depression) 11 (Moderate depression)   PHQ-9 Total Score 12 14 11           3/20/2024    12:32 PM 4/3/2024     8:56 AM 4/17/2024     9:05 AM   CELIA-7 SCORE   Total Score 15 (severe anxiety) 13 (moderate anxiety) 13 (moderate anxiety)   Total Score 15 13 13               Chronic Pain Follow Up:    Location of pain: Cervical, lumbar, rheumatoid arthritis, fibromyalgia  Analgesia/pain control:    - Recent changes: Recent taper in chronic narcotics   - Overall control: Tolerable with discomfort    - Current treatments: Norco 5-3 25 1 tablet every 6 hours, Wellbutrin, Luvox, Rexulti, Tylenol  Adherence:     - Do you ever take more pain medicine than prescribed? No    - When did you take your last dose of pain medicine?  Today  Adverse effects: No   PDMP Review         Value Time User    State PDMP site checked  Yes 4/17/2024  2:19 PM Jim Edwards MD          Last CSA Agreement:   CSA -- Patient Level:     [Media Unavailable] Controlled Substance Agreement - Opioid - Scan on 10/30/2023  5:17 PM: OPIOID AGREEMENT   [Media Unavailable] Controlled Substance Agreement - Opioid - Scan on 6/13/2022  4:08 PM   [Media Unavailable] Controlled Substance Agreement - Opioid - Scan on 5/26/2021  4:37 PM   [Media Unavailable] Controlled Substance Agreement - Opioid - Scan on 1/27/2020  2:58 PM: controlled substance agreement   [Media Unavailable] Controlled Substance Agreement - Opioid - Scan on 1/24/2019  1:31 PM: signed 1/11/2019       Last UDS: 8/2/2023 4/17/2024   General Health   How would you rate your overall physical health? (!) FAIR   Feel stress (tense, anxious, or unable to sleep) Rather much   (!) STRESS CONCERN      4/17/2024   Nutrition   Diet: Regular (no restrictions)         4/17/2024   Exercise   Days per week of moderate/strenous exercise 3 days   Average minutes spent exercising at this level 10 min          4/17/2024   Social Factors   Frequency of gathering with friends or relatives Once a week   Worry food won't last until get money to buy more Yes   Food not last or not have enough money for food? Yes   Do you have housing?  Yes   Are you worried about losing your housing? No   Lack of transportation? Yes   Unable to get utilities (heat,electricity)? No   (!) FOOD SECURITY CONCERN PRESENT (!) TRANSPORTATION CONCERN PRESENT      4/17/2024   Fall Risk   Fallen 2 or more times in the past year? No   Trouble with walking or balance? Yes   Gait Speed Test (Document in seconds) 5.36   Gait Speed Test Interpretation Less than or equal to 5.00 seconds - PASS          4/17/2024   Activities of Daily Living- Home Safety   Needs help with the following daily activites Transportation    Housework    Laundry   Safety concerns in the home No grab bars in the bathroom    None of the above         4/17/2024   Dental   Dentist two times every year? Yes         4/17/2024   Hearing Screening   Hearing concerns? (!) I FEEL THAT PEOPLE ARE MUMBLING OR NOT SPEAKING CLEARLY.    (!) I NEED TO ASK PEOPLE TO SPEAK UP OR REPEAT THEMSELVES.    (!) IT'S HARD TO FOLLOW A CONVERSATION IN A NOISY RESTAURANT OR CROWDED ROOM.    (!) TROUBLE UNDESTANDING A SPEAKER IN A PUBLIC MEETING OR [employer] SERVICE.    (!) TROUBLE UNDERSTANDING SOFT OR WHISPERED SPEECH.    (!) TROUBLE UNDERSTANDING SPEECH ON THE TELEPHONE         4/17/2024   Driving Risk Screening   Patient/family members have concerns about driving No         4/17/2024   General Alertness/Fatigue Screening   Have you been more tired than usual lately? No         4/17/2024   Urinary Incontinence Screening   Bothered by leaking urine in past 6 months No         4/17/2024   TB Screening   Were you born outside of the [place]? No       Today's PHQ-9 Score:       4/17/2024     1:05 PM   PHQ-9 SCORE   PHQ-9 Total Score MyChart 11 (Moderate depression)   PHQ-9 Total Score 11         4/17/2024   Substance  Use   If I could quit smoking, I would Completely agree   I want to quit somking, worry about health affects Completely agree   Willing to make a plan to quit smoking Completely agree   Willing to cut down before quitting Completely agree   Alcohol more than 3/day or more than 7/wk Not Applicable   Do you have a current opioid prescription? (!) YES   How severe/bad is pain from 1 to 10? 9/10   Do you use any other substances recreationally? No          No data to display              Low Risk (0-3)  Moderate Risk (4-7)  High Risk (>8)  Social History     Tobacco Use    Smoking status: Every Day     Current packs/day: 0.50     Average packs/day: 0.7 packs/day for 43.5 years (29.0 ttl pk-yrs)     Types: Cigarettes    Smokeless tobacco: Never   Vaping Use    Vaping status: Every Day    Substances: THC   Substance Use Topics    Alcohol use: Not Currently     Alcohol/week: 1.7 standard drinks of alcohol     Comment: rare    Drug use: No           6/20/2023   LAST FHS-7 RESULTS   1st degree relative breast or ovarian cancer No   Any relative bilateral breast cancer No   Any male have breast cancer No   Any ONE woman have BOTH breast AND ovarian cancer No   Any woman with breast cancer before 50yrs No   2 or more relatives with breast AND/OR ovarian cancer No   2 or more relatives with breast AND/OR bowel cancer No        Mammogram Screening - Mammogram every 1-2 years updated in Health Maintenance based on mutual decision making      History of abnormal Pap smear: NO - age 30-65 PAP every 5 years with negative HPV co-testing recommended        Latest Ref Rng & Units 12/19/2022     2:13 PM 8/1/2018     2:39 PM 8/1/2018     2:36 PM   PAP / HPV   PAP  Negative for Intraepithelial Lesion or Malignancy (NILM)      PAP (Historical)    NIL    HPV 16 DNA Negative Negative  Negative     HPV 18 DNA Negative Negative  Negative     Other HR HPV Negative Negative  Negative       ASCVD Risk   The 10-year ASCVD risk score  (Karsten LARKIN, et al., 2019) is: 4.5%    Values used to calculate the score:      Age: 53 years      Sex: Female      Is Non- : No      Diabetic: No      Tobacco smoker: Yes      Systolic Blood Pressure: 138 mmHg      Is BP treated: No      HDL Cholesterol: 62 mg/dL      Total Cholesterol: 181 mg/dL            Reviewed and updated as needed this visit by Provider                    Lab work is in process  Labs reviewed in EPIC  BP Readings from Last 3 Encounters:   04/17/24 138/88   11/23/23 (!) 144/99   10/30/23 128/74    Wt Readings from Last 3 Encounters:   04/17/24 72.3 kg (159 lb 4.8 oz)   11/23/23 60.3 kg (133 lb)   10/30/23 63.1 kg (139 lb 1.6 oz)                  Patient Active Problem List   Diagnosis    CARDIOVASCULAR SCREENING; LDL GOAL LESS THAN 160    Chronic fatigue syndrome    Fibromyalgia    Generalized anxiety disorder    Tobacco abuse    Disturbance in sleep behavior    Cervicalgia    Stenosis, cervical spine    Spondylitis, cervical (H24)    NSAID induced gastritis    Major depressive disorder, recurrent episode, mild (H24)    Other chronic pain    Intermittent asthma, uncomplicated    Rheumatoid arthritis involving multiple sites with positive rheumatoid factor (H)    Elevated white blood cell count    Panic attacks    Osteoarthritis of cervical spine, unspecified spinal osteoarthritis complication status    Degenerative joint disease of cervical spine    Pulmonary nodules    Abnormal CT of the chest    Hilar lymphadenopathy    Other migraine without status migrainosus, intractable    Chronic rhinitis    Pelvic pain in female    Cervical cancer screening    Chronic, continuous use of opioids    Medical cannabis use    Balance problems     Past Surgical History:   Procedure Laterality Date    BIOPSY  1996    Myself left breast    DILATION AND CURETTAGE, HYSTEROSCOPY, ABLATE ENDOMETRIUM NOVASURE, COMBINED  09/02/2011    Procedure:COMBINED DILATION AND CURETTAGE, HYSTEROSCOPY,  ABLATE ENDOMETRIUM NOVASURE; hysteroscopy, dilation and curettage, novasure; Surgeon:JEREMY ANNA; Location:[facility] OR    EXCISE LESION TRUNK  2013    Procedure: EXCISE LESION TRUNK;  interlaminar epidural Steroid Injection Cervical -thoracic Levels (C7-T1)    Re-Excision of chest wall mass;  Surgeon: Jeremy Bolaños MD;  Location: [facility] OR    HC HYSTEROS W PERMANENT FALLOPAIN IMPLANT  2012    Essure done in office Marcelo    INJECT BLOCK MEDIAL BRANCH CERVICAL/THORACIC/LUMBAR  2014    Procedure: INJECT BLOCK MEDIAL BRANCH CERVICAL / THORACIC / LUMBAR;  Surgeon: Josué Munson MD;  Location: [facility] OR    INJECT FACET JOINT  2014    Procedure: INJECT FACET JOINT;  diagnostic medial branch facet nerve block cervical levels 5-6, 6-7;  Surgeon: Josué Munson MD;  Location: [facility] OR    [facility]      [facility] APPENDECTOMY      Ruptured at age 7 years    [facility]  DELIVERY ONLY  1998    , Low Cervical       Social History     Tobacco Use    Smoking status: Every Day     Current packs/day: 0.50     Average packs/day: 0.7 packs/day for 43.5 years (29.0 ttl pk-yrs)     Types: Cigarettes    Smokeless tobacco: Never   Substance Use Topics    Alcohol use: Not Currently     Alcohol/week: 1.7 standard drinks of alcohol     Comment: rare     Family History   Problem Relation Age of Onset    Cancer Mother     Arthritis Mother         Rheumatoid    Respiratory Mother         COPD    Dementia Father     Cardiovascular Maternal Grandmother     Hypertension Sister         1/2 sister    Neurologic Disorder Sister         1/2 sisiter  Very mild form of CP    Heart Disease Maternal Aunt         Bypass at age 50's    Depression Other         Aunt dads sister    Mental Illness Other         Aunt Dads sister         Current Outpatient Medications   Medication Sig Dispense Refill    albuterol (VENTOLIN HFA) 108 (90 Base) MCG/ACT inhaler Inhale 1-2 puffs into the lungs every 6 hours as needed  for shortness of breath or wheezing 18 g 5    buPROPion (WELLBUTRIN XL) 150 MG 24 hr tablet Take 150 mg by mouth every morning With 300mg to = 450mg daily dose      buPROPion (WELLBUTRIN XL) 300 MG 24 hr tablet Take 300 mg by mouth every morning With 150mg to = 450mg daily dose      cetirizine (ZYRTEC) 10 MG tablet Take 1 tablet (10 mg) by mouth daily 90 tablet 0    clonazePAM (KLONOPIN) 0.5 MG tablet Take 0.5 mg by mouth 3 times daily as needed for anxiety      fluticasone (FLONASE) 50 MCG/ACT nasal spray Spray 1-2 sprays into both nostrils daily SPRAY 2 SPRAYS INTO BOTH NOSTRILS DAILY. 15.8 mL 5    fluvoxaMINE (LUVOX) 100 MG tablet Take 200 mg by mouth at bedtime With 50mg to = 250mg daily dose      fluvoxaMINE (LUVOX) 50 MG tablet Take 50 mg by mouth at bedtime With 200mg to = 250mg daily dose      HYDROcodone-acetaminophen (NORCO) 5-325 MG tablet Take 1 tablet by mouth every 6 hours as needed for severe pain 120 tablet 0    REXULTI 4 MG tablet Take 4 mg by mouth daily      naloxone (NARCAN) nasal spray Spray 1 spray (4 mg) into one nostril alternating nostrils as needed for opioid reversal every 2-3 minutes until assistance arrives (Patient not taking: Reported on 4/17/2024) 0.2 mL 0    ondansetron (ZOFRAN ODT) 4 MG ODT tab Take 1 tablet (4 mg) by mouth every 8 hours as needed for nausea (Patient not taking: Reported on 4/17/2024) 10 tablet 0     Allergies   Allergen Reactions    Gabapentin Shortness Of Breath    Lyrica [Pregabalin] Shortness Of Breath     Felt pressure on the throat area. jmp    Droperidol      Uncontrollable shaking      Penicillins      Recent Labs   Lab Test 11/23/23  1426 11/02/23  0838 12/19/22  1455 06/26/22  1321 06/13/22  1623 06/13/22  1623 04/29/21  1723 02/02/21  1032 01/24/20  1438   A1C  --   --   --   --   --   --   --  5.6  --    LDL  --  90  --   --   --   --   --  77 76   HDL  --  62  --   --   --   --   --  67 72   TRIG  --  147  --   --   --   --   --  85 144   ALT 19  --    --  34  --  19 31  --  25   CR 1.19*  --   --  0.84   < > 1.00 0.91  --  0.82   GFRESTIMATED 54*  --   --  83   < > 67 72  --  83   GFRESTBLACK  --   --   --   --   --   --  83  --  >90   POTASSIUM 3.4  --   --  3.7   < > 3.7 3.3*  --  4.4   TSH  --   --  0.83  --   --  1.77  --  0.96  --     < > = values in this interval not displayed.      Current providers sharing in care for this patient include:  Patient Care Team:  Jim Edwards MD as PCP - General (Family Medicine)  Jacinta Richardson DO as MD (Psychiatry)  Addie Anguiano LICSW as  ( - Clinical)  Jim Edwards MD as Assigned PCP  Jim Edwards MD as Assigned Pain Medication Provider    The following health maintenance items are reviewed in Epic and correct as of today:  Health Maintenance   Topic Date Due    COLORECTAL CANCER SCREENING  Never done    HEPATITIS B IMMUNIZATION (1 of 3 - 19+ 3-dose series) Never done    ZOSTER IMMUNIZATION (1 of 2) Never done    Pneumococcal Vaccine: Pediatrics (0 to 5 Years) and At-Risk Patients (6 to 64 Years) (2 of 2 - PPSV23 or PCV20) 02/05/2019    ASTHMA ACTION PLAN  10/01/2019    INFLUENZA VACCINE (1) 09/01/2023    COVID-19 Vaccine (3 - 2023-24 season) 09/01/2023    NICOTINE/TOBACCO CESSATION COUNSELING Q 1 YR  12/19/2023    MEDICARE ANNUAL WELLNESS VISIT  12/19/2023    DTAP/TDAP/TD IMMUNIZATION (2 - Td or Tdap) 03/19/2024    DEPRESSION 6 MO INDEX REPEAT PHQ-9  05/01/2024    ANNUAL REVIEW OF HM ORDERS  06/05/2024    LUNG CANCER SCREENING  06/20/2024    URINE DRUG SCREEN  07/31/2024    ASTHMA CONTROL TEST  10/17/2024    CONTROLLED SUBSTANCE AGREEMENT FOR CHRONIC PAIN MANAGEMENT  10/30/2024    ADVANCE CARE PLANNING  12/03/2024    CELIA ASSESSMENT  04/17/2025    PHQ-9  04/17/2025    MAMMO SCREENING  06/20/2025    HPV TEST  12/19/2025    PAP  12/19/2025    GLUCOSE  11/23/2026    LIPID  11/02/2028    HEPATITIS C SCREENING  Completed    HIV SCREENING  Completed    DEPRESSION ACTION PLAN  Completed     "MIGRAINE ACTION PLAN  Completed    IPV IMMUNIZATION  Aged Out    HPV IMMUNIZATION  Aged Out    MENINGITIS IMMUNIZATION  Aged Out    RSV MONOCLONAL ANTIBODY  Aged Out         Review of Systems  CONSTITUTIONAL: NEGATIVE for fever, chills, change in weight  ENT/MOUTH: NEGATIVE for ear, mouth and throat problems  RESP: NEGATIVE for significant cough or SOB  CV: NEGATIVE for chest pain, palpitations or peripheral edema  ROS otherwise negative     Objective    Exam  /88 (BP Location: Right arm, Patient Position: Chair)   Pulse 80   Temp (!) 96.7  F (35.9  C) (Temporal)   Resp 16   Ht 1.657 m (5' 5.25\")   Wt 72.3 kg (159 lb 4.8 oz)   SpO2 98%   BMI 26.31 kg/m     Estimated body mass index is 26.31 kg/m  as calculated from the following:    Height as of this encounter: 1.657 m (5' 5.25\").    Weight as of this encounter: 72.3 kg (159 lb 4.8 oz).    Physical Exam  GENERAL: alert and no distress  EYES: Eyes grossly normal to inspection, PERRL and conjunctivae and sclerae normal  HENT: ear canals and TM's normal, nose and mouth without ulcers or lesions  NECK: no adenopathy, no asymmetry, masses, or scars  RESP: lungs clear to auscultation - no rales, rhonchi or wheezes  CV: regular rate and rhythm, normal S1 S2, no S3 or S4, no murmur, click or rub, no peripheral edema  ABDOMEN: soft, nontender, no hepatosplenomegaly, no masses and bowel sounds normal  MS: no gross musculoskeletal defects noted, no edema  SKIN: no suspicious lesions or rashes  NEURO: Normal strength and tone, mentation intact and speech normal  PSYCH: mentation appears normal, affect flat, judgement and insight intact, and appearance well groomed  LYMPH: no cervical, supraclavicular, axillary, or inguinal adenopathy        4/17/2024   Mini Cog   Clock Draw Score 2 Normal   3 Item Recall 2 objects recalled   Mini Cog Total Score 4              Signed Electronically by: Jim Edwards MD    Answers submitted by the patient for this visit:  Patient " Health Questionnaire (Submitted on 4/17/2024)  If you checked off any problems, how difficult have these problems made it for you to do your work, take care of things at home, or get along with other people?: Somewhat difficult  PHQ9 TOTAL SCORE: 11  Lung Cancer Screening Shared Decision Making Visit     Sherie Otero, a 53 year old female, is eligible for lung cancer screening    History   Smoking Status    Every Day    Types: Cigarettes   Smokeless Tobacco    Never       I have discussed with patient the risks and benefits of screening for lung cancer with low-dose CT.     The risks include:    radiation exposure: one low dose chest CT has as much ionizing radiation as about 15 chest x-rays, or 6 months of background radiation living in [place]      false positives: most findings/nodules are NOT cancer, but some might still require additional diagnostic evaluation, including biopsy    over-diagnosis: some slow growing cancers that might never have been clinically significant will be detected and treated unnecessarily     The benefit of early detection of lung cancer is contingent upon adherence to annual screening or more frequent follow up if indicated.     Furthermore, to benefit from screening, Sherie must be willing and able to undergo diagnostic procedures, if indicated. Although no specific guide is available for determining severity of comorbidities, it is reasonable to withhold screening in patients who have greater mortality risk from other diseases.     We did discuss that the best way to prevent lung cancer is to not smoke.    Some patients may value a numeric estimation of lung cancer risk when evaluating if lung cancer screening is right for them, here is one calculator:    ShouldIScreen
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
## 2024-05-14 ENCOUNTER — MYC REFILL (OUTPATIENT)
Dept: FAMILY MEDICINE | Facility: CLINIC | Age: 54
End: 2024-05-14
Payer: MEDICARE

## 2024-05-14 DIAGNOSIS — G89.4 CHRONIC PAIN SYNDROME: ICD-10-CM

## 2024-05-14 DIAGNOSIS — G89.29 CHRONIC BILATERAL LOW BACK PAIN WITHOUT SCIATICA: ICD-10-CM

## 2024-05-14 DIAGNOSIS — M54.2 CERVICALGIA: ICD-10-CM

## 2024-05-14 DIAGNOSIS — M79.7 FIBROMYALGIA: ICD-10-CM

## 2024-05-14 DIAGNOSIS — M54.50 CHRONIC BILATERAL LOW BACK PAIN WITHOUT SCIATICA: ICD-10-CM

## 2024-05-16 RX ORDER — HYDROCODONE BITARTRATE AND ACETAMINOPHEN 5; 325 MG/1; MG/1
1 TABLET ORAL EVERY 6 HOURS PRN
Qty: 120 TABLET | Refills: 0 | Status: SHIPPED | OUTPATIENT
Start: 2024-05-17 | End: 2024-06-14

## 2024-06-14 ENCOUNTER — MYC REFILL (OUTPATIENT)
Dept: FAMILY MEDICINE | Facility: CLINIC | Age: 54
End: 2024-06-14
Payer: MEDICARE

## 2024-06-14 DIAGNOSIS — G89.29 CHRONIC BILATERAL LOW BACK PAIN WITHOUT SCIATICA: ICD-10-CM

## 2024-06-14 DIAGNOSIS — M79.7 FIBROMYALGIA: ICD-10-CM

## 2024-06-14 DIAGNOSIS — M54.50 CHRONIC BILATERAL LOW BACK PAIN WITHOUT SCIATICA: ICD-10-CM

## 2024-06-14 DIAGNOSIS — M54.2 CERVICALGIA: ICD-10-CM

## 2024-06-14 DIAGNOSIS — G89.4 CHRONIC PAIN SYNDROME: ICD-10-CM

## 2024-06-14 RX ORDER — HYDROCODONE BITARTRATE AND ACETAMINOPHEN 5; 325 MG/1; MG/1
1 TABLET ORAL EVERY 6 HOURS PRN
Qty: 120 TABLET | Refills: 0 | Status: SHIPPED | OUTPATIENT
Start: 2024-06-16 | End: 2024-07-15

## 2024-07-01 ASSESSMENT — ANXIETY QUESTIONNAIRES
2. NOT BEING ABLE TO STOP OR CONTROL WORRYING: NEARLY EVERY DAY
7. FEELING AFRAID AS IF SOMETHING AWFUL MIGHT HAPPEN: NEARLY EVERY DAY
GAD7 TOTAL SCORE: 13
5. BEING SO RESTLESS THAT IT IS HARD TO SIT STILL: SEVERAL DAYS
GAD7 TOTAL SCORE: 13
GAD7 TOTAL SCORE: 13
4. TROUBLE RELAXING: SEVERAL DAYS
1. FEELING NERVOUS, ANXIOUS, OR ON EDGE: SEVERAL DAYS
3. WORRYING TOO MUCH ABOUT DIFFERENT THINGS: NEARLY EVERY DAY
7. FEELING AFRAID AS IF SOMETHING AWFUL MIGHT HAPPEN: NEARLY EVERY DAY
IF YOU CHECKED OFF ANY PROBLEMS ON THIS QUESTIONNAIRE, HOW DIFFICULT HAVE THESE PROBLEMS MADE IT FOR YOU TO DO YOUR WORK, TAKE CARE OF THINGS AT HOME, OR GET ALONG WITH OTHER PEOPLE: EXTREMELY DIFFICULT
8. IF YOU CHECKED OFF ANY PROBLEMS, HOW DIFFICULT HAVE THESE MADE IT FOR YOU TO DO YOUR WORK, TAKE CARE OF THINGS AT HOME, OR GET ALONG WITH OTHER PEOPLE?: EXTREMELY DIFFICULT
6. BECOMING EASILY ANNOYED OR IRRITABLE: SEVERAL DAYS

## 2024-07-02 ENCOUNTER — VIRTUAL VISIT (OUTPATIENT)
Dept: PSYCHOLOGY | Facility: CLINIC | Age: 54
End: 2024-07-02
Payer: MEDICARE

## 2024-07-02 DIAGNOSIS — F43.9 TRAUMA AND STRESSOR-RELATED DISORDER: ICD-10-CM

## 2024-07-02 DIAGNOSIS — F40.01 PANIC DISORDER WITH AGORAPHOBIA: Primary | ICD-10-CM

## 2024-07-02 DIAGNOSIS — F33.1 MAJOR DEPRESSIVE DISORDER, RECURRENT EPISODE, MODERATE (H): ICD-10-CM

## 2024-07-02 DIAGNOSIS — F41.1 GENERALIZED ANXIETY DISORDER: ICD-10-CM

## 2024-07-02 DIAGNOSIS — Z13.39 ATTENTION DEFICIT HYPERACTIVITY DISORDER (ADHD) EVALUATION: ICD-10-CM

## 2024-07-02 PROCEDURE — 90834 PSYTX W PT 45 MINUTES: CPT | Mod: 95 | Performed by: PSYCHOLOGIST

## 2024-07-02 ASSESSMENT — COLUMBIA-SUICIDE SEVERITY RATING SCALE - C-SSRS
TOTAL  NUMBER OF INTERRUPTED ATTEMPTS LIFETIME: NO
TOTAL  NUMBER OF ABORTED OR SELF INTERRUPTED ATTEMPTS LIFETIME: NO
2. HAVE YOU ACTUALLY HAD ANY THOUGHTS OF KILLING YOURSELF?: NO
6. HAVE YOU EVER DONE ANYTHING, STARTED TO DO ANYTHING, OR PREPARED TO DO ANYTHING TO END YOUR LIFE?: NO
ATTEMPT LIFETIME: NO
1. HAVE YOU WISHED YOU WERE DEAD OR WISHED YOU COULD GO TO SLEEP AND NOT WAKE UP?: NO

## 2024-07-02 NOTE — PROGRESS NOTES
[facility]   Mental Health & Addiction Services     Progress Note - Initial Visit    Client Name:  Sherie Otero Date: 2024         Service Type: Individual     Visit Start Time: 9:00am  Visit End Time: 9:39am    Visit #: 1    Attendees: Client attended alone    Service Modality:  Video Visit:      Provider verified identity through the following two step process.  Patient provided:  Patient  and Patient address    Telemedicine Visit: The patient's condition can be safely assessed and treated via synchronous audio and visual telemedicine encounter.      Reason for Telemedicine Visit: Services only offered telehealth    Originating Site (Patient Location): Patient's home    Distant Site (Provider Location): Provider Remote Setting- Home Office    Consent:  The patient/guardian has verbally consented to: the potential risks and benefits of telemedicine (video visit) versus in person care; bill my insurance or make self-payment for services provided; and responsibility for payment of non-covered services.     Patient would like the video invitation sent by:  Send to e-mail at: [email or web address]    Mode of Communication:  [facility]    Distant Location (Provider):  Off-site    As the provider I attest to compliance with applicable laws and regulations related to telemedicine.       DATA:  Extended Session (53+ minutes): No  Interactive Complexity: No   Crisis: No     Presenting Concerns/Current Stressors:   Patient presented to session to initiate the ADHD evaluation process.           2024     9:04 AM 2024     1:05 PM 2024     9:47 AM   PHQ   PHQ-9 Total Score 14 11 7   Q9: Thoughts of better off dead/self-harm past 2 weeks Not at all Not at all Not at all            4/3/2024     8:56 AM 2024     9:05 AM 2024    10:05 AM   CELIA-7 SCORE   Total Score 13 (moderate anxiety) 13 (moderate anxiety) 13 (moderate anxiety)   Total Score 13 13  13       ASSESSMENT:  Mental Status Assessment:  Appearance:   Appropriate   Eye Contact:   Good   Psychomotor Behavior: Normal   Attitude:   Cooperative   Orientation:   All  Speech   Rate / Production: Normal/ Responsive   Volume:  Normal   Mood:    Depressed   Affect:    Flat   Thought Content:  Clear   Thought Form:  Logical   Insight:    Good       Safety Issues and Plan for Safety and Risk Management:   [place] Suicide Severity Rating Scale (Lifetime/Recent)      5/12/2019     1:07 PM 6/25/2019    12:44 PM 7/2/2024     9:06 AM   [place] Suicide Severity Rating (Lifetime/Recent)   Q1 Wish to be Dead (Lifetime)  No    Q2 Non-Specific Active Suicidal Thoughts (Lifetime)  No    Most Severe Ideation Rating (Lifetime)  NA    Q1 Wished to be Dead (Past Month) no     Q2 Suicidal Thoughts (Past Month) no     Q6 Suicide Behavior (Lifetime) no     RETIRED: 1. Wish to be Dead (Recent)  No    RETIRED: 2. Non-Specific Active Suicidal Thoughts (Recent)  No    3. Active Suicidal Ideation with any Methods (Not Plan) Without Intent to Act (Lifetime)  No    RETIRED: 3. Active Suicidal Ideation with any Methods (Not Plan) Without Intent to Act (Recent)  No    RETIRE: 4. Active Suicidal Ideation with Some Intent to Act, Without Specific Plan (Lifetime)  No    4. Active Suicidal Ideation with Some Intent to Act, Without Specific Plan (Recent)  No    RETIRE: 5. Active Suicidal Ideation with Specific Plan and Intent (Lifetime)  No    RETIRED: 5. Active Suicidal Ideation with Specific Plan and Intent (Recent)  No    Q1 Wish to be Dead (Lifetime)   N   Q2 Non-Specific Active Suicidal Thoughts (Lifetime)   N   Actual Attempt (Lifetime)   N   Has subject engaged in non-suicidal self-injurious behavior? (Lifetime)   N   Interrupted Attempts (Lifetime)   N   Aborted or Self-Interrupted Attempt (Lifetime)   N   Preparatory Acts or Behavior (Lifetime)   N   Calculated C-SSRS Risk Score (Lifetime/Recent)   No Risk Indicated     Patient  "denies current fears or concerns for personal safety.  Patient denies current or recent suicidal ideation or behaviors.  Patient denies current or recent homicidal ideation or behaviors.  Patient denies current or recent self injurious behavior or ideation.  Patient denies other safety concerns.  Recommended that patient call 911 or go to the local ED should there be a change in any of these risk factors.   Patient reports there are no firearms in the house.    Diagnostic Criteria:  F40.01:  A.  Recurrent unexpected panic attacks.  The panic attack is an abrupt surge of intense fear or intense discomfort that reaches a peak within minutes, and during which time four (or more) of the following symptoms occur:   1.  Palpitations, pounding heart, or accelerated heart rate.   2.  Sweating.   3.  Trembling or shaking.   4.  Sensations of shortness of breath or smothering.   5.  Feelings of choking.   6.  Chest pain or discomfort.   7.  Nausea or abdominal distress.   8.  Feeling dizzy, unsteady, lightheaded, or faint.   9.  Chills or heat sensations.   10.  Paresthesias.   11.  Derealization or depersonalization.   12.  Fear of losing control or \"going crazy.\"   13.  Fear of dying.  B.  At least one of the attacks has been followed by 1 month (or more) of 1 or both of the following   1.  Persistent concern or worry about additional panic attacks or other consequences.   2.  A significant maladaptive change in behavior related to the attacks.  C.  The disturbance is not attributable to the psychological effects of a substance or another medical condition.  D.  The disturbance is not better explained by another mental disorder.    F41.1:  A. Excessive anxiety and worry, occurring more days than not for at least 6 months about a number of events or activities.   B. The individual finds it difficult to control the worry.  C. The anxiety and worry are associated with 3 or more of 6 symptoms.  D. The anxiety, worry, or " physical symptoms cause clinically significant distress or impairment in social, occupational, or other important areas of functioning.  E. The disturbance is not attributable to the physiological effects of a substance (e.g., a drug of abuse, a medication) or another medical condition (e.g., hyperthyroidism).  F. The disturbance is not better explained by another mental disorder (e.g., anxiety or worry about having panic attacks in panic disorder, negative evaluation in social anxiety disorder [social phobia], contamination or other obsessions in obsessive-compulsive disorder, separation from attachment figures in separation anxiety disorder, reminders of traumatic events in posttraumatic stress disorder, gaining weight in anorexia nervosa, physical complaints in somatic symptom disorder, perceived appearance flaws in body dysmorphic disorder, having a serious illness in illness anxiety disorder, or the content of delusional beliefs in schizophrenia or delusional disorder).    F33.1:  A. Five (or more) symptoms have been present during the same 2-week period and represent a change from previous functioning; at least one of the symptoms is either (1) depressed mood or (2) loss of interest or pleasure.   Depressed mood.   Diminished interest or pleasure in all, or almost all, activities.   Fatigue or loss of energy.   Feelings of worthlessness or inappropriate guilt.   Diminished ability to think or concentrate, or indecisiveness.   B. The symptoms cause clinically significant distress or impairment in social, occupational, or other important areas of functioning.  C. The episode is not attributable to the physiological effects of a substance or to another medical condition.  D. The occurence of major depressive episode is not better explained by other thought / psychotic disorders.  E. There has never been a manic episode or hypomanic episode.     F43.9 (subclinical criteria for PTSD):  A. Exposure to actual or  threatened death, serious injury, or sexual violence in 1 or more of the following ways:  1. Directly experiencing the traumatic event.  B. Presence of 1 or more of the following intrusion symptoms associated with the traumatic event, beginning after the traumatic event occurred:  1. Recurrent, involuntary, and intrusive distressing memories of the traumatic event.  3. Dissociative rate reactions in which the individual feels or acts as if the traumatic event were occurring.  4. Intense or prolonged psychological distress at exposure to internal or external cues that symbolize or resemble an aspect of the traumatic event.  5. Marked physiological reactions to internal or external cues that symbolize or resemble an aspect of the traumatic event.  D. Negative alterations in cognitions and mood associated with the traumatic event, beginning or worsening after the traumatic event occurred, as evidenced by 2 or more of the followin. Persistent negative emotional state (fear, horror, anger, guilt, or shame).  5. Markedly diminished interest or participation in significant activities.  6. Feelings of detachment or estrangement from others.  7. Persistent inability to experience positive emotions.  E. Marked alterations in arousal and reactivity associated with the traumatic event, beginning or worsening after the traumatic event occurred, as evidenced by 2 or more of the followin. Irritable behavior and angry outbursts typically expressed as verbal or physical aggression toward people or objects.  3. Hypervigilance.  4. Exaggerated startle response.  5. Problems with concentration.  F. Duration of the disturbance is more than 1 month.  G. The disturbance causes clinically significant distress or impairment in social, occupational, or other important areas of functioning.  H. The disturbance is not attributable to the physiological effects of a substance or another medical condition.      DSM5 Diagnoses:  (Sustained by DSM5 Criteria Listed Above)  Diagnoses:   1. Panic disorder with agoraphobia    2. Generalized anxiety disorder    3. Major depressive disorder, recurrent episode, moderate (H)    4. Trauma and stressor-related disorder    5. Attention deficit hyperactivity disorder (ADHD) evaluation      Psychosocial & Contextual Factors:some social support, agoraphobia - unable to attend in-person testing  WHODAS 2.0 (12 item):       9/10/2019     1:48 PM   WHODAS 2.0 Total Score   Total Score 38       PROMIS-10 Scores  Global Mental Health Score: (P) 6  Global Physical Health Score: (P) 7   PROMIS TOTAL - SUBSCORES: (P) 13      Intervention:              Reviewed symptoms and history of presenting concern. Patient endorsed symptoms consistent with depression , anxiety , panic, and trauma. Chronic pain, sparing use of medical cannabis. Active agoraphobia so unable to attend in-person testing. Patient denied symptoms associated with phillip, OCD, and perceptual difficulties. Outdated DA from 5/29/2020 - unclear why DA was never updated, so that will be completed next week.   CBT: socratic questioning, positive reinforcement  EFT: empathetic attunement, emotion checking, emotion naming  MI: open ended questions, affirmations, reflections        Attendance Agreement:  Client has not signed the attendance agreement. Discussed expectations at beginning of this first session and patient agreed.       PLAN:  Provider will continue Diagnostic Assessment in next session. Patient will complete Kanika questionnaires  and CNS Vital Signs prior to next session (7/9/2024).    Patient meets the following risk assessment and triage: Patient denied any current/recent/lifetime history of suicidal ideation and/or behaviors.  No safety plan indicated at this time.     Medical necessity criteria is warranted in order to: Measure a psychological disorder and its severity and functional impairment to determine psychiatric diagnosis when a  mental illness is suspected, or to achieve a differential diagnosis from a range of medical/psychological disorders that present with similar constellations of symptoms (e.g., determination and measurement of anxiety severity and impact in the presence of ongoing asthma or heart disease), Perform symptom measurement to objectively measure treatment effectiveness and/or determine the need to refer for pharmacological treatment or other medical evaluation (e.g., based on severity and chronicity of symptoms), and Evaluate primary symptoms of impaired attention and concentration that can occur in many neurological and psychiatric conditions.    Medical necessity for psychological assessment is warranted as a result of the following: (1) A specific clinical question is posed that relates to the condition/symptoms being addressed (2) The question cannot be adequately addressed by clinical interview and/or behavioral observation (3) Results of psychological testing are reasonably expected to provide an answer to the query (4) It is reasonably expected that the testing will provide information leading to a clearer diagnosis and/or guide treatment planning with an expectation of improved clinical outcome.    I acknowledge that, based upon current clinical information, the patient and I have reviewed and discussed issues pertaining to the purpose of therapy/testing, potential therapeutic goals, procedures, risks and benefits, and estimated duration of therapy/testing. Issues pertaining to fees/insurance and confidentiality were also addressed with the patient, who indicated understanding and elected to continue with appointments. I will not be providing any experimental procedures and, if we agree that a change in clinical procedure would be more beneficial, I will obtain specific consent for that procedure or refer you to another provider who has expertise in that area.       Donna Foster PsyD, LYDIA  Clinical  Psychologist

## 2024-07-08 ASSESSMENT — PATIENT HEALTH QUESTIONNAIRE - PHQ9
10. IF YOU CHECKED OFF ANY PROBLEMS, HOW DIFFICULT HAVE THESE PROBLEMS MADE IT FOR YOU TO DO YOUR WORK, TAKE CARE OF THINGS AT HOME, OR GET ALONG WITH OTHER PEOPLE: EXTREMELY DIFFICULT
SUM OF ALL RESPONSES TO PHQ QUESTIONS 1-9: 10
SUM OF ALL RESPONSES TO PHQ QUESTIONS 1-9: 10

## 2024-07-09 ENCOUNTER — VIRTUAL VISIT (OUTPATIENT)
Dept: PSYCHOLOGY | Facility: CLINIC | Age: 54
End: 2024-07-09
Payer: MEDICARE

## 2024-07-09 DIAGNOSIS — F33.1 MAJOR DEPRESSIVE DISORDER, RECURRENT EPISODE, MODERATE (H): ICD-10-CM

## 2024-07-09 DIAGNOSIS — F40.01 PANIC DISORDER WITH AGORAPHOBIA: Primary | ICD-10-CM

## 2024-07-09 DIAGNOSIS — Z13.39 ATTENTION DEFICIT HYPERACTIVITY DISORDER (ADHD) EVALUATION: ICD-10-CM

## 2024-07-09 DIAGNOSIS — F41.1 GENERALIZED ANXIETY DISORDER: ICD-10-CM

## 2024-07-09 DIAGNOSIS — F43.9 TRAUMA AND STRESSOR-RELATED DISORDER: ICD-10-CM

## 2024-07-09 PROCEDURE — 90791 PSYCH DIAGNOSTIC EVALUATION: CPT | Mod: 95 | Performed by: PSYCHOLOGIST

## 2024-07-09 NOTE — PROGRESS NOTES
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 Generalized anxiety disorder    3. Major depressive disorder, recurrent episode, moderate (H)    4. Trauma and stressor-related disorder    5. Attention deficit hyperactivity disorder (ADHD) evaluation      4. Prognosis: Relieve Acute Symptoms.  5. Likely consequences of symptoms if not treated: Continued difficulties with inattention.  6. Client strengt hi guys hs include: has a previous history of therapy, insightful, intelligent, motivated, responsible parent, support of family, friends and providers, and work history .     Recommendations:   Per medical necessity criteria for psychological testing, patient will complete Kanika questionnaires and MMPI-3 before feedback session is scheduled. Patient was made aware that the MMPI-3 needs to be completed as soon as possible.  If it is not completed within one and two weeks, email reminders will be sent directly.  The patient was also made aware that the link expires after 30 days and if the test is not completed within that timeframe, it will be her responsibility to reinitiate contact to resume the testing process.  My contact information was provided.  Patient was in agreement to  this plan.    1. Plan for Safety and Risk Management:  Safety and Risk: Recommended that patient call 911 or go to the local ED should there be a change in any of these risk factors..         Report to child / adult protection services was NA.     2. Patient's identified mental health concerns with a cultural influence will be addressed in final recommendations.     3. Initial Treatment will focus on: ADHD testing. See above.      4. Resources/Service Plan:    services are not indicated.   Modifications to assist communication are not indicated.   Additional disability accommodations are not indicated.      5. Collaboration:   Collaboration / coordination of treatment will be initiated with the following  support professionals: primary care physician.      6.  Referrals:   The following referral(s) will be initiated: N/A.    A Release of Information has been obtained for the following: N/A.   Emergency Contact was not obtained.    Clinical Substantiation/medical necessity for the above recommendations:     Medical necessity criteria is warranted in order to: Measure a psychological disorder and its severity and functional impairment to determine psychiatric diagnosis when a mental illness is suspected, or to achieve a differential diagnosis from a range of medical/psychological disorders that present with similar constellations of symptoms (e.g., determination and measurement of anxiety severity and impact in the presence of ongoing asthma or heart disease), Perform symptom measurement to objectively measure treatment effectiveness and/or determine the need to refer for pharmacological treatment or other medical evaluation (e.g., based on severity and chronicity of symptoms), and Evaluate primary symptoms of impaired attention and concentration that can occur in many neurological and psychiatric conditions.    Medical necessity for psychological assessment is warranted as a result of the following: (1) A  specific clinical question is posed that relates to the condition/symptoms being addressed (2) The question cannot be adequately addressed by clinical interview and/or behavioral observation (3) Results of psychological testing are reasonably expected to provide an answer to the query (4) It is reasonably expected that the testing will provide information leading to a clearer diagnosis and/or guide treatment planning with an expectation of improved clinical outcome.    7. PAULA: N/A.    8. Records:   These were reviewed at time of assessment.   Information in this assessment was obtained from the medical record and  provided by patient who is a good historian. Patient will have open access to their mental health medical record.    9. Interactive Complexity: No     Parts of this documentation may have been completed using dictation software. Potential errors may result and are unintentional.       Donna Foster PsyD, LP  July 9, 2024

## 2024-07-15 ENCOUNTER — MYC REFILL (OUTPATIENT)
Dept: FAMILY MEDICINE | Facility: CLINIC | Age: 54
End: 2024-07-15
Payer: MEDICARE

## 2024-07-15 DIAGNOSIS — M54.2 CERVICALGIA: ICD-10-CM

## 2024-07-15 DIAGNOSIS — M54.50 CHRONIC BILATERAL LOW BACK PAIN WITHOUT SCIATICA: ICD-10-CM

## 2024-07-15 DIAGNOSIS — G89.4 CHRONIC PAIN SYNDROME: ICD-10-CM

## 2024-07-15 DIAGNOSIS — M79.7 FIBROMYALGIA: ICD-10-CM

## 2024-07-15 DIAGNOSIS — G89.29 CHRONIC BILATERAL LOW BACK PAIN WITHOUT SCIATICA: ICD-10-CM

## 2024-07-16 RX ORDER — HYDROCODONE BITARTRATE AND ACETAMINOPHEN 5; 325 MG/1; MG/1
1 TABLET ORAL EVERY 6 HOURS PRN
Qty: 120 TABLET | Refills: 0 | Status: SHIPPED | OUTPATIENT
Start: 2024-07-17 | End: 2024-08-19

## 2024-07-23 ASSESSMENT — ASTHMA QUESTIONNAIRES
ACT_TOTALSCORE: 22
QUESTION_3 LAST FOUR WEEKS HOW OFTEN DID YOUR ASTHMA SYMPTOMS (WHEEZING, COUGHING, SHORTNESS OF BREATH, CHEST TIGHTNESS OR PAIN) WAKE YOU UP AT NIGHT OR EARLIER THAN USUAL IN THE MORNING: NOT AT ALL
QUESTION_4 LAST FOUR WEEKS HOW OFTEN HAVE YOU USED YOUR RESCUE INHALER OR NEBULIZER MEDICATION (SUCH AS ALBUTEROL): NOT AT ALL
QUESTION_2 LAST FOUR WEEKS HOW OFTEN HAVE YOU HAD SHORTNESS OF BREATH: ONCE A DAY
ACT_TOTALSCORE: 22
QUESTION_1 LAST FOUR WEEKS HOW MUCH OF THE TIME DID YOUR ASTHMA KEEP YOU FROM GETTING AS MUCH DONE AT WORK, SCHOOL OR AT HOME: NONE OF THE TIME
QUESTION_5 LAST FOUR WEEKS HOW WOULD YOU RATE YOUR ASTHMA CONTROL: COMPLETELY CONTROLLED

## 2024-07-24 ENCOUNTER — OFFICE VISIT (OUTPATIENT)
Dept: FAMILY MEDICINE | Facility: CLINIC | Age: 54
End: 2024-07-24
Payer: MEDICARE

## 2024-07-24 VITALS
WEIGHT: 160.2 LBS | BODY MASS INDEX: 25.75 KG/M2 | RESPIRATION RATE: 16 BRPM | TEMPERATURE: 97.1 F | SYSTOLIC BLOOD PRESSURE: 118 MMHG | HEART RATE: 114 BPM | DIASTOLIC BLOOD PRESSURE: 78 MMHG | OXYGEN SATURATION: 98 % | HEIGHT: 66 IN

## 2024-07-24 DIAGNOSIS — G93.32 CHRONIC FATIGUE SYNDROME: ICD-10-CM

## 2024-07-24 DIAGNOSIS — F11.90 CHRONIC, CONTINUOUS USE OF OPIOIDS: ICD-10-CM

## 2024-07-24 DIAGNOSIS — G89.4 CHRONIC PAIN SYNDROME: ICD-10-CM

## 2024-07-24 DIAGNOSIS — M79.7 FIBROMYALGIA: Primary | ICD-10-CM

## 2024-07-24 LAB
AMPHETAMINES UR QL: NOT DETECTED
ANION GAP SERPL CALCULATED.3IONS-SCNC: 9 MMOL/L (ref 7–15)
BARBITURATES UR QL SCN: NOT DETECTED
BENZODIAZ UR QL SCN: NOT DETECTED
BUN SERPL-MCNC: 16.6 MG/DL (ref 6–20)
BUPRENORPHINE UR QL: NOT DETECTED
CALCIUM SERPL-MCNC: 9.4 MG/DL (ref 8.8–10.4)
CANNABINOIDS UR QL: NOT DETECTED
CHLORIDE SERPL-SCNC: 104 MMOL/L (ref 98–107)
COCAINE UR QL SCN: NOT DETECTED
CREAT SERPL-MCNC: 1.1 MG/DL (ref 0.51–0.95)
D-METHAMPHET UR QL: NOT DETECTED
EGFRCR SERPLBLD CKD-EPI 2021: 59 ML/MIN/1.73M2
GLUCOSE SERPL-MCNC: 101 MG/DL (ref 70–99)
HCO3 SERPL-SCNC: 26 MMOL/L (ref 22–29)
METHADONE UR QL SCN: NOT DETECTED
OPIATES UR QL SCN: DETECTED
OXYCODONE UR QL SCN: NOT DETECTED
PCP UR QL SCN: NOT DETECTED
POTASSIUM SERPL-SCNC: 4.4 MMOL/L (ref 3.4–5.3)
SODIUM SERPL-SCNC: 139 MMOL/L (ref 135–145)
TRICYCLICS UR QL SCN: NOT DETECTED

## 2024-07-24 PROCEDURE — 80048 BASIC METABOLIC PNL TOTAL CA: CPT | Performed by: FAMILY MEDICINE

## 2024-07-24 PROCEDURE — 99214 OFFICE O/P EST MOD 30 MIN: CPT | Performed by: FAMILY MEDICINE

## 2024-07-24 PROCEDURE — 80306 DRUG TEST PRSMV INSTRMNT: CPT | Performed by: FAMILY MEDICINE

## 2024-07-24 PROCEDURE — 36415 COLL VENOUS BLD VENIPUNCTURE: CPT | Performed by: FAMILY MEDICINE

## 2024-07-24 ASSESSMENT — PAIN SCALES - GENERAL: PAINLEVEL: SEVERE PAIN (6)

## 2024-07-24 NOTE — LETTER

## 2024-07-24 NOTE — PROGRESS NOTES
Assessment & Plan     Chronic, continuous use of opioids  Sherie is a 53-year-old female who presents to clinic today for her 3-month follow-up of her chronic continuous use of opioids.  She has a longstanding history of chronic pain secondary to fibromyalgia as well as rheumatoid and osteoarthritis of the cervical spine.  She has been on Norco for many years and we are beginning to taper her medication.  We are able to successfully taper her from 1 tablet 6 times daily down to 1 tablet 4 times daily.  Her pain is unchanged with the decrease in dose.    She is due for a urine drug screen today as well as a controlled substance agreement.  We reviewed [place] prescription monitoring today.  She is currently on 20 morphine milliequivalents per day of narcotics.  She does also take a small amount of benzodiazepine prescribed by her psychiatrist.  Her unintentional overdose risk score is 430 which is above average.  We discussed options to try to minimize this risk.  The best opportunity would be to slowly continue to taper her narcotic use.  At this time she is reluctant to continue further tapering and that there are days when she has significant pain.  I did describe to her that she can take 1 or 2 Norco at a time but she is limited to the monthly dose that she gets.  If she takes 2 tablets at 1 time then she at some point in time she will not be able to take a dose and she will need to manage other ways.  She is open to that possibility.    On exam today she is well-appearing in no acute distress.  Her blood pressure is 118/78 pulse is 114 and regular.  She is afebrile lungs are clear cardiac exam is regular.  She does have a small wart on her left knee and left shin which were frozen with liquid nitrogen.    Assessment: 53-year-old female with chronic pain and chronic continuous use of opioids.  Will continue current dosing of her Norco with a plan to try to continue to taper.  She did ask about recertification  for her medical cannabis.  I did authorize and recertify her.  I explained that the [place] State laws have changed and now her certifications are good for 3 years.    Fibromyalgia  Chronic, stable.  Continue current medication and plan.  - Urine Drug Screen Clinic; Future    Chronic fatigue syndrome  Chronic, stable.  She recently started Rexulti and has noticed some weight gain.  Her blood sugars are stable.  I recommend she follow-up with her psychiatrist regarding the side effects of Rexulti.  - Basic metabolic panel  (Ca, Cl, CO2, Creat, Gluc, K, Na, BUN); Future    Chronic pain syndrome  Sherie is a 53-year-old female who presents to clinic today for her 3-month follow-up of her chronic continuous use of opioids.  She has a longstanding history of chronic pain secondary to fibromyalgia as well as rheumatoid and osteoarthritis of the cervical spine.  She has been on Norco for many years and we are beginning to taper her medication.  We are able to successfully taper her from 1 tablet 6 times daily down to 1 tablet 4 times daily.  Her pain is unchanged with the decrease in dose.    She is due for a urine drug screen today as well as a controlled substance agreement.  We reviewed [place] prescription monitoring today.  She is currently on 20 morphine milliequivalents per day of narcotics.  She does also take a small amount of benzodiazepine prescribed by her psychiatrist.  Her unintentional overdose risk score is 430 which is above average.  We discussed options to try to minimize this risk.  The best opportunity would be to slowly continue to taper her narcotic use.  At this time she is reluctant to continue further tapering and that there are days when she has significant pain.  I did describe to her that she can take 1 or 2 Norco at a time but she is limited to the monthly dose that she gets.  If she takes 2 tablets at 1 time then she at some point in time she will not be able to take a dose and she will  need to manage other ways.  She is open to that possibility.  - PRIMARY CARE FOLLOW-UP SCHEDULING; Future        Results for orders placed or performed in visit on 07/24/24   Urine Drug Screen Clinic     Status: Abnormal   Result Value Ref Range    Cannabinoids (39-anu-3-carboxy-9-THC) Not Detected Not Detected, Indeterminate    Phencyclidine Not Detected Not Detected, Indeterminate    Cocaine (Benzoylecgonine) Not Detected Not Detected, Indeterminate    Methamphetamine (d-Methamphetamine) Not Detected Not Detected, Indeterminate    Opiates (Morphine) Detected (A) Not Detected, Indeterminate    Amphetamine (d-Amphetamine) Not Detected Not Detected, Indeterminate    Benzodiazepines (Nordiazepam) Not Detected Not Detected, Indeterminate    Tricyclic Antidepressants (Desipramine) Not Detected Not Detected, Indeterminate    Methadone Not Detected Not Detected, Indeterminate    Barbiturates (Butalbital) Not Detected Not Detected, Indeterminate    Oxycodone Not Detected Not Detected, Indeterminate    Buprenorphine Not Detected Not Detected, Indeterminate   Basic metabolic panel  (Ca, Cl, CO2, Creat, Gluc, K, Na, BUN)     Status: Abnormal   Result Value Ref Range    Sodium 139 135 - 145 mmol/L    Potassium 4.4 3.4 - 5.3 mmol/L    Chloride 104 98 - 107 mmol/L    Carbon Dioxide (CO2) 26 22 - 29 mmol/L    Anion Gap 9 7 - 15 mmol/L    Urea Nitrogen 16.6 6.0 - 20.0 mg/dL    Creatinine 1.10 (H) 0.51 - 0.95 mg/dL    GFR Estimate 59 (L) >60 mL/min/1.73m2    Calcium 9.4 8.8 - 10.4 mg/dL    Glucose 101 (H) 70 - 99 mg/dL         Work on weight loss  Regular exercise   Follow-up Visit   Expected date:  Oct 24, 2024 (Approximate)      Follow Up Appointment Details:     Follow-up with whom?: Me    Follow-Up for what?: Chronic Disease f/u    Chronic Disease f/u: General (Other) Comment - Chronic pain    How?: In Person                     Franky Rehman is a 53 year old, presenting for the following health issues:  RECHECK and  Recheck Medication        7/24/2024     2:05 PM   Additional Questions   Roomed by Diamante GALLAGHER     History of Present Illness       Reason for visit:  Med check    She eats 0-1 servings of fruits and vegetables daily.She consumes 1 sweetened beverage(s) daily.She exercises with enough effort to increase her heart rate 10 to 19 minutes per day.  She exercises with enough effort to increase her heart rate 3 or less days per week. She is missing 1 dose(s) of medications per week.         Pain History:  When did you first notice your pain? Longstanding   Have you seen this provider for your pain in the past? Yes   Where in your body do you have pain?  Fibromyalgia, cervical and lumbar spine  Are you seeing anyone else for your pain? Yes - therapist        4/17/2024     1:05 PM 7/1/2024     9:47 AM 7/8/2024     5:40 PM   PHQ-9 SCORE   PHQ-9 Total Score MyChart 11 (Moderate depression) 7 (Mild depression) 10 (Moderate depression)   PHQ-9 Total Score 11 7 10           4/3/2024     8:56 AM 4/17/2024     9:05 AM 7/1/2024    10:05 AM   CELIA-7 SCORE   Total Score 13 (moderate anxiety) 13 (moderate anxiety) 13 (moderate anxiety)   Total Score 13 13 13               Chronic Pain Follow Up:    Location of pain: Fibromyalgia, cervical and lumbar spine  Analgesia/pain control:    - Recent changes:  None    - Overall control: Tolerable with discomfort    - Current treatments: Norco   Adherence:     - Do you ever take more pain medicine than prescribed? No    - When did you take your last dose of pain medicine?  today   Adverse effects: No   PDMP Review         Value Time User    State PDMP site checked  Yes 7/16/2024  5:59 PM Jim Edwards MD          Last CSA Agreement:   CSA -- Patient Level:     [Media Unavailable] Controlled Substance Agreement - Opioid - Scan on 10/30/2023  5:17 PM: OPIOID AGREEMENT   [Media Unavailable] Controlled Substance Agreement - Opioid - Scan on 6/13/2022  4:08 PM   [Media Unavailable] Controlled  "Substance Agreement - Opioid - Scan on 5/26/2021  4:37 PM   [Media Unavailable] Controlled Substance Agreement - Opioid - Scan on 1/27/2020  2:58 PM: controlled substance agreement   [Media Unavailable] Controlled Substance Agreement - Opioid - Scan on 1/24/2019  1:31 PM: signed 1/11/2019       Last UDS: 8/2/2023                    Review of Systems  CONSTITUTIONAL: NEGATIVE for fever, chills, change in weight  INTEGUMENTARY/SKIN: NEGATIVE for worrisome rashes, moles or lesions and POSITIVE for wart on left knee and shin  ENT/MOUTH: NEGATIVE for ear, mouth and throat problems  RESP: NEGATIVE for significant cough or SOB  CV: NEGATIVE for chest pain, palpitations or peripheral edema  ROS otherwise negative      Objective    /78   Pulse 114   Temp 97.1  F (36.2  C) (Temporal)   Resp 16   Ht 1.664 m (5' 5.5\")   Wt 72.7 kg (160 lb 3.2 oz)   SpO2 98%   BMI 26.25 kg/m    Body mass index is 26.25 kg/m .  Physical Exam   GENERAL: alert and no distress  NECK: no adenopathy, no asymmetry, masses, or scars  RESP: lungs clear to auscultation - no rales, rhonchi or wheezes  CV: regular rate and rhythm, normal S1 S2, no S3 or S4, no murmur, click or rub, no peripheral edema  ABDOMEN: soft, nontender, no hepatosplenomegaly, no masses and bowel sounds normal  MS: no gross musculoskeletal defects noted, no edema  SKIN: no suspicious lesions or rashes and warts    Office Visit on 04/17/2024   Component Date Value Ref Range Status    Sodium 04/17/2024 142  135 - 145 mmol/L Final    Reference intervals for this test were updated on 09/26/2023 to more accurately reflect our healthy population. There may be differences in the flagging of prior results with similar values performed with this method. Interpretation of those prior results can be made in the context of the updated reference intervals.     Potassium 04/17/2024 4.0  3.4 - 5.3 mmol/L Final    Chloride 04/17/2024 103  98 - 107 mmol/L Final    Carbon Dioxide (CO2) " 04/17/2024 30 (H)  22 - 29 mmol/L Final    Anion Gap 04/17/2024 9  7 - 15 mmol/L Final    Urea Nitrogen 04/17/2024 14.4  6.0 - 20.0 mg/dL Final    Creatinine 04/17/2024 1.19 (H)  0.51 - 0.95 mg/dL Final    GFR Estimate 04/17/2024 54 (L)  >60 mL/min/1.73m2 Final    Calcium 04/17/2024 9.7  8.6 - 10.0 mg/dL Final    Glucose 04/17/2024 77  70 - 99 mg/dL Final         Results for orders placed or performed in visit on 07/24/24   Urine Drug Screen Clinic     Status: Abnormal   Result Value Ref Range    Cannabinoids (55-tsu-4-carboxy-9-THC) Not Detected Not Detected, Indeterminate    Phencyclidine Not Detected Not Detected, Indeterminate    Cocaine (Benzoylecgonine) Not Detected Not Detected, Indeterminate    Methamphetamine (d-Methamphetamine) Not Detected Not Detected, Indeterminate    Opiates (Morphine) Detected (A) Not Detected, Indeterminate    Amphetamine (d-Amphetamine) Not Detected Not Detected, Indeterminate    Benzodiazepines (Nordiazepam) Not Detected Not Detected, Indeterminate    Tricyclic Antidepressants (Desipramine) Not Detected Not Detected, Indeterminate    Methadone Not Detected Not Detected, Indeterminate    Barbiturates (Butalbital) Not Detected Not Detected, Indeterminate    Oxycodone Not Detected Not Detected, Indeterminate    Buprenorphine Not Detected Not Detected, Indeterminate   Basic metabolic panel  (Ca, Cl, CO2, Creat, Gluc, K, Na, BUN)     Status: Abnormal   Result Value Ref Range    Sodium 139 135 - 145 mmol/L    Potassium 4.4 3.4 - 5.3 mmol/L    Chloride 104 98 - 107 mmol/L    Carbon Dioxide (CO2) 26 22 - 29 mmol/L    Anion Gap 9 7 - 15 mmol/L    Urea Nitrogen 16.6 6.0 - 20.0 mg/dL    Creatinine 1.10 (H) 0.51 - 0.95 mg/dL    GFR Estimate 59 (L) >60 mL/min/1.73m2    Calcium 9.4 8.8 - 10.4 mg/dL    Glucose 101 (H) 70 - 99 mg/dL         Signed Electronically by: Jim Edwards MD

## 2024-08-07 NOTE — TELEPHONE ENCOUNTER
[place] UNIVERSAL TRANSFER FORM  (ALL ITEMS MUST BE COMPLETED)    1. TRANSFER FROM: [facility]      TRANSFER TO: [facility]    2. DATE OF TRANSFER: 8/7/2024                        TIME OF TRANSFER: 1630    3. PATIENT NAME: Edwin Elkins E      YOB: 1934                             GENDER: male    4. LANGUAGE:   English    5. PHYSICIAN NAME:  Sudha Mejia DO                   PHONE: [phone]    6. CODE STATUS: Level 3 - DNAR and DNI        Out of Hospital DNR Attached: No    7. :                                      :  Extended Emergency Contact Information  Primary Emergency Contact: Rachele Elkins  Address: [address] [place] [address]  Home Phone: [phone]  Relation: Spouse  Secondary Emergency Contact: Fredy Elkins  Mobile Phone: [phone]  Relation: Son   needed? No           Health Care Representative/Proxy:  Yes           Legal Guardian:  No             NAME OF:           HEALTH CARE REPRESENTATIVE/PROXY:                                         OR           LEGAL GUARDIAN, IF NOT :                                               PHONE:  (Day)           (Night)                        (Cell)    8. REASON FOR TRANSFER: (Must include brief medical history and recent changes in physical function or cognition.) COVID-19, UTI, frequent falls, Parkinsons disease            V/S: /69   Pulse 65   Temp (!) 96.9 °F (36.1 °C)   Resp 18   Wt 58.7 kg (129 lb 6.6 oz)   SpO2 94%   BMI 18.57 kg/m²           PAIN: None    9. PRIMARY DIAGNOSIS: Frequent falls      Secondary Diagnosis:         Pacemaker: No      Internal Defib: No          Mental Health Diagnosis (if Applicable):    10. RESTRAINTS: No     11. RESPIRATORY NEEDS: None    12. ISOLATION/PRECAUTION: MRSA and SiteNares    13. ALLERGY: Patient has no known allergies.    14.  Spoke to patient. Advised prescription was sent to pharmacy. Confirmed pharmacy.     Gave fill date of  tomorrow    Gave start date of  Monday    Patient stated understanding.    Rosita Huber [facility]     SENSORY:       Vision Good, Hearing Good , and Speech Clear    15. SKIN CONDITION: Yes:  Pressure, Sitesacrum, and Stage (Pressure) II    16. DIET: Mechanically Altered Diet    17. IV ACCESS: None    18. PERSONAL ITEMS SENT WITH PATIENT: Otherclothes    19. ATTACHED DOCUMENTS: MUST ATTACH CURRENT MEDICATION INFORMATION Face Sheet and Discharge Summary    20. AT RISK ALERTS:Falls, Pressure Ulcer, and Aspiration        HARM TO: N/A    21. WEIGHT BEARING STATUS:         Left Leg: Limited        Right Leg: Limited    22. MENTAL STATUS:Alert, Oriented, and Forgetful    23. FUNCTION:        Walk: With Help        Transfer: With Help        Toilet: With Help        Feed: With Help    24. IMMUNIZATIONS/SCREENING:     There is no immunization history on file for this patient.    25. BOWEL: Incontinent  and Date Last BM8/6/2024    26. BLADDER: Incontinent    27. SENDING FACILITY CONTACT: Trang CASTRO                  Title: RN        Unit: [facility]        Phone: [phone]          REC'G FACILITY CONTACT (if known):        Title:        Unit:         Phone:         FORM PREFILLED BY (if applicable)       Title:       Unit:        Phone:         FORM COMPLETED BY Trang Hannah RN      Title: GLORIA      Phone: [phone]

## 2024-08-19 ENCOUNTER — MYC REFILL (OUTPATIENT)
Dept: FAMILY MEDICINE | Facility: CLINIC | Age: 54
End: 2024-08-19
Payer: MEDICARE

## 2024-08-19 DIAGNOSIS — M54.50 CHRONIC BILATERAL LOW BACK PAIN WITHOUT SCIATICA: ICD-10-CM

## 2024-08-19 DIAGNOSIS — G89.29 CHRONIC BILATERAL LOW BACK PAIN WITHOUT SCIATICA: ICD-10-CM

## 2024-08-19 DIAGNOSIS — M79.7 FIBROMYALGIA: ICD-10-CM

## 2024-08-19 DIAGNOSIS — M54.2 CERVICALGIA: ICD-10-CM

## 2024-08-19 DIAGNOSIS — G89.4 CHRONIC PAIN SYNDROME: ICD-10-CM

## 2024-08-21 RX ORDER — HYDROCODONE BITARTRATE AND ACETAMINOPHEN 5; 325 MG/1; MG/1
1 TABLET ORAL EVERY 6 HOURS PRN
Qty: 120 TABLET | Refills: 0 | Status: SHIPPED | OUTPATIENT
Start: 2024-08-21 | End: 2024-09-18

## 2024-09-18 ENCOUNTER — MYC REFILL (OUTPATIENT)
Dept: FAMILY MEDICINE | Facility: CLINIC | Age: 54
End: 2024-09-18
Payer: MEDICARE

## 2024-09-18 DIAGNOSIS — G89.29 CHRONIC BILATERAL LOW BACK PAIN WITHOUT SCIATICA: ICD-10-CM

## 2024-09-18 DIAGNOSIS — M54.50 CHRONIC BILATERAL LOW BACK PAIN WITHOUT SCIATICA: ICD-10-CM

## 2024-09-18 DIAGNOSIS — M54.2 CERVICALGIA: ICD-10-CM

## 2024-09-18 DIAGNOSIS — M79.7 FIBROMYALGIA: ICD-10-CM

## 2024-09-18 DIAGNOSIS — G89.4 CHRONIC PAIN SYNDROME: ICD-10-CM

## 2024-09-18 RX ORDER — HYDROCODONE BITARTRATE AND ACETAMINOPHEN 5; 325 MG/1; MG/1
1 TABLET ORAL EVERY 6 HOURS PRN
Qty: 120 TABLET | Refills: 0 | Status: SHIPPED | OUTPATIENT
Start: 2024-09-18

## 2024-10-17 ENCOUNTER — OFFICE VISIT (OUTPATIENT)
Dept: FAMILY MEDICINE | Facility: CLINIC | Age: 54
End: 2024-10-17
Attending: FAMILY MEDICINE
Payer: MEDICARE

## 2024-10-17 VITALS
RESPIRATION RATE: 20 BRPM | BODY MASS INDEX: 27.4 KG/M2 | HEART RATE: 110 BPM | DIASTOLIC BLOOD PRESSURE: 78 MMHG | OXYGEN SATURATION: 97 % | SYSTOLIC BLOOD PRESSURE: 132 MMHG | WEIGHT: 167.2 LBS

## 2024-10-17 DIAGNOSIS — M54.50 CHRONIC BILATERAL LOW BACK PAIN WITHOUT SCIATICA: ICD-10-CM

## 2024-10-17 DIAGNOSIS — G89.29 CHRONIC BILATERAL LOW BACK PAIN WITHOUT SCIATICA: ICD-10-CM

## 2024-10-17 DIAGNOSIS — G89.4 CHRONIC PAIN SYNDROME: ICD-10-CM

## 2024-10-17 DIAGNOSIS — M79.7 FIBROMYALGIA: ICD-10-CM

## 2024-10-17 DIAGNOSIS — M54.2 CERVICALGIA: Primary | ICD-10-CM

## 2024-10-17 PROCEDURE — 99214 OFFICE O/P EST MOD 30 MIN: CPT | Performed by: FAMILY MEDICINE

## 2024-10-17 RX ORDER — HYDROCODONE BITARTRATE AND ACETAMINOPHEN 5; 325 MG/1; MG/1
1 TABLET ORAL EVERY 6 HOURS PRN
Qty: 120 TABLET | Refills: 0 | Status: SHIPPED | OUTPATIENT
Start: 2024-10-17

## 2024-10-17 ASSESSMENT — PAIN SCALES - GENERAL: PAINLEVEL: MODERATE PAIN (5)

## 2024-10-17 NOTE — PROGRESS NOTES
Assessment & Plan     Neela Rehman is a 54-year-old female who presents to clinic today for her 3-month follow-up of her chronic continuous use of opioids.  She has a longstanding history of chronic pain secondary to fibromyalgia as well as rheumatoid and osteoarthritis of the cervical spine.  She has been on Norco for many years and we are beginning to taper her medication.  We are able to successfully taper her from 1 tablet 6 times daily down to 1 tablet 4 times daily.  Her pain is unchanged with the decrease in dose.    She is current with urine drug screen today as well as a controlled substance agreement.  We reviewed [place] prescription monitoring today.  She is currently on 20 morphine milliequivalents per day of narcotics.  She does also take a small amount of benzodiazepine prescribed by her psychiatrist.  Her unintentional overdose risk score is 430 which is above average.  We discussed options to try to minimize this risk.  The best opportunity would be to slowly continue to taper her narcotic use.  At this time she is reluctant to continue further tapering and that there are days when she has significant pain.  I did describe to her that she can take 1 or 2 Norco at a time but she is limited to the monthly dose that she gets.  If she takes 2 tablets at 1 time then she at some point in time she will not be able to take a dose and she will need to manage other ways.  She is open to that possibility.    She does endorse that she is having more frequent headaches due to her neck pain.  She has not increased her pain medication at all.  She also identifies that her previous therapist has left and she has not worked with her therapist in the last 3 months.  I asked her to contact her therapist office to help set up another appointment with a new therapist.    On exam today she is well-appearing in no acute distress.  Her blood pressure is 132/78 pulse is 110 and regular.  She is afebrile lungs are  clear cardiac exam is regular.      Assessment: 53-year-old female with chronic pain and chronic continuous use of opioids.  Will continue current dosing of her Norco with a plan to try to continue to taper.    Referral to physical therapy and recommend that she contact her therapist's office to establish with another counselor.  - Physical Therapy  Referral; Future  - HYDROcodone-acetaminophen (NORCO) 5-325 MG tablet; Take 1 tablet by mouth every 6 hours as needed for severe pain.    Chronic pain syndrome  Sherie is a 54-year-old female who presents to clinic today for her 3-month follow-up of her chronic continuous use of opioids.  She has a longstanding history of chronic pain secondary to fibromyalgia as well as rheumatoid and osteoarthritis of the cervical spine.  She has been on Norco for many years and we are beginning to taper her medication.  We are able to successfully taper her from 1 tablet 6 times daily down to 1 tablet 4 times daily.  Her pain is unchanged with the decrease in dose.    She is current with urine drug screen today as well as a controlled substance agreement.  We reviewed [place] prescription monitoring today.  She is currently on 20 morphine milliequivalents per day of narcotics.  She does also take a small amount of benzodiazepine prescribed by her psychiatrist.  Her unintentional overdose risk score is 430 which is above average.  We discussed options to try to minimize this risk.  The best opportunity would be to slowly continue to taper her narcotic use.  At this time she is reluctant to continue further tapering and that there are days when she has significant pain.  I did describe to her that she can take 1 or 2 Norco at a time but she is limited to the monthly dose that she gets.  If she takes 2 tablets at 1 time then she at some point in time she will not be able to take a dose and she will need to manage other ways.  She is open to that possibility.    She does endorse  that she is having more frequent headaches due to her neck pain.  She has not increased her pain medication at all.  She also identifies that her previous therapist has left and she has not worked with her therapist in the last 3 months.  I asked her to contact her therapist office to help set up another appointment with a new therapist.    On exam today she is well-appearing in no acute distress.  Her blood pressure is 132/78 pulse is 110 and regular.  She is afebrile lungs are clear cardiac exam is regular.      Assessment: 53-year-old female with chronic pain and chronic continuous use of opioids.  Will continue current dosing of her Norco with a plan to try to continue to taper.    Referral to physical therapy and recommend that she contact her therapist's office to establish with another counselor.  - HYDROcodone-acetaminophen (NORCO) 5-325 MG tablet; Take 1 tablet by mouth every 6 hours as needed for severe pain.    Fibromyalgia  Sherie is a 54-year-old female who presents to clinic today for her 3-month follow-up of her chronic continuous use of opioids.  She has a longstanding history of chronic pain secondary to fibromyalgia as well as rheumatoid and osteoarthritis of the cervical spine.  She has been on Norco for many years and we are beginning to taper her medication.  We are able to successfully taper her from 1 tablet 6 times daily down to 1 tablet 4 times daily.  Her pain is unchanged with the decrease in dose.    She is current with urine drug screen today as well as a controlled substance agreement.  We reviewed [place] prescription monitoring today.  She is currently on 20 morphine milliequivalents per day of narcotics.  She does also take a small amount of benzodiazepine prescribed by her psychiatrist.  Her unintentional overdose risk score is 430 which is above average.  We discussed options to try to minimize this risk.  The best opportunity would be to slowly continue to taper her narcotic use.   At this time she is reluctant to continue further tapering and that there are days when she has significant pain.  I did describe to her that she can take 1 or 2 Norco at a time but she is limited to the monthly dose that she gets.  If she takes 2 tablets at 1 time then she at some point in time she will not be able to take a dose and she will need to manage other ways.  She is open to that possibility.    She does endorse that she is having more frequent headaches due to her neck pain.  She has not increased her pain medication at all.  She also identifies that her previous therapist has left and she has not worked with her therapist in the last 3 months.  I asked her to contact her therapist office to help set up another appointment with a new therapist.    On exam today she is well-appearing in no acute distress.  Her blood pressure is 132/78 pulse is 110 and regular.  She is afebrile lungs are clear cardiac exam is regular.      Assessment: 53-year-old female with chronic pain and chronic continuous use of opioids.  Will continue current dosing of her Norco with a plan to try to continue to taper.    Referral to physical therapy and recommend that she contact her therapist's office to establish with another counselor.  - HYDROcodone-acetaminophen (NORCO) 5-325 MG tablet; Take 1 tablet by mouth every 6 hours as needed for severe pain.    Chronic bilateral low back pain without sciatica  Sherie is a 54-year-old female who presents to clinic today for her 3-month follow-up of her chronic continuous use of opioids.  She has a longstanding history of chronic pain secondary to fibromyalgia as well as rheumatoid and osteoarthritis of the cervical spine.  She has been on Norco for many years and we are beginning to taper her medication.  We are able to successfully taper her from 1 tablet 6 times daily down to 1 tablet 4 times daily.  Her pain is unchanged with the decrease in dose.    She is current with urine drug  screen today as well as a controlled substance agreement.  We reviewed [place] prescription monitoring today.  She is currently on 20 morphine milliequivalents per day of narcotics.  She does also take a small amount of benzodiazepine prescribed by her psychiatrist.  Her unintentional overdose risk score is 430 which is above average.  We discussed options to try to minimize this risk.  The best opportunity would be to slowly continue to taper her narcotic use.  At this time she is reluctant to continue further tapering and that there are days when she has significant pain.  I did describe to her that she can take 1 or 2 Norco at a time but she is limited to the monthly dose that she gets.  If she takes 2 tablets at 1 time then she at some point in time she will not be able to take a dose and she will need to manage other ways.  She is open to that possibility.    She does endorse that she is having more frequent headaches due to her neck pain.  She has not increased her pain medication at all.  She also identifies that her previous therapist has left and she has not worked with her therapist in the last 3 months.  I asked her to contact her therapist office to help set up another appointment with a new therapist.    On exam today she is well-appearing in no acute distress.  Her blood pressure is 132/78 pulse is 110 and regular.  She is afebrile lungs are clear cardiac exam is regular.      Assessment: 53-year-old female with chronic pain and chronic continuous use of opioids.  Will continue current dosing of her Norco with a plan to try to continue to taper.    Referral to physical therapy and recommend that she contact her therapist's office to establish with another counselor.  - HYDROcodone-acetaminophen (NORCO) 5-325 MG tablet; Take 1 tablet by mouth every 6 hours as needed for severe pain.          BMI  Estimated body mass index is 27.4 kg/m  as calculated from the following:    Height as of 7/24/24: 1.664 m  "(5' 5.5\").    Weight as of this encounter: 75.8 kg (167 lb 3.2 oz).   Weight management plan: Discussed healthy diet and exercise guidelines       Follow-up Visit   Expected date:  Jan 17, 2025 (Approximate)      Follow Up Appointment Details:     Follow-up with whom?: Me    Follow-Up for what?: Chronic Disease f/u    Chronic Disease f/u: General (Other) Comment - chronic pain    How?: In Person                       Franky Rehman is a 54 year old, presenting for the following health issues:  RECHECK (Back pain, headaches, medication check )        10/17/2024     1:26 PM   Additional Questions   Roomed by Mayur GONZALEZ     History of Present Illness       Back Pain:  She presents for follow up of back pain. Patient's back pain is a recurring problem.  Location of back pain:  Right lower back, left lower back, right middle of back, left middle of back, right upper back, left upper back, right side of neck, left side of neck, right shoulder and left shoulder  Description of back pain: gnawing  Back pain spreads: nowhere    Since patient first noticed back pain, pain is: unchanged  Does back pain interfere with her job:  Not applicable       Headaches:   Since the patient's last clinic visit, headaches are: worsened  The patient is getting headaches:  Daily  She is not able to do normal daily activities when she has a migraine.  The patient is taking the following rescue/relief medications:  Ibuprofen (Advil, Motrin) and other   Patient states \"I get some relief\" from the rescue/relief medications.   The patient is taking the following medications to prevent migraines:  No medications to prevent migraines  In the past 4 weeks, the patient has gone to an Urgent Care or Emergency Room 0 times times due to headaches.    She eats 2-3 servings of fruits and vegetables daily.She consumes 1 sweetened beverage(s) daily.She exercises with enough effort to increase her heart rate 10 to 19 minutes per day.  She exercises with " enough effort to increase her heart rate 3 or less days per week. She is missing 1 dose(s) of medications per week.  She is not taking prescribed medications regularly due to remembering to take.     Headaches started about a week ago       Patient Active Problem List   Diagnosis    CARDIOVASCULAR SCREENING; LDL GOAL LESS THAN 160    Chronic fatigue syndrome    Fibromyalgia    Generalized anxiety disorder    Personal history of tobacco use    Disturbance in sleep behavior    Cervicalgia    Stenosis, cervical spine    Spondylitis, cervical (H)    NSAID induced gastritis    Major depressive disorder, recurrent episode, mild (H)    Chronic pain syndrome    Intermittent asthma, uncomplicated    Rheumatoid arthritis involving multiple sites with positive rheumatoid factor (H)    Elevated white blood cell count    Panic attacks    Osteoarthritis of cervical spine, unspecified spinal osteoarthritis complication status    Degenerative joint disease of cervical spine    Pulmonary nodules    Abnormal CT of the chest    Hilar lymphadenopathy    Other migraine without status migrainosus, intractable    Chronic rhinitis    Pelvic pain in female    Cervical cancer screening    Chronic, continuous use of opioids    Medical cannabis use    Balance problems     Current Outpatient Medications   Medication Sig Dispense Refill    albuterol (VENTOLIN HFA) 108 (90 Base) MCG/ACT inhaler Inhale 1-2 puffs into the lungs every 6 hours as needed for shortness of breath or wheezing 18 g 5    buPROPion (WELLBUTRIN XL) 150 MG 24 hr tablet Take 150 mg by mouth every morning With 300mg to = 450mg daily dose      buPROPion (WELLBUTRIN XL) 300 MG 24 hr tablet Take 300 mg by mouth every morning With 150mg to = 450mg daily dose      cetirizine (ZYRTEC) 10 MG tablet Take 1 tablet (10 mg) by mouth daily 90 tablet 0    clonazePAM (KLONOPIN) 0.5 MG tablet Take 0.5 mg by mouth 3 times daily as needed for anxiety      fluticasone (FLONASE) 50 MCG/ACT nasal  spray Spray 1-2 sprays into both nostrils daily SPRAY 2 SPRAYS INTO BOTH NOSTRILS DAILY. 15.8 mL 5    fluvoxaMINE (LUVOX) 100 MG tablet Take 200 mg by mouth at bedtime With 50mg to = 250mg daily dose      fluvoxaMINE (LUVOX) 50 MG tablet Take 50 mg by mouth at bedtime With 200mg to = 250mg daily dose      HYDROcodone-acetaminophen (NORCO) 5-325 MG tablet Take 1 tablet by mouth every 6 hours as needed for severe pain. 120 tablet 0    naloxone (NARCAN) nasal spray Spray 1 spray (4 mg) into one nostril alternating nostrils as needed for opioid reversal every 2-3 minutes until assistance arrives 0.2 mL 0    ondansetron (ZOFRAN ODT) 4 MG ODT tab Take 1 tablet (4 mg) by mouth every 8 hours as needed for nausea 10 tablet 0    REXULTI 4 MG tablet Take 4 mg by mouth daily               Review of Systems  CONSTITUTIONAL: NEGATIVE for fever, chills, change in weight  ENT/MOUTH: NEGATIVE for ear, mouth and throat problems  RESP: NEGATIVE for significant cough or SOB  CV: NEGATIVE for chest pain, palpitations or peripheral edema  MUSCULOSKELETAL: POSITIVE  for back pain chronci and neck pain  PSYCHIATRIC: POSITIVE foranxiety, depressed mood, Hx anxiety, and Hx depression and NEGATIVE forthoughts of hurting someone else and thoughts of self harm  ROS otherwise negative      Objective    /78   Pulse 110   Resp 20   Wt 75.8 kg (167 lb 3.2 oz)   SpO2 97%   BMI 27.40 kg/m    Body mass index is 27.4 kg/m .  Physical Exam   GENERAL: alert and no distress  NECK: no adenopathy, no asymmetry, masses, or scars  RESP: lungs clear to auscultation - no rales, rhonchi or wheezes  CV: regular rate and rhythm, normal S1 S2, no S3 or S4, no murmur, click or rub, no peripheral edema  ABDOMEN: soft, nontender, no hepatosplenomegaly, no masses and bowel sounds normal  MS: no gross musculoskeletal defects noted, no edema    Office Visit on 07/24/2024   Component Date Value Ref Range Status    Cannabinoids (59-vst-5-carboxy-9-T* 07/24/2024  Not Detected  Not Detected, Indeterminate Final    Cutoff for a negative cannabinoid is 50 ng/mL or less.    Phencyclidine 07/24/2024 Not Detected  Not Detected, Indeterminate Final    Cutoff for a negative PCP is 25 ng/mL or less.    Cocaine (Benzoylecgonine) 07/24/2024 Not Detected  Not Detected, Indeterminate Final    Cutoff for a negative cocaine is 150 ng/ml or less.    Methamphetamine (d-Methamphetamine) 07/24/2024 Not Detected  Not Detected, Indeterminate Final    Cutoff for a negative methamphetamine is 500 ng/ml or less.    Opiates (Morphine) 07/24/2024 Detected (A)  Not Detected, Indeterminate Final    Cutoff for a positive opiate is greater than 100 ng/ml.  This is an unconfirmed screening result to be used for medical purposes only.     Amphetamine (d-Amphetamine) 07/24/2024 Not Detected  Not Detected, Indeterminate Final    Cutoff for a negative amphetamine is 500 ng/mL or less.    Benzodiazepines (Nordiazepam) 07/24/2024 Not Detected  Not Detected, Indeterminate Final    Cutoff for a negative benzodiazepine is 150 ng/ml or less.    Tricyclic Antidepressants (Desipra* 07/24/2024 Not Detected  Not Detected, Indeterminate Final    Cutoff for a negative tricyclic antidepressant is 300 ng/ml or less.    Methadone 07/24/2024 Not Detected  Not Detected, Indeterminate Final    Cutoff for a negative methadone is 200 ng/ml or less.    Barbiturates (Butalbital) 07/24/2024 Not Detected  Not Detected, Indeterminate Final    Cutoff for a negative barbituate is 200 ng/ml or less.    Oxycodone 07/24/2024 Not Detected  Not Detected, Indeterminate Final    Cutoff for a negative oxycodone is 100 ng/mL or less.    Buprenorphine 07/24/2024 Not Detected  Not Detected, Indeterminate Final    Cutoff for a negative buprenorphine is 10 ng/ml or less.    Sodium 07/24/2024 139  135 - 145 mmol/L Final    Potassium 07/24/2024 4.4  3.4 - 5.3 mmol/L Final    Chloride 07/24/2024 104  98 - 107 mmol/L Final    Carbon Dioxide (CO2)  07/24/2024 26  22 - 29 mmol/L Final    Anion Gap 07/24/2024 9  7 - 15 mmol/L Final    Urea Nitrogen 07/24/2024 16.6  6.0 - 20.0 mg/dL Final    Creatinine 07/24/2024 1.10 (H)  0.51 - 0.95 mg/dL Final    GFR Estimate 07/24/2024 59 (L)  >60 mL/min/1.73m2 Final    eGFR calculated using 2021 CKD-EPI equation.    Calcium 07/24/2024 9.4  8.8 - 10.4 mg/dL Final    Reference intervals for this test were updated on 7/16/2024 to reflect our healthy population more accurately. There may be differences in the flagging of prior results with similar values performed with this method. Those prior results can be interpreted in the context of the updated reference intervals.    Glucose 07/24/2024 101 (H)  70 - 99 mg/dL Final           Signed Electronically by: Jim Edwards MD

## 2024-10-22 ENCOUNTER — THERAPY VISIT (OUTPATIENT)
Dept: PHYSICAL THERAPY | Facility: CLINIC | Age: 54
End: 2024-10-22
Attending: FAMILY MEDICINE
Payer: MEDICARE

## 2024-10-22 DIAGNOSIS — M54.2 CERVICALGIA: ICD-10-CM

## 2024-10-22 PROCEDURE — 97010 HOT OR COLD PACKS THERAPY: CPT | Mod: GP | Performed by: PHYSICAL THERAPIST

## 2024-10-22 PROCEDURE — 97110 THERAPEUTIC EXERCISES: CPT | Mod: GP | Performed by: PHYSICAL THERAPIST

## 2024-10-22 PROCEDURE — 97161 PT EVAL LOW COMPLEX 20 MIN: CPT | Mod: GP | Performed by: PHYSICAL THERAPIST

## 2024-10-22 PROCEDURE — 97140 MANUAL THERAPY 1/> REGIONS: CPT | Mod: GP | Performed by: PHYSICAL THERAPIST

## 2024-10-22 NOTE — PROGRESS NOTES
PHYSICAL THERAPY EVALUATION  Type of Visit: Evaluation        Fall Risk Screen:  Fall screen completed by: PT  Have you fallen 2 or more times in the past year?: Yes  Have you fallen and had an injury in the past year?: No  Timed Up and Go score (seconds): less than 13 seconds  Is patient a fall risk?: No  Fall screen comments: tripped over dog , and tripped going up stairs    Subjective    Patient reports that she has had headache since high school and pain can start in back and work up over her head or frontal . 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 But then stopped them , currently has been on meds for 1-2 years . Normally is not active , will clean her home take car of dogs and cats.  2 months ago started to have neck soreness and pulling and is getting daily headaches starting up the back of her neck to head . PMH none reported       Presenting condition or subjective complaint: Neck pain migraines  Date of onset: 08/22/24    Relevant medical history:     Dates & types of surgery: C section 1998    Prior diagnostic imaging/testing results:       Prior therapy history for the same diagnosis, illness or injury: Yes      Prior Level of Function  Transfers: Independent  Ambulation: Independent  ADL: Independent  IADL:  independent     Living Environment  Social support: With family members   Type of home: House   Stairs to enter the home: Yes 10 Is there a railing: Yes     Ramp: No   Stairs inside the home: Yes 10 Is there a railing: Yes     Help at home: None  Equipment owned:       Employment: No    Hobbies/Interests:    Patient goals for therapy: Function    Pain assessment:      Objective   Lays on ice , has theracane .   CERVICAL SPINE EVALUATION  PAIN: headache 0-8/10 daily ,  has knot on left neck , neck pain 2-8/10  INTEGUMENTARY (edema,  incisions):   POSTURE: forward head and rounded shoulders   GAIT:   Weightbearing Status:   Assistive Device(s):   Gait Deviations:   BALANCE/PROPRIOCEPTION:   WEIGHTBEARING ALIGNMENT:   ROM: laterial flexion 25 pulls , rotation right 50 left 45 , flexion 37 pulls , extension 38     MYOTOMES:   DTR S:   CORD SIGNS:   DERMATOMES: will sometimes have tingling in right hand 1x month when she is cold or has a bad headache  NEURAL TENSION:   FLEXIBILITY:    SPECIAL TESTS: spurling no radicular symptoms , pulls and headache   PALPATION:   SPINAL SEGMENTAL CONCLUSIONS:       Assessment & Plan   CLINICAL IMPRESSIONS  Medical Diagnosis: cervicalgia M54.2    Treatment Diagnosis: neck pain and headache   Impression/Assessment: Patient is a 54 year old female with chronic neck and headache complaints.  The following significant findings have been identified: Pain, Decreased ROM/flexibility, Decreased strength, Impaired muscle performance, and Decreased activity tolerance. These impairments interfere with their ability to perform self care tasks, work tasks, recreational activities, and household chores as compared to previous level of function.     Clinical Decision Making (Complexity):  Clinical Presentation: Stable/Uncomplicated  Clinical Presentation Rationale: based on medical and personal factors listed in PT evaluation  Clinical Decision Making (Complexity): Low complexity    PLAN OF CARE  Treatment Interventions:  Modalities:  as needed , manual traction with cold pack   Interventions: Manual Therapy, Neuromuscular Re-education, Therapeutic Activity, Therapeutic Exercise    Long Term Goals     PT Goal 1  Goal Identifier: 1  Goal Description: instruction in HEP and compliant with it 5 of 7 days to improve quality of life  Target Date: 01/19/25  PT Goal 2  Goal Identifier: 2  Goal Description: patient to have reduction in headaches currently has headache daily goal is 2x week with 50% less intensity  Target Date:  01/19/25  PT Goal 3  Goal Identifier: 2  Goal Description: patient to have overall improved tolerance to activity currently NDI 58 goal is less than 40  Target Date: 01/19/25      Frequency of Treatment: 1x week x 12 weeks  Duration of Treatment:      Recommended Referrals to Other Professionals:   Education Assessment:   Learner/Method: Patient;Listening;Reading;Demonstration;Pictures/Video;No Barriers to Learning    Risks and benefits of evaluation/treatment have been explained.   Patient/Family/caregiver agrees with Plan of Care.     Evaluation Time:     PT Eval, Low Complexity Minutes (56328): 15       Signing Clinician: Brooklyn Morrow, PT        [facility]                                                                                   OUTPATIENT PHYSICAL THERAPY      PLAN OF TREATMENT FOR OUTPATIENT REHABILITATION   Patient's Last Name, First Name, Sherie Braxton YOB: 1970   Provider's Name   [facility]   Medical Record No.  [id number]     Onset Date: 08/22/24  Start of Care Date: 10/22/24     Medical Diagnosis:  cervicalgia M54.2      PT Treatment Diagnosis:  neck pain and headache Plan of Treatment  Frequency/Duration: 1x week x 12 weeks/      Certification date from 10/22/24 to 01/19/25         See note for plan of treatment details and functional goals     Brooklyn Morrow, PT                         I CERTIFY THE NEED FOR THESE SERVICES FURNISHED UNDER        THIS PLAN OF TREATMENT AND WHILE UNDER MY CARE     (Physician attestation of this document indicates review and certification of the therapy plan).              Referring Provider:  Jim Edwards    Initial Assessment  See Epic Evaluation- Start of Care Date: 10/22/24

## 2024-10-28 ENCOUNTER — THERAPY VISIT (OUTPATIENT)
Dept: PHYSICAL THERAPY | Facility: CLINIC | Age: 54
End: 2024-10-28
Attending: FAMILY MEDICINE
Payer: MEDICARE

## 2024-10-28 DIAGNOSIS — M54.2 CERVICALGIA: Primary | ICD-10-CM

## 2024-10-28 PROCEDURE — 97110 THERAPEUTIC EXERCISES: CPT | Mod: GP | Performed by: PHYSICAL THERAPIST

## 2024-10-28 PROCEDURE — 97010 HOT OR COLD PACKS THERAPY: CPT | Mod: GP | Performed by: PHYSICAL THERAPIST

## 2024-10-28 PROCEDURE — 97140 MANUAL THERAPY 1/> REGIONS: CPT | Mod: GP | Performed by: PHYSICAL THERAPIST

## 2024-11-05 ENCOUNTER — THERAPY VISIT (OUTPATIENT)
Dept: PHYSICAL THERAPY | Facility: CLINIC | Age: 54
End: 2024-11-05
Attending: FAMILY MEDICINE
Payer: MEDICARE

## 2024-11-05 DIAGNOSIS — M54.2 CERVICALGIA: Primary | ICD-10-CM

## 2024-11-05 PROCEDURE — 97140 MANUAL THERAPY 1/> REGIONS: CPT | Mod: GP | Performed by: PHYSICAL THERAPIST

## 2024-11-05 PROCEDURE — 97010 HOT OR COLD PACKS THERAPY: CPT | Mod: GP | Performed by: PHYSICAL THERAPIST

## 2024-11-15 ENCOUNTER — MYC REFILL (OUTPATIENT)
Dept: FAMILY MEDICINE | Facility: CLINIC | Age: 54
End: 2024-11-15
Payer: MEDICARE

## 2024-11-15 DIAGNOSIS — M54.50 CHRONIC BILATERAL LOW BACK PAIN WITHOUT SCIATICA: ICD-10-CM

## 2024-11-15 DIAGNOSIS — G89.29 CHRONIC BILATERAL LOW BACK PAIN WITHOUT SCIATICA: ICD-10-CM

## 2024-11-15 DIAGNOSIS — M79.7 FIBROMYALGIA: ICD-10-CM

## 2024-11-15 DIAGNOSIS — G89.4 CHRONIC PAIN SYNDROME: ICD-10-CM

## 2024-11-15 DIAGNOSIS — M54.2 CERVICALGIA: ICD-10-CM

## 2024-11-15 RX ORDER — HYDROCODONE BITARTRATE AND ACETAMINOPHEN 5; 325 MG/1; MG/1
1 TABLET ORAL EVERY 6 HOURS PRN
Qty: 120 TABLET | Refills: 0 | Status: SHIPPED | OUTPATIENT
Start: 2024-11-15

## 2024-12-11 DIAGNOSIS — M79.7 FIBROMYALGIA: ICD-10-CM

## 2024-12-11 DIAGNOSIS — G89.29 CHRONIC BILATERAL LOW BACK PAIN WITHOUT SCIATICA: ICD-10-CM

## 2024-12-11 DIAGNOSIS — M54.2 CERVICALGIA: ICD-10-CM

## 2024-12-11 DIAGNOSIS — G89.4 CHRONIC PAIN SYNDROME: ICD-10-CM

## 2024-12-11 DIAGNOSIS — M54.50 CHRONIC BILATERAL LOW BACK PAIN WITHOUT SCIATICA: ICD-10-CM

## 2024-12-12 RX ORDER — HYDROCODONE BITARTRATE AND ACETAMINOPHEN 5; 325 MG/1; MG/1
1 TABLET ORAL EVERY 6 HOURS PRN
Qty: 120 TABLET | Refills: 0 | Status: SHIPPED | OUTPATIENT
Start: 2024-12-12

## 2025-01-16 ENCOUNTER — OFFICE VISIT (OUTPATIENT)
Dept: FAMILY MEDICINE | Facility: CLINIC | Age: 55
End: 2025-01-16
Payer: MEDICARE

## 2025-01-16 VITALS
SYSTOLIC BLOOD PRESSURE: 128 MMHG | TEMPERATURE: 97.2 F | HEIGHT: 66 IN | OXYGEN SATURATION: 97 % | BODY MASS INDEX: 28.93 KG/M2 | RESPIRATION RATE: 20 BRPM | HEART RATE: 109 BPM | WEIGHT: 180 LBS | DIASTOLIC BLOOD PRESSURE: 80 MMHG

## 2025-01-16 DIAGNOSIS — M54.2 CERVICALGIA: ICD-10-CM

## 2025-01-16 DIAGNOSIS — G89.29 CHRONIC BILATERAL LOW BACK PAIN WITHOUT SCIATICA: ICD-10-CM

## 2025-01-16 DIAGNOSIS — M79.7 FIBROMYALGIA: ICD-10-CM

## 2025-01-16 DIAGNOSIS — M54.50 CHRONIC BILATERAL LOW BACK PAIN WITHOUT SCIATICA: ICD-10-CM

## 2025-01-16 DIAGNOSIS — G89.4 CHRONIC PAIN SYNDROME: ICD-10-CM

## 2025-01-16 PROCEDURE — G2211 COMPLEX E/M VISIT ADD ON: HCPCS | Performed by: FAMILY MEDICINE

## 2025-01-16 PROCEDURE — 99214 OFFICE O/P EST MOD 30 MIN: CPT | Performed by: FAMILY MEDICINE

## 2025-01-16 RX ORDER — HYDROCODONE BITARTRATE AND ACETAMINOPHEN 5; 325 MG/1; MG/1
1 TABLET ORAL EVERY 6 HOURS PRN
Qty: 120 TABLET | Refills: 0 | Status: CANCELLED | OUTPATIENT
Start: 2025-01-16

## 2025-01-16 RX ORDER — HYDROCODONE BITARTRATE AND ACETAMINOPHEN 5; 325 MG/1; MG/1
1 TABLET ORAL EVERY 6 HOURS PRN
Qty: 120 TABLET | Refills: 0 | Status: SHIPPED | OUTPATIENT
Start: 2025-01-16

## 2025-01-16 ASSESSMENT — ASTHMA QUESTIONNAIRES
QUESTION_4 LAST FOUR WEEKS HOW OFTEN HAVE YOU USED YOUR RESCUE INHALER OR NEBULIZER MEDICATION (SUCH AS ALBUTEROL): NOT AT ALL
QUESTION_5 LAST FOUR WEEKS HOW WOULD YOU RATE YOUR ASTHMA CONTROL: COMPLETELY CONTROLLED
ACT_TOTALSCORE: 24
ACT_TOTALSCORE: 24
QUESTION_2 LAST FOUR WEEKS HOW OFTEN HAVE YOU HAD SHORTNESS OF BREATH: ONCE OR TWICE A WEEK
QUESTION_3 LAST FOUR WEEKS HOW OFTEN DID YOUR ASTHMA SYMPTOMS (WHEEZING, COUGHING, SHORTNESS OF BREATH, CHEST TIGHTNESS OR PAIN) WAKE YOU UP AT NIGHT OR EARLIER THAN USUAL IN THE MORNING: NOT AT ALL
QUESTION_1 LAST FOUR WEEKS HOW MUCH OF THE TIME DID YOUR ASTHMA KEEP YOU FROM GETTING AS MUCH DONE AT WORK, SCHOOL OR AT HOME: NONE OF THE TIME

## 2025-01-16 ASSESSMENT — PAIN SCALES - GENERAL: PAINLEVEL_OUTOF10: SEVERE PAIN (8)

## 2025-01-16 NOTE — PROGRESS NOTES
Assessment & Plan     Cervicalgia  Chronic pain syndrome  Fibromyalgia  Chronic bilateral low back pain without sciatica  Sherie is a 54-year-old female who presents to clinic today for her 3-month follow-up of her chronic continuous use of opioids.  She has a longstanding history of chronic pain secondary to fibromyalgia as well as rheumatoid and osteoarthritis of the cervical spine.  She has been on Norco for many years and we are beginning to taper her medication.  We are able to successfully taper her from 1 tablet 6 times daily down to 1 tablet 4 times daily.  Her pain is unchanged with the decrease in dose.     She is current with urine drug screen today as well as a controlled substance agreement.  We reviewed [place] prescription monitoring today.  She is currently on 16-20 morphine milliequivalents per day of narcotics.  She does also take a small amount of benzodiazepine prescribed by her psychiatrist.  Her unintentional overdose risk score is 410 which is above average.  We discussed options to try to minimize this risk.  The best opportunity would be to slowly continue to taper her narcotic use.  At this time she is reluctant to continue further tapering and that there are days when she has significant pain.  I did describe to her that she can take 1 or 2 Norco at a time but she is limited to the monthly dose that she gets.  If she takes 2 tablets at 1 time then she at some point in time she will not be able to take a dose and she will need to manage other ways.  She is open to that possibility.     She did endorse that she is having more frequent headaches due to her neck pain. She was referred to physical therapy with good results with home exercise program that she continues to do.  She has not had a headache since her workup and evaluation 2 months ago.  We discussed how this his evidence that exercise, relaxation techniques and stretching can be more helpful than pain medication.    On exam today  she is well-appearing in no acute distress.  Her blood pressure is 128/80 pulse is 110 and regular.  She is afebrile lungs are clear cardiac exam is regular.       Assessment: 54-year-old female with chronic pain and chronic continuous use of opioids.  Will continue current dosing of her Norco with a plan to try to continue to taper.    - HYDROcodone-acetaminophen (NORCO) 5-325 MG tablet; Take 1 tablet by mouth every 6 hours as needed for severe pain.            FUTURE APPOINTMENTS:       - Follow-up visit in 3 months to establish primary care after my long term    Work on weight loss  Regular exercise    Franky Rehman is a 54 year old, presenting for the following health issues:  Recheck Medication      1/16/2025     3:11 PM   Additional Questions   Roomed by Jolene     History of Present Illness       Back Pain:  She presents for follow up of back pain. Patient's back pain is a chronic problem.  Location of back pain:  Right upper back, left upper back, right shoulder and left shoulder  Description of back pain: gnawing  Back pain spreads: right shoulder and left shoulder    Since patient first noticed back pain, pain is: gradually worsening  Does back pain interfere with her job:  Yes       She eats 2-3 servings of fruits and vegetables daily.She consumes 2 sweetened beverage(s) daily.She exercises with enough effort to increase her heart rate 10 to 19 minutes per day.  She exercises with enough effort to increase her heart rate 4 days per week. She is missing 1 dose(s) of medications per week.  She is not taking prescribed medications regularly due to remembering to take.         Current Outpatient Medications   Medication Sig Dispense Refill    albuterol (VENTOLIN HFA) 108 (90 Base) MCG/ACT inhaler Inhale 1-2 puffs into the lungs every 6 hours as needed for shortness of breath or wheezing 18 g 5    buPROPion (WELLBUTRIN XL) 150 MG 24 hr tablet Take 150 mg by mouth every morning With 300mg to = 450mg daily  dose      buPROPion (WELLBUTRIN XL) 300 MG 24 hr tablet Take 300 mg by mouth every morning With 150mg to = 450mg daily dose      cetirizine (ZYRTEC) 10 MG tablet Take 1 tablet (10 mg) by mouth daily 90 tablet 0    clonazePAM (KLONOPIN) 0.5 MG tablet Take 0.5 mg by mouth 3 times daily as needed for anxiety      fluticasone (FLONASE) 50 MCG/ACT nasal spray Spray 1-2 sprays into both nostrils daily SPRAY 2 SPRAYS INTO BOTH NOSTRILS DAILY. 15.8 mL 5    fluvoxaMINE (LUVOX) 100 MG tablet Take 200 mg by mouth at bedtime With 50mg to = 250mg daily dose      fluvoxaMINE (LUVOX) 50 MG tablet Take 50 mg by mouth at bedtime With 200mg to = 250mg daily dose      HYDROcodone-acetaminophen (NORCO) 5-325 MG tablet Take 1 tablet by mouth every 6 hours as needed for severe pain. 120 tablet 0    ondansetron (ZOFRAN ODT) 4 MG ODT tab Take 1 tablet (4 mg) by mouth every 8 hours as needed for nausea 10 tablet 0    REXULTI 4 MG tablet Take 4 mg by mouth daily      naloxone (NARCAN) nasal spray Spray 1 spray (4 mg) into one nostril alternating nostrils as needed for opioid reversal every 2-3 minutes until assistance arrives (Patient not taking: Reported on 1/16/2025) 0.2 mL 0     Patient Active Problem List   Diagnosis    CARDIOVASCULAR SCREENING; LDL GOAL LESS THAN 160    Chronic fatigue syndrome    Fibromyalgia    Generalized anxiety disorder    Personal history of tobacco use    Disturbance in sleep behavior    Cervicalgia    Stenosis, cervical spine    Spondylitis, cervical    NSAID induced gastritis    Major depressive disorder, recurrent episode, mild    Chronic pain syndrome    Intermittent asthma, uncomplicated    Rheumatoid arthritis involving multiple sites with positive rheumatoid factor (H)    Elevated white blood cell count    Panic attacks    Osteoarthritis of cervical spine, unspecified spinal osteoarthritis complication status    Degenerative joint disease of cervical spine    Pulmonary nodules    Abnormal CT of the chest     Hilar lymphadenopathy    Other migraine without status migrainosus, intractable    Chronic rhinitis    Pelvic pain in female    Cervical cancer screening    Chronic, continuous use of opioids    Medical cannabis use    Balance problems       Pain History:  When did you first notice your pain?  10 years ago  Have you seen this provider for your pain in the past? Yes   Where in your body do you have pain?  Neck and lumbar spine  Are you seeing anyone else for your pain? Yes -therapist, intermittently interventional radiology and physical therapy        7/8/2024     5:40 PM 10/16/2024     8:15 PM 1/16/2025    11:05 AM   PHQ-9 SCORE   PHQ-9 Total Score MyChart 10 (Moderate depression) 10 (Moderate depression) 9 (Mild depression)   PHQ-9 Total Score 10 10 9        Patient-reported           4/3/2024     8:56 AM 4/17/2024     9:05 AM 7/1/2024    10:05 AM   CELIA-7 SCORE   Total Score 13 (moderate anxiety) 13 (moderate anxiety) 13 (moderate anxiety)   Total Score 13 13 13           8/3/2022     3:16 PM 12/19/2022     1:35 PM 1/16/2025     3:19 PM   PEG Score   PEG Total Score 8.67 6.33 8.33           Chronic Pain Follow Up:    Location of pain: Cervical spine and lumbar spine  Analgesia/pain control:    - Recent changes: Improved   - Overall control: Tolerable with discomfort    - Current treatments: Home exercise program, [place] 2 tablets in a.m. and 1 to 2 tablets in the later afternoon  Adherence:     - Do you ever take more pain medicine than prescribed? No    - When did you take your last dose of pain medicine?  Today  Adverse effects: No   PDMP Review         Value Time User    State PDMP site checked  Yes 1/16/2025  3:36 PM Jim Edwards MD          Last CSA Agreement:   CSA -- Patient Level:     [Media Unavailable] Controlled Substance Agreement - Opioid - Scan on 7/24/2024  2:55 PM   [Media Unavailable] Controlled Substance Agreement - Opioid - Scan on 10/30/2023  5:17 PM: OPIOID AGREEMENT   [Media Unavailable]  "Controlled Substance Agreement - Opioid - Scan on 6/13/2022  4:08 PM   [Media Unavailable] Controlled Substance Agreement - Opioid - Scan on 5/26/2021  4:37 PM   [Media Unavailable] Controlled Substance Agreement - Opioid - Scan on 1/27/2020  2:58 PM: controlled substance agreement   [Media Unavailable] Controlled Substance Agreement - Opioid - Scan on 1/24/2019  1:31 PM: signed 1/11/2019       Last UDS: 7/24/2024    Patient declined to complete Opioid Risk Tool today                Review of Systems  CONSTITUTIONAL: NEGATIVE for fever, chills, change in weight  ENT/MOUTH: NEGATIVE for ear, mouth and throat problems  RESP: NEGATIVE for significant cough or SOB  CV: NEGATIVE for chest pain, palpitations or peripheral edema  ROS otherwise negative      Objective    /80   Pulse 109   Temp 97.2  F (36.2  C) (Temporal)   Resp 20   Ht 1.664 m (5' 5.5\")   Wt 81.6 kg (180 lb)   SpO2 97%   BMI 29.50 kg/m    Body mass index is 29.5 kg/m .  Physical Exam   GENERAL: alert and no distress  NECK: no adenopathy, no asymmetry, masses, or scars  RESP: lungs clear to auscultation - no rales, rhonchi or wheezes  CV: regular rate and rhythm, normal S1 S2, no S3 or S4, no murmur, click or rub, no peripheral edema  ABDOMEN: soft, nontender, no hepatosplenomegaly, no masses and bowel sounds normal  MS: no gross musculoskeletal defects noted, no edema    Office Visit on 07/24/2024   Component Date Value Ref Range Status    Cannabinoids (11-uej-9-carboxy-9-T* 07/24/2024 Not Detected  Not Detected, Indeterminate Final    Cutoff for a negative cannabinoid is 50 ng/mL or less.    Phencyclidine 07/24/2024 Not Detected  Not Detected, Indeterminate Final    Cutoff for a negative PCP is 25 ng/mL or less.    Cocaine (Benzoylecgonine) 07/24/2024 Not Detected  Not Detected, Indeterminate Final    Cutoff for a negative cocaine is 150 ng/ml or less.    Methamphetamine (d-Methamphetamine) 07/24/2024 Not Detected  Not Detected, Indeterminate " Final    Cutoff for a negative methamphetamine is 500 ng/ml or less.    Opiates (Morphine) 07/24/2024 Detected (A)  Not Detected, Indeterminate Final    Cutoff for a positive opiate is greater than 100 ng/ml.  This is an unconfirmed screening result to be used for medical purposes only.     Amphetamine (d-Amphetamine) 07/24/2024 Not Detected  Not Detected, Indeterminate Final    Cutoff for a negative amphetamine is 500 ng/mL or less.    Benzodiazepines (Nordiazepam) 07/24/2024 Not Detected  Not Detected, Indeterminate Final    Cutoff for a negative benzodiazepine is 150 ng/ml or less.    Tricyclic Antidepressants (Desipra* 07/24/2024 Not Detected  Not Detected, Indeterminate Final    Cutoff for a negative tricyclic antidepressant is 300 ng/ml or less.    Methadone 07/24/2024 Not Detected  Not Detected, Indeterminate Final    Cutoff for a negative methadone is 200 ng/ml or less.    Barbiturates (Butalbital) 07/24/2024 Not Detected  Not Detected, Indeterminate Final    Cutoff for a negative barbituate is 200 ng/ml or less.    Oxycodone 07/24/2024 Not Detected  Not Detected, Indeterminate Final    Cutoff for a negative oxycodone is 100 ng/mL or less.    Buprenorphine 07/24/2024 Not Detected  Not Detected, Indeterminate Final    Cutoff for a negative buprenorphine is 10 ng/ml or less.    Sodium 07/24/2024 139  135 - 145 mmol/L Final    Potassium 07/24/2024 4.4  3.4 - 5.3 mmol/L Final    Chloride 07/24/2024 104  98 - 107 mmol/L Final    Carbon Dioxide (CO2) 07/24/2024 26  22 - 29 mmol/L Final    Anion Gap 07/24/2024 9  7 - 15 mmol/L Final    Urea Nitrogen 07/24/2024 16.6  6.0 - 20.0 mg/dL Final    Creatinine 07/24/2024 1.10 (H)  0.51 - 0.95 mg/dL Final    GFR Estimate 07/24/2024 59 (L)  >60 mL/min/1.73m2 Final    eGFR calculated using 2021 CKD-EPI equation.    Calcium 07/24/2024 9.4  8.8 - 10.4 mg/dL Final    Reference intervals for this test were updated on 7/16/2024 to reflect our healthy population more accurately.  There may be differences in the flagging of prior results with similar values performed with this method. Those prior results can be interpreted in the context of the updated reference intervals.    Glucose 07/24/2024 101 (H)  70 - 99 mg/dL Final           Signed Electronically by: Jim Edwards MD
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
## 2025-01-30 ENCOUNTER — VIRTUAL VISIT (OUTPATIENT)
Dept: FAMILY MEDICINE | Facility: CLINIC | Age: 55
End: 2025-01-30
Payer: MEDICARE

## 2025-01-30 DIAGNOSIS — G89.29 CHRONIC BILATERAL LOW BACK PAIN WITHOUT SCIATICA: ICD-10-CM

## 2025-01-30 DIAGNOSIS — Z79.899 CHRONIC PRESCRIPTION BENZODIAZEPINE USE: ICD-10-CM

## 2025-01-30 DIAGNOSIS — F33.0 MAJOR DEPRESSIVE DISORDER, RECURRENT EPISODE, MILD: ICD-10-CM

## 2025-01-30 DIAGNOSIS — M05.79 RHEUMATOID ARTHRITIS INVOLVING MULTIPLE SITES WITH POSITIVE RHEUMATOID FACTOR (H): ICD-10-CM

## 2025-01-30 DIAGNOSIS — G43.819 OTHER MIGRAINE WITHOUT STATUS MIGRAINOSUS, INTRACTABLE: ICD-10-CM

## 2025-01-30 DIAGNOSIS — M54.50 CHRONIC BILATERAL LOW BACK PAIN WITHOUT SCIATICA: ICD-10-CM

## 2025-01-30 DIAGNOSIS — F11.90 CHRONIC, CONTINUOUS USE OF OPIOIDS: Primary | ICD-10-CM

## 2025-01-30 DIAGNOSIS — F41.1 GENERALIZED ANXIETY DISORDER: ICD-10-CM

## 2025-01-30 DIAGNOSIS — Z76.89 ENCOUNTER TO ESTABLISH CARE: ICD-10-CM

## 2025-01-30 DIAGNOSIS — G89.4 CHRONIC PAIN SYNDROME: ICD-10-CM

## 2025-01-30 DIAGNOSIS — Z72.0 TOBACCO ABUSE: ICD-10-CM

## 2025-01-30 DIAGNOSIS — M54.2 CERVICALGIA: ICD-10-CM

## 2025-01-30 NOTE — PROGRESS NOTES
Sherie is a 54 year old who is being evaluated via a billable video visit.    How would you like to obtain your AVS? MyChart  If the video visit is dropped, the invitation should be resent by: Text to cell phone: [phone]  Will anyone else be joining your video visit? No      Assessment & Plan   Problem List Items Addressed This Visit          Nervous and Auditory    Cervicalgia    Chronic pain syndrome    Other migraine without status migrainosus, intractable       Immune    Rheumatoid arthritis involving multiple sites with positive rheumatoid factor (H)       Behavioral    Generalized anxiety disorder    Major depressive disorder, recurrent episode, mild       Other    Chronic, continuous use of opioids - Primary     Other Visit Diagnoses       Chronic bilateral low back pain without sciatica        Tobacco abuse        Chronic prescription benzodiazepine use        Encounter to establish care                 History reviewed and updated today.  Again reviewed risk of chronic benzodiazepines as well as interaction with chronic opioid use.  I did congratulate her on cutting down on her opioid use over this last year and would continue to go further given interactions benzodiazepines.  If psychiatry plans to taper off benzodiazepines I think that would be in her best interest long-term.  PDMP reviewed.  Continue other modalities for her pain as well as anxiety now.  Strongly encouraged smoking cessation and continuing home therapy.  Will follow-up in 3 months in person to review pain contract as well as potential changes with psychiatry.  Sooner if new or worsening issues arise    The longitudinal plan of care for the diagnosis(es)/condition(s) as documented were addressed during this visit. Due to the added complexity in care, I will continue to support Sherie in the subsequent management and with ongoing continuity of care.          Franky Rehman is a 54 year old, presenting for the following health  issues:  Establish [facility]        1/30/2025     1:32 PM   Additional Questions   Roomed by Sharon     Video Start Time: 3:48 PM    History of Present Illness       Reason for visit:  [facility] She is missing 1 dose(s) of medications per week.  She is not taking prescribed medications regularly due to remembering to take.       Patient being seen today to [facility].  She has no acute concerns.  She did see her previous provider recently who is leaving the clinic.  Has been taking medications as prescribed.  She does have follow-up with psychiatry who is also changing providers.  They did discuss tapering her benzodiazepines in the past.  Has come down on oxycodone significantly since transition to Dr. Edwards.       Review of Systems  Constitutional, HEENT, cardiovascular, pulmonary, GI, , musculoskeletal, neuro, skin, endocrine and psych systems are negative, except as otherwise noted.      Objective    Vitals - Patient Reported  Pain Score: Moderate Pain (6)        Physical Exam   GENERAL: alert and no distress  EYES: Eyes grossly normal to inspection.  No discharge or erythema, or obvious scleral/conjunctival abnormalities.  RESP: No audible wheeze, cough, or visible cyanosis.    SKIN: Visible skin clear. No significant rash, abnormal pigmentation or lesions.  NEURO: Cranial nerves grossly intact.  Mentation and speech appropriate for age.  PSYCH: Appropriate affect, tone, and pace of words        Video-Visit Details    Type of service:  Video Visit   Video End Time:3:58 PM  Originating Location (pt. Location): Home    Distant Location (provider location):  On-site  Platform used for Video Visit: Magy  Signed Electronically by: Maxwell Betts MD

## 2025-02-11 ENCOUNTER — MYC REFILL (OUTPATIENT)
Dept: FAMILY MEDICINE | Facility: CLINIC | Age: 55
End: 2025-02-11
Payer: MEDICARE

## 2025-02-11 DIAGNOSIS — M54.50 CHRONIC BILATERAL LOW BACK PAIN WITHOUT SCIATICA: ICD-10-CM

## 2025-02-11 DIAGNOSIS — M79.7 FIBROMYALGIA: ICD-10-CM

## 2025-02-11 DIAGNOSIS — G89.4 CHRONIC PAIN SYNDROME: ICD-10-CM

## 2025-02-11 DIAGNOSIS — M54.2 CERVICALGIA: ICD-10-CM

## 2025-02-11 DIAGNOSIS — G89.29 CHRONIC BILATERAL LOW BACK PAIN WITHOUT SCIATICA: ICD-10-CM

## 2025-02-12 RX ORDER — HYDROCODONE BITARTRATE AND ACETAMINOPHEN 5; 325 MG/1; MG/1
1 TABLET ORAL EVERY 6 HOURS PRN
Qty: 120 TABLET | Refills: 0 | Status: SHIPPED | OUTPATIENT
Start: 2025-02-12

## 2025-03-11 ENCOUNTER — MYC REFILL (OUTPATIENT)
Dept: FAMILY MEDICINE | Facility: CLINIC | Age: 55
End: 2025-03-11
Payer: MEDICARE

## 2025-03-11 DIAGNOSIS — M54.2 CERVICALGIA: ICD-10-CM

## 2025-03-11 DIAGNOSIS — G89.4 CHRONIC PAIN SYNDROME: ICD-10-CM

## 2025-03-11 DIAGNOSIS — M79.7 FIBROMYALGIA: ICD-10-CM

## 2025-03-11 DIAGNOSIS — G89.29 CHRONIC BILATERAL LOW BACK PAIN WITHOUT SCIATICA: ICD-10-CM

## 2025-03-11 DIAGNOSIS — M54.50 CHRONIC BILATERAL LOW BACK PAIN WITHOUT SCIATICA: ICD-10-CM

## 2025-03-12 RX ORDER — HYDROCODONE BITARTRATE AND ACETAMINOPHEN 5; 325 MG/1; MG/1
1 TABLET ORAL EVERY 6 HOURS PRN
Qty: 120 TABLET | Refills: 0 | Status: SHIPPED | OUTPATIENT
Start: 2025-03-12

## 2025-04-10 ENCOUNTER — TELEPHONE (OUTPATIENT)
Dept: FAMILY MEDICINE | Facility: CLINIC | Age: 55
End: 2025-04-10
Payer: MEDICARE

## 2025-04-10 NOTE — TELEPHONE ENCOUNTER
Patient Returning Call    Reason for call:  Patient was recommended to do a followup -in 3 mos. Has not been feeling well and nothing showing in schedule until June. Wondering if can get in sooner. Please call and advise.     Information relayed to patient:  Message Sent     Patient has additional questions:  Yes    What are your questions/concerns:  Patient was recommended to do a followup -in 3 mos. Has not been feeling well and nothing showing in schedule until June. Wondering if can get in sooner. Please call and advise.     Could we send this information to you in [employer] or would you prefer to receive a phone call?:   Patient would prefer a phone call   Okay to leave a detailed message?: Yes at Home number on file [phone] (home)

## 2025-04-14 ENCOUNTER — E-VISIT (OUTPATIENT)
Dept: FAMILY MEDICINE | Facility: CLINIC | Age: 55
End: 2025-04-14
Payer: MEDICARE

## 2025-04-14 ENCOUNTER — MYC MEDICAL ADVICE (OUTPATIENT)
Dept: FAMILY MEDICINE | Facility: CLINIC | Age: 55
End: 2025-04-14
Payer: MEDICARE

## 2025-04-14 DIAGNOSIS — M47.812 OSTEOARTHRITIS OF CERVICAL SPINE, UNSPECIFIED SPINAL OSTEOARTHRITIS COMPLICATION STATUS: Primary | ICD-10-CM

## 2025-04-14 PROCEDURE — 99207 PR NON-BILLABLE SERV PER CHARTING: CPT | Performed by: STUDENT IN AN ORGANIZED HEALTH CARE EDUCATION/TRAINING PROGRAM

## 2025-04-15 ENCOUNTER — VIRTUAL VISIT (OUTPATIENT)
Dept: FAMILY MEDICINE | Facility: CLINIC | Age: 55
End: 2025-04-15
Payer: MEDICARE

## 2025-04-15 DIAGNOSIS — M79.7 FIBROMYALGIA: Primary | ICD-10-CM

## 2025-04-15 DIAGNOSIS — M05.79 RHEUMATOID ARTHRITIS INVOLVING MULTIPLE SITES WITH POSITIVE RHEUMATOID FACTOR (H): ICD-10-CM

## 2025-04-15 PROCEDURE — 98005 SYNCH AUDIO-VIDEO EST LOW 20: CPT

## 2025-04-15 RX ORDER — PREDNISONE 20 MG/1
TABLET ORAL
Qty: 6 TABLET | Refills: 0 | Status: SHIPPED | OUTPATIENT
Start: 2025-04-15 | End: 2025-04-22

## 2025-04-15 RX ORDER — PREDNISONE 20 MG/1
TABLET ORAL
Qty: 5 TABLET | Refills: 0 | Status: SHIPPED | OUTPATIENT
Start: 2025-04-15 | End: 2025-04-15

## 2025-04-15 NOTE — PATIENT INSTRUCTIONS
ASSESSMENT & PLAN    Fibromyalgia Flare  - Flare-up of fibromyalgia with pain in joints, hands, knees, feet, and spine.  - Prescribe prednisone 20 mg for 5 days, followed by 10 mg for 2 days. Prescription sent to [facility] in [place].    Rheumatoid Arthritis Flare  - Flare-up of rheumatoid arthritis with similar symptoms as fibromyalgia.  - Same prednisone regimen as for fibromyalgia flare. Follow-up with Dr. Maxwell Betts on April 25, 2025, to assess condition and need for additional prednisone.

## 2025-04-15 NOTE — PROGRESS NOTES
"Sherie is a 54 year old who is being evaluated via a billable video visit.    How would you like to obtain your AVS? MyChart  If the video visit is dropped, the invitation should be resent by: Text to cell phone: [phone]  Will anyone else be joining your video visit? No      Assessment & Plan     Fibromyalgia  - predniSONE (DELTASONE) 20 MG tablet; Take 1 tablet (20 mg) by mouth daily for 5 days, THEN 0.5 tablets (10 mg) daily for 2 days.    Rheumatoid arthritis involving multiple sites with positive rheumatoid factor (H)  - predniSONE (DELTASONE) 20 MG tablet; Take 1 tablet (20 mg) by mouth daily for 5 days, THEN 0.5 tablets (10 mg) daily for 2 days.    BMI  Estimated body mass index is 29.5 kg/m  as calculated from the following:    Height as of 1/16/25: 1.664 m (5' 5.5\").    Weight as of 1/16/25: 81.6 kg (180 lb).         See Patient Instructions  Patient Instructions   ASSESSMENT & PLAN    Fibromyalgia Flare  - Flare-up of fibromyalgia with pain in joints, hands, knees, feet, and spine.  - Prescribe prednisone 20 mg for 5 days, followed by 10 mg for 2 days. Prescription sent to [facility] in [place].    Rheumatoid Arthritis Flare  - Flare-up of rheumatoid arthritis with similar symptoms as fibromyalgia.  - Same prednisone regimen as for fibromyalgia flare. Follow-up with Dr. Maxwell Betts on April 25, 2025, to assess condition and need for additional prednisone.    Subjective   Sherie is a 54 year old, presenting for the following health issues:  Fibromyalgia and Arthritis      4/15/2025     2:13 PM   Additional Questions   Roomed by Olesya         4/15/2025     2:13 PM   Patient Reported Additional Medications   Patient reports taking the following new medications trintelix     Video Start Time:  3:10pm    Arthritis    History of Present Illness       Reason for visit:  Reumatoid arthritis and fibromyalgia flare    She eats 2-3 servings of fruits and vegetables daily.She consumes 1 sweetened beverage(s) " daily.She exercises with enough effort to increase her heart rate 20 to 29 minutes per day.  She exercises with enough effort to increase her heart rate 3 or less days per week.   She is taking medications regularly.      SUBJECTIVE    The patient reports experiencing a flare-up of fibromyalgia and rheumatoid arthritis that began last week and has not subsided. The flare is characterized by joint pain affecting the hands, knees, feet, and spine. The current pain medication, Vicodin, taken at a dosage of two tablets twice a day, is not providing adequate relief. In the past, the patient has managed similar flares by taking a week's worth of prednisone, noting that the Medrol pack is less effective for them. The patient typically uses a 40 mg dose of prednisone but finds it causes jitteriness, preferring a 20 mg dose instead.    The patient has recently started taking Trintellix and inquired about potential interactions with prednisone, expressing concern about any interference between the medications. The patient has not experienced a flare in a long time prior to this episode.                  Objective           Vitals:  No vitals were obtained today due to virtual visit.    Physical Exam   GENERAL: alert and no distress  EYES: Eyes grossly normal to inspection.  No discharge or erythema, or obvious scleral/conjunctival abnormalities.  RESP: No audible wheeze, cough, or visible cyanosis.    SKIN: Visible skin clear. No significant rash, abnormal pigmentation or lesions.  NEURO: Cranial nerves grossly intact.  Mentation and speech appropriate for age.  PSYCH: Appropriate affect, tone, and pace of words    Office Visit on 07/24/2024   Component Date Value Ref Range Status    Cannabinoids (21-ykk-1-carboxy-9-T* 07/24/2024 Not Detected  Not Detected, Indeterminate Final    Cutoff for a negative cannabinoid is 50 ng/mL or less.    Phencyclidine 07/24/2024 Not Detected  Not Detected, Indeterminate Final    Cutoff for a  negative PCP is 25 ng/mL or less.    Cocaine (Benzoylecgonine) 07/24/2024 Not Detected  Not Detected, Indeterminate Final    Cutoff for a negative cocaine is 150 ng/ml or less.    Methamphetamine (d-Methamphetamine) 07/24/2024 Not Detected  Not Detected, Indeterminate Final    Cutoff for a negative methamphetamine is 500 ng/ml or less.    Opiates (Morphine) 07/24/2024 Detected (A)  Not Detected, Indeterminate Final    Cutoff for a positive opiate is greater than 100 ng/ml.  This is an unconfirmed screening result to be used for medical purposes only.     Amphetamine (d-Amphetamine) 07/24/2024 Not Detected  Not Detected, Indeterminate Final    Cutoff for a negative amphetamine is 500 ng/mL or less.    Benzodiazepines (Nordiazepam) 07/24/2024 Not Detected  Not Detected, Indeterminate Final    Cutoff for a negative benzodiazepine is 150 ng/ml or less.    Tricyclic Antidepressants (Desipra* 07/24/2024 Not Detected  Not Detected, Indeterminate Final    Cutoff for a negative tricyclic antidepressant is 300 ng/ml or less.    Methadone 07/24/2024 Not Detected  Not Detected, Indeterminate Final    Cutoff for a negative methadone is 200 ng/ml or less.    Barbiturates (Butalbital) 07/24/2024 Not Detected  Not Detected, Indeterminate Final    Cutoff for a negative barbituate is 200 ng/ml or less.    Oxycodone 07/24/2024 Not Detected  Not Detected, Indeterminate Final    Cutoff for a negative oxycodone is 100 ng/mL or less.    Buprenorphine 07/24/2024 Not Detected  Not Detected, Indeterminate Final    Cutoff for a negative buprenorphine is 10 ng/ml or less.    Sodium 07/24/2024 139  135 - 145 mmol/L Final    Potassium 07/24/2024 4.4  3.4 - 5.3 mmol/L Final    Chloride 07/24/2024 104  98 - 107 mmol/L Final    Carbon Dioxide (CO2) 07/24/2024 26  22 - 29 mmol/L Final    Anion Gap 07/24/2024 9  7 - 15 mmol/L Final    Urea Nitrogen 07/24/2024 16.6  6.0 - 20.0 mg/dL Final    Creatinine 07/24/2024 1.10 (H)  0.51 - 0.95 mg/dL Final     GFR Estimate 07/24/2024 59 (L)  >60 mL/min/1.73m2 Final    eGFR calculated using 2021 CKD-EPI equation.    Calcium 07/24/2024 9.4  8.8 - 10.4 mg/dL Final    Reference intervals for this test were updated on 7/16/2024 to reflect our healthy population more accurately. There may be differences in the flagging of prior results with similar values performed with this method. Those prior results can be interpreted in the context of the updated reference intervals.    Glucose 07/24/2024 101 (H)  70 - 99 mg/dL Final     No results found for any visits on 04/15/25.  No results found.      Video-Visit Details    Type of service:  Video Visit   Video End Time:3:25 PM  Originating Location (pt. Location): Home    Distant Location (provider location):  On-site  Platform used for Video Visit: Magy  Signed Electronically by: Dahlia Davis PA-C

## 2025-04-15 NOTE — PATIENT INSTRUCTIONS
Thank you for choosing us for your care. I think an in-clinic or virtual visit would be the best next step based on your symptoms as more information is needed. I see you have an upcoming appointment and we can discuss at that time. I could try getting you into clinic sooner if you jones like. Let me know. You won t be charged for this eVisit.      You can schedule an appointment by clicking here in [employer], or call [phone].

## 2025-04-17 ENCOUNTER — HOSPITAL ENCOUNTER (OUTPATIENT)
Dept: CT IMAGING | Facility: CLINIC | Age: 55
Discharge: HOME | End: 2025-04-17
Attending: FAMILY MEDICINE
Payer: MEDICARE

## 2025-04-17 ENCOUNTER — HOSPITAL ENCOUNTER (OUTPATIENT)
Dept: MAMMOGRAPHY | Facility: CLINIC | Age: 55
Discharge: HOME | End: 2025-04-17
Attending: FAMILY MEDICINE
Payer: MEDICARE

## 2025-04-17 DIAGNOSIS — Z12.31 ENCOUNTER FOR SCREENING MAMMOGRAM FOR BREAST CANCER: ICD-10-CM

## 2025-04-17 DIAGNOSIS — Z87.891 PERSONAL HISTORY OF TOBACCO USE: ICD-10-CM

## 2025-04-17 PROCEDURE — 77063 BREAST TOMOSYNTHESIS BI: CPT

## 2025-04-17 PROCEDURE — 71271 CT THORAX LUNG CANCER SCR C-: CPT

## 2025-04-17 PROCEDURE — 77067 SCR MAMMO BI INCL CAD: CPT

## 2025-04-17 RX ORDER — PREDNISONE 20 MG/1
TABLET ORAL
Qty: 20 TABLET | Refills: 0 | Status: SHIPPED | OUTPATIENT
Start: 2025-04-17

## 2025-05-07 ENCOUNTER — MYC MEDICAL ADVICE (OUTPATIENT)
Dept: FAMILY MEDICINE | Facility: CLINIC | Age: 55
End: 2025-05-07
Payer: MEDICARE

## 2025-05-07 DIAGNOSIS — J31.0 CHRONIC RHINITIS: ICD-10-CM

## 2025-05-07 DIAGNOSIS — J30.1 SEASONAL ALLERGIC RHINITIS DUE TO POLLEN: ICD-10-CM

## 2025-05-07 DIAGNOSIS — J01.00 ACUTE NON-RECURRENT MAXILLARY SINUSITIS: Primary | ICD-10-CM

## 2025-05-08 ENCOUNTER — MYC REFILL (OUTPATIENT)
Dept: FAMILY MEDICINE | Facility: CLINIC | Age: 55
End: 2025-05-08
Payer: MEDICARE

## 2025-05-08 DIAGNOSIS — G89.4 CHRONIC PAIN SYNDROME: ICD-10-CM

## 2025-05-08 DIAGNOSIS — G89.29 CHRONIC BILATERAL LOW BACK PAIN WITHOUT SCIATICA: ICD-10-CM

## 2025-05-08 DIAGNOSIS — M79.7 FIBROMYALGIA: ICD-10-CM

## 2025-05-08 DIAGNOSIS — M54.2 CERVICALGIA: ICD-10-CM

## 2025-05-08 DIAGNOSIS — M54.50 CHRONIC BILATERAL LOW BACK PAIN WITHOUT SCIATICA: ICD-10-CM

## 2025-05-13 ENCOUNTER — RESULTS FOLLOW-UP (OUTPATIENT)
Dept: FAMILY MEDICINE | Facility: CLINIC | Age: 55
End: 2025-05-13

## 2025-05-13 RX ORDER — AZELASTINE 1 MG/ML
1 SPRAY, METERED NASAL 2 TIMES DAILY
Qty: 30 ML | Refills: 0 | Status: SHIPPED | OUTPATIENT
Start: 2025-05-13

## 2025-05-13 RX ORDER — HYDROCODONE BITARTRATE AND ACETAMINOPHEN 5; 325 MG/1; MG/1
1 TABLET ORAL EVERY 6 HOURS PRN
Qty: 120 TABLET | Refills: 0 | Status: SHIPPED | OUTPATIENT
Start: 2025-05-13

## 2025-05-13 RX ORDER — DOXYCYCLINE 100 MG/1
100 CAPSULE ORAL 2 TIMES DAILY
Qty: 20 CAPSULE | Refills: 0 | Status: SHIPPED | OUTPATIENT
Start: 2025-05-13 | End: 2025-05-23

## 2025-05-13 RX ORDER — AZELASTINE 1 MG/ML
1 SPRAY, METERED NASAL 2 TIMES DAILY
Qty: 30 ML | Refills: 0 | Status: SHIPPED | OUTPATIENT
Start: 2025-05-13 | End: 2025-05-13

## 2025-05-13 NOTE — TELEPHONE ENCOUNTER
Spoke with patient. Given onset of symptoms will do trial of doxy now. If things return would switch to azelastine spray and regular allergy precautions.

## 2025-06-04 ENCOUNTER — MYC MEDICAL ADVICE (OUTPATIENT)
Dept: FAMILY MEDICINE | Facility: CLINIC | Age: 55
End: 2025-06-04

## 2025-06-04 ENCOUNTER — E-VISIT (OUTPATIENT)
Dept: URGENT CARE | Facility: URGENT CARE | Age: 55
End: 2025-06-04
Payer: MEDICARE

## 2025-06-04 DIAGNOSIS — R09.82 ALLERGIC RHINITIS WITH POSTNASAL DRIP: Primary | ICD-10-CM

## 2025-06-04 DIAGNOSIS — J30.9 ALLERGIC RHINITIS WITH POSTNASAL DRIP: Primary | ICD-10-CM

## 2025-06-04 RX ORDER — FLUTICASONE PROPIONATE 50 MCG
1 SPRAY, SUSPENSION (ML) NASAL DAILY
Qty: 16 G | Refills: 0 | Status: SHIPPED | OUTPATIENT
Start: 2025-06-04

## 2025-06-04 RX ORDER — CETIRIZINE HYDROCHLORIDE 10 MG/1
10 TABLET ORAL DAILY
Qty: 30 TABLET | Refills: 0 | Status: SHIPPED | OUTPATIENT
Start: 2025-06-04

## 2025-06-04 NOTE — PATIENT INSTRUCTIONS
Thank you for choosing us for your care. I have placed an order for a prescription so that you can start treatment:  Orders Placed This Encounter   Medications     cetirizine (ZYRTEC) 10 MG tablet     Sig: Take 1 tablet (10 mg) by mouth daily.     Dispense:  30 tablet     Refill:  0     fluticasone (FLONASE) 50 MCG/ACT nasal spray     Sig: Spray 1 spray into both nostrils daily.     Dispense:  16 g     Refill:  0        View your full visit summary for details by clicking on the link below. Your pharmacist will able to address any questions you may have about the medication.     If you're not feeling better within 5-7 days, please schedule an appointment.  You can schedule an appointment right here in [facility], or call [phone]  If the visit is for the same symptoms as your eVisit, we'll refund the cost of your eVisit if seen within seven days.

## 2025-06-06 ENCOUNTER — MYC MEDICAL ADVICE (OUTPATIENT)
Dept: FAMILY MEDICINE | Facility: CLINIC | Age: 55
End: 2025-06-06
Payer: MEDICARE

## 2025-06-09 ENCOUNTER — MYC REFILL (OUTPATIENT)
Dept: FAMILY MEDICINE | Facility: CLINIC | Age: 55
End: 2025-06-09
Payer: MEDICARE

## 2025-06-09 DIAGNOSIS — M54.2 CERVICALGIA: ICD-10-CM

## 2025-06-09 DIAGNOSIS — G89.4 CHRONIC PAIN SYNDROME: ICD-10-CM

## 2025-06-09 DIAGNOSIS — M79.7 FIBROMYALGIA: ICD-10-CM

## 2025-06-09 DIAGNOSIS — G89.29 CHRONIC BILATERAL LOW BACK PAIN WITHOUT SCIATICA: ICD-10-CM

## 2025-06-09 DIAGNOSIS — M54.50 CHRONIC BILATERAL LOW BACK PAIN WITHOUT SCIATICA: ICD-10-CM

## 2025-06-10 RX ORDER — HYDROCODONE BITARTRATE AND ACETAMINOPHEN 5; 325 MG/1; MG/1
1 TABLET ORAL EVERY 6 HOURS PRN
Qty: 120 TABLET | Refills: 0 | Status: SHIPPED | OUTPATIENT
Start: 2025-06-10

## 2025-06-10 NOTE — TELEPHONE ENCOUNTER
Patient is having non-stop runny nose and she is vomiting every morning due to the mucus.    She has been taking the flonase and zyrtec and this is not helping.  She tried benadryl instead and this helped some.    She is doing the saline rinses.  Drainage is clear.  She has vomited every day sine my 19th due to the sinus drainage.    She is wondering if there is anything else she can due to dry up the mucus.    Please advise.    Vinita Parikh RN on 6/10/2025 at 11:38 AM

## 2025-06-10 NOTE — TELEPHONE ENCOUNTER
Patient states in previous message that she has been using the saline rinses.    Anna Coleman XRO/

## 2025-06-11 ENCOUNTER — VIRTUAL VISIT (OUTPATIENT)
Dept: FAMILY MEDICINE | Facility: CLINIC | Age: 55
End: 2025-06-11
Payer: MEDICARE

## 2025-06-11 DIAGNOSIS — F11.90 CHRONIC, CONTINUOUS USE OF OPIOIDS: ICD-10-CM

## 2025-06-11 DIAGNOSIS — J30.1 SEASONAL ALLERGIC RHINITIS DUE TO POLLEN: Primary | ICD-10-CM

## 2025-06-11 DIAGNOSIS — J45.20 INTERMITTENT ASTHMA, UNCOMPLICATED: ICD-10-CM

## 2025-06-11 DIAGNOSIS — F33.0 MAJOR DEPRESSIVE DISORDER, RECURRENT EPISODE, MILD: ICD-10-CM

## 2025-06-11 DIAGNOSIS — Z87.891 PERSONAL HISTORY OF TOBACCO USE: ICD-10-CM

## 2025-06-11 PROCEDURE — 98006 SYNCH AUDIO-VIDEO EST MOD 30: CPT | Performed by: STUDENT IN AN ORGANIZED HEALTH CARE EDUCATION/TRAINING PROGRAM

## 2025-06-11 PROCEDURE — 1125F AMNT PAIN NOTED PAIN PRSNT: CPT | Performed by: STUDENT IN AN ORGANIZED HEALTH CARE EDUCATION/TRAINING PROGRAM

## 2025-06-11 RX ORDER — MONTELUKAST SODIUM 10 MG/1
10 TABLET ORAL AT BEDTIME
Qty: 90 TABLET | Refills: 0 | Status: SHIPPED | OUTPATIENT
Start: 2025-06-11

## 2025-06-11 ASSESSMENT — ENCOUNTER SYMPTOMS: VOMITING: 1

## 2025-06-11 NOTE — PROGRESS NOTES
Sherie is a 54 year old who is being evaluated via a billable video visit.    How would you like to obtain your AVS? MyChart  If the video visit is dropped, the invitation should be resent by: Text to cell phone: [phone]  Will anyone else be joining your video visit? No      Assessment & Plan   Problem List Items Addressed This Visit          Respiratory    Intermittent asthma, uncomplicated    Relevant Medications    montelukast (SINGULAIR) 10 MG tablet     Other Visit Diagnoses         Seasonal allergic rhinitis due to pollen    -  Primary    Relevant Medications    montelukast (SINGULAIR) 10 MG tablet      Screen for colon cancer               Ongoing allergic rhinitis symptoms not as responsive to azelastine as hoped.  Given her ongoing asthma as well we did discuss Singulair and will do trial of this.  Potential neuropsychiatric side effects and blackbox warning reviewed.  Continue with her nasal sprays and lavage.  Can do alternative antihistamine or add decongestant in the future like allegra d if not responding. We  Also discussed Afrin intermittently with potential risk reviewed.     The longitudinal plan of care for the diagnosis(es)/condition(s) as documented were addressed during this visit. Due to the added complexity in care, I will continue to support Sherie in the subsequent management and with ongoing continuity of care.      Franky Rehman is a 54 year old, presenting for the following health issues:  Nasal Congestion and Vomiting        6/11/2025     3:06 PM   Additional Questions   Roomed by shireen Bailey         6/11/2025     3:06 PM   Patient Reported Additional Medications   Patient reports taking the following new medications trintellix 20mg daily     Video Start Time: 5:10 PM    Vomiting     History of Present Illness       Reason for visit:  Non stop nasal drainage in the morning which leads to vomiting since May19th    She eats 2-3 servings of fruits and vegetables daily.She consumes  1 sweetened beverage(s) daily.She exercises with enough effort to increase her heart rate 20 to 29 minutes per day.  She exercises with enough effort to increase her heart rate 3 or less days per week. She is missing 1 dose(s) of medications per week.  She is not taking prescribed medications regularly due to remembering to take.      Patient treated for sinusitis in May and did improve but since then has had ongoing drainage.  Occasional ear pressure but doing nasal rinses.  Itching watery eyes and sneezing with her allergies.  Usually spring and summer are the worst for her.  Zyrtec Flonase and azelastine currently with nasal rinses.  She has no fevers or other facial pain.  No significant cough and asthma has still been stable with albuterol as needed        Review of Systems  Constitutional, HEENT, cardiovascular, pulmonary, GI, , musculoskeletal, neuro, skin, endocrine and psych systems are negative, except as otherwise noted.      Objective    Vitals - Patient Reported  Pain Score: Moderate Pain (6) (shoulders)        Physical Exam   GENERAL: alert and no distress  EYES: Eyes grossly normal to inspection.  No discharge or erythema, or obvious scleral/conjunctival abnormalities.  RESP: No audible wheeze, cough, or visible cyanosis.    SKIN: Visible skin clear. No significant rash, abnormal pigmentation or lesions.  NEURO: Cranial nerves grossly intact.  Mentation and speech appropriate for age.  PSYCH: Appropriate affect, tone, and pace of words        Video-Visit Details    Type of service:  Video Visit   Video End Time:5:27 PM  Originating Location (pt. Location): Home    Distant Location (provider location):  On-site  Platform used for Video Visit: Magy  Signed Electronically by: Maxwell Betts MD

## 2025-07-08 ENCOUNTER — MYC REFILL (OUTPATIENT)
Dept: FAMILY MEDICINE | Facility: CLINIC | Age: 55
End: 2025-07-08
Payer: COMMERCIAL

## 2025-07-08 DIAGNOSIS — M79.7 FIBROMYALGIA: ICD-10-CM

## 2025-07-08 DIAGNOSIS — M54.50 CHRONIC BILATERAL LOW BACK PAIN WITHOUT SCIATICA: ICD-10-CM

## 2025-07-08 DIAGNOSIS — G89.29 CHRONIC BILATERAL LOW BACK PAIN WITHOUT SCIATICA: ICD-10-CM

## 2025-07-08 DIAGNOSIS — G89.4 CHRONIC PAIN SYNDROME: ICD-10-CM

## 2025-07-08 DIAGNOSIS — M54.2 CERVICALGIA: ICD-10-CM

## 2025-07-08 RX ORDER — HYDROCODONE BITARTRATE AND ACETAMINOPHEN 5; 325 MG/1; MG/1
1 TABLET ORAL EVERY 6 HOURS PRN
Qty: 120 TABLET | Refills: 0 | Status: SHIPPED | OUTPATIENT
Start: 2025-07-08

## 2025-07-16 SDOH — HEALTH STABILITY: PHYSICAL HEALTH: ON AVERAGE, HOW MANY DAYS PER WEEK DO YOU ENGAGE IN MODERATE TO STRENUOUS EXERCISE (LIKE A BRISK WALK)?: 2 DAYS

## 2025-07-16 SDOH — HEALTH STABILITY: PHYSICAL HEALTH: ON AVERAGE, HOW MANY MINUTES DO YOU ENGAGE IN EXERCISE AT THIS LEVEL?: 10 MIN

## 2025-07-16 ASSESSMENT — SOCIAL DETERMINANTS OF HEALTH (SDOH): HOW OFTEN DO YOU GET TOGETHER WITH FRIENDS OR RELATIVES?: ONCE A WEEK

## 2025-07-17 ENCOUNTER — OFFICE VISIT (OUTPATIENT)
Dept: FAMILY MEDICINE | Facility: CLINIC | Age: 55
End: 2025-07-17
Attending: FAMILY MEDICINE
Payer: COMMERCIAL

## 2025-07-17 VITALS
OXYGEN SATURATION: 97 % | SYSTOLIC BLOOD PRESSURE: 125 MMHG | HEIGHT: 66 IN | WEIGHT: 153.9 LBS | DIASTOLIC BLOOD PRESSURE: 83 MMHG | TEMPERATURE: 97.5 F | RESPIRATION RATE: 17 BRPM | HEART RATE: 94 BPM | BODY MASS INDEX: 24.73 KG/M2

## 2025-07-17 DIAGNOSIS — J31.0 CHRONIC RHINITIS: ICD-10-CM

## 2025-07-17 DIAGNOSIS — F33.0 MAJOR DEPRESSIVE DISORDER, RECURRENT EPISODE, MILD: ICD-10-CM

## 2025-07-17 DIAGNOSIS — Z00.00 ROUTINE GENERAL MEDICAL EXAMINATION AT A HEALTH CARE FACILITY: Primary | ICD-10-CM

## 2025-07-17 DIAGNOSIS — J45.20 INTERMITTENT ASTHMA, UNCOMPLICATED: ICD-10-CM

## 2025-07-17 DIAGNOSIS — E78.1 HIGH TRIGLYCERIDES: ICD-10-CM

## 2025-07-17 DIAGNOSIS — F11.90 CHRONIC, CONTINUOUS USE OF OPIOIDS: ICD-10-CM

## 2025-07-17 DIAGNOSIS — F41.1 GENERALIZED ANXIETY DISORDER: ICD-10-CM

## 2025-07-17 DIAGNOSIS — Z12.11 SCREEN FOR COLON CANCER: ICD-10-CM

## 2025-07-17 DIAGNOSIS — G89.4 CHRONIC PAIN SYNDROME: ICD-10-CM

## 2025-07-17 DIAGNOSIS — M47.892 OTHER OSTEOARTHRITIS OF SPINE, CERVICAL REGION: ICD-10-CM

## 2025-07-17 LAB
AMPHETAMINES UR QL SCN: ABNORMAL
BARBITURATES UR QL SCN: ABNORMAL
BENZODIAZ UR QL SCN: ABNORMAL
BZE UR QL SCN: ABNORMAL
CANNABINOIDS UR QL SCN: ABNORMAL
CHOLEST SERPL-MCNC: 194 MG/DL
FASTING STATUS PATIENT QL REPORTED: YES
FENTANYL UR QL: ABNORMAL
HDLC SERPL-MCNC: 57 MG/DL
LDLC SERPL CALC-MCNC: 112 MG/DL
NONHDLC SERPL-MCNC: 137 MG/DL
OPIATES UR QL SCN: ABNORMAL
PCP QUAL URINE (ROCHE): ABNORMAL
TRIGL SERPL-MCNC: 123 MG/DL

## 2025-07-17 RX ORDER — VORTIOXETINE 20 MG/1
1 TABLET, FILM COATED ORAL DAILY
COMMUNITY
Start: 2025-07-07

## 2025-07-17 ASSESSMENT — ANXIETY QUESTIONNAIRES
3. WORRYING TOO MUCH ABOUT DIFFERENT THINGS: NEARLY EVERY DAY
2. NOT BEING ABLE TO STOP OR CONTROL WORRYING: NEARLY EVERY DAY
GAD7 TOTAL SCORE: 14
7. FEELING AFRAID AS IF SOMETHING AWFUL MIGHT HAPPEN: NEARLY EVERY DAY
6. BECOMING EASILY ANNOYED OR IRRITABLE: SEVERAL DAYS
1. FEELING NERVOUS, ANXIOUS, OR ON EDGE: MORE THAN HALF THE DAYS
4. TROUBLE RELAXING: SEVERAL DAYS
IF YOU CHECKED OFF ANY PROBLEMS ON THIS QUESTIONNAIRE, HOW DIFFICULT HAVE THESE PROBLEMS MADE IT FOR YOU TO DO YOUR WORK, TAKE CARE OF THINGS AT HOME, OR GET ALONG WITH OTHER PEOPLE: VERY DIFFICULT
5. BEING SO RESTLESS THAT IT IS HARD TO SIT STILL: SEVERAL DAYS
GAD7 TOTAL SCORE: 14
8. IF YOU CHECKED OFF ANY PROBLEMS, HOW DIFFICULT HAVE THESE MADE IT FOR YOU TO DO YOUR WORK, TAKE CARE OF THINGS AT HOME, OR GET ALONG WITH OTHER PEOPLE?: VERY DIFFICULT
7. FEELING AFRAID AS IF SOMETHING AWFUL MIGHT HAPPEN: NEARLY EVERY DAY
GAD7 TOTAL SCORE: 14

## 2025-07-17 ASSESSMENT — PAIN SCALES - GENERAL: PAINLEVEL_OUTOF10: NO PAIN (0)

## 2025-07-17 NOTE — PROGRESS NOTES
Preventive Care Visit  [facility]  Maxwell Betts MD, Family Medicine  Jul 17, 2025      Assessment & Plan   Problem List Items Addressed This Visit          Respiratory/Allergy    Intermittent asthma, uncomplicated    Chronic rhinitis       Behavioral Health    Generalized anxiety disorder    Relevant Medications    TRINTELLIX 20 MG tablet    Major depressive disorder, recurrent episode, mild    Relevant Medications    TRINTELLIX 20 MG tablet    Chronic, continuous use of opioids    Relevant Orders    Urine Drug Screen (Completed)       Orthopedic/Musculoskeletal    Degenerative joint disease of cervical spine       Surgery/Wound/Pain    Chronic pain syndrome     Other Visit Diagnoses         Routine general medical examination at a health care facility    -  Primary      Screen for colon cancer          High triglycerides        Relevant Orders    Lipid panel reflex to direct LDL Fasting (Completed)           Age-appropriate screening and immunization discussed today.  Overall allergy symptoms improved with Singulair and azelastine.  Tolerating without side effects.  Still some drainage and she does have history of chronic rhinitis diagnosis but clearly allergic component contributing.  Asthma appears stable.  If things worsen or not tolerable we will have her see ENT.  Urine drug screen today with continued opiate use and we did again discuss risk of respiratory suppression death and addiction as well as interaction.  Would not recommend long-term with benzodiazepines and would transition to as needed use.  She will discuss with psychiatry upcoming.  Follow-up in 3 months but sooner if new or worsening issues arise.    Patient has been advised of split billing requirements and indicates understanding: Yes    The longitudinal plan of care for the diagnosis(es)/condition(s) as documented were addressed during this visit. Due to the added complexity in care, I will continue to  support Sherie in the subsequent management and with ongoing continuity of care.    Counseling  Appropriate preventive services were addressed with this patient via screening, questionnaire, or discussion as appropriate for fall prevention, nutrition, physical activity, Tobacco-use cessation, social engagement, weight loss and cognition.  Checklist reviewing preventive services available has been given to the patient.  Reviewed patient's diet, addressing concerns and/or questions.   She is at risk for lack of exercise and has been provided with information to increase physical activity for the benefit of her well-being.   She is at risk for psychosocial distress and has been provided with information to reduce risk.   Discussed possible causes of fatigue. Updated plan of care.  Patient reported difficulty with activities of daily living were addressed today.The patient was provided with written information regarding signs of hearing loss.   Information on urinary incontinence and treatment options given to patient.   I have reviewed Opioid Use Disorder and Substance Use Disorder risk factors and made any needed referrals.   Reviewed preventive health counseling, as reflected in patient instructions       Regular exercise       Healthy diet/nutrition       Vision screening       Hearing screening       Alcohol use       Osteoporosis prevention/bone health       Safe sex practices/STD prevention       Colorectal Cancer Screening       Hep C screening for all patients one time for ages 18-79 years       HIV screeninx in teen years, 1x in adult years, and at intervals if high risk       (Nancy)menopause management       Advance Care Planning        Franky Rehman is a 54 year old, presenting for the following:  Recheck Medication (Hydrocodone)        2025    12:50 PM   Additional Questions   Roomed by JACKSON Nagy         2025    12:50 PM   Patient Reported Additional Medications   Patient reports taking  the following new medications Trintellix 20mg          HPI        Advance Care Planning    Discussed advance care planning with patient; however, patient declined at this time.        7/16/2025   General Health   How would you rate your overall physical health? (!) FAIR   Feel stress (tense, anxious, or unable to sleep) To some extent   (!) STRESS CONCERN      7/16/2025   Nutrition   Diet: Regular (no restrictions)    Breakfast skipped       Multiple values from one day are sorted in reverse-chronological order         7/16/2025   Exercise   Days per week of moderate/strenous exercise 2 days   Average minutes spent exercising at this level 10 min   (!) EXERCISE CONCERN      7/16/2025   Social Factors   Frequency of gathering with friends or relatives Once a week   Worry food won't last until get money to buy more Yes   Food not last or not have enough money for food? Yes   Do you have housing? (Housing is defined as stable permanent housing and does not include staying outside in a car, in a tent, in an abandoned building, in an overnight shelter, or couch-surfing.) Yes   Are you worried about losing your housing? No   Lack of transportation? No   Unable to get utilities (heat,electricity)? No   (!) FOOD SECURITY CONCERN PRESENT      7/17/2025   Fall Risk   Gait Speed Test (Document in seconds) 4.47   Gait Speed Test Interpretation Less than or equal to 5.00 seconds - PASS          7/16/2025   Dental   Dentist two times every year? Yes       Today's PHQ-9 Score:       7/16/2025     7:33 PM   PHQ-9 SCORE   PHQ-9 Total Score MyChart 7 (Mild depression)   PHQ-9 Total Score 7        Patient-reported         7/16/2025   Substance Use   If I could quit smoking, I would Completely agree   I want to quit somking, worry about health affects Completely agree   Willing to make a plan to quit smoking Somewhat agree   Willing to cut down before quitting Completely agree   Alcohol more than 3/day or more than 7/wk Not Applicable    Do you use any other substances recreationally? No     Social History     Tobacco Use    Smoking status: Every Day     Current packs/day: 0.50     Average packs/day: 0.7 packs/day for 43.5 years (29.0 ttl pk-yrs)     Types: Cigarettes    Smokeless tobacco: Never   Vaping Use    Vaping status: Never Used   Substance Use Topics    Alcohol use: Not Currently     Alcohol/week: 1.7 standard drinks of alcohol     Comment: rare    Drug use: Yes     Types: Marijuana           2025   LAST FHS-7 RESULTS   1st degree relative breast or ovarian cancer No   Any relative bilateral breast cancer No   Any male have breast cancer No   Any ONE woman have BOTH breast AND ovarian cancer No   Any woman with breast cancer before 50yrs No   2 or more relatives with breast AND/OR ovarian cancer No   2 or more relatives with breast AND/OR bowel cancer No        Mammogram Screening - Mammogram every 1-2 years updated in Health Maintenance based on mutual decision making      History of abnormal Pap smear: No - age 30- 64 PAP with HPV every 5 years recommended        Latest Ref Rng & Units 2022     2:13 PM 2018     2:39 PM 2018     2:36 PM   PAP / HPV   PAP  Negative for Intraepithelial Lesion or Malignancy (NILM)      PAP (Historical)    NIL    HPV 16 DNA Negative Negative  Negative     HPV 18 DNA Negative Negative  Negative     Other HR HPV Negative Negative  Negative       ASCVD Risk   The 10-year ASCVD risk score (Karsten LARKIN, et al., 2019) is: 4.6%    Values used to calculate the score:      Age: 54 years      Sex: Female      Is Non- : No      Diabetic: No      Tobacco smoker: Yes      Systolic Blood Pressure: 125 mmHg      Is BP treated: No      HDL Cholesterol: 57 mg/dL      Total Cholesterol: 194 mg/dL    Fracture Risk Assessment Tool  Link to Frax Calculator  Use the information below to complete the Frax calculator  : 1970  Sex: female  Weight (kg): 69.8 kg (actual  weight)  Height (cm): 168.5 cm  Previous Fragility Fracture:  No  History of parent with fractured hip:  No  Current Smoking:  Yes  Patient has been on glucocorticoids for more than 3 months (5mg/day or more): No  Rheumatoid Arthritis on Problem List:  Yes  Secondary Osteoporosis on Problem List:  No  Consumes 3 or more units of alcohol per day: No  Femoral Neck BMD (g/cm2)           Reviewed and updated as needed this visit by Provider                    Past Medical History:   Diagnosis Date    Allergic rhinitis due to other allergen     Arthritis 2002    My mom and myself have RA    COPD (chronic obstructive pulmonary disease) (H)     Mom    Degenerative joint disease of cervical spine 01/01/2016    multi level worst at C5-6    Depressive disorder Young teen    Both sides of my family    Generalized anxiety disorder     Hearing loss     Intrinsic asthma, unspecified     Irritable bowel syndrome     Lump or mass in breast 01/01/1996    Left breast lump 1996-- aspiration benign.    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    [employer] Update utility    Spondylitis, cervical     C5-C7 (MRI)    Stenosis, cervical spine     C5-C7 (MRI)    Tension headache      Past Surgical History:   Procedure Laterality Date    BIOPSY  1996    Myself left breast    DILATION AND CURETTAGE, HYSTEROSCOPY, ABLATE ENDOMETRIUM NOVASURE, COMBINED  09/02/2011    Procedure:COMBINED DILATION AND CURETTAGE, HYSTEROSCOPY, ABLATE ENDOMETRIUM NOVASURE; hysteroscopy, dilation and curettage, novasure; Surgeon:JEREMY ANNA; Location:[facility] OR    EXCISE LESION TRUNK  09/12/2013    Procedure: EXCISE LESION TRUNK;  interlaminar epidural Steroid Injection Cervical -thoracic Levels (C7-T1)    Re-Excision of chest wall mass;  Surgeon: Jeremy Bolaños MD;   "Location: [facility] OR    HC HYSTEROS W PERMANENT FALLOPAIN IMPLANT  2012    Essure done in office Marcelo    INJECT BLOCK MEDIAL BRANCH CERVICAL/THORACIC/LUMBAR  2014    Procedure: INJECT BLOCK MEDIAL BRANCH CERVICAL / THORACIC / LUMBAR;  Surgeon: Josué Munson MD;  Location: [facility] OR    INJECT FACET JOINT  2014    Procedure: INJECT FACET JOINT;  diagnostic medial branch facet nerve block cervical levels 5-6, 6-7;  Surgeon: Josué Munson MD;  Location: [facility] OR    WISDOM ST GUIDEWIRE      [facility] APPENDECTOMY      Ruptured at age 7 years    Z  DELIVERY ONLY  1998    , Low Cervical     OB History    Para Term  AB Living   4 2 1 1 0 3   SAB IAB Ectopic Multiple Live Births   0 0 0 0 0      # Outcome Date GA Lbr Geoff/2nd Weight Sex Type Anes PTL Lv   4   31w0d    -SEC      3 Term  40w0d  3.487 kg (7 lb 11 oz)        2             1                Obstetric Comments   Twins weights 2lb 4oz ,4lb 2oz         Review of Systems  Constitutional, HEENT, cardiovascular, pulmonary, GI, , musculoskeletal, neuro, skin, endocrine and psych systems are negative, except as otherwise noted.     Objective    Exam  /83 (BP Location: Left arm)   Pulse 94   Temp 97.5  F (36.4  C) (Temporal)   Resp 17   Ht 1.685 m (5' 6.34\")   Wt 69.8 kg (153 lb 14.4 oz)   SpO2 97%   BMI 24.59 kg/m     Estimated body mass index is 24.59 kg/m  as calculated from the following:    Height as of this encounter: 1.685 m (5' 6.34\").    Weight as of this encounter: 69.8 kg (153 lb 14.4 oz).    Physical Exam  GENERAL: alert and no distress  EYES: Eyes grossly normal to inspection, PERRL and conjunctivae and sclerae normal  HENT: ear canals and TM's normal, nose and mouth without ulcers or lesions  NECK: no adenopathy, no asymmetry, masses, or scars  RESP: lungs clear to auscultation - no rales, rhonchi or wheezes  CV: regular rate and rhythm, normal S1 S2, no S3 or " S4, no murmur, click or rub, no peripheral edema  ABDOMEN: soft, nontender, no hepatosplenomegaly, no masses and bowel sounds normal  MS: no gross musculoskeletal defects noted, no edema  SKIN: no suspicious lesions or rashes  NEURO: Normal strength and tone, mentation intact and speech normal  PSYCH: mentation appears normal, affect normal/bright  Gait and balance assessed per Gait Speed Test.  Result as above.        Signed Electronically by: Maxwell Betts MD  med  Answers submitted by the patient for this visit:  Patient Health Questionnaire (Submitted on 7/16/2025)  If you checked off any problems, how difficult have these problems made it for you to do your work, take care of things at home, or get along with other people?: Very difficult  PHQ9 TOTAL SCORE: 7

## 2025-07-17 NOTE — PATIENT INSTRUCTIONS
Patient Education   Preventive Care Advice   This is general advice given by our system to help you stay healthy. However, your care team may have specific advice just for you. Please talk to your care team about your preventive care needs.  Nutrition  Eat 5 or more servings of fruits and vegetables each day.  Try wheat bread, brown rice and whole grain pasta (instead of white bread, rice, and pasta).  Get enough calcium and vitamin D. Check the label on foods and aim for 100% of the RDA (recommended daily allowance).  Lifestyle  Exercise at least 150 minutes each week  (30 minutes a day, 5 days a week).  Do muscle strengthening activities 2 days a week. These help control your weight and prevent disease.  No smoking.  Wear sunscreen to prevent skin cancer.  Have a dental exam and cleaning every 6 months.  Yearly exams  See your health care team every year to talk about:  Any changes in your health.  Any medicines your care team has prescribed.  Preventive care, family planning, and ways to prevent chronic diseases.  Shots (vaccines)   HPV shots (up to age 26), if you've never had them before.  Hepatitis B shots (up to age 59), if you've never had them before.  COVID-19 shot: Get this shot when it's due.  Flu shot: Get a flu shot every year.  Tetanus shot: Get a tetanus shot every 10 years.  Pneumococcal, hepatitis A, and RSV shots: Ask your care team if you need these based on your risk.  Shingles shot (for age 50 and up)  General health tests  Diabetes screening:  Starting at age 35, Get screened for diabetes at least every 3 years.  If you are younger than age 35, ask your care team if you should be screened for diabetes.  Cholesterol test: At age 39, start having a cholesterol test every 5 years, or more often if advised.  Bone density scan (DEXA): At age 50, ask your care team if you should have this scan for osteoporosis (brittle bones).  Hepatitis C: Get tested at least once in your life.  STIs (sexually  transmitted infections)  Before age 24: Ask your care team if you should be screened for STIs.  After age 24: Get screened for STIs if you're at risk. You are at risk for STIs (including HIV) if:  You are sexually active with more than one person.  You don't use condoms every time.  You or a partner was diagnosed with a sexually transmitted infection.  If you are at risk for HIV, ask about PrEP medicine to prevent HIV.  Get tested for HIV at least once in your life, whether you are at risk for HIV or not.  Cancer screening tests  Cervical cancer screening: If you have a cervix, begin getting regular cervical cancer screening tests starting at age 21.  Breast cancer scan (mammogram): If you've ever had breasts, begin having regular mammograms starting at age 40. This is a scan to check for breast cancer.  Colon cancer screening: It is important to start screening for colon cancer at age 45.  Have a colonoscopy test every 10 years (or more often if you're at risk) Or, ask your provider about stool tests like a FIT test every year or Cologuard test every 3 years.  To learn more about your testing options, visit:   .  For help making a decision, visit:   https://bit.ly/qs26600.  Prostate cancer screening test: If you have a prostate, ask your care team if a prostate cancer screening test (PSA) at age 55 is right for you.  Lung cancer screening: If you are a current or former smoker ages 50 to 80, ask your care team if ongoing lung cancer screenings are right for you.  For informational purposes only. Not to replace the advice of your health care provider. Copyright   2023 [facility]. All rights reserved. Clinically reviewed by the [facility]. Tamtron [id number] - REV 01/24.  Learning About Activities of Daily Living  What are activities of daily living?     Activities of daily living (ADLs) are the basic self-care tasks you do every day. These include eating, bathing, dressing,  and moving around.  As you age, and if you have health problems, you may find that it's harder to do some of these tasks. If so, your doctor can suggest ideas that may help.  To measure what kind of help you may need, your doctor will ask how well you are able to do ADLs. Let your doctor know if there are any tasks that you are having trouble doing. This is an important first step to getting help. And when you have the help you need, you can stay as independent as possible.  How will a doctor assess your ADLs?  Asking about ADLs is part of a routine health checkup your doctor will likely do as you age. Your health check might be done in a doctor's office, in your home, or at a hospital. The goal is to find out if you are having any problems that could make it hard to care for yourself or that make it unsafe for you to be on your own.  To measure your ADLs, your doctor will ask how hard it is for you to do routine tasks. Your doctor may also want to know if you have changed the way you do a task because of a health problem. Your doctor may watch how you:  Walk back and forth.  Keep your balance while you stand or walk.  Move from sitting to standing or from a bed to a chair.  Button or unbutton a shirt or sweater.  Remove and put on your shoes.  It's common to feel a little worried or anxious if you find you can't do all the things you used to be able to do. Talking with your doctor about ADLs is a way to make sure you're as safe as possible and able to care for yourself as well as you can. You may want to bring a caregiver, friend, or family member to your checkup. They can help you talk to your doctor.  Follow-up care is a key part of your treatment and safety. Be sure to make and go to all appointments, and call your doctor if you are having problems. It's also a good idea to know your test results and keep a list of the medicines you take.  Current as of: October 24, 2024  Content Version: 14.5    7109-8758  [employer].   Care instructions adapted under license by your healthcare professional. If you have questions about a medical condition or this instruction, always ask your healthcare professional. [employer] disclaims any warranty or liability for your use of this information.    Preventing Falls: Care Instructions  Injuries and health problems such as trouble walking or poor eyesight can increase your risk of falling. So can some medicines. But there are things you can do to help prevent falls. You can exercise to get stronger. You can also arrange your home to make it safer.    Talk to your doctor about the medicines you take. Ask if any of them increase the risk of falls and whether they can be changed or stopped.   Try to exercise regularly. It can help improve your strength and balance. This can help lower your risk of falling.         Practice fall safety and prevention.   Wear low-heeled shoes that fit well and give your feet good support. Talk to your doctor if you have foot problems that make this hard.  Carry a cellphone or wear a medical alert device that you can use to call for help.  Use stepladders instead of chairs to reach high objects. Don't climb if you're at risk for falls. Ask for help, if needed.  Wear the correct eyeglasses, if you need them.        Make your home safer.   Remove rugs, cords, clutter, and furniture from walkways.  Keep your house well lit. Use night-lights in hallways and bathrooms.  Install and use sturdy handrails on stairways.  Wear nonskid footwear, even inside. Don't walk barefoot or in socks without shoes.        Be safe outside.   Use handrails, curb cuts, and ramps whenever possible.  Keep your hands free by using a shoulder bag or backpack.  Try to walk in well-lit areas. Watch out for uneven ground, changes in pavement, and debris.  Be careful in the winter. Walk on the grass or gravel when sidewalks are slippery. Use de-icer on steps and  "walkways. Add non-slip devices to shoes.    Put grab bars and nonskid mats in your shower or tub and near the toilet. Try to use a shower chair or bath bench when bathing.   Get into a tub or shower by putting in your weaker leg first. Get out with your strong side first. Have a phone or medical alert device in the bathroom with you.   Where can you learn more?  Go to https://www.[employer].net/patiented  Enter G117 in the search box to learn more about \"Preventing Falls: Care Instructions.\"  Current as of: July 31, 2024  Content Version: 14.5    9423-6541 [employer].   Care instructions adapted under license by your healthcare professional. If you have questions about a medical condition or this instruction, always ask your healthcare professional. [employer] disclaims any warranty or liability for your use of this information.    Hearing Loss: Care Instructions  Overview     Hearing loss is a sudden or slow decrease in how well you hear. It can range from slight to profound. Permanent hearing loss can occur with aging. It also can happen when you are exposed long-term to loud noise. Examples include listening to loud music, riding motorcycles, or being around other loud machines.  Hearing loss can affect your work and home life. It can make you feel lonely or depressed. You may feel that you have lost your independence. But hearing aids and other devices can help you hear better and feel connected to others.  Follow-up care is a key part of your treatment and safety. Be sure to make and go to all appointments, and call your doctor if you are having problems. It's also a good idea to know your test results and keep a list of the medicines you take.  How can you care for yourself at home?  Avoid loud noises whenever possible. This helps keep your hearing from getting worse.  Always wear hearing protection around loud noises.  Wear a hearing aid as directed.  A professional can help you pick a " "hearing aid that will work best for you.  You can also get hearing aids over the counter for mild to moderate hearing loss.  Have hearing tests as your doctor suggests. They can show whether your hearing has changed. Your hearing aid may need to be adjusted.  Use other devices as needed. These may include:  Telephone amplifiers and hearing aids that can connect to a television, stereo, radio, or microphone.  Devices that use lights or vibrations. These alert you to the doorbell, a ringing telephone, or a baby monitor.  Television closed-captioning. This shows the words at the bottom of the screen. Most new TVs can do this.  TTY (text telephone). This lets you type messages back and forth on the telephone instead of talking or listening. These devices are also called TDD. When messages are typed on the keyboard, they are sent over the phone line to a receiving TTY. The message is shown on a monitor.  Use text messaging, social media, and email if it is hard for you to communicate by telephone.  Try to learn a listening technique called speechreading. It is not lipreading. You pay attention to people's gestures, expressions, posture, and tone of voice. These clues can help you understand what a person is saying. Face the person you are talking to, and have them face you. Make sure the lighting is good. You need to see the other person's face clearly.  Think about counseling if you need help to adjust to your hearing loss.  When should you call for help?  Watch closely for changes in your health, and be sure to contact your doctor if:    You think your hearing is getting worse.     You have new symptoms, such as dizziness or nausea.   Where can you learn more?  Go to https://www.healthGangkr.net/patiented  Enter R798 in the search box to learn more about \"Hearing Loss: Care Instructions.\"  Current as of: October 27, 2024  Content Version: 14.5 2024-2025 [facility].   Care instructions adapted under license " by your healthcare professional. If you have questions about a medical condition or this instruction, always ask your healthcare professional. [employer] disclaims any warranty or liability for your use of this information.    Learning About Stress  What is stress?     Stress is your body's response to a hard situation. Your body can have a physical, emotional, or mental response. Stress is a fact of life for most people, and it affects everyone differently. What causes stress for you may not be stressful for someone else.  A lot of things can cause stress. You may feel stress when you go on a job interview, take a test, or run a race. This kind of short-term stress is normal and even useful. It can help you if you need to work hard or react quickly. For example, stress can help you finish an important job on time.  Long-term stress is caused by ongoing stressful situations or events. Examples of long-term stress include long-term health problems, ongoing problems at work, or conflicts in your family. Long-term stress can harm your health.  How does stress affect your health?  When you are stressed, your body responds as though you are in danger. It makes hormones that speed up your heart, make you breathe faster, and give you a burst of energy. This is called the fight-or-flight stress response. If the stress is over quickly, your body goes back to normal and no harm is done.  But if stress happens too often or lasts too long, it can have bad effects. Long-term stress can make you more likely to get sick, and it can make symptoms of some diseases worse. If you tense up when you are stressed, you may develop neck, shoulder, or low back pain. Stress is linked to high blood pressure and heart disease.  Stress also harms your emotional health. It can make you heath, tense, or depressed. Your relationships may suffer, and you may not do well at work or school.  What can you do to manage stress?  You can try  these things to help manage stress:   Do something active. Exercise or activity can help reduce stress. Walking is a great way to get started. Even everyday activities such as housecleaning or yard work can help.  Try yoga or dominick chi. These techniques combine exercise and meditation. You may need some training at first to learn them.  Do something you enjoy. For example, listen to music or go to a movie. Practice your hobby or do volunteer work.  Meditate. This can help you relax, because you are not worrying about what happened before or what may happen in the future.  Do guided imagery. Imagine yourself in any setting that helps you feel calm. You can use online videos, books, or a teacher to guide you.  Do breathing exercises. For example:  From a standing position, bend forward from the waist with your knees slightly bent. Let your arms dangle close to the floor.  Breathe in slowly and deeply as you return to a standing position. Roll up slowly and lift your head last.  Hold your breath for just a few seconds in the standing position.  Breathe out slowly and bend forward from the waist.  Let your feelings out. Talk, laugh, cry, and express anger when you need to. Talking with supportive friends or family, a counselor, or a zoila leader about your feelings is a healthy way to relieve stress. Avoid discussing your feelings with people who make you feel worse.  Write. It may help to write about things that are bothering you. This helps you find out how much stress you feel and what is causing it. When you know this, you can find better ways to cope.  What can you do to prevent stress?  You might try some of these things to help prevent stress:  Manage your time. This helps you find time to do the things you want and need to do.  Get enough sleep. Your body recovers from the stresses of the day while you are sleeping.  Get support. Your family, friends, and community can make a difference in how you experience  "stress.  Limit your news feed. Avoid or limit time on social media or news that may make you feel stressed.  Do something active. Exercise or activity can help reduce stress. Walking is a great way to get started.  Where can you learn more?  Go to https://www.[employer].net/patiented  Enter N032 in the search box to learn more about \"Learning About Stress.\"  Current as of: October 24, 2024  Content Version: 14.5 2024-2025 [employer].   Care instructions adapted under license by your healthcare professional. If you have questions about a medical condition or this instruction, always ask your healthcare professional. [employer] disclaims any warranty or liability for your use of this information.    Learning About Sleeping Well  What does sleeping well mean?     Sleeping well means getting enough sleep to feel good and stay healthy. How much sleep is enough varies among people.  The number of hours you sleep and how you feel when you wake up are both important. If you do not feel refreshed, you probably need more sleep. Another sign of not getting enough sleep is feeling tired during the day.  Experts recommend that adults get at least 7 or more hours of sleep per day. Children and older adults need more sleep.  Why is getting enough sleep important?  Getting enough quality sleep is a basic part of good health. When your sleep suffers, your physical health, mood, and your thoughts can suffer too. You may find yourself feeling more grumpy or stressed. Not getting enough sleep also can lead to serious problems, including injury, accidents, anxiety, and depression.  What might cause poor sleeping?  Many things can cause sleep problems, including:  Changes to your sleep schedule.  Stress. Stress can be caused by fear about a single event, such as giving a speech. Or you may have ongoing stress, such as worry about work or school.  Depression, anxiety, and other mental or emotional " "conditions.  Changes in your sleep habits or surroundings. This includes changes that happen where you sleep, such as noise, light, or sleeping in a different bed. It also includes changes in your sleep pattern, such as having jet lag or working a late shift.  Health problems, such as pain, breathing problems, and restless legs syndrome.  Lack of regular exercise.  Using alcohol, nicotine, or caffeine before bed.  How can you help yourself?  Here are some tips that may help you sleep more soundly and wake up feeling more refreshed.  Your sleeping area   Use your bedroom only for sleeping and sex. A bit of light reading may help you fall asleep. But if it doesn't, do your reading elsewhere in the house. Try not to use your TV, computer, smartphone, or tablet while you are in bed.  Be sure your bed is big enough to stretch out comfortably, especially if you have a sleep partner.  Keep your bedroom quiet, dark, and cool. Use curtains, blinds, or a sleep mask to block out light. To block out noise, use earplugs, soothing music, or a \"white noise\" machine.  Your evening and bedtime routine   Create a relaxing bedtime routine. You might want to take a warm shower or bath, or listen to soothing music.  Go to bed at the same time every night. And get up at the same time every morning, even if you feel tired.  What to avoid   Limit caffeine (coffee, tea, caffeinated sodas) during the day, and don't have any for at least 6 hours before bedtime.  Avoid drinking alcohol before bedtime. Alcohol can cause you to wake up more often during the night.  Try not to smoke or use tobacco, especially in the evening. Nicotine can keep you awake.  Limit naps during the day, especially close to bedtime.  Avoid lying in bed awake for too long. If you can't fall asleep or if you wake up in the middle of the night and can't get back to sleep within about 20 minutes, get out of bed and go to another room until you feel sleepy.  Avoid taking " "medicine right before bed that may keep you awake or make you feel hyper or energized. Your doctor can tell you if your medicine may do this and if you can take it earlier in the day.  If you can't sleep   Imagine yourself in a peaceful, pleasant scene. Focus on the details and feelings of being in a place that is relaxing.  Get up and do a quiet or boring activity until you feel sleepy.  Avoid drinking any liquids before going to bed to help prevent waking up often to use the bathroom.  Where can you learn more?  Go to https://www.Screenie.net/patiented  Enter J942 in the search box to learn more about \"Learning About Sleeping Well.\"  Current as of: July 31, 2024  Content Version: 14.5 2024-2025 [employer].   Care instructions adapted under license by your healthcare professional. If you have questions about a medical condition or this instruction, always ask your healthcare professional. [employer] disclaims any warranty or liability for your use of this information.    Bladder Training: Care Instructions  Your Care Instructions     Bladder training is used to treat urge incontinence and stress incontinence. Urge incontinence means that the need to urinate comes on so fast that you can't get to a toilet in time. Stress incontinence means that you leak urine because of pressure on your bladder. For example, it may happen when you laugh, cough, or lift something heavy.  Bladder training can increase how long you can wait before you have to urinate. It can also help your bladder hold more urine. And it can give you better control over the urge to urinate.  It is important to remember that bladder training takes a few weeks to a few months to make a difference. You may not see results right away, but don't give up.  Follow-up care is a key part of your treatment and safety. Be sure to make and go to all appointments, and call your doctor if you are having problems. It's also a good idea to " know your test results and keep a list of the medicines you take.  How can you care for yourself at home?  Work with your doctor to come up with a bladder training program that is right for you. You may use one or more of the following methods.  Delayed urination  In the beginning, try to keep from urinating for 5 minutes after you first feel the need to go.  While you wait, take deep, slow breaths to relax. Kegel exercises can also help you delay the need to go to the bathroom.  After some practice, when you can easily wait 5 minutes to urinate, try to wait 10 minutes before you urinate.  Slowly increase the waiting period until you are able to control when you have to urinate.  Scheduled urination  Empty your bladder when you first wake up in the morning.  Schedule times throughout the day when you will urinate.  Start by going to the bathroom every hour, even if you don't need to go.  Slowly increase the time between trips to the bathroom.  When you have found a schedule that works well for you, keep doing it.  If you wake up during the night and have to urinate, do it. Apply your schedule to waking hours only.  Kegel exercises  These tighten and strengthen pelvic muscles, which can help you control the flow of urine. (If doing these exercises causes pain, stop doing them and talk with your doctor.) To do Kegel exercises:  Squeeze your muscles as if you were trying not to pass gas. Or squeeze your muscles as if you were stopping the flow of urine. Your belly, legs, and buttocks shouldn't move.  Hold the squeeze for 3 seconds, then relax for 5 to 10 seconds.  Start with 3 seconds, then add 1 second each week until you are able to squeeze for 10 seconds.  Repeat the exercise 10 times a session. Do 3 to 8 sessions a day.  When should you call for help?  Watch closely for changes in your health, and be sure to contact your doctor if:    Your incontinence is getting worse.     You do not get better as expected.  "  Where can you learn more?  Go to https://www.[employer].net/patiented  Enter V684 in the search box to learn more about \"Bladder Training: Care Instructions.\"  Current as of: April 30, 2024  Content Version: 14.5    [id number] [employer].   Care instructions adapted under license by your healthcare professional. If you have questions about a medical condition or this instruction, always ask your healthcare professional. [employer] disclaims any warranty or liability for your use of this information.    Recovering From Depression: Care Instructions  Overview    Sticking to your treatment plan is important as you recover from depression. It may take time for your symptoms to get better after you start treatment. Try not to give up if you don't feel better right away. Make sure you keep going to counseling and taking any prescribed medicine if they are part of your treatment plan.  Focus on things that can help you feel better, such as being with friends and family. Try to eat healthy foods, be active, and get enough sleep. Take things slowly as you begin to recover.  Follow-up care is a key part of your treatment and safety. Be sure to make and go to all appointments, and call your doctor if you are having problems. It's also a good idea to know your test results and keep a list of the medicines you take.  How can you care for yourself at home?  Be realistic  If you have a large task to do, break it up into smaller steps you can handle, and just do what you can.  You may want to put off important decisions until your depression has lifted. If you have plans that will have a major impact on your life, such as marriage, divorce, or a job change, try to wait a bit. Talk it over with friends and loved ones who can help you look at the overall picture first.  Reaching out to people for help is important. Do not isolate yourself. Let your family and friends help you. Find someone you can trust and " confide in, and talk to that person.  Be patient, and be kind to yourself. Remember that depression is not your fault and is not something you can overcome with willpower alone. Treatment is important for depression, just like for any other illness. Feeling better takes time, and your mood will improve little by little.  Stay active  Stay busy and get outside. Take a walk, or try some other light exercise.  Talk with your doctor about an exercise program. Exercise can help with mild depression.  Go to a movie or concert. Take part in a [employer] activity or other social gathering. Go to a [employer] game.  Ask a friend to have dinner with you.  Take care of yourself  Eat healthy foods such as fresh fruits and vegetables, whole grains, and lean protein. If you have lost your appetite, eat small snacks rather than large meals.  Avoid using marijuana and other drugs and drinking alcohol. Do not take medicines that have not been prescribed for you. They may interfere with medicines you may be taking for depression, or they may make your depression worse.  Take your medicines exactly as they are prescribed. You may start to feel better within 1 to 3 weeks of taking antidepressant medicine. But it can take as many as 6 to 8 weeks to see more improvement. If you have questions or concerns about your medicines, or if you do not notice any improvement by 3 weeks, talk to your doctor.  Continue to take your medicine after your symptoms improve. Taking your medicine for at least 6 months after you feel better can help keep you from getting depressed again. If this isn't the first time you have been depressed, your doctor may recommend you to take medicine even longer.  If you have any side effects from your medicine, tell your doctor. Many side effects are mild and will go away on their own after you have been taking the medicine for a few weeks. Some may last longer. Talk to your doctor if side effects are bothering you too much. You  might be able to try a different medicine.  Continue counseling. It may help prevent depression from returning, especially if you've had multiple episodes of depression. Talk with your counselor if you are having a hard time attending your sessions or you think the sessions aren't working. Don't just stop going.  Get enough sleep. Talk to your doctor if you are having problems sleeping.  Avoid sleeping pills unless they are prescribed by the doctor treating your depression. Sleeping pills may make you groggy during the day, and they may interact with other medicine you are taking.  If you have any other illnesses, such as diabetes, heart disease, or high blood pressure, make sure to continue with your treatment. Tell your doctor about all of the medicines you take, including those with or without a prescription.  Where to get help 24 hours a day, 7 days a week  If you or someone you know talks about suicide, self-harm, a mental health crisis, a substance use crisis, or any other kind of emotional distress, get help right away. You can:  Call the Suicide and Crisis Lifeline at [place].  Text HOME to 803364 to access the Crisis Text Line.  Consider saving these numbers in your phone.  Go to [employer] for more information or to chat online.  Call 351 anytime you think you may need emergency care. For example, call if:  You feel like hurting yourself or someone else.  Someone you know has depression and is about to attempt or is attempting suicide.  Where to get help 24 hours a day, 7 days a week  If you or someone you know talks about suicide, self-harm, a mental health crisis, a substance use crisis, or any other kind of emotional distress, get help right away. You can:  Call the Suicide and Crisis Lifeline at [address].  Text HOME to 306370 to access the Crisis Text Line.  Consider saving these numbers in your phone.  Go to [employer] for more information or to chat online.  Call your doctor now or seek immediate  "medical care if:  You hear voices.  Someone you know has depression and:  Starts to give away possessions.  Uses illegal drugs or drinks alcohol heavily.  Talks or writes about death, including writing suicide notes or talking about guns, knives, or pills.  Starts to spend a lot of time alone.  Acts very aggressively or suddenly appears calm.  Watch closely for changes in your health, and be sure to contact your doctor if:  You do not get better as expected.  Where can you learn more?  Go to https://www.[employer].net/patiented  Enter N529 in the search box to learn more about \"Recovering From Depression: Care Instructions.\"  Current as of: July 31, 2024  Content Version: 14.5 2024-2025 [employer].   Care instructions adapted under license by your healthcare professional. If you have questions about a medical condition or this instruction, always ask your healthcare professional. [employer] disclaims any warranty or liability for your use of this information.    Chronic Pain: Care Instructions  Your Care Instructions     Chronic pain is pain that lasts a long time (months or even years) and may or may not have a clear cause. It is different from acute pain, which usually does have a clear cause--like an injury or illness--and gets better over time. Chronic pain:  Lasts over time but may vary from day to day.  Does not go away despite efforts to end it.  May disrupt your sleep and lead to fatigue.  May cause depression or anxiety.  May make your muscles tense, causing more pain.  Can disrupt your work, hobbies, home life, and relationships with friends and family.  Chronic pain is a very real condition. It is not just in your head. Treatment can help and usually includes several methods used together, such as medicines, physical therapy, exercise, and other treatments. Learning how to relax and changing negative thought patterns can also help you cope.  Chronic pain is complex. Taking an " active role in your treatment will help you better manage your pain. Tell your doctor if you have trouble dealing with your pain. You may have to try several things before you find what works best for you.  Follow-up care is a key part of your treatment and safety. Be sure to make and go to all appointments, and call your doctor if you are having problems. It's also a good idea to know your test results and keep a list of the medicines you take.  How can you care for yourself at home?  Pace yourself. Break up large jobs into smaller tasks. Save harder tasks for days when you have less pain, or go back and forth between hard tasks and easier ones. Take rest breaks.  Relax, and reduce stress. Relaxation techniques such as deep breathing or meditation can help.  Keep moving. Gentle, daily exercise can help reduce pain over the long run. Try low- or no-impact exercises such as walking, swimming, and stationary biking. Do stretches to stay flexible.  Try heat, cold packs, and massage.  Get enough sleep. Chronic pain can make you tired and drain your energy. Talk with your doctor if you have trouble sleeping because of pain.  Think positive. Your thoughts can affect your pain level. Do things that you enjoy to distract yourself when you have pain instead of focusing on the pain. See a movie, read a book, listen to music, or spend time with a friend.  If you think you are depressed, talk to your doctor about treatment.  Keep a daily pain diary. Record how your moods, thoughts, sleep patterns, activities, and medicine affect your pain. You may find that your pain is worse during or after certain activities or when you are feeling a certain emotion. Having a record of your pain can help you and your doctor find the best ways to treat your pain.  Take pain medicines exactly as directed.  If the doctor gave you a prescription medicine for pain, take it as prescribed.  If you are not taking a prescription pain medicine, ask  "your doctor if you can take an over-the-counter medicine.  Reducing constipation caused by pain medicine  Talk to your doctor about a laxative. If a laxative doesn't work, your doctor may suggest a prescription medicine.  Include fruits, vegetables, beans, and whole grains in your diet each day. These foods are high in fiber.  If your doctor recommends it, get more exercise. Walking is a good choice. Bit by bit, increase the amount you walk every day. Try for at least 30 minutes on most days of the week.  Schedule time each day for a bowel movement. A daily routine may help. Take your time and do not strain when having a bowel movement.  When should you call for help?   Call your doctor now or seek immediate medical care if:    Your pain gets worse or is out of control.     You feel down or blue, or you do not enjoy things like you once did. You may be depressed, which is common in people with chronic pain. Depression can be treated.     You have vomiting or cramps for more than 2 hours.   Watch closely for changes in your health, and be sure to contact your doctor if:    You cannot sleep because of pain.     You are very worried or anxious about your pain.     You have trouble taking your pain medicine.     You have any concerns about your pain medicine.     You have trouble with bowel movements, such as:  No bowel movement in 3 days.  Blood in the anal area, in your stool, or on the toilet paper.  Diarrhea for more than 24 hours.   Where can you learn more?  Go to https://www.[employer].net/patiented  Enter N004 in the search box to learn more about \"Chronic Pain: Care Instructions.\"  Current as of: July 31, 2024  Content Version: 14.5    6395-1035 [employer].   Care instructions adapted under license by your healthcare professional. If you have questions about a medical condition or this instruction, always ask your healthcare professional. [employer] disclaims any warranty or liability " for your use of this information.

## 2025-08-11 DIAGNOSIS — R09.82 ALLERGIC RHINITIS WITH POSTNASAL DRIP: ICD-10-CM

## 2025-08-11 DIAGNOSIS — J30.9 ALLERGIC RHINITIS WITH POSTNASAL DRIP: ICD-10-CM

## 2025-08-12 ENCOUNTER — PATIENT OUTREACH (OUTPATIENT)
Dept: CARE COORDINATION | Facility: CLINIC | Age: 55
End: 2025-08-12
Payer: MEDICARE

## 2025-08-12 RX ORDER — FLUTICASONE PROPIONATE 50 MCG
1 SPRAY, SUSPENSION (ML) NASAL DAILY
Qty: 16 G | Refills: 1 | Status: SHIPPED | OUTPATIENT
Start: 2025-08-12

## 2025-08-19 DIAGNOSIS — J30.9 ALLERGIC RHINITIS WITH POSTNASAL DRIP: ICD-10-CM

## 2025-08-19 DIAGNOSIS — R09.82 ALLERGIC RHINITIS WITH POSTNASAL DRIP: ICD-10-CM

## 2025-08-19 RX ORDER — FLUTICASONE PROPIONATE 50 MCG
1 SPRAY, SUSPENSION (ML) NASAL DAILY
Qty: 16 G | Refills: 1 | OUTPATIENT
Start: 2025-08-19